# Patient Record
Sex: FEMALE | Race: WHITE | Employment: OTHER | URBAN - METROPOLITAN AREA
[De-identification: names, ages, dates, MRNs, and addresses within clinical notes are randomized per-mention and may not be internally consistent; named-entity substitution may affect disease eponyms.]

---

## 2017-01-17 ENCOUNTER — TELEPHONE (OUTPATIENT)
Dept: INTERNAL MEDICINE CLINIC | Age: 82
End: 2017-01-17

## 2017-01-17 DIAGNOSIS — R68.89 COLD INTOLERANCE: ICD-10-CM

## 2017-01-17 DIAGNOSIS — I10 ESSENTIAL HYPERTENSION: ICD-10-CM

## 2017-01-17 RX ORDER — ESTRADIOL 0.5 MG/1
TABLET ORAL
Qty: 90 TAB | Refills: 0 | Status: SHIPPED | OUTPATIENT
Start: 2017-01-17 | End: 2017-04-24 | Stop reason: SDUPTHER

## 2017-01-17 RX ORDER — FOSINOPIRL SODIUM 10 MG/1
10 TABLET ORAL DAILY
Qty: 90 TAB | Refills: 3 | Status: SHIPPED | OUTPATIENT
Start: 2017-01-17 | End: 2017-10-24 | Stop reason: SDUPTHER

## 2017-01-17 NOTE — TELEPHONE ENCOUNTER
Pt is very upset that her medications were denied.  She is requesting a return call from the nurse before 1:30pm to discuss this [phone]

## 2017-03-14 ENCOUNTER — OFFICE VISIT (OUTPATIENT)
Dept: INTERNAL MEDICINE CLINIC | Age: 82
End: 2017-03-14

## 2017-03-14 ENCOUNTER — HOSPITAL ENCOUNTER (OUTPATIENT)
Dept: LAB | Age: 82
Discharge: HOME | End: 2017-03-14
Payer: MEDICARE

## 2017-03-14 VITALS
OXYGEN SATURATION: 93 % | BODY MASS INDEX: 21.82 KG/M2 | SYSTOLIC BLOOD PRESSURE: 143 MMHG | RESPIRATION RATE: 18 BRPM | WEIGHT: 139 LBS | TEMPERATURE: 97.8 F | DIASTOLIC BLOOD PRESSURE: 79 MMHG | HEART RATE: 74 BPM | HEIGHT: 67 IN

## 2017-03-14 DIAGNOSIS — I10 ESSENTIAL HYPERTENSION: Primary | ICD-10-CM

## 2017-03-14 DIAGNOSIS — T73.3XXA FATIGUE DUE TO EXCESSIVE EXERTION, INITIAL ENCOUNTER: ICD-10-CM

## 2017-03-14 DIAGNOSIS — R73.02 GLUCOSE INTOLERANCE (IMPAIRED GLUCOSE TOLERANCE): ICD-10-CM

## 2017-03-14 DIAGNOSIS — E07.9 THYROID DISORDER: ICD-10-CM

## 2017-03-14 PROCEDURE — 83036 HEMOGLOBIN GLYCOSYLATED A1C: CPT

## 2017-03-14 PROCEDURE — 84443 ASSAY THYROID STIM HORMONE: CPT

## 2017-03-14 PROCEDURE — 84439 ASSAY OF FREE THYROXINE: CPT

## 2017-03-14 PROCEDURE — 86738 MYCOPLASMA ANTIBODY: CPT

## 2017-03-14 PROCEDURE — 80053 COMPREHEN METABOLIC PANEL: CPT

## 2017-03-14 RX ORDER — AMIODARONE HYDROCHLORIDE 100 MG/1
100 TABLET ORAL DAILY
COMMUNITY
End: 2018-02-01 | Stop reason: ALTCHOICE

## 2017-03-14 RX ORDER — ALBUTEROL SULFATE 90 UG/1
2 AEROSOL, METERED RESPIRATORY (INHALATION)
Qty: 1 INHALER | Refills: 1 | Status: SHIPPED | OUTPATIENT
Start: 2017-03-14 | End: 2018-02-01 | Stop reason: SDUPTHER

## 2017-03-14 RX ORDER — FLUTICASONE PROPIONATE 110 UG/1
2 AEROSOL, METERED RESPIRATORY (INHALATION) EVERY 12 HOURS
Qty: 1 INHALER | Refills: 0 | Status: SHIPPED | OUTPATIENT
Start: 2017-03-14 | End: 2018-02-01 | Stop reason: ALTCHOICE

## 2017-03-14 NOTE — PROGRESS NOTES
Have you been to the ER or urgent care clinic since your last [facility] DX with walking pnuemonia and COPD     Have you been hospitalized since your last visit? No     Have you been seen or consulted any other health care provider outside of [facility] since your last visit (including pap smears, colonoscopy screening)?    No

## 2017-03-14 NOTE — PROGRESS NOTES
Chief Complaint   Patient presents with    Wheezing     cougjh   congestion       Urgent Care 2/26/17       Dx with walking pnuemonia  an COPD       Mycoplasma pneumonia  Pt presents after diagnosis with mycoplasma 2/26/17. She reports she had sx for 2 weeks prior to her diagnosis. She was put on clindamyacin and sx are significantly better. She notes she did not have a + cxr but she was detected through her blood. (notes from [facility] [place] reviewed and noted + mycoplasma). She feels she does have some sob. she is concerned because she was told she had COPD although no hx of smoking and no hx of prior asthma. She has persistent fatigue at 3/10 but recently her baseline has been 5-6 /10 over the past 3 years. It use to be 9-10/10. Thyroid Disease:  Natalie Cuba is a 80 y.o. female here for follow up of hypothyroidism. Lab Results   Component Value Date/Time    TSH 0.426 12/12/2016 12:00 AM     Residual symptoms fatigue, feeling cold and cold intolerance, swelling and feeling slow. she denies feeling excessive energy and palpitations  Thyroid medication has been not decreased since last medication check and labs. She had tsh checked when she was sick revealing normal t4, tsh mildly elevated and elevated t3    Afib  She is followed closely by dr. Skylar Helton and Pardeep Quigley. She is currently on eliquis and no se    Subjective:   Natalie Cuba is a 80 y.o. female with hypertension. Hypertension ROS: taking medications as instructed, no medication side effects noted, no TIA's, no chest pain on exertion, no dyspnea on exertion, no swelling of ankles. New concerns: none. Rectal pouch  Pt had colonscopy. Dr. Carolynn Newman found pouch in rectum and recommened rectal surgery for pouch. Pt had not been able to go due to cardiac issues.         Past Medical History:   Diagnosis Date    Anemia, unspecified     Anxiety     Arrhythmia     a fib    Arthritis     osteoarthritis, rheumatoid    Atrial fibrillation [facility])     Dr. Christopher Cheng and Dr. Rob Mota  following    Autoimmune disease [facility])     sjogren disease Dr. Arsenio Kaye CAD (coronary artery disease)     HTN (hypertension)     Hyperlipidemia LDL goal < 100     Hypothyroid     Insomnia     Osteoarthritis     Sjogren's disease ([address]     Thoracic aneurysm without mention of rupture [facility])      Past Surgical History:   Procedure Laterality Date    HX CHOLECYSTECTOMY      HX COLONOSCOPY  3/2014    Dr. Jeremias Diggs    [facility]      hernia repairs    HX PARTIAL THYROIDECTOMY      HX JANNET AND BSO      HX VEIN STRIPPING       Social History     Social History    Marital status:      Spouse name: N/A    Number of children: N/A    Years of education: N/A     Social History Main Topics    Smoking status: Never Smoker    Smokeless tobacco: None    Alcohol use Yes      Comment: wine    Drug use: None    Sexual activity: Not Asked     Other Topics Concern    None     Social History Narrative    ** Merged History Encounter **          Family History   Problem Relation Age of Onset    Heart Disease Father      aneurysm    Cancer Maternal Grandfather      Current Outpatient Prescriptions   Medication Sig Dispense Refill    amiodarone (PACERONE) 100 mg tablet Take 100 mg by mouth daily.  fosinopril (MONOPRIL) 10 mg tablet Take 1 Tab by mouth daily. 90 Tab 3    estradiol (ESTRACE) 0.5 mg tablet Take 1 pill po daily. 90 Tab 0    levothyroxine (SYNTHROID) 100 mcg tablet Take 1 Tab by mouth Daily (before breakfast). 30 Tab 3    atenolol (TENORMIN) 25 mg tablet Take 25 mg by mouth daily.  diclofenac (VOLTAREN) 1 % gel Apply 4 g to affected area four (4) times daily. 100 g 0    PARoxetine (PAXIL) 30 mg tablet Take 1 Tab by mouth daily.  Indications: ANXIETY WITH DEPRESSION 30 Tab 2    ALPRAZolam (XANAX) 0.25 mg tablet Take 1/2 to 1 tablet po daily ONLY AS NEEDED 10 Tab 0    fluticasone (FLONASE) 50 mcg/actuation nasal spray       oxyCODONE IR (ROXICODONE) 5 mg immediate release tablet       eszopiclone (LUNESTA) 3 mg tablet Take 1 Tab by mouth nightly as needed for Sleep. Max Daily Amount: 3 mg. Please dispense brand only. Use sparingly 45 Tab 0    vitamin b comp & c no.4 (SUPER B COMPLEX + C) 150 mg tab Take  by mouth.  Cetirizine (ALL DAY ALLERGY, CETIRIZINE,) 10 mg cap Take  by mouth.  cholecalciferol, vitamin D3, (VITAMIN D3) 2,000 unit tab Take  by mouth.  CALCIUM CARBONATE (TUMS PO) Take  by mouth.  MULTIVITAMIN (MULTIPLE VITAMIN PO) Take  by mouth.  meloxicam (MOBIC) 7.5 mg tablet Take  by mouth daily as needed.  aspirin 81 mg tablet Take 81 mg by mouth. Allergies   Allergen Reactions    Latex Hives    Pcn [Penicillins] Anaphylaxis    Sulfa (Sulfonamide Antibiotics) Anaphylaxis    Biaxin [Clarithromycin] Nausea Only    Pcn [Penicillins] Hives    Sulfa (Sulfonamide Antibiotics) Nausea Only       Review of Systems - General ROS: positive for  - fatigue, malaise and sleep disturbance  negative for - chills or fever  Cardiovascular ROS: no chest pain or dyspnea on exertion  Respiratory ROS: no cough, shortness of breath, or wheezing    Visit Vitals    /79 (BP 1 Location: Left arm, BP Patient Position: Sitting)    Pulse 74    Temp 97.8 °F (36.6 °C) (Oral)    Resp 18    Ht 5' 7\" (1.702 m)    Wt 139 lb (63 kg)    SpO2 93%    BMI 21.77 kg/m2     General Appearance:  Well developed, well nourished,alert and oriented x 3, and individual in no acute distress. Ears/Nose/Mouth/Throat:   Hearing grossly normal.         Neck: Supple, no lad, no bruits   Chest:   Lungs clear to auscultation bilaterally. irregularly irregular   Cardiovascular:  Regular rate and rhythm, S1, S2 normal, no murmur. Abdomen:   Soft, non-tender, bowel sounds are active. Extremities: No edema bilaterally.     Skin: Warm and dry, no suspicious lesions                     Nirmal Kim was seen today for wheezing. Diagnoses and all orders for this visit:    Essential hypertension  Controlled   Cont meds  -     METABOLIC PANEL, COMPREHENSIVE    Thyroid disorder  Pt was checked and labs on 2/26/17 may reflect sick euthryoid will recheck today and adjust. Pt with fatigue and cold intolrance and may be from residual pneumonia but will check thryoid as rx was decreased due to mild hyperthryoid at last labs  -     T4, FREE  -     TSH 3RD GENERATION    Fatigue due to excessive exertion, initial encounter  Will need to follow up in 2 weeks  No hx of prior copd and prior xray not c/w this  May be from cardiac  Will try on inhalers and assess if beneficial  -     MYCOPLASMA AB, IGG/IGM  -     albuterol (PROVENTIL HFA, VENTOLIN HFA, PROAIR HFA) 90 mcg/actuation inhaler; Take 2 Puffs by inhalation every four (4) hours as needed for Wheezing or Shortness of Breath. -     fluticasone (FLOVENT HFA) 110 mcg/actuation inhaler; Take 2 Puffs by inhalation every twelve (12) hours. Rinse mouth after use    Glucose intolerance (impaired glucose tolerance)  -     HEMOGLOBIN A1C WITH EAG      I spent 25 min with this patient and >50% of the time was spent on counseling and management of fatigue and dyspnea. For urology She still needs to follow up after Dr. Marquis Farrell. She still needs follow up with dr. Cecilia Ross for recommended further testing manometry and defecography. I asked her to follow up re: her breathing in 1-2 weeks. Son lives close by and helping with pt. This note will not be viewable in [address]

## 2017-03-14 NOTE — MR AVS SNAPSHOT
Visit Information Date & Time Provider Department Dept. Phone Encounter #  
 3/14/2017 11:00 AM Kary Tristan MD [facility] [address] [id number] Upcoming Health Maintenance Date Due Pneumococcal 65+ Low/Medium Risk (1 of 2 - PCV13) 4/22/1993 INFLUENZA AGE 9 TO ADULT 8/1/2016 MEDICARE YEARLY EXAM 12/3/2016 GLAUCOMA SCREENING Q2Y 4/10/2017 DTaP/Tdap/Td series (2 - Td) 1/13/2024 Allergies as of 3/14/2017  Review Complete On: 3/14/2017 By: Kary Tristan MD  
  
 Severity Noted Reaction Type Reactions Latex  04/09/2013    Hives Pcn [Penicillins] High 01/26/2015   Systemic Anaphylaxis Sulfa (Sulfonamide Antibiotics) High 01/26/2015   Systemic Anaphylaxis Biaxin [Clarithromycin]  04/09/2013    Nausea Only Pcn [Penicillins]  04/09/2013    Hives Sulfa (Sulfonamide Antibiotics)  04/09/2013    Nausea Only Current Immunizations  Reviewed on 12/3/2015 Name Date Tdap 1/13/2014 Not reviewed this visit You Were Diagnosed With   
  
 Codes Comments Essential hypertension    -  Primary ICD-10-CM: I10 
ICD-9-CM: 401.9 Thyroid disorder     ICD-10-CM: E07.9 ICD-9-CM: 246. 9 Fatigue due to excessive exertion, initial encounter     ICD-10-CM: T73. 3XXA ICD-9-CM: 994.5, E927.9 Vitals BP Pulse Temp Resp Height(growth percentile) Weight(growth percentile) 143/79 (BP 1 Location: Left arm, BP Patient Position: Sitting) 74 97.8 °F (36.6 °C) (Oral) 18 5' 7\" (1.702 m) 139 lb (63 kg) SpO2 BMI OB Status Smoking Status 93% 21.77 kg/m2 Hysterectomy Never Smoker BMI and BSA Data Body Mass Index Body Surface Area 21.77 kg/m 2 1.73 m 2 Preferred Pharmacy Pharmacy Name Phone [facility], [address] [phone] Your Updated Medication List  
  
   
 This list is accurate as of: 3/14/17 12:06 PM.  Always use your most recent med list.  
  
  
  
  
 albuterol 90 mcg/actuation inhaler Commonly known as:  PROVENTIL HFA, VENTOLIN HFA, PROAIR HFA Take 2 Puffs by inhalation every four (4) hours as needed for Wheezing or Shortness of Breath. ALL DAY ALLERGY (CETIRIZINE) 10 mg Cap Generic drug:  Cetirizine Take  by mouth. ALPRAZolam 0.25 mg tablet Commonly known as:  Willjacinta Phoenix Take 1/2 to 1 tablet po daily ONLY AS NEEDED  
  
 amiodarone 100 mg tablet Commonly known as:  Ladd Boeck Take 100 mg by mouth daily. aspirin 81 mg tablet Take 81 mg by mouth. atenolol 25 mg tablet Commonly known as:  TENORMIN Take 25 mg by mouth daily. diclofenac 1 % Gel Commonly known as:  VOLTAREN Apply 4 g to affected area four (4) times daily. estradiol 0.5 mg tablet Commonly known as:  ESTRACE Take 1 pill po daily. eszopiclone 3 mg tablet Commonly known as:  Sherryle Revel Take 1 Tab by mouth nightly as needed for Sleep. Max Daily Amount: 3 mg. Please dispense brand only. Use sparingly * fluticasone 50 mcg/actuation nasal spray Commonly known as:  FLONASE  
  
 * fluticasone 110 mcg/actuation inhaler Commonly known as:  FLOVENT HFA Take 2 Puffs by inhalation every twelve (12) hours. Rinse mouth after use  
  
 fosinopril 10 mg tablet Commonly known as:  MONOPRIL Take 1 Tab by mouth daily. levothyroxine 100 mcg tablet Commonly known as:  SYNTHROID Take 1 Tab by mouth Daily (before breakfast). MOBIC 7.5 mg tablet Generic drug:  meloxicam  
Take  by mouth daily as needed. MULTIPLE VITAMIN PO Take  by mouth. oxyCODONE IR 5 mg immediate release tablet Commonly known as:  Tonia Sotelo PARoxetine 30 mg tablet Commonly known as:  PAXIL Take 1 Tab by mouth daily. Indications: ANXIETY WITH DEPRESSION  
  
 SUPER B COMPLEX + C 150 mg Tab Generic drug:  vitamin b comp & c no.4 Take  by mouth. TUMS PO Take  by mouth. VITAMIN D3 2,000 unit Tab Generic drug:  cholecalciferol (vitamin D3) Take  by mouth. * Notice: This list has 2 medication(s) that are the same as other medications prescribed for you. Read the directions carefully, and ask your doctor or other care provider to review them with you. Prescriptions Printed Refills  
 fluticasone (FLOVENT HFA) 110 mcg/actuation inhaler 0 Sig: Take 2 Puffs by inhalation every twelve (12) hours. Rinse mouth after use Class: Print Route: Inhalation Prescriptions Sent to Pharmacy Refills  
 albuterol (PROVENTIL HFA, VENTOLIN HFA, PROAIR HFA) 90 mcg/actuation inhaler 1 Sig: Take 2 Puffs by inhalation every four (4) hours as needed for Wheezing or Shortness of Breath. Class: Normal  
 Pharmacy: [facility], [address] #: [phone] Route: Inhalation We Performed the Following METABOLIC PANEL, COMPREHENSIVE [92941 CPT(R)] MYCOPLASMA AB, IGG/IGM [id number] CPT(R)] T4, FREE [id number] CPT(R)] TSH 3RD GENERATION [79132 CPT(R)] Introducing [facility]! Dear Lavell Page: 
Thank you for requesting a [employer] account. Our records indicate that you already have an active [employer] account. You can access your account anytime at [email or web address] Did you know that you can access your hospital and ER discharge instructions at any time in [employer]? You can also review all of your test results from your hospital stay or ER visit. Additional Information If you have questions, please visit the Frequently Asked Questions section of the [employer] website at [email or web address] Remember, [employer] is NOT to be used for urgent needs. For medical emergencies, dial 911. Now available from your iPhone and Android! Please provide this summary of care documentation to your next provider. Your primary care clinician is listed as Felipe Almodovar. If you have any questions after today's visit, please call [phone].

## 2017-03-16 LAB
ALBUMIN SERPL-MCNC: 4 G/DL (ref 3.5–4.7)
ALBUMIN/GLOB SERPL: 1.7 {RATIO} (ref 1.2–2.2)
ALP SERPL-CCNC: 72 IU/L (ref 39–117)
ALT SERPL-CCNC: 10 IU/L (ref 0–32)
AST SERPL-CCNC: 21 IU/L (ref 0–40)
BILIRUB SERPL-MCNC: 0.5 MG/DL (ref 0–1.2)
BUN SERPL-MCNC: 15 MG/DL (ref 8–27)
BUN/CREAT SERPL: 19 (ref 11–26)
CALCIUM SERPL-MCNC: 9.1 MG/DL (ref 8.7–10.3)
CHLORIDE SERPL-SCNC: 97 MMOL/L (ref 96–106)
CO2 SERPL-SCNC: 27 MMOL/L (ref 18–29)
CREAT SERPL-MCNC: 0.77 MG/DL (ref 0.57–1)
EST. AVERAGE GLUCOSE BLD GHB EST-MCNC: 126 MG/DL
GLOBULIN SER CALC-MCNC: 2.4 G/DL (ref 1.5–4.5)
GLUCOSE SERPL-MCNC: 84 MG/DL (ref 65–99)
HBA1C MFR BLD: 6 % (ref 4.8–5.6)
M PNEUMO IGG SER IA-ACNC: <100 U/ML (ref 0–99)
M PNEUMO IGM SER IA-ACNC: <770 U/ML (ref 0–769)
POTASSIUM SERPL-SCNC: 4.8 MMOL/L (ref 3.5–5.2)
PROT SERPL-MCNC: 6.4 G/DL (ref 6–8.5)
SODIUM SERPL-SCNC: 139 MMOL/L (ref 134–144)
T4 FREE SERPL-MCNC: 1.74 NG/DL (ref 0.82–1.77)
TSH SERPL DL<=0.005 MIU/L-ACNC: 4.6 UIU/ML (ref 0.45–4.5)

## 2017-03-21 ENCOUNTER — OFFICE VISIT (OUTPATIENT)
Dept: INTERNAL MEDICINE CLINIC | Age: 82
End: 2017-03-21

## 2017-03-21 VITALS
RESPIRATION RATE: 20 BRPM | TEMPERATURE: 97.9 F | BODY MASS INDEX: 22.13 KG/M2 | OXYGEN SATURATION: 97 % | HEART RATE: 102 BPM | HEIGHT: 67 IN | WEIGHT: 141 LBS | SYSTOLIC BLOOD PRESSURE: 129 MMHG | DIASTOLIC BLOOD PRESSURE: 86 MMHG

## 2017-03-21 DIAGNOSIS — Z13.31 SCREENING FOR DEPRESSION: ICD-10-CM

## 2017-03-21 DIAGNOSIS — Z00.00 MEDICARE ANNUAL WELLNESS VISIT, INITIAL: Primary | ICD-10-CM

## 2017-03-21 DIAGNOSIS — J43.9 PULMONARY EMPHYSEMA, UNSPECIFIED EMPHYSEMA TYPE (HCC): ICD-10-CM

## 2017-03-21 DIAGNOSIS — I10 ESSENTIAL HYPERTENSION: ICD-10-CM

## 2017-03-21 DIAGNOSIS — Z00.00 ROUTINE GENERAL MEDICAL EXAMINATION AT A HEALTH CARE FACILITY: ICD-10-CM

## 2017-03-21 DIAGNOSIS — J01.00 SUBACUTE MAXILLARY SINUSITIS: ICD-10-CM

## 2017-03-21 DIAGNOSIS — Z71.89 ADVANCED CARE PLANNING/COUNSELING DISCUSSION: ICD-10-CM

## 2017-03-21 DIAGNOSIS — Z13.39 SCREENING FOR ALCOHOLISM: ICD-10-CM

## 2017-03-21 RX ORDER — DOXYCYCLINE 100 MG/1
100 TABLET ORAL 2 TIMES DAILY
Qty: 20 TAB | Refills: 0 | Status: SHIPPED | OUTPATIENT
Start: 2017-03-21 | End: 2017-05-04 | Stop reason: ALTCHOICE

## 2017-03-21 NOTE — PROGRESS NOTES
Chief Complaint   Patient presents with    Thyroid Problem    Annual Wellness Visit     Pt presents for follow up from pneumnia last visit and thyroid as well as medicare annual..  Please see JABIER Kaye note for [employer]ha [employer]. Pt does not have any questions for me about this part of exam    Mycoplasma pneumonia  She reports she is feeling a little better. Labs reviewed. Presents with nasal blockage, post nasal drip and bilateral sinus pain for 7 days. Denies shortness of breath, chest pain, abdominal pain, nausea, vomiting,  sneezing and swollen glands. Symptoms are moderate. Recent treatment for similar symptoms? None. Has tried over-the-counter remedies flonase with mild and paritial relief of symptoms. Contacts with similar infections: no.  Asthma?:  no.   reports that she has never smoked. She does not have any smokeless tobacco history on file. COPD  Discussed with pt smoking hx and she reports some use at 16-19 yo but exposed to second hand smoke from parents and first . She feels better on her advair inhaler and has been compliant with use. Her energy level is much better as well. Thyroid Disease:  Jossie Bains is a 80 y.o. female here for follow up of hypothyroidism. Lab Results   Component Value Date/Time    TSH 4.600 03/14/2017 12:41 PM     Residual symptoms fatigue, feeling cold and cold intolerance, swelling and feeling slow. she denies feeling excessive energy and palpitations  Thyroid medication has been not decreased since last medication check and labs. She had tsh checked when she was sick revealing normal t4, tsh mildly elevated and elevated t3. This was rechecked and similar readings    Afib  She is followed closely by dr. Lorenzo Lam and Marie Mason. She is currently on eliquis and no se    Subjective:   Jossie Bains is a 80 y.o. female with hypertension.   Hypertension ROS: taking medications as instructed, no medication side effects noted, no TIA's, no chest pain on exertion, no dyspnea on exertion, no swelling of ankles. New concerns: none. Rectal pouch  Pt had colonscopy. Dr. Ioana Dang found pouch in rectum and recommened rectal surgery for pouch. Pt had not been able to go due to cardiac issues. Past Medical History:   Diagnosis Date    Anemia, unspecified     Anxiety     Arrhythmia     a fib    Arthritis     osteoarthritis, rheumatoid    Atrial fibrillation (HCC)     Dr. Hannah Deluna and Dr. Kaycee Ruffin  following    Autoimmune disease [facility])     sjogren disease Dr. Nicky Chi CAD (coronary artery disease)     HTN (hypertension)     Hyperlipidemia LDL goal < 100     Hypothyroid     Insomnia     Osteoarthritis     [address]ogren's disease ([facility]     Thoracic aneurysm without mention of rupture [facility])      Past Surgical History:   Procedure Laterality Date    HX CHOLECYSTECTOMY      HX COLONOSCOPY  3/2014    Dr. Naheed Wasserman    [facility]      hernia repairs    HX PARTIAL THYROIDECTOMY      HX JANNET AND BSO      HX VEIN STRIPPING       Social History     Social History    Marital status:      Spouse name: N/A    Number of children: N/A    Years of education: N/A     Social History Main Topics    Smoking status: Never Smoker    Smokeless tobacco: None    Alcohol use Yes      Comment: wine    Drug use: None    Sexual activity: Not Asked     Other Topics Concern    None     Social History Narrative    ** Merged History Encounter **          Family History   Problem Relation Age of Onset    Heart Disease Father      aneurysm    Cancer Maternal Grandfather      Current Outpatient Prescriptions   Medication Sig Dispense Refill    doxycycline (ADOXA) 100 mg tablet Take 1 Tab by mouth two (2) times a day. Do not take with calcium or other multivtamins 20 Tab 0    amiodarone (PACERONE) 100 mg tablet Take 100 mg by mouth daily.  apixaban (ELIQUIS) 5 mg tablet Take 1 Tab by mouth two (2) times a day.  RX BY CARDIOLOGY 1 Tab 0    fosinopril (MONOPRIL) 10 mg tablet Take 1 Tab by mouth daily. 90 Tab 3    estradiol (ESTRACE) 0.5 mg tablet Take 1 pill po daily. 90 Tab 0    levothyroxine (SYNTHROID) 100 mcg tablet Take 1 Tab by mouth Daily (before breakfast). 30 Tab 3    atenolol (TENORMIN) 25 mg tablet Take 25 mg by mouth daily.  PARoxetine (PAXIL) 30 mg tablet Take 1 Tab by mouth daily. Indications: ANXIETY WITH DEPRESSION 30 Tab 2    eszopiclone (LUNESTA) 3 mg tablet Take 1 Tab by mouth nightly as needed for Sleep. Max Daily Amount: 3 mg. Please dispense brand only. Use sparingly 45 Tab 0    vitamin b comp & c no.4 (SUPER B COMPLEX + C) 150 mg tab Take  by mouth.  Cetirizine (ALL DAY ALLERGY, CETIRIZINE,) 10 mg cap Take  by mouth.  cholecalciferol, vitamin D3, (VITAMIN D3) 2,000 unit tab Take  by mouth.  CALCIUM CARBONATE (TUMS PO) Take  by mouth.  MULTIVITAMIN (MULTIPLE VITAMIN PO) Take  by mouth.  albuterol (PROVENTIL HFA, VENTOLIN HFA, PROAIR HFA) 90 mcg/actuation inhaler Take 2 Puffs by inhalation every four (4) hours as needed for Wheezing or Shortness of Breath. 1 Inhaler 1    fluticasone (FLOVENT HFA) 110 mcg/actuation inhaler Take 2 Puffs by inhalation every twelve (12) hours. Rinse mouth after use 1 Inhaler 0    ALPRAZolam (XANAX) 0.25 mg tablet Take 1/2 to 1 tablet po daily ONLY AS NEEDED 10 Tab 0    fluticasone (FLONASE) 50 mcg/actuation nasal spray       oxyCODONE IR (ROXICODONE) 5 mg immediate release tablet       aspirin 81 mg tablet Take 81 mg by mouth.        Allergies   Allergen Reactions    Latex Hives    Pcn [Penicillins] Anaphylaxis    Sulfa (Sulfonamide Antibiotics) Anaphylaxis    Biaxin [Clarithromycin] Nausea Only    Pcn [Penicillins] Hives    Sulfa (Sulfonamide Antibiotics) Nausea Only       Review of Systems - General ROS: positive for  - fatigue, malaise and sleep disturbance  negative for - chills or fever  Cardiovascular ROS: no chest pain or dyspnea on exertion  Respiratory ROS: no cough, shortness of breath, or wheezing    Visit Vitals    /86 (BP 1 Location: Left arm, BP Patient Position: Sitting)    Pulse (!) 102    Temp 97.9 °F (36.6 °C) (Oral)    Resp 20    Ht 5' 7\" (1.702 m)    Wt 141 lb (64 kg)    SpO2 97%    BMI 22.08 kg/m2     General Appearance:  Well developed, well nourished,alert and oriented x 3, and individual in no acute distress. Ears/Nose/Mouth/Throat:   Hearing grossly normal. Maxillary sinus pain on palpation         Neck: Supple, no lad, no bruits   Chest:   Lungs clear to auscultation bilaterally. irregularly irregular   Cardiovascular:  Regular rate and rhythm, S1, S2 normal, no murmur. Abdomen:   Soft, non-tender, bowel sounds are active. Extremities: No edema bilaterally. Skin: Warm and dry, no suspicious lesions                     Reagan Rosado was seen today for thyroid problem and annual wellness visit. Diagnoses and all orders for this visit:    Medicare annual wellness visit, initial    Subacute maxillary sinusitis  Would like for her not to be on abx right now  Will simply saline, flonase and ipratropium sprays for now  ini will add on doxy, noted she was on levoquin  Sx improved then came back    Pulmonary emphysema, unspecified emphysema type ([address]  Due to second hand smoke  I think she is doing better on the advair. Will keep her on this. Once her sinusitis clears and if stable for 3 months may wean her off if no other issues on hand    Essential hypertension  Cont meds    Routine general medical examination at a health care facility    Screening for alcoholism    Screening for depression    Advanced care planning/counseling discussion    Other orders  -     doxycycline (ADOXA) 100 mg tablet; Take 1 Tab by mouth two (2) times a day. Do not take with calcium or other multivtamins      I spent 25 min with this patient and >50% of the time was spent on counseling and management of copd, sinusitis, fatiuge.   Will not step off advair until sinus sx resolve Son lives close by and helping with pt. This note will not be viewable in [address]

## 2017-03-21 NOTE — PATIENT INSTRUCTIONS
Medicare Part B Preventive Services Guidelines/Limitations Date last completed and Frequency Due Date   Bone Mass Measurement  (age 72 & older, biennial) Requires diagnosis related to osteoporosis or estrogen deficiency. Biennial benefit unless patient has history of long-term glucocorticoid tx or baseline is needed because initial test was by other method Completed 5/2013    Recommended every 2 years As recommended by your PCP or Specialist     Cardiovascular Screening Blood Tests (every 5 years)  Total cholesterol, HDL, Triglycerides Order as a panel if possible Completed 7/2016    As recommended by your PCP As recommended by your PCP or Specialist   Colorectal Cancer Screening  -Fecal occult blood test (annual)  -Flexible sigmoidoscopy (5y)  -Screening colonoscopy (10y)  -Barium Enema Age 49-80; After age [de-identified] if history of abnormal results Completed 3/19/2014     Recommended every 5 to 10 years  As recommended by your PCP or Specialist     Counseling to Prevent Tobacco Use (up to 8 sessions per year)  - Counseling greater than 3 and up to 10 minutes  - Counseling greater than 10 minutes Patients must be asymptomatic of tobacco-related conditions to receive as preventive service N/A N/A   Diabetes Screening Tests (at least every 3 years, Medicare covers annually or at 6-month intervals for prediabetic patients)    Fasting blood sugar (FBS) or glucose tolerance test (GTT) Patient must be diagnosed with one of the following:  -Hypertension, Dyslipidemia, obesity, previous impaired FBS or GTT  Or any two of the following: overweight, FH of diabetes, age ? 72, history of gestational diabetes, birth of baby weighing more than 9 pounds Completed 2/2017    Recommended every 3 years for non-diabetics     As recommended by your PCP or Specialist     Glaucoma Screening (no USPSTF recommendation) Diabetes mellitus, family history, , age 48 or over,  American, age 72 or over Completed within the last year    Recommended annually As recommended by your PCP or Specialist   Seasonal Influenza Vaccination (annually)  Completed Fall 2016    Recommended Annually Completed for 2016 flu season   TDAP Vaccination  Completed 1/2014    Recommended every 10 years As recommended by your PCP or Specialist   Zoster (Shingles) Vaccination Covered by Medicare Part D through the pharmacy- PCP provides prescription Completed 1/2013    Recommended once over age 48  Complete   Pneumococcal Vaccination (once after 72)  Pneumo 23-   Recommended once over the age of 72    Prevnar 15-  Recommended once over the age of 72 As recommended by your PCP or Specialist    You are due for a Prevnar 13 vaccine. You can get this at your local pharmacy with a prescription from your PCP. Screening Mammography (biennial age 54-69) Annually (age 36 or over) As recommended by your PCP or Specialist   As recommended by your PCP or Specialist     Screening Pap Tests and Pelvic Examination (up to age 79 and after 79 if unknown history or abnormal study last 8 years) Every 25 months except high risk As recommended by your PCP or Specialist   As recommended by your PCP or Specialist     Ultrasound Screening for Abdominal Aortic Aneurysm (AAA) (once) Patient must be referred through [facility] and not have had a screening for abdominal aortic aneurysm before under Medicare. Limited to patients who meet one of the following criteria:  - Men who are 73-68 years old and have smoked more than 100 cigarettes in their lifetime.  -Anyone with a FH of AAA  -Anyone recommended for screening by USPSTF Not indicated unless recommended by PCP   Not indicated unless recommended by PCP     Family Practice Management 2011    Please bring a copy of your completed advance medical directive to the office so it may be added to your medical record. Thank you. If you have any questions or concerns please feel free to contact me at [phone].   It was a pleasure meeting you today and participating in your healthcare.   Krista Delgado RN

## 2017-03-21 NOTE — PROGRESS NOTES
Nurse Navigator Medicare Wellness Visit performed by HIMA Gonzalez    This is an Initial Anastasiya Exam (AWV) (Performed 12 months after IPPE or effective date of Medicare Part B enrollment, Once in a lifetime)    I have reviewed the patient's medical history in detail and updated the computerized patient record. History     Past Medical History:   Diagnosis Date    Anemia, unspecified     Anxiety     Arrhythmia     a fib    Arthritis     osteoarthritis, rheumatoid    Atrial fibrillation (HCC)     Dr. Yuan Brand and Dr. Joyce Cespedes  following    Autoimmune disease [facility])     sjogren disease Dr. Rajiv Wallace CAD (coronary artery disease)     HTN (hypertension)     Hyperlipidemia LDL goal < 100     Hypothyroid     Insomnia     Osteoarthritis     [facility]ogren's [facility] ([facility]     Thoracic aneurysm without mention of rupture ([facility]       Past Surgical History:   Procedure Laterality Date    HX CHOLECYSTECTOMY      HX COLONOSCOPY  3/2014    Dr. Farrah Kendall    HX OTHER SURGICAL      hernia repairs    HX PARTIAL THYROIDECTOMY      HX JANNET AND BSO      HX VEIN STRIPPING       Current Outpatient Prescriptions   Medication Sig Dispense Refill    doxycycline (ADOXA) 100 mg tablet Take 1 Tab by mouth two (2) times a day. Do not take with calcium or other multivtamins 20 Tab 0    amiodarone (PACERONE) 100 mg tablet Take 100 mg by mouth daily.  apixaban (ELIQUIS) 5 mg tablet Take 1 Tab by mouth two (2) times a day. RX BY CARDIOLOGY 1 Tab 0    fosinopril (MONOPRIL) 10 mg tablet Take 1 Tab by mouth daily. 90 Tab 3    estradiol (ESTRACE) 0.5 mg tablet Take 1 pill po daily. 90 Tab 0    levothyroxine (SYNTHROID) 100 mcg tablet Take 1 Tab by mouth Daily (before breakfast). 30 Tab 3    atenolol (TENORMIN) 25 mg tablet Take 25 mg by mouth daily.  PARoxetine (PAXIL) 30 mg tablet Take 1 Tab by mouth daily.  Indications: ANXIETY WITH DEPRESSION 30 Tab 2    eszopiclone (LUNESTA) 3 mg tablet Take 1 Tab by mouth nightly as needed for Sleep. Max Daily Amount: 3 mg. Please dispense brand only. Use sparingly 45 Tab 0    vitamin b comp & c no.4 (SUPER B COMPLEX + C) 150 mg tab Take  by mouth.  Cetirizine (ALL DAY ALLERGY, CETIRIZINE,) 10 mg cap Take  by mouth.  cholecalciferol, vitamin D3, (VITAMIN D3) 2,000 unit tab Take  by mouth.  CALCIUM CARBONATE (TUMS PO) Take  by mouth.  MULTIVITAMIN (MULTIPLE VITAMIN PO) Take  by mouth.  albuterol (PROVENTIL HFA, VENTOLIN HFA, PROAIR HFA) 90 mcg/actuation inhaler Take 2 Puffs by inhalation every four (4) hours as needed for Wheezing or Shortness of Breath. 1 Inhaler 1    fluticasone (FLOVENT HFA) 110 mcg/actuation inhaler Take 2 Puffs by inhalation every twelve (12) hours. Rinse mouth after use 1 Inhaler 0    ALPRAZolam (XANAX) 0.25 mg tablet Take 1/2 to 1 tablet po daily ONLY AS NEEDED 10 Tab 0    fluticasone (FLONASE) 50 mcg/actuation nasal spray       oxyCODONE IR (ROXICODONE) 5 mg immediate release tablet       aspirin 81 mg tablet Take 81 mg by mouth.        Allergies   Allergen Reactions    Latex Hives    Pcn [Penicillins] Anaphylaxis    Sulfa (Sulfonamide Antibiotics) Anaphylaxis    Biaxin [Clarithromycin] Nausea Only    Pcn [Penicillins] Hives    Sulfa (Sulfonamide Antibiotics) Nausea Only     Family History   Problem Relation Age of Onset    Heart Disease Father      aneurysm    Cancer Maternal Grandfather      Social History   Substance Use Topics    Smoking status: Never Smoker    Smokeless tobacco: Not on file    Alcohol use Yes      Comment: wine     Patient Active Problem List   Diagnosis Code    Anxiety F41.9    Insomnia G47.00    HTN (hypertension) I10    Hyperlipidemia LDL goal < 100 E78.5    Osteoarthritis M19.90    Hypothyroid E03.9    CAD (coronary artery disease) I25.10    Atrial fibrillation ([address] I48.91    Thoracic aneurysm without mention of rupture (HCC) I71.2    Anemia, unspecified D64.9    Personal history of colonic polyps Z86.010    Advanced care planning/counseling discussion Z71.89         Depression Risk Factor Screening:   Patient denies feelings of being down, depressed or hopeless at this time. Patient states that they have a strong support system within their family & friends. Patient shares that she has been healing from diagnoses of pneumonia & a sinus infection simultaneously which kept her at home. Patient adds that she is just now beginning to feel better & she has started driving again. Patient states that while she was sick, she was feeling low & very fatigued. Patient states that she was in contact with PCP during the illness. Patient is in good spirits today. Patient denies thoughts of harm to self or others. PHQ 2 / 9, over the last two weeks 3/21/2017   Little interest or pleasure in doing things Not at all   Feeling down, depressed or hopeless Not at all   Total Score PHQ 2 0     Alcohol Risk Factor Screening: On any occasion during the past 3 months, have you had more than 3 drinks containing alcohol? No    Do you average more than 7 drinks per week? No    Functional Ability and Level of Safety:     Hearing Loss   normal-to-mild    Activities of Daily Living   Self-care. Patient states that she lives alone in a private condominium & her son checks in on her often. Patient adds that her two neighbors are very supportive as well. Patient states independence in all ADLs & denies the use of assistive devices for ambulation. NN encouraged patient to continue and/ or introduce routine physical exercise into their daily routine as applicable & as recommended by PCP. Patient verbalized understanding & agreement to take this into consideration; however, patient has been laying low for the past few weeks while healing from pneumonia & a sinus infection.    Requires assistance with:   ADL Assessment 3/21/2017   Feeding yourself No Help Needed   Getting from bed to chair No Help Needed Getting dressed No Help Needed   Bathing or showering No Help Needed   Walk across the room (includes cane/walker) No Help Needed   Using the telphone No Help Needed   Taking your medications No Help Needed   Preparing meals No Help Needed   Managing money (expenses/bills) No Help Needed   Moderately strenuous housework (laundry) No Help Needed   Shopping for personal items (toiletries/medicines) No Help Needed   Shopping for groceries No Help Needed   Driving No Help Needed   Climbing a flight of stairs No Help Needed   Getting to places beyond walking distances No Help Needed       Fall Risk   Patient reports one minor trip & fall at home. Patient states that she was cleaning & had moved a rug which she then tripped over. Patient states that she was able to get up by herself after the fall. Patient denies injury at the time of the fall. Patient denies a visit to the ER/ MD at the time of the fall. Patient denies the use of assistive devices for ambulation. Patient denies any additional falls. Patient denies feeling dizzy, weak, lightheaded & states no loss of consciousness at the time of the fall. Patient verbalized fall prevention strategies. Fall Risk Assessment, last 12 mths 3/21/2017   Able to walk? Yes   Fall in past 12 months? Yes   Fall with injury? No   Number of falls in past 12 months 1   Fall Risk Score 1     Abuse Screen   Patient is not abused    Review of Systems   Medicare Wellness Visit    Physical Examination     No exam data present    Evaluation of Cognitive Function:  Mood/affect:  happy  Appearance: age appropriate and casually dressed  Family member/caregiver input: None present; however, patient reports a strong support system. No exam performed today, Medicare Wellness Visit.     Patient Care Team:  Jasen Barrera MD as PCP - General (Internal Medicine)  Jasen Barrera MD (Internal Medicine)    Advice/Referrals/Counseling   Education and counseling provided:  End-of-Life planning (with patient's consent)  Pneumococcal Vaccine  Influenza Vaccine  Screening Mammography  Screening Pap and pelvic (covered once every 2 years)  Colorectal cancer screening tests  Bone mass measurement (DEXA)  Screening for glaucoma  tdap & shingles vaccinations      Assessment/Plan   1. Patient states that a completed Advanced Medical Directive is at home. NN encouraged patient to bring a copy of the completed Advanced Medical Directive to the office for scanning into the medical record. Patient verbalized understanding & agreement. 2. Patient is up to date on the following immunizations: flu vaccine (admin 10/2016), tdap vaccine (admin 1/2014), shingles vaccine (admin 1/2013). Patient confirmed the aforementioned preventative immunization dates are correct. Patient is unsure of her past pneumonia vaccine history & will inquire with PCP today about getting a pneumonia vaccine as she does not want to catch pneumonia again. PCP notified. Patient's health maintenance immunization record has been updated & is current. 3. Due to the patient's age, screening mammograms & screening colonoscopies (report on file from 3/19/2014 performed by Dr. Belma Hatchet at [facility]) are no longer indicated unless recommended by PCP or a specialist. Patient's last screening dexa scan was completed 5/2013 & a report is on file in the patient's medical record. 4. Patient was not wearing corrective lenses. Patient reports having a routine eye exam & glaucoma screening within the last year performed by [facility] Taiwo Gonzalez [facility]. [facility] faxed requesting a copy of patient's last eye exam with glaucoma screening with patient's verbal approval.     5. Please see depression screening section for additional information. Patient verbalized understanding of all information discussed. Patient was given the opportunity to ask questions.   Medication reconciliation completed by MA/ LPN and reviewed by PCP. Patient provided AVS which includes Medicare Wellness Preventative Screening Table.

## 2017-03-21 NOTE — MR AVS SNAPSHOT
Visit Information Date & Time Provider Department Dept. Phone Encounter #  
 3/21/2017  2:15 PM Rebeca Bentley MD [facility] [address] [id number] Upcoming Health Maintenance Date Due Pneumococcal 65+ Low/Medium Risk (1 of 2 - PCV13) 4/22/1993 GLAUCOMA SCREENING Q2Y 4/10/2017 MEDICARE YEARLY EXAM 3/22/2018 DTaP/Tdap/Td series (2 - Td) 1/13/2024 Allergies as of 3/21/2017  Review Complete On: 3/21/2017 By: Teresa Bruno LPN Severity Noted Reaction Type Reactions Latex  04/09/2013    Hives Pcn [Penicillins] High 01/26/2015   Systemic Anaphylaxis Sulfa (Sulfonamide Antibiotics) High 01/26/2015   Systemic Anaphylaxis Biaxin [Clarithromycin]  04/09/2013    Nausea Only Pcn [Penicillins]  04/09/2013    Hives Sulfa (Sulfonamide Antibiotics)  04/09/2013    Nausea Only Current Immunizations  Reviewed on 12/3/2015 Name Date Influenza Vaccine 10/15/2016 Tdap 1/13/2014 Not reviewed this visit You Were Diagnosed With   
  
 Codes Comments Subacute maxillary sinusitis    -  Primary ICD-10-CM: J01.00 ICD-9-CM: 461.0 Pulmonary emphysema, unspecified emphy[facility] type ([facility]     ICD-10-CM: J43.9 ICD-9-CM: 492.8 Essential hypertension     ICD-10-CM: I10 
ICD-9-CM: 401.9 Vitals BP Pulse Temp Resp Height(growth percentile) Weight(growth percentile) 129/86 (BP 1 Location: Left arm, BP Patient Position: Sitting) (!) 102 97.9 °F (36.6 °C) (Oral) 20 5' 7\" (1.702 m) 141 lb (64 kg) SpO2 BMI OB Status Smoking Status 97% 22.08 kg/m2 Hysterectomy Never Smoker BMI and BSA Data Body Mass Index Body Surface Area 22.08 kg/m 2 1.74 m 2 Preferred Pharmacy Pharmacy Name Phone [facility], [address] [phone] Your Updated Medication List  
  
   
 This list is accurate as of: 3/21/17  3:14 PM.  Always use your most recent med list.  
  
  
  
  
 albuterol 90 mcg/actuation inhaler Commonly known as:  PROVENTIL HFA, VENTOLIN HFA, PROAIR HFA Take 2 Puffs by inhalation every four (4) hours as needed for Wheezing or Shortness of Breath. ALL DAY ALLERGY (CETIRIZINE) 10 mg Cap Generic drug:  Cetirizine Take  by mouth. ALPRAZolam 0.25 mg tablet Commonly known as:  Ace Sa Take 1/2 to 1 tablet po daily ONLY AS NEEDED  
  
 amiodarone 100 mg tablet Commonly known as:  Isma Heady Take 100 mg by mouth daily. apixaban 5 mg tablet Commonly known as:  Cliffton Rubinstein Take 1 Tab by mouth two (2) times a day. RX BY CARDIOLOGY  
  
 aspirin 81 mg tablet Take 81 mg by mouth. atenolol 25 mg tablet Commonly known as:  TENORMIN Take 25 mg by mouth daily. doxycycline 100 mg tablet Commonly known as:  ADOXA Take 1 Tab by mouth two (2) times a day. Do not take with calcium or other multivtamins  
  
 estradiol 0.5 mg tablet Commonly known as:  ESTRACE Take 1 pill po daily. eszopiclone 3 mg tablet Commonly known as:  Sandra Nicks Take 1 Tab by mouth nightly as needed for Sleep. Max Daily Amount: 3 mg. Please dispense brand only. Use sparingly * fluticasone 50 mcg/actuation nasal spray Commonly known as:  FLONASE  
  
 * fluticasone 110 mcg/actuation inhaler Commonly known as:  FLOVENT HFA Take 2 Puffs by inhalation every twelve (12) hours. Rinse mouth after use  
  
 fosinopril 10 mg tablet Commonly known as:  MONOPRIL Take 1 Tab by mouth daily. levothyroxine 100 mcg tablet Commonly known as:  SYNTHROID Take 1 Tab by mouth Daily (before breakfast). MULTIPLE VITAMIN PO Take  by mouth. oxyCODONE IR 5 mg immediate release tablet Commonly known as:  Zohra Jimenez PARoxetine 30 mg tablet Commonly known as:  PAXIL Take 1 Tab by mouth daily.  Indications: ANXIETY WITH DEPRESSION  
  
 SUPER B COMPLEX + C 150 mg Tab Generic drug:  vitamin b comp & c no. 4 Take  by mouth. TUMS PO Take  by mouth. VITAMIN D3 2,000 unit Tab Generic drug:  cholecalciferol (vitamin D3) Take  by mouth. * Notice: This list has 2 medication(s) that are the same as other medications prescribed for you. Read the directions carefully, and ask your doctor or other care provider to review them with you. Prescriptions Sent to Pharmacy Refills  
 doxycycline (ADOXA) 100 mg tablet 0 Sig: Take 1 Tab by mouth two (2) times a day. Do not take with calcium or other multivtamins Class: Normal  
 [facility] [address] #: [phone] Route: Oral  
  
Patient Instructions Medicare Part B Preventive Services Guidelines/Limitations Date last completed and Frequency Due Date Bone Mass Measurement 
(age 72 & older, biennial) Requires diagnosis related to osteoporosis or estrogen deficiency. Biennial benefit unless patient has history of long-term glucocorticoid tx or baseline is needed because initial test was by other method Completed 5/2013 Recommended every 2 years As recommended by your PCP or Specialist 
  
Cardiovascular Screening Blood Tests (every 5 years) Total cholesterol, HDL, Triglycerides Order as a panel if possible Completed 7/2016 As recommended by your PCP As recommended by your PCP or Specialist  
Colorectal Cancer Screening 
-Fecal occult blood test (annual) -Flexible sigmoidoscopy (5y) 
-Screening colonoscopy (10y) -Barium Enema Age 49-80; After age [de-identified] if history of abnormal results Completed 3/19/2014 Recommended every 5 to 10 years  As recommended by your PCP or Specialist 
  
Counseling to Prevent Tobacco Use (up to 8 sessions per year) - Counseling greater than 3 and up to 10 minutes - Counseling greater than 10 minutes Patients must be asymptomatic of tobacco-related conditions to receive as preventive service N/A N/A Diabetes Screening Tests (at least every 3 years, Medicare covers annually or at 6-month intervals for prediabetic patients) Fasting blood sugar (FBS) or glucose tolerance test (GTT) Patient must be diagnosed with one of the following: 
-Hypertension, Dyslipidemia, obesity, previous impaired FBS or GTT 
Or any two of the following: overweight, FH of diabetes, age ? 72, history of gestational diabetes, birth of baby weighing more than 9 pounds Completed 2/2017 Recommended every 3 years for non-diabetics As recommended by your PCP or Specialist 
  
Glaucoma Screening (no USPSTF recommendation) Diabetes mellitus, family history, , age 48 or over,  American, age 72 or over Completed within the last year Recommended annually As recommended by your PCP or Specialist  
Seasonal Influenza Vaccination (annually)  Completed Fall 2016 Recommended Annually Completed for 2016 flu season TDAP Vaccination  Completed 1/2014 Recommended every 10 years As recommended by your PCP or Specialist  
Zoster (Shingles) Vaccination Covered by Medicare Part D through the pharmacy- PCP provides prescription Completed 1/2013 Recommended once over age 48  Complete Pneumococcal Vaccination (once after 65)  Pneumo 23- Recommended once over the age of 72 Prevnar 13-  Recommended once over the age of 72 As recommended by your PCP or Specialist 
 
You are due for a Prevnar 13 vaccine. You can get this at your local pharmacy with a prescription from your PCP.    
Screening Mammography (biennial age 54-69) Annually (age 36 or over) As recommended by your PCP or Specialist 
 As recommended by your PCP or Specialist 
  
Screening Pap Tests and Pelvic Examination (up to age 79 and after 79 if unknown history or abnormal study last 10 years) Every 24 months except high risk As recommended by your PCP or Specialist 
 As recommended by your PCP or Specialist 
  
Ultrasound Screening for Abdominal Aortic Aneurysm (AAA) (once) Patient must be referred through [facility] and not have had a screening for abdominal aortic aneurysm before under Medicare. Limited to patients who meet one of the following criteria: 
- Men who are 73-68 years old and have smoked more than 100 cigarettes in their lifetime. 
-Anyone with a FH of AAA 
-Anyone recommended for screening by USPSTF Not indicated unless recommended by PCP Not indicated unless recommended by PCP Family Practice Management 2011 Please bring a copy of your completed advance medical directive to the office so it may be added to your medical record. Thank you. If you have any questions or concerns please feel free to contact me at [phone]. It was a pleasure meeting you today and participating in your healthcare. Krishna Will RN Introducing [facility]! Dear Ricardo Corrales: 
Thank you for requesting a [employer] account. Our records indicate that you already have an active [employer] account. You can access your account anytime at [email or web address] Did you know that you can access your hospital and ER discharge instructions at any time in Of[employer]? You can also review all of your test results from your hospital stay or ER visit. Additional Information If you have questions, please visit the Frequently Asked Questions section of the [employer] website at [email or web address] Remember, [employer] is NOT to be used for urgent needs. For medical emergencies, dial 911. Now available from your iPhone and Android! Please provide this summary of care documentation to your next provider. Your primary care clinician is listed as Joyce Iraheta. If you have any questions after today's visit, please call [phone].

## 2017-03-22 RX ORDER — ESZOPICLONE 3 MG/1
3 TABLET, FILM COATED ORAL
Qty: 45 TAB | Refills: 0 | OUTPATIENT
Start: 2017-03-22 | End: 2017-04-18 | Stop reason: SDUPTHER

## 2017-03-22 NOTE — TELEPHONE ENCOUNTER
Verbal order per Dr. Delia Turk for calling in medications   Requested Prescriptions     Signed Prescriptions Disp Refills    eszopiclone (LUNESTA) 3 mg tablet 45 Tab 0     Sig: Take 1 Tab by mouth nightly as needed for Sleep. Max Daily Amount: 3 mg. Please dispense brand only. Use sparingly     Authorizing Provider: Osvaldo Grewal             Phone in.

## 2017-04-24 DIAGNOSIS — R68.89 COLD INTOLERANCE: ICD-10-CM

## 2017-04-25 ENCOUNTER — TELEPHONE (OUTPATIENT)
Dept: INTERNAL MEDICINE CLINIC | Age: 82
End: 2017-04-25

## 2017-04-25 NOTE — TELEPHONE ENCOUNTER
----- Message from Lelia Rose sent at 4/25/2017  9:06 AM EDT -----  Regarding: Dr. Lenard Anton: [phone]  Pt's request alternative sleeping Rx because ( Eszopiclone) is not working.  Pt also request refill Rx Estradiol. [phone]

## 2017-04-26 RX ORDER — ESTRADIOL 0.5 MG/1
TABLET ORAL
Qty: 90 TAB | Refills: 0 | Status: SHIPPED | OUTPATIENT
Start: 2017-04-26 | End: 2017-07-26 | Stop reason: SDUPTHER

## 2017-05-04 ENCOUNTER — OFFICE VISIT (OUTPATIENT)
Dept: INTERNAL MEDICINE CLINIC | Age: 82
End: 2017-05-04

## 2017-05-04 ENCOUNTER — HOSPITAL ENCOUNTER (OUTPATIENT)
Dept: LAB | Age: 82
Discharge: HOME | End: 2017-05-04
Payer: MEDICARE

## 2017-05-04 VITALS
BODY MASS INDEX: 21.82 KG/M2 | HEIGHT: 67 IN | DIASTOLIC BLOOD PRESSURE: 84 MMHG | SYSTOLIC BLOOD PRESSURE: 154 MMHG | RESPIRATION RATE: 14 BRPM | WEIGHT: 139 LBS | TEMPERATURE: 97.8 F | OXYGEN SATURATION: 97 % | HEART RATE: 74 BPM

## 2017-05-04 DIAGNOSIS — I48.20 CHRONIC ATRIAL FIBRILLATION (HCC): ICD-10-CM

## 2017-05-04 DIAGNOSIS — I10 ESSENTIAL HYPERTENSION: ICD-10-CM

## 2017-05-04 DIAGNOSIS — F51.01 PRIMARY INSOMNIA: Primary | ICD-10-CM

## 2017-05-04 PROCEDURE — 80053 COMPREHEN METABOLIC PANEL: CPT

## 2017-05-04 PROCEDURE — 84443 ASSAY THYROID STIM HORMONE: CPT

## 2017-05-04 PROCEDURE — 85027 COMPLETE CBC AUTOMATED: CPT

## 2017-05-04 PROCEDURE — 84439 ASSAY OF FREE THYROXINE: CPT

## 2017-05-04 RX ORDER — PAROXETINE HYDROCHLORIDE 20 MG/1
TABLET, FILM COATED ORAL
COMMUNITY
Start: 2017-03-10 | End: 2017-08-08 | Stop reason: SDUPTHER

## 2017-05-04 RX ORDER — LEVOTHYROXINE SODIUM 112 UG/1
TABLET ORAL
COMMUNITY
Start: 2017-03-15 | End: 2017-05-04 | Stop reason: SDUPTHER

## 2017-05-04 RX ORDER — ATENOLOL 50 MG/1
TABLET ORAL
COMMUNITY
Start: 2017-01-25 | End: 2017-05-04 | Stop reason: SDUPTHER

## 2017-05-04 RX ORDER — AMIODARONE HYDROCHLORIDE 200 MG/1
TABLET ORAL
COMMUNITY
Start: 2017-04-06 | End: 2017-05-04 | Stop reason: SDUPTHER

## 2017-05-04 RX ORDER — ESZOPICLONE 3 MG/1
3 TABLET, FILM COATED ORAL
Qty: 30 TAB | Refills: 1 | Status: SHIPPED | OUTPATIENT
Start: 2017-05-04 | End: 2017-07-20 | Stop reason: SDUPTHER

## 2017-05-04 RX ORDER — MELOXICAM 7.5 MG/1
7.5 TABLET ORAL DAILY
COMMUNITY
Start: 2017-04-21 | End: 2018-12-04 | Stop reason: ALTCHOICE

## 2017-05-04 NOTE — MR AVS SNAPSHOT
Visit Information Date & Time Provider Department Dept. Phone Encounter #  
 5/4/2017 11:00 AM Marina Alvarez MD [facility] [address] [id number] Upcoming Health Maintenance Date Due Pneumococcal 65+ Low/Medium Risk (1 of 2 - PCV13) 4/22/1993 INFLUENZA AGE 9 TO ADULT 8/1/2017 MEDICARE YEARLY EXAM 3/22/2018 GLAUCOMA SCREENING Q2Y 4/29/2018 DTaP/Tdap/Td series (2 - Td) 1/13/2024 Allergies as of 5/4/2017  Review Complete On: 5/4/2017 By: Nhan Chandler LPN Severity Noted Reaction Type Reactions Latex  04/09/2013    Hives Pcn [Penicillins] High 01/26/2015   Systemic Anaphylaxis Sulfa (Sulfonamide Antibiotics) High 01/26/2015   Systemic Anaphylaxis Biaxin [Clarithromycin]  04/09/2013    Nausea Only Pcn [Penicillins]  04/09/2013    Hives Sulfa (Sulfonamide Antibiotics)  04/09/2013    Nausea Only Current Immunizations  Reviewed on 12/3/2015 Name Date Influenza Vaccine 10/15/2016 Tdap 1/13/2014 Not reviewed this visit You Were Diagnosed With   
  
 Codes Comments Primary insomnia    -  Primary ICD-10-CM: F51.01 
ICD-9-CM: 307.42 Chronic atrial fibrillation (HCC)     ICD-10-CM: U84.7 ICD-9-CM: 427.31 Essential hypertension     ICD-10-CM: I10 
ICD-9-CM: 401.9 Vitals BP Pulse Temp Resp Height(growth percentile) Weight(growth percentile) 154/84 (BP 1 Location: Left arm, BP Patient Position: Sitting) 74 97.8 °F (36.6 °C) (Oral) 14 5' 7\" (1.702 m) 139 lb (63 kg) SpO2 BMI OB Status Smoking Status 97% 21.77 kg/m2 Hysterectomy Never Smoker BMI and BSA Data Body Mass Index Body Surface Area 21.77 kg/m 2 1.73 m 2 Preferred Pharmacy Pharmacy Name Phone [facility], [address] [phone] Your Updated Medication List  
  
   
 This list is accurate as of: 5/4/17 12:08 PM.  Always use your most recent med list.  
  
  
  
  
 albuterol 90 mcg/actuation inhaler Commonly known as:  PROVENTIL HFA, VENTOLIN HFA, PROAIR HFA Take 2 Puffs by inhalation every four (4) hours as needed for Wheezing or Shortness of Breath. ALL DAY ALLERGY (CETIRIZINE) 10 mg Cap Generic drug:  Cetirizine Take  by mouth. ALPRAZolam 0.25 mg tablet Commonly known as:  Alondra Sham Take 1/2 to 1 tablet po daily ONLY AS NEEDED  
  
 amiodarone 100 mg tablet Commonly known as:  Pamelia Shallow Take 100 mg by mouth daily. apixaban 5 mg tablet Commonly known as:  Ioanaanjelica Miller Take 1 Tab by mouth two (2) times a day. RX BY CARDIOLOGY  
  
 aspirin 81 mg tablet Take 81 mg by mouth. atenolol 25 mg tablet Commonly known as:  TENORMIN Take 25 mg by mouth daily. estradiol 0.5 mg tablet Commonly known as:  ESTRACE  
TAKE ONE TABLET BY MOUTH DAILY  
  
 eszopiclone 3 mg tablet Commonly known as:  Reagan Picket Take 1 Tab by mouth nightly as needed for Sleep. Max Daily Amount: 3 mg. Use with caution as may cause falls and sedation * fluticasone 50 mcg/actuation nasal spray Commonly known as:  FLONASE  
  
 * fluticasone 110 mcg/actuation inhaler Commonly known as:  FLOVENT HFA Take 2 Puffs by inhalation every twelve (12) hours. Rinse mouth after use  
  
 fosinopril 10 mg tablet Commonly known as:  MONOPRIL Take 1 Tab by mouth daily. meloxicam 7.5 mg tablet Commonly known as:  MOBIC  
  
 MULTIPLE VITAMIN PO Take  by mouth. oxyCODONE IR 5 mg immediate release tablet Commonly known as:  ROXICODONE  
  
 * PARoxetine 30 mg tablet Commonly known as:  PAXIL Take 1 Tab by mouth daily. Indications: ANXIETY WITH DEPRESSION  
  
 * PARoxetine 20 mg tablet Commonly known as:  PAXIL SUPER B COMPLEX + C 150 mg Tab Generic drug:  vitamin b comp & c no. 4 Take  by mouth. SYNTHROID 100 mcg tablet Generic drug:  levothyroxine TAKE ONE TABLET BY MOUTH DAILY BEFORE BREAKFAST  
  
 TUMS PO Take  by mouth. VITAMIN D3 2,000 unit Tab Generic drug:  cholecalciferol (vitamin D3) Take  by mouth. * Notice: This list has 4 medication(s) that are the same as other medications prescribed for you. Read the directions carefully, and ask your doctor or other care provider to review them with you. Prescriptions Printed Refills  
 eszopiclone (LUNESTA) 3 mg tablet 1 Sig: Take 1 Tab by mouth nightly as needed for Sleep. Max Daily Amount: 3 mg. Use with caution as may cause falls and sedation Class: Print Route: Oral  
  
We Performed the Following CBC W/O DIFF [53019 CPT(R)] METABOLIC PANEL, COMPREHENSIVE [37944 CPT(R)] T4, FREE [id number] CPT(R)] TSH 3RD GENERATION [82797 CPT(R)] Introducing [facility] SERVICES! Dear Darren Martins: 
Thank you for requesting a GiveGab account. Our records indicate that you already have an active GiveGab account. You can access your account anytime at [email or web address]air/[employer] Did you know that you can access your hospital and ER discharge instructions at any time in Give[employer]? You can also review all of your test results from your hospital stay or ER visit. Additional Information If you have questions, please visit the Frequently Asked Questions section of the GiveGab website at [email or web address]. Remember, GiveGab is NOT to be used for urgent needs. For medical emergencies, dial 911. Now available from your iPhone and Android! Please provide this summary of care documentation to your next provider. Your primary care clinician is listed as Manny Arnold. If you have any questions after today's visit, please call [phone].

## 2017-05-05 DIAGNOSIS — E07.9 THYROID DISORDER: Primary | ICD-10-CM

## 2017-05-05 LAB
ALBUMIN SERPL-MCNC: 4.2 G/DL (ref 3.5–4.7)
ALBUMIN/GLOB SERPL: 1.7 {RATIO} (ref 1.2–2.2)
ALP SERPL-CCNC: 64 IU/L (ref 39–117)
ALT SERPL-CCNC: 16 IU/L (ref 0–32)
AST SERPL-CCNC: 27 IU/L (ref 0–40)
BILIRUB SERPL-MCNC: 0.6 MG/DL (ref 0–1.2)
BUN SERPL-MCNC: 20 MG/DL (ref 8–27)
BUN/CREAT SERPL: 26 (ref 12–28)
CALCIUM SERPL-MCNC: 9 MG/DL (ref 8.7–10.3)
CHLORIDE SERPL-SCNC: 97 MMOL/L (ref 96–106)
CO2 SERPL-SCNC: 27 MMOL/L (ref 18–29)
CREAT SERPL-MCNC: 0.76 MG/DL (ref 0.57–1)
ERYTHROCYTE [DISTWIDTH] IN BLOOD BY AUTOMATED COUNT: 15 % (ref 12.3–15.4)
GLOBULIN SER CALC-MCNC: 2.5 G/DL (ref 1.5–4.5)
GLUCOSE SERPL-MCNC: 83 MG/DL (ref 65–99)
HCT VFR BLD AUTO: 39.9 % (ref 34–46.6)
HGB BLD-MCNC: 13.6 G/DL (ref 11.1–15.9)
MCH RBC QN AUTO: 31.6 PG (ref 26.6–33)
MCHC RBC AUTO-ENTMCNC: 34.1 G/DL (ref 31.5–35.7)
MCV RBC AUTO: 93 FL (ref 79–97)
PLATELET # BLD AUTO: 143 X10E3/UL (ref 150–379)
POTASSIUM SERPL-SCNC: 4.7 MMOL/L (ref 3.5–5.2)
PROT SERPL-MCNC: 6.7 G/DL (ref 6–8.5)
RBC # BLD AUTO: 4.31 X10E6/UL (ref 3.77–5.28)
SODIUM SERPL-SCNC: 139 MMOL/L (ref 134–144)
T4 FREE SERPL-MCNC: 1.94 NG/DL (ref 0.82–1.77)
TSH SERPL DL<=0.005 MIU/L-ACNC: 4.74 UIU/ML (ref 0.45–4.5)
WBC # BLD AUTO: 3.4 X10E3/UL (ref 3.4–10.8)

## 2017-05-05 NOTE — PROGRESS NOTES
Chief Complaint   Patient presents with    Thyroid Problem     SUBJECTIVE: Leslie Reilly is a 80 y.o. female here for follow up of hypothyroidism. Lab Results   Component Value Date/Time    TSH 4.600 03/14/2017 12:41 PM     Thyroid ROS: fatigue, weight gain, feeling cold and cold intolerance, constipation, feeling slow, losing hair and anxiousness. Mycoplasma pneumonia  She reports she is feeling a little better. Labs reviewed. COPD  Discussed with pt smoking hx and she reports some use at 16-21 yo but exposed to second hand smoke from parents and first . She feels better on her advair inhaler and has been compliant with use. Her energy level is much better as well. She denies respiratory sx on this visit. Afib  She is followed closely by dr. Aide Garcia and Cameron Madden. She is currently on eliquis and no se. She was told to get cbc, liver and thryoid checked. Subjective:   Leslie Reilly is a 80 y.o. female with hypertension. Hypertension ROS: taking medications as instructed, no medication side effects noted, no TIA's, no chest pain on exertion, no dyspnea on exertion, no swelling of ankles. New concerns: she has a hx of some hypotensive type sx so her atenolol was decreased from 25 mg to 1/2 tablet po at night. She now reports her bp is elevated and can feel in her head    Rectal pouch  Pt had colonscopy. Dr. Danny Garcia found pouch in rectum and recommened rectal surgery for pouch. Pt had not been able to go due to cardiac issues.         Past Medical History:   Diagnosis Date    Anemia, unspecified     Anxiety     Arrhythmia     a fib    Arthritis     osteoarthritis, rheumatoid    Atrial fibrillation (HCC)     Dr. Jerardo Rae and Dr. Leobardo Greene  following    Autoimmune disease [facility])     sjogren disease Dr. Drea Palma CAD (coronary artery disease)     HTN (hypertension)     Hyperlipidemia LDL goal < 100     Hypothyroid     Insomnia     Osteoarthritis     Sjogren's disease [facility])     Thoracic aneurysm without mention of rupture      Past Surgical History:   Procedure Laterality Date    HX CHOLECYSTECTOMY      HX COLONOSCOPY  3/2014    Dr. Landeros Halo    HX OTHER SURGICAL      hernia repairs    HX PARTIAL THYROIDECTOMY      HX JANNET AND BSO      HX VEIN STRIPPING       Social History     Social History    Marital status:      Spouse name: N/A    Number of children: N/A    Years of education: N/A     Social History Main Topics    Smoking status: Never Smoker    Smokeless tobacco: None    Alcohol use Yes      Comment: wine    Drug use: None    Sexual activity: Not Asked     Other Topics Concern    None     Social History Narrative    ** Merged History Encounter **          Family History   Problem Relation Age of Onset    Heart Disease Father      aneurysm    Cancer Maternal Grandfather      Current Outpatient Prescriptions   Medication Sig Dispense Refill    meloxicam (MOBIC) 7.5 mg tablet       PARoxetine (PAXIL) 20 mg tablet       eszopiclone (LUNESTA) 3 mg tablet Take 1 Tab by mouth nightly as needed for Sleep. Max Daily Amount: 3 mg. Use with caution as may cause falls and sedation 30 Tab 1    estradiol (ESTRACE) 0.5 mg tablet TAKE ONE TABLET BY MOUTH DAILY 90 Tab 0    SYNTHROID 100 mcg tablet TAKE ONE TABLET BY MOUTH DAILY BEFORE BREAKFAST 30 Tab 2    amiodarone (PACERONE) 100 mg tablet Take 100 mg by mouth daily.  apixaban (ELIQUIS) 5 mg tablet Take 1 Tab by mouth two (2) times a day. RX BY CARDIOLOGY 1 Tab 0    fosinopril (MONOPRIL) 10 mg tablet Take 1 Tab by mouth daily. 90 Tab 3    atenolol (TENORMIN) 25 mg tablet Take 25 mg by mouth daily.  PARoxetine (PAXIL) 30 mg tablet Take 1 Tab by mouth daily. Indications: ANXIETY WITH DEPRESSION 30 Tab 2    fluticasone (FLONASE) 50 mcg/actuation nasal spray       vitamin b comp & c no.4 (SUPER B COMPLEX + C) 150 mg tab Take  by mouth.       Cetirizine (ALL DAY ALLERGY, CETIRIZINE,) 10 mg cap Take  by mouth.      cholecalciferol, vitamin D3, (VITAMIN D3) 2,000 unit tab Take  by mouth.  CALCIUM CARBONATE (TUMS PO) Take  by mouth.  MULTIVITAMIN (MULTIPLE VITAMIN PO) Take  by mouth.  albuterol (PROVENTIL HFA, VENTOLIN HFA, PROAIR HFA) 90 mcg/actuation inhaler Take 2 Puffs by inhalation every four (4) hours as needed for Wheezing or Shortness of Breath. 1 Inhaler 1    fluticasone (FLOVENT HFA) 110 mcg/actuation inhaler Take 2 Puffs by inhalation every twelve (12) hours. Rinse mouth after use 1 Inhaler 0    ALPRAZolam (XANAX) 0.25 mg tablet Take 1/2 to 1 tablet po daily ONLY AS NEEDED 10 Tab 0    oxyCODONE IR (ROXICODONE) 5 mg immediate release tablet       aspirin 81 mg tablet Take 81 mg by mouth. Allergies   Allergen Reactions    Latex Hives    Pcn [Penicillins] Anaphylaxis    Sulfa (Sulfonamide Antibiotics) Anaphylaxis    Biaxin [Clarithromycin] Nausea Only    Pcn [Penicillins] Hives    Sulfa (Sulfonamide Antibiotics) Nausea Only       Review of Systems - General ROS: positive for  - fatigue, malaise and sleep disturbance  negative for - chills or fever  Cardiovascular ROS: no chest pain or dyspnea on exertion  Respiratory ROS: no cough, shortness of breath, or wheezing    Visit Vitals    /84 (BP 1 Location: Left arm, BP Patient Position: Sitting)    Pulse 74    Temp 97.8 °F (36.6 °C) (Oral)    Resp 14    Ht 5' 7\" (1.702 m)    Wt 139 lb (63 kg)    SpO2 97%    BMI 21.77 kg/m2     General Appearance:  Well developed, well nourished,alert and oriented x 3, and individual in no acute distress. Ears/Nose/Mouth/Throat:   Hearing grossly normal. Maxillary sinus pain on palpation         Neck: Supple, no lad, no bruits   Chest:   Lungs clear to auscultation bilaterally. irregularly irregular   Cardiovascular:  Regular rate and rhythm, S1, S2 normal, no murmur. Abdomen:   Soft, non-tender, bowel sounds are active. Extremities: No edema bilaterally.     Skin: Warm and dry, no suspicious lesions                     Santa Redman was seen today for thyroid problem. Diagnoses and all orders for this visit:    Primary insomnia  LONG discussion with pt. Discussed se of lunesta and other meds to help with insomnia with negative se profile for pt >70 yo. I will give her a letter outlining se and she would like to sign. She adamantly would like to stay with her lunesta to preserve her QOL. She does not want to try new meds at her age and reports was told she was suppose to get adequate sleep from cardiology. The lunesta 2 mg does not work for her and wants the 3 mg tablet. She has been feeling much worse on the 2 mg and notes fatigue is worse since on the 2 mg. She has been on the [place] for 23 years at least or med similar to [place]. -     eszopiclone (LUNESTA) 3 mg tablet; Take 1 Tab by mouth nightly as needed for Sleep. Max Daily Amount: 3 mg. Use with caution as may cause falls and sedation  -     CBC W/O DIFF    Chronic atrial fibrillation (HCC)  Cont meds  Check labs   Follow up with cardiology  -     CBC W/O DIFF  -     TSH 3RD GENERATION  -     T4, FREE  -     METABOLIC PANEL, COMPREHENSIVE    Essential hypertension  I rechecked her bp and 146/84  Can stay on current bp as outlined by cardiology. If bp increasing may need 25 mg atenolol as 1/2 tab bid as 25 mg one dose she develops se profile        This note will not be viewable in [employer]. I spent 25 min with pt and >50% of the time was spent on management and counseling of pt re: her lunesta se profile but also acknowledged may be helping her cv status and bp management. She will follow up in 1 month. This note will not be viewable in [address]

## 2017-05-23 ENCOUNTER — TELEPHONE (OUTPATIENT)
Dept: INTERNAL MEDICINE CLINIC | Age: 82
End: 2017-05-23

## 2017-05-23 NOTE — TELEPHONE ENCOUNTER
Spoke to pt, told pt that she has an appt with Endocrinology (Dr. Braxton Cobos) on 7/24/2017, pt verbalized understanding.

## 2017-05-23 NOTE — TELEPHONE ENCOUNTER
Pt states she has been unable to get in with any of the thyroid drs that were recommended to her.  Wants to know if the office can get her in or if she can be recommended to another dr. Please call [phone]

## 2017-06-09 RX ORDER — PAROXETINE HYDROCHLORIDE 20 MG/1
TABLET, FILM COATED ORAL
Qty: 90 TAB | Refills: 0 | Status: SHIPPED | OUTPATIENT
Start: 2017-06-09 | End: 2017-09-08 | Stop reason: SDUPTHER

## 2017-07-15 DIAGNOSIS — E03.9 ACQUIRED HYPOTHYROIDISM: ICD-10-CM

## 2017-07-16 RX ORDER — LEVOTHYROXINE SODIUM 100 UG/1
TABLET ORAL
Qty: 30 TAB | Refills: 1 | Status: SHIPPED | OUTPATIENT
Start: 2017-07-16 | End: 2017-09-19 | Stop reason: SDUPTHER

## 2017-07-20 DIAGNOSIS — F51.01 PRIMARY INSOMNIA: ICD-10-CM

## 2017-07-20 NOTE — TELEPHONE ENCOUNTER
Pt states that Migueljordancarlos alberto Darby has faxed twice. She is out of medication and depends on her son to get the medication. He is at the pharmacy now and she would like to have this called in.

## 2017-07-20 NOTE — TELEPHONE ENCOUNTER
I spoke with patient to advised she needs to be seen for her 1 month, appt scheduled with pcp. Rx request sent to pcp.

## 2017-07-21 RX ORDER — ESZOPICLONE 3 MG/1
3 TABLET, FILM COATED ORAL
Qty: 30 TAB | Refills: 1 | Status: SHIPPED | OUTPATIENT
Start: 2017-07-21 | End: 2017-08-08 | Stop reason: SDUPTHER

## 2017-07-21 NOTE — TELEPHONE ENCOUNTER
Pt has to rely on others to get her medication. She would like for the prescription to be sent so she can get it today after therapy.

## 2017-07-26 DIAGNOSIS — R68.89 COLD INTOLERANCE: ICD-10-CM

## 2017-07-26 RX ORDER — ESTRADIOL 0.5 MG/1
TABLET ORAL
Qty: 90 TAB | Refills: 0 | Status: SHIPPED | OUTPATIENT
Start: 2017-07-26 | End: 2017-10-27 | Stop reason: SDUPTHER

## 2017-08-08 ENCOUNTER — HOSPITAL ENCOUNTER (OUTPATIENT)
Dept: LAB | Age: 82
Discharge: HOME | End: 2017-08-08
Payer: MEDICARE

## 2017-08-08 ENCOUNTER — OFFICE VISIT (OUTPATIENT)
Dept: INTERNAL MEDICINE CLINIC | Age: 82
End: 2017-08-08

## 2017-08-08 VITALS
BODY MASS INDEX: 21.66 KG/M2 | SYSTOLIC BLOOD PRESSURE: 114 MMHG | OXYGEN SATURATION: 96 % | TEMPERATURE: 98.2 F | WEIGHT: 138 LBS | DIASTOLIC BLOOD PRESSURE: 72 MMHG | HEART RATE: 80 BPM | HEIGHT: 67 IN | RESPIRATION RATE: 16 BRPM

## 2017-08-08 DIAGNOSIS — F51.01 PRIMARY INSOMNIA: Primary | ICD-10-CM

## 2017-08-08 DIAGNOSIS — I10 ESSENTIAL HYPERTENSION: ICD-10-CM

## 2017-08-08 DIAGNOSIS — D50.9 IRON DEFICIENCY ANEMIA, UNSPECIFIED IRON DEFICIENCY ANEMIA TYPE: ICD-10-CM

## 2017-08-08 DIAGNOSIS — E07.9 THYROID DISORDER: ICD-10-CM

## 2017-08-08 PROCEDURE — 82728 ASSAY OF FERRITIN: CPT

## 2017-08-08 PROCEDURE — 84439 ASSAY OF FREE THYROXINE: CPT

## 2017-08-08 PROCEDURE — 85027 COMPLETE CBC AUTOMATED: CPT

## 2017-08-08 PROCEDURE — 36415 COLL VENOUS BLD VENIPUNCTURE: CPT

## 2017-08-08 PROCEDURE — 84443 ASSAY THYROID STIM HORMONE: CPT

## 2017-08-08 PROCEDURE — 83550 IRON BINDING TEST: CPT

## 2017-08-08 RX ORDER — ESZOPICLONE 2 MG/1
2 TABLET, FILM COATED ORAL
Qty: 30 TAB | Refills: 0 | Status: SHIPPED | OUTPATIENT
Start: 2017-08-08 | End: 2019-02-16 | Stop reason: CLARIF

## 2017-08-08 NOTE — MR AVS SNAPSHOT
Visit Information Date & Time Provider Department Dept. Phone Encounter #  
 8/8/2017  1:15 PM Laurel Marshall MD [facility] [address] [id number] Upcoming Health Maintenance Date Due Pneumococcal 65+ Low/Medium Risk (1 of 2 - PCV13) 4/22/1993 INFLUENZA AGE 9 TO ADULT 8/1/2017 MEDICARE YEARLY EXAM 3/22/2018 GLAUCOMA SCREENING Q2Y 4/29/2018 DTaP/Tdap/Td series (2 - Td) 1/13/2024 Allergies as of 8/8/2017  Review Complete On: 8/8/2017 By: Laurel Marshall MD  
  
 Severity Noted Reaction Type Reactions Latex  04/09/2013    Hives Pcn [Penicillins] High 01/26/2015   Systemic Anaphylaxis Sulfa (Sulfonamide Antibiotics) High 01/26/2015   Systemic Anaphylaxis Biaxin [Clarithromycin]  04/09/2013    Nausea Only Pcn [Penicillins]  04/09/2013    Hives Sulfa (Sulfonamide Antibiotics)  04/09/2013    Nausea Only Current Immunizations  Reviewed on 12/3/2015 Name Date Influenza Vaccine 10/15/2016 Tdap 1/13/2014 Not reviewed this visit You Were Diagnosed With   
  
 Codes Comments Primary insomnia    -  Primary ICD-10-CM: F51.01 
ICD-9-CM: 307.42 Iron deficiency anemia, unspecified iron deficiency anemia type     ICD-10-CM: D50.9 ICD-9-CM: 280.9 Essential hypertension     ICD-10-CM: I10 
ICD-9-CM: 401.9 Thyroid disorder     ICD-10-CM: E07.9 ICD-9-CM: 246. 9 Vitals BP Pulse Temp Resp Height(growth percentile) Weight(growth percentile) 114/72 (BP 1 Location: Left arm, BP Patient Position: Sitting) 80 98.2 °F (36.8 °C) (Oral) 16 5' 7\" (1.702 m) 138 lb (62.6 kg) SpO2 BMI OB Status Smoking Status 96% 21.61 kg/m2 Hysterectomy Never Smoker Vitals History BMI and BSA Data Body Mass Index Body Surface Area  
 21.61 kg/m 2 1.72 m 2 Preferred Pharmacy Pharmacy Name Phone  [facility] [address] [facility] [address] [phone] Your Updated Medication List  
  
   
This list is accurate as of: 8/8/17  2:03 PM.  Always use your most recent med list.  
  
  
  
  
 albuterol 90 mcg/actuation inhaler Commonly known as:  PROVENTIL HFA, VENTOLIN HFA, PROAIR HFA Take 2 Puffs by inhalation every four (4) hours as needed for Wheezing or Shortness of Breath. ALL DAY ALLERGY (CETIRIZINE) 10 mg Cap Generic drug:  Cetirizine Take  by mouth. ALPRAZolam 0.25 mg tablet Commonly known as:  Nirmala Roman Take 1/2 to 1 tablet po daily ONLY AS NEEDED  
  
 amiodarone 100 mg tablet Commonly known as:  Georgia Rey Take 100 mg by mouth daily. apixaban 5 mg tablet Commonly known as:  Cecille Redmondan Take 1 Tab by mouth two (2) times a day. RX BY CARDIOLOGY  
  
 aspirin 81 mg tablet Take 81 mg by mouth. atenolol 25 mg tablet Commonly known as:  TENORMIN Take 25 mg by mouth daily. estradiol 0.5 mg tablet Commonly known as:  ESTRACE Take 1 po daily but try to wean off as not a good medication with cardiac issues. eszopiclone 2 mg tablet Commonly known as:  Sherryn Waterloo Take 1 Tab by mouth nightly as needed for Sleep. Max Daily Amount: 2 mg. Use with caution as may cause falls and sedation  Indications: INSOMNIA * fluticasone 50 mcg/actuation nasal spray Commonly known as:  FLONASE  
  
 * fluticasone 110 mcg/actuation inhaler Commonly known as:  FLOVENT HFA Take 2 Puffs by inhalation every twelve (12) hours. Rinse mouth after use  
  
 fosinopril 10 mg tablet Commonly known as:  MONOPRIL Take 1 Tab by mouth daily. meloxicam 7.5 mg tablet Commonly known as:  MOBIC  
  
 MULTIPLE VITAMIN PO Take  by mouth. oxyCODONE IR 5 mg immediate release tablet Commonly known as:  ROXICODONE  
  
 * PARoxetine 30 mg tablet Commonly known as:  PAXIL Take 1 Tab by mouth daily. Indications: ANXIETY WITH DEPRESSION  
  
 * PARoxetine 20 mg tablet Commonly known as:  PAXIL TAKE ONE TABLET BY MOUTH DAILY SUPER B COMPLEX + C 150 mg Tab Generic drug:  vitamin b comp & c no. 4 Take  by mouth. SYNTHROID 100 mcg tablet Generic drug:  levothyroxine TAKE ONE TABLET BY MOUTH DAILY BEFORE BREAKFAST  
  
 TUMS PO Take  by mouth. VITAMIN D3 2,000 unit Tab Generic drug:  cholecalciferol (vitamin D3) Take  by mouth. * Notice: This list has 4 medication(s) that are the same as other medications prescribed for you. Read the directions carefully, and ask your doctor or other care provider to review them with you. Prescriptions Printed Refills  
 eszopiclone (LUNESTA) 2 mg tablet 0 Sig: Take 1 Tab by mouth nightly as needed for Sleep. Max Daily Amount: 2 mg. Use with caution as may cause falls and sedation  Indications: INSOMNIA Class: Print Route: Oral  
  
We Performed the Following CBC W/O DIFF [65234 CPT(R)] FERRITIN [67745 CPT(R)] IRON PROFILE [id number] CPT(R)] T4, FREE [id number] CPT(R)] TSH 3RD GENERATION [12798 CPT(R)] Introducing [facility] SERVICES! Dear Juanjo Villareal: 
Thank you for requesting a Cycle account. Our records indicate that you already have an active Cycle account. You can access your account anytime at [email or web address] Did you know that you can access your hospital and ER discharge instructions at any time in Cycle? You can also review all of your test results from your hospital stay or ER visit. Additional Information If you have questions, please visit the Frequently Asked Questions section of the Cycle website at [email or web address] Remember, Cycle is NOT to be used for urgent needs. For medical emergencies, dial 911. Now available from your iPhone and Android! Please provide this summary of care documentation to your next provider. Your primary care clinician is listed as Isidro Thompson. If you have any questions after today's visit, please call [phone].

## 2017-08-08 NOTE — PROGRESS NOTES
Chief Complaint   Patient presents with    Insomnia     S/p hip surgery  Pt reports on June 26 she became hypotensive during her hip surgery. She does not recall any of the event but son told her. She was in recovery for 5 hours and resusitatted back. Pt is not on pain medication. She reports she has been recovering but has not been able to sleep. SUBJECTIVE: Jenaro Juarez is a 80 y.o. female here for follow up of hypothyroidism. Lab Results   Component Value Date/Time    TSH 4.740 05/04/2017 12:26 PM     Thyroid ROS: fatigue, weight gain, feeling cold and cold intolerance, constipation, feeling slow, losing hair and anxiousness. Pt is still on 100 mcg of synthroid. She deferred increase in thryoid medication but now feels she may need to increase. She notes has been having difficulty gaining weight. She feels cold and tired. COPD  No complaints      Afib  She is followed closely by dr. Caro Nowak and German Cee. She is currently on eliquis and no se. Per pt they are aware she is on estrogen still and wants to stay on estrogen    Subjective:   Jenaro Juarez is a 80 y.o. female with hypertension. Hypertension ROS: taking medications as instructed, no medication side effects noted, no TIA's, no chest pain on exertion, no dyspnea on exertion, no swelling of ankles. New concerns: no complaints    Rectal pouch  Pt had colonscopy. Dr. Shabnam Wells found pouch in rectum and recommened rectal surgery for pouch. Pt had not been able to go due to cardiac issues and hip surgery.         Past Medical History:   Diagnosis Date    Anemia, unspecified     Anxiety     Arrhythmia     a fib    Arthritis     osteoarthritis, rheumatoid    Atrial fibrillation (HCC)     Dr. Yale Skiff and Dr. Sonia Collins  following    Autoimmune disease [facility])     sjogren disease Dr. Esperanza Haji CAD (coronary artery disease)     HTN (hypertension)     Hyperlipidemia LDL goal < 100     Hypothyroid     Insomnia     Osteoarthritis     Sjogren's disease ([facility] [address]     Thoracic aneurysm without mention of rupture      Past Surgical History:   Procedure Laterality Date    HX CHOLECYSTECTOMY      HX COLONOSCOPY  3/2014    Dr. Lena Chirinos    HX OTHER SURGICAL      hernia repairs    HX PARTIAL THYROIDECTOMY      HX JANNET AND BSO      HX VEIN STRIPPING       Social History     Social History    Marital status:      Spouse name: N/A    Number of children: N/A    Years of education: N/A     Social History Main Topics    Smoking status: Never Smoker    Smokeless tobacco: Never Used    Alcohol use Yes      Comment: wine    Drug use: None    Sexual activity: Not Asked     Other Topics Concern    None     Social History Narrative    ** Merged History Encounter **          Family History   Problem Relation Age of Onset    Heart Disease Father      aneurysm    Cancer Maternal Grandfather      Current Outpatient Prescriptions   Medication Sig Dispense Refill    estradiol (ESTRACE) 0.5 mg tablet Take 1 po daily but try to wean off as not a good medication with cardiac issues. 90 Tab 0    eszopiclone (LUNESTA) 3 mg tablet Take 1 Tab by mouth nightly as needed for Sleep. Max Daily Amount: 3 mg. Use with caution as may cause falls and sedation 30 Tab 1    SYNTHROID 100 mcg tablet TAKE ONE TABLET BY MOUTH DAILY BEFORE BREAKFAST 30 Tab 1    PARoxetine (PAXIL) 20 mg tablet TAKE ONE TABLET BY MOUTH DAILY 90 Tab 0    amiodarone (PACERONE) 100 mg tablet Take 100 mg by mouth daily.  apixaban (ELIQUIS) 5 mg tablet Take 1 Tab by mouth two (2) times a day. RX BY CARDIOLOGY 1 Tab 0    fosinopril (MONOPRIL) 10 mg tablet Take 1 Tab by mouth daily. 90 Tab 3    atenolol (TENORMIN) 25 mg tablet Take 25 mg by mouth daily.  PARoxetine (PAXIL) 30 mg tablet Take 1 Tab by mouth daily.  Indications: ANXIETY WITH DEPRESSION 30 Tab 2    fluticasone (FLONASE) 50 mcg/actuation nasal spray       vitamin b comp & c no.4 (SUPER B COMPLEX + C) 150 mg tab Take  by mouth.  Cetirizine (ALL DAY ALLERGY, CETIRIZINE,) 10 mg cap Take  by mouth.  cholecalciferol, vitamin D3, (VITAMIN D3) 2,000 unit tab Take  by mouth.  CALCIUM CARBONATE (TUMS PO) Take  by mouth.  meloxicam (MOBIC) 7.5 mg tablet       albuterol (PROVENTIL HFA, VENTOLIN HFA, PROAIR HFA) 90 mcg/actuation inhaler Take 2 Puffs by inhalation every four (4) hours as needed for Wheezing or Shortness of Breath. 1 Inhaler 1    fluticasone (FLOVENT HFA) 110 mcg/actuation inhaler Take 2 Puffs by inhalation every twelve (12) hours. Rinse mouth after use 1 Inhaler 0    ALPRAZolam (XANAX) 0.25 mg tablet Take 1/2 to 1 tablet po daily ONLY AS NEEDED 10 Tab 0    oxyCODONE IR (ROXICODONE) 5 mg immediate release tablet       MULTIVITAMIN (MULTIPLE VITAMIN PO) Take  by mouth.  aspirin 81 mg tablet Take 81 mg by mouth. Allergies   Allergen Reactions    Latex Hives    Pcn [Penicillins] Anaphylaxis    Sulfa (Sulfonamide Antibiotics) Anaphylaxis    Biaxin [Clarithromycin] Nausea Only    Pcn [Penicillins] Hives    Sulfa (Sulfonamide Antibiotics) Nausea Only       Review of Systems - General ROS: positive for  - fatigue, malaise and sleep disturbance  negative for - chills or fever  Cardiovascular ROS: no chest pain or dyspnea on exertion  Respiratory ROS: no cough, shortness of breath, or wheezing    Visit Vitals    /72 (BP 1 Location: Left arm, BP Patient Position: Sitting)    Pulse 80    Temp 98.2 °F (36.8 °C) (Oral)    Resp 16    Ht 5' 7\" (1.702 m)    Wt 138 lb (62.6 kg)    SpO2 96%    BMI 21.61 kg/m2     General Appearance:  Well developed, well nourished,alert and oriented x 3, and individual in no acute distress. Ears/Nose/Mouth/Throat:   Hearing grossly normal. Maxillary sinus pain on palpation         Neck: Supple, no lad, no bruits   Chest:   Lungs clear to auscultation bilaterally. irregularly irregular   Cardiovascular:  Regular rate and rhythm, S1, S2 normal, no murmur. Abdomen:   Soft, non-tender, bowel sounds are active. Extremities: No edema bilaterally. Skin: Warm and dry, no suspicious lesions                     Edgard Liz was seen today for thyroid problem. Diagnoses and all orders for this visit:    Much of visit discussion was on insomnia. 1. Primary insomnia  -     eszopiclone (LUNESTA) 2 mg tablet; Take 1 Tab by mouth nightly as needed for Sleep. Max Daily Amount: 2 mg. Use with caution as may cause falls and sedation  Indications: INSOMNIA  She reports she tried to get a refill on her lunesta 3 mg but could not get it. She reports the 2 mg just does not work. She would like to sign papers which would release me of any responsibility with the lunesta. Explained to her that Board of Medicine reviewing and agree that her dose is too high for her age due to risk for falls. Further I again stated that she should not even be on this medication but she responds that she has been on it for 23 years and at 80 does not want to come off medication. She reports trying trazadone, amitriptyline. She will ask Dr. Dick Ureña if he will write for her. LONG discussion with pt. Discussed se of lunesta and other meds to help with insomnia with negative se profile for pt >72 yo. I will give her a letter outlining se and she would like to sign. She adamantly would like to stay with her lunesta to preserve her QOL. She does not want to try new meds at her age and reports was told she was suppose to get adequate sleep from cardiology. The lunesta 2 mg does not work for her and wants the 3 mg tablet. She has been feeling much worse on the 2 mg and notes fatigue is worse since on the 2 mg. She has been on the [place] for 23 years at least or med similar to [place].           2. Iron deficiency anemia, unspecified iron deficiency anemia type  She required 2 units of prbc  Will check for persistent anemia from surgery and replete is needed  -     CBC W/O DIFF  - FERRITIN  -     IRON PROFILE    3. Essential hypertension  Cont meds    4. Thyroid disorder  May be having hyperthyroid sx even though hypothroid given age range  -     TSH 3RD GENERATION  -     T4, FREE        This note will not be viewable in MyChart. I spent 25 min with pt and >50% of the time was spent AGAIN on management and counseling of pt re: her lunesta se profile. This note will not be viewable in [address]

## 2017-08-09 LAB
ERYTHROCYTE [DISTWIDTH] IN BLOOD BY AUTOMATED COUNT: 14.7 % (ref 12.3–15.4)
FERRITIN SERPL-MCNC: 84 NG/ML (ref 15–150)
HCT VFR BLD AUTO: 36.1 % (ref 34–46.6)
HGB BLD-MCNC: 11.9 G/DL (ref 11.1–15.9)
IRON SATN MFR SERPL: 20 % (ref 15–55)
IRON SERPL-MCNC: 70 UG/DL (ref 27–139)
MCH RBC QN AUTO: 31.6 PG (ref 26.6–33)
MCHC RBC AUTO-ENTMCNC: 33 G/DL (ref 31.5–35.7)
MCV RBC AUTO: 96 FL (ref 79–97)
PLATELET # BLD AUTO: 186 X10E3/UL (ref 150–379)
RBC # BLD AUTO: 3.77 X10E6/UL (ref 3.77–5.28)
T4 FREE SERPL-MCNC: 1.75 NG/DL (ref 0.82–1.77)
TIBC SERPL-MCNC: 352 UG/DL (ref 250–450)
TSH SERPL DL<=0.005 MIU/L-ACNC: 4.26 UIU/ML (ref 0.45–4.5)
UIBC SERPL-MCNC: 282 UG/DL (ref 118–369)
WBC # BLD AUTO: 3.8 X10E3/UL (ref 3.4–10.8)

## 2017-10-12 ENCOUNTER — TELEPHONE (OUTPATIENT)
Dept: INTERNAL MEDICINE CLINIC | Age: 82
End: 2017-10-12

## 2017-10-12 NOTE — TELEPHONE ENCOUNTER
Patient is having a ct scan tomorrow and she wanted to know she could get an order to have her stomach scanned for the diarrhea issue she is having.   Call patient at [phone]

## 2017-10-24 ENCOUNTER — HOSPITAL ENCOUNTER (OUTPATIENT)
Dept: LAB | Age: 82
Discharge: HOME | End: 2017-10-24
Payer: MEDICARE

## 2017-10-24 ENCOUNTER — OFFICE VISIT (OUTPATIENT)
Dept: INTERNAL MEDICINE CLINIC | Age: 82
End: 2017-10-24

## 2017-10-24 VITALS
WEIGHT: 136 LBS | DIASTOLIC BLOOD PRESSURE: 94 MMHG | HEART RATE: 79 BPM | SYSTOLIC BLOOD PRESSURE: 159 MMHG | HEIGHT: 67 IN | OXYGEN SATURATION: 96 % | TEMPERATURE: 97.9 F | BODY MASS INDEX: 21.35 KG/M2 | RESPIRATION RATE: 16 BRPM

## 2017-10-24 DIAGNOSIS — G44.52 NEW DAILY PERSISTENT HEADACHE: Primary | ICD-10-CM

## 2017-10-24 DIAGNOSIS — E03.9 ACQUIRED HYPOTHYROIDISM: ICD-10-CM

## 2017-10-24 DIAGNOSIS — I10 ESSENTIAL HYPERTENSION: ICD-10-CM

## 2017-10-24 PROCEDURE — 84443 ASSAY THYROID STIM HORMONE: CPT

## 2017-10-24 PROCEDURE — 85027 COMPLETE CBC AUTOMATED: CPT

## 2017-10-24 RX ORDER — FOSINOPIRL SODIUM 10 MG/1
20 TABLET ORAL DAILY
Qty: 90 TAB | Refills: 3 | Status: SHIPPED | OUTPATIENT
Start: 2017-10-24 | End: 2018-11-25 | Stop reason: SDUPTHER

## 2017-10-24 NOTE — PROGRESS NOTES
Chief Complaint   Patient presents with    Immunization/Injection     Pt presents because she has been feeling ill with headache and sweats since her steroid injection in her back. sweats  10/16/17 pt got a steroid injection in her back and since then she reports feeling like she develops sweats then fatigue. She reports last week headache, day sweats, hot flashes all evening. she denies infection or chills  No appetite but still able to eat 3 meals /day  On review, sweats were before the injection  Increase in fatigue but denies chest pain, sob or palpitaions  Labs from cardiologist wnl CMP and cholesterol, glucose was normal      Area on tongue red not painful  Has not shown to her dentist    Hypertension  Hypertension ROS: taking medications as instructed, no medication side effects noted, no TIA's, no chest pain on exertion, no dyspnea on exertion, no swelling of ankles     reports that she has never smoked.  She has never used smokeless tobacco.    reports that she drinks alcohol. she has not been checking her bp at home  BP Readings from Last 2 Encounters:   10/24/17 (!) 159/94   08/08/17 114/72         Past Medical History:   Diagnosis Date    Anemia, unspecified     Anxiety     Arrhythmia     a fib    Arthritis     osteoarthritis, rheumatoid    Atrial fibrillation (HCC)     Dr. Leana Blood and Dr. Brayan Cevallos  following    Autoimmune disease [facility])     sjogren disease Dr. Philly Salas CAD (coronary artery disease)     HTN (hypertension)     Hyperlipidemia LDL goal < 100     Hypothyroid     Insomnia     Osteoarthritis     Sjogren's disease ([facility] [address]     Thoracic aneurysm without mention of rupture      Past Surgical History:   Procedure Laterality Date    HX CHOLECYSTECTOMY      HX COLONOSCOPY  3/2014    Dr. Ne Martinez    HX OTHER SURGICAL      hernia repairs    HX PARTIAL THYROIDECTOMY      HX JANNET AND BSO      HX VEIN STRIPPING       Social History     Social History    Marital status:  Spouse name: N/A    Number of children: N/A    Years of education: N/A     Social History Main Topics    Smoking status: Never Smoker    Smokeless tobacco: Never Used    Alcohol use Yes      Comment: wine    Drug use: None    Sexual activity: Not Asked     Other Topics Concern    None     Social History Narrative    ** Merged History Encounter **          Family History   Problem Relation Age of Onset    Heart Disease Father      aneurysm    Cancer Maternal Grandfather      Current Outpatient Prescriptions   Medication Sig Dispense Refill    fosinopril (MONOPRIL) 10 mg tablet Take 2 Tabs by mouth daily. 90 Tab 3    pneumococcal 23-valent (PNEUMOVAX 23) 25 mcg/0.5 mL injection 0.5 mL by IntraMUSCular route once for 1 dose. 0.5 mL 0    SYNTHROID 100 mcg tablet TAKE ONE TABLET BY MOUTH DAILY BEFORE BREAKFAST 90 Tab 0    PARoxetine (PAXIL) 20 mg tablet TAKE ONE TABLET BY MOUTH DAILY 90 Tab 0    eszopiclone (LUNESTA) 2 mg tablet Take 1 Tab by mouth nightly as needed for Sleep. Max Daily Amount: 2 mg. Use with caution as may cause falls and sedation  Indications: INSOMNIA 30 Tab 0    estradiol (ESTRACE) 0.5 mg tablet Take 1 po daily but try to wean off as not a good medication with cardiac issues. 90 Tab 0    amiodarone (PACERONE) 100 mg tablet Take 100 mg by mouth daily.  apixaban (ELIQUIS) 5 mg tablet Take 1 Tab by mouth two (2) times a day. RX BY CARDIOLOGY 1 Tab 0    atenolol (TENORMIN) 25 mg tablet Take 25 mg by mouth daily.  PARoxetine (PAXIL) 30 mg tablet Take 1 Tab by mouth daily. Indications: ANXIETY WITH DEPRESSION 30 Tab 2    fluticasone (FLONASE) 50 mcg/actuation nasal spray       vitamin b comp & c no.4 (SUPER B COMPLEX + C) 150 mg tab Take  by mouth.  Cetirizine (ALL DAY ALLERGY, CETIRIZINE,) 10 mg cap Take  by mouth.  cholecalciferol, vitamin D3, (VITAMIN D3) 2,000 unit tab Take  by mouth.  CALCIUM CARBONATE (TUMS PO) Take  by mouth.       MULTIVITAMIN (MULTIPLE VITAMIN PO) Take  by mouth.  meloxicam (MOBIC) 7.5 mg tablet       albuterol (PROVENTIL HFA, VENTOLIN HFA, PROAIR HFA) 90 mcg/actuation inhaler Take 2 Puffs by inhalation every four (4) hours as needed for Wheezing or Shortness of Breath. 1 Inhaler 1    fluticasone (FLOVENT HFA) 110 mcg/actuation inhaler Take 2 Puffs by inhalation every twelve (12) hours. Rinse mouth after use 1 Inhaler 0    ALPRAZolam (XANAX) 0.25 mg tablet Take 1/2 to 1 tablet po daily ONLY AS NEEDED 10 Tab 0    oxyCODONE IR (ROXICODONE) 5 mg immediate release tablet       aspirin 81 mg tablet Take 81 mg by mouth. Allergies   Allergen Reactions    Latex Hives    Pcn [Penicillins] Anaphylaxis    Sulfa (Sulfonamide Antibiotics) Anaphylaxis    Biaxin [Clarithromycin] Nausea Only    Pcn [Penicillins] Hives    Sulfa (Sulfonamide Antibiotics) Nausea Only       Review of Systems - General ROS: positive for  - fatigue and malaise  negative for - chills or fever  Cardiovascular ROS: no chest pain or dyspnea on exertion  Respiratory ROS: no cough, shortness of breath, or wheezing    Visit Vitals    BP (!) 159/94 (BP 1 Location: Left arm, BP Patient Position: Sitting)    Pulse 79    Temp 97.9 °F (36.6 °C) (Oral)    Resp 16    Ht 5' 7\" (1.702 m)    Wt 136 lb (61.7 kg)    SpO2 96%    BMI 21.3 kg/m2     General Appearance:  Well developed, well nourished,alert and oriented x 3, and individual in no acute distress. Ears/Nose/Mouth/Throat:   Hearing grossly normal.         Neck: Supple, no lad, no bruits   Chest:   Lungs clear to auscultation bilaterally. Cardiovascular:  Regular rate and rhythm, S1, S2 normal, no murmur. Abdomen:   Soft, non-tender, bowel sounds are active. Extremities: No edema bilaterally. Skin: Warm and dry, no suspicious lesions  Neuro: gait wnl, motor ue/le 5/5 bilaterally, tongue midline                 Diagnoses and all orders for this visit:    1.  New daily persistent headache  Will check labs  May be related to uncontrolled htn  Neurologically intact  -     CBC W/O DIFF    2. Essential hypertension  Not at goal and may be causing or aggravating symtoms  Will incrase fosinopril to 20 mg and may need to further increase to 30 mg  -     fosinopril (MONOPRIL) 10 mg tablet; Take 2 Tabs by mouth daily. 3. Acquired hypothyroidism/sweats  Pt has lost weigth and may not need 100 mcg levothyoxine and dose may need to be decreased  -     TSH 3RD GENERATION    Other orders  -     pneumococcal 23-valent (PNEUMOVAX 23) 25 mcg/0.5 mL injection; 0.5 mL by IntraMUSCular route once for 1 dose. Need close followo up, asked pt to kelly salmon her bp    I spent 25 min with this patient and >50% of the time was spent on counseling and management of Blood pressure, headache sx            This note will not be viewable in [facility].

## 2017-10-25 LAB
ERYTHROCYTE [DISTWIDTH] IN BLOOD BY AUTOMATED COUNT: 14.4 % (ref 12.3–15.4)
HCT VFR BLD AUTO: 43 % (ref 34–46.6)
HGB BLD-MCNC: 13.8 G/DL (ref 11.1–15.9)
MCH RBC QN AUTO: 30.1 PG (ref 26.6–33)
MCHC RBC AUTO-ENTMCNC: 32.1 G/DL (ref 31.5–35.7)
MCV RBC AUTO: 94 FL (ref 79–97)
PLATELET # BLD AUTO: 176 X10E3/UL (ref 150–379)
RBC # BLD AUTO: 4.59 X10E6/UL (ref 3.77–5.28)
TSH SERPL DL<=0.005 MIU/L-ACNC: 2.46 UIU/ML (ref 0.45–4.5)
WBC # BLD AUTO: 4.8 X10E3/UL (ref 3.4–10.8)

## 2017-10-27 DIAGNOSIS — R68.89 COLD INTOLERANCE: ICD-10-CM

## 2017-10-27 RX ORDER — ESTRADIOL 0.5 MG/1
TABLET ORAL
Qty: 90 TAB | Refills: 0 | Status: SHIPPED | OUTPATIENT
Start: 2017-10-27 | End: 2018-02-01 | Stop reason: SDUPTHER

## 2017-11-03 ENCOUNTER — TELEPHONE (OUTPATIENT)
Dept: INTERNAL MEDICINE CLINIC | Age: 82
End: 2017-11-03

## 2017-11-03 NOTE — TELEPHONE ENCOUNTER
Message received, return call to pt, she did not answer, left v/m asking her to call back as we do not have her list of medication.

## 2017-12-16 RX ORDER — PAROXETINE HYDROCHLORIDE 20 MG/1
TABLET, FILM COATED ORAL
Qty: 90 TAB | Refills: 0 | Status: SHIPPED | OUTPATIENT
Start: 2017-12-16 | End: 2018-03-15 | Stop reason: SDUPTHER

## 2018-01-19 ENCOUNTER — TELEPHONE (OUTPATIENT)
Dept: INTERNAL MEDICINE CLINIC | Age: 83
End: 2018-01-19

## 2018-01-19 NOTE — TELEPHONE ENCOUNTER
I called pt back, no answer. LM on VM to return call. Pt needs to keep her appt with PCP. It has to be approved by both providers for patient to switch within the practice.

## 2018-01-19 NOTE — TELEPHONE ENCOUNTER
----- Message from Stefan Stallworth sent at 1/19/2018  1:13 PM EST -----  Regarding: dr hummel/telephone  Patient is requesting to switch and set up an appointment with dr Michelle alcantara, per Adri myriam do send a message. Best contact .

## 2018-02-01 ENCOUNTER — HOSPITAL ENCOUNTER (OUTPATIENT)
Dept: CT IMAGING | Age: 83
Discharge: HOME | End: 2018-02-01
Attending: INTERNAL MEDICINE
Payer: MEDICARE

## 2018-02-01 ENCOUNTER — HOSPITAL ENCOUNTER (OUTPATIENT)
Dept: GENERAL RADIOLOGY | Age: 83
Discharge: HOME | End: 2018-02-01
Attending: INTERNAL MEDICINE
Payer: MEDICARE

## 2018-02-01 ENCOUNTER — OFFICE VISIT (OUTPATIENT)
Dept: INTERNAL MEDICINE CLINIC | Age: 83
End: 2018-02-01

## 2018-02-01 VITALS
TEMPERATURE: 97.9 F | BODY MASS INDEX: 21.5 KG/M2 | HEIGHT: 67 IN | WEIGHT: 137 LBS | SYSTOLIC BLOOD PRESSURE: 137 MMHG | DIASTOLIC BLOOD PRESSURE: 84 MMHG | RESPIRATION RATE: 18 BRPM | OXYGEN SATURATION: 97 % | HEART RATE: 77 BPM

## 2018-02-01 DIAGNOSIS — R68.89 COLD INTOLERANCE: ICD-10-CM

## 2018-02-01 DIAGNOSIS — I48.21 PERMANENT ATRIAL FIBRILLATION (HCC): ICD-10-CM

## 2018-02-01 DIAGNOSIS — R10.32 LLQ ABDOMINAL PAIN: ICD-10-CM

## 2018-02-01 DIAGNOSIS — R53.82 CHRONIC FATIGUE: Primary | ICD-10-CM

## 2018-02-01 DIAGNOSIS — R53.82 CHRONIC FATIGUE: ICD-10-CM

## 2018-02-01 DIAGNOSIS — K59.1 FUNCTIONAL DIARRHEA: ICD-10-CM

## 2018-02-01 DIAGNOSIS — J01.10 SUBACUTE FRONTAL SINUSITIS: ICD-10-CM

## 2018-02-01 DIAGNOSIS — R06.09 DYSPNEA ON EXERTION: ICD-10-CM

## 2018-02-01 LAB — CREAT BLD-MCNC: 0.9 MG/DL (ref 0.6–1.3)

## 2018-02-01 PROCEDURE — 71046 X-RAY EXAM CHEST 2 VIEWS: CPT

## 2018-02-01 PROCEDURE — 74177 CT ABD & PELVIS W/CONTRAST: CPT

## 2018-02-01 PROCEDURE — 82565 ASSAY OF CREATININE: CPT

## 2018-02-01 PROCEDURE — 74011636320 HC RX REV CODE- 636/320: Performed by: RADIOLOGY

## 2018-02-01 RX ORDER — ESTRADIOL 0.5 MG/1
TABLET ORAL
Qty: 90 TAB | Refills: 0 | Status: CANCELLED | OUTPATIENT
Start: 2018-02-01

## 2018-02-01 RX ORDER — AMIODARONE HYDROCHLORIDE 200 MG/1
TABLET ORAL
COMMUNITY
Start: 2018-01-15 | End: 2018-02-01 | Stop reason: ALTCHOICE

## 2018-02-01 RX ORDER — ALBUTEROL SULFATE 90 UG/1
2 AEROSOL, METERED RESPIRATORY (INHALATION)
Qty: 1 INHALER | Refills: 1 | Status: SHIPPED | OUTPATIENT
Start: 2018-02-01 | End: 2020-01-07 | Stop reason: SDUPTHER

## 2018-02-01 RX ORDER — IPRATROPIUM BROMIDE 42 UG/1
SPRAY, METERED NASAL
COMMUNITY
Start: 2018-01-16 | End: 2019-02-16 | Stop reason: CLARIF

## 2018-02-01 RX ORDER — ESTRADIOL 0.5 MG/1
TABLET ORAL
Qty: 90 TAB | Refills: 0 | Status: SHIPPED | OUTPATIENT
Start: 2018-02-01 | End: 2018-05-01 | Stop reason: SDUPTHER

## 2018-02-01 RX ORDER — ESZOPICLONE 3 MG/1
TABLET, FILM COATED ORAL
COMMUNITY
Start: 2018-01-16 | End: 2018-02-01 | Stop reason: SDUPTHER

## 2018-02-01 RX ORDER — AMIODARONE HYDROCHLORIDE 100 MG/1
100 TABLET ORAL DAILY
Qty: 30 TAB | Refills: 3 | Status: SHIPPED | OUTPATIENT
Start: 2018-02-01 | End: 2018-09-14 | Stop reason: ALTCHOICE

## 2018-02-01 RX ORDER — DOXYCYCLINE 100 MG/1
100 TABLET ORAL 2 TIMES DAILY
Qty: 20 TAB | Refills: 0 | Status: SHIPPED | OUTPATIENT
Start: 2018-02-01 | End: 2018-09-14 | Stop reason: ALTCHOICE

## 2018-02-01 RX ADMIN — IOPAMIDOL 100 ML: 755 INJECTION, SOLUTION INTRAVENOUS at 16:59

## 2018-02-01 NOTE — MR AVS SNAPSHOT
[facility], [facility] 
 
 
 [address] 
[phone] Patient: Shayy Miller MRN:  FIM:[id number] Visit Information Date & Time Provider Department Dept. Phone Encounter #  
 2/1/2018  2:00 PM Cata Mix MD [facility] [address] [id number] Upcoming Health Maintenance Date Due Pneumococcal 65+ Low/Medium Risk (1 of 2 - PCV13) 4/22/1993 MEDICARE YEARLY EXAM 3/22/2018 GLAUCOMA SCREENING Q2Y 4/29/2018 DTaP/Tdap/Td series (2 - Td) 1/13/2024 Allergies as of 2/1/2018  Review Complete On: 2/1/2018 By: Cata Mix MD  
  
 Severity Noted Reaction Type Reactions Latex  04/09/2013    Hives NOT ALLERGIC PER PT 02/01/2018 Pcn [Penicillins] High 01/26/2015   Systemic Anaphylaxis Sulfa (Sulfonamide Antibiotics) High 01/26/2015   Systemic Anaphylaxis Biaxin [Clarithromycin]  04/09/2013    Nausea Only Pcn [Penicillins]  04/09/2013    Hives Sulfa (Sulfonamide Antibiotics)  04/09/2013    Nausea Only Current Immunizations  Reviewed on 10/24/2017 Name Date Influenza High Dose Vaccine PF 10/3/2017 Influenza Vaccine 10/15/2016 Tdap 1/13/2014 Not reviewed this visit You Were Diagnosed With   
  
 Codes Comments LLQ abdominal pain    -  Primary ICD-10-CM: R10.32 
ICD-9-CM: 789.04 Cold intolerance     ICD-10-CM: R68.89 ICD-9-CM: 780.99 Chronic fatigue     ICD-10-CM: R53.82 
ICD-9-CM: 780.79 Permanent atrial fibrillation (HCC)     ICD-10-CM: E55.7 ICD-9-CM: 427.31 Vitals BP Pulse Temp Resp Height(growth percentile) Weight(growth percentile) 137/84 77 97.9 °F (36.6 °C) (Oral) 18 5' 7\" (1.702 m) 137 lb (62.1 kg) SpO2 BMI OB Status Smoking Status 97% 21.46 kg/m2 Hysterectomy Never Smoker Vitals History BMI and BSA Data  Body Mass Index Body Surface Area  
 21.46 kg/m 2 1.71 m 2  
  
  
 Preferred Pharmacy Pharmacy Name Phone [facility] [address], [address] [phone] Your Updated Medication List  
  
   
This list is accurate as of: 2/1/18  3:09 PM.  Always use your most recent med list.  
  
  
  
  
 albuterol 90 mcg/actuation inhaler Commonly known as:  PROVENTIL HFA, VENTOLIN HFA, PROAIR HFA Take 2 Puffs by inhalation every four (4) hours as needed for Wheezing or Shortness of Breath. ALL DAY ALLERGY (CETIRIZINE) 10 mg Cap Generic drug:  Cetirizine Take  by mouth. amiodarone 100 mg tablet Commonly known as:  Berashida Prince Take 1 Tab by mouth daily. rx'd by cardiology  
  
 apixaban 5 mg tablet Commonly known as:  Ara Bussinmisty Take 1 Tab by mouth two (2) times a day. RX BY CARDIOLOGY  
  
 atenolol 25 mg tablet Commonly known as:  TENORMIN Take 25 mg by mouth daily. estradiol 0.5 mg tablet Commonly known as:  ESTRACE  
TAKE ONE TABLET BY MOUTH DAILY BUT TRY TO WEAN OFF BECAUSE THIS IS NOT A GOOD MEDICATION WITH CARDIAC ISSUES  
  
 eszopiclone 2 mg tablet Commonly known as:  Hermes Oz Take 1 Tab by mouth nightly as needed for Sleep. Max Daily Amount: 2 mg. Use with caution as may cause falls and sedation  Indications: INSOMNIA  
  
 fluticasone 50 mcg/actuation nasal spray Commonly known as:  FLONASE  
  
 fosinopril 10 mg tablet Commonly known as:  MONOPRIL Take 2 Tabs by mouth daily. ipratropium 42 mcg (0.06 %) nasal spray Commonly known as:  ATROVENT  
  
 levothyroxine 100 mcg tablet Commonly known as:  SYNTHROID  
TAKE ONE TABLET BY MOUTH DAILY BEFORE BREAKFAST  
  
 meloxicam 7.5 mg tablet Commonly known as:  MOBIC  
  
 MULTIPLE VITAMIN PO Take  by mouth. PARoxetine 20 mg tablet Commonly known as:  PAXIL TAKE ONE TABLET BY MOUTH DAILY SUPER B COMPLEX + C 150 mg Tab Generic drug:  vitamin b comp & c no. 4 Take  by mouth.   
  
 TUMS PO  
 Take  by mouth. VITAMIN D3 2,000 unit Tab Generic drug:  cholecalciferol (vitamin D3) Take  by mouth. Prescriptions Sent to Pharmacy Refills  
 estradiol (ESTRACE) 0.5 mg tablet 0 Sig: TAKE ONE TABLET BY MOUTH DAILY BUT TRY TO WEAN OFF BECAUSE THIS IS NOT A GOOD MEDICATION WITH CARDIAC ISSUES Class: Normal  
 Pharmacy: [facility] [address] [address], [address] Ph #: [phone]  
 amiodarone (PACERONE) 100 mg tablet 3 Sig: Take 1 Tab by mouth daily. rx'd by cardiology Class: Normal  
 Pharmacy: [facility] [address], [address] Ph #: [phone] Route: Oral  
  
We Performed the Following CBC WITH AUTOMATED DIFF [42127 CPT(R)] CULTURE, URINE [id number] CPT(R)] T4, FREE [id number] CPT(R)] TSH 3RD GENERATION [38389 CPT(R)] URINALYSIS W/ RFLX MICROSCOPIC [11350 CPT(R)] To-Do List   
 02/01/2018 Imaging:  CT ABD W CONT AND PELVIS W WO CONT   
  
 02/01/2018 Imaging:  XR CHEST PA LAT   
  
 03/03/2018 Lab:  OCCULT BLOOD, IMMUNOASSAY (FIT) Introducing [facility]! Dear Akshat Escamilla: 
Thank you for requesting a [facility] account. Our records indicate that you already have an active [facility] account. You can access your account anytime at [email or web address] Did you know that you can access your hospital and ER discharge instructions at any time in [facility]? You can also review all of your test results from your hospital stay or ER visit. Additional Information If you have questions, please visit the Frequently Asked Questions section of the [facility] website at [email or web address] Remember, [facility] is NOT to be used for urgent needs. For medical emergencies, dial 911. Now available from your iPhone and Android! Please provide this summary of care documentation to your next provider. Your primary care clinician is listed as Cheri Munguia. If you have any questions after today's visit, please call [phone].

## 2018-02-01 NOTE — PROGRESS NOTES
Chief Complaint   Patient presents with    Shortness of Breath    Fatigue    Labs     requesting tests to be done for \"pneumonia, liver enzymes, thyroid, and cancer\"    Pneumonia     patient concerned about pneunomia because she has a history    Stomach Cancer     patient concerned about cancer     Diarrhea

## 2018-02-01 NOTE — PROGRESS NOTES
Chief Complaint   Patient presents with    Shortness of Breath    Fatigue    Labs     requesting tests to be done for \"pneumonia, liver enzymes, thyroid, and cancer\"    Pneumonia     patient concerned about pneunomia because she has a history    Stomach Cancer     patient concerned about cancer     Diarrhea       Pt presents with complaints of Fatigue, SOB, diarrhea, possible pna, concerns re: cancer. fatiuge and sob  Pt reports she is fatigued and has sob. She reports she was diagnosed with pna in the past and it did not show up on the cxr. She does not have fever or chills. She is not wheezing. She reports her fatigue is making her feel short of breath. She reports she has lost weight. She was on albuterol and flovent in the past but not using meds now. She feels her sx are progressive. She reports she has heat intolerance and then cold intolerance. She is presently fanning herself. Sinus  She reports her left nares is clear but her right nose has green drainage some of the time. She does not have frontal or maxillary sinus pressure. Diarrhea  She reports her chronic diarrhea has not stopped. She was seen by Dr. Lucyann Soulier and then developed diarrhea. He had recommended she go for testing (defecogram and manometry) but she never went because the testing site was on the other side of [facility]. She never followed up with [facility] despite her sx and did not follow up. Atrial fibrillation  She is still on amiodarone. She will follow up with cardiology and needs her TSH and liver proteins run. Subjective:   Chrissy Salas is a 80 y.o. female with hypertension. Hypertension ROS: taking medications as instructed, no medication side effects noted, no TIA's, no chest pain on exertion, no dyspnea on exertion, no swelling of ankles. New concerns: none.                Past Medical History:   Diagnosis Date    Anemia, unspecified     Anxiety     Arrhythmia     a fib    Arthritis osteoarthritis, rheumatoid    Atrial fibrillation (HCC)     Dr. Carri Aponte and Dr. Ayden Harrison  following    Autoimmune disease [facility])     sjogren disease Dr. Odilia Tilley    CAD (coronary artery disease)     HTN (hypertension)     Hyperlipidemia LDL goal < 100     Hypothyroid     Insomnia     Osteoarthritis     Sjogren's disease ([facility] [address]     Thoracic aneurysm without mention of rupture      Past Surgical History:   Procedure Laterality Date    HX CHOLECYSTECTOMY      HX COLONOSCOPY  3/2014    Dr. Pinky Hamlin    HX OTHER SURGICAL      hernia repairs    HX PARTIAL THYROIDECTOMY      HX JANNET AND BSO      HX VEIN STRIPPING       Social History     Social History    Marital status:      Spouse name: N/A    Number of children: N/A    Years of education: N/A     Social History Main Topics    Smoking status: Never Smoker    Smokeless tobacco: Never Used    Alcohol use Yes      Comment: wine    Drug use: None    Sexual activity: Not Asked     Other Topics Concern    None     Social History Narrative    ** Merged History Encounter **          Family History   Problem Relation Age of Onset    Heart Disease Father      aneurysm    Cancer Maternal Grandfather      Current Outpatient Prescriptions   Medication Sig Dispense Refill    ipratropium (ATROVENT) 42 mcg (0.06 %) nasal spray       eszopiclone (LUNESTA) 3 mg tablet       levothyroxine (SYNTHROID) 100 mcg tablet TAKE ONE TABLET BY MOUTH DAILY BEFORE BREAKFAST 90 Tab 0    PARoxetine (PAXIL) 20 mg tablet TAKE ONE TABLET BY MOUTH DAILY 90 Tab 0    estradiol (ESTRACE) 0.5 mg tablet TAKE ONE TABLET BY MOUTH DAILY BUT TRY TO WEAN OFF BECAUSE THIS IS NOT A GOOD MEDICATION WITH CARDIAC ISSUES 90 Tab 0    fosinopril (MONOPRIL) 10 mg tablet Take 2 Tabs by mouth daily. 90 Tab 3    meloxicam (MOBIC) 7.5 mg tablet       amiodarone (PACERONE) 100 mg tablet Take 100 mg by mouth daily.       albuterol (PROVENTIL HFA, VENTOLIN HFA, PROAIR HFA) 90 mcg/actuation inhaler Take 2 Puffs by inhalation every four (4) hours as needed for Wheezing or Shortness of Breath. 1 Inhaler 1    fluticasone (FLOVENT HFA) 110 mcg/actuation inhaler Take 2 Puffs by inhalation every twelve (12) hours. Rinse mouth after use 1 Inhaler 0    apixaban (ELIQUIS) 5 mg tablet Take 1 Tab by mouth two (2) times a day. RX BY CARDIOLOGY 1 Tab 0    atenolol (TENORMIN) 25 mg tablet Take 25 mg by mouth daily.  ALPRAZolam (XANAX) 0.25 mg tablet Take 1/2 to 1 tablet po daily ONLY AS NEEDED 10 Tab 0    fluticasone (FLONASE) 50 mcg/actuation nasal spray       vitamin b comp & c no.4 (SUPER B COMPLEX + C) 150 mg tab Take  by mouth.  Cetirizine (ALL DAY ALLERGY, CETIRIZINE,) 10 mg cap Take  by mouth.  cholecalciferol, vitamin D3, (VITAMIN D3) 2,000 unit tab Take  by mouth.  CALCIUM CARBONATE (TUMS PO) Take  by mouth.  MULTIVITAMIN (MULTIPLE VITAMIN PO) Take  by mouth.  amiodarone (CORDARONE) 200 mg tablet       eszopiclone (LUNESTA) 2 mg tablet Take 1 Tab by mouth nightly as needed for Sleep. Max Daily Amount: 2 mg. Use with caution as may cause falls and sedation  Indications: INSOMNIA 30 Tab 0    oxyCODONE IR (ROXICODONE) 5 mg immediate release tablet       aspirin 81 mg tablet Take 81 mg by mouth.        Allergies   Allergen Reactions    Latex Hives     NOT ALLERGIC PER PT 02/01/2018    Pcn [Penicillins] Anaphylaxis    Sulfa (Sulfonamide Antibiotics) Anaphylaxis    Biaxin [Clarithromycin] Nausea Only    Pcn [Penicillins] Hives    Sulfa (Sulfonamide Antibiotics) Nausea Only       Review of Systems - General ROS: positive for  - fatigue, malaise and weight loss  negative for - fever  Cardiovascular ROS: no chest pain or dyspnea on exertion  Respiratory ROS: positive for - cough, pleuritic pain and shortness of breath  negative for - hemoptysis    Visit Vitals    /84    Pulse 77    Temp 97.9 °F (36.6 °C) (Oral)    Resp 18    Ht 5' 7\" (1.702 m)    Wt 137 lb (62.1 kg)    SpO2 97%    BMI 21.46 kg/m2     General Appearance:  thin,alert and oriented x 3, and individual in no acute distress. She does appear tired but not in acute distress. She appears slightly younger than her stated age [de-identified]   Ears/Nose/Mouth/Throat:   Hearing grossly normal. She initially reported some TTP on left maxillary sinuse but on repeat no sx. Nasal turbinates wnl, no enlargement, no erythema of pharynx         Neck: Supple, no lymphadenopathy, no bruits   Chest:   Lungs clear to auscultation bilaterally. no wheezing, no crackles   Cardiovascular:  Regular rate and rhythm, S1, S2 normal, no murmur. No irregular rate, steady, no tachycardia   Abdomen:   Soft,, bowel sounds are active, no epigastric pain, + TTP LLQ on deep palpation other quadrants wnl   Extremities: No edema bilaterally. Skin: Warm and dry, no suspicious lesions                 Diagnoses and all orders for this visit:     Pt presents for follow up visit. I spent 40 min with pt and >50% of the time was spent on management and counseling re: her sx. I disucssed with her the work up for infectious process, r/o thryoid process, likley contributing chronic diarrhea. We will check her labs, cxr and CT before initation of abx. She reports she has had progressive fatigue since the last visit. She does appear more tired than prior. She notes she lost weight but according to cc she is stable. History taken and multiple issues. discussedd with Pt; Will do labs to work them up. Discussed her Exam was overall normal but  significant for LLQ pain. She did not have any prominent lymphadenopathy. She was assessed for infection and discussion re: abx. On exam no crackles and did not appear to have PNA, will check for uti. She has diarrhea but appears to be chronic process and does appear to be infectious. Her diarrhea may certainly be contributing to her fatigue and she was instructed to follow up with Dr. Pankaj Dougherty recommendations.   Her thyroid certainly could be contributing to her sx as well and needs to be checked given her amiodarone treatment. In terms of abx, she does not have fever. She may have possible sinus infection and could have diverticulits. Discussed treatment and given chronicity will check abd Ct, CBC with diff. Can start zpak given negative CT and may help sinus sx. If no infectious process and labs are within normal limits. sx maybe due to functional decline of heart/lungs vs se of meds    Further, I did address with her that I was aware that she was pursuing another PCP. While I saw her at [facility] she did find another PCP and then came back to me at this clinic. Given this, I discussed with her  that since this was the second time she felt like she needed another PCP  that she should find another PCP. I feel that this physician - patient relationship is not strong going forward  so recommend she find another PCP. I discussed with her that I will follow up these studies I ordered but she still should pursue another PCP to further evaluate her symptoms. 1. Chronic fatigue  -     XR CHEST PA LAT; Future    2. Cold intolerance  -     estradiol (ESTRACE) 0.5 mg tablet; TAKE ONE TABLET BY MOUTH DAILY BUT TRY TO WEAN OFF BECAUSE THIS IS NOT A GOOD MEDICATION WITH CARDIAC ISSUES  -     CBC WITH AUTOMATED DIFF  -     T4, FREE  -     TSH 3RD GENERATION    3. LLQ abdominal pain  Chronic diarrhea assess for blood  Needs to follow up with dr. Elvia Epps, IMMUNOASSAY (FIT); Future  -     CULTURE, URINE  -     URINALYSIS W/ RFLX MICROSCOPIC  -     METABOLIC PANEL, COMPREHENSIVE    4. Permanent atrial fibrillation (HCC)  -     amiodarone (PACERONE) 100 mg tablet; Take 1 Tab by mouth daily. rx'd by cardiology    5. Functional diarrhea  Needs follow up with [facility]  She will not get work up recommended because testing facility too far. She did not call dr. Leandra Amaral office back to reschedule. Advised reschedule.  Will give her fit test to assess for blood    6. Dyspnea on exertion  Will check labs      Sent message to [facility]   Ct negative  maciepak  albuterol    Labs still pending  Follow up with [facility]    This note will not be viewable in MyChart.

## 2018-02-02 NOTE — TELEPHONE ENCOUNTER
----- Message from Mis Barry MD sent at 2/1/2018  8:28 PM EST -----  Regarding: please call pt  Hi  Evita Guerrier,   Please see my A/P for background. Please tell pt that her chest xray was read as normal and CT of abdomen and pelvis did not show reason for diarrhea or her sx and no diverticulitis which is good. Please tell her I ordered doxycycline for possible underlying sinus issues and albuterol for her shortness of breathe. Please tell her I will be watching for her lab results. Please tell her to call dr. Esvin Noyola office and let them know the testing facility is too far for her and to please recommend a closer place to have the tests done.   Thanks,yumiko

## 2018-02-02 NOTE — TELEPHONE ENCOUNTER
I spoke with pt to advise of PCP's message. Pt verbalized understanding. Pt states she is feeling better today. She will be in touch with us if we can help with anything. She will get her labs done next week.

## 2018-02-05 ENCOUNTER — HOSPITAL ENCOUNTER (OUTPATIENT)
Dept: LAB | Age: 83
Discharge: HOME | End: 2018-02-05
Payer: MEDICARE

## 2018-02-05 PROCEDURE — 36415 COLL VENOUS BLD VENIPUNCTURE: CPT

## 2018-02-05 PROCEDURE — 84439 ASSAY OF FREE THYROXINE: CPT

## 2018-02-05 PROCEDURE — 84443 ASSAY THYROID STIM HORMONE: CPT

## 2018-02-05 PROCEDURE — 81003 URINALYSIS AUTO W/O SCOPE: CPT

## 2018-02-05 PROCEDURE — 85025 COMPLETE CBC W/AUTO DIFF WBC: CPT

## 2018-02-05 PROCEDURE — 87088 URINE BACTERIA CULTURE: CPT

## 2018-02-05 PROCEDURE — 80053 COMPREHEN METABOLIC PANEL: CPT

## 2018-02-07 ENCOUNTER — HOSPITAL ENCOUNTER (OUTPATIENT)
Dept: LAB | Age: 83
Discharge: HOME | End: 2018-02-07
Payer: MEDICARE

## 2018-02-07 LAB
ALBUMIN SERPL-MCNC: 4.4 G/DL (ref 3.5–4.7)
ALBUMIN/GLOB SERPL: 1.6 {RATIO} (ref 1.2–2.2)
ALP SERPL-CCNC: 83 IU/L (ref 39–117)
ALT SERPL-CCNC: 13 IU/L (ref 0–32)
APPEARANCE UR: CLEAR
AST SERPL-CCNC: 25 IU/L (ref 0–40)
BACTERIA UR CULT: NORMAL
BASOPHILS # BLD AUTO: 0 X10E3/UL (ref 0–0.2)
BASOPHILS NFR BLD AUTO: 1 %
BILIRUB SERPL-MCNC: 0.5 MG/DL (ref 0–1.2)
BILIRUB UR QL STRIP: NEGATIVE
BUN SERPL-MCNC: 26 MG/DL (ref 8–27)
BUN/CREAT SERPL: 26 (ref 12–28)
CALCIUM SERPL-MCNC: 9.1 MG/DL (ref 8.7–10.3)
CHLORIDE SERPL-SCNC: 92 MMOL/L (ref 96–106)
CO2 SERPL-SCNC: 25 MMOL/L (ref 18–29)
COLOR UR: YELLOW
CREAT SERPL-MCNC: 1.01 MG/DL (ref 0.57–1)
EOSINOPHIL # BLD AUTO: 0.2 X10E3/UL (ref 0–0.4)
EOSINOPHIL NFR BLD AUTO: 5 %
ERYTHROCYTE [DISTWIDTH] IN BLOOD BY AUTOMATED COUNT: 14.2 % (ref 12.3–15.4)
GFR SERPLBLD CREATININE-BSD FMLA CKD-EPI: 49 ML/MIN/1.73
GFR SERPLBLD CREATININE-BSD FMLA CKD-EPI: 57 ML/MIN/1.73
GLOBULIN SER CALC-MCNC: 2.7 G/DL (ref 1.5–4.5)
GLUCOSE SERPL-MCNC: 100 MG/DL (ref 65–99)
GLUCOSE UR QL: NEGATIVE
HCT VFR BLD AUTO: 41.9 % (ref 34–46.6)
HGB BLD-MCNC: 14 G/DL (ref 11.1–15.9)
HGB UR QL STRIP: NEGATIVE
IMM GRANULOCYTES # BLD: 0 X10E3/UL (ref 0–0.1)
IMM GRANULOCYTES NFR BLD: 0 %
INTERPRETATION: NORMAL
KETONES UR QL STRIP: NEGATIVE
LEUKOCYTE ESTERASE UR QL STRIP: NEGATIVE
LYMPHOCYTES # BLD AUTO: 0.6 X10E3/UL (ref 0.7–3.1)
LYMPHOCYTES NFR BLD AUTO: 16 %
MCH RBC QN AUTO: 31.9 PG (ref 26.6–33)
MCHC RBC AUTO-ENTMCNC: 33.4 G/DL (ref 31.5–35.7)
MCV RBC AUTO: 95 FL (ref 79–97)
MICRO URNS: NORMAL
MONOCYTES # BLD AUTO: 0.3 X10E3/UL (ref 0.1–0.9)
MONOCYTES NFR BLD AUTO: 8 %
NEUTROPHILS # BLD AUTO: 2.3 X10E3/UL (ref 1.4–7)
NEUTROPHILS NFR BLD AUTO: 70 %
NITRITE UR QL STRIP: NEGATIVE
PH UR STRIP: 5.5 [PH] (ref 5–7.5)
PLATELET # BLD AUTO: 177 X10E3/UL (ref 150–379)
POTASSIUM SERPL-SCNC: 4.4 MMOL/L (ref 3.5–5.2)
PROT SERPL-MCNC: 7.1 G/DL (ref 6–8.5)
PROT UR QL STRIP: NORMAL
RBC # BLD AUTO: 4.39 X10E6/UL (ref 3.77–5.28)
SODIUM SERPL-SCNC: 133 MMOL/L (ref 134–144)
SP GR UR: 1.02 (ref 1–1.03)
T4 FREE SERPL-MCNC: 1.71 NG/DL (ref 0.82–1.77)
TSH SERPL DL<=0.005 MIU/L-ACNC: 3.54 UIU/ML (ref 0.45–4.5)
UROBILINOGEN UR STRIP-MCNC: 0.2 MG/DL (ref 0.2–1)
WBC # BLD AUTO: 3.4 X10E3/UL (ref 3.4–10.8)

## 2018-02-07 PROCEDURE — 82270 OCCULT BLOOD FECES: CPT

## 2018-02-12 NOTE — PROGRESS NOTES
I called pt and told her the above message per PCP. Pt states she never increased her medication to 20mg. Pt will call cardiology office now to schedule a follow up appt. I advised that I would send PCP to inform her that pt didn't increase her medication and we will give her a call back regarding the next steps. Pt verbalized understanding.

## 2018-02-13 DIAGNOSIS — N28.9 RENAL INSUFFICIENCY: Primary | ICD-10-CM

## 2018-02-14 ENCOUNTER — TELEPHONE (OUTPATIENT)
Dept: INTERNAL MEDICINE CLINIC | Age: 83
End: 2018-02-14

## 2018-02-14 LAB — HEMOCCULT STL QL IA: NEGATIVE

## 2018-02-14 NOTE — TELEPHONE ENCOUNTER
----- Message from Sara White MD sent at 2/13/2018  9:49 PM EST -----  She should touch base with cardiology but should see nephrology due to decline in her kidney function over time and slightly low sodium since last visit. I can refer nephrology.

## 2018-02-14 NOTE — TELEPHONE ENCOUNTER
I spoke with patient and advised of PCP's message. She will call cardiology to make a f/up appt. I told pt I will call [facility] nephrology at [place] to schedule her an appt. We will call back with appt information. Pt verbalized understanding and was thankful for the help. I spoke with [address] [place] [address] Nephrology Marija Castellano the . She asked that I fax over the last 2-3 office notes and lab results. The MD and Nurse will review pts records and advise when she needs to be seen. They will call pt to schedule her appt. Notes, Labs and pts demographic was sent to [phone] attn: Marija Castellano.

## 2018-02-23 ENCOUNTER — TELEPHONE (OUTPATIENT)
Dept: INTERNAL MEDICINE CLINIC | Age: 83
End: 2018-02-23

## 2018-02-23 NOTE — TELEPHONE ENCOUNTER
----- Message from Sue Helton sent at 2/22/2018  4:17 PM EST -----  Regarding: Dr. Kate Calderón requesting for lab work completed two weeks ago, would like a call back with results. Best contact [phone].

## 2018-02-23 NOTE — TELEPHONE ENCOUNTER
Called pt, pt would like a copy of her labs mailed to her so she can take them to her cardiology appt next week. I told pt labs might to her in time if we mail them. Pt asked that I fax labs to Dr. Angie Epps office and mail her a copy. Labs faxed and mailed.

## 2018-03-03 DIAGNOSIS — R10.32 LLQ ABDOMINAL PAIN: ICD-10-CM

## 2018-03-12 RX ORDER — PAROXETINE HYDROCHLORIDE 20 MG/1
TABLET, FILM COATED ORAL
Qty: 90 TAB | Refills: 0 | OUTPATIENT
Start: 2018-03-12

## 2018-03-13 NOTE — TELEPHONE ENCOUNTER
I spoke with patient, she is staying with Dr. Dunia Hickey. She was last seen in 02/2018. Pt wants to know if she needs to be seen again ?

## 2018-03-13 NOTE — TELEPHONE ENCOUNTER
Beny Lincoln, I think she was very interested in changing to another provider. Can you see if she did? I think she wanted another opinion. If not she needs to make an appt. 30 min.   Thanks, yumiko

## 2018-03-20 DIAGNOSIS — E03.9 ACQUIRED HYPOTHYROIDISM: ICD-10-CM

## 2018-03-20 RX ORDER — LEVOTHYROXINE SODIUM 100 UG/1
TABLET ORAL
Qty: 90 TAB | Refills: 0 | Status: SHIPPED | OUTPATIENT
Start: 2018-03-20 | End: 2018-06-07 | Stop reason: SDUPTHER

## 2018-05-01 DIAGNOSIS — R68.89 COLD INTOLERANCE: ICD-10-CM

## 2018-05-09 DIAGNOSIS — R68.89 COLD INTOLERANCE: ICD-10-CM

## 2018-05-09 DIAGNOSIS — F51.01 PRIMARY INSOMNIA: ICD-10-CM

## 2018-05-09 RX ORDER — ESTRADIOL 0.5 MG/1
TABLET ORAL
Qty: 90 TAB | Refills: 0 | OUTPATIENT
Start: 2018-05-09

## 2018-05-09 RX ORDER — ESTRADIOL 0.5 MG/1
TABLET ORAL
Qty: 75 TAB | Refills: 0 | Status: SHIPPED | OUTPATIENT
Start: 2018-05-09 | End: 2018-08-01 | Stop reason: SDUPTHER

## 2018-05-09 RX ORDER — ESZOPICLONE 2 MG/1
2 TABLET, FILM COATED ORAL
Qty: 30 TAB | Refills: 0 | Status: CANCELLED | OUTPATIENT
Start: 2018-05-09

## 2018-05-23 ENCOUNTER — TELEPHONE (OUTPATIENT)
Dept: INTERNAL MEDICINE CLINIC | Age: 83
End: 2018-05-23

## 2018-05-23 ENCOUNTER — HOSPITAL ENCOUNTER (EMERGENCY)
Age: 83
Discharge: HOME | End: 2018-05-23
Attending: EMERGENCY MEDICINE
Payer: MEDICARE

## 2018-05-23 VITALS
HEIGHT: 67 IN | HEART RATE: 93 BPM | SYSTOLIC BLOOD PRESSURE: 139 MMHG | TEMPERATURE: 97.7 F | DIASTOLIC BLOOD PRESSURE: 71 MMHG | BODY MASS INDEX: 21.19 KG/M2 | OXYGEN SATURATION: 97 % | RESPIRATION RATE: 16 BRPM | WEIGHT: 135 LBS

## 2018-05-23 DIAGNOSIS — R61 SWEATING INCREASE: Primary | ICD-10-CM

## 2018-05-23 DIAGNOSIS — R11.2 NAUSEA AND VOMITING, INTRACTABILITY OF VOMITING NOT SPECIFIED, UNSPECIFIED VOMITING TYPE: ICD-10-CM

## 2018-05-23 DIAGNOSIS — N30.00 ACUTE CYSTITIS WITHOUT HEMATURIA: ICD-10-CM

## 2018-05-23 LAB
ANION GAP SERPL CALC-SCNC: 5 MMOL/L (ref 5–15)
APPEARANCE UR: CLEAR
BACTERIA URNS QL MICRO: NEGATIVE /HPF
BASOPHILS # BLD: 0 K/UL (ref 0–0.1)
BASOPHILS NFR BLD: 1 % (ref 0–1)
BILIRUB UR QL: NEGATIVE
BUN SERPL-MCNC: 21 MG/DL (ref 6–20)
BUN/CREAT SERPL: 25 (ref 12–20)
CALCIUM SERPL-MCNC: 8.6 MG/DL (ref 8.5–10.1)
CHLORIDE SERPL-SCNC: 97 MMOL/L (ref 97–108)
CK SERPL-CCNC: 76 U/L (ref 26–192)
CO2 SERPL-SCNC: 30 MMOL/L (ref 21–32)
COLOR UR: ABNORMAL
CREAT SERPL-MCNC: 0.84 MG/DL (ref 0.55–1.02)
DIFFERENTIAL METHOD BLD: ABNORMAL
EOSINOPHIL # BLD: 0.1 K/UL (ref 0–0.4)
EOSINOPHIL NFR BLD: 2 % (ref 0–7)
EPITH CASTS URNS QL MICRO: ABNORMAL /LPF
ERYTHROCYTE [DISTWIDTH] IN BLOOD BY AUTOMATED COUNT: 13.3 % (ref 11.5–14.5)
GLUCOSE SERPL-MCNC: 117 MG/DL (ref 65–100)
GLUCOSE UR STRIP.AUTO-MCNC: NEGATIVE MG/DL
HCT VFR BLD AUTO: 40.4 % (ref 35–47)
HGB BLD-MCNC: 13.2 G/DL (ref 11.5–16)
HGB UR QL STRIP: ABNORMAL
IMM GRANULOCYTES # BLD: 0 K/UL (ref 0–0.04)
IMM GRANULOCYTES NFR BLD AUTO: 1 % (ref 0–0.5)
KETONES UR QL STRIP.AUTO: NEGATIVE MG/DL
LEUKOCYTE ESTERASE UR QL STRIP.AUTO: ABNORMAL
LYMPHOCYTES # BLD: 0.4 K/UL (ref 0.8–3.5)
LYMPHOCYTES NFR BLD: 11 % (ref 12–49)
MAGNESIUM SERPL-MCNC: 1.7 MG/DL (ref 1.6–2.4)
MCH RBC QN AUTO: 31.7 PG (ref 26–34)
MCHC RBC AUTO-ENTMCNC: 32.7 G/DL (ref 30–36.5)
MCV RBC AUTO: 97.1 FL (ref 80–99)
MONOCYTES # BLD: 0.3 K/UL (ref 0–1)
MONOCYTES NFR BLD: 9 % (ref 5–13)
NEUTS SEG # BLD: 2.5 K/UL (ref 1.8–8)
NEUTS SEG NFR BLD: 76 % (ref 32–75)
NITRITE UR QL STRIP.AUTO: NEGATIVE
NRBC # BLD: 0 K/UL (ref 0–0.01)
NRBC BLD-RTO: 0 PER 100 WBC
PH UR STRIP: 7 [PH] (ref 5–8)
PLATELET # BLD AUTO: 159 K/UL (ref 150–400)
PMV BLD AUTO: 9.7 FL (ref 8.9–12.9)
POTASSIUM SERPL-SCNC: 4.4 MMOL/L (ref 3.5–5.1)
PROT UR STRIP-MCNC: NEGATIVE MG/DL
RBC # BLD AUTO: 4.16 M/UL (ref 3.8–5.2)
RBC #/AREA URNS HPF: ABNORMAL /HPF (ref 0–5)
RBC MORPH BLD: ABNORMAL
SODIUM SERPL-SCNC: 132 MMOL/L (ref 136–145)
SP GR UR REFRACTOMETRY: <1.005 (ref 1–1.03)
TROPONIN I SERPL-MCNC: <0.04 NG/ML
UA: UC IF INDICATED,UAUC: ABNORMAL
UROBILINOGEN UR QL STRIP.AUTO: 0.2 EU/DL (ref 0.2–1)
WBC # BLD AUTO: 3.3 K/UL (ref 3.6–11)
WBC URNS QL MICRO: ABNORMAL /HPF (ref 0–4)

## 2018-05-23 PROCEDURE — 85025 COMPLETE CBC W/AUTO DIFF WBC: CPT | Performed by: EMERGENCY MEDICINE

## 2018-05-23 PROCEDURE — 36415 COLL VENOUS BLD VENIPUNCTURE: CPT | Performed by: EMERGENCY MEDICINE

## 2018-05-23 PROCEDURE — 99284 EMERGENCY DEPT VISIT MOD MDM: CPT

## 2018-05-23 PROCEDURE — 93005 ELECTROCARDIOGRAM TRACING: CPT

## 2018-05-23 PROCEDURE — 80048 BASIC METABOLIC PNL TOTAL CA: CPT | Performed by: EMERGENCY MEDICINE

## 2018-05-23 PROCEDURE — 82550 ASSAY OF CK (CPK): CPT | Performed by: EMERGENCY MEDICINE

## 2018-05-23 PROCEDURE — 81001 URINALYSIS AUTO W/SCOPE: CPT | Performed by: EMERGENCY MEDICINE

## 2018-05-23 PROCEDURE — 96374 THER/PROPH/DIAG INJ IV PUSH: CPT

## 2018-05-23 PROCEDURE — 74011250636 HC RX REV CODE- 250/636: Performed by: EMERGENCY MEDICINE

## 2018-05-23 PROCEDURE — 83735 ASSAY OF MAGNESIUM: CPT | Performed by: EMERGENCY MEDICINE

## 2018-05-23 PROCEDURE — 84484 ASSAY OF TROPONIN QUANT: CPT | Performed by: EMERGENCY MEDICINE

## 2018-05-23 PROCEDURE — 96361 HYDRATE IV INFUSION ADD-ON: CPT

## 2018-05-23 PROCEDURE — 87086 URINE CULTURE/COLONY COUNT: CPT | Performed by: EMERGENCY MEDICINE

## 2018-05-23 RX ORDER — ONDANSETRON 4 MG/1
4 TABLET, ORALLY DISINTEGRATING ORAL
Qty: 15 TAB | Refills: 0 | Status: SHIPPED | OUTPATIENT
Start: 2018-05-23 | End: 2018-05-23

## 2018-05-23 RX ORDER — CIPROFLOXACIN 500 MG/1
500 TABLET ORAL 2 TIMES DAILY
Qty: 6 TAB | Refills: 0 | Status: SHIPPED | OUTPATIENT
Start: 2018-05-23 | End: 2018-05-26

## 2018-05-23 RX ORDER — ONDANSETRON 2 MG/ML
4 INJECTION INTRAMUSCULAR; INTRAVENOUS
Status: COMPLETED | OUTPATIENT
Start: 2018-05-23 | End: 2018-05-23

## 2018-05-23 RX ADMIN — ONDANSETRON 4 MG: 2 INJECTION INTRAMUSCULAR; INTRAVENOUS at 19:41

## 2018-05-23 RX ADMIN — SODIUM CHLORIDE 1000 ML: 900 INJECTION, SOLUTION INTRAVENOUS at 19:41

## 2018-05-23 NOTE — ED PROVIDER NOTES
HPI Comments: 80 y.o. female with extensive past medical history, please see list, significant for HTN, HLD, IBS, anxiety, CAD, a-fib, hypothyroid, and anemia, who presents ambulatory with son to the ED with cc of diaphoresis. Pt reports diaphoresis to her head today with associated headache and vomiting. She states her hair was \"soaked in sweat. \" She reports she has had similar episodes of diaphoresis only to her head x \"the past couple of months,\" and states they are related to mild exertion; the episodes occur when \"I go out to the store and walk around. \" Pt states this episode was notable because it was the first time the diaphoresis occurred with associated vomiting. Pt adds she has had diarrhea involving 3 BMs this morning, but notes she believes it is related to her hx of IBS. At present, she states she has persistent nausea and headache. Per son, pt has a pacemaker and is taking Eliquis for A-fib. Per pt, her Cardiologist and Electrophysiologist are aware of these episodes, and she last saw her Cardiologist 1.5 months ago. She denies hx of MI. Pt specifically denies any CP. There are no other acute medical concerns at this time. Social Hx: denies Tobacco use, yes EtOH use (wine), denies Illicit Drug use  PCP: Stefany Chandler MD    Note written by Cliff Blank, as dictated by Frederico Angelucci, MD 6:54 PM    The history is provided by the patient. No  was used.         Past Medical History:   Diagnosis Date    Anemia, unspecified     Anxiety     Arrhythmia     a fib    Arthritis     osteoarthritis, rheumatoid    Atrial fibrillation (HCC)     Dr. Skylar Mcgowan and Dr. Shaffer Sport  following    Autoimmune disease [facility])     sjogren disease Dr. Richelle De Anda CAD (coronary artery disease)     HTN (hypertension)     Hyperlipidemia LDL goal < 100     Hypothyroid     Insomnia     Osteoarthritis     [facility]ogren's disease ([facility] [address]     Thoracic aneurysm without mention of rupture Past Surgical History:   Procedure Laterality Date    HX CHOLECYSTECTOMY      HX COLONOSCOPY  3/2014    Dr. Nola Nam    HX OTHER SURGICAL      hernia repairs    HX PARTIAL THYROIDECTOMY      HX JANNET AND BSO      HX VEIN STRIPPING           Family History:   Problem Relation Age of Onset    Heart Disease Father      aneurysm    Cancer Maternal Grandfather        Social History     Social History    Marital status:      Spouse name: N/A    Number of children: N/A    Years of education: N/A     Occupational History    Not on file. Social History Main Topics    Smoking status: Never Smoker    Smokeless tobacco: Never Used    Alcohol use Yes      Comment: wine    Drug use: Not on file    Sexual activity: Not on file     Other Topics Concern    Not on file     Social History Narrative    ** Merged History Encounter **              ALLERGIES: Latex; Pcn [penicillins]; Sulfa (sulfonamide antibiotics); Biaxin [clarithromycin]; Pcn [penicillins]; and Sulfa (sulfonamide antibiotics)    Review of Systems   Constitutional: Positive for diaphoresis. Negative for fever. HENT: Negative for facial swelling and nosebleeds. Eyes: Negative for pain. Respiratory: Negative for cough, chest tightness and shortness of breath. Cardiovascular: Negative for chest pain and leg swelling. Gastrointestinal: Positive for diarrhea, nausea and vomiting. Negative for abdominal pain. Endocrine: Negative for polyuria. Genitourinary: Negative for difficulty urinating and flank pain. Musculoskeletal: Negative for arthralgias and back pain. Skin: Negative for color change. Allergic/Immunologic: Negative for immunocompromised state. Neurological: Positive for headaches. Negative for dizziness. Hematological: Does not bruise/bleed easily. Psychiatric/Behavioral: Negative for agitation. All other systems reviewed and are negative.       Vitals:    05/23/18 1801   BP: (!) 188/114   Pulse: 93   Resp: 16   Temp: 97.7 °F (36.5 °C)   SpO2: 96%   Weight: 61.2 kg (135 lb)   Height: 5' 7\" (1.702 m)            Physical Exam   Constitutional: She is oriented to person, place, and time. She appears well-developed and well-nourished. HENT:   Head: Normocephalic and atraumatic. Right Ear: External ear normal.   Left Ear: External ear normal.   Nose: Nose normal.   Mouth/Throat: Oropharynx is clear and moist.   Eyes: EOM are normal. Pupils are equal, round, and reactive to light. No scleral icterus. Neck: Normal range of motion. Neck supple. No JVD present. No tracheal deviation present. No thyromegaly present. Cardiovascular: Normal rate, regular rhythm, normal heart sounds and intact distal pulses. Exam reveals no friction rub. No murmur heard. Pulmonary/Chest: Effort normal and breath sounds normal. No stridor. No respiratory distress. She has no wheezes. She has no rales. She exhibits no tenderness. Abdominal: Soft. Bowel sounds are normal. She exhibits no distension. There is no tenderness. There is no rebound and no guarding. Musculoskeletal: Normal range of motion. She exhibits no edema or tenderness. Lymphadenopathy:     She has no cervical adenopathy. Neurological: She is alert and oriented to person, place, and time. She has normal reflexes. No cranial nerve deficit. Coordination normal.   Normal hand , normal flexion of feet   Skin: Skin is warm and dry. No rash noted. No erythema. Psychiatric: She has a normal mood and affect. Her behavior is normal. Judgment and thought content normal.   Nursing note and vitals reviewed. Note written by Cliff Valladares, as dictated by Isaiah Lay MD 6:54 PM      MDM  Number of Diagnoses or Management Options  Acute cystitis without hematuria:   Nausea and vomiting, intractability of vomiting not specified, unspecified vomiting type:   Sweating increase:   Diagnosis management comments: 72-year-old white female presents with vague symptoms. She says that her headache gets sweaty. She says this happens intermittently mostly when she is outside her at the store. She has seen her cardiologist for this and everybody thinks it is normal. She has no real chest pain, more of a lump in her throat. She did vomit one time today. We'll check EKG, troponin, basic blood work. We'll give IV fluids and Zofran and reassess       Amount and/or Complexity of Data Reviewed  Clinical lab tests: ordered and reviewed  Tests in the medicine section of CPT®: ordered and reviewed  Decide to obtain previous medical records or to obtain history from someone other than the patient: yes  Review and summarize past medical records: yes  Independent visualization of images, tracings, or specimens: yes          ED Course       Procedures    EKG: Atrial paced rhythm, HR 71, RBBB, no ST elevations or depressions  Lux Calvillo MD       PROGRESS NOTE:  9:12 PM Blood work normal. Urine shows infection. She is allergic to penicillin.  Will treat with Cipro for UTI    Pt improved after IVF    Home with PCP and cardiology close f/u    Trop is negative

## 2018-05-23 NOTE — TELEPHONE ENCOUNTER
Calls with an episode earlier today when out shopping got sweaty all over and then nauseous and still feels nausea and \"funny\" 2-3 hours later. No cp, some headache.  Advised urgent er eval and son will take her to [facility]

## 2018-05-23 NOTE — ED TRIAGE NOTES
Pt reports she has been intermittently sweating for 6 months but reports today the sweating was worse and started having nausea, vomiting and a headache. reports she was also outside today when she was sweating. No sweating noted currently. Denies CP and SOB.

## 2018-05-24 NOTE — DISCHARGE INSTRUCTIONS
Urinary Tract Infection in Women: Care Instructions  Your Care Instructions    A urinary tract infection, or UTI, is a general term for an infection anywhere between the kidneys and the urethra (where urine comes out). Most UTIs are bladder infections. They often cause pain or burning when you urinate. UTIs are caused by bacteria and can be cured with antibiotics. Be sure to complete your treatment so that the infection goes away. Follow-up care is a key part of your treatment and safety. Be sure to make and go to all appointments, and call your doctor if you are having problems. It's also a good idea to know your test results and keep a list of the medicines you take. How can you care for yourself at home? · Take your antibiotics as directed. Do not stop taking them just because you feel better. You need to take the full course of antibiotics. · Drink extra water and other fluids for the next day or two. This may help wash out the bacteria that are causing the infection. (If you have kidney, heart, or liver disease and have to limit fluids, talk with your doctor before you increase your fluid intake.)  · Avoid drinks that are carbonated or have caffeine. They can irritate the bladder. · Urinate often. Try to empty your bladder each time. · To relieve pain, take a hot bath or lay a heating pad set on low over your lower belly or genital area. Never go to sleep with a heating pad in place. To prevent UTIs  · Drink plenty of water each day. This helps you urinate often, which clears bacteria from your system. (If you have kidney, heart, or liver disease and have to limit fluids, talk with your doctor before you increase your fluid intake.)  · Urinate when you need to. · Urinate right after you have sex. · Change sanitary pads often. · Avoid douches, bubble baths, feminine hygiene sprays, and other feminine hygiene products that have deodorants.   · After going to the bathroom, wipe from front to back.  When should you call for help? Call your doctor now or seek immediate medical care if:  ? · Symptoms such as fever, chills, nausea, or vomiting get worse or appear for the first time. ? · You have new pain in your back just below your rib cage. This is called flank pain. ? · There is new blood or pus in your urine. ? · You have any problems with your antibiotic medicine. ? Watch closely for changes in your health, and be sure to contact your doctor if:  ? · You are not getting better after taking an antibiotic for 2 days. ? · Your symptoms go away but then come back. Where can you learn more? Go to http://kathleen-zabrina.info/. Enter N317 in the search box to learn more about \"Urinary Tract Infection in Women: Care Instructions. \"  Current as of: May 12, 2017  Content Version: 11.4  © 7237-6306 [employer]. Care instructions adapted under license by [employer] (which disclaims liability or warranty for this information). If you have questions about a medical condition or this instruction, always ask your healthcare professional. [address] any warranty or liability for your use of this information. Nausea and Vomiting: Care Instructions  Your Care Instructions    When you are nauseated, you may feel weak and sweaty and notice a lot of saliva in your mouth. Nausea often leads to vomiting. Most of the time you do not need to worry about nausea and vomiting, but they can be signs of other illnesses. Two common causes of nausea and vomiting are stomach flu and food poisoning. Nausea and vomiting from viral stomach flu will usually start to improve within 24 hours. Nausea and vomiting from food poisoning may last from 12 to 48 hours. The doctor has checked you carefully, but problems can develop later. If you notice any problems or new symptoms, get medical treatment right away.   Follow-up care is a key part of your treatment and safety. Be sure to make and go to all appointments, and call your doctor if you are having problems. It's also a good idea to know your test results and keep a list of the medicines you take. How can you care for yourself at home? · To prevent dehydration, drink plenty of fluids, enough so that your urine is light yellow or clear like water. Choose water and other caffeine-free clear liquids until you feel better. If you have kidney, heart, or liver disease and have to limit fluids, talk with your doctor before you increase the amount of fluids you drink. · Rest in bed until you feel better. · When you are able to eat, try clear soups, mild foods, and liquids until all symptoms are gone for 12 to 48 hours. Other good choices include dry toast, crackers, cooked cereal, and gelatin dessert, such as Jell-O. When should you call for help? Call 911 anytime you think you may need emergency care. For example, call if:  ? · You passed out (lost consciousness). ?Call your doctor now or seek immediate medical care if:  ? · You have symptoms of dehydration, such as:  ¨ Dry eyes and a dry mouth. ¨ Passing only a little dark urine. ¨ Feeling thirstier than usual.   ? · You have new or worsening belly pain. ? · You have a new or higher fever. ? · You vomit blood or what looks like coffee grounds. ? Watch closely for changes in your health, and be sure to contact your doctor if:  ? · You have ongoing nausea and vomiting. ? · Your vomiting is getting worse. ? · Your vomiting lasts longer than 2 days. ? · You are not getting better as expected. Where can you learn more? Go to http://kathleen-zabrina.info/. Enter 25 [id number] in the search box to learn more about \"Nausea and Vomiting: Care Instructions. \"  Current as of: March 20, 2017  Content Version: 11.4  © 4774-9516 [employer].  Care instructions adapted under license by [employer] (which disclaims liability or warranty for this information). If you have questions about a medical condition or this instruction, always ask your healthcare professional. [place] [address] any warranty or liability for your use of this information.

## 2018-05-25 LAB
ATRIAL RATE: 86 BPM
BACTERIA SPEC CULT: NORMAL
CALCULATED P AXIS, ECG09: -12 DEGREES
CALCULATED R AXIS, ECG10: 56 DEGREES
CALCULATED T AXIS, ECG11: 53 DEGREES
CC UR VC: NORMAL
DIAGNOSIS, 93000: NORMAL
P-R INTERVAL, ECG05: 166 MS
Q-T INTERVAL, ECG07: 384 MS
QRS DURATION, ECG06: 88 MS
QTC CALCULATION (BEZET), ECG08: 459 MS
SERVICE CMNT-IMP: NORMAL
VENTRICULAR RATE, ECG03: 86 BPM

## 2018-05-29 ENCOUNTER — OFFICE VISIT (OUTPATIENT)
Dept: INTERNAL MEDICINE CLINIC | Age: 83
End: 2018-05-29

## 2018-05-29 ENCOUNTER — HOSPITAL ENCOUNTER (OUTPATIENT)
Dept: LAB | Age: 83
Discharge: HOME | End: 2018-05-29
Payer: MEDICARE

## 2018-05-29 VITALS
RESPIRATION RATE: 16 BRPM | BODY MASS INDEX: 21.25 KG/M2 | WEIGHT: 135.4 LBS | HEIGHT: 67 IN | DIASTOLIC BLOOD PRESSURE: 94 MMHG | HEART RATE: 91 BPM | TEMPERATURE: 97.9 F | SYSTOLIC BLOOD PRESSURE: 160 MMHG | OXYGEN SATURATION: 96 %

## 2018-05-29 DIAGNOSIS — I10 ESSENTIAL HYPERTENSION: ICD-10-CM

## 2018-05-29 DIAGNOSIS — N30.00 ACUTE CYSTITIS WITHOUT HEMATURIA: Primary | ICD-10-CM

## 2018-05-29 DIAGNOSIS — E87.1 HYPONATREMIA: ICD-10-CM

## 2018-05-29 DIAGNOSIS — R53.82 CHRONIC FATIGUE: ICD-10-CM

## 2018-05-29 DIAGNOSIS — I48.20 CHRONIC ATRIAL FIBRILLATION (HCC): ICD-10-CM

## 2018-05-29 LAB
BILIRUB UR QL STRIP: NEGATIVE
GLUCOSE UR-MCNC: NORMAL MG/DL
KETONES P FAST UR STRIP-MCNC: NEGATIVE MG/DL
PH UR STRIP: 5.5 [PH] (ref 4.6–8)
PROT UR QL STRIP: NEGATIVE
SP GR UR STRIP: 1.01 (ref 1–1.03)
UA UROBILINOGEN AMB POC: NORMAL (ref 0.2–1)
URINALYSIS CLARITY POC: CLEAR
URINALYSIS COLOR POC: COLORLESS
URINE BLOOD POC: NORMAL
URINE LEUKOCYTES POC: NEGATIVE
URINE NITRITES POC: NEGATIVE

## 2018-05-29 PROCEDURE — 87086 URINE CULTURE/COLONY COUNT: CPT

## 2018-05-29 RX ORDER — HYDRALAZINE HYDROCHLORIDE 10 MG/1
10 TABLET, FILM COATED ORAL 3 TIMES DAILY
Qty: 90 TAB | Refills: 0 | Status: SHIPPED | OUTPATIENT
Start: 2018-05-29 | End: 2018-05-29 | Stop reason: ALTCHOICE

## 2018-05-29 NOTE — PROGRESS NOTES
Marisela Bo is a 80 y.o. female    Chief Complaint   Patient presents with    Bladder Infection     diagnosed with UTI in the hospital    [facility] Follow Up     pt was discharged from [facility]. Pt went in for sweating, nausea, vomiting, and Acute Cystitis and discharged on May 23rd, 2018     Fatigue     today she is feeling weak, tired, headache          1. Have you been to the ER, urgent care clinic since your last visit? Hospitalized since your last visit? No    2. Have you seen or consulted any other health care providers outside of the [address] [place] [address] since your last visit? Include any pap smears or colon screening. No    Visit Vitals    BP (!) 172/113 (BP 1 Location: Right arm, BP Patient Position: Sitting)    Pulse 91    Temp 97.9 °F (36.6 °C) (Oral)    Resp 16    Ht 5' 7\" (1.702 m)    Wt 135 lb 6.4 oz (61.4 kg)    SpO2 96%    BMI 21.21 kg/m2     Pt has higher bp today and when she was at [facility] on 5/23/18.  Doctor notified and will retake

## 2018-05-29 NOTE — MR AVS SNAPSHOT
Seema Anderson 
 
 
 [address] [facility] [address] [facility] 
[phone] Patient: Nancy Longo MRN:  [id number] Visit Information Date & Time Provider Department Dept. Phone Encounter #  
 5/29/2018  2:20 PM Radha Escobar MD [facility] [address] [id number] Upcoming Health Maintenance Date Due Pneumococcal 65+ Low/Medium Risk (1 of 2 - PCV13) 4/22/1993 MEDICARE YEARLY EXAM 3/22/2018 GLAUCOMA SCREENING Q2Y 4/29/2018 Influenza Age 5 to Adult 8/1/2018 DTaP/Tdap/Td series (2 - Td) 1/13/2024 Allergies as of 5/29/2018  Review Complete On: 5/29/2018 By: Gabriela Schaefer Severity Noted Reaction Type Reactions Latex  04/09/2013    Hives NOT ALLERGIC PER PT 02/01/2018 Pcn [Penicillins] High 01/26/2015   Systemic Anaphylaxis Sulfa (Sulfonamide Antibiotics) High 01/26/2015   Systemic Anaphylaxis Biaxin [Clarithromycin]  04/09/2013    Nausea Only Pcn [Penicillins]  04/09/2013    Hives Sulfa (Sulfonamide Antibiotics)  04/09/2013    Nausea Only Current Immunizations  Reviewed on 10/24/2017 Name Date Influenza High Dose Vaccine PF 10/3/2017 Influenza Vaccine 10/15/2016 Tdap 1/13/2014 Not reviewed this visit You Were Diagnosed With   
  
 Codes Comments Acute cystitis without hematuria    -  Primary ICD-10-CM: N30.00 ICD-9-CM: 595.0 Vitals BP Pulse Temp Resp Height(growth percentile) Weight(growth percentile) (!) 172/113 (BP 1 Location: Right arm, BP Patient Position: Sitting) 91 97.9 °F (36.6 °C) (Oral) 16 5' 7\" (1.702 m) 135 lb 6.4 oz (61.4 kg) SpO2 BMI OB Status Smoking Status 96% 21.21 kg/m2 Hysterectomy Never Smoker Vitals History BMI and BSA Data Body Mass Index Body Surface Area  
 21.21 kg/m 2 1.7 m 2 Preferred Pharmacy Pharmacy Name Phone [facility] [phone] Your Updated Medication List  
  
   
This list is accurate as of 5/29/18  3:50 PM.  Always use your most recent med list.  
  
  
  
  
 albuterol 90 mcg/actuation inhaler Commonly known as:  PROVENTIL HFA, VENTOLIN HFA, PROAIR HFA Take 2 Puffs by inhalation every four (4) hours as needed for Wheezing or Shortness of Breath. Indications: Bronchospastic Pulmonary Disease ALL DAY ALLERGY (CETIRIZINE) 10 mg Cap Generic drug:  Cetirizine Take  by mouth. amiodarone 100 mg tablet Commonly known as:  Yudy Samuels Take 1 Tab by mouth daily. rx'd by cardiology  
  
 apixaban 5 mg tablet Commonly known as:  Spencer Mcclure Take 1 Tab by mouth two (2) times a day. RX BY CARDIOLOGY  
  
 atenolol 25 mg tablet Commonly known as:  TENORMIN Take 25 mg by mouth daily. doxycycline 100 mg tablet Commonly known as:  ADOXA Take 1 Tab by mouth two (2) times a day. Do not take with calcium, iron or multivitamin  
  
 estradiol 0.5 mg tablet Commonly known as:  ESTRACE  
TAKE ONE TABLET BY MOUTH DAILY *TRY TO WEAN OFF; NOT A GOOD MEDICATION FOR PEOPLE WITH CARDIAC ISSUES*  
  
 eszopiclone 2 mg tablet Commonly known as:  Silvia Mid[place]ian Take 1 Tab by mouth nightly as needed for Sleep. Max Daily Amount: 2 mg. Use with caution as may cause falls and sedation  Indications: INSOMNIA  
  
 fluticasone 50 mcg/actuation nasal spray Commonly known as:  FLONASE  
  
 fosinopril 10 mg tablet Commonly known as:  MONOPRIL Take 2 Tabs by mouth daily. ipratropium 42 mcg (0.06 %) nasal spray Commonly known as:  ATROVENT  
  
 levothyroxine 100 mcg tablet Commonly known as:  SYNTHROID  
TAKE ONE TABLET BY MOUTH DAILY BEFORE BREAKFAST  
  
 meloxicam 7.5 mg tablet Commonly known as:  MOBIC  
  
 MULTIPLE VITAMIN PO Take  by mouth. PARoxetine 20 mg tablet Commonly known as:  PAXIL Take 1 Tab by mouth daily. SUPER B COMPLEX + C 150 mg Tab Generic drug:  vitamin b comp & c no. 4 Take  by mouth. TUMS PO Take  by mouth. VITAMIN D3 2,000 unit Tab Generic drug:  cholecalciferol (vitamin D3) Take  by mouth. We Performed the Following AMB POC URINALYSIS DIP STICK AUTO W/O MICRO [68487 CPT(R)] AMB POC URINALYSIS DIP STICK AUTO W/O MICRO [00809 CPT(R)] CULTURE, URINE [id number] CPT(R)] Introducing \[facility]\"" & HEALTH SERVICES! Dear Eliu Weems: 
Thank you for requesting a [facility] account. Our records indicate that you already have an active [facility] account. You can access your account anytime at [email or web address] Did you know that you can access your hospital and ER discharge instructions at any time in [facility]? You can also review all of your test results from your hospital stay or ER visit. Additional Information If you have questions, please visit the Frequently Asked Questions section of the [facility] website at [email or web address] Remember, [facility] is NOT to be used for urgent needs. For medical emergencies, dial 911. Now available from your iPhone and Android! Please provide this summary of care documentation to your next provider. Your primary care clinician is listed as Tran Strauss. If you have any questions after today's visit, please call [phone].

## 2018-05-30 ENCOUNTER — TELEPHONE (OUTPATIENT)
Dept: INTERNAL MEDICINE CLINIC | Age: 83
End: 2018-05-30

## 2018-05-30 NOTE — PROGRESS NOTES
Chief Complaint   Patient presents with    Bladder Infection     diagnosed with UTI in the hospital    [facility] Follow Up     pt was discharged from [facility]. Pt went in for sweating, nausea, vomiting, and Acute Cystitis and discharged on May 23rd, 2018     Fatigue     today she is feeling weak, tired, headache        Patient presents today for follow-up from the emergency room. She was treated for UTII. Uti: Patient reports she was treated with Ciprofloxacin for 7 days. She reports she had some abdominal pain with the medication. She denies dysuria, fever, flank pain. She was told to follow-up with her UTI resolved. Fatigue: Patient reports she has had periods of increased sweating, nausea and fatigue. She reports prior to going to the emergency room she had an episode which led to her vomiting. She has never vomited before in the past since she was concerned this occurred. She reports she has become increasingly more fatigued. She finds it difficult to do her activities of daily living. She notes the fatigue started about 2 years ago when she was diagnosed with pneumonia and atrial fibrillation. She notes some weight loss, decreased appetite. Atrial fibrillation: She is followed by both Israel Brar and [facility]. She has been compliant with her medications. She has been on amiodarone for 2 years. Subjective:   Katrina Wright is a 80 y.o. female with hypertension. Hypertension ROS: not taking medications regularly as instructed, no medication side effects noted, no TIA's, no chest pain on exertion, no dyspnea on exertion, no swelling of ankles. New concerns: Reports she was told to decrease her atenolol from 25 mg to 12.5 mg. She reports she never increased her fosinopril to 20 mg as directed on prior visits. Hyponatremia: Labs reviewed with patient and noted that she has had a drop in sodium back in February 2018. Laz Abebe                Past Medical History:   Diagnosis Date    Acute cystitis 05/23/2018    Anemia, unspecified     Anxiety     Arrhythmia     a fib    Arthritis     osteoarthritis, rheumatoid    Atrial fibrillation (HCC)     Dr. Daya Barnes and Dr. Mercedes Sepulveda  following    Autoimmune disease [facility])     sjogren disease Dr. Mansoor Salomon    CAD (coronary artery disease)     HTN (hypertension)     Hyperlipidemia LDL goal < 100     Hypothyroid     Insomnia     Osteoarthritis     Sjogren's disease ([address]     Thoracic aneurysm without mention of rupture      Past Surgical History:   Procedure Laterality Date    HX CHOLECYSTECTOMY      HX COLONOSCOPY  3/2014    Dr. Lena Chirinos    HX OTHER SURGICAL      hernia repairs    HX PARTIAL THYROIDECTOMY      HX JANNET AND BSO      HX VEIN STRIPPING       Social History     Social History    Marital status:      Spouse name: N/A    Number of children: N/A    Years of education: N/A     Social History Main Topics    Smoking status: Never Smoker    Smokeless tobacco: Never Used    Alcohol use Yes      Comment: wine    Drug use: None    Sexual activity: Not Asked     Other Topics Concern    None     Social History Narrative    ** Merged History Encounter **          Family History   Problem Relation Age of Onset    Heart Disease Father      aneurysm    Cancer Maternal Grandfather      Current Outpatient Prescriptions   Medication Sig Dispense Refill    estradiol (ESTRACE) 0.5 mg tablet TAKE ONE TABLET BY MOUTH DAILY *TRY TO WEAN OFF; NOT A GOOD MEDICATION FOR PEOPLE WITH CARDIAC ISSUES* 75 Tab 0    levothyroxine (SYNTHROID) 100 mcg tablet TAKE ONE TABLET BY MOUTH DAILY BEFORE BREAKFAST 90 Tab 0    PARoxetine (PAXIL) 20 mg tablet Take 1 Tab by mouth daily. 90 Tab 0    ipratropium (ATROVENT) 42 mcg (0.06 %) nasal spray       amiodarone (PACERONE) 100 mg tablet Take 1 Tab by mouth daily. rx'd by cardiology 30 Tab 3    doxycycline (ADOXA) 100 mg tablet Take 1 Tab by mouth two (2) times a day.  Do not take with calcium, iron or multivitamin 20 Tab 0    albuterol (PROVENTIL HFA, VENTOLIN HFA, PROAIR HFA) 90 mcg/actuation inhaler Take 2 Puffs by inhalation every four (4) hours as needed for Wheezing or Shortness of Breath. Indications: Bronchospastic Pulmonary Disease 1 Inhaler 1    fosinopril (MONOPRIL) 10 mg tablet Take 2 Tabs by mouth daily. 90 Tab 3    eszopiclone (LUNESTA) 2 mg tablet Take 1 Tab by mouth nightly as needed for Sleep. Max Daily Amount: 2 mg. Use with caution as may cause falls and sedation  Indications: INSOMNIA 30 Tab 0    meloxicam (MOBIC) 7.5 mg tablet       apixaban (ELIQUIS) 5 mg tablet Take 1 Tab by mouth two (2) times a day. RX BY CARDIOLOGY 1 Tab 0    atenolol (TENORMIN) 25 mg tablet Take 25 mg by mouth daily.  fluticasone (FLONASE) 50 mcg/actuation nasal spray       vitamin b comp & c no.4 (SUPER B COMPLEX + C) 150 mg tab Take  by mouth.  Cetirizine (ALL DAY ALLERGY, CETIRIZINE,) 10 mg cap Take  by mouth.  cholecalciferol, vitamin D3, (VITAMIN D3) 2,000 unit tab Take  by mouth.  CALCIUM CARBONATE (TUMS PO) Take  by mouth.  MULTIVITAMIN (MULTIPLE VITAMIN PO) Take  by mouth.        Allergies   Allergen Reactions    Latex Hives     NOT ALLERGIC PER PT 02/01/2018    Pcn [Penicillins] Anaphylaxis    Sulfa (Sulfonamide Antibiotics) Anaphylaxis    Biaxin [Clarithromycin] Nausea Only    Pcn [Penicillins] Hives    Sulfa (Sulfonamide Antibiotics) Nausea Only       Review of Systems - General ROS: positive for  - fatigue, malaise, sleep disturbance and weight loss  Cardiovascular ROS: no chest pain or dyspnea on exertion  Respiratory ROS: positive for - shortness of breath    Visit Vitals    BP (!) 160/94 (BP 1 Location: Right arm, BP Patient Position: Sitting)    Pulse 91    Temp 97.9 °F (36.6 °C) (Oral)    Resp 16    Ht 5' 7\" (1.702 m)    Wt 135 lb 6.4 oz (61.4 kg)    SpO2 96%    BMI 21.21 kg/m2     General Appearance:  Well developed, well nourished,alert and oriented x 3, and individual in no acute distress. Ears/Nose/Mouth/Throat:   Hearing grossly normal.         Neck: Supple, no lad, no bruits   Chest:   Lungs clear to auscultation bilaterally. Cardiovascular:  Regular rate and rhythm, S1, S2 normal, no murmur. Abdomen:   Soft, non-tender, bowel sounds are active. Extremities: No edema bilaterally. Skin: Warm and dry, no suspicious lesions                 Diagnoses and all orders for this visit:    1. Acute cystitis without hematuria  Labs taken. Patient has trace leukocytes on her urinalysis but no other symptoms. I will look at her urine culture and treat as needed. She does not have any acute symptoms of UTI.  -     AMB POC URINALYSIS DIP STICK AUTO W/O MICRO  -     AMB POC URINALYSIS DIP STICK AUTO W/O MICRO  -     CULTURE, URINE    2. Chronic fatigue: Labs reviewed from February and recently from ER with patient. She reports her symptoms are worse since our last visit. She has developed GI symptoms during this time. We discussed her quality of life. She reports it is not good based on her current symptoms. We reviewed her medications together. She will follow-up with cardiology with regards to her amiodarone. 3. Chronic atrial fibrillation [facility]): Patient has been on amiodarone for 2 years. She reports since this time. She has had significant amount of fatigue. She has been on 100 mg per day. Her February labs revealed normal thyroid, liver, kidney. Her chest x-ray was within normal limits. It is possible that he might have some amiodarone side effects. I asked her to reach out to Dr. Jocelyne Arshad to make a follow-up appointment. 4. Hyponatremia: This is been a new finding for patient. I discussed with her possible causes for this. We discussed her Paxil and fosinopril. He is currently taking one half of her Paxil dose which I believe is 10 mg per day. She is going to slowly decrease this with time.   I told her not to stop this medication abruptly due to discontinuation syndrome which may cause more problems for her. Patient is also on a low-dose of lisinopril. Her blood pressure is not well controlled. We will increase her atenolol rather than her fosinopril due to a hyponatremia risk. I discussed possible tumor in chest but xray appears to be normal.      5. Essential hypertension  Not controlled. Her blood pressure was 184/100 when she first came. Her blood pressure has come down after discussing her symptoms. She will increase her atenolol to 25 mg as per Dr. Pansy Libman request as well. She will monitor her blood pressure at home. If she cannot monitor at home she will go and ask the pharmacist when she picks up her medications. She will need to follow-up in 1 week. I spent 40 min with this patient and >50% of the time was spent on counseling and management of above problems in detail. All questions answered. This note will not be viewable in [address]

## 2018-05-30 NOTE — TELEPHONE ENCOUNTER
Patient needs a nurse to call her today regarding a medication. She is not sure which one is she supposed to take.  Her no is [phone]  Wants to know some thing before you leave today

## 2018-05-30 NOTE — TELEPHONE ENCOUNTER
I spoke with the patient, she was very confused about a medication the pharmacy has. I spoke with the pharmacy, they have a new rx for Hydralazine 10mg tablet. I advise that PCP discontinue that medication yesterday 05/29/18 reason alternate therapy. Pharmacy tech verbalized understanding and will discontinue medication in their system. He will have the pharmacist call the patient.

## 2018-05-31 LAB — BACTERIA UR CULT: NORMAL

## 2018-06-08 NOTE — PROGRESS NOTES
Correction to note. Re: hyponatremia pt will wean off paxil as possible cause and will continue atenolol and consider decreasing ace I fosinopril if ok with cardiology as fosinopril might cause a low sodium. Her cxr did not show any lesions when last imaged which is good.

## 2018-08-01 DIAGNOSIS — R68.89 COLD INTOLERANCE: ICD-10-CM

## 2018-08-01 RX ORDER — ESTRADIOL 0.5 MG/1
TABLET ORAL
Qty: 75 TAB | Refills: 0 | Status: SHIPPED | OUTPATIENT
Start: 2018-08-01 | End: 2018-10-13 | Stop reason: SDUPTHER

## 2018-09-06 ENCOUNTER — HOSPITAL ENCOUNTER (OUTPATIENT)
Dept: LAB | Age: 83
Discharge: HOME | End: 2018-09-06
Payer: MEDICARE

## 2018-09-06 ENCOUNTER — OFFICE VISIT (OUTPATIENT)
Dept: INTERNAL MEDICINE CLINIC | Age: 83
End: 2018-09-06

## 2018-09-06 VITALS
BODY MASS INDEX: 21.05 KG/M2 | DIASTOLIC BLOOD PRESSURE: 73 MMHG | SYSTOLIC BLOOD PRESSURE: 111 MMHG | TEMPERATURE: 97.7 F | WEIGHT: 134.13 LBS | HEART RATE: 86 BPM | HEIGHT: 67 IN | OXYGEN SATURATION: 99 % | RESPIRATION RATE: 18 BRPM

## 2018-09-06 DIAGNOSIS — N30.00 ACUTE CYSTITIS WITHOUT HEMATURIA: Primary | ICD-10-CM

## 2018-09-06 DIAGNOSIS — J15.7 PNEUMONIA DUE TO MYCOPLASMA PNEUMONIAE, UNSPECIFIED LATERALITY, UNSPECIFIED PART OF LUNG: ICD-10-CM

## 2018-09-06 DIAGNOSIS — N30.00 ACUTE CYSTITIS WITHOUT HEMATURIA: ICD-10-CM

## 2018-09-06 LAB
BILIRUB UR QL STRIP: NEGATIVE
GLUCOSE UR-MCNC: NEGATIVE MG/DL
KETONES P FAST UR STRIP-MCNC: NEGATIVE MG/DL
PH UR STRIP: 5.5 [PH] (ref 4.6–8)
PROT UR QL STRIP: NORMAL
SP GR UR STRIP: 1.01 (ref 1–1.03)
UA UROBILINOGEN AMB POC: NORMAL (ref 0.2–1)
URINALYSIS CLARITY POC: CLEAR
URINALYSIS COLOR POC: NORMAL
URINE BLOOD POC: NORMAL
URINE LEUKOCYTES POC: NEGATIVE
URINE NITRITES POC: NEGATIVE

## 2018-09-06 PROCEDURE — 87086 URINE CULTURE/COLONY COUNT: CPT

## 2018-09-06 PROCEDURE — 81003 URINALYSIS AUTO W/O SCOPE: CPT

## 2018-09-06 RX ORDER — ALPRAZOLAM 1 MG/1
1 TABLET ORAL
COMMUNITY
Start: 2018-08-20 | End: 2019-02-16 | Stop reason: CLARIF

## 2018-09-06 RX ORDER — BENZONATATE 100 MG/1
CAPSULE ORAL
COMMUNITY
Start: 2018-09-05 | End: 2019-02-16 | Stop reason: CLARIF

## 2018-09-06 RX ORDER — DOXYCYCLINE HYCLATE 100 MG
TABLET ORAL
COMMUNITY
Start: 2018-08-31 | End: 2018-09-06 | Stop reason: SDUPTHER

## 2018-09-06 NOTE — PROGRESS NOTES
Chief Complaint Patient presents with  Pneumonia Patient presents for follow-up care after being seen by [facility] on [facility]. She was treated for UTI and pneumonia. Patient reports she had been at home for about 3 weeks and her symptoms of fatigue and shortness of breath got progressively worse. Her son convinced her to go and get treatment. She reports she started to have chills and almost passed out. She never had chest pain. UTI Patient was diagnosed with Klebsiella pneumoniae with her urine culture. She had greater than 100,000 colony-forming units. She was initially started on Macrobid then was transitioned over to Cipro 250 mg twice a day. She reports her symptoms are better but also notes that she never really experienced any UTI type symptoms initially. She still feels fatigued and weak. Pneumonia Patient reports she had a chest x-ray which was negative for infiltrate or infiltrate. She had a mycoplasma run there was no IgG or IgM but was positive. She was initially put on a Z-Aidan but symptoms did not improve so she was transitioned over to doxycycline. She is still on Doxy and is not having any side effects. She does report still being fatigued and run down. She reports she feels clinically much better than 3 weeks ago. She has not been smoking for over 80 years. She was concerned she had a history of COPD on x-ray. She has about 8 days of Doxy left. A. Fib Patient denies having symptoms of palpitations during diagnosis of pneumonia or UTI. She will see Dr. Isai Molina next Wednesday, about 6 days from now. She reports she feels better on the amiodarone reduced from daily to every other day. Dr. Leana Blood did offer her to not have the amiodarone for 1 full month but her son wanted her to be on something. Fatigue Patient's TSH was run at [facility]. Please see media July 26, 2018. Subjective: Alan Machado is a 80 y.o. female with hypertension. Hypertension ROS: taking medications as instructed, no medication side effects noted, no TIA's, no chest pain on exertion, no dyspnea on exertion, no swelling of ankles. New concerns: Patient has been compliant with medications. She did have a period of chills but was still taking her medication. Fernando Rausch Past Medical History:  
Diagnosis Date  Acute cystitis 05/23/2018  Anemia, unspecified  Anxiety  Arrhythmia   
 a fib  Arthritis   
 osteoarthritis, rheumatoid  Atrial fibrillation ([facility] [address] Dr. Christopher Cheng and Dr. Rob Mota  following  Autoimmune disease (HCC)   
 sjogren disease Dr. Belcher Ahr  CAD (coronary artery disease)  HTN (hypertension)  Hyperlipidemia LDL goal < 100  Hypothyroid  Insomnia  Osteoarthritis  Sjogren's disease ([facility] [address]  Thoracic aneurysm without mention of rupture Past Surgical History:  
Procedure Laterality Date  HX CHOLECYSTECTOMY  HX COLONOSCOPY  3/2014 Dr. Jeremias Diggs  HX OTHER SURGICAL    
 hernia repairs  HX PARTIAL THYROIDECTOMY  HX JANNET AND BSO  HX VEIN STRIPPING Social History Social History  Marital status:  Spouse name: N/A  
 Number of children: N/A  
 Years of education: N/A Social History Main Topics  Smoking status: Never Smoker  Smokeless tobacco: Never Used  Alcohol use Yes Comment: wine  Drug use: None  Sexual activity: Not Asked Other Topics Concern  None Social History Narrative ** Merged History Encounter ** Family History Problem Relation Age of Onset  Heart Disease Father   
  aneurysm  Cancer Maternal Grandfather Current Outpatient Prescriptions Medication Sig Dispense Refill  benzonatate (TESSALON) 100 mg capsule two (2) times daily as needed.  ALPRAZolam (XANAX) 1 mg tablet  estradiol (ESTRACE) 0.5 mg tablet TAKE ONE TABLET BY MOUTH DAILY *TRY TO WEAN OFF* 75 Tab 0  
 PARoxetine (PAXIL) 10 mg tablet Take 1 Tab by mouth daily. 90 Tab 0  
 levothyroxine (SYNTHROID) 100 mcg tablet TAKE ONE TABLET BY MOUTH DAILY BEFORE BREAKFAST 90 Tab 0  
 ipratropium (ATROVENT) 42 mcg (0.06 %) nasal spray     
 amiodarone (PACERONE) 100 mg tablet Take 1 Tab by mouth daily. rx'd by cardiology 30 Tab 3  
 doxycycline (ADOXA) 100 mg tablet Take 1 Tab by mouth two (2) times a day. Do not take with calcium, iron or multivitamin 20 Tab 0  
 albuterol (PROVENTIL HFA, VENTOLIN HFA, PROAIR HFA) 90 mcg/actuation inhaler Take 2 Puffs by inhalation every four (4) hours as needed for Wheezing or Shortness of Breath. Indications: Bronchospastic Pulmonary Disease 1 Inhaler 1  
 fosinopril (MONOPRIL) 10 mg tablet Take 2 Tabs by mouth daily. 90 Tab 3  
 eszopiclone (LUNESTA) 2 mg tablet Take 1 Tab by mouth nightly as needed for Sleep. Max Daily Amount: 2 mg. Use with caution as may cause falls and sedation  Indications: INSOMNIA 30 Tab 0  
 meloxicam (MOBIC) 7.5 mg tablet Take 7.5 mg by mouth daily.  apixaban (ELIQUIS) 5 mg tablet Take 1 Tab by mouth two (2) times a day. RX BY CARDIOLOGY 1 Tab 0  
 atenolol (TENORMIN) 25 mg tablet Take 25 mg by mouth daily.  fluticasone (FLONASE) 50 mcg/actuation nasal spray  vitamin b comp & c no.4 (SUPER B COMPLEX + C) 150 mg tab Take  by mouth.  Cetirizine (ALL DAY ALLERGY, CETIRIZINE,) 10 mg cap Take  by mouth.  cholecalciferol, vitamin D3, (VITAMIN D3) 2,000 unit tab Take  by mouth.  CALCIUM CARBONATE (TUMS PO) Take  by mouth.  MULTIVITAMIN (MULTIPLE VITAMIN PO) Take  by mouth. Allergies Allergen Reactions  Latex Hives NOT ALLERGIC PER PT 02/01/2018  Pcn [Penicillins] Anaphylaxis  Sulfa (Sulfonamide Antibiotics) Anaphylaxis  Biaxin [Clarithromycin] Nausea Only  Pcn [Penicillins] Hives  Sulfa (Sulfonamide Antibiotics) Nausea Only Review of Systems - General ROS: positive for  - fatigue, malaise and weight loss 
negative for - chills or fever Cardiovascular ROS: no chest pain or dyspnea on exertion Respiratory ROS: positive for - cough and shortness of breath 
negative for - hemoptysis, stridor or tachypnea Visit Vitals  /73 (BP 1 Location: Left arm, BP Patient Position: Sitting)  Pulse 86  Temp 97.7 °F (36.5 °C) (Oral)  Resp 18  Ht 5' 7\" (1.702 m)  Wt 134 lb 2 oz (60.8 kg)  SpO2 99%  BMI 21.01 kg/m2 General Appearance:  Well developed, well nourished,alert and oriented x 3, and individual in no acute distress. Ears/Nose/Mouth/Throat:   Hearing grossly normal. 
  
    Neck: Supple, no lad, no bruits Chest:   Lungs clear to auscultation bilaterally. Cardiovascular:  Regular rate and rhythm, S1, S2 normal, no murmur. Abdomen:   Soft, non-tender, bowel sounds are active. Extremities: No edema bilaterally. Skin: Warm and dry, no suspicious lesions Diagnoses and all orders for this visit: 
 
1. Acute cystitis without hematuria Patient was treated with Cipro twice daily 5 days at outpatient facility. Will recheck to ensure that she is clear today. I also gave her urine culture lab slip to recheck in 2 weeks. -     AMB POC URINALYSIS DIP STICK MANUAL W/O MICRO 
-     CULTURE, URINE 
-     URINALYSIS W/ RFLX MICROSCOPIC 
-     URINALYSIS W/ RFLX MICROSCOPIC; Future -     CULTURE, URINE; Future 2. Pneumonia due to Mycoplasma pneumoniae, unspecified laterality, unspecified part of lung Patient has a history of mycoplasma pneumonia. I do not know if IgG or IgM was run. Patient will need to continue on Doxy. We will do a repeat x-ray if her symptoms are not improving still -     XR CHEST PA LAT; Future 
 
 
htn Discussion with patient to hold her fosinopril if her blood pressure is less than 110/65. She will also see Dr. Loy Young and discussed with him A. fib Patient is on Eliquis. She has not had any falls. She may need to come off her amiodarone due to side effects and quality of life issues. I am curious if she would be a candidate for an interventional procedure. She will talk with Loy Young later next week. I spent 25 min with this patient and >50% of the time was spent on counseling and management of fatigue, UTI, pneumonia and hypertension and A. Fib. This note will not be viewable in [address]

## 2018-09-06 NOTE — MR AVS SNAPSHOT
[facility] 
 
 
 [address] [place] [address] [facility] 
[phone] Patient: Jose Franz MRN:  [id number] Visit Information Date & Time Provider Department Dept. Phone Encounter #  
 2018  1:40 PM Jose Salomon MD [facility] [address] [id number] Upcoming Health Maintenance Date Due Pneumococcal 65+ Low/Medium Risk (1 of 2 - PCV13) 1993 MEDICARE YEARLY EXAM 3/22/2018 GLAUCOMA SCREENING Q2Y 2018 Influenza Age 5 to Adult 2018 DTaP/Tdap/Td series (2 - Td) 2024 Allergies as of 2018  Review Complete On: 2018 By: Jose Salomon MD  
  
 Severity Noted Reaction Type Reactions Latex  2013    Hives NOT ALLERGIC PER PT 2018 Pcn [Penicillins] High 2015   Systemic Anaphylaxis Sulfa (Sulfonamide Antibiotics) High 2015   Systemic Anaphylaxis Biaxin [Clarithromycin]  2013    Nausea Only Pcn [Penicillins]  2013    Hives Sulfa (Sulfonamide Antibiotics)  2013    Nausea Only Current Immunizations  Reviewed on 10/24/2017 Name Date Influenza High Dose Vaccine PF 10/3/2017 Influenza Vaccine 10/15/2016 Tdap 2014 Not reviewed this visit You Were Diagnosed With   
  
 Codes Comments Acute cystitis without hematuria    -  Primary ICD-10-CM: N30.00 ICD-9-CM: 595.0 Pneumonia due to Mycoplasma pneumoniae, unspecified laterality, unspecified part of lung     ICD-10-CM: J15.7 ICD-9-CM: 483.0 Vitals BP Pulse Temp Resp Height(growth percentile) Weight(growth percentile) 111/73 (BP 1 Location: Left arm, BP Patient Position: Sitting) 86 97.7 °F (36.5 °C) (Oral) 18 5' 7\" (1.702 m) 134 lb 2 oz (60.8 kg) SpO2 BMI OB Status Smoking Status 99% 21.01 kg/m2 Hysterectomy Never Smoker BMI and BSA Data Body Mass Index Body Surface Area 21.01 kg/m 2 1.7 m 2 Preferred Pharmacy Pharmacy Name Phone [facility], [address] [phone] Your Updated Medication List  
  
   
This list is accurate as of 9/6/18  2:31 PM.  Always use your most recent med list.  
  
  
  
  
 albuterol 90 mcg/actuation inhaler Commonly known as:  PROVENTIL HFA, VENTOLIN HFA, PROAIR HFA Take 2 Puffs by inhalation every four (4) hours as needed for Wheezing or Shortness of Breath. Indications: Bronchospastic Pulmonary Disease ALL DAY ALLERGY (CETIRIZINE) 10 mg Cap Generic drug:  Cetirizine Take  by mouth. ALPRAZolam 1 mg tablet Commonly known as:  XANAX  
  
 amiodarone 100 mg tablet Commonly known as:  Eliverto [place] Take 1 Tab by mouth daily. rx'd by cardiology  
  
 apixaban 5 mg tablet Commonly known as:  [place] Nelia Take 1 Tab by mouth two (2) times a day. RX BY CARDIOLOGY  
  
 atenolol 25 mg tablet Commonly known as:  TENORMIN Take 25 mg by mouth daily. benzonatate 100 mg capsule Commonly known as:  TESSALON  
two (2) times daily as needed. doxycycline 100 mg tablet Commonly known as:  ADOXA Take 1 Tab by mouth two (2) times a day. Do not take with calcium, iron or multivitamin  
  
 estradiol 0.5 mg tablet Commonly known as:  ESTRACE  
TAKE ONE TABLET BY MOUTH DAILY *TRY TO WEAN OFF*  
  
 eszopiclone 2 mg tablet Commonly known as:  Argenis [place] Take 1 Tab by mouth nightly as needed for Sleep. Max Daily Amount: 2 mg. Use with caution as may cause falls and sedation  Indications: INSOMNIA  
  
 fluticasone 50 mcg/actuation nasal spray Commonly known as:  FLONASE  
  
 fosinopril 10 mg tablet Commonly known as:  MONOPRIL Take 2 Tabs by mouth daily. ipratropium 42 mcg (0.06 %) nasal spray Commonly known as:  ATROVENT  
  
 levothyroxine 100 mcg tablet Commonly known as:  SYNTHROID  
 TAKE ONE TABLET BY MOUTH DAILY BEFORE BREAKFAST  
  
 meloxicam 7.5 mg tablet Commonly known as:  MOBIC Take 7.5 mg by mouth daily. MULTIPLE VITAMIN PO Take  by mouth. PARoxetine 10 mg tablet Commonly known as:  PAXIL Take 1 Tab by mouth daily. SUPER B COMPLEX + C 150 mg Tab Generic drug:  vitamin b comp & c no. 4 Take  by mouth. TUMS PO Take  by mouth. VITAMIN D3 2,000 unit Tab Generic drug:  cholecalciferol (vitamin D3) Take  by mouth. We Performed the Following AMB POC URINALYSIS DIP STICK MANUAL W/O MICRO [94585 CPT(R)] CULTURE, URINE [id number] CPT(R)] URINALYSIS W/ RFLX MICROSCOPIC [58190 CPT(R)] To-Do List   
 09/06/2018 Microbiology:  CULTURE, URINE   
  
 09/06/2018 Lab:  URINALYSIS W/ RFLX MICROSCOPIC   
  
 09/06/2018 Imaging:  XR CHEST PA LAT [facility] SERVICES! Dear Lavell Page: 
Thank you for requesting a [employer] account. Our records indicate that you already have an active [employer] account. You can access your account anytime at [email or web address] Did you know that you can access your hospital and ER discharge instructions at any time in [employer]? You can also review all of your test results from your hospital stay or ER visit. Additional Information If you have questions, please visit the Frequently Asked Questions section of the [employer] website at [email or web address] Remember, [employer] is NOT to be used for urgent needs. For medical emergencies, dial 911. Now available from your iPhone and Android! Please provide this summary of care documentation to your next provider. Your primary care clinician is listed as Lori Mari. If you have any questions after today's visit, please call [phone].

## 2018-09-08 LAB
APPEARANCE UR: CLEAR
BACTERIA UR CULT: NO GROWTH
BILIRUB UR QL STRIP: NEGATIVE
COLOR UR: YELLOW
GLUCOSE UR QL: NEGATIVE
HGB UR QL STRIP: NEGATIVE
KETONES UR QL STRIP: NEGATIVE
LEUKOCYTE ESTERASE UR QL STRIP: NEGATIVE
MICRO URNS: NORMAL
NITRITE UR QL STRIP: NEGATIVE
PH UR STRIP: 5.5 [PH] (ref 5–7.5)
PROT UR QL STRIP: NEGATIVE
SP GR UR: 1.01 (ref 1–1.03)
UROBILINOGEN UR STRIP-MCNC: 0.2 MG/DL (ref 0.2–1)

## 2018-09-12 ENCOUNTER — TELEPHONE (OUTPATIENT)
Dept: INTERNAL MEDICINE CLINIC | Age: 83
End: 2018-09-12

## 2018-09-12 NOTE — TELEPHONE ENCOUNTER
----- Message from Kerri Tomas sent at 9/12/2018  1:02 PM EDT -----  Regarding: Dr. Lisha Macedo  The patient is requesting a call back to discuss getting a different medication due to her symptoms of pneumonia getting worse.  [phone]

## 2018-09-14 ENCOUNTER — OFFICE VISIT (OUTPATIENT)
Dept: INTERNAL MEDICINE CLINIC | Age: 83
End: 2018-09-14

## 2018-09-14 ENCOUNTER — HOSPITAL ENCOUNTER (OUTPATIENT)
Dept: GENERAL RADIOLOGY | Age: 83
Discharge: HOME | End: 2018-09-14
Attending: INTERNAL MEDICINE
Payer: MEDICARE

## 2018-09-14 VITALS
OXYGEN SATURATION: 100 % | DIASTOLIC BLOOD PRESSURE: 90 MMHG | WEIGHT: 133 LBS | TEMPERATURE: 97.8 F | SYSTOLIC BLOOD PRESSURE: 143 MMHG | HEIGHT: 67 IN | RESPIRATION RATE: 12 BRPM | BODY MASS INDEX: 20.88 KG/M2 | HEART RATE: 91 BPM

## 2018-09-14 DIAGNOSIS — Z91.09 ENVIRONMENTAL ALLERGIES: ICD-10-CM

## 2018-09-14 DIAGNOSIS — I48.20 CHRONIC ATRIAL FIBRILLATION (HCC): ICD-10-CM

## 2018-09-14 DIAGNOSIS — R05.9 COUGH: ICD-10-CM

## 2018-09-14 DIAGNOSIS — I10 ESSENTIAL HYPERTENSION: ICD-10-CM

## 2018-09-14 DIAGNOSIS — R05.9 COUGH: Primary | ICD-10-CM

## 2018-09-14 PROCEDURE — 71046 X-RAY EXAM CHEST 2 VIEWS: CPT

## 2018-09-14 RX ORDER — MONTELUKAST SODIUM 10 MG/1
10 TABLET ORAL DAILY
Qty: 30 TAB | Refills: 3 | Status: SHIPPED | OUTPATIENT
Start: 2018-09-14 | End: 2019-01-09 | Stop reason: SDUPTHER

## 2018-09-14 RX ORDER — GUAIFENESIN 600 MG/1
TABLET, EXTENDED RELEASE ORAL
Qty: 60 TAB | Refills: 0 | Status: SHIPPED | OUTPATIENT
Start: 2018-09-14 | End: 2019-02-16 | Stop reason: CLARIF

## 2018-09-14 RX ORDER — LEVOFLOXACIN 500 MG/1
500 TABLET, FILM COATED ORAL DAILY
Qty: 10 TAB | Refills: 0 | Status: SHIPPED | OUTPATIENT
Start: 2018-09-14 | End: 2018-10-08 | Stop reason: ALTCHOICE

## 2018-09-14 RX ORDER — MINERAL OIL
180 ENEMA (ML) RECTAL DAILY
Qty: 30 TAB | Refills: 3 | Status: SHIPPED | OUTPATIENT
Start: 2018-09-14 | End: 2018-10-08 | Stop reason: ALTCHOICE

## 2018-09-14 RX ORDER — PREDNISONE 5 MG/1
5 TABLET ORAL DAILY
Qty: 5 TAB | Refills: 0 | Status: SHIPPED | OUTPATIENT
Start: 2018-09-14 | End: 2019-02-16 | Stop reason: CLARIF

## 2018-09-14 NOTE — PROGRESS NOTES
Chief Complaint Patient presents with  Pneumonia Cough Pt presents for follow up. She was seen last week with hx of uti and pneumonia. She reports she has been off the doxy for several days but still does not feel good. She notes she has a cough. She has sputum which is a thick white and pnd. She is coughing at night and bringing up the phlegm. She does not have fever, night sweats. She feels a deep ache in her left lower base of lung area. She feels sob. She has an albuterol inhaler but has not been using it. She has not been using tylenol. She is losing weight and does not know why. She has to sleep upright due to the secretions. afib She is off the amiodarone for 1 week. She can not tell if she feels better due to lung issues. Subjective:  
Nakia Farley is a 80 y.o. female with hypertension. Hypertension ROS: taking medications as instructed, no medication side effects noted, no TIA's, no chest pain on exertion, no dyspnea on exertion, no swelling of ankles. New concerns: none. Back pain She is not taking otc pain med or tylenol. Allergies She reports PND for several weeks. Ragweed is the worst this season. She is taking zyrtec and flonase. She is compliant with both but on zyrtec for several years. Past Medical History:  
Diagnosis Date  Acute cystitis 05/23/2018  Anemia, unspecified  Anxiety  Arrhythmia   
 a fib  Arthritis   
 osteoarthritis, rheumatoid  Atrial fibrillation ([facility] [address] Dr. Dale Kelley and Dr. Leonel Raines  following  Autoimmune disease (HCC)   
 sjogren disease Dr. Monster Melgar  CAD (coronary artery disease)  HTN (hypertension)  Hyperlipidemia LDL goal < 100  Hypothyroid  Insomnia  Osteoarthritis  Sjogren's disease ([facility] [address]  Thoracic aneurysm without mention of rupture Past Surgical History:  
Procedure Laterality Date  HX CHOLECYSTECTOMY  HX COLONOSCOPY  3/2014 Dr. Dean May  HX OTHER SURGICAL    
 hernia repairs  HX PARTIAL THYROIDECTOMY  HX JANNET AND BSO  HX VEIN STRIPPING Social History Social History  Marital status:  Spouse name: N/A  
 Number of children: N/A  
 Years of education: N/A Social History Main Topics  Smoking status: Never Smoker  Smokeless tobacco: Never Used  Alcohol use Yes Comment: wine  Drug use: None  Sexual activity: Not Asked Other Topics Concern  None Social History Narrative ** Merged History Encounter ** Family History Problem Relation Age of Onset  Heart Disease Father   
  aneurysm  Cancer Maternal Grandfather Current Outpatient Prescriptions Medication Sig Dispense Refill  montelukast (SINGULAIR) 10 mg tablet Take 1 Tab by mouth daily. 30 Tab 3  
 fexofenadine (ALLEGRA) 180 mg tablet Take 1 Tab by mouth daily. 30 Tab 3  
 guaiFENesin ER (MUCINEX) 600 mg ER tablet Must take with water 60 Tab 0  predniSONE (DELTASONE) 5 mg tablet Take 1 Tab by mouth daily. 5 Tab 0  
 levoFLOXacin (LEVAQUIN) 500 mg tablet Take 1 Tab by mouth daily. 10 Tab 0  
 benzonatate (TESSALON) 100 mg capsule two (2) times daily as needed.  ALPRAZolam (XANAX) 1 mg tablet  estradiol (ESTRACE) 0.5 mg tablet TAKE ONE TABLET BY MOUTH DAILY *TRY TO WEAN OFF* 75 Tab 0  
 PARoxetine (PAXIL) 10 mg tablet Take 1 Tab by mouth daily. 90 Tab 0  
 levothyroxine (SYNTHROID) 100 mcg tablet TAKE ONE TABLET BY MOUTH DAILY BEFORE BREAKFAST 90 Tab 0  
 ipratropium (ATROVENT) 42 mcg (0.06 %) nasal spray     
 albuterol (PROVENTIL HFA, VENTOLIN HFA, PROAIR HFA) 90 mcg/actuation inhaler Take 2 Puffs by inhalation every four (4) hours as needed for Wheezing or Shortness of Breath. Indications: Bronchospastic Pulmonary Disease 1 Inhaler 1  
 fosinopril (MONOPRIL) 10 mg tablet Take 2 Tabs by mouth daily.  90 Tab 3  
 eszopiclone (LUNESTA) 2 mg tablet Take 1 Tab by mouth nightly as needed for Sleep. Max Daily Amount: 2 mg. Use with caution as may cause falls and sedation  Indications: INSOMNIA 30 Tab 0  
 meloxicam (MOBIC) 7.5 mg tablet Take 7.5 mg by mouth daily.  apixaban (ELIQUIS) 5 mg tablet Take 1 Tab by mouth two (2) times a day. RX BY CARDIOLOGY 1 Tab 0  
 atenolol (TENORMIN) 25 mg tablet Take 25 mg by mouth daily.  fluticasone (FLONASE) 50 mcg/actuation nasal spray  vitamin b comp & c no.4 (SUPER B COMPLEX + C) 150 mg tab Take  by mouth.  cholecalciferol, vitamin D3, (VITAMIN D3) 2,000 unit tab Take  by mouth.  CALCIUM CARBONATE (TUMS PO) Take  by mouth.  MULTIVITAMIN (MULTIPLE VITAMIN PO) Take  by mouth. Allergies Allergen Reactions  Latex Hives NOT ALLERGIC PER PT 02/01/2018  Pcn [Penicillins] Anaphylaxis  Sulfa (Sulfonamide Antibiotics) Anaphylaxis  Biaxin [Clarithromycin] Nausea Only  Pcn [Penicillins] Hives  Sulfa (Sulfonamide Antibiotics) Nausea Only Review of Systems - General ROS: positive for  - fatigue, malaise and sleep disturbance 
negative for - fever Cardiovascular ROS: no chest pain or dyspnea on exertion Respiratory ROS: positive for - cough and wheezing 
negative for - hemoptysis, pleuritic pain or sputum changes Visit Vitals  /90 (BP 1 Location: Left arm, BP Patient Position: Sitting)  Pulse 91  Temp 97.8 °F (36.6 °C) (Oral)  Resp 12  Ht 5' 7\" (1.702 m)  Wt 133 lb (60.3 kg)  SpO2 100%  BMI 20.83 kg/m2 General Appearance:  Well developed, well nourished,alert and oriented x 3, and individual in no acute distress. Ears/Nose/Mouth/Throat:   Hearing grossly normal. 
  
    Neck: Supple, no lad, no bruits Chest:   Lungs clear to auscultation bilaterally. Cardiovascular:  Regular rate and rhythm, S1, S2 normal, no murmur. Abdomen:   Soft, non-tender, bowel sounds are active. Extremities: No edema bilaterally. Skin: Warm and dry, no suspicious lesions Diagnoses and all orders for this visit: 
 
Clinically pt does not appear acutely ill. 1. Cough Not improving. S/p PNA. Will recheck xray. Discussed with pt to do conservative care with allergy control as noted below and will add mucinex and albuterol. ONLY if sx not improving she will add on prednisone low dose. SED re: prednisone and levoquin -     XR CHEST PA LAT; Future 
-     guaiFENesin ER (MUCINEX) 600 mg ER tablet; Must take with water -     predniSONE (DELTASONE) 5 mg tablet; Take 1 Tab by mouth daily. -     levoFLOXacin (LEVAQUIN) 500 mg tablet; Take 1 Tab by mouth daily. 2. Environmental allergies Not controlled 
-     montelukast (SINGULAIR) 10 mg tablet; Take 1 Tab by mouth daily. -     fexofenadine (ALLEGRA) 180 mg tablet; Take 1 Tab by mouth daily. 3. Chronic atrial fibrillation ([address] Off amiodarone Cont eliquis 4. Essential hypertension Cont meds Follow up in 2 weeks or earlier if needed This note will not be viewable in [address]

## 2018-09-14 NOTE — TELEPHONE ENCOUNTER
Pt will need to be seen and assessed before new abx based on her hx. She may need an chest xray. [address], can you offer her 12 noon.  yumiko

## 2018-09-14 NOTE — TELEPHONE ENCOUNTER
I called pt, she states she thinks she is feeling better. She is still getting up at night to cough up mucus. Offered appt at 12pm with PCP, pt will be here.

## 2018-09-14 NOTE — MR AVS SNAPSHOT
[place] 
 
 
 [address]  [place] [phone] Patient: Jose Franz MRN:  [id number] Visit Information Date & Time Provider Department Dept. Phone Encounter #  
 9/14/2018 12:00 PM Jose Salomon MD [facility] [id number] Your Appointments 10/8/2018 11:40 AM  
ROUTINE CARE with Jose Salomon MD  
[facility] [address]) Appt Note: 1 mo  
 [address] [facility] [phone]  
[phone]  
  
   
 [address] [facility] [address] Upcoming Health Maintenance Date Due Pneumococcal 65+ Low/Medium Risk (1 of 2 - PCV13) 4/22/1993 MEDICARE YEARLY EXAM 3/22/2018 GLAUCOMA SCREENING Q2Y 4/29/2018 Influenza Age 5 to Adult 8/1/2018 DTaP/Tdap/Td series (2 - Td) 1/13/2024 Allergies as of 9/14/2018  Review Complete On: 9/14/2018 By: Gremán Spencer LPN Severity Noted Reaction Type Reactions Latex  04/09/2013    Hives NOT ALLERGIC PER PT 02/01/2018 Pcn [Penicillins] High 01/26/2015   Systemic Anaphylaxis Sulfa (Sulfonamide Antibiotics) High 01/26/2015   Systemic Anaphylaxis Biaxin [Clarithromycin]  04/09/2013    Nausea Only Pcn [Penicillins]  04/09/2013    Hives Sulfa (Sulfonamide Antibiotics)  04/09/2013    Nausea Only Current Immunizations  Reviewed on 10/24/2017 Name Date Influenza High Dose Vaccine PF 10/3/2017 Influenza Vaccine 10/15/2016 Tdap 1/13/2014 Not reviewed this visit You Were Diagnosed With   
  
 Codes Comments Cough    -  Primary ICD-10-CM: Y27 ICD-9-CM: 786.2 Environmental allergies     ICD-10-CM: Z91.09 
ICD-9-CM: V15.09 Chronic atrial fibrillation (HCC)     ICD-10-CM: B93.7 ICD-9-CM: 427.31 Essential hypertension     ICD-10-CM: I10 
ICD-9-CM: 401.9 Vitals BP Pulse Temp Resp Height(growth percentile) Weight(growth percentile) 143/90 (BP 1 Location: Left arm, BP Patient Position: Sitting) 91 97.8 °F (36.6 °C) (Oral) 12 5' 7\" (1.702 m) 133 lb (60.3 kg) SpO2 BMI OB Status Smoking Status 100% 20.83 kg/m2 Hysterectomy Never Smoker BMI and BSA Data Body Mass Index Body Surface Area  
 20.83 kg/m 2 1.69 m 2 [facility] [address] [phone] Your Updated Medication List  
  
   
This list is accurate as of 9/14/18 12:33 PM.  Always use your most recent med list.  
  
  
  
  
 albuterol 90 mcg/actuation inhaler Commonly known as:  PROVENTIL HFA, VENTOLIN HFA, PROAIR HFA Take 2 Puffs by inhalation every four (4) hours as needed for Wheezing or Shortness of Breath. Indications: Bronchospastic Pulmonary Disease ALPRAZolam 1 mg tablet Commonly known as:  XANAX  
  
 apixaban 5 mg tablet Commonly known as:  José Antonio Costain Take 1 Tab by mouth two (2) times a day. RX BY CARDIOLOGY  
  
 atenolol 25 mg tablet Commonly known as:  TENORMIN Take 25 mg by mouth daily. benzonatate 100 mg capsule Commonly known as:  TESSALON  
two (2) times daily as needed. estradiol 0.5 mg tablet Commonly known as:  ESTRACE  
TAKE ONE TABLET BY MOUTH DAILY *TRY TO WEAN OFF*  
  
 eszopiclone 2 mg tablet Commonly known as:  Kale Barajas Take 1 Tab by mouth nightly as needed for Sleep. Max Daily Amount: 2 mg. Use with caution as may cause falls and sedation  Indications: INSOMNIA  
  
 fexofenadine 180 mg tablet Commonly known as:  Candance Ny Take 1 Tab by mouth daily. fluticasone 50 mcg/actuation nasal spray Commonly known as:  FLONASE  
  
 fosinopril 10 mg tablet Commonly known as:  MONOPRIL Take 2 Tabs by mouth daily. guaiFENesin  mg ER tablet Commonly known as:  Preston & Preston Must take with water  
  
 ipratropium 42 mcg (0.06 %) nasal spray Commonly known as:  ATROVENT  
  
 levoFLOXacin 500 mg tablet Commonly known as:  Lena Radon Take 1 Tab by mouth daily. levothyroxine 100 mcg tablet Commonly known as:  SYNTHROID  
TAKE ONE TABLET BY MOUTH DAILY BEFORE BREAKFAST  
  
 meloxicam 7.5 mg tablet Commonly known as:  MOBIC Take 7.5 mg by mouth daily. montelukast 10 mg tablet Commonly known as:  SINGULAIR Take 1 Tab by mouth daily. MULTIPLE VITAMIN PO Take  by mouth. PARoxetine 10 mg tablet Commonly known as:  PAXIL Take 1 Tab by mouth daily. predniSONE 5 mg tablet Commonly known as:  Susen Arbour Take 1 Tab by mouth daily. SUPER B COMPLEX + C 150 mg Tab Generic drug:  vitamin b comp & c no. 4 Take  by mouth. TUMS PO Take  by mouth. VITAMIN D3 2,000 unit Tab Generic drug:  cholecalciferol (vitamin D3) Take  by mouth. Prescriptions Printed Refills  
 predniSONE (DELTASONE) 5 mg tablet 0 Sig: Take 1 Tab by mouth daily. Class: Print Route: Oral  
 levoFLOXacin (LEVAQUIN) 500 mg tablet 0 Sig: Take 1 Tab by mouth daily. Class: Print Route: Oral  
  
Prescriptions Sent to Pharmacy Refills  
 montelukast (SINGULAIR) 10 mg tablet 3 Sig: Take 1 Tab by mouth daily. Class: Normal  
 Pharmacy: [place] [address] Ph #: [phone] Route: Oral  
 fexofenadine (ALLEGRA) 180 mg tablet 3 Sig: Take 1 Tab by mouth daily. Class: Normal  
 Pharmacy: [place] [address] Ph #: [phone] Route: Oral  
 guaiFENesin ER (MUCINEX) 600 mg ER tablet 0 Sig: Must take with water Class: Normal  
 Pharmacy: [place] [address] Ph #: [phone] To-Do List   
 09/14/2018 Imaging:  XR CHEST PA LAT Introducing [facility] SERVICES! Dear Jihan Caballero: 
Thank you for requesting a [employer] account. Our records indicate that you already have an active [employer] account. You can access your account anytime at [email or web address] Did you know that you can access your hospital and ER discharge instructions at any time in [employer]? You can also review all of your test results from your hospital stay or ER visit. Additional Information If you have questions, please visit the Frequently Asked Questions section of the [employer] website at [email or web address] Remember, [employer] is NOT to be used for urgent needs. For medical emergencies, dial 911. Now available from your iPhone and Android! Please provide this summary of care documentation to your next provider. Your primary care clinician is listed as Margarito Souza. If you have any questions after today's visit, please call [phone].

## 2018-09-28 ENCOUNTER — TELEPHONE (OUTPATIENT)
Dept: INTERNAL MEDICINE CLINIC | Age: 83
End: 2018-09-28

## 2018-09-28 NOTE — TELEPHONE ENCOUNTER
Patient has an apt on 10/08 and is requesting to have her labs completed prior to her apt.        Patient can be reached at [phone]

## 2018-09-30 DIAGNOSIS — E03.9 ACQUIRED HYPOTHYROIDISM: ICD-10-CM

## 2018-09-30 DIAGNOSIS — E55.9 VITAMIN D DEFICIENCY: ICD-10-CM

## 2018-09-30 DIAGNOSIS — I10 ESSENTIAL HYPERTENSION: Primary | ICD-10-CM

## 2018-09-30 DIAGNOSIS — D72.829 LEUKOCYTOSIS, UNSPECIFIED TYPE: ICD-10-CM

## 2018-10-04 ENCOUNTER — HOSPITAL ENCOUNTER (OUTPATIENT)
Dept: LAB | Age: 83
Discharge: HOME | End: 2018-10-04
Payer: MEDICARE

## 2018-10-04 PROCEDURE — 84443 ASSAY THYROID STIM HORMONE: CPT

## 2018-10-04 PROCEDURE — 81001 URINALYSIS AUTO W/SCOPE: CPT

## 2018-10-04 PROCEDURE — 80053 COMPREHEN METABOLIC PANEL: CPT

## 2018-10-04 PROCEDURE — 36415 COLL VENOUS BLD VENIPUNCTURE: CPT

## 2018-10-04 PROCEDURE — 85027 COMPLETE CBC AUTOMATED: CPT

## 2018-10-04 PROCEDURE — 87086 URINE CULTURE/COLONY COUNT: CPT

## 2018-10-04 PROCEDURE — 82306 VITAMIN D 25 HYDROXY: CPT

## 2018-10-05 LAB
25(OH)D3+25(OH)D2 SERPL-MCNC: 47.2 NG/ML (ref 30–100)
ALBUMIN SERPL-MCNC: 4.5 G/DL (ref 3.2–4.6)
ALBUMIN/GLOB SERPL: 2.3 {RATIO} (ref 1.2–2.2)
ALP SERPL-CCNC: 61 IU/L (ref 39–117)
ALT SERPL-CCNC: 10 IU/L (ref 0–32)
AST SERPL-CCNC: 22 IU/L (ref 0–40)
BILIRUB SERPL-MCNC: 0.5 MG/DL (ref 0–1.2)
BUN SERPL-MCNC: 16 MG/DL (ref 10–36)
BUN/CREAT SERPL: 17 (ref 12–28)
CALCIUM SERPL-MCNC: 9.3 MG/DL (ref 8.7–10.3)
CHLORIDE SERPL-SCNC: 97 MMOL/L (ref 96–106)
CO2 SERPL-SCNC: 25 MMOL/L (ref 20–29)
CREAT SERPL-MCNC: 0.92 MG/DL (ref 0.57–1)
ERYTHROCYTE [DISTWIDTH] IN BLOOD BY AUTOMATED COUNT: 13.6 % (ref 12.3–15.4)
GLOBULIN SER CALC-MCNC: 2 G/DL (ref 1.5–4.5)
GLUCOSE SERPL-MCNC: 94 MG/DL (ref 65–99)
HCT VFR BLD AUTO: 37.8 % (ref 34–46.6)
HGB BLD-MCNC: 12.4 G/DL (ref 11.1–15.9)
INTERPRETATION: NORMAL
MCH RBC QN AUTO: 31.2 PG (ref 26.6–33)
MCHC RBC AUTO-ENTMCNC: 32.8 G/DL (ref 31.5–35.7)
MCV RBC AUTO: 95 FL (ref 79–97)
PLATELET # BLD AUTO: 162 X10E3/UL (ref 150–379)
POTASSIUM SERPL-SCNC: 4.6 MMOL/L (ref 3.5–5.2)
PROT SERPL-MCNC: 6.5 G/DL (ref 6–8.5)
RBC # BLD AUTO: 3.98 X10E6/UL (ref 3.77–5.28)
SODIUM SERPL-SCNC: 138 MMOL/L (ref 134–144)
TSH SERPL DL<=0.005 MIU/L-ACNC: 1.21 UIU/ML (ref 0.45–4.5)
WBC # BLD AUTO: 2.7 X10E3/UL (ref 3.4–10.8)

## 2018-10-07 LAB
APPEARANCE UR: CLEAR
BACTERIA #/AREA URNS HPF: ABNORMAL /[HPF]
BACTERIA UR CULT: NORMAL
BILIRUB UR QL STRIP: NEGATIVE
CASTS URNS QL MICRO: ABNORMAL /LPF
COLOR UR: YELLOW
EPI CELLS #/AREA URNS HPF: ABNORMAL /HPF
GLUCOSE UR QL: NEGATIVE
HGB UR QL STRIP: NEGATIVE
KETONES UR QL STRIP: NEGATIVE
LEUKOCYTE ESTERASE UR QL STRIP: ABNORMAL
MICRO URNS: ABNORMAL
MUCOUS THREADS URNS QL MICRO: PRESENT
NITRITE UR QL STRIP: NEGATIVE
PH UR STRIP: 6 [PH] (ref 5–7.5)
PROT UR QL STRIP: NEGATIVE
RBC #/AREA URNS HPF: ABNORMAL /HPF
SP GR UR: 1.01 (ref 1–1.03)
UROBILINOGEN UR STRIP-MCNC: 0.2 MG/DL (ref 0.2–1)
WBC #/AREA URNS HPF: ABNORMAL /HPF

## 2018-10-08 ENCOUNTER — OFFICE VISIT (OUTPATIENT)
Dept: INTERNAL MEDICINE CLINIC | Age: 83
End: 2018-10-08

## 2018-10-08 VITALS
OXYGEN SATURATION: 98 % | HEIGHT: 67 IN | DIASTOLIC BLOOD PRESSURE: 86 MMHG | RESPIRATION RATE: 18 BRPM | WEIGHT: 132 LBS | TEMPERATURE: 98 F | HEART RATE: 73 BPM | BODY MASS INDEX: 20.72 KG/M2 | SYSTOLIC BLOOD PRESSURE: 132 MMHG

## 2018-10-08 DIAGNOSIS — I48.19 PERSISTENT ATRIAL FIBRILLATION (HCC): ICD-10-CM

## 2018-10-08 DIAGNOSIS — I10 ESSENTIAL HYPERTENSION: ICD-10-CM

## 2018-10-08 DIAGNOSIS — E03.9 ACQUIRED HYPOTHYROIDISM: ICD-10-CM

## 2018-10-08 DIAGNOSIS — R63.4 WEIGHT LOSS: Primary | ICD-10-CM

## 2018-10-08 DIAGNOSIS — Z23 ENCOUNTER FOR IMMUNIZATION: ICD-10-CM

## 2018-10-08 RX ORDER — LEVOTHYROXINE SODIUM 100 UG/1
TABLET ORAL
Qty: 90 TAB | Refills: 1 | Status: SHIPPED | OUTPATIENT
Start: 2018-10-08 | End: 2019-02-16 | Stop reason: CLARIF

## 2018-10-08 RX ORDER — ATENOLOL 25 MG/1
12.5 TABLET ORAL DAILY
Qty: 90 TAB | Refills: 0 | Status: ON HOLD
Start: 2018-10-08 | End: 2019-02-25 | Stop reason: SDUPTHER

## 2018-10-08 RX ORDER — PAROXETINE 10 MG/1
15 TABLET, FILM COATED ORAL DAILY
Qty: 135 TAB | Refills: 0 | Status: SHIPPED | OUTPATIENT
Start: 2018-10-08 | End: 2019-01-02 | Stop reason: SDUPTHER

## 2018-10-08 NOTE — MR AVS SNAPSHOT
[address] [facility], [place], operated [facility] 
 
 
 [address] [place] [address] 
[phone] Patient: Norman Lutz MRN:  [id number] Visit Information Date & Time Provider Department Dept. Phone Encounter #  
 10/8/2018 11:40 AM Bev Cornejo MD [facility] [address] [id number] Upcoming Health Maintenance Date Due Shingrix Vaccine Age 50> (1 of 2) 4/22/1978 Pneumococcal 65+ High/Highest Risk (1 of 2 - PCV13) 4/22/1993 MEDICARE YEARLY EXAM 3/22/2018 GLAUCOMA SCREENING Q2Y 4/29/2018 Influenza Age 5 to Adult 8/1/2018 DTaP/Tdap/Td series (2 - Td) 1/13/2024 Allergies as of 10/8/2018  Review Complete On: 10/8/2018 By: Bev Cornejo MD  
  
 Severity Noted Reaction Type Reactions Latex  04/09/2013    Hives NOT ALLERGIC PER PT 02/01/2018 Pcn [Penicillins] High 01/26/2015   Systemic Anaphylaxis Sulfa (Sulfonamide Antibiotics) High 01/26/2015   Systemic Anaphylaxis Biaxin [Clarithromycin]  04/09/2013    Nausea Only Pcn [Penicillins]  04/09/2013    Hives Sulfa (Sulfonamide Antibiotics)  04/09/2013    Nausea Only Current Immunizations  Reviewed on 10/24/2017 Name Date Influenza High Dose Vaccine PF 10/3/2017 Influenza Vaccine 10/15/2016 Influenza Vaccine (Tri) Adjuvanted  Incomplete Tdap 1/13/2014 Not reviewed this visit You Were Diagnosed With   
  
 Codes Comments Weight loss    -  Primary ICD-10-CM: R63.4 ICD-9-CM: 783.21 Encounter for immunization     ICD-10-CM: F03 ICD-9-CM: V03.89 Acquired hypothyroidism     ICD-10-CM: E03.9 ICD-9-CM: 244.9 Essential hypertension     ICD-10-CM: I10 
ICD-9-CM: 401.9 Vitals BP Pulse Temp Resp Height(growth percentile) Weight(growth percentile) 132/86 (BP 1 Location: Left arm, BP Patient Position: Sitting) 73 98 °F (36.7 °C) (Oral) 18 5' 7\" (1.702 m) 132 lb (59.9 kg) SpO2 BMI OB Status Smoking Status 98% 20.67 kg/m2 Hysterectomy Never Smoker Vitals History BMI and BSA Data Body Mass Index Body Surface Area  
 20.67 kg/m 2 1.68 m 2 Preferred Pharmacy Pharmacy Name Phone [facility] [address], [address] [phone] Your Updated Medication List  
  
   
This list is accurate as of 10/8/18 12:39 PM.  Always use your most recent med list.  
  
  
  
  
 albuterol 90 mcg/actuation inhaler Commonly known as:  PROVENTIL HFA, VENTOLIN HFA, PROAIR HFA Take 2 Puffs by inhalation every four (4) hours as needed for Wheezing or Shortness of Breath. Indications: Bronchospastic Pulmonary Disease ALPRAZolam 1 mg tablet Commonly known as:  Daisha Bravo Take 1 mg by mouth nightly as needed. apixaban 5 mg tablet Commonly known as:  Catherne Gaucher Take 1 Tab by mouth two (2) times a day. RX BY CARDIOLOGY  
  
 atenolol 25 mg tablet Commonly known as:  TENORMIN Take 0.5 Tabs by mouth daily. benzonatate 100 mg capsule Commonly known as:  TESSALON  
two (2) times daily as needed. estradiol 0.5 mg tablet Commonly known as:  ESTRACE  
TAKE ONE TABLET BY MOUTH DAILY *TRY TO WEAN OFF*  
  
 eszopiclone 2 mg tablet Commonly known as:  Leander Sour Take 1 Tab by mouth nightly as needed for Sleep. Max Daily Amount: 2 mg. Use with caution as may cause falls and sedation  Indications: INSOMNIA  
  
 fluticasone 50 mcg/actuation nasal spray Commonly known as:  FLONASE  
  
 fosinopril 10 mg tablet Commonly known as:  MONOPRIL Take 2 Tabs by mouth daily. guaiFENesin  mg ER tablet Commonly known as:  Preston & Preston Must take with water  
  
 ipratropium 42 mcg (0.06 %) nasal spray Commonly known as:  ATROVENT  
  
 levothyroxine 100 mcg tablet Commonly known as:  SYNTHROID  
TAKE ONE TABLET BY MOUTH DAILY BEFORE BREAKFAST  
  
 meloxicam 7.5 mg tablet Commonly known as:  MOBIC Take 7.5 mg by mouth daily. montelukast 10 mg tablet Commonly known as:  SINGULAIR Take 1 Tab by mouth daily. MULTIPLE VITAMIN PO Take  by mouth. PARoxetine 10 mg tablet Commonly known as:  PAXIL Take 1.5 Tabs by mouth daily. predniSONE 5 mg tablet Commonly known as:  Leward Meals Take 1 Tab by mouth daily. SUPER B COMPLEX + C 150 mg Tab Generic drug:  vitamin b comp & c no. 4 Take  by mouth. TUMS PO Take  by mouth. VITAMIN D3 2,000 unit Tab Generic drug:  cholecalciferol (vitamin D3) Take  by mouth. Prescriptions Sent to Pharmacy Refills  
 levothyroxine (SYNTHROID) 100 mcg tablet 1 Sig: TAKE ONE TABLET BY MOUTH DAILY BEFORE BREAKFAST Class: Normal  
 [facility] [address] Ph #: [phone] PARoxetine (PAXIL) 10 mg tablet 0 Sig: Take 1.5 Tabs by mouth daily. Class: Normal  
 [facility] [address] Ph #: [phone] Route: Oral  
  
We Performed the Following ADMIN INFLUENZA VIRUS VAC [ [facility]] INFLUENZA VACCINE INACTIVATED (IIV), SUBUNIT, ADJUVANTED, IM [id number] CPT(R)] Patient Instructions DASH Diet: Care Instructions Your Care Instructions The DASH diet is an eating plan that can help lower your blood pressure. DASH stands for Dietary Approaches to Stop Hypertension. Hypertension is high blood pressure. The DASH diet focuses on eating foods that are high in calcium, potassium, and magnesium. These nutrients can lower blood pressure. The foods that are highest in these nutrients are fruits, vegetables, low-fat dairy products, nuts, seeds, and legumes.  But taking calcium, potassium, and magnesium supplements instead of eating foods that are high in those nutrients does not have the same effect. The DASH diet also includes whole grains, fish, and poultry. The DASH diet is one of several lifestyle changes your doctor may recommend to lower your high blood pressure. Your doctor may also want you to decrease the amount of sodium in your diet. Lowering sodium while following the DASH diet can lower blood pressure even further than just the DASH diet alone. Follow-up care is a key part of your treatment and safety. Be sure to make and go to all appointments, and call your doctor if you are having problems. It's also a good idea to know your test results and keep a list of the medicines you take. How can you care for yourself at home? Following the DASH diet · Eat 4 to 5 servings of fruit each day. A serving is 1 medium-sized piece of fruit, ½ cup chopped or canned fruit, 1/4 cup dried fruit, or 4 ounces (½ cup) of fruit juice. Choose fruit more often than fruit juice. · Eat 4 to 5 servings of vegetables each day. A serving is 1 cup of lettuce or raw leafy vegetables, ½ cup of chopped or cooked vegetables, or 4 ounces (½ cup) of vegetable juice. Choose vegetables more often than vegetable juice. · Get 2 to 3 servings of low-fat and fat-free dairy each day. A serving is 8 ounces of milk, 1 cup of yogurt, or 1 ½ ounces of cheese. · Eat 6 to 8 servings of grains each day. A serving is 1 slice of bread, 1 ounce of dry cereal, or ½ cup of cooked rice, pasta, or cooked cereal. Try to choose whole-grain products as much as possible. · Limit lean meat, poultry, and fish to 2 servings each day. A serving is 3 ounces, about the size of a deck of cards. · Eat 4 to 5 servings of nuts, seeds, and legumes (cooked dried beans, lentils, and split peas) each week. A serving is 1/3 cup of nuts, 2 tablespoons of seeds, or ½ cup of cooked beans or peas. · Limit fats and oils to 2 to 3 servings each day. A serving is 1 teaspoon of vegetable oil or 2 tablespoons of salad dressing. · Limit sweets and added sugars to 5 servings or less a week. A serving is 1 tablespoon jelly or jam, ½ cup sorbet, or 1 cup of lemonade. · Eat less than 2,300 milligrams (mg) of sodium a day. If you limit your sodium to 1,500 mg a day, you can lower your blood pressure even more. Tips for success · Start small. Do not try to make dramatic changes to your diet all at once. You might feel that you are missing out on your favorite foods and then be more likely to not follow the plan. Make small changes, and stick with them. Once those changes become habit, add a few more changes. · Try some of the following: ¨ Make it a goal to eat a fruit or vegetable at every meal and at snacks. This will make it easy to get the recommended amount of fruits and vegetables each day. ¨ Try yogurt topped with fruit and nuts for a snack or healthy dessert. ¨ Add lettuce, tomato, cucumber, and onion to sandwiches. ¨ Combine a ready-made pizza crust with low-fat mozzarella cheese and lots of vegetable toppings. Try using tomatoes, squash, spinach, broccoli, carrots, cauliflower, and onions. ¨ Have a variety of cut-up vegetables with a low-fat dip as an appetizer instead of chips and dip. ¨ Sprinkle sunflower seeds or chopped almonds over salads. Or try adding chopped walnuts or almonds to cooked vegetables. ¨ Try some vegetarian meals using beans and peas. Add garbanzo or kidney beans to salads. Make burritos and tacos with mashed cerda beans or black beans. Where can you learn more? Go to http://kathleen-zabrina.info/. Enter P319 in the search box to learn more about \"DASH Diet: Care Instructions. \" Current as of: December 6, 2017 Content Version: 11.8 © 6187-9430 [employer]. Care instructions adapted under license by [employer] (which disclaims liability or warranty for this information).  If you have questions about a medical condition or this instruction, always ask your healthcare professional. [address] any warranty or liability for your use of this information. Introducing [facility] SERVICES! Dear Tyler Gil: 
Thank you for requesting a [employer] account. Our records indicate that you already have an active [employer] account. You can access your account anytime at [email or web address] Did you know that you can access your hospital and ER discharge instructions at any time in [employer]? You can also review all of your test results from your hospital stay or ER visit. Additional Information If you have questions, please visit the Frequently Asked Questions section of the [employer] website at [email or web address] Remember, [employer] is NOT to be used for urgent needs. For medical emergencies, dial 911. Now available from your iPhone and Android! Please provide this summary of care documentation to your next provider. Your primary care clinician is listed as Guillermina Marcial. If you have any questions after today's visit, please call [phone].

## 2018-10-08 NOTE — PATIENT INSTRUCTIONS

## 2018-10-08 NOTE — PROGRESS NOTES
Chief Complaint Patient presents with  Labs Would like review labs  Fatigue Patient presents for follow-up. She is following up on her cough, atrial fibrillation, weight loss and fatigue. Left shoulder Patient reports she got a left shoulder injection. She reports after the injection she felt so good and she had a lot of energy. Cough Improved from last visit. She still reports she has secretions. She changed from Allegra to Zyrtec because the former was not helping her. afib Patient reports that after the last visit 3 weeks ago she developed an increase in fatigue. She talked to her son who recommended that she go back on the amiodarone. She has been on the amiodarone every other day. Her fatigue level has subsided. She is still on Eliquis. She denies chest pain shortness of breath and palpitations. Subjective:  
Walt Gatica is a 80 y.o. female with hypertension. Hypertension ROS: taking medications as instructed, no medication side effects noted, no TIA's, no chest pain on exertion, no dyspnea on exertion, no swelling of ankles. New concerns: She took the atenolol 25 mg p.o. daily but then her blood pressure dropped too low. She is back on the 25 mg daily. Weight loss She reports she continues to lose weight. She takes one Ensure a day. For breakfast she has 1 bowl of Cheerios. For lunch she has a sandwich of cheese or meat. For dinner she will have a salad and some chicken or sweet potato. She denies blood in her stool. She denies fevers or night sweats Past Medical History:  
Diagnosis Date  Acute cystitis 05/23/2018  Anemia, unspecified  Anxiety  Arrhythmia   
 a fib  Arthritis   
 osteoarthritis, rheumatoid  Atrial fibrillation ([facility] [address] Dr. Nikko Aldrich and Dr. Christina Mcclellan  following  Autoimmune disease (HCC)   
 sjogren disease Dr. Katiuska Choi  CAD (coronary artery disease)  HTN (hypertension)  Hyperlipidemia LDL goal < 100  Hypothyroid  Insomnia  Osteoarthritis  Sjogren's disease ([address]  Thoracic aneurysm without mention of rupture Past Surgical History:  
Procedure Laterality Date  HX CHOLECYSTECTOMY  HX COLONOSCOPY  3/2014 Dr. Mary Kim  HX OTHER SURGICAL    
 hernia repairs  HX PARTIAL THYROIDECTOMY  HX JANNTE AND BSO  HX VEIN STRIPPING Social History Social History  Marital status:  Spouse name: N/A  
 Number of children: N/A  
 Years of education: N/A Social History Main Topics  Smoking status: Never Smoker  Smokeless tobacco: Never Used  Alcohol use Yes Comment: wine  Drug use: None  Sexual activity: Not Asked Other Topics Concern  None Social History Narrative ** Merged History Encounter ** Family History Problem Relation Age of Onset  Heart Disease Father   
  aneurysm  Cancer Maternal Grandfather Current Outpatient Prescriptions Medication Sig Dispense Refill  levothyroxine (SYNTHROID) 100 mcg tablet TAKE ONE TABLET BY MOUTH DAILY BEFORE BREAKFAST 20 Tab 0  
 montelukast (SINGULAIR) 10 mg tablet Take 1 Tab by mouth daily. 30 Tab 3  
 guaiFENesin ER (MUCINEX) 600 mg ER tablet Must take with water 60 Tab 0  
 benzonatate (TESSALON) 100 mg capsule two (2) times daily as needed.  ALPRAZolam (XANAX) 1 mg tablet Take 1 mg by mouth nightly as needed.  estradiol (ESTRACE) 0.5 mg tablet TAKE ONE TABLET BY MOUTH DAILY *TRY TO WEAN OFF* 75 Tab 0  
 PARoxetine (PAXIL) 10 mg tablet Take 1 Tab by mouth daily. 90 Tab 0  
 ipratropium (ATROVENT) 42 mcg (0.06 %) nasal spray     
 albuterol (PROVENTIL HFA, VENTOLIN HFA, PROAIR HFA) 90 mcg/actuation inhaler Take 2 Puffs by inhalation every four (4) hours as needed for Wheezing or Shortness of Breath.  Indications: Bronchospastic Pulmonary Disease 1 Inhaler 1  
  fosinopril (MONOPRIL) 10 mg tablet Take 2 Tabs by mouth daily. 90 Tab 3  
 eszopiclone (LUNESTA) 2 mg tablet Take 1 Tab by mouth nightly as needed for Sleep. Max Daily Amount: 2 mg. Use with caution as may cause falls and sedation  Indications: INSOMNIA 30 Tab 0  
 meloxicam (MOBIC) 7.5 mg tablet Take 7.5 mg by mouth daily.  apixaban (ELIQUIS) 5 mg tablet Take 1 Tab by mouth two (2) times a day. RX BY CARDIOLOGY 1 Tab 0  
 atenolol (TENORMIN) 25 mg tablet Take 25 mg by mouth daily.  fluticasone (FLONASE) 50 mcg/actuation nasal spray  vitamin b comp & c no.4 (SUPER B COMPLEX + C) 150 mg tab Take  by mouth.  cholecalciferol, vitamin D3, (VITAMIN D3) 2,000 unit tab Take  by mouth.  CALCIUM CARBONATE (TUMS PO) Take  by mouth.  MULTIVITAMIN (MULTIPLE VITAMIN PO) Take  by mouth.  predniSONE (DELTASONE) 5 mg tablet Take 1 Tab by mouth daily. 5 Tab 0 Allergies Allergen Reactions  Latex Hives NOT ALLERGIC PER PT 02/01/2018  Pcn [Penicillins] Anaphylaxis  Sulfa (Sulfonamide Antibiotics) Anaphylaxis  Biaxin [Clarithromycin] Nausea Only  Pcn [Penicillins] Hives  Sulfa (Sulfonamide Antibiotics) Nausea Only Review of Systems - General ROS: positive for  - fatigue, malaise and sleep disturbance 
negative for - fever Cardiovascular ROS: no chest pain or dyspnea on exertion Respiratory ROS: positive for - cough and wheezing 
negative for - hemoptysis, pleuritic pain or sputum changes Visit Vitals  /86 (BP 1 Location: Left arm, BP Patient Position: Sitting)  Pulse 73  Temp 98 °F (36.7 °C) (Oral)  Resp 18  Ht 5' 7\" (1.702 m)  Wt 132 lb (59.9 kg)  SpO2 98%  BMI 20.67 kg/m2 General Appearance:  Well developed, well nourished,alert and oriented x 3, and individual in no acute distress. Ears/Nose/Mouth/Throat:   Hearing grossly normal. 
  
    Neck: Supple, no lad, no bruits Chest:   Lungs clear to auscultation bilaterally. Cardiovascular:  Regular rate and rhythm, S1, S2 normal, no murmur. Abdomen:   Soft, non-tender, bowel sounds are active. Extremities: No edema bilaterally. Skin: Warm and dry, no suspicious lesions Diagnoses and all orders for this visit: 
Clinically patient appears improved more so than last visit and the prior visit. 1. Weight loss Discussed with patient. I do not think she is getting enough calories in per day. I think she needs to eat more carbs. We will monitor her for underlying cancer. 2. Acquired hypothyroidism Thyroid was within normal limits continue meds -     levothyroxine (SYNTHROID) 100 mcg tablet; TAKE ONE TABLET BY MOUTH DAILY BEFORE BREAKFAST 3. Essential hypertension Continue on low-dose atenolol. She appears stable 
-     atenolol (TENORMIN) 25 mg tablet; Take 0.5 Tabs by mouth daily. 4. Encounter for immunization -     Influenza Vaccine Inactivated (IIV)(FLUAD), Subunit, Adjuvanted, IM, (32365) -     Administration fee () for Medicare insured patients 5. Persistent atrial fibrillation ([facility] [address] Continue on amiodarone. I directed patient to follow-up with her cardiologist with regards to the frequency of her amiodarone. When she was taking daily it may have been too much and now she is taking every other day which seems to control her. Her energy level looks better. Depression/anxiety anxiety Will increase her Paxil this might help her with her affect and her weight. -     PARoxetine (PAXIL) 10 mg tablet; Take 1.5 Tabs by mouth daily. Follow-up in November/December Diagnoses and all orders for this visit: 
 
1. Weight loss 2. Acquired hypothyroidism 
-     levothyroxine (SYNTHROID) 100 mcg tablet; TAKE ONE TABLET BY MOUTH DAILY BEFORE BREAKFAST 3. Essential hypertension 
-     atenolol (TENORMIN) 25 mg tablet; Take 0.5 Tabs by mouth daily. 4. Encounter for immunization -     Influenza Vaccine Inactivated (IIV)(FLUAD), Subunit, Adjuvanted, IM, (19481) -     Administration fee () for Medicare insured patients 5. Persistent [facility] ([facility] Other orders -     PARoxetine (PAXIL) 10 mg tablet; Take 1.5 Tabs by mouth daily. This note will not be viewable in [address]

## 2018-10-12 ENCOUNTER — TELEPHONE (OUTPATIENT)
Dept: INTERNAL MEDICINE CLINIC | Age: 83
End: 2018-10-12

## 2018-10-12 NOTE — TELEPHONE ENCOUNTER
Patient is requesting to have her Lab results mailed to her home address on file, address was verified.

## 2018-10-13 NOTE — PROGRESS NOTES
Please let pt know. An organism was cultured from your urine but not at a level consistent with an urinary tract infection. Please let me know if you develop burning, hesitancy, incomplete voiding, dysuria as we may want to re culture you and treat.

## 2018-11-14 ENCOUNTER — OFFICE VISIT (OUTPATIENT)
Dept: INTERNAL MEDICINE CLINIC | Age: 83
End: 2018-11-14

## 2018-11-14 VITALS
OXYGEN SATURATION: 96 % | TEMPERATURE: 97.6 F | BODY MASS INDEX: 21.06 KG/M2 | HEIGHT: 67 IN | WEIGHT: 134.2 LBS | RESPIRATION RATE: 12 BRPM | DIASTOLIC BLOOD PRESSURE: 84 MMHG | SYSTOLIC BLOOD PRESSURE: 136 MMHG | HEART RATE: 84 BPM

## 2018-11-14 DIAGNOSIS — K59.1 FUNCTIONAL DIARRHEA: ICD-10-CM

## 2018-11-14 DIAGNOSIS — D70.9 NEUTROPENIA, UNSPECIFIED TYPE (HCC): ICD-10-CM

## 2018-11-14 DIAGNOSIS — R21 RASH: Primary | ICD-10-CM

## 2018-11-14 RX ORDER — TRIAMCINOLONE ACETONIDE 1 MG/G
CREAM TOPICAL 2 TIMES DAILY
Qty: 15 G | Refills: 0 | Status: SHIPPED | OUTPATIENT
Start: 2018-11-14 | End: 2019-02-16 | Stop reason: CLARIF

## 2018-11-14 RX ORDER — CHOLESTYRAMINE 4 G/9G
1 POWDER, FOR SUSPENSION ORAL
Qty: 30 PACKET | Refills: 0 | Status: SHIPPED | OUTPATIENT
Start: 2018-11-14 | End: 2018-12-15 | Stop reason: SDUPTHER

## 2018-11-14 NOTE — PROGRESS NOTES
Chief Complaint   Patient presents with    Diarrhea    Rash     upper part of body      Patient reports for follow-up because she has not been feeling well. She still has fatigue. Rash   Patient reports she has had a breakout on her right chest and behind her neck. She reports there is a rash which is very itchy. She is currently taking Zyrtec. Zyrtec is moderately helpful but she still has an itch in that area. She has an itch on her right chest on her right neck and at the base of her neck. She denies use of perfumes, necklaces or clothing that is new that would be touching the area. She has not put any over-the-counter topical agents. She denies rash on the left side of her body chest back legs or abdomen. Diarrhea  Patient reports she has a chronic history of diarrhea. She has been seen by gastroenterology and per her history her workup was negative. Pt reports increased flatulence. Pt reports diarrhea for month +. She reports it was actually chronic. She has seen GI. She reports her son told her to try Imodium. Since taking the Imodium her symptoms are better. She did not stool yesterday but today much better. She has brown/yellow stool. It is not giana colored or white. She is eating 3 small meals a day still. She has not increased. afib  Patient reports she is taking amiodarone every other day. She does not have a follow-up cardiology appointment until the first of the year. Subjective:   Claude Bautista is a 80 y.o. female with hypertension. Hypertension ROS: taking medications as instructed, no medication side effects noted, no TIA's, no chest pain on exertion, no dyspnea on exertion, no swelling of ankles. New concerns: No symptoms    Weight loss  Patient reports her weight is stable. She reports she has a small bowl of cereal for breakfast with some blueberries. For lunch she has a small sandwich. For dinner she has a salad and some chicken.   She reports she does take Ensure. Her son has recommended that she eat more meals per day but she is not.       Past Medical History:   Diagnosis Date    Acute cystitis 05/23/2018    Anemia, unspecified     Anxiety     Arrhythmia     a fib    Arthritis     osteoarthritis, rheumatoid    Atrial fibrillation (HCC)     Dr. Diego Pratt and Dr. Nirav Espino  following    Autoimmune disease [facility])     sjogren disease Dr. Waqar Hill    CAD (coronary artery disease)     HTN (hypertension)     Hyperlipidemia LDL goal < 100     Hypothyroid     Insomnia     Osteoarthritis     Sjogren's disease ([address]     Thoracic aneurysm without mention of rupture      Past Surgical History:   Procedure Laterality Date    HX CHOLECYSTECTOMY      HX COLONOSCOPY  3/2014    Dr. Francine Gomez    [facility]      hernia repairs    HX PARTIAL THYROIDECTOMY      HX JANNET AND BSO      HX VEIN STRIPPING       Social History     Socioeconomic History    Marital status:      Spouse name: Not on file    Number of children: Not on file    Years of education: Not on file    Highest education level: Not on file   Social Needs    Financial resource strain: Not on file    Food insecurity - worry: Not on file    Food insecurity - inability: Not on file   [place] needs - medical: Not on file   [employer] needs - non-medical: Not on file   Occupational History    Not on file   Tobacco Use    Smoking status: Never Smoker    Smokeless tobacco: Never Used   Substance and Sexual Activity    Alcohol use: Yes     Comment: wine    Drug use: Not on file    Sexual activity: Not on file   Other Topics Concern    Not on file   Social History Narrative    ** Merged History Encounter **          Family History   Problem Relation Age of Onset    Heart Disease Father         aneurysm    Cancer Maternal Grandfather      Current Outpatient Medications   Medication Sig Dispense Refill    estradiol (ESTRACE) 0.5 mg tablet TAKE ONE TABLET BY MOUTH DAILY, TRY TO WEAN OFF 60 Tab 0    levothyroxine (SYNTHROID) 100 mcg tablet TAKE ONE TABLET BY MOUTH DAILY BEFORE BREAKFAST 90 Tab 1    PARoxetine (PAXIL) 10 mg tablet Take 1.5 Tabs by mouth daily. 135 Tab 0    atenolol (TENORMIN) 25 mg tablet Take 0.5 Tabs by mouth daily. 90 Tab 0    montelukast (SINGULAIR) 10 mg tablet Take 1 Tab by mouth daily. 30 Tab 3    guaiFENesin ER (MUCINEX) 600 mg ER tablet Must take with water 60 Tab 0    benzonatate (TESSALON) 100 mg capsule two (2) times daily as needed.  ALPRAZolam (XANAX) 1 mg tablet Take 1 mg by mouth nightly as needed.  ipratropium (ATROVENT) 42 mcg (0.06 %) nasal spray       albuterol (PROVENTIL HFA, VENTOLIN HFA, PROAIR HFA) 90 mcg/actuation inhaler Take 2 Puffs by inhalation every four (4) hours as needed for Wheezing or Shortness of Breath. Indications: Bronchospastic Pulmonary Disease 1 Inhaler 1    fosinopril (MONOPRIL) 10 mg tablet Take 2 Tabs by mouth daily. 90 Tab 3    eszopiclone (LUNESTA) 2 mg tablet Take 1 Tab by mouth nightly as needed for Sleep. Max Daily Amount: 2 mg. Use with caution as may cause falls and sedation  Indications: INSOMNIA 30 Tab 0    meloxicam (MOBIC) 7.5 mg tablet Take 7.5 mg by mouth daily.  apixaban (ELIQUIS) 5 mg tablet Take 1 Tab by mouth two (2) times a day. RX BY CARDIOLOGY 1 Tab 0    fluticasone (FLONASE) 50 mcg/actuation nasal spray       vitamin b comp & c no.4 (SUPER B COMPLEX + C) 150 mg tab Take  by mouth.  cholecalciferol, vitamin D3, (VITAMIN D3) 2,000 unit tab Take  by mouth.  CALCIUM CARBONATE (TUMS PO) Take  by mouth.  MULTIVITAMIN (MULTIPLE VITAMIN PO) Take  by mouth.  predniSONE (DELTASONE) 5 mg tablet Take 1 Tab by mouth daily.  5 Tab 0     Allergies   Allergen Reactions    Latex Hives     NOT ALLERGIC PER PT 02/01/2018    Pcn [Penicillins] Anaphylaxis    Sulfa (Sulfonamide Antibiotics) Anaphylaxis    Biaxin [Clarithromycin] Nausea Only    Pcn [Penicillins] Hives    Sulfa (Sulfonamide Antibiotics) Nausea Only       Review of Systems - General ROS: positive for  - fatigue, malaise and sleep disturbance  negative for - fever  Cardiovascular ROS: no chest pain or dyspnea on exertion  Respiratory ROS: positive for - cough and wheezing  negative for - hemoptysis, pleuritic pain or sputum changes    Visit Vitals  /84 (BP 1 Location: Left arm, BP Patient Position: Sitting)   Pulse 84   Temp 97.6 °F (36.4 °C) (Oral)   Resp 12   Ht 5' 7\" (1.702 m)   Wt 134 lb 3.2 oz (60.9 kg)   SpO2 96%   BMI 21.02 kg/m²     General Appearance:  Well developed, well nourished,alert and oriented x 3, and individual in no acute distress. Ears/Nose/Mouth/Throat:   Hearing grossly normal.         Neck: Supple, no lad, no bruits   Chest:   Lungs clear to auscultation bilaterally. Cardiovascular:  Regular rate and rhythm, S1, S2 normal, no murmur. Abdomen:   Soft, non-tender, bowel sounds are active. Extremities: No edema bilaterally. Skin: Warm and dry, no suspicious lesions, pink scaly rash over left chest about 1 inch x 3 inches, scaly patch on left shoulder and behind neck                 Diagnoses and all orders for this visit:    1. Rash/eczema  We will try triamcinolone. I encouraged her to continue to moisturize that area and her skin through the fall and winter season  -     triamcinolone acetonide (KENALOG) 0.1 % topical cream; Apply  to affected area two (2) times a day. use thin layer    2. Functional diarrhea  I think the diarrhea may be coming from the ensure drinks. She also notes she had her gallbladder out several years ago with initiation of diarrhea since then. She can try banana flakes but if no improvement she may try Questran  -     cholestyramine (QUESTRAN) 4 gram packet; Take 1 Packet by mouth daily as needed.     3. Neutropenia, unspecified type ([address]  Labs reviewed we will recheck her white count  -     CBC W/O DIFF  -     CBC W/O DIFF      BMI 21  Natural weight loss with aging. I do not think patient is eating enough calories in fact I think she is eating less than 1000 jean claude/day. I encouraged her to have about 5 small meals per day at about 1200 jean claude to gain weight. I also was discussed with her that part of the reason why she is not bouncing back as well after illness or dehydration is likely due to her low calories and limited reserves.     Follow-up in 3 months or earlier if needed

## 2018-11-25 DIAGNOSIS — I10 ESSENTIAL HYPERTENSION: ICD-10-CM

## 2018-11-25 RX ORDER — FOSINOPIRL SODIUM 10 MG/1
TABLET ORAL
Qty: 90 TAB | Refills: 2 | Status: SHIPPED | OUTPATIENT
Start: 2018-11-25 | End: 2019-02-16 | Stop reason: CLARIF

## 2018-12-03 ENCOUNTER — OFFICE VISIT (OUTPATIENT)
Dept: INTERNAL MEDICINE CLINIC | Age: 83
End: 2018-12-03

## 2018-12-03 ENCOUNTER — HOSPITAL ENCOUNTER (OUTPATIENT)
Dept: LAB | Age: 83
Discharge: HOME | End: 2018-12-03
Payer: MEDICARE

## 2018-12-03 ENCOUNTER — HOSPITAL ENCOUNTER (OUTPATIENT)
Dept: CT IMAGING | Age: 83
Discharge: HOME | End: 2018-12-03
Attending: INTERNAL MEDICINE
Payer: MEDICARE

## 2018-12-03 VITALS
HEART RATE: 78 BPM | HEIGHT: 67 IN | TEMPERATURE: 97.7 F | BODY MASS INDEX: 20.53 KG/M2 | DIASTOLIC BLOOD PRESSURE: 88 MMHG | WEIGHT: 130.8 LBS | SYSTOLIC BLOOD PRESSURE: 139 MMHG | OXYGEN SATURATION: 96 % | RESPIRATION RATE: 12 BRPM

## 2018-12-03 DIAGNOSIS — I48.19 PERSISTENT ATRIAL FIBRILLATION (HCC): ICD-10-CM

## 2018-12-03 DIAGNOSIS — G45.9 TIA (TRANSIENT ISCHEMIC ATTACK): Primary | ICD-10-CM

## 2018-12-03 DIAGNOSIS — G45.9 TIA (TRANSIENT ISCHEMIC ATTACK): ICD-10-CM

## 2018-12-03 DIAGNOSIS — R35.0 FREQUENCY OF MICTURITION: ICD-10-CM

## 2018-12-03 DIAGNOSIS — N39.0 URINARY TRACT INFECTION WITHOUT HEMATURIA, SITE UNSPECIFIED: ICD-10-CM

## 2018-12-03 LAB
BILIRUB UR QL STRIP: NEGATIVE
GLUCOSE UR-MCNC: NEGATIVE MG/DL
KETONES P FAST UR STRIP-MCNC: NEGATIVE MG/DL
PH UR STRIP: 6 [PH] (ref 4.6–8)
PROT UR QL STRIP: NEGATIVE
SP GR UR STRIP: 1 (ref 1–1.03)
UA UROBILINOGEN AMB POC: NORMAL (ref 0.2–1)
URINALYSIS CLARITY POC: CLEAR
URINALYSIS COLOR POC: YELLOW
URINE BLOOD POC: NORMAL
URINE LEUKOCYTES POC: NORMAL
URINE NITRITES POC: NEGATIVE

## 2018-12-03 PROCEDURE — 81001 URINALYSIS AUTO W/SCOPE: CPT

## 2018-12-03 PROCEDURE — 87086 URINE CULTURE/COLONY COUNT: CPT

## 2018-12-03 PROCEDURE — 70450 CT HEAD/BRAIN W/O DYE: CPT

## 2018-12-03 NOTE — Clinical Note
Can you please call neurology and see if pt can be seen by them? Pt evaluated  for possible TIA on eliquis, possibly first available MD or NP?  Thanks, yumiko

## 2018-12-03 NOTE — PROGRESS NOTES
I called pt with results of head CT. She will follow up with dr. Kathy Severino for the echo and carotid doppler. VM to Dr. Jairo Nunez re: pt as well.

## 2018-12-04 DIAGNOSIS — I48.20 CHRONIC ATRIAL FIBRILLATION (HCC): Primary | ICD-10-CM

## 2018-12-04 NOTE — PROGRESS NOTES
Chief Complaint Patient presents with  
 Headache  
  states she has had a HA and had an epidose of extremity paralysis x4 on 11/26/18 Motor loss Patient reports last Monday, 7 days ago, she reports she was getting her [employer] things together in the closet. Patient reports she was leaning over to open a box when she felt a weakness in her legs and arms. She reports extremely weak and had to crawl to a recliner to sit up. She was unable to lift herself up off the floor so was crawling around. She eventually crawled over to the \"recliner and got up. She reports her symptoms resolved over about 30 minutes. She has never had any symptoms like this before. She denied chest pain shortness of breath or palpitations. She was by herself so could not talk her and was not talking. She has been on Eliquis for her atrial fibrillation. The patient reports she stayed at home and did not tell anybody. She called her son the following evening. The son notes that the patient was at her baseline. Patient notes that since this event she has had a chronic diffuse headache. She denied any weakness in her face. Since last week she has not had any additional symptoms. Patient reports at baseline she is able to pick herself off the floor and stand up. This was a new event. Frequency Patient reports she has been voiding frequently at night. Atrial fibrillation Patient is being followed by both Dr. Andree Bowman and Dr. Mónica Barraza. She has been compliant with her Eliquis and her amiodarone. Past Medical History:  
Diagnosis Date  Acute cystitis 05/23/2018  Anemia, unspecified  Anxiety  Arrhythmia   
 a fib  Arthritis   
 osteoarthritis, rheumatoid  Atrial fibrillation ([facility] [address] Dr. Mónica Barraza and Dr. Andree Bowman  following  Autoimmune disease (HCC)   
 sjogren disease Dr. Giovany Hargrove  CAD (coronary artery disease)  HTN (hypertension)  Hyperlipidemia LDL goal < 100  Hypothyroid  Insomnia  Osteoarthritis  Sjogren's disease ([facility] [address]  Thoracic aneurysm without mention of rupture Past Surgical History:  
Procedure Laterality Date  HX CHOLECYSTECTOMY  HX COLONOSCOPY  3/2014 Dr. Isaiah Cosme  HX OTHER SURGICAL    
 hernia repairs  HX PARTIAL THYROIDECTOMY  HX JANNET AND BSO  HX VEIN STRIPPING Social History Socioeconomic History  Marital status:  Spouse name: Not on file  Number of children: Not on file  Years of education: Not on file  Highest education level: Not on file Tobacco Use  Smoking status: Never Smoker  Smokeless tobacco: Never Used Substance and Sexual Activity  Alcohol use: Yes Comment: wine Social History Narrative ** Merged History Encounter ** Family History Problem Relation Age of Onset  Heart Disease Father   
     aneurysm  Cancer Maternal Grandfather Current Outpatient Medications Medication Sig Dispense Refill  fosinopril (MONOPRIL) 10 mg tablet TAKE TWO TABLETS BY MOUTH DAILY 90 Tab 2  
 triamcinolone acetonide (KENALOG) 0.1 % topical cream Apply  to affected area two (2) times a day. use thin layer 15 g 0  cholestyramine (QUESTRAN) 4 gram packet Take 1 Packet by mouth daily as needed. 30 Packet 0  
 estradiol (ESTRACE) 0.5 mg tablet TAKE ONE TABLET BY MOUTH DAILY, TRY TO WEAN OFF 60 Tab 0  
 levothyroxine (SYNTHROID) 100 mcg tablet TAKE ONE TABLET BY MOUTH DAILY BEFORE BREAKFAST 90 Tab 1  
 PARoxetine (PAXIL) 10 mg tablet Take 1.5 Tabs by mouth daily. 135 Tab 0  
 atenolol (TENORMIN) 25 mg tablet Take 0.5 Tabs by mouth daily. 90 Tab 0  
 montelukast (SINGULAIR) 10 mg tablet Take 1 Tab by mouth daily. 30 Tab 3  
 guaiFENesin ER (MUCINEX) 600 mg ER tablet Must take with water 60 Tab 0  
 benzonatate (TESSALON) 100 mg capsule two (2) times daily as needed.  ALPRAZolam (XANAX) 1 mg tablet Take 1 mg by mouth nightly as needed.  ipratropium (ATROVENT) 42 mcg (0.06 %) nasal spray     
 albuterol (PROVENTIL HFA, VENTOLIN HFA, PROAIR HFA) 90 mcg/actuation inhaler Take 2 Puffs by inhalation every four (4) hours as needed for Wheezing or Shortness of Breath. Indications: Bronchospastic Pulmonary Disease 1 Inhaler 1  
 eszopiclone (LUNESTA) 2 mg tablet Take 1 Tab by mouth nightly as needed for Sleep. Max Daily Amount: 2 mg. Use with caution as may cause falls and sedation  Indications: INSOMNIA 30 Tab 0  
 meloxicam (MOBIC) 7.5 mg tablet Take 7.5 mg by mouth daily.  apixaban (ELIQUIS) 5 mg tablet Take 1 Tab by mouth two (2) times a day. RX BY CARDIOLOGY 1 Tab 0  
 fluticasone (FLONASE) 50 mcg/actuation nasal spray  vitamin b comp & c no.4 (SUPER B COMPLEX + C) 150 mg tab Take  by mouth.  cholecalciferol, vitamin D3, (VITAMIN D3) 2,000 unit tab Take  by mouth.  CALCIUM CARBONATE (TUMS PO) Take  by mouth.  MULTIVITAMIN (MULTIPLE VITAMIN PO) Take  by mouth.  predniSONE (DELTASONE) 5 mg tablet Take 1 Tab by mouth daily. 5 Tab 0 Allergies Allergen Reactions  Latex Hives NOT ALLERGIC PER PT 02/01/2018  Pcn [Penicillins] Anaphylaxis  Sulfa (Sulfonamide Antibiotics) Anaphylaxis  Biaxin [Clarithromycin] Nausea Only  Pcn [Penicillins] Hives  Sulfa (Sulfonamide Antibiotics) Nausea Only Review of Systems - General ROS: positive for  - fatigue and malaise 
negative for - chills or fever Cardiovascular ROS: no chest pain or dyspnea on exertion Respiratory ROS: no cough, shortness of breath, or wheezing Visit Vitals /88 (BP 1 Location: Left arm, BP Patient Position: Sitting) Pulse 78 Temp 97.7 °F (36.5 °C) (Oral) Resp 12 Ht 5' 7\" (1.702 m) Wt 130 lb 12.8 oz (59.3 kg) SpO2 96% BMI 20.49 kg/m² General Appearance:  Well developed, well nourished,alert and oriented x 3, and individual in no acute distress.   
Ears/Nose/Mouth/Throat:   Hearing grossly normal. 
  
 Neck: Supple, no lad, no bruits Chest:   Lungs clear to auscultation bilaterally. Cardiovascular:  Regular rate and rhythm, S1, S2 normal, no murmur. Abdomen:   Soft, non-tender, bowel sounds are active. Extremities: No edema bilaterally. Skin: Warm and dry, no suspicious lesions Diagnoses and all orders for this visit: 1. TIA (transient ischemic attack) Possible TIA versus near vagal syncope versus seizure Patient is on Eliquis so should decrease risk for stroke. Spoke with son and patient. If this event was a TIA then she did this on Eliquis. We discussed and patient will add on low-dose aspirin. Discussed increased bleeding risk with addition of aspirin and they are aware. Will also have her see neurology. She will also follow-up with cardiology for a repeat Doppler of her carotids and echo. pt was not able to get MRI head due to pacer 
-     DUPLEX CAROTID BILATERAL; Future 
-     ECHO COMPLETE STUDY We will have her see neurology as well 2. Frequency of micturition Will rule out UTI as symptoms may cause altered neurological states 
-     CULTURE, URINE 
-     AMB POC URINALYSIS DIP STICK MANUAL W/O MICRO 3. Urinary tract infection without hematuria, site unspecified -     AMB POC URINALYSIS DIP STICK MANUAL W/O MICRO 4. Persistent atrial fibrillation ([address] Continue Eliquis Caution fall risk

## 2018-12-05 LAB
APPEARANCE UR: CLEAR
BACTERIA #/AREA URNS HPF: ABNORMAL /[HPF]
BACTERIA UR CULT: NORMAL
BILIRUB UR QL STRIP: NEGATIVE
CASTS URNS QL MICRO: ABNORMAL /LPF
COLOR UR: YELLOW
EPI CELLS #/AREA URNS HPF: ABNORMAL /HPF
GLUCOSE UR QL: NEGATIVE
HGB UR QL STRIP: NEGATIVE
KETONES UR QL STRIP: NEGATIVE
LEUKOCYTE ESTERASE UR QL STRIP: ABNORMAL
MICRO URNS: ABNORMAL
NITRITE UR QL STRIP: NEGATIVE
PH UR STRIP: 6 [PH] (ref 5–7.5)
PROT UR QL STRIP: NEGATIVE
RBC #/AREA URNS HPF: ABNORMAL /HPF
SP GR UR: 1.01 (ref 1–1.03)
UROBILINOGEN UR STRIP-MCNC: 0.2 MG/DL (ref 0.2–1)
WBC #/AREA URNS HPF: ABNORMAL /HPF

## 2018-12-13 DIAGNOSIS — R68.89 COLD INTOLERANCE: ICD-10-CM

## 2018-12-13 RX ORDER — ESTRADIOL 0.5 MG/1
TABLET ORAL
Qty: 30 TAB | Refills: 0 | Status: SHIPPED | OUTPATIENT
Start: 2018-12-13 | End: 2019-01-07 | Stop reason: ALTCHOICE

## 2018-12-13 NOTE — TELEPHONE ENCOUNTER
Yue Galvez [facility] Front Office Pool             Pt requesting a refill on prescription: \"Estradol\" [facility] pharmacy on file. Pt best contact number is [phone].      [address]loria [address], [address] [facility] [phone]

## 2018-12-15 DIAGNOSIS — K59.1 FUNCTIONAL DIARRHEA: ICD-10-CM

## 2018-12-15 RX ORDER — CHOLESTYRAMINE 4 G/9G
POWDER, FOR SUSPENSION ORAL
Qty: 30 EACH | Refills: 0 | Status: SHIPPED | OUTPATIENT
Start: 2018-12-15 | End: 2019-02-12 | Stop reason: ALTCHOICE

## 2018-12-17 ENCOUNTER — HOSPITAL ENCOUNTER (OUTPATIENT)
Dept: VASCULAR SURGERY | Age: 83
Discharge: HOME | End: 2018-12-17
Attending: INTERNAL MEDICINE
Payer: MEDICARE

## 2018-12-17 DIAGNOSIS — G45.9 TIA (TRANSIENT ISCHEMIC ATTACK): ICD-10-CM

## 2018-12-17 PROCEDURE — 93880 EXTRACRANIAL BILAT STUDY: CPT

## 2018-12-17 NOTE — PROCEDURES
[address]  *** FINAL REPORT ***    Name: Pastor Wallace  MRN: [id number]    Outpatient  : 1928  HIS Order #: [id number]  [id number] [facility] Visit #: [id number]  Date: 17 Dec 2018    TYPE OF TEST: Cerebrovascular Duplex    REASON FOR TEST  Transient ischemic attacks    Right Carotid:-             Proximal               Mid                 Distal  cm/s  Systolic  Diastolic  Systolic  Diastolic  Systolic  Diastolic  CCA:     22.7       8.4                            63.3      15.4  Bulb:  ECA:     50.2       0.0  ICA:     61.6      18.0      105.9      25.2       75.4      22.6  ICA/CCA:  1.0       1.2    ICA Stenosis: <50%    Right Vertebral:-  Finding: Antegrade  Sys:       51.0  Zahra:       10.4    Right Subclavian:    Left Carotid:-            Proximal                Mid                 Distal  cm/s  Systolic  Diastolic  Systolic  Diastolic  Systolic  Diastolic  CCA:     03.1      12.7                            60.0      11.6  Bulb:  ECA:     54.5       0.0  ICA:     60.3      18.9       68.0      19.7       78.1      27.8  ICA/CCA:  1.0       1.6    ICA Stenosis: <50%    Left Vertebral:-  Finding: Antegrade  Sys:       67.4  Zahra:       18.1    Left Subclavian:    INTERPRETATION/FINDINGS  PROCEDURE:  Evaluation of the extracranial cerebrovascular arteries  with ultrasound (B-mode imaging, pulsed Doppler, color Doppler). Includes the common carotid, internal carotid, external carotid, and  vertebral arteries. FINDINGS:  Mild heterogeneous plaque seen in the right carotid bulb with no  hemodynamic changes. IMPRESSION: Findings are consistent with 0-49% stenosis of the right  internal carotid and 0-49% stenosis of the left internal carotid. Vertebrals are patent with antegrade flow. ADDITIONAL COMMENTS    I have personally reviewed the data relevant to the interpretation of  this  study. TECHNOLOGIST: Molly Rod. Jacob  Signed: 2018 03:23 PM    PHYSICIAN: Jenn Ruth.  Chayo Harrell MD  Signed: 12/18/2018 10:12 AM

## 2019-01-02 RX ORDER — PAROXETINE 10 MG/1
TABLET, FILM COATED ORAL
Qty: 135 TAB | Refills: 0 | Status: SHIPPED | OUTPATIENT
Start: 2019-01-02 | End: 2019-02-16 | Stop reason: CLARIF

## 2019-01-09 DIAGNOSIS — Z91.09 ENVIRONMENTAL ALLERGIES: ICD-10-CM

## 2019-01-09 RX ORDER — MONTELUKAST SODIUM 10 MG/1
TABLET ORAL
Qty: 30 TAB | Refills: 2 | Status: SHIPPED | OUTPATIENT
Start: 2019-01-09 | End: 2019-02-16 | Stop reason: CLARIF

## 2019-02-05 ENCOUNTER — APPOINTMENT (OUTPATIENT)
Dept: CT IMAGING | Age: 84
End: 2019-02-05
Attending: STUDENT IN AN ORGANIZED HEALTH CARE EDUCATION/TRAINING PROGRAM
Payer: MEDICARE

## 2019-02-05 ENCOUNTER — HOSPITAL ENCOUNTER (EMERGENCY)
Age: 84
Discharge: HOME | End: 2019-02-05
Attending: STUDENT IN AN ORGANIZED HEALTH CARE EDUCATION/TRAINING PROGRAM
Payer: MEDICARE

## 2019-02-05 VITALS
OXYGEN SATURATION: 95 % | HEART RATE: 80 BPM | BODY MASS INDEX: 20.4 KG/M2 | SYSTOLIC BLOOD PRESSURE: 147 MMHG | DIASTOLIC BLOOD PRESSURE: 78 MMHG | HEIGHT: 67 IN | RESPIRATION RATE: 13 BRPM | WEIGHT: 130 LBS

## 2019-02-05 DIAGNOSIS — R11.2 NAUSEA AND VOMITING, INTRACTABILITY OF VOMITING NOT SPECIFIED, UNSPECIFIED VOMITING TYPE: Primary | ICD-10-CM

## 2019-02-05 LAB
ABO + RH BLD: NORMAL
ALBUMIN SERPL-MCNC: 3.5 G/DL (ref 3.5–5)
ALBUMIN/GLOB SERPL: 1.1 {RATIO} (ref 1.1–2.2)
ALP SERPL-CCNC: 70 U/L (ref 45–117)
ALT SERPL-CCNC: 28 U/L (ref 12–78)
ANION GAP SERPL CALC-SCNC: 12 MMOL/L (ref 5–15)
APPEARANCE UR: CLEAR
AST SERPL-CCNC: 54 U/L (ref 15–37)
BACTERIA URNS QL MICRO: ABNORMAL /HPF
BASOPHILS # BLD: 0 K/UL (ref 0–0.1)
BASOPHILS NFR BLD: 1 % (ref 0–1)
BILIRUB SERPL-MCNC: 0.4 MG/DL (ref 0.2–1)
BILIRUB UR QL: NEGATIVE
BLOOD GROUP ANTIBODIES SERPL: NORMAL
BUN SERPL-MCNC: 13 MG/DL (ref 6–20)
BUN/CREAT SERPL: 12 (ref 12–20)
CALCIUM SERPL-MCNC: 8.7 MG/DL (ref 8.5–10.1)
CHLORIDE SERPL-SCNC: 103 MMOL/L (ref 97–108)
CO2 SERPL-SCNC: 28 MMOL/L (ref 21–32)
COLOR UR: ABNORMAL
COMMENT, HOLDF: NORMAL
CREAT SERPL-MCNC: 1.13 MG/DL (ref 0.55–1.02)
DIFFERENTIAL METHOD BLD: ABNORMAL
EOSINOPHIL # BLD: 0.1 K/UL (ref 0–0.4)
EOSINOPHIL NFR BLD: 3 % (ref 0–7)
EPITH CASTS URNS QL MICRO: ABNORMAL /LPF
ERYTHROCYTE [DISTWIDTH] IN BLOOD BY AUTOMATED COUNT: 13.1 % (ref 11.5–14.5)
GLOBULIN SER CALC-MCNC: 3.3 G/DL (ref 2–4)
GLUCOSE SERPL-MCNC: 183 MG/DL (ref 65–100)
GLUCOSE UR STRIP.AUTO-MCNC: NEGATIVE MG/DL
HCT VFR BLD AUTO: 39.8 % (ref 35–47)
HGB BLD-MCNC: 12.6 G/DL (ref 11.5–16)
HGB UR QL STRIP: ABNORMAL
HYALINE CASTS URNS QL MICRO: ABNORMAL /LPF (ref 0–5)
IMM GRANULOCYTES # BLD AUTO: 0 K/UL (ref 0–0.04)
IMM GRANULOCYTES NFR BLD AUTO: 0 % (ref 0–0.5)
KETONES UR QL STRIP.AUTO: NEGATIVE MG/DL
LACTATE SERPL-SCNC: 3 MMOL/L (ref 0.4–2)
LEUKOCYTE ESTERASE UR QL STRIP.AUTO: NEGATIVE
LIPASE SERPL-CCNC: 363 U/L (ref 73–393)
LYMPHOCYTES # BLD: 0.5 K/UL (ref 0.8–3.5)
LYMPHOCYTES NFR BLD: 13 % (ref 12–49)
MAGNESIUM SERPL-MCNC: 1.9 MG/DL (ref 1.6–2.4)
MCH RBC QN AUTO: 31.7 PG (ref 26–34)
MCHC RBC AUTO-ENTMCNC: 31.7 G/DL (ref 30–36.5)
MCV RBC AUTO: 100.3 FL (ref 80–99)
MONOCYTES # BLD: 0.2 K/UL (ref 0–1)
MONOCYTES NFR BLD: 6 % (ref 5–13)
NEUTS SEG # BLD: 3.2 K/UL (ref 1.8–8)
NEUTS SEG NFR BLD: 77 % (ref 32–75)
NITRITE UR QL STRIP.AUTO: NEGATIVE
NRBC # BLD: 0 K/UL (ref 0–0.01)
NRBC BLD-RTO: 0 PER 100 WBC
PH UR STRIP: 7 [PH] (ref 5–8)
PLATELET # BLD AUTO: 150 K/UL (ref 150–400)
PMV BLD AUTO: 10 FL (ref 8.9–12.9)
POTASSIUM SERPL-SCNC: 3.6 MMOL/L (ref 3.5–5.1)
PROT SERPL-MCNC: 6.8 G/DL (ref 6.4–8.2)
PROT UR STRIP-MCNC: ABNORMAL MG/DL
RBC # BLD AUTO: 3.97 M/UL (ref 3.8–5.2)
RBC #/AREA URNS HPF: ABNORMAL /HPF (ref 0–5)
RBC MORPH BLD: ABNORMAL
SAMPLES BEING HELD,HOLD: NORMAL
SODIUM SERPL-SCNC: 143 MMOL/L (ref 136–145)
SP GR UR REFRACTOMETRY: 1.01 (ref 1–1.03)
SPECIMEN EXP DATE BLD: NORMAL
TROPONIN I SERPL-MCNC: <0.05 NG/ML
UR CULT HOLD, URHOLD: NORMAL
UROBILINOGEN UR QL STRIP.AUTO: 0.2 EU/DL (ref 0.2–1)
WBC # BLD AUTO: 4 K/UL (ref 3.6–11)
WBC URNS QL MICRO: ABNORMAL /HPF (ref 0–4)

## 2019-02-05 PROCEDURE — 83735 ASSAY OF MAGNESIUM: CPT

## 2019-02-05 PROCEDURE — 93005 ELECTROCARDIOGRAM TRACING: CPT

## 2019-02-05 PROCEDURE — 96374 THER/PROPH/DIAG INJ IV PUSH: CPT

## 2019-02-05 PROCEDURE — 86900 BLOOD TYPING SEROLOGIC ABO: CPT

## 2019-02-05 PROCEDURE — 74011250636 HC RX REV CODE- 250/636: Performed by: STUDENT IN AN ORGANIZED HEALTH CARE EDUCATION/TRAINING PROGRAM

## 2019-02-05 PROCEDURE — 81001 URINALYSIS AUTO W/SCOPE: CPT

## 2019-02-05 PROCEDURE — 74011636320 HC RX REV CODE- 636/320: Performed by: STUDENT IN AN ORGANIZED HEALTH CARE EDUCATION/TRAINING PROGRAM

## 2019-02-05 PROCEDURE — 36415 COLL VENOUS BLD VENIPUNCTURE: CPT

## 2019-02-05 PROCEDURE — 99284 EMERGENCY DEPT VISIT MOD MDM: CPT

## 2019-02-05 PROCEDURE — 74177 CT ABD & PELVIS W/CONTRAST: CPT

## 2019-02-05 PROCEDURE — 83690 ASSAY OF LIPASE: CPT

## 2019-02-05 PROCEDURE — 85025 COMPLETE CBC W/AUTO DIFF WBC: CPT

## 2019-02-05 PROCEDURE — 80053 COMPREHEN METABOLIC PANEL: CPT

## 2019-02-05 PROCEDURE — 83605 ASSAY OF LACTIC ACID: CPT

## 2019-02-05 PROCEDURE — 84484 ASSAY OF TROPONIN QUANT: CPT

## 2019-02-05 PROCEDURE — 96361 HYDRATE IV INFUSION ADD-ON: CPT

## 2019-02-05 RX ORDER — ONDANSETRON 2 MG/ML
4 INJECTION INTRAMUSCULAR; INTRAVENOUS
Status: COMPLETED | OUTPATIENT
Start: 2019-02-05 | End: 2019-02-05

## 2019-02-05 RX ADMIN — ONDANSETRON 4 MG: 2 INJECTION INTRAMUSCULAR; INTRAVENOUS at 18:30

## 2019-02-05 RX ADMIN — SODIUM CHLORIDE 1000 ML: 900 INJECTION, SOLUTION INTRAVENOUS at 18:29

## 2019-02-05 RX ADMIN — SODIUM CHLORIDE 1000 ML: 900 INJECTION, SOLUTION INTRAVENOUS at 16:03

## 2019-02-05 RX ADMIN — IOPAMIDOL 100 ML: 755 INJECTION, SOLUTION INTRAVENOUS at 20:51

## 2019-02-05 NOTE — ED PROVIDER NOTES
80 y.o. female with past medical history significant for anxiety, insomnia, HTN, hyperlipidemia, osteoarthritis, hypothyroid, CAD, a-fib, thoracic aneurysm, anemia, Sjogren's disease, arrhythmia, and acute cystitis who presents from home via EMS with chief complaint of nausea. Patient states that she suddenly began experiencing nausea and generalized weakness after eating a chicken salad sandwich with mayonnaise for lunch. Patient states that she took a nap after lunch but claims that she felt worse upon waking. She mentions that she became diaphoretic and required a long amount of time to get to a nearby phone. EMS reports that the patient has likely been experiencing these symptoms for the past hour. EMS denies giving the patient Zofran for her symptoms. Of note, patient has previously visited the ED on 5/23/18 for sweating increase, nausea, and vomiting. Patient denies vomiting, syncope, and chest pain. There are no other acute medical concerns at this time. Social hx: negative tobacco use; positive alcohol use; unknown drug use PCP: Gabriela Schaefer MD 
 
Note written by Cliff Hayes, as dictated by Christine Alves MD 3:35 PM 
 
 
 
 
The history is provided by the patient and the EMS personnel. No  was used. Past Medical History:  
Diagnosis Date  Acute cystitis 05/23/2018  Anemia, unspecified  Anxiety  Arrhythmia   
 a fib  Arthritis   
 osteoarthritis, rheumatoid  Atrial fibrillation ([address] Dr. Roberta Koroma and Dr. Amanda Carreno  following  Autoimmune disease (HCC)   
 sjogren disease Dr. Marily Hull  CAD (coronary artery disease)  HTN (hypertension)  Hyperlipidemia LDL goal < 100  Hypothyroid  Insomnia  Osteoarthritis  Sjogren's disease ([address]  Thoracic aneurysm without mention of rupture Past Surgical History:  
Procedure Laterality Date  HX CHOLECYSTECTOMY  HX COLONOSCOPY  3/2014 Dr. Stevenson Diver  HX OTHER SURGICAL    
 hernia repairs  HX PARTIAL THYROIDECTOMY  HX JANNET AND BSO  HX VEIN STRIPPING Family History:  
Problem Relation Age of Onset  Heart Disease Father   
     aneurysm  Cancer Maternal Grandfather Social History Socioeconomic History  Marital status:  Spouse name: Not on file  Number of children: Not on file  Years of education: Not on file  Highest education level: Not on file Social Needs  Financial resource strain: Not on file  Food insecurity - worry: Not on file  Food insecurity - inability: Not on file  Transportation needs - medical: Not on file  Transportation needs - non-medical: Not on file Occupational History  Not on file Tobacco Use  Smoking status: Never Smoker  Smokeless tobacco: Never Used Substance and Sexual Activity  Alcohol use: Yes Comment: wine  Drug use: Not on file  Sexual activity: Not on file Other Topics Concern  Not on file Social History Narrative ** Merged History Encounter ** ALLERGIES: Latex; Pcn [penicillins]; Sulfa (sulfonamide antibiotics); Biaxin [clarithromycin]; Biotin; Pcn [penicillins]; and Sulfa (sulfonamide antibiotics) Review of Systems Constitutional: Positive for diaphoresis. Negative for activity change and fever. HENT: Negative for congestion and sore throat. Eyes: Negative for photophobia and visual disturbance. Respiratory: Negative for chest tightness and shortness of breath. Cardiovascular: Negative for chest pain, palpitations and leg swelling. Gastrointestinal: Positive for nausea. Negative for blood in stool, constipation, diarrhea and vomiting. Genitourinary: Negative for difficulty urinating, dysuria, flank pain, frequency and hematuria. Musculoskeletal: Positive for gait problem (\"took a long time getting to the phone\"). Negative for back pain. Neurological: Positive for weakness. Negative for dizziness, syncope, numbness and headaches. All other systems reviewed and are negative. Vitals:  
 02/05/19 1540 BP: 135/75 Pulse: 88 Resp: 16 SpO2: 97% Weight: 59 kg (130 lb) Height: 5' 7\" (1.702 m) Physical Exam  
Constitutional: She is oriented to person, place, and time. She appears well-developed and well-nourished. She appears ill. No distress. HENT:  
Head: Normocephalic and atraumatic. Nose: Nose normal.  
Mouth/Throat: Oropharynx is clear and moist. No oropharyngeal exudate. Eyes: Conjunctivae and EOM are normal. Pupils are equal, round, and reactive to light. Right eye exhibits no discharge. Left eye exhibits no discharge. No scleral icterus. Neck: Normal range of motion. Neck supple. No JVD present. No tracheal deviation present. No thyromegaly present. Cardiovascular: Normal rate, regular rhythm, normal heart sounds and intact distal pulses. Exam reveals no gallop and no friction rub. No murmur heard. Equal pulses in all extremities bilaterally Pulmonary/Chest: Effort normal and breath sounds normal. No stridor. No respiratory distress. She has no wheezes. She has no rales. She exhibits no tenderness. Abdominal: Bowel sounds are normal. She exhibits no distension and no mass. There is tenderness in the epigastric area and periumbilical area. There is no rebound and no guarding. No hernia. Musculoskeletal: Normal range of motion. She exhibits no edema or tenderness. Lymphadenopathy:  
  She has no cervical adenopathy. Neurological: She is alert and oriented to person, place, and time. No cranial nerve deficit. Coordination normal.  
Skin: Skin is warm. No rash noted. No erythema. No pallor. Psychiatric: She has a normal mood and affect. Her behavior is normal. Judgment and thought content normal.  
Nursing note and vitals reviewed. Note written by Cliff Paz, as dictated by Jenny Cabrera MD 3:35 PM 
 
 
MDM Number of Diagnoses or Management Options Nausea and vomiting, intractability of vomiting not specified, unspecified vomiting type:  
Diagnosis management comments: A/P:  AAA, aortic dissection, N/V.  81 y/o with sudden onset abd pain with N/V. Pt with hx of AAA. Bedside US performed does not show rupture. Cbc, cmp, ua, type and screen, ecg, troponin, 1 L NS, lactate, zofran, CT abd/pelvis. Reassessment:  Imaging and labs reassuring. Pt feeling better after fluids and antiemetics. Pt to be dc with PCP f/u. Amount and/or Complexity of Data Reviewed Clinical lab tests: ordered and reviewed Tests in the radiology section of CPT®: reviewed and ordered Review and summarize past medical records: yes Independent visualization of images, tracings, or specimens: yes Risk of Complications, Morbidity, and/or Mortality Presenting problems: moderate Diagnostic procedures: moderate Management options: moderate Patient Progress Patient progress: improved Bedside US Date/Time: 2/5/2019 3:45 PM 
Performed by: Jenny Cabrera MD 
Authorized by: Jenny Cabrera MD  
 
Verbal consent obtained: Yes Given by:  Patient Performed by: Attending Type of procedure: Focused abdominal aorta Indications:  Abdominal pain and other (comment) (h/o aneurysm) Proximal transverse:  Adequate Distal transverse:  Adequate Sagittal:  Complete Bifurcation:  Visualized Aneurysm: aneurysm present Aneurysm comment:  Transverse 3.2 cm Sonographic Evidence for Aneurysm:  Aneurysm present (No evidence of disection or triple A rupture) ED EKG interpretation: 
Rhythm: atrial paced rhythm with occasional PVCs;  Rate (approx.): 80 bpm; Prolonged QT at 528;  
Note written by Stanislav Jay, as dictated by Jenny Cabrera MD 4:04 PM 
 
10:17 PM 
 Patient's results have been reviewed with them. Patient and/or family have verbally conveyed their understanding and agreement of the patient's signs, symptoms, diagnosis, treatment and prognosis and additionally agree to follow up as recommended or return to the Emergency Room should their condition change prior to follow-up. Discharge instructions have also been provided to the patient with some educational information regarding their diagnosis as well a list of reasons why they would want to return to the ER prior to their follow-up appointment should their condition change.

## 2019-02-05 NOTE — ED TRIAGE NOTES
Arrives via EMS from home. Had a sudden onset of generalized weakness,. Nausea and vertigo when trying to stand up. History of aortic aneurysm and a pacemaker.

## 2019-02-06 ENCOUNTER — PATIENT OUTREACH (OUTPATIENT)
Dept: INTERNAL MEDICINE CLINIC | Age: 84
End: 2019-02-06

## 2019-02-06 ENCOUNTER — TELEPHONE (OUTPATIENT)
Dept: INTERNAL MEDICINE CLINIC | Age: 84
End: 2019-02-06

## 2019-02-06 LAB
ATRIAL RATE: 78 BPM
CALCULATED R AXIS, ECG10: 37 DEGREES
CALCULATED T AXIS, ECG11: 58 DEGREES
DIAGNOSIS, 93000: NORMAL
P-R INTERVAL, ECG05: 250 MS
Q-T INTERVAL, ECG07: 458 MS
QRS DURATION, ECG06: 92 MS
QTC CALCULATION (BEZET), ECG08: 528 MS
VENTRICULAR RATE, ECG03: 80 BPM

## 2019-02-06 NOTE — ED NOTES
Talked with abdifatah in [address] regarding patient lab levels and contract, will have patient finish second NS bolus and repeat creatine level prior to CT.

## 2019-02-06 NOTE — ED NOTES
Discharge Instructions Reviewed with patient. Discharge instructions given to patient per provider. patient able to return verbalize discharge instructions. Paper copy of discharge instructions given. Patient condition stable, Respiratory status WNL, Neurostatus intact. Patient wheeled out of er, to home with family.

## 2019-02-06 NOTE — TELEPHONE ENCOUNTER
I spoke to pts son, states pt was seen in ER last  Night for n/v and generalized weakness from not feeling well. Pt is not having n/v now and he states she is staying hydrated and urinating regularly, but her son wants pt to be seen today to discuss the ER visit as he feels it was \"inconclusive\". I offered him an appt for tomorrow at 8:20am. He takes the appt but wants me to ask Dr Keenan Villalobos if she can be worked in. I advised him that I would discuss this with her and call him back.

## 2019-02-06 NOTE — TELEPHONE ENCOUNTER
Jaren Carlos returned my call, states today at 3pm no longer works with his schedule and wants to just keep tomorrows appt at 820am

## 2019-02-06 NOTE — TELEPHONE ENCOUNTER
Patient's son is requesting to speak with the nurse to discuss possibly working patient in today for an ER visit f.u/ Son can be reached at [phone], declined first available

## 2019-02-06 NOTE — DISCHARGE INSTRUCTIONS
Patient Education        Nausea and Vomiting: Care Instructions  Your Care Instructions    When you are nauseated, you may feel weak and sweaty and notice a lot of saliva in your mouth. Nausea often leads to vomiting. Most of the time you do not need to worry about nausea and vomiting, but they can be signs of other illnesses. Two common causes of nausea and vomiting are stomach flu and food poisoning. Nausea and vomiting from viral stomach flu will usually start to improve within 24 hours. Nausea and vomiting from food poisoning may last from 12 to 48 hours. The doctor has checked you carefully, but problems can develop later. If you notice any problems or new symptoms, get medical treatment right away. Follow-up care is a key part of your treatment and safety. Be sure to make and go to all appointments, and call your doctor if you are having problems. It's also a good idea to know your test results and keep a list of the medicines you take. How can you care for yourself at home? · To prevent dehydration, drink plenty of fluids, enough so that your urine is light yellow or clear like water. Choose water and other caffeine-free clear liquids until you feel better. If you have kidney, heart, or liver disease and have to limit fluids, talk with your doctor before you increase the amount of fluids you drink. · Rest in bed until you feel better. · When you are able to eat, try clear soups, mild foods, and liquids until all symptoms are gone for 12 to 48 hours. Other good choices include dry toast, crackers, cooked cereal, and gelatin dessert, such as Jell-O. When should you call for help? Call 911 anytime you think you may need emergency care. For example, call if:    · You passed out (lost consciousness).    Call your doctor now or seek immediate medical care if:    · You have symptoms of dehydration, such as:  ? Dry eyes and a dry mouth. ? Passing only a little dark urine. ?  Feeling thirstier than usual.   · You have new or worsening belly pain.     · You have a new or higher fever.     · You vomit blood or what looks like coffee grounds.    Watch closely for changes in your health, and be sure to contact your doctor if:    · You have ongoing nausea and vomiting.     · Your vomiting is getting worse.     · Your vomiting lasts longer than 2 days.     · You are not getting better as expected. Where can you learn more? Go to http://kathleen-zabrina.info/. Enter 25 [id number] in the search box to learn more about \"Nausea and Vomiting: Care Instructions. \"  Current as of: September 23, 2018  Content Version: 11.9  © 7190-9868 [employer]. Care instructions adapted under license by [employer] (which disclaims liability or warranty for this information). If you have questions about a medical condition or this instruction, always ask your healthcare professional. [address] any warranty or liability for your use of this information.

## 2019-02-06 NOTE — TELEPHONE ENCOUNTER
I spoke to Dr Aba Zapata, she states she can add her on today at 3pm. I attempted to call Erin Hernandez back, no answer, left voicemail to return call asap

## 2019-02-06 NOTE — PROGRESS NOTES
ED Discharge Follow-Up Date/Time:     2019 3:12 PM 
 
Patient presented to [facility]  ED on 19 and was diagnosed with N/V. Top Challenges reviewed with the provider ACP not on file Health Maintenance needs to be updated Method of communication with provider : none Nurse Navigator(NN) contacted the  family  by telephone to perform post ED discharge assessment. Verified name and  with family as identifiers. Provided introduction to self, and explanation of the Nurse Navigator role. Family reported assessment: son reported pt has been experiencing nausea more so then vomiting. Unable to say how long this has been going on. Pt resides in own home, son on his way now to visit pt. Son cut call short with NN, on his way now to visit pt. Medication(s):  
New Medications at Discharge: no 
Changed Medications at Discharge: no 
Discontinued Medications at Discharge: no There were no barriers to obtaining medications identified at this time. Reviewed discharge instructions and red flags with  family who voiced understanding. Family given an opportunity to ask questions and does not have any further questions or concerns at this time. The family agrees to contact the PCP office for questions related to their healthcare. Patient reminded that there are physicians on call 24 hours a day / 7 days a week (M-F 5pm to 8am and from Friday 5pm until Monday 8am for the weekend) should the patient have questions or concerns. NN provided contact information for future reference. Offered follow up appointment with PCP: yes [facility] follow up appointment(s):  
Future Appointments Date Time Provider Nikolas Victoria 2019  8:20 AM Marissa Brown MD [address] [facility] [address] Non-[facility] follow up appointment(s): ? openPeople:  Son hung up before NN could provide info  
 www. [place] 9 AM- 9 PM.   Phone [phone]

## 2019-02-07 ENCOUNTER — OFFICE VISIT (OUTPATIENT)
Dept: INTERNAL MEDICINE CLINIC | Age: 84
End: 2019-02-07

## 2019-02-07 VITALS
DIASTOLIC BLOOD PRESSURE: 83 MMHG | RESPIRATION RATE: 14 BRPM | HEART RATE: 96 BPM | OXYGEN SATURATION: 96 % | SYSTOLIC BLOOD PRESSURE: 159 MMHG | WEIGHT: 135.4 LBS | HEIGHT: 67 IN | TEMPERATURE: 97.9 F | BODY MASS INDEX: 21.25 KG/M2

## 2019-02-07 DIAGNOSIS — I48.91 ATRIAL FIBRILLATION, UNSPECIFIED TYPE (HCC): ICD-10-CM

## 2019-02-07 DIAGNOSIS — R53.1 WEAKNESS GENERALIZED: Primary | ICD-10-CM

## 2019-02-07 DIAGNOSIS — I10 ESSENTIAL HYPERTENSION: ICD-10-CM

## 2019-02-07 NOTE — PROGRESS NOTES
.it  Chief Complaint   Patient presents with   [facility] Follow Up     2/5/19 seen for nausea     Patient presents with her son for follow-up from the emergency room. Weakness  Pt reports ate lunch normal prior to Chicken salad sandwich. She ate sandwhich then back in the sits in chair. She feels lightheaded and blurred visiton. She felt sick and hair sweating. She went from chair to chair. She called 911 and dispatched ambulance. She was unstable in the ER. She reports she was still feeling very unstable when she went to the ER. She notes that she has had a history of chronic loose stools. She reports that she does make formed stools as well. Patient reports she had an episode in [place] but notes they are not as similar. Patient had a head CT in December which was negative    Atrial fibrillation  Patient is being followed by cardiology. She was just recently increased on her amiodarone dose. Patient is being followed by both Dr. Armando Romero and Dr. Fernando Palafox. She has been compliant with her Eliquis and her amiodarone.       Past Medical History:   Diagnosis Date    Acute cystitis 05/23/2018    Anemia, unspecified     Anxiety     Arrhythmia     a fib    Arthritis     osteoarthritis, rheumatoid    Atrial fibrillation (HCC)     Dr. Fernando Palafox and Dr. Armando Romero  following    Autoimmune disease [facility])     sjogren disease Dr. Sylvain Matthews CAD (coronary artery disease)     HTN (hypertension)     Hyperlipidemia LDL goal < 100     Hypothyroid     Insomnia     Osteoarthritis     [facility]ogren's [facility]     Thoracic aneurysm without mention of rupture      Past Surgical History:   Procedure Laterality Date    HX CHOLECYSTECTOMY      HX COLONOSCOPY  3/2014    Dr. Elva Hines    HX OTHER SURGICAL      hernia repairs    HX PARTIAL THYROIDECTOMY      HX JANNET AND BSO      HX VEIN STRIPPING       Social History     Socioeconomic History    Marital status:      Spouse name: Not on file    Number of children: Not on file    Years of education: Not on file    Highest education level: Not on file   Tobacco Use    Smoking status: Never Smoker    Smokeless tobacco: Never Used   Substance and Sexual Activity    Alcohol use: Yes     Comment: wine   Social History Narrative    ** Merged History Encounter **          Family History   Problem Relation Age of Onset    Heart Disease Father         aneurysm    Cancer Maternal Grandfather      Current Outpatient Medications   Medication Sig Dispense Refill    montelukast (SINGULAIR) 10 mg tablet TAKE ONE TABLET BY MOUTH DAILY 30 Tab 2    estradiol (ESTRACE) 0.5 mg tablet TAKE ONE TABLET BY MOUTH DAILY 30 Tab 0    PARoxetine (PAXIL) 10 mg tablet TAKE ONE AND ONE-HALF TABLET BY MOUTH DAILY 135 Tab 0    cholestyramine (QUESTRAN) 4 gram packet MIX ONE PACKET AS DIRECTED ON PACKAGE AND TAKE ONCE DAILY 30 Each 0    rivaroxaban (XARELTO) 20 mg tab tablet Take 1 Tab by mouth daily (with breakfast). Do not take  ecasa. 30 Tab 0    fosinopril (MONOPRIL) 10 mg tablet TAKE TWO TABLETS BY MOUTH DAILY 90 Tab 2    triamcinolone acetonide (KENALOG) 0.1 % topical cream Apply  to affected area two (2) times a day. use thin layer 15 g 0    levothyroxine (SYNTHROID) 100 mcg tablet TAKE ONE TABLET BY MOUTH DAILY BEFORE BREAKFAST 90 Tab 1    atenolol (TENORMIN) 25 mg tablet Take 0.5 Tabs by mouth daily. 90 Tab 0    guaiFENesin ER (MUCINEX) 600 mg ER tablet Must take with water 60 Tab 0    benzonatate (TESSALON) 100 mg capsule two (2) times daily as needed.  ALPRAZolam (XANAX) 1 mg tablet Take 1 mg by mouth nightly as needed.  ipratropium (ATROVENT) 42 mcg (0.06 %) nasal spray       albuterol (PROVENTIL HFA, VENTOLIN HFA, PROAIR HFA) 90 mcg/actuation inhaler Take 2 Puffs by inhalation every four (4) hours as needed for Wheezing or Shortness of Breath.  Indications: Bronchospastic Pulmonary Disease 1 Inhaler 1    eszopiclone (LUNESTA) 2 mg tablet Take 1 Tab by mouth nightly as needed for Sleep. Max Daily Amount: 2 mg. Use with caution as may cause falls and sedation  Indications: INSOMNIA 30 Tab 0    fluticasone (FLONASE) 50 mcg/actuation nasal spray       vitamin b comp & c no.4 (SUPER B COMPLEX + C) 150 mg tab Take  by mouth.  cholecalciferol, vitamin D3, (VITAMIN D3) 2,000 unit tab Take  by mouth.  CALCIUM CARBONATE (TUMS PO) Take  by mouth.  MULTIVITAMIN (MULTIPLE VITAMIN PO) Take  by mouth.  predniSONE (DELTASONE) 5 mg tablet Take 1 Tab by mouth daily. 5 Tab 0     Allergies   Allergen Reactions    Latex Hives     NOT ALLERGIC PER PT 02/01/2018    Pcn [Penicillins] Anaphylaxis    Sulfa (Sulfonamide Antibiotics) Anaphylaxis    Biaxin [Clarithromycin] Nausea Only    Biotin Unknown (comments)    Pcn [Penicillins] Hives    Sulfa (Sulfonamide Antibiotics) Nausea Only       Review of Systems - General ROS: positive for  - fatigue and malaise  negative for - chills or fever  Cardiovascular ROS: no chest pain or dyspnea on exertion  Respiratory ROS: no cough, shortness of breath, or wheezing    Visit Vitals  /83 (BP 1 Location: Left arm, BP Patient Position: Sitting)   Pulse 96   Temp 97.9 °F (36.6 °C) (Oral)   Resp 14   Ht 5' 7\" (1.702 m)   Wt 135 lb 6.4 oz (61.4 kg)   SpO2 96%   BMI 21.21 kg/m²     Supine: 148/88, 84 sitting 140/88, 82 standing 140/88, 80. Patient reports on transition from supine to sitting to standing lightheadedness and fuzzy feeling in head. General Appearance:  Well developed, well nourished,alert and oriented x 3, and individual in no acute distress. Ears/Nose/Mouth/Throat:   Hearing grossly normal.         Neck: Supple, no lad, no bruits   Chest:   Lungs clear to auscultation bilaterally. Cardiovascular:  Regular rate and rhythm, S1, S2 normal, no murmur. Abdomen:   Soft, non-tender, bowel sounds are active. Extremities: No edema bilaterally.     Skin: Warm and dry, no suspicious lesions                 Diagnoses and all orders for this visit:    1. Weakness generalized  Discussed with patient and son. ER lab work and imaging reviewed with them. I think she may have a slow vagal tone. She was symptomatic on orthostatic testing clinically. 2. Essential hypertension  Blood pressure recheck improved  Patient will follow up with cardiology    3. Atrial fibrillation, unspecified type ([facility] [address]  Continue amiodarone for now    I spent 25 minutes with this patient and son greater than 50% of the time was spent in management and counseling with regards to her ER evaluation and assessment of her second presyncopal type event. She may have vagal tone dysfunction which is causing some of her symptoms.

## 2019-02-12 DIAGNOSIS — K59.1 FUNCTIONAL DIARRHEA: ICD-10-CM

## 2019-02-12 RX ORDER — CHOLESTYRAMINE 4 G/9G
POWDER, FOR SUSPENSION ORAL
Qty: 30 PACKET | Refills: 0 | Status: SHIPPED | OUTPATIENT
Start: 2019-02-12 | End: 2019-02-16 | Stop reason: CLARIF

## 2019-02-15 DIAGNOSIS — K59.1 FUNCTIONAL DIARRHEA: Primary | ICD-10-CM

## 2019-02-15 RX ORDER — CHOLESTYRAMINE 4 G/4.8G
POWDER, FOR SUSPENSION ORAL
Qty: 30 PACKET | Refills: 0 | Status: SHIPPED | OUTPATIENT
Start: 2019-02-15 | End: 2019-02-16 | Stop reason: CLARIF

## 2019-02-16 ENCOUNTER — APPOINTMENT (OUTPATIENT)
Dept: GENERAL RADIOLOGY | Age: 84
DRG: 225 | End: 2019-02-16
Attending: EMERGENCY MEDICINE
Payer: MEDICARE

## 2019-02-16 ENCOUNTER — HOSPITAL ENCOUNTER (INPATIENT)
Age: 84
LOS: 9 days | Discharge: NURSING, REHAB OR LONG-TERM CARE | DRG: 225 | End: 2019-02-25
Attending: EMERGENCY MEDICINE | Admitting: INTERNAL MEDICINE
Payer: MEDICARE

## 2019-02-16 DIAGNOSIS — I47.20 V-TACH: ICD-10-CM

## 2019-02-16 DIAGNOSIS — I46.9 CARDIAC ARREST (HCC): ICD-10-CM

## 2019-02-16 DIAGNOSIS — I10 ESSENTIAL HYPERTENSION: ICD-10-CM

## 2019-02-16 DIAGNOSIS — I47.20 VENTRICULAR TACHYCARDIA: Primary | ICD-10-CM

## 2019-02-16 PROBLEM — R79.89 ELEVATED SERUM CREATININE: Status: ACTIVE | Noted: 2019-02-16

## 2019-02-16 LAB
ALBUMIN SERPL-MCNC: 3.3 G/DL (ref 3.5–5)
ALBUMIN/GLOB SERPL: 1 {RATIO} (ref 1.1–2.2)
ALP SERPL-CCNC: 81 U/L (ref 45–117)
ALT SERPL-CCNC: 31 U/L (ref 12–78)
AMORPH CRY URNS QL MICRO: ABNORMAL
ANION GAP BLD CALC-SCNC: 22 MMOL/L (ref 10–20)
ANION GAP SERPL CALC-SCNC: 14 MMOL/L (ref 5–15)
APPEARANCE UR: CLEAR
AST SERPL-CCNC: 70 U/L (ref 15–37)
BACTERIA URNS QL MICRO: NEGATIVE /HPF
BASOPHILS # BLD: 0 K/UL (ref 0–0.1)
BASOPHILS NFR BLD: 1 % (ref 0–1)
BILIRUB SERPL-MCNC: 0.4 MG/DL (ref 0.2–1)
BILIRUB UR QL: NEGATIVE
BUN BLD-MCNC: 18 MG/DL (ref 9–20)
BUN SERPL-MCNC: 19 MG/DL (ref 6–20)
BUN/CREAT SERPL: 15 (ref 12–20)
CA-I BLD-MCNC: 1.03 MMOL/L (ref 1.12–1.32)
CALCIUM SERPL-MCNC: 8.4 MG/DL (ref 8.5–10.1)
CHLORIDE BLD-SCNC: 99 MMOL/L (ref 98–107)
CHLORIDE SERPL-SCNC: 100 MMOL/L (ref 97–108)
CO2 BLD-SCNC: 20 MMOL/L (ref 21–32)
CO2 SERPL-SCNC: 22 MMOL/L (ref 21–32)
COLOR UR: ABNORMAL
COMMENT, HOLDF: NORMAL
CREAT BLD-MCNC: 1.1 MG/DL (ref 0.6–1.3)
CREAT SERPL-MCNC: 1.26 MG/DL (ref 0.55–1.02)
DIFFERENTIAL METHOD BLD: ABNORMAL
EOSINOPHIL # BLD: 0.1 K/UL (ref 0–0.4)
EOSINOPHIL NFR BLD: 4 % (ref 0–7)
EPITH CASTS URNS QL MICRO: ABNORMAL /LPF
ERYTHROCYTE [DISTWIDTH] IN BLOOD BY AUTOMATED COUNT: 13.2 % (ref 11.5–14.5)
GLOBULIN SER CALC-MCNC: 3.3 G/DL (ref 2–4)
GLUCOSE BLD-MCNC: 275 MG/DL (ref 65–100)
GLUCOSE SERPL-MCNC: 278 MG/DL (ref 65–100)
GLUCOSE UR STRIP.AUTO-MCNC: 100 MG/DL
HCT VFR BLD AUTO: 38.4 % (ref 35–47)
HCT VFR BLD CALC: 38 % (ref 35–47)
HGB BLD-MCNC: 12.1 G/DL (ref 11.5–16)
HGB UR QL STRIP: ABNORMAL
IMM GRANULOCYTES # BLD AUTO: 0.1 K/UL (ref 0–0.04)
IMM GRANULOCYTES NFR BLD AUTO: 2 % (ref 0–0.5)
INR PPP: 1.1 (ref 0.9–1.1)
KETONES UR QL STRIP.AUTO: NEGATIVE MG/DL
LACTATE SERPL-SCNC: 6.9 MMOL/L (ref 0.4–2)
LEUKOCYTE ESTERASE UR QL STRIP.AUTO: NEGATIVE
LYMPHOCYTES # BLD: 0.8 K/UL (ref 0.8–3.5)
LYMPHOCYTES NFR BLD: 30 % (ref 12–49)
MAGNESIUM SERPL-MCNC: 2 MG/DL (ref 1.6–2.4)
MCH RBC QN AUTO: 31.7 PG (ref 26–34)
MCHC RBC AUTO-ENTMCNC: 31.5 G/DL (ref 30–36.5)
MCV RBC AUTO: 100.5 FL (ref 80–99)
MONOCYTES # BLD: 0.2 K/UL (ref 0–1)
MONOCYTES NFR BLD: 7 % (ref 5–13)
NEUTS SEG # BLD: 1.3 K/UL (ref 1.8–8)
NEUTS SEG NFR BLD: 56 % (ref 32–75)
NITRITE UR QL STRIP.AUTO: NEGATIVE
NRBC # BLD: 0 K/UL (ref 0–0.01)
NRBC BLD-RTO: 0 PER 100 WBC
PH UR STRIP: 7.5 [PH] (ref 5–8)
PLATELET # BLD AUTO: 150 K/UL (ref 150–400)
PMV BLD AUTO: 10.3 FL (ref 8.9–12.9)
POTASSIUM BLD-SCNC: 3.8 MMOL/L (ref 3.5–5.1)
POTASSIUM SERPL-SCNC: 4 MMOL/L (ref 3.5–5.1)
PROT SERPL-MCNC: 6.6 G/DL (ref 6.4–8.2)
PROT UR STRIP-MCNC: 30 MG/DL
PROTHROMBIN TIME: 11 SEC (ref 9–11.1)
RBC # BLD AUTO: 3.82 M/UL (ref 3.8–5.2)
RBC #/AREA URNS HPF: ABNORMAL /HPF (ref 0–5)
RBC MORPH BLD: ABNORMAL
RBC MORPH BLD: ABNORMAL
SAMPLES BEING HELD,HOLD: NORMAL
SERVICE CMNT-IMP: ABNORMAL
SODIUM BLD-SCNC: 137 MMOL/L (ref 136–145)
SODIUM SERPL-SCNC: 136 MMOL/L (ref 136–145)
SP GR UR REFRACTOMETRY: 1.01 (ref 1–1.03)
TROPONIN I SERPL-MCNC: 0.14 NG/ML
TROPONIN I SERPL-MCNC: <0.05 NG/ML
UR CULT HOLD, URHOLD: NORMAL
UROBILINOGEN UR QL STRIP.AUTO: 0.2 EU/DL (ref 0.2–1)
WBC # BLD AUTO: 2.5 K/UL (ref 3.6–11)
WBC URNS QL MICRO: ABNORMAL /HPF (ref 0–4)

## 2019-02-16 PROCEDURE — 51701 INSERT BLADDER CATHETER: CPT

## 2019-02-16 PROCEDURE — 82803 BLOOD GASES ANY COMBINATION: CPT

## 2019-02-16 PROCEDURE — 81001 URINALYSIS AUTO W/SCOPE: CPT

## 2019-02-16 PROCEDURE — 85610 PROTHROMBIN TIME: CPT

## 2019-02-16 PROCEDURE — 74011250637 HC RX REV CODE- 250/637: Performed by: INTERNAL MEDICINE

## 2019-02-16 PROCEDURE — 96375 TX/PRO/DX INJ NEW DRUG ADDON: CPT

## 2019-02-16 PROCEDURE — 74011250636 HC RX REV CODE- 250/636: Performed by: EMERGENCY MEDICINE

## 2019-02-16 PROCEDURE — 87449 NOS EACH ORGANISM AG IA: CPT

## 2019-02-16 PROCEDURE — 93005 ELECTROCARDIOGRAM TRACING: CPT

## 2019-02-16 PROCEDURE — 85025 COMPLETE CBC W/AUTO DIFF WBC: CPT

## 2019-02-16 PROCEDURE — 89055 LEUKOCYTE ASSESSMENT FECAL: CPT

## 2019-02-16 PROCEDURE — 96365 THER/PROPH/DIAG IV INF INIT: CPT

## 2019-02-16 PROCEDURE — 77030011943

## 2019-02-16 PROCEDURE — 71045 X-RAY EXAM CHEST 1 VIEW: CPT

## 2019-02-16 PROCEDURE — 87046 STOOL CULTR AEROBIC BACT EA: CPT

## 2019-02-16 PROCEDURE — 74011250637 HC RX REV CODE- 250/637: Performed by: EMERGENCY MEDICINE

## 2019-02-16 PROCEDURE — 87798 DETECT AGENT NOS DNA AMP: CPT

## 2019-02-16 PROCEDURE — 80053 COMPREHEN METABOLIC PANEL: CPT

## 2019-02-16 PROCEDURE — 36415 COLL VENOUS BLD VENIPUNCTURE: CPT

## 2019-02-16 PROCEDURE — 83735 ASSAY OF MAGNESIUM: CPT

## 2019-02-16 PROCEDURE — 80047 BASIC METABLC PNL IONIZED CA: CPT

## 2019-02-16 PROCEDURE — 84484 ASSAY OF TROPONIN QUANT: CPT

## 2019-02-16 PROCEDURE — 65610000006 HC RM INTENSIVE CARE

## 2019-02-16 PROCEDURE — 77030005563 HC CATH URETH INT MMGH -A

## 2019-02-16 PROCEDURE — 36600 WITHDRAWAL OF ARTERIAL BLOOD: CPT

## 2019-02-16 PROCEDURE — 83605 ASSAY OF LACTIC ACID: CPT

## 2019-02-16 PROCEDURE — 74011000258 HC RX REV CODE- 258: Performed by: EMERGENCY MEDICINE

## 2019-02-16 PROCEDURE — 99284 EMERGENCY DEPT VISIT MOD MDM: CPT

## 2019-02-16 PROCEDURE — 74011250636 HC RX REV CODE- 250/636: Performed by: INTERNAL MEDICINE

## 2019-02-16 RX ORDER — GUAIFENESIN 600 MG/1
600 TABLET, EXTENDED RELEASE ORAL
COMMUNITY
End: 2021-10-04

## 2019-02-16 RX ORDER — PROCHLORPERAZINE EDISYLATE 5 MG/ML
10 INJECTION INTRAMUSCULAR; INTRAVENOUS
Status: COMPLETED | OUTPATIENT
Start: 2019-02-16 | End: 2019-02-16

## 2019-02-16 RX ORDER — LEVOTHYROXINE SODIUM 100 UG/1
100 TABLET ORAL
COMMUNITY
End: 2019-07-15 | Stop reason: SDUPTHER

## 2019-02-16 RX ORDER — MONTELUKAST SODIUM 10 MG/1
10 TABLET ORAL
COMMUNITY
End: 2019-04-30 | Stop reason: ALTCHOICE

## 2019-02-16 RX ORDER — AMIODARONE HYDROCHLORIDE 200 MG/1
200 TABLET ORAL DAILY
Status: ON HOLD | COMMUNITY
End: 2019-02-25 | Stop reason: SDUPTHER

## 2019-02-16 RX ORDER — OXYCODONE AND ACETAMINOPHEN 5; 325 MG/1; MG/1
1 TABLET ORAL
Status: DISCONTINUED | OUTPATIENT
Start: 2019-02-16 | End: 2019-02-25

## 2019-02-16 RX ORDER — PAROXETINE 10 MG/1
15 TABLET, FILM COATED ORAL DAILY
COMMUNITY
End: 2019-02-25

## 2019-02-16 RX ORDER — ASPIRIN 300 MG/1
300 SUPPOSITORY RECTAL
Status: COMPLETED | OUTPATIENT
Start: 2019-02-16 | End: 2019-02-16

## 2019-02-16 RX ORDER — ESZOPICLONE 2 MG/1
2 TABLET, FILM COATED ORAL
Status: ON HOLD | COMMUNITY
End: 2020-10-21

## 2019-02-16 RX ORDER — SODIUM CHLORIDE 9 MG/ML
75 INJECTION, SOLUTION INTRAVENOUS CONTINUOUS
Status: DISCONTINUED | OUTPATIENT
Start: 2019-02-16 | End: 2019-02-17

## 2019-02-16 RX ORDER — PROCHLORPERAZINE EDISYLATE 5 MG/ML
10 INJECTION INTRAMUSCULAR; INTRAVENOUS
Status: DISCONTINUED | OUTPATIENT
Start: 2019-02-16 | End: 2019-02-25 | Stop reason: HOSPADM

## 2019-02-16 RX ORDER — FOSINOPIRL SODIUM 10 MG/1
20 TABLET ORAL DAILY
COMMUNITY
End: 2019-04-19 | Stop reason: ALTCHOICE

## 2019-02-16 RX ORDER — ACETAMINOPHEN 325 MG/1
650 TABLET ORAL
Status: DISCONTINUED | OUTPATIENT
Start: 2019-02-16 | End: 2019-02-22 | Stop reason: SDUPTHER

## 2019-02-16 RX ORDER — HYDROMORPHONE HYDROCHLORIDE 2 MG/ML
0.5 INJECTION, SOLUTION INTRAMUSCULAR; INTRAVENOUS; SUBCUTANEOUS
Status: DISCONTINUED | OUTPATIENT
Start: 2019-02-16 | End: 2019-02-17

## 2019-02-16 RX ORDER — SODIUM CHLORIDE 0.9 % (FLUSH) 0.9 %
5-40 SYRINGE (ML) INJECTION AS NEEDED
Status: DISCONTINUED | OUTPATIENT
Start: 2019-02-16 | End: 2019-02-25 | Stop reason: HOSPADM

## 2019-02-16 RX ORDER — SODIUM CHLORIDE 0.9 % (FLUSH) 0.9 %
5-40 SYRINGE (ML) INJECTION EVERY 8 HOURS
Status: DISCONTINUED | OUTPATIENT
Start: 2019-02-16 | End: 2019-02-25 | Stop reason: HOSPADM

## 2019-02-16 RX ADMIN — AMIODARONE HYDROCHLORIDE 1 MG/MIN: 50 INJECTION, SOLUTION INTRAVENOUS at 17:39

## 2019-02-16 RX ADMIN — SODIUM CHLORIDE 75 ML/HR: 900 INJECTION, SOLUTION INTRAVENOUS at 23:50

## 2019-02-16 RX ADMIN — Medication 10 ML: at 23:00

## 2019-02-16 RX ADMIN — PROCHLORPERAZINE EDISYLATE 10 MG: 5 INJECTION INTRAMUSCULAR; INTRAVENOUS at 18:00

## 2019-02-16 RX ADMIN — AMIODARONE HYDROCHLORIDE 0.5 MG/MIN: 50 INJECTION, SOLUTION INTRAVENOUS at 23:51

## 2019-02-16 RX ADMIN — SODIUM CHLORIDE 1000 ML: 900 INJECTION, SOLUTION INTRAVENOUS at 17:50

## 2019-02-16 RX ADMIN — ASPIRIN 300 MG: 300 SUPPOSITORY RECTAL at 18:10

## 2019-02-16 RX ADMIN — Medication 1 CAPSULE: at 21:28

## 2019-02-16 RX ADMIN — SODIUM CHLORIDE 1000 ML: 900 INJECTION, SOLUTION INTRAVENOUS at 19:05

## 2019-02-16 NOTE — Clinical Note
TRANSFER - OUT REPORT:  
 
Verbal report given to: Janice Phillip RN. Report consisted of patient's Situation, Background, Assessment and  
Recommendations(SBAR). Opportunity for questions and clarification was provided. Patient transported with a Registered Nurse. Patient transported to: [facility].

## 2019-02-16 NOTE — CONSULTS
Reason for Consult: Ventricular tachycardia. HPI: Jenaro Juarez is a 80 y.o. female with extensive past medical history significant for CAD, atrial fibrillation, permanent pacemaker, hypertension, Sjogren's syndrome, thoracic aortic aneurysm without mention of rupture, hypertension, is brought by paramedics. Patient reports having shortness of breath, lethargic and not able to move herself. When paramedics arrived they found her unresponsive and in V. tach. Chest compressions were started and patient received 200 J synchronized shock converting to sinus tach at rate of 114 bpm.  Patient was also administered amiodarone 150 mg. On arrival to the ER she was alert awake oriented x3 and was verbally responsive. She denies having any chest pain. EKG does not demonstrate any ST-T changes. EKG shows atrial pacing. ER attending placed a magnet on the pacemaker which converted the rhythm to ventricular pacing. Her usual cardiologist is Dr. Yudelka Wilson and Dr. Yale Skiff placed a pacemaker. She personally denies any history of CAD. She takes Xarelto for atrial fibrillation. EKG was personally reviewed which demonstrated atrial pacing with prolonged AV delay with normal AV conduction and normal ST segment. QTc interval is mildly prolonged at 512. Troponin is negative    Hemoglobin is 12    Potassium was normal.    Plan:    1. Wide-complex tachycardia status post cardioversion: At this time there is no clear etiology apparent. Son mentioned to me that she had cardioversion for atrial fibrillation few weeks ago. She does not seem to have ACS however we will do 3 sets of cardiac enzymes. QT interval is mildly prolonged at 512. Check echocardiogram.   Continue atenolol which she takes at home. Continue amiodarone drip overnight however keep close eye as it can also cause QTC prolongation. Monitoring. 2.  Atrial fibrillation: Currently atrial paced. Continue Xarelto. Continue atenolol.   She takes amiodarone p.o. at home. Continue amiodarone drip overnight. Hold amnio p.o. medication until she is on the drip. 3.  Hypertension: Continue atenolol and Monopril.          Past Medical History:   Diagnosis Date    Acute cystitis 05/23/2018    Anemia, unspecified     Anxiety     Arrhythmia     a fib    Arthritis     osteoarthritis, rheumatoid    Atrial fibrillation (HCC)     Dr. Aminata Garza and Dr. Asim Mosqueda  following    Autoimmune disease [facility])     sjogren disease Dr. Smita Welch CAD (coronary artery disease)     HTN (hypertension)     Hyperlipidemia LDL goal < 100     Hypothyroid     Insomnia     Osteoarthritis     Sjogren's disease ([facility] [address]     Thoracic aneurysm without mention of rupture             Past Surgical History:   Procedure Laterality Date    HX CHOLECYSTECTOMY      HX COLONOSCOPY  3/2014    Dr. Timothy Avilez    HX OTHER SURGICAL      hernia repairs    HX PARTIAL THYROIDECTOMY      HX JANNET AND BSO      HX VEIN STRIPPING               Family History   Problem Relation Age of Onset    Heart Disease Father         aneurysm    Cancer Maternal Grandfather            Social History     Socioeconomic History    Marital status:      Spouse name: Not on file    Number of children: Not on file    Years of education: Not on file    Highest education level: Not on file   Social Needs    Financial resource strain: Not on file    Food insecurity - worry: Not on file    Food insecurity - inability: Not on file   [employer] [employer] needs - medical: Not on file   [employer] needs - non-medical: Not on file   Occupational History    Not on file   Tobacco Use    Smoking status: Never Smoker    Smokeless tobacco: Never Used   Substance and Sexual Activity    Alcohol use: Yes     Comment: wine    Drug use: Not on file    Sexual activity: Not on file   Other Topics Concern    Not on file   Social History Narrative    ** Merged History Encounter **              Allergies   Allergen Reactions    Latex Hives     NOT ALLERGIC PER PT 02/01/2018    Pcn [Penicillins] Anaphylaxis    Sulfa (Sulfonamide Antibiotics) Anaphylaxis    Biaxin [Clarithromycin] Nausea Only    Biotin Unknown (comments)    Pcn [Penicillins] Hives    Sulfa (Sulfonamide Antibiotics) Nausea Only            Current Facility-Administered Medications   Medication Dose Route Frequency Provider Last Rate Last Dose    amiodarone (CORDARONE) 375 mg in dextrose 5% 250 mL infusion  0.5-1 mg/min IntraVENous TITRATE Rozetta Tonia, DO 40 mL/hr at 02/16/19 1739 1 mg/min at 02/16/19 1739    sodium chloride 0.9 % bolus infusion 1,000 mL  1,000 mL IntraVENous ONCE Rozetta Tonia, DO        sodium chloride 0.9 % bolus infusion 1,000 mL  1,000 mL IntraVENous ONCE Rozetta Tonia, DO 1,000 mL/hr at 02/16/19 1750 1,000 mL at 02/16/19 1750     Current Outpatient Medications   Medication Sig Dispense Refill    cholestyramine-aspartame (PREVALITE) 4 gram packet Mix one package as directed on package and take once daily 30 Packet 0    cholestyramine (QUESTRAN) 4 gram packet MIX ONE PACKET AS DIRECTED ON PACKAGE AND TAKE ONCE DAILY 30 Packet 0    estradiol (ESTRACE) 0.5 mg tablet Take 1 Tab by mouth daily. Please wean off this medication due to cv risks associated. 30 Tab 0    montelukast (SINGULAIR) 10 mg tablet TAKE ONE TABLET BY MOUTH DAILY 30 Tab 2    PARoxetine (PAXIL) 10 mg tablet TAKE ONE AND ONE-HALF TABLET BY MOUTH DAILY 135 Tab 0    rivaroxaban (XARELTO) 20 mg tab tablet Take 1 Tab by mouth daily (with breakfast). Do not take  ecasa. 30 Tab 0    fosinopril (MONOPRIL) 10 mg tablet TAKE TWO TABLETS BY MOUTH DAILY 90 Tab 2    triamcinolone acetonide (KENALOG) 0.1 % topical cream Apply  to affected area two (2) times a day. use thin layer 15 g 0    levothyroxine (SYNTHROID) 100 mcg tablet TAKE ONE TABLET BY MOUTH DAILY BEFORE BREAKFAST 90 Tab 1    atenolol (TENORMIN) 25 mg tablet Take 0.5 Tabs by mouth daily.  90 Tab 0    guaiFENesin ER (MUCINEX) 600 mg ER tablet Must take with water 60 Tab 0    predniSONE (DELTASONE) 5 mg tablet Take 1 Tab by mouth daily. 5 Tab 0    benzonatate (TESSALON) 100 mg capsule two (2) times daily as needed.  ALPRAZolam (XANAX) 1 mg tablet Take 1 mg by mouth nightly as needed.  ipratropium (ATROVENT) 42 mcg (0.06 %) nasal spray       albuterol (PROVENTIL HFA, VENTOLIN HFA, PROAIR HFA) 90 mcg/actuation inhaler Take 2 Puffs by inhalation every four (4) hours as needed for Wheezing or Shortness of Breath. Indications: Bronchospastic Pulmonary Disease 1 Inhaler 1    eszopiclone (LUNESTA) 2 mg tablet Take 1 Tab by mouth nightly as needed for Sleep. Max Daily Amount: 2 mg. Use with caution as may cause falls and sedation  Indications: INSOMNIA 30 Tab 0    fluticasone (FLONASE) 50 mcg/actuation nasal spray       vitamin b comp & c no.4 (SUPER B COMPLEX + C) 150 mg tab Take  by mouth.  cholecalciferol, vitamin D3, (VITAMIN D3) 2,000 unit tab Take  by mouth.  CALCIUM CARBONATE (TUMS PO) Take  by mouth.  MULTIVITAMIN (MULTIPLE VITAMIN PO) Take  by mouth. ROS:  12 point review of systems was performed.  All negative except for HPI     Physical Exam:  Visit Vitals  /74   Pulse 80   Temp 95.6 °F (35.3 °C)   Resp 26   SpO2 100%       Gen:  Well-developed, well-nourished, in no acute distress  HEENT:  Pink conjunctivae, PERRL, hearing intact to voice, moist mucous membranes  Neck:  Supple, without masses, thyroid non-tender  Resp:  No accessory muscle use, clear breath sounds without wheezes rales or rhonchi  Card:  No murmurs, normal S1, S2 without thrills, bruits or peripheral edema  Abd:  Soft, non-tender, non-distended, normoactive bowel sounds are present, no palpable organomegaly and no detectable hernias  Lymph:  No cervical or inguinal adenopathy  Musc:  No cyanosis or clubbing  Skin:  No rashes or ulcers, skin turgor is good  Neuro:  Cranial nerves are grossly intact, no focal motor weakness, follows commands appropriately  Psych:  Good insight, oriented to person, place and time, alert     Labs:     Lab Results   Component Value Date/Time    WBC 2.5 (L) 02/16/2019 05:36 PM    HGB 12.1 02/16/2019 05:36 PM    HCT 38.4 02/16/2019 05:36 PM    Hematocrit (POC) 38 02/16/2019 05:38 PM    PLATELET 032 17/64/3236 05:36 PM    .5 (H) 02/16/2019 05:36 PM     Lab Results   Component Value Date/Time    Hemoglobin A1c 6.0 (H) 03/14/2017 12:41 PM    Hemoglobin A1c 5.9 (H) 12/12/2016 12:00 AM    Hemoglobin A1c 5.9 (H) 05/22/2013 12:00 AM    Glucose 278 (H) 02/16/2019 05:36 PM    Glucose (POC) 275 (H) 02/16/2019 05:38 PM    Microalb/Creat ratio (ug/mg creat.) 25.0 05/07/2013 12:00 AM    LDL, calculated 99 07/26/2016 11:40 AM    Creatinine (POC) 1.1 02/16/2019 05:38 PM    Creatinine 1.26 (H) 02/16/2019 05:36 PM      Lab Results   Component Value Date/Time    Cholesterol, total 199 07/26/2016 11:40 AM    HDL Cholesterol 75 07/26/2016 11:40 AM    LDL, calculated 99 07/26/2016 11:40 AM    Triglyceride 125 07/26/2016 11:40 AM     Lab Results   Component Value Date/Time    ALT (SGPT) 31 02/16/2019 05:36 PM    AST (SGOT) 70 (H) 02/16/2019 05:36 PM    Alk.  phosphatase 81 02/16/2019 05:36 PM    Bilirubin, direct CANCELED 02/21/2014 11:42 AM    Bilirubin, total 0.4 02/16/2019 05:36 PM    Albumin 3.3 (L) 02/16/2019 05:36 PM    Protein, total 6.6 02/16/2019 05:36 PM    INR 1.1 02/16/2019 05:36 PM    Prothrombin time 11.0 02/16/2019 05:36 PM    Prothrombin time, External 9.7 09/12/2016    PLATELET 495 91/40/9031 05:36 PM     Lab Results   Component Value Date/Time    INR 1.1 02/16/2019 05:36 PM    INR, External 1.0 09/12/2016    Prothrombin time 11.0 02/16/2019 05:36 PM      Lab Results   Component Value Date/Time    GFR est non-AA 40 (L) 02/16/2019 05:36 PM    GFRNA, POC 47 (L) 02/16/2019 05:38 PM    GFR est AA 48 (L) 02/16/2019 05:36 PM    GFRAA, POC 57 (L) 02/16/2019 05:38 PM    Creatinine 1.26 (H) 02/16/2019 05:36 PM    Creatinine (POC) 1.1 02/16/2019 05:38 PM    BUN 19 02/16/2019 05:36 PM    BUN (POC) 18 02/16/2019 05:38 PM    Sodium 136 02/16/2019 05:36 PM    Sodium (POC) 137 02/16/2019 05:38 PM    Potassium 4.0 02/16/2019 05:36 PM    Potassium (POC) 3.8 02/16/2019 05:38 PM    Chloride 100 02/16/2019 05:36 PM    Chloride (POC) 99 02/16/2019 05:38 PM    CO2 22 02/16/2019 05:36 PM    Magnesium 2.0 02/16/2019 05:36 PM     No results found for: PSA, Anne-Marie Dux, [id number], [id number], PSALT  Lab Results   Component Value Date/Time    TSH 1.210 10/04/2018 10:42 AM    T4, Free 1.71 02/05/2018 01:58 PM      Lab Results   Component Value Date/Time    Glucose 278 (H) 02/16/2019 05:36 PM    Glucose (POC) 275 (H) 02/16/2019 05:38 PM      Lab Results   Component Value Date/Time    Troponin-I, Qt. <0.05 02/16/2019 05:36 PM      No results found for: BNP, BNPP, BNPPPOC, XBNPT, BNPNT   Lab Results   Component Value Date/Time    Sodium 136 02/16/2019 05:36 PM    Potassium 4.0 02/16/2019 05:36 PM    Chloride 100 02/16/2019 05:36 PM    CO2 22 02/16/2019 05:36 PM    Anion gap 14 02/16/2019 05:36 PM    Glucose 278 (H) 02/16/2019 05:36 PM    BUN 19 02/16/2019 05:36 PM    Creatinine 1.26 (H) 02/16/2019 05:36 PM    BUN/Creatinine ratio 15 02/16/2019 05:36 PM    GFR est AA 48 (L) 02/16/2019 05:36 PM    GFR est non-AA 40 (L) 02/16/2019 05:36 PM    Calcium 8.4 (L) 02/16/2019 05:36 PM      Lab Results   Component Value Date/Time    Sodium 136 02/16/2019 05:36 PM    Potassium 4.0 02/16/2019 05:36 PM    Chloride 100 02/16/2019 05:36 PM    CO2 22 02/16/2019 05:36 PM    Anion gap 14 02/16/2019 05:36 PM    Glucose 278 (H) 02/16/2019 05:36 PM    BUN 19 02/16/2019 05:36 PM    Creatinine 1.26 (H) 02/16/2019 05:36 PM    BUN/Creatinine ratio 15 02/16/2019 05:36 PM    GFR est AA 48 (L) 02/16/2019 05:36 PM    GFR est non-AA 40 (L) 02/16/2019 05:36 PM    Calcium 8.4 (L) 02/16/2019 05:36 PM    Bilirubin, total 0.4 02/16/2019 05:36 PM    ALT (SGPT) 31 02/16/2019 05:36 PM    AST (SGOT) 70 (H) 02/16/2019 05:36 PM    Alk. phosphatase 81 02/16/2019 05:36 PM    Protein, total 6.6 02/16/2019 05:36 PM    Albumin 3.3 (L) 02/16/2019 05:36 PM    Globulin 3.3 02/16/2019 05:36 PM    A-G Ratio 1.0 (L) 02/16/2019 05:36 PM      Lab Results   Component Value Date/Time    Hemoglobin A1c 6.0 (H) 03/14/2017 12:41 PM         Recent Labs     02/16/19  1736   TROIQ <0.05           Problem List:     Problem List  Date Reviewed: 2/16/2019          Codes Class Noted    * (Principal) Ventricular tachycardia ([facility] [address] ICD-10-CM: I47.2  ICD-9-CM: 427.1  2/16/2019        Elevated serum creatinine ICD-10-CM: R79.89  ICD-9-CM: 790.99  2/16/2019        Advanced care planning/counseling discussion ICD-10-CM: Z71.89  ICD-9-CM: V65.49  12/3/2015    Overview Signed 12/3/2015  1:10 PM by Aleta Vigil RN     Patient states that completed AMD at home. NN encouraged patient to bring a copy to the office for scanning into the medical record. Patient verbalized understanding. Patient verbalized that her son is the primary medical decision maker. Personal history of colonic polyps ICD-10-CM: Z86.010  ICD-9-CM: V12.72  3/19/2014        Anxiety ICD-10-CM: F41.9  ICD-9-CM: 300.00  Unknown        Insomnia ICD-10-CM: G47.00  ICD-9-CM: 780.52  Unknown        HTN (hypertension) (Chronic) ICD-10-CM: I10  ICD-9-CM: 401.9  Unknown        Hyperlipidemia LDL goal < 100 ICD-10-CM: E78.5  ICD-9-CM: 272.4  Unknown        Osteoarthritis ICD-10-CM: M19.90  ICD-9-CM: 715.90  Unknown        Hypothyroid ICD-10-CM: E03.9  ICD-9-CM: 244.9  Unknown        CAD (coronary artery disease) (Chronic) ICD-10-CM: I25.10  ICD-9-CM: 414.00  Unknown        Atrial fibrillation (HCC) (Chronic) ICD-10-CM: I48.91  ICD-9-CM: 427.31  Unknown        Thoracic aneurysm without mention of rupture ICD-10-CM: I71.2  ICD-9-CM: 071. 2  Unknown        Anemia ICD-10-CM: D64.9  ICD-9-CM: 249. 9  Unknown James Collazo MD, [facility]

## 2019-02-16 NOTE — H&P
[address] [place] [address] 
[phone] Admission History and Physical 
 
 
NAME:  Do Dempsey :   1928 MRN:  [id number] PCP:  Karla Doherty MD  
 
Date/Time:  2019 Subjective: CHIEF COMPLAINT: cardiac arrest  
 
HISTORY OF PRESENT ILLNESS:    
Ms. Beckie Cardenas is a 80 y.o.  female who is admitted s/p VT arrest.  Ms. Beckie Cardenas presented to the Emergency Department this PM via EMS. She called them complaining of SOB and on arrival was found unresponsive in VT, she was resuscitated and successfully cardioverted to sinus tach with return of consciousness History obtained from child, chart review and the patient. Previous records reviewed, recent ED visit for nausea attributed to food poisoning, PCP visits for follow up of chronic afib, HTN Past Medical History:  
Diagnosis Date  Acute cystitis 2018  Anemia, unspecified  Anxiety  Arrhythmia   
 a fib  Arthritis   
 osteoarthritis, rheumatoid  Atrial fibrillation ([address] Dr. Wu Gutierrez and Dr. Milagro Erwin  following  Autoimmune disease (HCC)   
 sjogren disease Dr. Salvador Bowie  CAD (coronary artery disease)  HTN (hypertension)  Hyperlipidemia LDL goal < 100  Hypothyroid  Insomnia  Osteoarthritis  Sjogren's disease ([address]  Thoracic aneurysm without mention of rupture Past Surgical History:  
Procedure Laterality Date  HX CHOLECYSTECTOMY  HX COLONOSCOPY  3/2014 Dr. Bj Simpson  HX OTHER SURGICAL    
 hernia repairs  HX PARTIAL THYROIDECTOMY  HX JANNET AND BSO  HX VEIN STRIPPING Social History Tobacco Use  Smoking status: Never Smoker  Smokeless tobacco: Never Used Substance Use Topics  Alcohol use: Yes Comment: wine Family History Problem Relation Age of Onset  Heart Disease Father   
     aneurysm  Cancer Maternal Grandfather Allergies Allergen Reactions  Latex Hives NOT ALLERGIC PER PT 02/01/2018  Pcn [Penicillins] Anaphylaxis  Sulfa (Sulfonamide Antibiotics) Anaphylaxis  Biaxin [Clarithromycin] Nausea Only  Biotin Unknown (comments)  Pcn [Penicillins] Hives  Sulfa (Sulfonamide Antibiotics) Nausea Only Prior to Admission medications Medication Sig Start Date End Date Taking? Authorizing Provider  
cholestyramine-aspartame (PREVALITE) 4 gram packet Mix one package as directed on package and take once daily 2/15/19   Evelyn Oliver MD  
cholestyramine (QUESTRAN) 4 gram packet MIX ONE PACKET AS DIRECTED ON PACKAGE AND TAKE ONCE DAILY 2/12/19   Lucie Estrada MD  
estradiol (ESTRACE) 0.5 mg tablet Take 1 Tab by mouth daily. Please wean off this medication due to cv risks associated. 2/8/19   Lucie Estrada MD  
montelukast (SINGULAIR) 10 mg tablet TAKE ONE TABLET BY MOUTH DAILY 1/9/19   Lucie Estrada MD  
PARoxetine (PAXIL) 10 mg tablet TAKE ONE AND ONE-HALF TABLET BY MOUTH DAILY 1/2/19   Evelyn Oliver MD  
rivaroxaban (XARELTO) 20 mg tab tablet Take 1 Tab by mouth daily (with breakfast). Do not take  ecasa. 12/4/18   Lucie Estrada MD  
fosinopril (MONOPRIL) 10 mg tablet TAKE TWO TABLETS BY MOUTH DAILY 11/25/18   Lucie Estrada MD  
triamcinolone acetonide (KENALOG) 0.1 % topical cream Apply  to affected area two (2) times a day. use thin layer 11/14/18   Lucie Estrada MD  
levothyroxine (SYNTHROID) 100 mcg tablet TAKE ONE TABLET BY MOUTH DAILY BEFORE BREAKFAST 10/8/18   Evelyn Brown MD  
atenolol (TENORMIN) 25 mg tablet Take 0.5 Tabs by mouth daily. 10/8/18   Lucie Estrada MD  
guaiFENesin ER [facility]) 600 mg ER tablet Must take with water 9/14/18   Lucie Estrada MD  
predniSONE (DELTASONE) 5 mg tablet Take 1 Tab by mouth daily.  9/14/18   Lucie Estrada MD  
 benzonatate (TESSALON) 100 mg capsule two (2) times daily as needed. 9/5/18   Provider, Historical  
ALPRAZolam [place]) 1 mg tablet Take 1 mg by mouth nightly as needed. 8/20/18   Provider, Historical  
ipratropium (ATROVENT) 42 mcg (0.06 %) nasal spray  1/16/18   Provider, Historical  
albuterol (PROVENTIL HFA, VENTOLIN HFA, PROAIR HFA) 90 mcg/actuation inhaler Take 2 Puffs by inhalation every four (4) hours as needed for Wheezing or Shortness of Breath. Indications: Bronchospastic Pulmonary Disease 2/1/18   Sheridan Dexter MD  
eszopiclone (LUNESTA) 2 mg tablet Take 1 Tab by mouth nightly as needed for Sleep. Max Daily Amount: 2 mg. Use with caution as may cause falls and sedation  Indications: INSOMNIA 8/8/17   Sheridan Dexter MD  
fluticasone [facility]) 50 mcg/actuation nasal spray  7/3/16   Provider, Historical  
vitamin b comp & c no.4 (SUPER B COMPLEX + C) 150 mg tab Take  by mouth. Provider, Historical  
cholecalciferol, vitamin D3, (VITAMIN D3) 2,000 unit tab Take  by mouth. Provider, Historical  
CALCIUM CARBONATE (TUMS PO) Take  by mouth. Provider, Historical  
MULTIVITAMIN (MULTIPLE VITAMIN PO) Take  by mouth. Provider, Historical  
 
 
 
Review of Systems: 
(bold if positive, if negative) Gen:  fatigueEyes:  ENT:  CVS:  syncopePulm:  GI:  nausea :   
MS:  PainSkin:  Psych:  Endo:   
Hem:  Renal:   
Neuro:    
 
 
  
Objective: VITALS:   
Vital signs reviewed; most recent are: 
 
Visit Vitals /74 Pulse 80 Temp 95.6 °F (35.3 °C) Resp 26 SpO2 100% SpO2 Readings from Last 6 Encounters:  
02/16/19 100% 02/07/19 96% 02/05/19 95% 12/03/18 96% 11/14/18 96% 10/08/18 98% No intake or output data in the 24 hours ending 02/16/19 1830 Exam:  
 
Physical Exam: 
 
Gen: Well-developed, well-nourished, elderly, in no acute distress HEENT:  Pink conjunctivae, PERRL, hearing intact to voice, moist mucous membranes Neck: Supple, without masses, thyroid non-tender Resp: No accessory muscle use, clear breath sounds without wheezes rales or rhonchi 
Card: No murmurs, normal S1, S2 without thrills, bruits or peripheral edema, 2+ LE peripheral pulses Abd:  Soft, non-tender, non-distended, normoactive bowel sounds are present, no palpable organomegaly and no detectable hernias Lymph:  No cervical or inguinal adenopathy Musc: No cyanosis or clubbing Skin: No rashes or ulcers, skin turgor is good, cap refill <2 sec Neuro:  Cranial nerves are grossly intact, no focal motor weakness, follows commands appropriately Psych:  Good insight, oriented to person, place but not time, lethargic Labs: 
 
Recent Labs  
  02/16/19 
1736 WBC 2.5* HGB 12.1 HCT 38.4  Recent Labs  
  02/16/19 
1736   
K 4.0  
 CO2 22 * BUN 19  
CREA 1.26* CA 8.4* MG 2.0 ALB 3.3* TBILI 0.4 SGOT 70* ALT 31 Lab Results Component Value Date/Time Glucose (POC) 275 (H) 02/16/2019 05:38 PM  
 
No results for input(s): PH, PCO2, PO2, HCO3, FIO2 in the last 72 hours. Recent Labs  
  02/16/19 
1736 INR 1.1 Additional testing:  Chest X-ray: left basilar atelectasis, no other acute findings, visualized by me. Results not reviewed with Radiologist. 
 
  
Assessment/Plan:   
  
Principal Problem: 
  Ventricular tachycardia ([address] (2/16/2019) 
 - s/p successful resuscitation 
 - continue amiodarone drip - obtain echocardiogram 
 - cycle cardiac enzymes - Cardiology seeing Active Problems: 
  HTN (hypertension) () 
 - hold antihypertensives for now in post-arrest period CAD (coronary artery disease) () 
 - stable, initial troponin negative, cycle enzymes Atrial fibrillation ([address] () 
 - continue Xarelto unless ICD is planned   Elevated serum creatinine (2/16/2019) 
 - acute elevated from 11 days ago and was already up at that time from baseline, suspect mild ELADIA due to dehydration and arrest 
 - follow on IV fluids Code status: 
 - patient is FULL CODE, no AMD on file, son Kerrie Banerjee is surrogate Total time spent on patient care: 70 Minutes Care Plan discussed with: Patient, Family, Nursing Staff and Dr. Cali Figueroa Discussed:  Code Status, Care Plan and D/C Planning Prophylaxis:  SCD's and Xarelto Probable Disposition:  Home w/Family 
        
___________________________________________________ Attending Physician: Tavares Mckee MD

## 2019-02-16 NOTE — Clinical Note
Subcutaneous layer closed with 3-0 monocryl.  Skin margins approximated with zip line closure device

## 2019-02-16 NOTE — ED TRIAGE NOTES
Pt arrived via [facility]. Report of pt pt c/o shortness of breath and chest pain squad arrived pt was unresponsive in V tach compressions started  pt synchronized  shocked [address] she then converted to a sinus tach rate 114. She started breathing on her own pt was administer Amiodarone 150mg Zofran 4mg was given. % sats 100%. On arrival to ER pt verbally responsive alert and oriented x4. VSS non rebreather in place. RT Dr Laverne Lopez in room.

## 2019-02-16 NOTE — Clinical Note
TRANSFER - IN REPORT:  
 
Verbal report received from: 318. Report consisted of patient's Situation, Background, Assessment and  
Recommendations(SBAR). Opportunity for questions and clarification was provided. Assessment completed upon patient's arrival to unit and care assumed. Patient transported with a Registered Nurse.

## 2019-02-16 NOTE — ED PROVIDER NOTES
[address] y.o. female with past medical history significant for anxiety, HTN, hyperlipidemia, osteoarthritis, hypothyroid, CAD, atrial fibrillation, thoracic aneurysm without mention of rupture, anemia, sjogren's disease, and acute cystitis who presents from home via ambulance with chief complaint of VT and cardiac arrest. Patient history was delivered via EMS: the patient was unresponsive and was found with no pulse. Per EMS, the patient was given CPR (10-12 compressions only) and her pulse was restored, and \"popping was heard during the 10-12 compressions. \"  Per EMS, the patient was breathing 1x per minute. Per EMS, the patient does have a pacemaker. The presenting rhythm V Tach. Pt had one shock DCC. The patient was administered 150 mg Amiodarone and 4 mg Zofran. Per son through EMS, the patient also experienced similar chest pain and SOB one week ago. Per EMS, the patient's most recent BP was 150/80, but first one taken \"was even higher. \" Patient affirms nausea. Patient denies CP, back pain , and jaw pain. There are no other acute medical concerns at this time. Social hx: Never smoker PCP: Arcelia Navarro MD 
 
Full history, physical exam, and ROS unable to be obtained due to: Patient Unresponsive. Note written by radha Antunez, as dictated by Dmitry Zazueta DO 5:20 PM 
 
 
The history is provided by the EMS personnel and the patient. The history is limited by the condition of the patient. No  was used. Past Medical History:  
Diagnosis Date  Acute cystitis 05/23/2018  Anemia, unspecified  Anxiety  Arrhythmia   
 a fib  Arthritis   
 osteoarthritis, rheumatoid  Atrial fibrillation ([facility] [address] Dr. Irina You and Dr. Kita Eduardo  following  Autoimmune disease (HCC)   
 sjogren disease Dr. Kayleigh Turner  CAD (coronary artery disease)  HTN (hypertension)  Hyperlipidemia LDL goal < 100  Hypothyroid  Insomnia  Osteoarthritis  Sjogren's disease ([facility] [address]  Thoracic aneurysm without mention of rupture Past Surgical History:  
Procedure Laterality Date  HX CHOLECYSTECTOMY  HX COLONOSCOPY  3/2014 Dr. Flakito Guillen  HX OTHER SURGICAL    
 hernia repairs  HX PARTIAL THYROIDECTOMY  HX JANNET AND BSO  HX VEIN STRIPPING Family History:  
Problem Relation Age of Onset  Heart Disease Father   
     aneurysm  Cancer Maternal Grandfather Social History Socioeconomic History  Marital status:  Spouse name: Not on file  Number of children: Not on file  Years of education: Not on file  Highest education level: Not on file Social Needs  Financial resource strain: Not on file  Food insecurity - worry: Not on file  Food insecurity - inability: Not on file  Transportation needs - medical: Not on file  Transportation needs - non-medical: Not on file Occupational History  Not on file Tobacco Use  Smoking status: Never Smoker  Smokeless tobacco: Never Used Substance and Sexual Activity  Alcohol use: Yes Comment: wine  Drug use: Not on file  Sexual activity: Not on file Other Topics Concern  Not on file Social History Narrative ** Merged History Encounter ** ALLERGIES: Latex; Pcn [penicillins]; Sulfa (sulfonamide antibiotics); Biaxin [clarithromycin]; Biotin; Pcn [penicillins]; and Sulfa (sulfonamide antibiotics) Review of Systems Unable to perform ROS: Patient unresponsive HENT: Negative for dental problem (jaw pain). Respiratory: Positive for shortness of breath. Cardiovascular: Positive for palpitations. Negative for chest pain. Musculoskeletal: Negative for back pain. All other systems reviewed and are negative. There were no vitals filed for this visit. Physical Exam  
Constitutional: No distress. Frail, elderly, arrived awake. Dried vomit around mouth. Eyes: Conjunctivae are normal. Pupils are equal, round, and reactive to light. Neck: No tracheal deviation present. Cardiovascular: Normal rate, regular rhythm and intact distal pulses. Pulmonary/Chest: Effort normal and breath sounds normal.  
Abdominal: Soft. She exhibits no distension. There is no tenderness. Musculoskeletal: She exhibits no edema. Skin: She is diaphoretic (slight). cool MDM Number of Diagnoses or Management Options Critical Care Total time providing critical care: 30-74 minutes 35 minutes Procedures PROGRESS NOTE: 
5:28 PM 
TigerTexted Dr. Zander Escobar, consult for cardiology, about the patient. ED EKG interpretation: 1 Rhythm: atrial paced; and regular . Rate (approx.): 84; Axis: normal; ST/T wave: normal; No ectopy. Note written by radha Li, as dictated by Siddharth Vizcarra,  5:26 PM 
 
 
ED EKG interpretation: # 2 Rhythm: atrial paced; and regular . Rate (approx.): 84; Axis: normal; ST/T wave: normal. No ectopy. Note written by radha Li, as dictated by Siddharth Vizcarra, DO 5:34 PM 
  
ED EKG interpretation: 
Rhythm: paced; and regular . Rate (approx.): 80 bpm; no ectopy; Note written by Radha Clark, as dictated by Siddharth Vizcarra, DO 7:30 PM 
 
 
 
Consulted hospitalist to admit IVF 
D/w son Amiodarone drip/load continued Asa given [place]. Trop # 1 neg Lactic elevation is likely bc of her cardiac arrest/hypotension and poor perfusion while in VT Reviewed EMS EKG strip - no 12 lead done.

## 2019-02-16 NOTE — Clinical Note
Chronic R Atrial lead [id number], [id number] Chronic R Ventricle [id number], [id number] Generator to be explanted [id number], [id number]

## 2019-02-17 ENCOUNTER — APPOINTMENT (OUTPATIENT)
Dept: NON INVASIVE DIAGNOSTICS | Age: 84
DRG: 225 | End: 2019-02-17
Attending: INTERNAL MEDICINE
Payer: MEDICARE

## 2019-02-17 LAB
ANION GAP SERPL CALC-SCNC: 7 MMOL/L (ref 5–15)
BUN SERPL-MCNC: 20 MG/DL (ref 6–20)
BUN/CREAT SERPL: 24 (ref 12–20)
C DIFF GDH STL QL: NEGATIVE
C DIFF TOX A+B STL QL IA: NEGATIVE
CALCIUM SERPL-MCNC: 7.6 MG/DL (ref 8.5–10.1)
CHLORIDE SERPL-SCNC: 106 MMOL/L (ref 97–108)
CO2 SERPL-SCNC: 25 MMOL/L (ref 21–32)
CREAT SERPL-MCNC: 0.83 MG/DL (ref 0.55–1.02)
ECHO AO ASC DIAM: 4.17 CM
ECHO AO ROOT DIAM: 4.22 CM
ECHO AV REGURGITANT PHT: 298.2 CM
ECHO IVC SNIFF: 1.58 CM
ECHO LA AREA 4C: 23.9 CM2
ECHO LA MAJOR AXIS: 3.85 CM
ECHO LA TO AORTIC ROOT RATIO: 0.91
ECHO LA VOL 2C: 109.68 ML (ref 22–52)
ECHO LA VOL 4C: 61.66 ML (ref 22–52)
ECHO LA VOL BP: 86.2 ML (ref 22–52)
ECHO LA VOL/BSA BIPLANE: 50.38 ML/M2 (ref 16–28)
ECHO LA VOLUME INDEX A2C: 64.1 ML/M2 (ref 16–28)
ECHO LA VOLUME INDEX A4C: 36.04 ML/M2 (ref 16–28)
ECHO LV E' LATERAL VELOCITY: 11.68 CENTIMETER/SECOND
ECHO LV E' SEPTAL VELOCITY: 6.32 CENTIMETER/SECOND
ECHO LV EDV A2C: 70 ML
ECHO LV EDV A4C: 77.8 ML
ECHO LV EDV BP: 75 ML (ref 56–104)
ECHO LV EDV INDEX A4C: 45.5 ML/M2
ECHO LV EDV INDEX BP: 43.8 ML/M2
ECHO LV EDV NDEX A2C: 40.9 ML/M2
ECHO LV EJECTION FRACTION A2C: 64 %
ECHO LV EJECTION FRACTION A4C: 57 %
ECHO LV EJECTION FRACTION BIPLANE: 60.4 % (ref 55–100)
ECHO LV ESV A2C: 24.9 ML
ECHO LV ESV A4C: 33.5 ML
ECHO LV ESV BP: 29.7 ML (ref 19–49)
ECHO LV ESV INDEX A2C: 14.6 ML/M2
ECHO LV ESV INDEX A4C: 19.6 ML/M2
ECHO LV ESV INDEX BP: 17.4 ML/M2
ECHO LV INTERNAL DIMENSION DIASTOLIC: 3.81 CM (ref 3.9–5.3)
ECHO LV INTERNAL DIMENSION SYSTOLIC: 3.05 CM
ECHO LV IVSD: 1.34 CM (ref 0.6–0.9)
ECHO LV MASS 2D: 203.2 G (ref 67–162)
ECHO LV MASS INDEX 2D: 118.8 G/M2 (ref 43–95)
ECHO LV POSTERIOR WALL DIASTOLIC: 1.26 CM (ref 0.6–0.9)
ECHO LVOT DIAM: 2.22 CM
ECHO MV A VELOCITY: 63.48 CM/S
ECHO MV AREA PHT: 4.8 CM2
ECHO MV E DECELERATION TIME (DT): 158.9 MS
ECHO MV E VELOCITY: 0.93 CM/S
ECHO MV E/A RATIO: 0.01
ECHO MV PRESSURE HALF TIME (PHT): 46.1 MS
ECHO RV INTERNAL DIMENSION: 4.59 CM
ECHO RV TAPSE: 2.76 CM (ref 1.5–2)
ECHO TV REGURGITANT MAX VELOCITY: 251.42 CM/S
ECHO TV REGURGITANT PEAK GRADIENT: 25.3 MMHG
ERYTHROCYTE [DISTWIDTH] IN BLOOD BY AUTOMATED COUNT: 13 % (ref 11.5–14.5)
GLUCOSE SERPL-MCNC: 121 MG/DL (ref 65–100)
HCT VFR BLD AUTO: 33.8 % (ref 35–47)
HGB BLD-MCNC: 10.8 G/DL (ref 11.5–16)
INTERPRETATION: NORMAL
LACTATE SERPL-SCNC: 0.7 MMOL/L (ref 0.4–2)
MAGNESIUM SERPL-MCNC: 1.6 MG/DL (ref 1.6–2.4)
MCH RBC QN AUTO: 31.8 PG (ref 26–34)
MCHC RBC AUTO-ENTMCNC: 32 G/DL (ref 30–36.5)
MCV RBC AUTO: 99.4 FL (ref 80–99)
NRBC # BLD: 0 K/UL (ref 0–0.01)
NRBC BLD-RTO: 0 PER 100 WBC
PHOSPHATE SERPL-MCNC: 3.6 MG/DL (ref 2.6–4.7)
PISA AR MAX VEL: 329.7 CM/S
PLATELET # BLD AUTO: 142 K/UL (ref 150–400)
PMV BLD AUTO: 10.2 FL (ref 8.9–12.9)
POTASSIUM SERPL-SCNC: 3.5 MMOL/L (ref 3.5–5.1)
RBC # BLD AUTO: 3.4 M/UL (ref 3.8–5.2)
SODIUM SERPL-SCNC: 138 MMOL/L (ref 136–145)
TROPONIN I SERPL-MCNC: 0.12 NG/ML
TROPONIN I SERPL-MCNC: <0.05 NG/ML
WBC # BLD AUTO: 3.2 K/UL (ref 3.6–11)
WBC #/AREA STL HPF: NORMAL /HPF (ref 0–4)

## 2019-02-17 PROCEDURE — 74011250636 HC RX REV CODE- 250/636: Performed by: INTERNAL MEDICINE

## 2019-02-17 PROCEDURE — 84100 ASSAY OF PHOSPHORUS: CPT

## 2019-02-17 PROCEDURE — 85027 COMPLETE CBC AUTOMATED: CPT

## 2019-02-17 PROCEDURE — 77030038269 HC DRN EXT URIN PURWCK BARD -A

## 2019-02-17 PROCEDURE — 65610000006 HC RM INTENSIVE CARE

## 2019-02-17 PROCEDURE — 74011250636 HC RX REV CODE- 250/636: Performed by: EMERGENCY MEDICINE

## 2019-02-17 PROCEDURE — 36415 COLL VENOUS BLD VENIPUNCTURE: CPT

## 2019-02-17 PROCEDURE — 97116 GAIT TRAINING THERAPY: CPT | Performed by: PHYSICAL THERAPIST

## 2019-02-17 PROCEDURE — 74011000258 HC RX REV CODE- 258: Performed by: EMERGENCY MEDICINE

## 2019-02-17 PROCEDURE — 97535 SELF CARE MNGMENT TRAINING: CPT

## 2019-02-17 PROCEDURE — 97161 PT EVAL LOW COMPLEX 20 MIN: CPT | Performed by: PHYSICAL THERAPIST

## 2019-02-17 PROCEDURE — 97165 OT EVAL LOW COMPLEX 30 MIN: CPT

## 2019-02-17 PROCEDURE — 74011000258 HC RX REV CODE- 258: Performed by: INTERNAL MEDICINE

## 2019-02-17 PROCEDURE — 84484 ASSAY OF TROPONIN QUANT: CPT

## 2019-02-17 PROCEDURE — 74011250637 HC RX REV CODE- 250/637: Performed by: INTERNAL MEDICINE

## 2019-02-17 PROCEDURE — 93306 TTE W/DOPPLER COMPLETE: CPT

## 2019-02-17 PROCEDURE — 83605 ASSAY OF LACTIC ACID: CPT

## 2019-02-17 PROCEDURE — 83735 ASSAY OF MAGNESIUM: CPT

## 2019-02-17 PROCEDURE — 80048 BASIC METABOLIC PNL TOTAL CA: CPT

## 2019-02-17 RX ORDER — AMIODARONE HYDROCHLORIDE 200 MG/1
200 TABLET ORAL DAILY
Status: DISCONTINUED | OUTPATIENT
Start: 2019-02-17 | End: 2019-02-17

## 2019-02-17 RX ORDER — ATENOLOL 25 MG/1
25 TABLET ORAL DAILY
Status: DISCONTINUED | OUTPATIENT
Start: 2019-02-18 | End: 2019-02-25 | Stop reason: HOSPADM

## 2019-02-17 RX ORDER — MONTELUKAST SODIUM 10 MG/1
10 TABLET ORAL DAILY
Status: DISCONTINUED | OUTPATIENT
Start: 2019-02-17 | End: 2019-02-25 | Stop reason: HOSPADM

## 2019-02-17 RX ORDER — MAGNESIUM SULFATE HEPTAHYDRATE 40 MG/ML
2 INJECTION, SOLUTION INTRAVENOUS ONCE
Status: COMPLETED | OUTPATIENT
Start: 2019-02-17 | End: 2019-02-17

## 2019-02-17 RX ORDER — LEVOTHYROXINE SODIUM 100 UG/1
100 TABLET ORAL
Status: DISCONTINUED | OUTPATIENT
Start: 2019-02-17 | End: 2019-02-25 | Stop reason: HOSPADM

## 2019-02-17 RX ORDER — LOPERAMIDE HYDROCHLORIDE 2 MG/1
2 CAPSULE ORAL
Status: DISCONTINUED | OUTPATIENT
Start: 2019-02-17 | End: 2019-02-25 | Stop reason: HOSPADM

## 2019-02-17 RX ORDER — ATENOLOL 25 MG/1
12.5 TABLET ORAL DAILY
Status: DISCONTINUED | OUTPATIENT
Start: 2019-02-17 | End: 2019-02-17

## 2019-02-17 RX ORDER — POTASSIUM CHLORIDE 750 MG/1
40 TABLET, FILM COATED, EXTENDED RELEASE ORAL
Status: COMPLETED | OUTPATIENT
Start: 2019-02-17 | End: 2019-02-17

## 2019-02-17 RX ADMIN — AMIODARONE HYDROCHLORIDE 0.5 MG/MIN: 50 INJECTION, SOLUTION INTRAVENOUS at 17:41

## 2019-02-17 RX ADMIN — AMIODARONE HYDROCHLORIDE 200 MG: 200 TABLET ORAL at 13:43

## 2019-02-17 RX ADMIN — APIXABAN 5 MG: 5 TABLET, FILM COATED ORAL at 17:48

## 2019-02-17 RX ADMIN — Medication 10 ML: at 21:42

## 2019-02-17 RX ADMIN — ATENOLOL 12.5 MG: 25 TABLET ORAL at 09:37

## 2019-02-17 RX ADMIN — SODIUM CHLORIDE 75 ML/HR: 900 INJECTION, SOLUTION INTRAVENOUS at 12:34

## 2019-02-17 RX ADMIN — MAGNESIUM SULFATE HEPTAHYDRATE 2 G: 40 INJECTION, SOLUTION INTRAVENOUS at 16:49

## 2019-02-17 RX ADMIN — AMIODARONE HYDROCHLORIDE 0.5 MG/MIN: 50 INJECTION, SOLUTION INTRAVENOUS at 12:35

## 2019-02-17 RX ADMIN — Medication 10 ML: at 07:11

## 2019-02-17 RX ADMIN — POTASSIUM CHLORIDE 40 MEQ: 750 TABLET, EXTENDED RELEASE ORAL at 17:48

## 2019-02-17 RX ADMIN — Medication 10 ML: at 14:00

## 2019-02-17 RX ADMIN — MONTELUKAST 10 MG: 10 TABLET, FILM COATED ORAL at 09:38

## 2019-02-17 RX ADMIN — APIXABAN 5 MG: 5 TABLET, FILM COATED ORAL at 09:37

## 2019-02-17 RX ADMIN — LEVOTHYROXINE SODIUM 100 MCG: 100 TABLET ORAL at 09:38

## 2019-02-17 NOTE — ED NOTES
Pt suddenly stated \"I feel nauseated again and my throat hurts Like when the ambulance came. \" Dr Kathi Leblanc made aware, warm IVF infusing as ordered.

## 2019-02-17 NOTE — ED NOTES
TRANSFER - OUT REPORT: 
 
Verbal report given to [facility] RN(name) on Joanna Seo  being transferred to [id number](unit) for routine progression of care Report consisted of patients Situation, Background, Assessment and  
Recommendations(SBAR). Information from the following report(s) SBAR, Kardex, [facility], Recent Results and Cardiac Rhythm Paced 80's was reviewed with the receiving nurse. Lines:  
Peripheral IV 02/16/19 Right Antecubital (Active) Site Assessment Clean, dry, & intact 2/16/2019  5:40 PM  
Phlebitis Assessment 0 2/16/2019  5:40 PM  
Infiltration Assessment 0 2/16/2019  5:40 PM  
Dressing Status Clean, dry, & intact 2/16/2019  5:40 PM  
Hub Color/Line Status Pink 2/16/2019  5:40 PM  
Action Taken Blood drawn 2/16/2019  5:40 PM  
   
Peripheral IV 02/16/19 Left Antecubital (Active) Site Assessment Clean, dry, & intact 2/16/2019  6:00 PM  
Phlebitis Assessment 0 2/16/2019  6:00 PM  
Infiltration Assessment 0 2/16/2019  6:00 PM  
Dressing Status Clean, dry, & intact 2/16/2019  6:00 PM  
Dressing Type Transparent 2/16/2019  6:00 PM  
Hub Color/Line Status Pink 2/16/2019  6:00 PM  
  
 
Opportunity for questions and clarification was provided. Patient transported with: 
 Monitor O2 @ 2 liters Registered Nurse

## 2019-02-17 NOTE — ED NOTES
Assumed care from Hank Mayberry. Patient AXOX3, only complaint at the moment is being tired, no complaint of pain. TAMMIE fletcher applied. Amiodarone infusing at 1mg until 2330, as well as 1L NS bolus. Family at bedside.

## 2019-02-17 NOTE — ED NOTES
Critical result called by [facility] in Lab @0188. Dr. Nba Carpio on unit and notified @8658. No new orders received.

## 2019-02-17 NOTE — PROGRESS NOTES
Problem: Self Care Deficits Care Plan (Adult) Goal: *Acute Goals and Plan of Care (Insert Text) Occupational Therapy Goals Initiated 2/17/2019 1. Patient will perform grooming with supervision/set-up in standing with CGA within 7 day(s). 2.  Patient will perform upper body dressing with modified independence within 7 day(s). 3.  Patient will perform lower body dressing with supervision/set-up within 7 day(s). 4.  Patient will perform toilet transfers with supervision/set-up within 7 day(s). 5.  Patient will perform all aspects of toileting with supervision/set-up within 7 day(s). 6.  Patient will participate in upper extremity therapeutic exercise/activities with independence for 5 minutes within 7 day(s). 7.  Patient will utilize energy conservation techniques during functional activities with verbal cues within 7 day(s). Occupational Therapy EVALUATION Patient: Ben De Los Santos (63 y.o. female) Date: 2/17/2019 Primary Diagnosis: Ventricular tachycardia ([address] [I47.2] Precautions:  Fall ASSESSMENT : 
Cleared by RN to see pt for therapy session. Based on the objective data described below, the patient presents with decreased endurance, strength and balance following admission for ventricular tachycardia. At baseline pt lives alone with supportive family nearby, is I with ADLs and functional mobility without AD (although owns cane and walker). Received seated EOB with PT, agreeable to participate. Performed sit>stand from EOB, brief ambulation in room and transfer to chair with CGA x1-2 (primarily for line management) using RW for energy conservation/increased stability. No LOB observed, min cues for safe RW management and transfer technique. Performed hand washing with setup and LB dressing ADL in sitting using leg crossover technique in sitting. Cued to take rest break during LB dressing task.  Vitals monitored and stable, O2 sats 94-99% and HR 80s-90s with activity. In chair at end of session with call bell in reach and needs met. Pt is below her I baseline at time and will benefit from skilled intervention to address the above impairments. Recommend HH therapy at discharge to increase independence and safety. Patients rehabilitation potential is considered to be Good Factors which may influence rehabilitation potential include:  
[x]             None noted []             Mental ability/status []             Medical condition []             Home/family situation and support systems []             Safety awareness []             Pain tolerance/management 
[]             Other: PLAN : 
Recommendations and Planned Interventions: 
[x]               Self Care Training                  [x]        Therapeutic Activities [x]               Functional Mobility Training    []        Cognitive Retraining 
[x]               Therapeutic Exercises           [x]        Endurance Activities [x]               Balance Training                   []        Neuromuscular Re-Education []               Visual/Perceptual Training     [x]   Home Safety Training 
[x]               Patient Education                 [x]        Family Training/Education []               Other (comment): Frequency/Duration: Patient will be followed by occupational therapy 5 times a week to address goals. Discharge Recommendations: Home Health Further Equipment Recommendations for Discharge: TBD, likely none SUBJECTIVE:  
Patient stated My son got me this bracelet. I never take it off.  OBJECTIVE DATA SUMMARY:  
HISTORY:  
Past Medical History:  
Diagnosis Date  Acute cystitis 05/23/2018  Anemia, unspecified  Anxiety  Arrhythmia   
 a fib  Arthritis   
 osteoarthritis, rheumatoid  Atrial fibrillation ([address] Dr. Val Guaman and Dr. Miah Bello  following  Autoimmune disease (HCC)   
 sjogren disease Dr. Chetan Ferreira  CAD (coronary artery disease)  HTN (hypertension)  Hyperlipidemia LDL goal < 100  Hypothyroid  Insomnia  Osteoarthritis  Sjogren's disease ([facility] [address]  Thoracic aneurysm without mention of rupture Past Surgical History:  
Procedure Laterality Date  HX CHOLECYSTECTOMY  HX COLONOSCOPY  3/2014 Dr. Ward Vinton  HX OTHER SURGICAL    
 hernia repairs  HX PARTIAL THYROIDECTOMY  HX JANNET AND BSO  HX VEIN STRIPPING Prior Level of Function/Environment/Context: At baseline pt lives alone with supportive family nearby, is I with ADLs and functional mobility without AD (although owns cane and walker). Pt reports she drives and performs IADLs without assistance. Home Situation Home Environment: Private residence # Steps to Enter: 0 Rails to Enter: No 
Wheelchair Ramp: No 
One/Two Story Residence: Two story, live on 1st floor Lift Chair Available: No 
Living Alone: Yes Support Systems: Family member(s) Patient Expects to be Discharged to[de-identified] Private residence Current DME Used/Available at Home: Walker, rolling, Cane, straight, Grab bars Tub or Shower Type: Shower Hand dominance: RightEXAMINATION OF PERFORMANCE DEFICITS: 
Cognitive/Behavioral Status: 
Neurologic State: Alert Orientation Level: Oriented X4 Cognition: Follows commands Perception: Appears intact Perseveration: No perseveration noted Safety/Judgement: Awareness of environment; Fall prevention Hearing: Auditory Auditory Impairment: Hearing aid(s) Vision/Perceptual:   
    
Acuity: Within Defined Limits Range of Motion: 
AROM: Generally decreased, functional 
   
Strength: 
Strength: Generally decreased, functional 
Coordination: 
Coordination: Generally decreased, functional 
Fine Motor Skills-Upper: Left Intact; Right Intact Gross Motor Skills-Upper: Left Intact; Right Intact Tone & Sensation: 
Tone: Normal 
Sensation: Intact Balance: 
Sitting: Intact Standing: Intact; With support(RW) 
 Functional Mobility and Transfers for ADLs:Bed Mobility: 
Rolling: Contact guard assistance Supine to Sit: Contact guard assistance Scooting: Modified independent Transfers: 
Sit to Stand: Contact guard assistance Stand to Sit: Contact guard assistance Bed to Chair: Contact guard assistance;Assist x2; Other (comment) ADL Assessment: 
Feeding: Independent Oral Facial Hygiene/Grooming: Setup Bathing: Additional time; Adaptive equipment; Moderate assistance Upper Body Dressing: Setup Lower Body Dressing: Minimum assistance; Additional time; Adaptive equipment Toileting: Minimum assistance ADL Intervention and task modifications: 
  
 
Grooming Washing Hands: Supervision/set-up(seated in chair) Lower Body Dressing Assistance Socks: Supervision/set-up(increased time, cues to take rest break) Leg Crossed Method Used: Yes Position Performed: Seated in chair Cues: Verbal cues provided Cues for pacing during task Cognitive Retraining Safety/Judgement: Awareness of environment; Fall prevention Functional Measure: 
Barthel Index: 
 
Bathin Bladder: 5 Bowels: 10 
Groomin Dressin Feeding: 10 Mobility: 0 Stairs: 0 Toilet Use: 5 Transfer (Bed to Chair and Back): 10 Total: 50 The Barthel ADL Index: Guidelines 1. The index should be used as a record of what a patient does, not as a record of what a patient could do. 2. The main aim is to establish degree of independence from any help, physical or verbal, however minor and for whatever reason. 3. The need for supervision renders the patient not independent. 4. A patient's performance should be established using the best available evidence. Asking the patient, friends/relatives and nurses are the usual sources, but direct observation and common sense are also important. However direct testing is not needed.  
5. Usually the patient's performance over the preceding 24-48 hours is important, but occasionally longer periods will be relevant. 6. Middle categories imply that the patient supplies over 50 per cent of the effort. 7. Use of aids to be independent is allowed. Alonso Moise., Barthel, D.W. (8218). Functional evaluation: the Barthel Index. [facility] (14)2. LAUREL Conteh, Jesus Bellamy., Joana Osler., [place], [address] [place] [address] (1999). Measuring the change indisability after inpatient rehabilitation; comparison of the responsiveness of the Barthel Index and Functional Rheems Measure. Journal of Neurology, Neurosurgery, and Psychiatry, 66(4), 915-683. Brandie Jung, MARGARETTE.LALITHA.DARRION, JAYSON Miranda, & Farshad Fan MCONCHITA. (2004.) Assessment of post-stroke quality of life in cost-effectiveness studies: The usefulness of the Barthel Index and the EuroQoL-5D. [facility], 13, 707-74 Occupational Therapy Evaluation Charge Determination History Examination Decision-Making LOW Complexity : Brief history review  MEDIUM Complexity : 3-5 performance deficits relating to physical, cognitive , or psychosocial skils that result in activity limitations and / or participation restrictions MEDIUM Complexity : Patient may present with comorbidities that affect occupational performnce. Miniml to moderate modification of tasks or assistance (eg, physical or verbal ) with assesment(s) is necessary to enable patient to complete evaluation Based on the above components, the patient evaluation is determined to be of the following complexity level: LOW Pain: 
Pain Scale 1: Numeric (0 - 10) Pain Intensity 1: 0 Activity Tolerance:  
Good Please refer to the flowsheet for vital signs taken during this treatment. After treatment:  
[x] Patient left in no apparent distress sitting up in chair 
[] Patient left in no apparent distress in bed 
[x] Call bell left within reach [x] Nursing notified 
[] Caregiver present 
[] Bed alarm activated COMMUNICATION/EDUCATION:  
The patients plan of care was discussed with: Physical Therapist and Registered Nurse. [x] Home safety education was provided and the patient/caregiver indicated understanding. [x] Patient/family have participated as able in goal setting and plan of care. [x] Patient/family agree to work toward stated goals and plan of care. [] Patient understands intent and goals of therapy, but is neutral about his/her participation. [] Patient is unable to participate in goal setting and plan of care. This patients plan of care is appropriate for delegation to hospitals. Thank you for this referral. 
Arely Muro OT Time Calculation: 17 mins

## 2019-02-17 NOTE — PROGRESS NOTES
Cardiology Daily Progress Note Katrina Wright is a 80 y.o. female with extensive past medical history significant for CAD, atrial fibrillation, permanent pacemaker, hypertension, Sjogren's syndrome, thoracic aortic aneurysm without mention of rupture, hypertension admitted with what appears to be cardiac arrest.  
 
PPM interrogation today shows heart rate of 194 bpm yesterday for 1 hour around the time when she had symptoms. It is unclear if this is A. fib with pacemaker tracking versus VT. She had a similar high heart rate event on February 5 noted on her pacemaker. She is doing well without any symptoms. Visit Vitals /74 Pulse 80 Temp 98.6 °F (37 °C) Resp 15 Wt 133 lb 9.6 oz (60.6 kg) SpO2 94% BMI 20.92 kg/m² Intake/Output Summary (Last 24 hours) at 2/17/2019 1513 Last data filed at 2/17/2019 2308 Gross per 24 hour Intake 3003.5 ml Output 575 ml Net 2428. 5 ml  
 
General appearance - alert, well appearing, and in no distress Mental status - affect appropriate to mood Eyes - sclera anicteric, moist mucous membranes Neck - supple, no significant adenopathy Chest - clear to auscultation, no wheezes, rales or rhonchi Heart - normal rate, regular rhythm, normal S1, S2, no murmurs, rubs, clicks or gallops Abdomen - soft, nontender, nondistended, no masses or organomegaly Extremities - peripheral pulses normal, no pedal edema Current Facility-Administered Medications Medication Dose Route Frequency  apixaban (ELIQUIS) tablet 5 mg  5 mg Oral BID  levothyroxine (SYNTHROID) tablet 100 mcg  100 mcg Oral ACB  montelukast (SINGULAIR) tablet 10 mg  10 mg Oral DAILY  amiodarone (CORDARONE) tablet 200 mg  200 mg Oral DAILY  [START ON 2/18/2019] atenolol (TENORMIN) tablet 25 mg  25 mg Oral DAILY  sodium chloride (NS) flush 5-40 mL  5-40 mL IntraVENous Q8H  
 sodium chloride (NS) flush 5-40 mL  5-40 mL IntraVENous PRN  
  acetaminophen (TYLENOL) tablet 650 mg  650 mg Oral Q4H PRN  
 oxyCODONE-acetaminophen (PERCOCET) 5-325 mg per tablet 1 Tab  1 Tab Oral Q4H PRN  
 HYDROmorphone (PF) (DILAUDID) injection 0.5 mg  0.5 mg IntraVENous Q4H PRN  prochlorperazine (COMPAZINE) injection 10 mg  10 mg IntraVENous Q6H PRN  
 lactobac ac& pc-s.therm-b.anim (WILLA Q/RISAQUAD)  1 Cap Oral DAILY No specialty comments available. Assessment: 
 
- Cardiac arrest/ VT? 
- Afib - PPM 
 
 
 
Plan:  
 
-PPM interrogation shows an hour long episode yesterday with heart rate of 194 bpm.  It is unclear if this is A. fib with ventricular tracking of the pacemaker versus VT. Dr. Emi Kaye reviewed these rhythm strips as well. In his opinion he would like to get an echocardiogram and if EF is normal patient can be discharged home and he can follow-up as an outpatient. Awaiting echo results. 
-Troponin minimally positive peripheral to AICD discharge. It has quickly normalized. Not an MI. No chest pain no EKG changes. 
-Increase atenolol to 25 mg p.o. daily. Continue home amiodarone dose of 200 mg p.o. Daily. ___________________________________________________ James Valencia MD, [facility]

## 2019-02-17 NOTE — PROGRESS NOTES
TRANSFER - IN REPORT: 
 
Verbal report received from Sandrita Burgos RN (name) on Sima Weller  being received from ED (unit) for routine progression of care Report consisted of patients Situation, Background, Assessment and  
Recommendations(SBAR). Information from the following report(s) SBAR, Intake/Output, MAR, Accordion and Recent Results was reviewed with the receiving nurse. Opportunity for questions and clarification was provided. Assessment completed upon patients arrival to unit and care assumed. Pt arrived on the floor, denies pain, confirmed full code status. Unable to apply SCD's due to no machines on floor. Turn team notified. 0400 No changes, pt resting quietly. Shift summary: Uneventful night. Pt slept between assessments, VSS, on room air, Amio 0.5mg. Bedside shift change report given to Sharona Saenz (oncoming nurse) by Rosa Maria Cortes RN (offgoing nurse). Report included the following information SBAR, Intake/Output, MAR, Accordion and Recent Results.

## 2019-02-17 NOTE — ED NOTES
Patient with several watery stools in the last hour. Patient advises she has not had any stool softeners recently, or been on any antibiotics. Will send stool sample.

## 2019-02-17 NOTE — ED NOTES
Spoke with Sandy Oconnor, ICU Charge Nurse to notify of bed assignment, nurse to call back for report Satish Tracy RN notified

## 2019-02-17 NOTE — ED NOTES
Bedside shift change report given to [address] [place] [address] (oncoming nurse) by Oma Coffey. Jane Martinez RN (offgoing nurse). Report included the following information SBAR, Vitals, telemetry, all test results,MAR.

## 2019-02-17 NOTE — PROGRESS NOTES
Problem: Mobility Impaired (Adult and Pediatric) Goal: *Acute Goals and Plan of Care (Insert Text) Physical Therapy Goals Initiated 2/17/2019 1. Patient will move from supine to sit and sit to supine  in bed with modified independence within 7 day(s). 2.  Patient will transfer from bed to chair and chair to bed with modified independence using the least restrictive device within 7 day(s). 3.  Patient will perform sit to stand with modified independence within 7 day(s). 4.  Patient will ambulate with modified independence for 150 feet with the least restrictive device within 7 day(s). 5.  Patient will ascend/descend 3 stairs with no handrail(s) with minimal assistance/contact guard assist within 7 day(s). physical Therapy EVALUATION Patient: Avelina Keane (52 y.o. female) Date: 2/17/2019 Primary Diagnosis: Ventricular tachycardia ([address] [I47.2] Precautions: Fall , [place] ( batteries low, son will replace batteries) ASSESSMENT : 
Based on the objective data described below, the patient presents with no acute issues, and boast about her independence prior to admission despite her advanced age. She is cooperative and willing to participate in OOB transfer, and limited gait ( with her desire to keep rc-wick patent following bedpan use). Pt bed mobility and transfer requiring CGA x 2 for line management and safety, but pt able to initiate movement independently with safeguards. Limited ambulation at bedside ~ 10 feet using RW with SBA for staff for IV/management of lines/leads. Welcomed OOB to the chair with CGA for safety, and left in the care of OTR. Pt denies pain with mobility, hemodynamically stable with pacing. HR 80-->86, /73-->143/68, SPO2 94%-->99% on RA. Anticipate cont progressive mobility toward functional baseline independence, in hopes tor return to private residence.  May benefit from HHPT for safety, and progressive mobility, strength, and endurance to participate in routine tasks independently as previously. Patient will benefit from skilled intervention to address the above impairments. Patients rehabilitation potential is considered to be Good Factors which may influence rehabilitation potential include:  
[]         None noted 
[]         Mental ability/status [x]         Medical condition 
[x]         Home/family situation and support systems 
[]         Safety awareness 
[]         Pain tolerance/management 
[]         Other: PLAN : 
Recommendations and Planned Interventions: 
[x]           Bed Mobility Training             []    Neuromuscular Re-Education 
[x]           Transfer Training                   []    Orthotic/Prosthetic Training 
[x]           Gait Training                         []    Modalities [x]           Therapeutic Exercises           []    Edema Management/Control 
[x]           Therapeutic Activities            [x]    Patient and Family Training/Education 
[]           Other (comment): Frequency/Duration: Patient will be followed by physical therapy  5 times a week to address goals. Discharge Recommendations: Home Health Further Equipment Recommendations for Discharge: None new. Has access to [facility] SUBJECTIVE:  
Patient stated May I leave this in my bladder, they just inserted it.  OBJECTIVE DATA SUMMARY:  
HISTORY:   
Past Medical History:  
Diagnosis Date  Acute cystitis 05/23/2018  Anemia, unspecified  Anxiety  Arrhythmia   
 a fib  Arthritis   
 osteoarthritis, rheumatoid  Atrial fibrillation ([facility] [address] Dr. Jocelyne Arshad and Dr. Maagly Perry  following  Autoimmune disease (HCC)   
 sjogren disease Dr. Jessica Evans  CAD (coronary artery disease)  HTN (hypertension)  Hyperlipidemia LDL goal < 100  Hypothyroid  Insomnia  Osteoarthritis  Sjogren's disease ([facility] [address]  Thoracic aneurysm without mention of rupture Past Surgical History: Procedure Laterality Date  HX CHOLECYSTECTOMY  HX COLONOSCOPY  3/2014 Dr. Nohemy Fuentes  HX OTHER SURGICAL    
 hernia repairs  HX PARTIAL THYROIDECTOMY  HX JANNET AND BSO  HX VEIN STRIPPING Prior Level of Function/Home Situation: Independent Personal factors and/or comorbidities impacting plan of care: None acutley Home Situation Home Environment: Private residence # Steps to Enter: 0 Rails to Enter: No 
Wheelchair Ramp: No 
One/Two Story Residence: Two story, live on 1st floor Lift Chair Available: No 
Living Alone: Yes Support Systems: Family member(s) Patient Expects to be Discharged to[de-identified] Private residence Current DME Used/Available at Home: Walker, rolling, Cane, straight, Grab bars Tub or Shower Type: Shower EXAMINATION/PRESENTATION/DECISION MAKING: Critical Behavior: 
  
Orientation Level: Appropriate for age, Oriented X4 Cognition: Appropriate safety awareness, Appropriate for age attention/concentration, Follows commands, Appropriate decision making Safety/Judgement: Awareness of environment, Fall prevention, Insight into deficits Hearing: Auditory Auditory Impairment: Hearing aid(s)Skin:  itnact Edema: N/A Range Of Motion: 
AROM: Generally decreased, functional 
Strength:   
Strength: Generally decreased, functional 
 Tone & Sensation:  
Tone: Normal 
 Sensation: Intact Coordination: 
Coordination: Generally decreased, functional 
Functional Mobility: 
Bed Mobility: 
Rolling: Contact guard assistance Supine to Sit: Contact guard assistance Scooting: Modified independent Transfers: 
Sit to Stand: Contact guard assistance Stand to Sit: Contact guard assistance Bed to Chair: Contact guard assistance;Assist x2; Other (comment) Balance:  
Sitting: Intact Standing: Intact; With supportAmbulation/Gait Training:Distance (ft): 10 Feet (ft) Assistive Device: Walker, rolling;Gait belt Ambulation - Level of Assistance: Contact guard assistance;Minimal assistance Gait Description (WDL): Exceptions to [facility] Base of Support: Narrowed Speed/Tayler: Pace decreased (<100 feet/min) Stairs: Deferred Functional Measure: 
Barthel Index: 
 
Bathin Bladder: 5 Bowels: 5 Groomin Dressin Feeding: 10 Mobility: 0 Stairs: 0 Toilet Use: 0 Transfer (Bed to Chair and Back): 5 Total: 25 The Barthel ADL Index: Guidelines 1. The index should be used as a record of what a patient does, not as a record of what a patient could do. 2. The main aim is to establish degree of independence from any help, physical or verbal, however minor and for whatever reason. 3. The need for supervision renders the patient not independent. 4. A patient's performance should be established using the best available evidence. Asking the patient, friends/relatives and nurses are the usual sources, but direct observation and common sense are also important. However direct testing is not needed. 5. Usually the patient's performance over the preceding 24-48 hours is important, but occasionally longer periods will be relevant. 6. Middle categories imply that the patient supplies over 50 per cent of the effort. 7. Use of aids to be independent is allowed. [facility] Barthel, DFinaW. (5832). Functional evaluation: the Barthel Index. [address] [facility] (14)2. LAUREL Aguilar, Luis Felipe Machado., [facility] Human.[facility], [address] [place] [address] (). Measuring the change indisability after inpatient rehabilitation; comparison of the responsiveness of the Barthel Index and Functional [place] Measure. Journal of Neurology, Neurosurgery, and Psychiatry, 66(4), 027-077. Jose R Kingston, N.J.A, JAYSON Miranda, & Temitope Dunn M.A. (2004.) Assessment of post-stroke quality of life in cost-effectiveness studies: The usefulness of the Barthel Index and the EuroQoL-5D. [facility], 13, 344-82 Physical Therapy Evaluation Charge Determination History Examination Presentation Decision-Making LOW Complexity : Zero comorbidities / personal factors that will impact the outcome / POC LOW Complexity : 1-2 Standardized tests and measures addressing body structure, function, activity limitation and / or participation in recreation  LOW Complexity : Stable, uncomplicated  MEDIUM Complexity : FOTO score of 26-74 Based on the above components, the patient evaluation is determined to be of the following complexity level: MEDIUM Pain: 
Pain Scale 1: Numeric (0 - 10) Pain Intensity 1: 0 Activity Tolerance: Tolerated OOB, short distance ambulation with hemodynamically stable parameters Please refer to the flowsheet for vital signs taken during this treatment. After treatment:  
[x]         Patient left in no apparent distress sitting up in chair 
[]         Patient left in no apparent distress in bed 
[x]         Call bell left within reach [x]         Nursing notified 
[]         Caregiver present [x]         Chair activated COMMUNICATION/EDUCATION:  
The patients plan of care was discussed with: Registered Nurse. [x]         Fall prevention education was provided and the patient/caregiver indicated understanding. [x]         Patient/family have participated as able in goal setting and plan of care. [x]         Patient/family agree to work toward stated goals and plan of care. []         Patient understands intent and goals of therapy, but is neutral about his/her participation. []         Patient is unable to participate in goal setting and plan of care. Thank you for this referral. 
Mary Vang, PT Time Calculation: 20 mins

## 2019-02-17 NOTE — PROGRESS NOTES
[facility]: MED RECONCILIATION Comments/Recommendations:  
 
Medications added: · Amiodarone · Eliquis - confirmed with patient/son - Pt does not take Xarelto Medications removed: 
 
· Xanax · Benzonatate · Questran · Prednisone · Atrovent nasal spray · Xarelto Medications adjusted: 
 
· None Information obtained from: Son, Rx Query, Patient and reading through PCP notes Significant PMH/Disease States:  
Past Medical History:  
Diagnosis Date  Acute cystitis 05/23/2018  Anemia, unspecified  Anxiety  Arrhythmia   
 a fib  Arthritis   
 osteoarthritis, rheumatoid  Atrial fibrillation ([facility] [address] Dr. Jocelyne Arshad and Dr. Magaly Perry  following  Autoimmune disease (HCC)   
 sjogren disease Dr. Jessica Evans  CAD (coronary artery disease)  HTN (hypertension)  Hyperlipidemia LDL goal < 100  Hypothyroid  Insomnia  Osteoarthritis  Sjogren's disease ([facility] [address]  Thoracic aneurysm without mention of rupture Chief Complaint for this Admission: No chief complaint on file. Allergies: Latex; Pcn [penicillins]; Sulfa (sulfonamide antibiotics); Biaxin [clarithromycin]; Biotin; Pcn [penicillins]; and Sulfa (sulfonamide antibiotics) Prior to Admission Medications:  
 
Medication Documentation Review Audit Reviewed by Michael Abreu (Pharmacist) on 02/16/19 at 2000 Medication Sig Documenting Provider Last Dose Status Taking? albuterol (PROVENTIL HFA, VENTOLIN HFA, PROAIR HFA) 90 mcg/actuation inhaler Take 2 Puffs by inhalation every four (4) hours as needed for Wheezing or Shortness of Breath. Indications: Bronchospastic Pulmonary Disease Rosa Brown MD years ago Active Yes  
amiodarone (CORDARONE) 200 mg tablet Take 200 mg by mouth daily. Provider, Historical 2/16/2019 Unknown time Active Yes  
apixaban (ELIQUIS) 5 mg tablet Take 5 mg by mouth two (2) times a day. Provider, Historical 2/16/2019 Unknown time Active Yes atenolol (TENORMIN) 25 mg tablet Take 0.5 Tabs by mouth daily. Chelsea Solitario MD 2/16/2019 Unknown time Active Yes Med Note (KACEY STEPHON A   Sat Feb 16, 2019  8:00 PM) CALCIUM CARBONATE (TUMS PO) Take 1 Tab by mouth every six (6) hours as needed. Provider, Historical 2/15/2019 Unknown time Active Yes  
cholecalciferol, vitamin D3, (VITAMIN D3) 2,000 unit tab Take 2,000 Units by mouth daily. Provider, Historical 2/16/2019 Unknown time Active Yes  
estradiol (ESTRACE) 0.5 mg tablet Take 1 Tab by mouth daily. Please wean off this medication due to cv risks associated. Chelsea Solitario MD 2/16/2019 Unknown time Active Yes  
eszopiclone (LUNESTA) 2 mg tablet Take 2 mg by mouth nightly. Provider, Historical 2/16/2019 Unknown time Active Yes  
fluticasone (FLONASE) 50 mcg/actuation nasal spray 2 Sprays by Both Nostrils route daily as needed. Provider, Historical  Active Yes Med Note ([facility] Sep 6, 2018  1:32 PM)    
fosinopril (MONOPRIL) 10 mg tablet Take 20 mg by mouth daily. Provider, Historical 2/16/2019 Unknown time Active Yes  
guaiFENesin ER (MUCINEX) 600 mg ER tablet Take 600 mg by mouth two (2) times daily as needed for Congestion. Provider, Historical  Active Yes  
levothyroxine (SYNTHROID) 100 mcg tablet Take 100 mcg by mouth Daily (before breakfast). Provider, Historical 2/16/2019 Unknown time Active Yes  
montelukast (SINGULAIR) 10 mg tablet Take 10 mg by mouth daily as needed. Provider, Historical  Active Yes MULTIVITAMIN (MULTIPLE VITAMIN PO) Take 1 Tab by mouth daily. Provider, Historical 2/16/2019 Unknown time Active Yes PARoxetine (PAXIL) 10 mg tablet Take 15 mg by mouth daily. Provider, Historical 2/16/2019 Unknown time Active Yes  
vitamin b comp & c no.4 (SUPER B COMPLEX + C) 150 mg tab Take 1 Tab by mouth daily. Provider, Historical 2/16/2019 Unknown time Active Yes Shahid Calderón   Contact: [phone]

## 2019-02-17 NOTE — PROGRESS NOTES
0700 
Bedside and Verbal shift change report given to Sharona Saenz  (oncoming nurse) by Rosa Maria Cortes RN  (offgoing nurse). Report included the following information SBAR, MAR and Recent Results. [phone] Pt had a 12 beat run of Vtach followed by a 15 beat run of vtach ten minutes later. Dr. Haleigh Larry notified, orders received for 2g mag.  
 
1900 Bedside and Verbal shift change report given to Maria Luisa Buchanan  (oncoming nurse) by Sharona Saenz  (offgoing nurse). Report included the following information SBAR, MAR and Recent Results.

## 2019-02-17 NOTE — PROGRESS NOTES
[facility] DAILY PROGRESS NOTE Name: Parvez Reis : 1928 MRN: [id number] Date: 2019 8:48 AM  
I have reviewed the flowsheet and previous days notes. The patient a part of intensivist program. 
Briefly, she is a 81 yo female who call EMS with lethargy and SOB. On their arrival she was in V-Tach and unresponsive. Chest compressions were started and she was shocked with 200 J and sinus rhythm returned. Currently she is resting comfortable on room air. She denies pain. Mild nausea which is improved from yesterday. PMH: a.fib, pacemaker, CAD, htn, hypothyroid, sjogrens SH: nonsmoker Vital Signs:   
Visit Vitals /73 Pulse 80 Temp 98.1 °F (36.7 °C) Resp 15 Wt 60.6 kg (133 lb 9.6 oz) SpO2 93% BMI 20.92 kg/m² O2 Device: Room air O2 Flow Rate (L/min): 2 l/min Temp (24hrs), Av.6 °F (36.4 °C), Min:95.6 °F (35.3 °C), Max:98.6 °F (37 °C) Intake/Output:  
Last shift:       07 - 1900 In: 76 [I.V.:75] Out: - Last 3 shifts: 02/15 1901 -  0700 In: 2928.5 [I.V.:2928.5] Out: 375 [Urine:375] Intake/Output Summary (Last 24 hours) at 2019 0848 Last data filed at 2019 0800 Gross per 24 hour Intake 3003.5 ml Output 375 ml Net 2628.5 ml Physical Exam: 
General:  Alert, cooperative, no distress Head:  atraumatic. Eyes:  EOMs intact. Nose: Nares normal.  
Room air Throat: Lips normal.  
   
   
Lungs:   Clear to auscultation bilaterally. Chest wall:  No tenderness or deformity. Heart:  Regular rate and rhythm Abdomen:   Soft, non-tender. Extremities: No edema or tenderness. Skin: Dry Neurologic: Grossly nonfocal  
 
 
DATA:  
Current Facility-Administered Medications Medication Dose Route Frequency  apixaban (ELIQUIS) tablet 5 mg  5 mg Oral BID  atenolol (TENORMIN) tablet 12.5 mg  12.5 mg Oral DAILY  levothyroxine (SYNTHROID) tablet 100 mcg  100 mcg Oral ACB  montelukast (SINGULAIR) tablet 10 mg  10 mg Oral DAILY  amiodarone (CORDARONE) 375 mg in dextrose 5% 250 mL infusion  0.5-1 mg/min IntraVENous TITRATE  
 0.9% sodium chloride infusion  75 mL/hr IntraVENous CONTINUOUS  
 sodium chloride (NS) flush 5-40 mL  5-40 mL IntraVENous Q8H  
 sodium chloride (NS) flush 5-40 mL  5-40 mL IntraVENous PRN  
 acetaminophen (TYLENOL) tablet 650 mg  650 mg Oral Q4H PRN  
 oxyCODONE-acetaminophen (PERCOCET) 5-325 mg per tablet 1 Tab  1 Tab Oral Q4H PRN  
 HYDROmorphone (PF) (DILAUDID) injection 0.5 mg  0.5 mg IntraVENous Q4H PRN  prochlorperazine (COMPAZINE) injection 10 mg  10 mg IntraVENous Q6H PRN  
 lactobac ac& pc-s.therm-b.anim (WILLA Q/RISAQUAD)  1 Cap Oral DAILY Telemetry: paced rhythm Labs: 
Recent Labs  
  02/17/19 
0235 02/16/19 
1736 WBC 3.2* 2.5* HGB 10.8* 12.1 HCT 33.8* 38.4 * 150 Recent Labs  
  02/17/19 
0235 02/16/19 
1736  136  
K 3.5 4.0  
 100 CO2 25 22 * 278* BUN 20 19 CREA 0.83 1.26* CA 7.6* 8.4* MG 1.6 2.0 PHOS 3.6  --   
ALB  --  3.3* SGOT  --  70* ALT  --  31 INR  --  1.1 No results for input(s): PH, PCO2, PO2, HCO3, FIO2 in the last 72 hours. Imaging:  I have personally reviewed the patients radiographs and reports. Mild left atx IMPRESSION:  
· S/p VT and cardioversion · Pacemaker · Htn PLAN:  
· Cardiology following and checking echo/pacemaker. Ruling out ischemia. · Pulm/ccm team will be available as needed · My assessment/plan was discussed with: nurse Greg Roberts MD

## 2019-02-18 LAB
ANION GAP SERPL CALC-SCNC: 8 MMOL/L (ref 5–15)
ARTERIAL PATENCY WRIST A: YES
ATRIAL RATE: 79 BPM
ATRIAL RATE: 86 BPM
BASE DEFICIT BLDA-SCNC: 6.1 MMOL/L
BASOPHILS # BLD: 0 K/UL (ref 0–0.1)
BASOPHILS NFR BLD: 1 % (ref 0–1)
BDY SITE: ABNORMAL
BUN SERPL-MCNC: 8 MG/DL (ref 6–20)
BUN/CREAT SERPL: 10 (ref 12–20)
CALCIUM SERPL-MCNC: 7.7 MG/DL (ref 8.5–10.1)
CALCULATED P AXIS, ECG09: 105 DEGREES
CALCULATED R AXIS, ECG10: -86 DEGREES
CALCULATED R AXIS, ECG10: 35 DEGREES
CALCULATED T AXIS, ECG11: 52 DEGREES
CALCULATED T AXIS, ECG11: 91 DEGREES
CHLORIDE SERPL-SCNC: 109 MMOL/L (ref 97–108)
CO2 SERPL-SCNC: 27 MMOL/L (ref 21–32)
CREAT SERPL-MCNC: 0.8 MG/DL (ref 0.55–1.02)
DIAGNOSIS, 93000: NORMAL
DIAGNOSIS, 93000: NORMAL
DIFFERENTIAL METHOD BLD: ABNORMAL
EOSINOPHIL # BLD: 0.1 K/UL (ref 0–0.4)
EOSINOPHIL NFR BLD: 3 % (ref 0–7)
ERYTHROCYTE [DISTWIDTH] IN BLOOD BY AUTOMATED COUNT: 13.2 % (ref 11.5–14.5)
GLUCOSE SERPL-MCNC: 89 MG/DL (ref 65–100)
HCO3 BLDA-SCNC: 18 MMOL/L (ref 22–26)
HCT VFR BLD AUTO: 36.6 % (ref 35–47)
HGB BLD-MCNC: 11.6 G/DL (ref 11.5–16)
IMM GRANULOCYTES # BLD AUTO: 0 K/UL (ref 0–0.04)
IMM GRANULOCYTES NFR BLD AUTO: 0 % (ref 0–0.5)
LYMPHOCYTES # BLD: 0.6 K/UL (ref 0.8–3.5)
LYMPHOCYTES NFR BLD: 21 % (ref 12–49)
MAGNESIUM SERPL-MCNC: 2 MG/DL (ref 1.6–2.4)
MCH RBC QN AUTO: 31.4 PG (ref 26–34)
MCHC RBC AUTO-ENTMCNC: 31.7 G/DL (ref 30–36.5)
MCV RBC AUTO: 98.9 FL (ref 80–99)
MONOCYTES # BLD: 0.3 K/UL (ref 0–1)
MONOCYTES NFR BLD: 12 % (ref 5–13)
NEUTS SEG # BLD: 1.7 K/UL (ref 1.8–8)
NEUTS SEG NFR BLD: 63 % (ref 32–75)
NRBC # BLD: 0 K/UL (ref 0–0.01)
NRBC BLD-RTO: 0 PER 100 WBC
P-R INTERVAL, ECG05: 182 MS
P-R INTERVAL, ECG05: 352 MS
PCO2 BLDA: 32 MMHG (ref 35–45)
PH BLDA: 7.37 [PH] (ref 7.35–7.45)
PLATELET # BLD AUTO: 140 K/UL (ref 150–400)
PMV BLD AUTO: 10 FL (ref 8.9–12.9)
PO2 BLDA: 305 MMHG (ref 80–100)
POTASSIUM SERPL-SCNC: 4 MMOL/L (ref 3.5–5.1)
Q-T INTERVAL, ECG07: 450 MS
Q-T INTERVAL, ECG07: 518 MS
QRS DURATION, ECG06: 182 MS
QRS DURATION, ECG06: 98 MS
QTC CALCULATION (BEZET), ECG08: 519 MS
QTC CALCULATION (BEZET), ECG08: 612 MS
RBC # BLD AUTO: 3.7 M/UL (ref 3.8–5.2)
SAO2 % BLD: 100 % (ref 92–97)
SAO2% DEVICE SAO2% SENSOR NAME: ABNORMAL
SODIUM SERPL-SCNC: 144 MMOL/L (ref 136–145)
SPECIMEN SITE: ABNORMAL
VENTRICULAR RATE, ECG03: 80 BPM
VENTRICULAR RATE, ECG03: 84 BPM
WBC # BLD AUTO: 2.6 K/UL (ref 3.6–11)

## 2019-02-18 PROCEDURE — 74011250637 HC RX REV CODE- 250/637: Performed by: NURSE PRACTITIONER

## 2019-02-18 PROCEDURE — 74011250636 HC RX REV CODE- 250/636: Performed by: INTERNAL MEDICINE

## 2019-02-18 PROCEDURE — 74011250637 HC RX REV CODE- 250/637: Performed by: INTERNAL MEDICINE

## 2019-02-18 PROCEDURE — 97116 GAIT TRAINING THERAPY: CPT

## 2019-02-18 PROCEDURE — 80048 BASIC METABOLIC PNL TOTAL CA: CPT

## 2019-02-18 PROCEDURE — 83735 ASSAY OF MAGNESIUM: CPT

## 2019-02-18 PROCEDURE — 97535 SELF CARE MNGMENT TRAINING: CPT

## 2019-02-18 PROCEDURE — 85025 COMPLETE CBC W/AUTO DIFF WBC: CPT

## 2019-02-18 PROCEDURE — 77030038269 HC DRN EXT URIN PURWCK BARD -A

## 2019-02-18 PROCEDURE — 97530 THERAPEUTIC ACTIVITIES: CPT

## 2019-02-18 PROCEDURE — 36415 COLL VENOUS BLD VENIPUNCTURE: CPT

## 2019-02-18 PROCEDURE — 65610000006 HC RM INTENSIVE CARE

## 2019-02-18 PROCEDURE — 74011000258 HC RX REV CODE- 258: Performed by: INTERNAL MEDICINE

## 2019-02-18 RX ORDER — AMIODARONE HYDROCHLORIDE 200 MG/1
200 TABLET ORAL 2 TIMES DAILY
Status: DISCONTINUED | OUTPATIENT
Start: 2019-02-18 | End: 2019-02-18

## 2019-02-18 RX ORDER — LISINOPRIL 5 MG/1
10 TABLET ORAL
Status: DISCONTINUED | OUTPATIENT
Start: 2019-02-18 | End: 2019-02-21

## 2019-02-18 RX ORDER — GUAIFENESIN 600 MG/1
600 TABLET, EXTENDED RELEASE ORAL EVERY 12 HOURS
Status: DISCONTINUED | OUTPATIENT
Start: 2019-02-18 | End: 2019-02-18

## 2019-02-18 RX ORDER — GUAIFENESIN 600 MG/1
600 TABLET, EXTENDED RELEASE ORAL EVERY 12 HOURS
Status: DISCONTINUED | OUTPATIENT
Start: 2019-02-18 | End: 2019-02-25 | Stop reason: HOSPADM

## 2019-02-18 RX ORDER — AMIODARONE HYDROCHLORIDE 200 MG/1
200 TABLET ORAL 2 TIMES DAILY
Status: DISCONTINUED | OUTPATIENT
Start: 2019-02-18 | End: 2019-02-25 | Stop reason: HOSPADM

## 2019-02-18 RX ORDER — CETIRIZINE HCL 10 MG
5 TABLET ORAL DAILY
Status: DISCONTINUED | OUTPATIENT
Start: 2019-02-18 | End: 2019-02-25 | Stop reason: HOSPADM

## 2019-02-18 RX ADMIN — APIXABAN 5 MG: 5 TABLET, FILM COATED ORAL at 08:02

## 2019-02-18 RX ADMIN — AMIODARONE HYDROCHLORIDE 200 MG: 200 TABLET ORAL at 17:02

## 2019-02-18 RX ADMIN — ATENOLOL 25 MG: 25 TABLET ORAL at 08:02

## 2019-02-18 RX ADMIN — PROCHLORPERAZINE EDISYLATE 10 MG: 5 INJECTION INTRAMUSCULAR; INTRAVENOUS at 06:58

## 2019-02-18 RX ADMIN — LISINOPRIL 10 MG: 5 TABLET ORAL at 21:27

## 2019-02-18 RX ADMIN — Medication 10 ML: at 13:17

## 2019-02-18 RX ADMIN — MONTELUKAST 10 MG: 10 TABLET, FILM COATED ORAL at 08:02

## 2019-02-18 RX ADMIN — Medication 10 ML: at 21:27

## 2019-02-18 RX ADMIN — AMIODARONE HYDROCHLORIDE 200 MG: 200 TABLET ORAL at 10:33

## 2019-02-18 RX ADMIN — LEVOTHYROXINE SODIUM 100 MCG: 100 TABLET ORAL at 07:52

## 2019-02-18 RX ADMIN — GUAIFENESIN 600 MG: 600 TABLET, EXTENDED RELEASE ORAL at 16:56

## 2019-02-18 RX ADMIN — Medication 1 CAPSULE: at 08:02

## 2019-02-18 RX ADMIN — CETIRIZINE HYDROCHLORIDE 5 MG: 10 TABLET, FILM COATED ORAL at 16:56

## 2019-02-18 RX ADMIN — ACETAMINOPHEN 650 MG: 325 TABLET ORAL at 00:08

## 2019-02-18 RX ADMIN — AMIODARONE HYDROCHLORIDE 0.5 MG/MIN: 50 INJECTION, SOLUTION INTRAVENOUS at 05:05

## 2019-02-18 NOTE — PROGRESS NOTES
Appreciate cardiology's asisstance. Dr. Meza Brood willing to be listed as attending. Will change attending status.

## 2019-02-18 NOTE — CONSULTS
HISTORY OF PRESENTING ILLNESS      Mira Ojeda is a [address] y.[address]. female with with CAD, dual chamber pacemaker presenting after sudden cardiac death for which she underwent defibrillation by EMS. PPM interrogation confirmed sustained VT x 2. Her atenolol dose was increased yesterday. She was started on amiodarone drip. She was on amiodarone orally already. Echocardiogram demonstrates preserved LV function.         ACTIVE PROBLEM LIST     Patient Active Problem List    Diagnosis Date Noted    Ventricular tachycardia ([address] 02/16/2019    Elevated serum creatinine 02/16/2019    Advanced care planning/counseling discussion 12/03/2015    Personal history of colonic polyps 03/19/2014    Anxiety     Insomnia     HTN (hypertension)     Hyperlipidemia LDL goal < 100     Osteoarthritis     Hypothyroid     CAD (coronary artery disease)     Atrial fibrillation (HCC)     Thoracic aneurysm without mention of rupture     Anemia            MEDICATIONS     Current Facility-Administered Medications   Medication Dose Route Frequency    amiodarone (CORDARONE) tablet 200 mg  200 mg Oral BID    apixaban (ELIQUIS) tablet 5 mg  5 mg Oral BID    levothyroxine (SYNTHROID) tablet 100 mcg  100 mcg Oral ACB    montelukast (SINGULAIR) tablet 10 mg  10 mg Oral DAILY    atenolol (TENORMIN) tablet 25 mg  25 mg Oral DAILY    amiodarone (CORDARONE) 375 mg in dextrose 5% 250 mL infusion  0.5 mg/min IntraVENous TITRATE    loperamide (IMODIUM) capsule 2 mg  2 mg Oral Q4H PRN    sodium chloride (NS) flush 5-40 mL  5-40 mL IntraVENous Q8H    sodium chloride (NS) flush 5-40 mL  5-40 mL IntraVENous PRN    acetaminophen (TYLENOL) tablet 650 mg  650 mg Oral Q4H PRN    oxyCODONE-acetaminophen (PERCOCET) 5-325 mg per tablet 1 Tab  1 Tab Oral Q4H PRN    prochlorperazine (COMPAZINE) injection 10 mg  10 mg IntraVENous Q6H PRN    lactobac ac& pc-s.therm-b.anim (WILLA Q/RISAQUAD)  1 Cap Oral DAILY           PAST MEDICAL HISTORY     Past Medical History:   Diagnosis Date    Acute cystitis 05/23/2018    Anemia, unspecified     Anxiety     Arrhythmia     a fib    Arthritis     osteoarthritis, rheumatoid    Atrial fibrillation (HCC)     Dr. Sonny Hernandes and Dr. Gerry Alanis  following    Autoimmune disease [facility])     sjogren disease Dr. Meredith Rodriguez    CAD (coronary artery disease)     HTN (hypertension)     Hyperlipidemia LDL goal < 100     Hypothyroid     Insomnia     Osteoarthritis     Sjogren's disease ([address]     Thoracic aneurysm without mention of rupture            PAST SURGICAL HISTORY     Past Surgical History:   Procedure Laterality Date    HX CHOLECYSTECTOMY      HX COLONOSCOPY  3/2014    Dr. Narinder Ibarra    HX OTHER SURGICAL      hernia repairs    HX PARTIAL THYROIDECTOMY      HX JANNET AND BSO      HX VEIN STRIPPING            ALLERGIES     Allergies   Allergen Reactions    Latex Hives     NOT ALLERGIC PER PT 02/01/2018    Pcn [Penicillins] Anaphylaxis    Sulfa (Sulfonamide Antibiotics) Anaphylaxis    Biaxin [Clarithromycin] Nausea Only    Biotin Unknown (comments)    Pcn [Penicillins] Hives    Sulfa (Sulfonamide Antibiotics) Nausea Only          FAMILY HISTORY     Family History   Problem Relation Age of Onset    Heart Disease Father         aneurysm    Cancer Maternal Grandfather     negative for cardiac disease       SOCIAL HISTORY     Social History     Socioeconomic History    Marital status:      Spouse name: Not on file    Number of children: Not on file    Years of education: Not on file    Highest education level: Not on file   Tobacco Use    Smoking status: Never Smoker    Smokeless tobacco: Never Used   Substance and Sexual Activity    Alcohol use: Yes     Comment: wine   Social History Narrative    ** Merged History Encounter **              MEDICATIONS     Current Facility-Administered Medications   Medication Dose Route Frequency    amiodarone (CORDARONE) tablet 200 mg  200 mg Oral BID    apixaban (ELIQUIS) tablet 5 mg  5 mg Oral BID    levothyroxine (SYNTHROID) tablet 100 mcg  100 mcg Oral ACB    montelukast (SINGULAIR) tablet 10 mg  10 mg Oral DAILY    atenolol (TENORMIN) tablet 25 mg  25 mg Oral DAILY    amiodarone (CORDARONE) 375 mg in dextrose 5% 250 mL infusion  0.5 mg/min IntraVENous TITRATE    loperamide (IMODIUM) capsule 2 mg  2 mg Oral Q4H PRN    sodium chloride (NS) flush 5-40 mL  5-40 mL IntraVENous Q8H    sodium chloride (NS) flush 5-40 mL  5-40 mL IntraVENous PRN    acetaminophen (TYLENOL) tablet 650 mg  650 mg Oral Q4H PRN    oxyCODONE-acetaminophen (PERCOCET) 5-325 mg per tablet 1 Tab  1 Tab Oral Q4H PRN    prochlorperazine (COMPAZINE) injection 10 mg  10 mg IntraVENous Q6H PRN    lactobac ac& pc-s.therm-b.anim (WILLA Q/RISAQUAD)  1 Cap Oral DAILY       I have reviewed the nurses notes, vitals, problem list, allergy list, medical history, family, social history and medications. REVIEW OF SYMPTOMS      General: Pt denies excessive weight gain or loss. Pt is able to conduct ADL's  HEENT: Denies blurred vision, headaches, hearing loss, epistaxis and difficulty swallowing. Respiratory: Denies cough, congestion, shortness of breath, NAVA, wheezing or stridor. Cardiovascular: Denies precordial pain, palpitations, edema or PND  Gastrointestinal: Denies poor appetite, indigestion, abdominal pain or blood in stool  Genitourinary: Denies hematuria, dysuria, increased urinary frequency  Musculoskeletal: Denies joint pain or swelling from muscles or joints  Neurologic: Denies tremor, paresthesias, headache, or sensory motor disturbance  Psychiatric: Denies confusion, insomnia, depression  Integumentray: Denies rash, itching or ulcers.   Hematologic: Denies easy bruising, bleeding       PHYSICAL EXAMINATION      Vitals:    02/18/19 1030 02/18/19 1100 02/18/19 1130 02/18/19 1200   BP: 161/89 149/76 158/84 (!) 157/92   Pulse: 82 80 80 85   Resp: 16 13 12 14   Temp:    98.4 °F (36.9 °C) SpO2: 92% 95% 93% 96%   Weight:       Height:         General: Well developed, in no acute distress. HEENT: No jaundice, oral mucosa moist, no oral ulcers  Neck: Supple, no stiffness, no lymphadenopathy, supple  Heart:  Normal S1/S2 negative S3 or S4. Regular, no murmur, gallop or rub, no jugular venous distention  Respiratory: Clear bilaterally x 4, no wheezing or rales  Abdomen:   Soft, non-tender, bowel sounds are active.   Extremities:  No edema, normal cap refill, no cyanosis. Musculoskeletal: No clubbing, no deformities  Neuro: A&Ox3, speech clear, gait stable, cooperative, no focal neurologic deficits  Skin: Skin color is normal. No rashes or lesions. Non diaphoretic, moist.  Vascular: 2+ pulses symmetric in all extremities       DIAGNOSTIC DATA      TELEMETRY: paced rhythm       LABORATORY DATA      Lab Results   Component Value Date/Time    WBC 2.6 (L) 02/18/2019 03:00 AM    HGB 11.6 02/18/2019 03:00 AM    Hematocrit (POC) 38 02/16/2019 05:38 PM    HCT 36.6 02/18/2019 03:00 AM    PLATELET 254 (L) 40/77/3648 03:00 AM    MCV 98.9 02/18/2019 03:00 AM      Lab Results   Component Value Date/Time    Sodium 144 02/18/2019 03:00 AM    Potassium 4.0 02/18/2019 03:00 AM    Chloride 109 (H) 02/18/2019 03:00 AM    CO2 27 02/18/2019 03:00 AM    Anion gap 8 02/18/2019 03:00 AM    Glucose 89 02/18/2019 03:00 AM    BUN 8 02/18/2019 03:00 AM    Creatinine 0.80 02/18/2019 03:00 AM    BUN/Creatinine ratio 10 (L) 02/18/2019 03:00 AM    GFR est AA >60 02/18/2019 03:00 AM    GFR est non-AA >60 02/18/2019 03:00 AM    Calcium 7.7 (L) 02/18/2019 03:00 AM    Bilirubin, total 0.4 02/16/2019 05:36 PM    AST (SGOT) 70 (H) 02/16/2019 05:36 PM    Alk. phosphatase 81 02/16/2019 05:36 PM    Protein, total 6.6 02/16/2019 05:36 PM    Albumin 3.3 (L) 02/16/2019 05:36 PM    Globulin 3.3 02/16/2019 05:36 PM    A-G Ratio 1.0 (L) 02/16/2019 05:36 PM    ALT (SGPT) 31 02/16/2019 05:36 PM           ASSESSMENT      1. Ventricular tachycardia  2. CAD, native  3. Pacemaker   A. Dual chamber   B. Implant 2016  4. Atrial fibrillation   A. Paroxysmal   5. Sjogren disease  6. Hypertension  7. Hypothyroidism  8. Thoracic aortic aneurysm         PLAN     Increase amiodarone to 200 mg bid. Recommend evaluating whether ischemic evaluation necessary by obtaining previous records from Dr. Zechariah Plummer office. Continue atenolol and increased dosing. Thank you, Landen Childers MD and Dr. Jerel Grant for involving me in the care of this extraordinarily pleasant female. Please do not hesitate to contact me for further questions/concerns.          Niki Graves MD  Cardiac Electrophysiology / Cardiology    [facility]  [address] [facility], [facility], [facility]  [address]helley L[address]  [phone] / [phone] Fax   [phone] / [phone] Fax

## 2019-02-18 NOTE — ADVANCED PRACTICE NURSE
Reviewed record from S. Last ischemia eval was 2016 with nuclear stress test that showed no ischemia. Met with pt and son. Pt recalls cath many yrs ago in [facility]. Will plan for [facility] tomorrow pm 1:45, Dr Papi Quintanilla. NPO after 7 am, ok for light breakfast prior. Hold Eliquis tonight and in am  
 
D/W Dr Thelma Krueger.

## 2019-02-18 NOTE — PROGRESS NOTES
Problem: Self Care Deficits Care Plan (Adult) Goal: *Acute Goals and Plan of Care (Insert Text) Occupational Therapy Goals Initiated 2/17/2019 1. Patient will perform grooming with supervision/set-up in standing with CGA within 7 day(s). 2.  Patient will perform upper body dressing with modified independence within 7 day(s). 3.  Patient will perform lower body dressing with supervision/set-up within 7 day(s). 4.  Patient will perform toilet transfers with supervision/set-up within 7 day(s). 5.  Patient will perform all aspects of toileting with supervision/set-up within 7 day(s). 6.  Patient will participate in upper extremity therapeutic exercise/activities with independence for 5 minutes within 7 day(s). 7.  Patient will utilize energy conservation techniques during functional activities with verbal cues within 7 day(s). Occupational Therapy TREATMENT Patient: Do Dempsey (34 y.o. female) Date: 2/18/2019 Diagnosis: Ventricular tachycardia ([address] [I47.2] Ventricular tachycardia ([address] Procedure(s) (LRB): LEFT HEART CATH / CORONARY ANGIOGRAPHY (N/A) Precautions: Fall Chart, occupational therapy assessment, plan of care, and goals were reviewed. ASSESSMENT: 
Patient received supine in bed, sleeping. Easily aroused and agreeable to activity though reporting she is tired. Son present for treatment. Patient able to move to EOB with SBA. Sit to stand with CGA and transfers with stand by/CGA. Patient transfers to commode and able to perform hygiene with supervision. She reports RN has been performing hygiene. Encouraged patient to complete on her own. Patient then able to stand at sink for hand hygiene and requests brushing teeth. She performs with supervision in standing. Patient returned to bed at end of session. Recommend HH at discharge. Progression toward goals: 
[x]       Improving appropriately and progressing toward goals []       Improving slowly and progressing toward goals 
[]       Not making progress toward goals and plan of care will be adjusted PLAN: 
Patient continues to benefit from skilled intervention to address the above impairments. Continue treatment per established plan of care. Discharge Recommendations:  Home Health Further Equipment Recommendations for Discharge:  TBD SUBJECTIVE:  
Patient stated I feel Mónica Farris.  OBJECTIVE DATA SUMMARY:  
Cognitive/Behavioral Status: 
Neurologic State: Alert Orientation Level: Oriented X4 Cognition: Follows commands Perception: Appears intact Perseveration: No perseveration noted Safety/Judgement: Awareness of environment Functional Mobility and Transfers for ADLs:Bed Mobility: 
Rolling: Stand-by assistance Supine to Sit: Stand-by assistance Sit to Supine: Stand-by assistance Scooting: Supervision Transfers: 
Sit to Stand: Contact guard assistance Functional Transfers Toilet Transfer : Contact guard assistance Balance: 
Sitting: Intact Standing: Intact; With support ADL Intervention: 
  
 
Grooming Grooming Assistance: Supervision/set up Washing Face: Supervision/set-up Washing Hands: Supervision/set-up Toileting Toileting Assistance: Contact guard assistance Bladder Hygiene: Supervision/set-up Clothing Management: Contact guard assistance Cognitive Retraining Safety/Judgement: Awareness of environment Neuro Re-Education: 
  
  
  
Therapeutic Exercises:  
Pain: 
Pain Scale 1: Numeric (0 - 10) Pain Intensity 1: 0 Activity Tolerance: VSS Please refer to the flowsheet for vital signs taken during this treatment. After treatment:  
[] Patient left in no apparent distress sitting up in chair 
[x] Patient left in no apparent distress in bed 
[x] Call bell left within reach [x] Nursing notified 
[x] Caregiver present 
[] Bed alarm activated COMMUNICATION/COLLABORATION:  
 The patients plan of care was discussed with: Physical Therapist and Registered Nurse Charly Hernandez OTR/L Time Calculation: 31 mins

## 2019-02-18 NOTE — PROGRESS NOTES
1900: Bedside and Verbal shift change report given to [facility] (oncoming nurse) by Joanna Tang (offgoing nurse). Report included the following information SBAR, Kardex, Procedure Summary, Intake/Output, MAR, Recent Results, Med Rec Status and Cardiac Rhythm paced . 2000: Shift assessment noted, see flowsheet. AxOx4, on room air, VSS, paced on monitor, Amio infusing at 0.5, no complaints at this time. 0010: Patient stated she bumped her head on the side rail and her head is hurting, prn tylenol given, No new changes at this time. 0430: Patient reassessed, no new changes, VSS, afebrile, purewick changed and in place CHG bath given, new IV started, no complaints of pain. 0720: Bedside and Verbal shift change report given to Azeb Love RN (oncoming nurse) by Nery Gonzalez RN (offgoing nurse). Report included the following information SBAR, Kardex, Procedure Summary, Intake/Output, MAR, Recent Results, Med Rec Status and Cardiac Rhythm Paced .

## 2019-02-18 NOTE — DISCHARGE SUMMARY
Cardiology Discharge Summary     Patient ID:       Griffin Renee  [id number]  37 y.o.  4/22/1928    Admit Date: 2/16/2019    Discharge Date:      Admitting Physician: Isa Dennis MD   PCP: Brian Shafer MD    Discharge Physician: Darcie Yates MD     Consults: EP                     Cardiac Rehab, PT, OT, Case management     Admission Diagnoses: Ventricular tachycardia [facility]) [I47.2]    Discharge Diagnoses: Principal Problem:    Ventricular tachycardia ([facility] (2/16/2019)    Active Problems:    HTN (hypertension) ()      CAD (coronary artery disease) ()      Atrial fibrillation ([facility] ()      Elevated serum creatinine (2/16/2019)        Discharge Condition: Stable    Cardiology Procedures this Admission:  Diagnostic left heart catheterization  EchoCardiogram   ICD implant    Hospital Course:    Griffin Renee  is a 80 y.o. female with extensive past medical history significant for CAD, atrial fibrillation, permanent pacemaker, hypertension, Sjogren's syndrome, thoracic aortic aneurysm without mention of rupture, hypertension, is brought by paramedics. Patient reports having shortness of breath, lethargic and not able to move herself. When paramedics arrived they found her unresponsive and in V. tach. Chest compressions were started and patient received 200 J synchronized shock converting to sinus tach at rate of 114 bpm.  Patient was also administered amiodarone 150 mg. On arrival to the ER she was alert awake oriented x3 and was verbally responsive. She denies having any chest pain. EKG does not demonstrate any ST-T changes. EKG shows atrial pacing. ER attending placed a magnet on the pacemaker which converted the rhythm to ventricular pacing. Her usual cardiologist is Dr. Isaiah Mathur and Dr. Vyas Hands placed a pacemaker. She personally denies any history of CAD.      EKG was personally reviewed which demonstrated atrial pacing with prolonged AV delay with normal AV conduction and normal ST segment. QTc interval is mildly prolonged at 512.           ECHO   02/16/19   ECHO ADULT COMPLETE 02/17/2019 2/17/2019    Narrative · Estimated left ventricular ejection fraction is 61 - 65%. · Left atrial cavity size is mildly dilated. · Mild aortic valve sclerosis. Aortic valve leaflet calcification present. Mild aortic valve regurgitation is present. · Mild mitral annular calcification. · Mild tricuspid valve regurgitation is present. Signed by: Maikol Ace MD       Cardiac cath showed non obs CAD    EP consulted for VT mgt: amiodarone dose adjusted. Pacemaker upgraded to ICD    The patient was seen by cardiac rehab for education. Deconditioning: seen and treated by PT/OT. Up ambulating with assistance. Case mangement was consulted for discharge planning. The patient/family was kept apprised of her disease process and treatment plan of care. After receiving maximum benefit from her in-patient hospitalization, she was ready for discharge. At the time of discharge she was up ambulating and hemodynamically stable. Discharge Exam:     See Final Progress Note from today 2/25/19    Disposition: SNF    Patient Instructions:   Current Discharge Medication List      START taking these medications    Details   clindamycin (CLEOCIN) 150 mg capsule Take 1 Cap by mouth three (3) times daily. Qty: 7 Cap, Refills: 0      magnesium oxide (MAG-OX) 400 mg tablet Take 1 Tab by mouth daily. Qty: 30 Tab, Refills: 0         CONTINUE these medications which have CHANGED    Details   amiodarone (CORDARONE) 200 mg tablet Take 1 Tab by mouth two (2) times a day for 10 days, THEN 1 Tab daily for 30 days. Qty: 50 Tab, Refills: 0      atenolol (TENORMIN) 25 mg tablet Take 1 Tab by mouth daily. Qty: 90 Tab, Refills: 0    Associated Diagnoses: Essential hypertension         CONTINUE these medications which have NOT CHANGED    Details   fosinopril (MONOPRIL) 10 mg tablet Take 20 mg by mouth daily. montelukast (SINGULAIR) 10 mg tablet Take 10 mg by mouth daily as needed. eszopiclone (LUNESTA) 2 mg tablet Take 2 mg by mouth nightly. guaiFENesin ER (MUCINEX) 600 mg ER tablet Take 600 mg by mouth two (2) times daily as needed for Congestion. levothyroxine (SYNTHROID) 100 mcg tablet Take 100 mcg by mouth Daily (before breakfast). apixaban (ELIQUIS) 5 mg tablet Take 5 mg by mouth two (2) times a day. estradiol (ESTRACE) 0.5 mg tablet Take 1 Tab by mouth daily. Please wean off this medication due to cv risks associated. Qty: 30 Tab, Refills: 0    Associated Diagnoses: Cold intolerance      albuterol (PROVENTIL HFA, VENTOLIN HFA, PROAIR HFA) 90 mcg/actuation inhaler Take 2 Puffs by inhalation every four (4) hours as needed for Wheezing or Shortness of Breath. Indications: Bronchospastic Pulmonary Disease  Qty: 1 Inhaler, Refills: 1    Associated Diagnoses: Dyspnea on exertion; Chronic fatigue      fluticasone (FLONASE) 50 mcg/actuation nasal spray 2 Sprays by Both Nostrils route daily as needed. vitamin b comp & c no.4 (SUPER B COMPLEX + C) 150 mg tab Take 1 Tab by mouth daily. cholecalciferol, vitamin D3, (VITAMIN D3) 2,000 unit tab Take 2,000 Units by mouth daily. CALCIUM CARBONATE (TUMS PO) Take 1 Tab by mouth every six (6) hours as needed. MULTIVITAMIN (MULTIPLE VITAMIN PO) Take 1 Tab by mouth daily. STOP taking these medications       PARoxetine (PAXIL) 10 mg tablet Comments:   Reason for Stopping:               Referenced discharge instructions provided by nursing for diet and activity. Follow-up with Dr [facility] in 10 days and Dr Linda Marroquin   Date Time Provider Nikolas Victoria   3/20/2019  3:00 PM Michelle Crenshaw MD [address] [place] [address]         Signed:  Iliana Molina NP  2/25/2019  9:44 AM    Discharge time > 30 min. James Cornelius MD, [facility]

## 2019-02-18 NOTE — PROGRESS NOTES
Cardiology Progress Note     ICU   NAME:  Lv Lee :   1928 MRN:   [id number] Assessment/Plan:  
1. S/P VT arrest: PPM interrogation shows an hour long episode out of hospitla with heart rate of 194 bpm.  It is unclear if this is A. fib with ventricular tracking of the pacemaker versus VT. Dr. Fernando Palafox reviewed these rhythm strips as well. Feels it was VT. EF 61-65% Mild TR, mild AR 2. Recurrent NSVT: IV amio, BB EP to see. D/W pt  
3. Elevated troponin: no MI, this is secondary to shock. 4. A fib s/p PPM:  cont atenolol, eliquis 5. CAD: cont BB Cardiology Attending: 
 
Patient personally seen and examined. All the elements of history and examination were personally performed. Assessment and plan was discussed and agree as written above. Mik Castro reviewing the case. - Increased Amio to 200 BID. Stop Amio gtt. - Possible ischemia eval if not done in recent past.  
 
James Encarnacion MD, [facility] [place] Subjective:  
Lv Lee is a 80 y.o. female with extensive past medical history significant for CAD, atrial fibrillation, permanent pacemaker, hypertension, Sjogren's syndrome, thoracic aortic aneurysm without mention of rupture, hypertension admitted with what appears to be cardiac arrest.  
  
PPM interrogation showed a  heart rate of 194 bpm yesterday for 1 hour around the time when she had symptoms. It is unclear if this is A. fib with pacemaker tracking versus VT. She had a similar high heart rate event on  noted on her pacemaker. 
  
 
Cardiac ROS: Patient denies any exertional chest pain, dyspnea, palpitations, syncope, orthopnea, edema or paroxysmal nocturnal dyspnea. Previous Cardiac Eval 
19 ECHO ADULT COMPLETE 2019 Narrative · Estimated left ventricular ejection fraction is 61 - 65%. · Left atrial cavity size is mildly dilated. · Mild aortic valve sclerosis. Aortic valve leaflet calcification present. Mild aortic valve regurgitation is present. · Mild mitral annular calcification. · Mild tricuspid valve regurgitation is present. Signed by: Nighat Markham MD  
 
 
 
Review of Systems: + fatigue , No nausea, indigestion, vomiting, pain, cough, sputum. No bleeding. Taking po. Appetite ok. Up to chair. Objective:  
 
Visit Vitals /82 Pulse 80 Temp 98.4 °F (36.9 °C) Resp 14 Ht 5' 7\" (1.702 m) Wt 135 lb (61.2 kg) SpO2 93% BMI 21.14 kg/m² O2 Flow Rate (L/min): 2 l/min O2 Device: Room air Temp (24hrs), Av.3 °F (36.8 °C), Min:98 °F (36.7 °C), Max:98.6 °F (37 °C) 
 
 
701 - 1900 In: -  
Out: [facility] 1901 -  07 In: 3992.3 [I.V.:3992.3] Out: 1531 [OLHZL:5746] TELE: SR  
 
General: AAOx3 cooperative, no acute distress. HEENT: Atraumatic. Pink and moist.  Anicteric sclerae. Neck : Supple, no thyromegaly. Lungs: CTA bilaterally. No wheezing/rhonchi/rales. Heart: Regular rhythm, no murmur, no rubs, no gallops. No JVD. No carotid bruits. Abdomen: Soft, non-distended, non-tender. + Bowel sounds. No bruits. Extremities: No edema, no clubbing, no cyanosis. Neurologic: Grossly intact. Alert and oriented X 3. No acute neurological distress. Psych: Good insight. Not anxious or agitated. Care Plan discussed with: 
  Comments Patient x Family RN x Care Manager Consultant:  x Data Review: No lab exists for component: ITNL Recent Labs  
  19 
0949 19 
0235 19 
2019 TROIQ <0.05 0.12* 0.14* Recent Labs  
  19 
0300 19 
0235 19 
1736  138 136  
K 4.0 3.5 4.0  
* 106 100 CO2 27 25 22 BUN 8 20 19 CREA 0.80 0.83 1.26* GLU 89 121* 278* PHOS  --  3.6  --   
MG 2.0 1.6 2.0 ALB  --   --  3.3* WBC 2.6* 3.2* 2.5* HGB 11.6 10.8* 12.1 HCT 36.6 33.8* 38.4 * 142* 150 Recent Labs  
  02/16/19 
1736 INR 1.1 PTP 11.0 Medications reviewed Current Facility-Administered Medications Medication Dose Route Frequency  apixaban (ELIQUIS) tablet 5 mg  5 mg Oral BID  levothyroxine (SYNTHROID) tablet 100 mcg  100 mcg Oral ACB  montelukast (SINGULAIR) tablet 10 mg  10 mg Oral DAILY  atenolol (TENORMIN) tablet 25 mg  25 mg Oral DAILY  amiodarone (CORDARONE) 375 mg in dextrose 5% 250 mL infusion  0.5 mg/min IntraVENous TITRATE  loperamide (IMODIUM) capsule 2 mg  2 mg Oral Q4H PRN  
 sodium chloride (NS) flush 5-40 mL  5-40 mL IntraVENous Q8H  
 sodium chloride (NS) flush 5-40 mL  5-40 mL IntraVENous PRN  
 acetaminophen (TYLENOL) tablet 650 mg  650 mg Oral Q4H PRN  
 oxyCODONE-acetaminophen (PERCOCET) 5-325 mg per tablet 1 Tab  1 Tab Oral Q4H PRN  prochlorperazine (COMPAZINE) injection 10 mg  10 mg IntraVENous Q6H PRN  
 lactobac ac& pc-s.therm-b.anim (WILLA Q/RISAQUAD)  1 Cap Oral DAILY [place] NP

## 2019-02-18 NOTE — PROGRESS NOTES
[facility] DAILY PROGRESS NOTE Name: Mirna Conn : 1928 MRN: [id number] Date: 2019 8:48 AM  
I have reviewed the flowsheet and previous days notes. Overall feels fatigued, but denies chest pain, shortness of breath, cough, f/c. Amiodarone PO increased to bid, plan to stop drip this afternoon. Vital Signs:   
Visit Vitals BP (!) 157/92 (BP 1 Location: Left arm, BP Patient Position: At rest) Pulse 85 Temp 98.4 °F (36.9 °C) Resp 14 Ht 5' 7\" (1.702 m) Wt 61.2 kg (135 lb) SpO2 96% BMI 21.14 kg/m² O2 Device: Room air O2 Flow Rate (L/min): 2 l/min Temp (24hrs), Av.3 °F (36.8 °C), Min:98 °F (36.7 °C), Max:98.6 °F (37 °C) Intake/Output:  
Last shift:       07 - 1900 In: 340 [P.O.:240; I.V.:100] Out: 8051 [YRRQB:3334] Last 3 shifts: 1901 -  0700 In: 3992.3 [I.V.:3992.3] Out: 4464 [WVCRP:2184] Intake/Output Summary (Last 24 hours) at 2019 1309 Last data filed at 2019 1200 Gross per 24 hour Intake 1328.83 ml Output 3800 ml Net -2471.17 ml Physical Exam: 
General:  Alert, cooperative, no distress Head:  atraumatic. Eyes:  EOMs intact. Nose: Nares normal.  
Room air Throat: Lips normal.  
   
   
Lungs:   Clear to auscultation bilaterally. Chest wall:  No tenderness or deformity. Heart:  Regular rate and rhythm Abdomen:   Soft, non-tender. Extremities: No edema or tenderness. Skin: Dry Neurologic: Grossly nonfocal  
 
 
DATA:  
Current Facility-Administered Medications Medication Dose Route Frequency  amiodarone (CORDARONE) tablet 200 mg  200 mg Oral BID  
 apixaban (ELIQUIS) tablet 5 mg  5 mg Oral BID  levothyroxine (SYNTHROID) tablet 100 mcg  100 mcg Oral ACB  montelukast (SINGULAIR) tablet 10 mg  10 mg Oral DAILY  atenolol (TENORMIN) tablet 25 mg  25 mg Oral DAILY  amiodarone (CORDARONE) 375 mg in dextrose 5% 250 mL infusion  0.5 mg/min IntraVENous TITRATE  loperamide (IMODIUM) capsule 2 mg  2 mg Oral Q4H PRN  
 sodium chloride (NS) flush 5-40 mL  5-40 mL IntraVENous Q8H  
 sodium chloride (NS) flush 5-40 mL  5-40 mL IntraVENous PRN  
 acetaminophen (TYLENOL) tablet 650 mg  650 mg Oral Q4H PRN  
 oxyCODONE-acetaminophen (PERCOCET) 5-325 mg per tablet 1 Tab  1 Tab Oral Q4H PRN  prochlorperazine (COMPAZINE) injection 10 mg  10 mg IntraVENous Q6H PRN  
 lactobac ac& pc-s.therm-b.anim (WILLA Q/RISAQUAD)  1 Cap Oral DAILY Telemetry: paced rhythm Labs: 
Recent Labs  
  02/18/19 
0300 02/17/19 
0235 02/16/19 
1736 WBC 2.6* 3.2* 2.5* HGB 11.6 10.8* 12.1 HCT 36.6 33.8* 38.4 * 142* 150 Recent Labs  
  02/18/19 
0300 02/17/19 
0235 02/16/19 
1736  138 136  
K 4.0 3.5 4.0  
* 106 100 CO2 27 25 22 GLU 89 121* 278* BUN 8 20 19 CREA 0.80 0.83 1.26* CA 7.7* 7.6* 8.4* MG 2.0 1.6 2.0 PHOS  --  3.6  --   
ALB  --   --  3.3* SGOT  --   --  70* ALT  --   --  31 INR  --   --  1.1 Recent Labs  
  02/16/19 
1743 PH 7.37  
PCO2 32* PO2 305* HCO3 18* Imaging:  I have personally reviewed the patients radiographs and reports. Mild left atx IMPRESSION:  
· S/p VT and cardioversion · Pacemaker · Htn · Afib s/p PPM  
PLAN:  
· Rate control as per cardiology · May need ischemia evaluation · Atenolol · Elquis · Cardiac diet · My assessment/plan was discussed with: nurse Gabriella Healy MD

## 2019-02-18 NOTE — PROGRESS NOTES
Problem: Mobility Impaired (Adult and Pediatric) Goal: *Acute Goals and Plan of Care (Insert Text) Physical Therapy Goals Initiated 2/17/2019 1. Patient will move from supine to sit and sit to supine  in bed with modified independence within 7 day(s). 2.  Patient will transfer from bed to chair and chair to bed with modified independence using the least restrictive device within 7 day(s). 3.  Patient will perform sit to stand with modified independence within 7 day(s). 4.  Patient will ambulate with modified independence for 150 feet with the least restrictive device within 7 day(s). 5.  Patient will ascend/descend 3 stairs with no handrail(s) with minimal assistance/contact guard assist within 7 day(s). physical Therapy TREATMENT Patient: Shanon Segovia (27 y.o. female) Date: 2/18/2019 Diagnosis: Ventricular tachycardia ([address] [I47.2] Ventricular tachycardia ([address] Procedure(s) (LRB): LEFT HEART CATH / CORONARY ANGIOGRAPHY (N/A) Precautions: Fall Chart, physical therapy assessment, plan of care and goals were reviewed. ASSESSMENT: 
Based on the objective data described below, patient participated well with treatment today. Rolled on the edge of bed SBA, supine to sit SBA, sit to stand CGA, ambulate with rolling walker CGA out on the ICU hallway, no loss of balance. OOB to chair as tolerated performed some active range of motion exercise on both LE all planes. Assisted to and from the commode CGA. Notified nurse who agreed to monitor patient. Progression toward goals: 
[x]    Improving appropriately and progressing toward goals 
[]    Improving slowly and progressing toward goals 
[]    Not making progress toward goals and plan of care will be adjusted PLAN: 
Patient continues to benefit from skilled intervention to address the above impairments. Continue treatment per established plan of care. Discharge Recommendations:  Home Health Further Equipment Recommendations for Discharge:  TBD SUBJECTIVE:  
Patient stated ok.  OBJECTIVE DATA SUMMARY:  
Critical Behavior: 
Neurologic State: Alert Orientation Level: Oriented X4 Cognition: Follows commands Safety/Judgement: Awareness of environment Functional Mobility Training: 
Bed Mobility: 
Rolling: Stand-by assistance Supine to Sit: Stand-by assistance Sit to Supine: Stand-by assistance Scooting: Supervision Transfers: 
Sit to Stand: Contact guard assistance Stand to Sit: Contact guard assistance Stand Pivot Transfers: Contact guard assistance Bed to Chair: Contact guard assistance Balance: 
Sitting: Intact Standing: Intact; With supportAmbulation/Gait Training: 
Distance (ft): 80 Feet (ft) Assistive Device: Walker, rolling;Gait belt Ambulation - Level of Assistance: Contact guard assistance Gait Description (WDL): Exceptions to [facility] Base of Support: Widened Speed/Tayler: Pace decreased (<100 feet/min) Therapeutic Exercises:  
 Instructed patient to continue active range of motion exercise on both legs while up on chair or on bed. Pain: 
Pain Scale 1: Numeric (0 - 10) Pain Intensity 1: 0 Activity Tolerance:  
Good. Please refer to the flowsheet for vital signs taken during this treatment. After treatment:  
[x]    Patient left in no apparent distress sitting up in chair 
[]    Patient left in no apparent distress in bed 
[x]    Call bell left within reach [x]    Nursing notified 
[x]    Caregiver present 
[]    Bed alarm activated COMMUNICATION/COLLABORATION:  
The patients plan of care was discussed with: Occupational Therapist, Registered Nurse and patient Alejandra Wolf PT,WCC. Time Calculation: 24 mins

## 2019-02-18 NOTE — PROGRESS NOTES
7482-Bedside report received from Ivory Sarmiento RN. SBAR, Kardex, Procedure Summary, Intake/Output, MAR, Recent Results and Cardiac Rhythm paced were discussed. Pt observed in bed, asleep. VSS. Will assess. Holly Saleh RN 
1000-Dr. Kapadia on unit. Plan to change amiodarone back to PO today; increase to BID. Will stop amiodarone drip approximately 2 hours after PO dose administered. No other changes; MD to call patient's son to explain plan of care. Elenita SIMONS 
1200-Reassessed at this time; assisted out of bed to the chair again (got out of the bed to the chair this morning). Tolerated activity very well. Voided in the bedside commode; pure wick once again removed. Encouraged patient to use the bedside commode instead. Voided in the bedside commode without difficulty. Amiodarone still infusing; will be stopped in about 40min. No new issues. Elenita SIMONS 
1433-Mindy Acosta, cardiology NP at bedside discussing plan of care with the patient and her son. Will hold eliquis, resume lisinopril tonight, and make NPO after midnight for cardiac cath tomorrow. They verbalize understanding. Elenita SIMONS 
0334-Pt ambulating in the hallway with PT/OT. Appears to be tolerating well. Elenita SIMONS 
6198-Bedside and Verbal shift change report given to TERELL Dawson (oncoming nurse) by HARSH Love RN (offgoing nurse). Report included the following information SBAR, Kardex, Procedure Summary, Intake/Output, MAR, Recent Results and Cardiac Rhythm paced.  Elenita SIMONS

## 2019-02-18 NOTE — PROGRESS NOTES
Reason for Admission:   S/p VT arrest,recurrent NSVT,elevated troponin RRAT Score:    8 Plan for utilizing home health:  Home Health orders for SN,PT and OT are being set up with [facility]. Likelihood of Readmission:  Low/green Transition of Care Plan:     I met with pt and her son,Nazario Moyer. Pt lives by herself @ [address] Pt has medicare A & B and [employer] as her secondary. PCP is Dr Angie Morse. I notified her nurse navigators regarding pt.'s admission. I discussed rehab versus home health with pt and her son. Son prefers for his mother to return home with home health services as she is typically independent. Pt prefers to return home also with home health services. The plan is for pt to have a cardiac catherization tomorrow am.with enrrique discharge home later this week. Order for home health for SN,PT and OT faxed through [employer] to [facility]. I will follow-up with pt and son again tomorrow.  
 
Sarah Gentile

## 2019-02-19 LAB
ANION GAP SERPL CALC-SCNC: 6 MMOL/L (ref 5–15)
BACTERIA SPEC CULT: NORMAL
BUN SERPL-MCNC: 8 MG/DL (ref 6–20)
BUN/CREAT SERPL: 12 (ref 12–20)
C JEJUNI+C COLI AG STL QL: NEGATIVE
CALCIUM SERPL-MCNC: 8.4 MG/DL (ref 8.5–10.1)
CHLORIDE SERPL-SCNC: 103 MMOL/L (ref 97–108)
CO2 SERPL-SCNC: 30 MMOL/L (ref 21–32)
CREAT SERPL-MCNC: 0.69 MG/DL (ref 0.55–1.02)
E COLI SXT1+2 STL IA: NEGATIVE
GLUCOSE SERPL-MCNC: 100 MG/DL (ref 65–100)
MAGNESIUM SERPL-MCNC: 1.7 MG/DL (ref 1.6–2.4)
NOROVIRUS GI RNA STL QL NAA+PROBE: NEGATIVE
NOROVIRUS GII RNA STL QL NAA+PROBE: NEGATIVE
POTASSIUM SERPL-SCNC: 3.6 MMOL/L (ref 3.5–5.1)
SERVICE CMNT-IMP: NORMAL
SODIUM SERPL-SCNC: 139 MMOL/L (ref 136–145)

## 2019-02-19 PROCEDURE — 77030004532 HC CATH ANGI DX IMP BSC -A: Performed by: INTERNAL MEDICINE

## 2019-02-19 PROCEDURE — 36415 COLL VENOUS BLD VENIPUNCTURE: CPT

## 2019-02-19 PROCEDURE — 80048 BASIC METABOLIC PNL TOTAL CA: CPT

## 2019-02-19 PROCEDURE — 77030038269 HC DRN EXT URIN PURWCK BARD -A

## 2019-02-19 PROCEDURE — 74011250637 HC RX REV CODE- 250/637: Performed by: INTERNAL MEDICINE

## 2019-02-19 PROCEDURE — 99152 MOD SED SAME PHYS/QHP 5/>YRS: CPT | Performed by: INTERNAL MEDICINE

## 2019-02-19 PROCEDURE — 74011636320 HC RX REV CODE- 636/320: Performed by: INTERNAL MEDICINE

## 2019-02-19 PROCEDURE — 4A023N7 MEASUREMENT OF CARDIAC SAMPLING AND PRESSURE, LEFT HEART, PERCUTANEOUS APPROACH: ICD-10-PCS | Performed by: INTERNAL MEDICINE

## 2019-02-19 PROCEDURE — C1887 CATHETER, GUIDING: HCPCS | Performed by: INTERNAL MEDICINE

## 2019-02-19 PROCEDURE — C1894 INTRO/SHEATH, NON-LASER: HCPCS | Performed by: INTERNAL MEDICINE

## 2019-02-19 PROCEDURE — 93458 L HRT ARTERY/VENTRICLE ANGIO: CPT | Performed by: INTERNAL MEDICINE

## 2019-02-19 PROCEDURE — 74011250637 HC RX REV CODE- 250/637: Performed by: NURSE PRACTITIONER

## 2019-02-19 PROCEDURE — 65610000006 HC RM INTENSIVE CARE

## 2019-02-19 PROCEDURE — B2151ZZ FLUOROSCOPY OF LEFT HEART USING LOW OSMOLAR CONTRAST: ICD-10-PCS | Performed by: INTERNAL MEDICINE

## 2019-02-19 PROCEDURE — 74011250636 HC RX REV CODE- 250/636

## 2019-02-19 PROCEDURE — 83735 ASSAY OF MAGNESIUM: CPT

## 2019-02-19 PROCEDURE — B2111ZZ FLUOROSCOPY OF MULTIPLE CORONARY ARTERIES USING LOW OSMOLAR CONTRAST: ICD-10-PCS | Performed by: INTERNAL MEDICINE

## 2019-02-19 PROCEDURE — 77030004522 HC CATH ANGI DX EXPO BSC -A: Performed by: INTERNAL MEDICINE

## 2019-02-19 PROCEDURE — 77030029065 HC DRSG HEMO QCLOT ZMED -B: Performed by: INTERNAL MEDICINE

## 2019-02-19 PROCEDURE — 99153 MOD SED SAME PHYS/QHP EA: CPT | Performed by: INTERNAL MEDICINE

## 2019-02-19 PROCEDURE — 74011250636 HC RX REV CODE- 250/636: Performed by: INTERNAL MEDICINE

## 2019-02-19 RX ORDER — LANOLIN ALCOHOL/MO/W.PET/CERES
400 CREAM (GRAM) TOPICAL DAILY
Status: DISCONTINUED | OUTPATIENT
Start: 2019-02-19 | End: 2019-02-25 | Stop reason: HOSPADM

## 2019-02-19 RX ORDER — FENTANYL CITRATE 50 UG/ML
INJECTION, SOLUTION INTRAMUSCULAR; INTRAVENOUS AS NEEDED
Status: DISCONTINUED | OUTPATIENT
Start: 2019-02-19 | End: 2019-02-19 | Stop reason: HOSPADM

## 2019-02-19 RX ORDER — SODIUM CHLORIDE 0.9 % (FLUSH) 0.9 %
5-40 SYRINGE (ML) INJECTION EVERY 8 HOURS
Status: DISCONTINUED | OUTPATIENT
Start: 2019-02-19 | End: 2019-02-22

## 2019-02-19 RX ORDER — SODIUM CHLORIDE 9 MG/ML
75 INJECTION, SOLUTION INTRAVENOUS CONTINUOUS
Status: DISPENSED | OUTPATIENT
Start: 2019-02-19 | End: 2019-02-20

## 2019-02-19 RX ORDER — SENNOSIDES 8.6 MG/1
1 TABLET ORAL DAILY
Status: DISCONTINUED | OUTPATIENT
Start: 2019-02-20 | End: 2019-02-25 | Stop reason: HOSPADM

## 2019-02-19 RX ORDER — MIDAZOLAM HYDROCHLORIDE 1 MG/ML
INJECTION, SOLUTION INTRAMUSCULAR; INTRAVENOUS AS NEEDED
Status: DISCONTINUED | OUTPATIENT
Start: 2019-02-19 | End: 2019-02-19 | Stop reason: HOSPADM

## 2019-02-19 RX ORDER — LIDOCAINE HYDROCHLORIDE 10 MG/ML
INJECTION INFILTRATION; PERINEURAL AS NEEDED
Status: DISCONTINUED | OUTPATIENT
Start: 2019-02-19 | End: 2019-02-19 | Stop reason: HOSPADM

## 2019-02-19 RX ORDER — SODIUM CHLORIDE 0.9 % (FLUSH) 0.9 %
5-40 SYRINGE (ML) INJECTION AS NEEDED
Status: DISCONTINUED | OUTPATIENT
Start: 2019-02-19 | End: 2019-02-22

## 2019-02-19 RX ORDER — DOCUSATE SODIUM 100 MG/1
100 CAPSULE, LIQUID FILLED ORAL 2 TIMES DAILY
Status: DISCONTINUED | OUTPATIENT
Start: 2019-02-19 | End: 2019-02-25 | Stop reason: HOSPADM

## 2019-02-19 RX ADMIN — MONTELUKAST 10 MG: 10 TABLET, FILM COATED ORAL at 08:06

## 2019-02-19 RX ADMIN — AMIODARONE HYDROCHLORIDE 200 MG: 200 TABLET ORAL at 08:07

## 2019-02-19 RX ADMIN — SODIUM CHLORIDE 75 ML/HR: 900 INJECTION, SOLUTION INTRAVENOUS at 16:39

## 2019-02-19 RX ADMIN — LEVOTHYROXINE SODIUM 100 MCG: 100 TABLET ORAL at 08:06

## 2019-02-19 RX ADMIN — LISINOPRIL 10 MG: 5 TABLET ORAL at 21:04

## 2019-02-19 RX ADMIN — Medication 400 MG: at 17:48

## 2019-02-19 RX ADMIN — Medication 10 ML: at 06:00

## 2019-02-19 RX ADMIN — ATENOLOL 25 MG: 25 TABLET ORAL at 08:06

## 2019-02-19 RX ADMIN — AMIODARONE HYDROCHLORIDE 200 MG: 200 TABLET ORAL at 17:48

## 2019-02-19 RX ADMIN — GUAIFENESIN 600 MG: 600 TABLET, EXTENDED RELEASE ORAL at 08:07

## 2019-02-19 RX ADMIN — CETIRIZINE HYDROCHLORIDE 5 MG: 10 TABLET, FILM COATED ORAL at 08:06

## 2019-02-19 RX ADMIN — Medication 10 ML: at 21:05

## 2019-02-19 RX ADMIN — GUAIFENESIN 600 MG: 600 TABLET, EXTENDED RELEASE ORAL at 21:04

## 2019-02-19 RX ADMIN — DOCUSATE SODIUM 100 MG: 100 CAPSULE, LIQUID FILLED ORAL at 17:48

## 2019-02-19 RX ADMIN — Medication 1 CAPSULE: at 08:07

## 2019-02-19 NOTE — PROGRESS NOTES
TRANSFER - IN REPORT: 
 
Verbal report received from [place], RN (name) on Bne De Los Santos  being received from Cath Lab (unit) for routine progression of care Report consisted of patients Situation, Background, Assessment and  
Recommendations(SBAR). Information from the following report(s) SBAR, Kardex, [facility] and Cardiac Rhythm Paced was reviewed with the receiving nurse. Opportunity for questions and clarification was provided. Assessment completed upon patients arrival to unit and care assumed. 1600 - 1800 Patient brought up from Cath Lab. Right groin dressing D/I. Held pressure to cath site for an extra five minutes per request of RONAK Fraga (Cath LaB). Patient's son at bedside. Educated patient on the need to keep right leg straight and to call for assistance when needed. Patient acknowledged understanding. Palpable pulse below and above cath site. No complaints of pain or discomfort. 1800 - 1900: Patient able to sit up and eat according to Cardiologist orders. Patient eating dinner. No complaints. Cath site D/I. No hematoma or bleeding. Bedside and Verbal shift change report given to Angela Montgomery RN (oncoming nurse) by Jeffrey Morales RN (offgoing nurse). Report included the following information SBAR, Kardex, [facility] and Cardiac Rhythm Paced.

## 2019-02-19 NOTE — PROGRESS NOTES
0700-Bedside shift change report given to Roberto (oncoming nurse) by Betsy Alatorre RN (offgoing nurse). Report included the following information SBAR, Intake/Output, MAR, Recent Results, Cardiac Rhythm NSR and Alarm Parameters . 0800-Patient sitting uo in chair. A&O x4. AM meds given. Aware that heart cath procedure has been moved up to around 1115, son also aware. CHG bath previously completed this AM. 
1030-Patient resting. No complaints. Awaiting Heart cath. 1225-Son at bedside. Patient up to toilet w/assist x1. Heart cath delayed, patient and son aware. 1315-Heart cath continues to be delayed, patient and son updated. 1440-Patient to Cath Lab 
6512- report given to Amy Vann RN (oncoming nurse) by Joseph Crump RN (offgoing nurse). Report included the following information SBAR, Intake/Output, MAR, Recent Results, Cardiac Rhythm NSR and Alarm Parameters .

## 2019-02-19 NOTE — PROGRESS NOTES
1900: Bedside and Verbal shift change report given to Andres Mcmillan RN (oncoming nurse) by Viktoriya Nagel RN (offgoing nurse). Report included the following information SBAR, Kardex, Procedure Summary, Intake/Output, MAR, Recent Results, Med Rec Status and Cardiac Rhythm Paced 2010: Shift assessment noted, see flow sheet. AxOx4, on room air. /94, HR 82 Paced on monitor, afebrile. Voiding, purewick in place. No complaints of pain. Call bell within reach. Family at bedside. 2200: Patient asleep in bed. 0023: Patient reassessed, no new changes at this time. 0400: No new changes, new IV started and lab work sent, reeducated patient that she could have a light breakfast before 7 am, patient declines. 0530: Patient given CHG bath, now resting comfortably in bed 
 
 
0700: Bedside and Verbal shift change report given to Roberto (oncoming nurse) by Andres Mcmillan RN (offgoing nurse). Report included the following information SBAR, Kardex, Procedure Summary, Intake/Output, MAR, Recent Results, Med Rec Status and Cardiac Rhythm Paced.

## 2019-02-19 NOTE — PROGRESS NOTES
Pt is currently in the cath lab. Son is very focused on his mother asking repeatedly throughout the shift when his mother would be going for her procedure. When pt is stable for discharge,pt will be opened to home health services with [facility]

## 2019-02-19 NOTE — PROCEDURES
BRIEF PROCEDURE NOTE    Date of Procedure: 2/19/2019   Preoperative Diagnosis: VT  Postoperative Diagnosis: Non obstructive CAD    Procedure: Left heart cath, LV angiography, coronary angiography  Interventional Cardiologist: Brian Eagle MD  Anesthesia: local + IV sedation  Estimated Blood Loss: Minimal  Findings:   R CFA acess - ultrasound guided; micropuncture access    LCA - EBU 3.5  RCA - JR4    L Main:  Med; Nml    LAD: Med; Mid 30% tandem lesion; Small vessel distally; D1/D2 - MLI    LCflex: Med; Prox 30%; OM1 - MLI    RCA: Med to Large; Mid  - 50% lesion; PDA and PLB - med; MLI    LVEDP: 12    LVEF: Not assessed; Nml by ECHO    No significant gradient across aortic valvve    Specimens Removed : None    Closure Device: Manual    D/w Dr Rice Falling         See full cath note.     Complications: none    Brian Eagle MD

## 2019-02-19 NOTE — PROGRESS NOTES
Nutrition Assessment: 
 
RECOMMENDATIONS/INTERVENTION(S):  
1. Consider liberalizing diet to regular, pt refusing meals on cardiac diet. 2. Ensure Enlive BID with limited PO intake. 3. Monitor intake, wt and plan of care. ASSESSMENT:  
2/19: pt admitted for v-tach cardiac arrest, resuscitated in the ED. PMHx for CAD, HTn, Afib, HLD. Cath placement planned for today. Pt able to have a small breakfast this am, pt refused and had 1/2 of an Ensure HP. PTA pt reports a good appetite has cereal with a banana for breakfast, cheese, peanut butter or banana sandwich for lunch (no sides) and eggs for dinner. Son reports she will eat more when she is with him. Pt reported not liking the food on the cardiac diet. She wasn't able to get a grilled cheese sandwich last night so she refused dinner and had a Ensure high protein. Eating 50% or less of meals. UBW around 135 lbs, confirmed in chart. BMI underweight for age. Meds: lola-q, bowel, mag ox, Diet Order: NPO 
% Eaten:   
Patient Vitals for the past 72 hrs: 
 % Diet Eaten 02/19/19 0700 0 % 02/18/19 1200 10 % 02/18/19 1000 50 % Pertinent Medications: [x] Reviewed Chemistries: [x] Reviewed Anthropometrics: Height: 5' 7\" (170.2 cm) Weight: 61.2 kg (135 lb) IBW (%IBW):   ( ) UBW (%UBW):   (  %) BMI: Body mass index is 21.14 kg/m². This BMI is indicative of: 
 [x] Underweight for age   [] Normal    [] Overweight    []  Obesity    []  Extreme Obesity (BMI>40) Estimated Nutrition Needs (Based on): 1060 Kcals/day(BMr 1065 x 1.2 AF +250kcal) , 61 g(-73g (1.0-1.2g/kg ABW)) Protein Carbohydrate: At Least 130 g/day  Fluids: 1550 ml (1mL/kcal) Last BM: 2/16,watery       Bowel Sounds: active Skin:    [] Intact   [] Incision  [] Breakdown   [] DTI   [] Tears/Excoriation/Abrasion  [x]Edema-trace BLE [] Other: Wt Readings from Last 30 Encounters:  
02/17/19 61.2 kg (135 lb) 02/07/19 61.4 kg (135 lb 6.4 oz) 02/05/19 59 kg (130 lb) 12/03/18 59.3 kg (130 lb 12.8 oz)  
11/14/18 60.9 kg (134 lb 3.2 oz) 10/08/18 59.9 kg (132 lb)  
09/14/18 60.3 kg (133 lb) 09/06/18 60.8 kg (134 lb 2 oz) 05/29/18 61.4 kg (135 lb 6.4 oz) 05/23/18 61.2 kg (135 lb) 02/01/18 62.1 kg (137 lb) 10/24/17 61.7 kg (136 lb) 08/08/17 62.6 kg (138 lb) 05/04/17 63 kg (139 lb)  
03/21/17 64 kg (141 lb)  
03/14/17 63 kg (139 lb) 12/12/16 60.8 kg (134 lb) 08/19/16 63.5 kg (140 lb) 08/12/16 64 kg (141 lb)  
07/26/16 64 kg (141 lb) 12/03/15 64.4 kg (142 lb)  
09/11/15 64 kg (141 lb) 08/10/15 64.4 kg (142 lb) 05/11/15 64 kg (141 lb) 05/04/15 64 kg (141 lb)  
01/26/15 65.8 kg (145 lb)  
09/24/14 65.8 kg (145 lb) 09/03/14 64 kg (141 lb)  
03/19/14 67.6 kg (149 lb)  
02/21/14 66.7 kg (147 lb) NUTRITION DIAGNOSES:  
Problem:  Inadequate energy intake Etiology: related to decreased hunger sensation, limited food acceptance with age Signs/Symptoms: as evidenced by BMI underweight for age, not meeting EEN    
 
NUTRITION INTERVENTIONS: 
Meals/Snacks: General/healthful diet   Supplements: Commercial supplement GOAL:  
Patient to meet EEN in the next 2-4 days Cultural, Taoism, or Ethnic Dietary Needs: None EDUCATION & DISCHARGE NEEDS:  
 [x] None Identified 
 [] Identified and Education Provided/Documented 
 [] Identified and Pt declined/was not appropriate [x] Interdisciplinary Care Plan Reviewed/Documented  
 [x] Discharge Needs: Regular diet, ONS until appetite returns 
 [] No Nutrition Related Discharge Needs NUTRITION RISK:  
Pt Is At Nutrition Risk  [x] No Nutrition Risk Identified  [] PT SEEN FOR:  
 []  MD Consult: []Calorie Count []Diabetic Diet Education []Diet Education []Electrolyte Management []General Nutrition Management and Supplements []Management of Tube Feeding []TPN Recommendations []  RN Referral:  []MST score >=2 
   []Enteral/Parenteral Nutrition PTA []Pregnant: Gestational DM or Multigestation  
              [] Pressure Ulcer 
 
[]  Low BMI      [x]  Length of Stay ICU       [] Dysphagia Diet         [] Ventilator 
[]  Follow-up Previous Recommendations: 
 [] Implemented          [] Not Implemented          [x] Not Applicable Previous Goal: 
 [] Met              [] Progressing Towards Goal              [] Not Progressing Towards Goal   [x] Not Applicable Janet Galvan RD Pager [phone] Office [phone]

## 2019-02-19 NOTE — PROGRESS NOTES
Problem: Falls - Risk of 
Goal: *Absence of Falls Document Luz Maria Reece Fall Risk and appropriate interventions in the flowsheet. Outcome: Progressing Towards Goal 
Fall Risk Interventions: 
Mobility Interventions: Patient to call before getting OOB Medication Interventions: Patient to call before getting OOB Elimination Interventions: Call light in reach, Patient to call for help with toileting needs History of Falls Interventions: Door open when patient unattended Problem: Pressure Injury - Risk of 
Goal: *Prevention of pressure injury Document Luciano Scale and appropriate interventions in the flowsheet. Outcome: Progressing Towards Goal 
Pressure Injury Interventions: Activity Interventions: Increase time out of bed Mobility Interventions: PT/OT evaluation Nutrition Interventions: Document food/fluid/supplement intake Friction and Shear Interventions: HOB 30 degrees or less

## 2019-02-19 NOTE — PROGRESS NOTES
Occupational therapy note: 
Chart reviewed. Patient scheduled for cardiac cath today. Will hold OT at this time and follow up with patient after cath. Taylor Conrad MS OTR/L

## 2019-02-19 NOTE — PROGRESS NOTES
Cardiology Progress Note     ICU   NAME:  Efe Mulligan :   1928 MRN:   [id number] Assessment/Plan:  
1. S/P VT arrest: PPM interrogation shows an hour long episode out of hospital with heart rate of 194 bpm.  Rhythm likely VT. EF 61-65% Mild TR, mild AR. Plan for [facility] today at 11:15 to eval CORS. NPO. Discussed at length with pt and son. Verbal consent obtained. Further recs post cath. 2. Recurrent NSVT: EP following. Cont amio 200 mg BID. 3. Elevated troponin: no MI.  
4. A fib s/p PPM:  cont atenolol, eliquis 5. CAD: cont BB Cardiology Attending: 
 
Patient personally seen and examined. All the elements of history and examination were personally performed. Assessment and plan was discussed and agree as written above. - Going for cath to r/o CAD. - High bleeding risk therefore discussed with Dr. Bart Fatima to consider BMS if PCI is needed to avoid long therm DAPT as she is on Eliquis for Afib James Rivas MD, [facility] [place] Subjective:  
Efe Mulligan is a 80 y.o. female with extensive past medical history significant for CAD, atrial fibrillation, permanent pacemaker, hypertension, Sjogren's syndrome, thoracic aortic aneurysm without mention of rupture, hypertension admitted with what appears to be cardiac arrest.  
  
PPM interrogation showed a  heart rate of 194 bpm yesterday for 1 hour around the time when she had symptoms. It is unclear if this is A. fib with pacemaker tracking versus VT. She had a similar high heart rate event on  noted on her pacemaker. 
  
 
Cardiac ROS: Patient denies any exertional chest pain, dyspnea, palpitations, syncope, orthopnea, edema or paroxysmal nocturnal dyspnea. Previous Cardiac Eval 
19 ECHO ADULT COMPLETE 2019 Narrative · Estimated left ventricular ejection fraction is 61 - 65%. · Left atrial cavity size is mildly dilated. · Mild aortic valve sclerosis. Aortic valve leaflet calcification present. Mild aortic valve regurgitation is present. · Mild mitral annular calcification. · Mild tricuspid valve regurgitation is present. Signed by: Natalie Renee MD  
 
16 stress EF 61% no ischemia 11 stress EF 71% no ischemia 2/10/17 DCCV a fib > SR 1 shock 150 joules 16 dual chamber PPM, medtronic 16 ECHO EF 65% mild/mod hypertrophy, mild/mod MR/TR 
12: ECHO EF 60% mild AI, MR Chest CT 3/12/12 aneurysmal dilation of ascending thoracic aorta 4.4 cm in greatest diameter Proximal descending aorta 4.2 cm in greatest diameter. Mod calcified plaque  
involving LAD, and prox cflex Review of Systems: Slept well. No nausea, indigestion, vomiting, pain, cough, sputum. No bleeding. NPO for cath. Objective:  
 
Visit Vitals /66 Pulse 80 Temp 97.8 °F (36.6 °C) Resp 12 Ht 5' 7\" (1.702 m) Wt 135 lb (61.2 kg) SpO2 96% BMI 21.14 kg/m² O2 Flow Rate (L/min): 2 l/min O2 Device: Room air Temp (24hrs), Av.9 °F (36.6 °C), Min:97.6 °F (36.4 °C), Max:98.4 °F (36.9 °C) No intake/output data recorded.  1901 -  0700 In: 1027 [P.O.:440; I.V.:587] Out: 5550 [[facility]:6362] TELE: SR occ PVC General: AAOx3 cooperative, no acute distress. HEENT: Atraumatic. Pink and moist.  Anicteric sclerae. Neck : Supple, no thyromegaly. Lungs: CTA bilaterally. No wheezing/rhonchi/rales. Heart: Regular rhythm, no murmur, no rubs, no gallops. No JVD. No carotid bruits. Abdomen: Soft, non-distended, non-tender. + Bowel sounds. Extremities: No edema Neurologic: Grossly intact. Alert and oriented X 3. No acute neurological distress. Psych: Good insight. Not anxious or agitated. Care Plan discussed with: 
  Comments Patient x Family  x Son   
RN x Care Manager x Consultant:  x Data Review: No lab exists for component: ITNL Recent Labs  
  02/17/19 
0949 02/17/19 
0235 02/16/19 2019 TROIQ <0.05 0.12* 0.14* Recent Labs  
  02/19/19 
0354 02/18/19 
0300 02/17/19 
0235 02/16/19 
1736  144 138 136  
K 3.6 4.0 3.5 4.0  
 109* 106 100 CO2 30 27 25 22 BUN 8 8 20 19 CREA 0.69 0.80 0.83 1.26*  89 121* 278* PHOS  --   --  3.6  --   
MG 1.7 2.0 1.6 2.0 ALB  --   --   --  3.3* WBC  --  2.6* 3.2* 2.5* HGB  --  11.6 10.8* 12.1 HCT  --  36.6 33.8* 38.4 PLT  --  140* 142* 150 Recent Labs  
  02/16/19 
1736 INR 1.1 PTP 11.0 Medications reviewed Current Facility-Administered Medications Medication Dose Route Frequency  amiodarone (CORDARONE) tablet 200 mg  200 mg Oral BID  lisinopril (PRINIVIL, ZESTRIL) tablet 10 mg  10 mg Oral QHS  cetirizine (ZYRTEC) tablet 5 mg  5 mg Oral DAILY  guaiFENesin ER (MUCINEX) tablet 600 mg  600 mg Oral Q12H  levothyroxine (SYNTHROID) tablet 100 mcg  100 mcg Oral ACB  montelukast (SINGULAIR) tablet 10 mg  10 mg Oral DAILY  atenolol (TENORMIN) tablet 25 mg  25 mg Oral DAILY  amiodarone (CORDARONE) 375 mg in dextrose 5% 250 mL infusion  0.5 mg/min IntraVENous TITRATE  loperamide (IMODIUM) capsule 2 mg  2 mg Oral Q4H PRN  
 sodium chloride (NS) flush 5-40 mL  5-40 mL IntraVENous Q8H  
 sodium chloride (NS) flush 5-40 mL  5-40 mL IntraVENous PRN  
 acetaminophen (TYLENOL) tablet 650 mg  650 mg Oral Q4H PRN  
 oxyCODONE-acetaminophen (PERCOCET) 5-325 mg per tablet 1 Tab  1 Tab Oral Q4H PRN  prochlorperazine (COMPAZINE) injection 10 mg  10 mg IntraVENous Q6H PRN  
 lactobac ac& pc-s.therm-b.anim (WILLA Q/RISAQUAD)  1 Cap Oral DAILY Tj River NP

## 2019-02-19 NOTE — PROGRESS NOTES
Attempted to work with the patient for Physical Therapy, chart reviewed and discussed with nurse patient scheduled to have cardiac cath today. We will defer therapy for today. We will continue to follow up with the patient for therapy. Thank you.

## 2019-02-19 NOTE — PROGRESS NOTES
[facility] DAILY PROGRESS NOTE Name: Jaz Fuentes : 1928 MRN: [id number] Date: 2019 8:48 AM  
I have reviewed the flowsheet and previous days notes. Overnight events: 
Has some chest tenderness from compressions. Denies SOB Plan for [facility] this AM. Vital Signs:   
Visit Vitals /90 Pulse 80 Temp 98.3 °F (36.8 °C) Resp 11 Ht 5' 7\" (1.702 m) Wt 61.2 kg (135 lb) SpO2 95% BMI 21.14 kg/m² O2 Device: Room air O2 Flow Rate (L/min): 2 l/min Temp (24hrs), Av °F (36.7 °C), Min:97.6 °F (36.4 °C), Max:98.4 °F (36.9 °C) Intake/Output:  
Last shift:       07 - 1900 In: -  
Out: 400 [Urine:400] Last 3 shifts: 1901 -  0700 In: 1267 [P.O.:680; I.V.:587] Out: 5550 [WKPUR:3925] Intake/Output Summary (Last 24 hours) at 2019 1058 Last data filed at 2019 2290 Gross per 24 hour Intake 505.67 ml Output 3600 ml Net -3094.33 ml Physical Exam: 
General:  Alert, cooperative, no distress Head:  atraumatic. Eyes:  EOMs intact. Nose: Nares normal.  
Room air Throat: Lips normal.  
   
   
Lungs:   Clear to auscultation bilaterally. Chest wall:  No tenderness or deformity. Heart:  Regular rate and rhythm Abdomen:   Soft, non-tender. Extremities: No edema or tenderness. Skin: Dry Neurologic: Grossly nonfocal  
 
 
DATA:  
Current Facility-Administered Medications Medication Dose Route Frequency  magnesium oxide (MAG-OX) tablet 400 mg  400 mg Oral DAILY  amiodarone (CORDARONE) tablet 200 mg  200 mg Oral BID  lisinopril (PRINIVIL, ZESTRIL) tablet 10 mg  10 mg Oral QHS  cetirizine (ZYRTEC) tablet 5 mg  5 mg Oral DAILY  guaiFENesin ER (MUCINEX) tablet 600 mg  600 mg Oral Q12H  levothyroxine (SYNTHROID) tablet 100 mcg  100 mcg Oral ACB  montelukast (SINGULAIR) tablet 10 mg  10 mg Oral DAILY  atenolol (TENORMIN) tablet 25 mg  25 mg Oral DAILY  amiodarone (CORDARONE) 375 mg in dextrose 5% 250 mL infusion  0.5 mg/min IntraVENous TITRATE  loperamide (IMODIUM) capsule 2 mg  2 mg Oral Q4H PRN  
 sodium chloride (NS) flush 5-40 mL  5-40 mL IntraVENous Q8H  
 sodium chloride (NS) flush 5-40 mL  5-40 mL IntraVENous PRN  
 acetaminophen (TYLENOL) tablet 650 mg  650 mg Oral Q4H PRN  
 oxyCODONE-acetaminophen (PERCOCET) 5-325 mg per tablet 1 Tab  1 Tab Oral Q4H PRN  prochlorperazine (COMPAZINE) injection 10 mg  10 mg IntraVENous Q6H PRN  
 lactobac ac& pc-s.therm-b.anim (WILLA Q/RISAQUAD)  1 Cap Oral DAILY Telemetry: paced rhythm Labs: 
Recent Labs  
  02/18/19 
0300 02/17/19 
0235 02/16/19 
1736 WBC 2.6* 3.2* 2.5* HGB 11.6 10.8* 12.1 HCT 36.6 33.8* 38.4 * 142* 150 Recent Labs  
  02/19/19 
0354 02/18/19 
0300 02/17/19 
0235 02/16/19 
1736  144 138 136  
K 3.6 4.0 3.5 4.0  
 109* 106 100 CO2 30 27 25 22  89 121* 278* BUN 8 8 20 19 CREA 0.69 0.80 0.83 1.26* CA 8.4* 7.7* 7.6* 8.4* MG 1.7 2.0 1.6 2.0 PHOS  --   --  3.6  --   
ALB  --   --   --  3.3* SGOT  --   --   --  70* ALT  --   --   --  31 INR  --   --   --  1.1 Recent Labs  
  02/16/19 
1743 PH 7.37  
PCO2 32* PO2 305* HCO3 18* Imaging:  I have personally reviewed the patients radiographs and reports. Mild left atx IMPRESSION:  
· S/p VT and cardioversion · Pacemaker · Htn · Afib s/p PPM 
· Constipation · Hypothyroidism PLAN:  
· Rate control as per cardiology · Amiodarone · LHC this AM 
· Atenolol · Elquis · Cardiac diet · Stool softners · My assessment/plan was discussed with: nurse Yonas Calvillo MD

## 2019-02-20 LAB
ANION GAP SERPL CALC-SCNC: 7 MMOL/L (ref 5–15)
BUN SERPL-MCNC: 10 MG/DL (ref 6–20)
BUN/CREAT SERPL: 13 (ref 12–20)
CALCIUM SERPL-MCNC: 7.9 MG/DL (ref 8.5–10.1)
CHLORIDE SERPL-SCNC: 105 MMOL/L (ref 97–108)
CO2 SERPL-SCNC: 29 MMOL/L (ref 21–32)
CREAT SERPL-MCNC: 0.75 MG/DL (ref 0.55–1.02)
GLUCOSE SERPL-MCNC: 88 MG/DL (ref 65–100)
MAGNESIUM SERPL-MCNC: 1.8 MG/DL (ref 1.6–2.4)
POTASSIUM SERPL-SCNC: 3.9 MMOL/L (ref 3.5–5.1)
SODIUM SERPL-SCNC: 141 MMOL/L (ref 136–145)
TSH SERPL DL<=0.05 MIU/L-ACNC: 5.26 UIU/ML (ref 0.36–3.74)

## 2019-02-20 PROCEDURE — 80048 BASIC METABOLIC PNL TOTAL CA: CPT

## 2019-02-20 PROCEDURE — 74011250637 HC RX REV CODE- 250/637: Performed by: INTERNAL MEDICINE

## 2019-02-20 PROCEDURE — 36415 COLL VENOUS BLD VENIPUNCTURE: CPT

## 2019-02-20 PROCEDURE — 77030038269 HC DRN EXT URIN PURWCK BARD -A

## 2019-02-20 PROCEDURE — 97535 SELF CARE MNGMENT TRAINING: CPT

## 2019-02-20 PROCEDURE — 97116 GAIT TRAINING THERAPY: CPT

## 2019-02-20 PROCEDURE — 83735 ASSAY OF MAGNESIUM: CPT

## 2019-02-20 PROCEDURE — 97530 THERAPEUTIC ACTIVITIES: CPT

## 2019-02-20 PROCEDURE — 84443 ASSAY THYROID STIM HORMONE: CPT

## 2019-02-20 PROCEDURE — 65660000000 HC RM CCU STEPDOWN

## 2019-02-20 PROCEDURE — 74011250637 HC RX REV CODE- 250/637: Performed by: NURSE PRACTITIONER

## 2019-02-20 RX ADMIN — SENNA 8.6 MG: 8.6 TABLET, COATED ORAL at 08:02

## 2019-02-20 RX ADMIN — DOCUSATE SODIUM 100 MG: 100 CAPSULE, LIQUID FILLED ORAL at 18:40

## 2019-02-20 RX ADMIN — Medication 10 ML: at 18:41

## 2019-02-20 RX ADMIN — MONTELUKAST 10 MG: 10 TABLET, FILM COATED ORAL at 08:02

## 2019-02-20 RX ADMIN — ATENOLOL 25 MG: 25 TABLET ORAL at 08:03

## 2019-02-20 RX ADMIN — ACETAMINOPHEN 650 MG: 325 TABLET ORAL at 08:02

## 2019-02-20 RX ADMIN — ACETAMINOPHEN 650 MG: 325 TABLET ORAL at 21:09

## 2019-02-20 RX ADMIN — CETIRIZINE HYDROCHLORIDE 5 MG: 10 TABLET, FILM COATED ORAL at 08:03

## 2019-02-20 RX ADMIN — AMIODARONE HYDROCHLORIDE 200 MG: 200 TABLET ORAL at 18:39

## 2019-02-20 RX ADMIN — Medication 10 ML: at 21:12

## 2019-02-20 RX ADMIN — Medication 1 CAPSULE: at 08:03

## 2019-02-20 RX ADMIN — LISINOPRIL 10 MG: 5 TABLET ORAL at 21:09

## 2019-02-20 RX ADMIN — Medication 400 MG: at 08:02

## 2019-02-20 RX ADMIN — APIXABAN 5 MG: 5 TABLET, FILM COATED ORAL at 09:07

## 2019-02-20 RX ADMIN — GUAIFENESIN 600 MG: 600 TABLET, EXTENDED RELEASE ORAL at 21:09

## 2019-02-20 RX ADMIN — Medication 10 ML: at 06:00

## 2019-02-20 RX ADMIN — DOCUSATE SODIUM 100 MG: 100 CAPSULE, LIQUID FILLED ORAL at 08:02

## 2019-02-20 RX ADMIN — AMIODARONE HYDROCHLORIDE 200 MG: 200 TABLET ORAL at 08:02

## 2019-02-20 RX ADMIN — Medication 10 ML: at 18:42

## 2019-02-20 RX ADMIN — LEVOTHYROXINE SODIUM 100 MCG: 100 TABLET ORAL at 08:02

## 2019-02-20 RX ADMIN — APIXABAN 5 MG: 5 TABLET, FILM COATED ORAL at 18:39

## 2019-02-20 RX ADMIN — Medication 10 ML: at 18:40

## 2019-02-20 RX ADMIN — GUAIFENESIN 600 MG: 600 TABLET, EXTENDED RELEASE ORAL at 08:02

## 2019-02-20 NOTE — PROGRESS NOTES
Problem: Falls - Risk of 
Goal: *Absence of Falls Document Reino Horn Fall Risk and appropriate interventions in the flowsheet. Outcome: Progressing Towards Goal 
Fall Risk Interventions: 
Mobility Interventions: Patient to call before getting OOB Medication Interventions: Patient to call before getting OOB Elimination Interventions: Call light in reach, Patient to call for help with toileting needs History of Falls Interventions: Door open when patient unattended Problem: Pressure Injury - Risk of 
Goal: *Prevention of pressure injury Document Luciano Scale and appropriate interventions in the flowsheet. Outcome: Progressing Towards Goal 
Pressure Injury Interventions: Activity Interventions: Increase time out of bed, Pressure redistribution bed/mattress(bed type) Mobility Interventions: PT/OT evaluation Nutrition Interventions: Document food/fluid/supplement intake Friction and Shear Interventions: HOB 30 degrees or less

## 2019-02-20 NOTE — PROGRESS NOTES
SUBJECTIVE No recurrent VT on amiodarone bid. [facility] yesterday demonstrated nonobstructive CAD. ACTIVE PROBLEM LIST Patient Active Problem List  
 Diagnosis Date Noted  Ventricular tachycardia ([facility] [address] 02/16/2019  Elevated serum creatinine 02/16/2019  Advanced care planning/counseling discussion 12/03/2015  Personal history of colonic polyps 03/19/2014  Anxiety  Insomnia   
 HTN (hypertension)  Hyperlipidemia LDL goal < 100   
 Osteoarthritis  Hypothyroid  CAD (coronary artery disease)  Atrial fibrillation ([facility] [address]  Thoracic aneurysm without mention of rupture  Anemia MEDICATIONS Current Facility-Administered Medications Medication Dose Route Frequency  apixaban (ELIQUIS) tablet 5 mg  5 mg Oral BID  magnesium oxide (MAG-OX) tablet 400 mg  400 mg Oral DAILY  docusate sodium (COLACE) capsule 100 mg  100 mg Oral BID  senna (SENOKOT) tablet 8.6 mg  1 Tab Oral DAILY  sodium chloride (NS) flush 5-40 mL  5-40 mL IntraVENous Q8H  
 sodium chloride (NS) flush 5-40 mL  5-40 mL IntraVENous PRN  
 amiodarone (CORDARONE) tablet 200 mg  200 mg Oral BID  lisinopril (PRINIVIL, ZESTRIL) tablet 10 mg  10 mg Oral QHS  cetirizine (ZYRTEC) tablet 5 mg  5 mg Oral DAILY  guaiFENesin ER (MUCINEX) tablet 600 mg  600 mg Oral Q12H  levothyroxine (SYNTHROID) tablet 100 mcg  100 mcg Oral ACB  montelukast (SINGULAIR) tablet 10 mg  10 mg Oral DAILY  atenolol (TENORMIN) tablet 25 mg  25 mg Oral DAILY  amiodarone (CORDARONE) 375 mg in dextrose 5% 250 mL infusion  0.5 mg/min IntraVENous TITRATE  loperamide (IMODIUM) capsule 2 mg  2 mg Oral Q4H PRN  
 sodium chloride (NS) flush 5-40 mL  5-40 mL IntraVENous Q8H  
 sodium chloride (NS) flush 5-40 mL  5-40 mL IntraVENous PRN  
 acetaminophen (TYLENOL) tablet 650 mg  650 mg Oral Q4H PRN  
 oxyCODONE-acetaminophen (PERCOCET) 5-325 mg per tablet 1 Tab  1 Tab Oral Q4H PRN  
  prochlorperazine (COMPAZINE) injection 10 mg  10 mg IntraVENous Q6H PRN  
 lactobac ac& pc-s.therm-b.anim (WILLA Q/RISAQUAD)  1 Cap Oral DAILY PAST MEDICAL HISTORY Past Medical History:  
Diagnosis Date  Acute cystitis 05/23/2018  Anemia, unspecified  Anxiety  Arrhythmia   
 a fib  Arthritis   
 osteoarthritis, rheumatoid  Atrial fibrillation ([facility] [address] Dr. Marylou Crockett and Dr. Margi Tinsley  following  Autoimmune disease (HCC)   
 sjogren disease Dr. Micheal Jacinto  CAD (coronary artery disease)  HTN (hypertension)  Hyperlipidemia LDL goal < 100  Hypothyroid  Insomnia  Osteoarthritis  Sjogren's disease ([facility] [address]  Thoracic aneurysm without mention of rupture PAST SURGICAL HISTORY Past Surgical History:  
Procedure Laterality Date  HX CHOLECYSTECTOMY  HX COLONOSCOPY  3/2014 Dr. Isabelle Gayle  HX OTHER SURGICAL    
 hernia repairs  HX PARTIAL THYROIDECTOMY  HX JANNET AND BSO  HX VEIN STRIPPING ALLERGIES Allergies Allergen Reactions  Latex Hives NOT ALLERGIC PER PT 02/01/2018  Pcn [Penicillins] Anaphylaxis  Sulfa (Sulfonamide Antibiotics) Anaphylaxis  Biaxin [Clarithromycin] Nausea Only  Biotin Unknown (comments)  Pcn [Penicillins] Hives  Sulfa (Sulfonamide Antibiotics) Nausea Only FAMILY HISTORY Family History Problem Relation Age of Onset  Heart Disease Father   
     aneurysm  Cancer Maternal Grandfather   
 negative for cardiac disease SOCIAL HISTORY Social History Socioeconomic History  Marital status:  Spouse name: Not on file  Number of children: Not on file  Years of education: Not on file  Highest education level: Not on file Tobacco Use  Smoking status: Never Smoker  Smokeless tobacco: Never Used Substance and Sexual Activity  Alcohol use: Yes Comment: wine Social History Narrative ** Merged History Encounter ** MEDICATIONS Current Facility-Administered Medications Medication Dose Route Frequency  apixaban (ELIQUIS) tablet 5 mg  5 mg Oral BID  magnesium oxide (MAG-OX) tablet 400 mg  400 mg Oral DAILY  docusate sodium (COLACE) capsule 100 mg  100 mg Oral BID  senna (SENOKOT) tablet 8.6 mg  1 Tab Oral DAILY  sodium chloride (NS) flush 5-40 mL  5-40 mL IntraVENous Q8H  
 sodium chloride (NS) flush 5-40 mL  5-40 mL IntraVENous PRN  
 amiodarone (CORDARONE) tablet 200 mg  200 mg Oral BID  lisinopril (PRINIVIL, ZESTRIL) tablet 10 mg  10 mg Oral QHS  cetirizine (ZYRTEC) tablet 5 mg  5 mg Oral DAILY  guaiFENesin ER (MUCINEX) tablet 600 mg  600 mg Oral Q12H  levothyroxine (SYNTHROID) tablet 100 mcg  100 mcg Oral ACB  montelukast (SINGULAIR) tablet 10 mg  10 mg Oral DAILY  atenolol (TENORMIN) tablet 25 mg  25 mg Oral DAILY  amiodarone (CORDARONE) 375 mg in dextrose 5% 250 mL infusion  0.5 mg/min IntraVENous TITRATE  loperamide (IMODIUM) capsule 2 mg  2 mg Oral Q4H PRN  
 sodium chloride (NS) flush 5-40 mL  5-40 mL IntraVENous Q8H  
 sodium chloride (NS) flush 5-40 mL  5-40 mL IntraVENous PRN  
 acetaminophen (TYLENOL) tablet 650 mg  650 mg Oral Q4H PRN  
 oxyCODONE-acetaminophen (PERCOCET) 5-325 mg per tablet 1 Tab  1 Tab Oral Q4H PRN  prochlorperazine (COMPAZINE) injection 10 mg  10 mg IntraVENous Q6H PRN  
 lactobac ac& pc-s.therm-b.anim (WILLA Q/RISAQUAD)  1 Cap Oral DAILY I have reviewed the nurses notes, vitals, problem list, allergy list, medical history, family, social history and medications. REVIEW OF SYMPTOMS General: Pt denies excessive weight gain or loss. Pt is able to conduct ADL's HEENT: Denies blurred vision, headaches, hearing loss, epistaxis and difficulty swallowing. Respiratory: Denies cough, congestion, shortness of breath, NAVA, wheezing or stridor. Cardiovascular: Denies precordial pain, palpitations, edema or PND Gastrointestinal: Denies poor appetite, indigestion, abdominal pain or blood in stool Genitourinary: Denies hematuria, dysuria, increased urinary frequency Musculoskeletal: Denies joint pain or swelling from muscles or joints Neurologic: Denies tremor, paresthesias, headache, or sensory motor disturbance Psychiatric: Denies confusion, insomnia, depression Integumentray: Denies rash, itching or ulcers. Hematologic: Denies easy bruising, bleeding PHYSICAL EXAMINATION Vitals:  
 02/20/19 1100 02/20/19 1142 02/20/19 1200 02/20/19 1300 BP: 125/89 152/87 125/79 143/80 Pulse: 82 81 80 80 Resp: 14 17 15 14 Temp: 97.7 °F (36.5 °C) SpO2: 96% 94% 95% 96% Weight:      
Height:      
 
General: Well developed, in no acute distress. HEENT: No jaundice, oral mucosa moist, no oral ulcers Neck: Supple, no stiffness, no lymphadenopathy, supple Heart:  Normal S1/S2 negative S3 or S4. Regular, no murmur, gallop or rub, no jugular venous distention Respiratory: Clear bilaterally x 4, no wheezing or rales Abdomen:   Soft, non-tender, bowel sounds are active.  
Extremities:  No edema, normal cap refill, no cyanosis. Musculoskeletal: No clubbing, no deformities Neuro: A&Ox3, speech clear, gait stable, cooperative, no focal neurologic deficits Skin: Skin color is normal. No rashes or lesions. Non diaphoretic, moist. 
Vascular: 2+ pulses symmetric in all extremities DIAGNOSTIC DATA TELEMETRY:  
 
 
 LABORATORY DATA Lab Results Component Value Date/Time WBC 2.6 (L) 02/18/2019 03:00 AM  
 HGB 11.6 02/18/2019 03:00 AM  
 Hematocrit (POC) 38 02/16/2019 05:38 PM  
 HCT 36.6 02/18/2019 03:00 AM  
 PLATELET 878 (L) 55/11/9730 03:00 AM  
 MCV 98.9 02/18/2019 03:00 AM  
  
Lab Results Component Value Date/Time  Sodium 141 02/20/2019 05:04 AM  
 Potassium 3.9 02/20/2019 05:04 AM  
 Chloride 105 02/20/2019 05:04 AM  
 CO2 29 02/20/2019 05:04 AM  
 Anion gap 7 02/20/2019 05:04 AM  
 Glucose 88 02/20/2019 05:04 AM  
 BUN 10 02/20/2019 05:04 AM  
 Creatinine 0.75 02/20/2019 05:04 AM  
 BUN/Creatinine ratio 13 02/20/2019 05:04 AM  
 GFR est AA >60 02/20/2019 05:04 AM  
 GFR est non-AA >60 02/20/2019 05:04 AM  
 Calcium 7.9 (L) 02/20/2019 05:04 AM  
 Bilirubin, total 0.4 02/16/2019 05:36 PM  
 AST (SGOT) 70 (H) 02/16/2019 05:36 PM  
 Alk. phosphatase 81 02/16/2019 05:36 PM  
 Protein, total 6.6 02/16/2019 05:36 PM  
 Albumin 3.3 (L) 02/16/2019 05:36 PM  
 Globulin 3.3 02/16/2019 05:36 PM  
 A-G Ratio 1.0 (L) 02/16/2019 05:36 PM  
 ALT (SGPT) 31 02/16/2019 05:36 PM  
  
 
 
 ASSESSMENT 1. Ventricular tachycardia 2. CAD, native 3. Pacemaker A. Dual chamber B. Implant 2016 4. Atrial fibrillation A. Paroxysmal  
5. Sjogren disease 6. Hypertension 7. Hypothyroidism 8. Thoracic aortic aneurysm 
  
 
 
 PLAN Plan for upgrade of dual chamber PPM to ICD on Friday. Hold eliquis tomorrow. Haile Chandler MD 
Cardiac Electrophysiology / Cardiology [address], [address] [facility], [address] 
[phone] / [phone] Fax   [phone] / [phone] Fax

## 2019-02-20 NOTE — PROGRESS NOTES
Problem: Self Care Deficits Care Plan (Adult) Goal: *Acute Goals and Plan of Care (Insert Text) Occupational Therapy Goals Initiated 2/17/2019 1. Patient will perform grooming with supervision/set-up in standing with CGA within 7 day(s). 2.  Patient will perform upper body dressing with modified independence within 7 day(s). 3.  Patient will perform lower body dressing with supervision/set-up within 7 day(s). 4.  Patient will perform toilet transfers with supervision/set-up within 7 day(s). 5.  Patient will perform all aspects of toileting with supervision/set-up within 7 day(s). 6.  Patient will participate in upper extremity therapeutic exercise/activities with independence for 5 minutes within 7 day(s). 7.  Patient will utilize energy conservation techniques during functional activities with verbal cues within 7 day(s). Occupational Therapy TREATMENT Patient: Do Dempsey (23 y.o. female) Date: 2/20/2019 Diagnosis: Ventricular tachycardia ([address] [I47.2] Ventricular tachycardia ([address] Procedure(s) (LRB): LEFT HEART CATH / CORONARY ANGIOGRAPHY (Bilateral) 1 Day Post-Op Precautions: Fall Chart, occupational therapy assessment, plan of care, and goals were reviewed. ASSESSMENT: 
Patient agreeable to activity today. She is able to come to sitting with SBA. Once sitting, she requests toilet transfer. She is able to perform with CGA. She manages hygiene in sitting with supervision and requires only CGA for managing hospital gown. Patient able to stand at sink for hand hygiene with supervision. Patient left in NAD in chair. Progression toward goals: 
[x]       Improving appropriately and progressing toward goals 
[]       Improving slowly and progressing toward goals 
[]       Not making progress toward goals and plan of care will be adjusted PLAN: 
Patient continues to benefit from skilled intervention to address the above impairments. Continue treatment per established plan of care. Discharge Recommendations:  Home Health Further Equipment Recommendations for Discharge:  TBD SUBJECTIVE:  
Patient stated I was just going to take a nap.  OBJECTIVE DATA SUMMARY:  
Cognitive/Behavioral Status: 
Neurologic State: Alert Orientation Level: Oriented X4 Cognition: Appropriate decision making; Follows commands Perception: Appears intact Perseveration: No perseveration noted Safety/Judgement: Awareness of environment Functional Mobility and Transfers for ADLs:Bed Mobility: 
Rolling: Stand-by assistance Supine to Sit: Stand-by assistance Sit to Supine: Stand-by assistance Scooting: Stand-by assistance Transfers: 
Sit to Stand: Contact guard assistance Functional Transfers Toilet Transfer : Contact guard assistance Bed to Chair: Contact guard assistance Balance: 
Sitting: Intact Standing: With support Standing - Static: Good Standing - Dynamic : Fair ADL Intervention: 
  
 
Grooming Grooming Assistance: Supervision/set up Washing Face: Supervision/set-up Washing Hands: Supervision/set-up Toileting Toileting Assistance: Contact guard assistance Bladder Hygiene: Supervision/set-up Clothing Management: Contact guard assistance Cognitive Retraining Safety/Judgement: Awareness of environment Neuro Re-Education: 
  
  
  
Therapeutic Exercises:  
Pain: 
Pain Scale 1: Numeric (0 - 10) Pain Intensity 1: 0 Pain Location 1: Head 
Pain Orientation 1: Anterior Pain Description 1: Aching Pain Intervention(s) 1: Medication (see MAR) Activity Tolerance: VSS Please refer to the flowsheet for vital signs taken during this treatment. After treatment:  
[x] Patient left in no apparent distress sitting up in chair 
[] Patient left in no apparent distress in bed 
[x] Call bell left within reach 
[] Nursing notified 
[x] Caregiver present [x] Bed alarm activated COMMUNICATION/COLLABORATION:  
The patients plan of care was discussed with: Physical Therapist and Registered Nurse BLACK Landaverde/L Time Calculation: 23 mins

## 2019-02-20 NOTE — PROGRESS NOTES
I met with pt to discuss the plan for discharge. Zahida Delacruz will transfer to telemetry. On Friday,pt will have an ICD placed. Cardiac cath demonstrated non-obstructive CAD. If pt remains stable,she will likely be discharged home on Saturday. When discharged,pt has been accepted by [facility] in [place] for SN,PT and OT. I notified [facility] regarding pt.'s tentative discharge date. Home Health has been placed on pt.'s AVS.Dr Elysia Brown and nurse navigators notified of the plan also. Hand-off given to [facility]  in anticipation of pt.'s transfer to telemetry.  
 
Lubna Mehta

## 2019-02-20 NOTE — PROGRESS NOTES
Cardiology Progress Note    NAME:  Sabra Herman :   1928 MRN:   [id number] Assessment/Plan:  
1. S/P VT arrest: PPM interrogation shows an hour long episode out of hospital with heart rate of 194 bpm.  Rhythm likely VT. EF 61-65% Mild TR, mild AR. Cath with non obs CAD. D/W Dr Lennox Rung re: ICD. He will call son. OK to resume eliquis today and hold from tomorrow in anticipation of ICD Friday. D/ W pt  
2. NSVT: EP following. None with amio @ 200 mg BID. 3. Elevated troponin: no MI.  
4. A fib s/p PPM:  cont atenolol, eliquis 5. Non obs CAD: cont BB Ok to tele. Cardiology Attending: 
 
Patient personally seen and examined. All the elements of history and examination were personally performed. Assessment and plan was discussed and agree as written above. - No occlusive CAD on cath. RCA with 50% lesion but deemed non significant to cause ischemia. Will probably need stress test in near future to r/o ischemia.  
 
- Dr. Angelo Waddell. He is has been in communication with her son and Dr. Daya Barnes. James Cornelius MD, [facility] [place] Subjective:  
Sabra Herman is a 80 y.o. female with extensive past medical history significant for CAD, atrial fibrillation, permanent pacemaker, hypertension, Sjogren's syndrome, thoracic aortic aneurysm without mention of rupture, hypertension admitted with what appears to be cardiac arrest.  
  
PPM interrogation showed a  heart rate of 194 bpm yesterday for 1 hour around the time when she had symptoms. It is unclear if this is A. fib with pacemaker tracking versus VT. She had a similar high heart rate event on  noted on her pacemaker. 
  
 
Cardiac ROS: Patient denies any exertional chest pain, dyspnea, palpitations, syncope, orthopnea, edema or paroxysmal nocturnal dyspnea. Previous Cardiac Eval 
19 ECHO ADULT COMPLETE 2019 Narrative · Estimated left ventricular ejection fraction is 61 - 65%. · Left atrial cavity size is mildly dilated. · Mild aortic valve sclerosis. Aortic valve leaflet calcification present. Mild aortic valve regurgitation is present. · Mild mitral annular calcification. · Mild tricuspid valve regurgitation is present. Signed by: Rishabh Meneses MD  
 
16 stress EF 61% no ischemia 11 stress EF 71% no ischemia 2/10/17 DCCV a fib > SR 1 shock 150 joules 16 dual chamber PPM, medtronic 16 ECHO EF 65% mild/mod hypertrophy, mild/mod MR/TR 
12: ECHO EF 60% mild AI, MR Chest CT 3/12/12 aneurysmal dilation of ascending thoracic aorta 4.4 cm in greatest diameter Proximal descending aorta 4.2 cm in greatest diameter. Mod calcified plaque  
involving LAD, and prox cflex Review of Systems: Slept well. No nausea, indigestion, vomiting, pain, cough, sputum. No bleeding. Appetite poor, on supplements. Objective:  
 
Visit Vitals /69 (BP 1 Location: Right arm, BP Patient Position: At rest) Pulse 80 Temp 98 °F (36.7 °C) Resp 18 Ht 5' 7\" (1.702 m) Wt 135 lb (61.2 kg) SpO2 95% BMI 21.14 kg/m² O2 Flow Rate (L/min): 2 l/min O2 Device: Room air Temp (24hrs), Av °F (36.7 °C), Min:98 °F (36.7 °C), Max:98.1 °F (36.7 °C) No intake/output data recorded.  1901 -  0700 In: 1241.3 [P.O.:240; I.V.:1001.3] Out: 2950 [Urine:2950] TELE: SR occ PVC General: AAOx3 cooperative, no acute distress. HEENT: Atraumatic. Pink and moist.  Anicteric sclerae. Neck : Supple, no thyromegaly. Lungs: CTA bilaterally. No wheezing/rhonchi/rales. Heart: Regular rhythm, no murmur. No JVD. No carotid bruits. Abdomen: Soft, non-distended, non-tender. + Bowel sounds. R groin: soft, no hematoma. Dsg D/I. Extremities: No edema Neurologic: Grossly intact. Alert and oriented X 3. No acute neurological distress. Psych: Good insight. Not anxious or agitated. Care Plan discussed with: 
  Comments Patient x Family  x Son   
RN x Care Manager x Consultant:  conner BAXTER Data Review: No lab exists for component: ITNL Recent Labs  
  02/17/19 
3367 TROIQ <0.05 Recent Labs  
  02/20/19 
0504 02/19/19 
0354 02/18/19 
0300  139 144  
K 3.9 3.6 4.0  
 103 109* CO2 29 30 27 BUN 10 8 8 CREA 0.75 0.69 0.80 GLU 88 100 89 MG 1.8 1.7 2.0 WBC  --   --  2.6* HGB  --   --  11.6 HCT  --   --  36.6 PLT  --   --  140* No results for input(s): INR, PTP, APTT in the last 72 hours. No lab exists for component: INREXT, INREXT Medications reviewed Current Facility-Administered Medications Medication Dose Route Frequency  magnesium oxide (MAG-OX) tablet 400 mg  400 mg Oral DAILY  docusate sodium (COLACE) capsule 100 mg  100 mg Oral BID  senna (SENOKOT) tablet 8.6 mg  1 Tab Oral DAILY  sodium chloride (NS) flush 5-40 mL  5-40 mL IntraVENous Q8H  
 sodium chloride (NS) flush 5-40 mL  5-40 mL IntraVENous PRN  
 amiodarone (CORDARONE) tablet 200 mg  200 mg Oral BID  lisinopril (PRINIVIL, ZESTRIL) tablet 10 mg  10 mg Oral QHS  cetirizine (ZYRTEC) tablet 5 mg  5 mg Oral DAILY  guaiFENesin ER (MUCINEX) tablet 600 mg  600 mg Oral Q12H  levothyroxine (SYNTHROID) tablet 100 mcg  100 mcg Oral ACB  montelukast (SINGULAIR) tablet 10 mg  10 mg Oral DAILY  atenolol (TENORMIN) tablet 25 mg  25 mg Oral DAILY  amiodarone (CORDARONE) 375 mg in dextrose 5% 250 mL infusion  0.5 mg/min IntraVENous TITRATE  loperamide (IMODIUM) capsule 2 mg  2 mg Oral Q4H PRN  
 sodium chloride (NS) flush 5-40 mL  5-40 mL IntraVENous Q8H  
 sodium chloride (NS) flush 5-40 mL  5-40 mL IntraVENous PRN  
 acetaminophen (TYLENOL) tablet 650 mg  650 mg Oral Q4H PRN  
 oxyCODONE-acetaminophen (PERCOCET) 5-325 mg per tablet 1 Tab  1 Tab Oral Q4H PRN  
  prochlorperazine (COMPAZINE) injection 10 mg  10 mg IntraVENous Q6H PRN  
 lactobac ac& pc-s.therm-b.anim (WILLA Q/RISAQUAD)  1 Cap Oral DAILY Em Novoa NP

## 2019-02-20 NOTE — PROGRESS NOTES
Bedside and Verbal shift change report given to Aria Hughes RN (oncoming nurse) by Rebekah Castillo RN (offgoing nurse). Report included the following information SBAR, Kardex, [facility] and Cardiac Rhythm Paced. 0730: Patient resting in bed. Complains of headache pain of 5 out of 10. Cath site to right groin D/I without signs of hematoma. No complaints of pain to site. 12: Helped patient to bedside toilet then to sit in bedside chair. 1100: Patient back in bed. No complaints of pain or discomfort. Right groin cath site D/I. No signs of hematoma or bleeding. 1300: Patient in bedside chair. Helped patient back into bed per patient's request.  
 
TRANSFER - OUT REPORT: 
 
Verbal report given to Mar(name) elmo Angel Guardian  being transferred to [facility], RN (unit) for routine progression of care Report consisted of patients Situation, Background, Assessment and  
Recommendations(SBAR). Information from the following report(s) SBAR, Kardex, [facility] and Cardiac Rhythm Paced was reviewed with the receiving nurse. Lines:  
Peripheral IV 02/19/19 Left Wrist (Active) Site Assessment Clean, dry, & intact 2/20/2019  7:00 AM  
Phlebitis Assessment 0 2/20/2019  7:00 AM  
Infiltration Assessment 0 2/20/2019  7:00 AM  
Dressing Status Clean, dry, & intact 2/20/2019  7:00 AM  
Dressing Type Transparent;Tape 2/20/2019  7:00 AM  
Hub Color/Line Status Pink;Flushed;Patent 2/20/2019  7:00 AM  
Action Taken Open ports on tubing capped 2/20/2019  7:00 AM  
Alcohol Cap Used Yes 2/20/2019  7:00 AM  
  
 
Opportunity for questions and clarification was provided. Patient transported with: 
 Monitor Registered Nurse

## 2019-02-20 NOTE — PROGRESS NOTES
TRANSFER - IN REPORT: 
 
Verbal report received from Cristopher(name) on Joanna Seo  being received from ICU(unit) for routine progression of care Report consisted of patients Situation, Background, Assessment and  
Recommendations(SBAR). Information from the following report(s) SBAR, Kardex, Intake/Output, MAR and Cardiac Rhythm Paced was reviewed with the receiving nurse. Opportunity for questions and clarification was provided. Assessment completed upon patients arrival to unit and care assumed. End of shift Bedside shift change report given to BRIDGETTE (oncoming nurse) by Nhung Tidwell (offgoing nurse). Report included the following information SBAR, Kardex, Intake/Output, Recent Results and Cardiac Rhythm Paced.

## 2019-02-20 NOTE — PROGRESS NOTES
Problem: Mobility Impaired (Adult and Pediatric) Goal: *Acute Goals and Plan of Care (Insert Text) Physical Therapy Goals Initiated 2/17/2019 1. Patient will move from supine to sit and sit to supine  in bed with modified independence within 7 day(s). 2.  Patient will transfer from bed to chair and chair to bed with modified independence using the least restrictive device within 7 day(s). 3.  Patient will perform sit to stand with modified independence within 7 day(s). 4.  Patient will ambulate with modified independence for 150 feet with the least restrictive device within 7 day(s). 5.  Patient will ascend/descend 3 stairs with no handrail(s) with minimal assistance/contact guard assist within 7 day(s). physical Therapy TREATMENT Patient: Joanna Seo (35 y.o. female) Date: 2/20/2019 Diagnosis: Ventricular tachycardia ([address] [I47.2] Ventricular tachycardia ([address] Procedure(s) (LRB): LEFT HEART CATH / CORONARY ANGIOGRAPHY (Bilateral) 1 Day Post-Op Precautions: Fall Chart, physical therapy assessment, plan of care and goals were reviewed. ASSESSMENT: 
Based on the objective data described below, patient participated well with treatment today. Rolled on the edge of bed with SBA, supine to sit SBA, sit to stand CGA, ambulate with rolling walker out on the ICU hallway CGA. No loss of balance need some verbal cues to stay within the rolling walker and to turn slowly. OOB to chair as tolerated performed some active range of motion on both LE all planes. Notified nurse who agreed to monitor and assist back to bed when ready to go back to bed. Progression toward goals: 
[x]    Improving appropriately and progressing toward goals 
[]    Improving slowly and progressing toward goals 
[]    Not making progress toward goals and plan of care will be adjusted PLAN: 
Patient continues to benefit from skilled intervention to address the above impairments. Continue treatment per established plan of care. Discharge Recommendations:  Home Health and To Be Determined pending progress from therapy Further Equipment Recommendations for Discharge: TBD SUBJECTIVE:  
Patient stated ok.  OBJECTIVE DATA SUMMARY:  
Critical Behavior: 
Neurologic State: Alert Orientation Level: Oriented X4 Cognition: Appropriate decision making, Appropriate for age attention/concentration, Appropriate safety awareness, Follows commands Safety/Judgement: Awareness of environment Functional Mobility Training: 
Bed Mobility: 
Rolling: Stand-by assistance Supine to Sit: Stand-by assistance Sit to Supine: Stand-by assistance Scooting: Stand-by assistance Transfers: 
Sit to Stand: Contact guard assistance Stand to Sit: Contact guard assistance Stand Pivot Transfers: Contact guard assistance Bed to Chair: Contact guard assistance Balance: 
Sitting: Intact Standing: With support; Impaired Standing - Static: Fair Standing - Dynamic : FairAmbulation/Gait Training: 
Distance (ft): 150 Feet (ft) Assistive Device: Walker, rolling;Gait belt Ambulation - Level of Assistance: Contact guard assistance Gait Description (WDL): Exceptions to [facility] Base of Support: Narrowed Speed/Tayler: Pace decreased (<100 feet/min) Therapeutic Exercises:  
 Instructed patient to continue active range of motion exercise on both legs while up on chair or on bed. Pain: 
Pain Scale 1: Numeric (0 - 10) Pain Intensity 1: 0 Pain Location 1: Head 
Pain Orientation 1: Anterior Pain Description 1: Aching Pain Intervention(s) 1: Medication (see MAR) Activity Tolerance:  
Good. Please refer to the flowsheet for vital signs taken during this treatment. After treatment:  
[x]    Patient left in no apparent distress sitting up in chair 
[]    Patient left in no apparent distress in bed 
[x]    Call bell left within reach [x]    Nursing notified 
[]    Caregiver present 
[]    Bed alarm activated COMMUNICATION/COLLABORATION:  
The patients plan of care was discussed with: Occupational Therapist, Registered Nurse and patient Kerri Youssef PT,WCC. Time Calculation: 26 mins

## 2019-02-21 LAB
ANION GAP SERPL CALC-SCNC: 8 MMOL/L (ref 5–15)
BUN SERPL-MCNC: 11 MG/DL (ref 6–20)
BUN/CREAT SERPL: 14 (ref 12–20)
CALCIUM SERPL-MCNC: 8.5 MG/DL (ref 8.5–10.1)
CHLORIDE SERPL-SCNC: 105 MMOL/L (ref 97–108)
CO2 SERPL-SCNC: 29 MMOL/L (ref 21–32)
CREAT SERPL-MCNC: 0.81 MG/DL (ref 0.55–1.02)
GLUCOSE SERPL-MCNC: 98 MG/DL (ref 65–100)
MAGNESIUM SERPL-MCNC: 1.8 MG/DL (ref 1.6–2.4)
POTASSIUM SERPL-SCNC: 3.8 MMOL/L (ref 3.5–5.1)
SODIUM SERPL-SCNC: 142 MMOL/L (ref 136–145)

## 2019-02-21 PROCEDURE — 80048 BASIC METABOLIC PNL TOTAL CA: CPT

## 2019-02-21 PROCEDURE — 74011250637 HC RX REV CODE- 250/637: Performed by: SPECIALIST

## 2019-02-21 PROCEDURE — 77030032490 HC SLV COMPR SCD KNE COVD -B

## 2019-02-21 PROCEDURE — 74011250637 HC RX REV CODE- 250/637: Performed by: NURSE PRACTITIONER

## 2019-02-21 PROCEDURE — 74011250637 HC RX REV CODE- 250/637: Performed by: INTERNAL MEDICINE

## 2019-02-21 PROCEDURE — 36415 COLL VENOUS BLD VENIPUNCTURE: CPT

## 2019-02-21 PROCEDURE — 65660000000 HC RM CCU STEPDOWN

## 2019-02-21 PROCEDURE — 83735 ASSAY OF MAGNESIUM: CPT

## 2019-02-21 RX ORDER — ZOLPIDEM TARTRATE 5 MG/1
5 TABLET ORAL
Status: DISCONTINUED | OUTPATIENT
Start: 2019-02-21 | End: 2019-02-22

## 2019-02-21 RX ORDER — LISINOPRIL 5 MG/1
10 TABLET ORAL DAILY
Status: DISCONTINUED | OUTPATIENT
Start: 2019-02-21 | End: 2019-02-25 | Stop reason: HOSPADM

## 2019-02-21 RX ADMIN — Medication 20 ML: at 14:48

## 2019-02-21 RX ADMIN — SENNA 8.6 MG: 8.6 TABLET, COATED ORAL at 09:29

## 2019-02-21 RX ADMIN — Medication 10 ML: at 14:48

## 2019-02-21 RX ADMIN — GUAIFENESIN 600 MG: 600 TABLET, EXTENDED RELEASE ORAL at 21:24

## 2019-02-21 RX ADMIN — Medication 10 ML: at 06:30

## 2019-02-21 RX ADMIN — CETIRIZINE HYDROCHLORIDE 5 MG: 10 TABLET, FILM COATED ORAL at 09:30

## 2019-02-21 RX ADMIN — LISINOPRIL 10 MG: 5 TABLET ORAL at 10:49

## 2019-02-21 RX ADMIN — GUAIFENESIN 600 MG: 600 TABLET, EXTENDED RELEASE ORAL at 09:29

## 2019-02-21 RX ADMIN — Medication 10 ML: at 21:24

## 2019-02-21 RX ADMIN — Medication 10 ML: at 06:29

## 2019-02-21 RX ADMIN — ZOLPIDEM TARTRATE 5 MG: 5 TABLET ORAL at 21:24

## 2019-02-21 RX ADMIN — LEVOTHYROXINE SODIUM 100 MCG: 100 TABLET ORAL at 08:02

## 2019-02-21 RX ADMIN — ACETAMINOPHEN 650 MG: 325 TABLET ORAL at 06:29

## 2019-02-21 RX ADMIN — DOCUSATE SODIUM 100 MG: 100 CAPSULE, LIQUID FILLED ORAL at 17:40

## 2019-02-21 RX ADMIN — AMIODARONE HYDROCHLORIDE 200 MG: 200 TABLET ORAL at 09:30

## 2019-02-21 RX ADMIN — Medication 1 CAPSULE: at 09:29

## 2019-02-21 RX ADMIN — AMIODARONE HYDROCHLORIDE 200 MG: 200 TABLET ORAL at 17:40

## 2019-02-21 RX ADMIN — DOCUSATE SODIUM 100 MG: 100 CAPSULE, LIQUID FILLED ORAL at 09:29

## 2019-02-21 RX ADMIN — ATENOLOL 25 MG: 25 TABLET ORAL at 08:02

## 2019-02-21 RX ADMIN — Medication 400 MG: at 09:30

## 2019-02-21 RX ADMIN — MONTELUKAST 10 MG: 10 TABLET, FILM COATED ORAL at 09:29

## 2019-02-21 NOTE — PROGRESS NOTES
Bedside and Verbal shift change report given to Charlene Snyder RN (oncoming nurse) by Giacomo Orr RN (offgoing nurse). Report included the following information SBAR, Kardex, Intake/Output, MAR, Accordion, Recent Results, Med Rec Status, Cardiac Rhythm V Paced and Alarm Parameters . 1150: Called received from telemetry that patient peaked at T wave. Rodrigo Kirby NP notified, telemetry monitor reviewed by her. Patient resting comfortable in her room, denies any chest pain. 1800: Patient requesting sleeping aid. On call Dr Anamaria Calloway called, no order for Ambien 5 mg po PRN. Patient made aware. Bedside shift change report given to [address] [place] [address]  (oncoming nurse) by Charlene Snyder RN (offgoing nurse). Report included the following information SBAR, Kardex, Intake/Output, MAR, Accordion, Recent Results, Med Rec Status and Cardiac Rhythm Afib.

## 2019-02-21 NOTE — PROGRESS NOTES
2- CASE MANAGEMENT NOTE: 
I met with the pt to introduce myself and explain the role of case management. She confirmed that she lives alone in a first floor condo with no entry steps. She has a cane, rolling walker, walk-in-shower, shower chair and railings in the bathroom. She is independent with her ADL's and driving prior to admission. She understands that she will not be able to drive for a month post discharge and either her son, Nazanin Nobles ([phone]), or friends will assist with transportation needs. A referral was sent to [facility] ([phone]) for home RN,PT,OT and they are planning to see her on Saturday if discharged on Friday. CM will follow and let [facility] know of definite discharge date.  Pankaj Segura, BSW, CM

## 2019-02-21 NOTE — PROGRESS NOTES
Cardiology Progress Note [address], [place], [address] [facility] Phone [phone]; Fax [phone] 
 
 
 
2019 9:58 AM  
 
Admit Date:           2019 Admit Diagnosis:  Ventricular tachycardia ([address] [I47.2] :          1928 MRN:          [id number] ASSESSMENT/RECOMMENDATION:  
 
S/P VT arrest: PPM interrogation shows an hour long episode out of hospital with heart rate of 194 bpm.  Rhythm likely VT. EF 61-65% Mild TR, mild AR. Cath with non obs CAD. Holding eliquis today  in anticipation of ICD tomorrow. D/ W pt  
-pEF 
 
NSVT: EP following. Now on amio @ 200 mg BID. Hypertension: elevated this AM, change lisinopril to AM, cont atenolol titrate medications as tolerated Elevated troponin: no MI. A fib s/p PPM:  cont atenolol, eliquis Non obs CAD: cont BB Cardiology Attending: 
 
Patient personally seen and examined. All the elements of history and examination were personally performed. Assessment and plan was discussed and agree as written above. PPM upgrade to AICD tomorrow morning for primary prevention of VT Discussed with son at bedside. James Santoyo MD, [facility] [place] No intake/output data recorded. Last 3 Recorded Weights in this Encounter 19 2100 19 1537 Weight: 133 lb 9.6 oz (60.6 kg) 135 lb (61.2 kg) 1901 -  0700 In: 2201.3 [P.O.:1200; I.V.:1001.3] Out: 750 [Urine:750] SUBJECTIVE Joanna Seo denies palpitations, irregular heart beat, SOB, chest pain or LE edema.  
+ lightheadedness or dizziness, does not feel well when her BP is high Current Facility-Administered Medications Medication Dose Route Frequency  magnesium oxide (MAG-OX) tablet 400 mg  400 mg Oral DAILY  docusate sodium (COLACE) capsule 100 mg  100 mg Oral BID  senna (SENOKOT) tablet 8.6 mg  1 Tab Oral DAILY  sodium chloride (NS) flush 5-40 mL  5-40 mL IntraVENous Q8H  
 sodium chloride (NS) flush 5-40 mL  5-40 mL IntraVENous PRN  
 amiodarone (CORDARONE) tablet 200 mg  200 mg Oral BID  lisinopril (PRINIVIL, ZESTRIL) tablet 10 mg  10 mg Oral QHS  cetirizine (ZYRTEC) tablet 5 mg  5 mg Oral DAILY  guaiFENesin ER (MUCINEX) tablet 600 mg  600 mg Oral Q12H  levothyroxine (SYNTHROID) tablet 100 mcg  100 mcg Oral ACB  montelukast (SINGULAIR) tablet 10 mg  10 mg Oral DAILY  atenolol (TENORMIN) tablet 25 mg  25 mg Oral DAILY  amiodarone (CORDARONE) 375 mg in dextrose 5% 250 mL infusion  0.5 mg/min IntraVENous TITRATE  loperamide (IMODIUM) capsule 2 mg  2 mg Oral Q4H PRN  
 sodium chloride (NS) flush 5-40 mL  5-40 mL IntraVENous Q8H  
 sodium chloride (NS) flush 5-40 mL  5-40 mL IntraVENous PRN  
 acetaminophen (TYLENOL) tablet 650 mg  650 mg Oral Q4H PRN  
 oxyCODONE-acetaminophen (PERCOCET) 5-325 mg per tablet 1 Tab  1 Tab Oral Q4H PRN  prochlorperazine (COMPAZINE) injection 10 mg  10 mg IntraVENous Q6H PRN  
 lactobac ac& pc-s.therm-b.anim (WILLA Q/RISAQUAD)  1 Cap Oral DAILY OBJECTIVE Intake/Output Summary (Last 24 hours) at 2/21/2019 9519 Last data filed at 2/21/2019 0668 Gross per 24 hour Intake 1020 ml Output  Net 1020 ml Review of Systems - History obtained from the patient AS PER  HPI Telemetry AP w PVCs PHYSICAL EXAM  
  
 
Visit Vitals /86 Pulse 79 Temp 97.7 °F (36.5 °C) Resp 18 Ht 5' 7\" (1.702 m) Wt 135 lb (61.2 kg) SpO2 94% BMI 21.14 kg/m² Gen: Well-developed, elderly/frail, in no acute distress  alert and oriented x 3 HEENT:  Pink conjunctivae, Hearing grossly normal.No scleral icterus or conjunctival, moist mucous membranes Neck: Supple, No JVD Resp: No accessory muscle use, Clear breath sounds, No rales or rhonchi 
Card: Regular Rate,Rythm, No murmurs, rubs or gallop. No thrills. GI:          soft, non-tender MSK: No cyanosis or clubbing, good capillary refill Skin: No rashes or ulcers, + bruising. Left sided ppm unremarkable, ppm prominent as she is thin Neuro:  Cranial nerves are grossly intact, moving all four extremities, no focal deficit, follows commands appropriately Psych:  Good insight, oriented to person, place and time, alert, Nml Affect LE: No edema DATA REVIEW Laboratory and Imaging have been reviewed by me and are notable for No results for input(s): CPK, CKMB, TROIQ in the last 72 hours. Recent Labs  
  02/21/19 
0404 02/20/19 
0504 02/19/19 
0354  141 139  
K 3.8 3.9 3.6 CO2 29 29 30 BUN 11 10 8 CREA 0.81 0.75 0.69 GLU 98 88 100 MG 1.8 1.8 1.7 Amor Sero, NP

## 2019-02-21 NOTE — PROGRESS NOTES
1930 Bedside and Verbal shift change report given to [address] [place] [address] (oncoming nurse) by Maria Esther Arthur RN (offgoing nurse). Report included the following information SBAR and Kardex. 1855 patient has complaints of headache. Requested blood pressure to be checked again, results 152/92 pulse 78. Patient medicated with Tylenol for headache. Will re check blood pressure in 30 minutes. 3997 Assisted patient to bathroom and back into bed. Prior to ambulation blood pressure 161/86, pulse 79. Patient medicated with am  dose of Atenolol. Greg Lan

## 2019-02-21 NOTE — PROGRESS NOTES
Nutrition Assessment: 
 
RECOMMENDATIONS/INTERVENTION(S):  
1. Consider liberalizing diet to regular, pt refusing meals on cardiac diet. 2. Will change Ensure Enlive to Mighty Shake TID per pt preference. 3. Monitor intake, wt, GI. ASSESSMENT:  
2/21: Cardiac diet ordered with Ensure Enlive and Banatrol ordered. Pt states she is not consuming the Banatrol, explained what it is for and encouraged pt to try it. She is not drinking the Ensure out of fear it will cause her to have diarrhea. Offered to change this to Mighty shake, she agrees, states she loves ice-cream and thinks she will like it. PO intakes have been poor. States her appetite is not good and she does not like some of the items. Having diarrhea today. Probiotic is ordered. Labs reviewed. 2/19: pt admitted for v-tach cardiac arrest, resuscitated in the ED. PMHx for CAD, HTn, Afib, HLD. Cath placement planned for today. Pt able to have a small breakfast this am, pt refused and had 1/2 of an Ensure HP. PTA pt reports a good appetite has cereal with a banana for breakfast, cheese, peanut butter or banana sandwich for lunch (no sides) and eggs for dinner. Son reports she will eat more when she is with him. Pt reported not liking the food on the cardiac diet. She wasn't able to get a grilled cheese sandwich last night so she refused dinner and had a Ensure high protein. Eating 50% or less of meals. UBW around 135 lbs, confirmed in chart. BMI underweight for age. Meds: lola-q, bowel, mag ox, Diet Order: Cardiac 
% Eaten:   
Patient Vitals for the past 72 hrs: 
 % Diet Eaten 02/20/19 1650 100 % 02/20/19 1200 50 % 02/20/19 0840 50 % 02/19/19 1800 50 % 02/19/19 0700 0 % Pertinent Medications: [x] Reviewed Chemistries: [x] Reviewed Anthropometrics: Height: 5' 7\" (170.2 cm) Weight: 61.2 kg (135 lb) IBW (%IBW):   ( ) UBW (%UBW):   (  %) BMI: Body mass index is 21.14 kg/m². This BMI is indicative of: [x] Underweight for age   [] Normal    [] Overweight    []  Obesity    []  Extreme Obesity (BMI>40) Estimated Nutrition Needs (Based on): 9966 Kcals/day(BMr 1065 x 1.2 AF +250kcal) , 61 g(-73g (1.0-1.2g/kg ABW)) Protein Carbohydrate: At Least 130 g/day  Fluids: 1550 ml (1mL/kcal) Last BM: 2/21,watery       Bowel Sounds: active Skin:    [x] Intact   [] Incision  [] Breakdown   [] DTI   [] Tears/Excoriation/Abrasion  []Edema-trace BLE [] Other: Wt Readings from Last 30 Encounters:  
02/17/19 61.2 kg (135 lb) 02/07/19 61.4 kg (135 lb 6.4 oz) 02/05/19 59 kg (130 lb) 12/03/18 59.3 kg (130 lb 12.8 oz)  
11/14/18 60.9 kg (134 lb 3.2 oz) 10/08/18 59.9 kg (132 lb)  
09/14/18 60.3 kg (133 lb) 09/06/18 60.8 kg (134 lb 2 oz) 05/29/18 61.4 kg (135 lb 6.4 oz) 05/23/18 61.2 kg (135 lb) 02/01/18 62.1 kg (137 lb) 10/24/17 61.7 kg (136 lb) 08/08/17 62.6 kg (138 lb) 05/04/17 63 kg (139 lb)  
03/21/17 64 kg (141 lb)  
03/14/17 63 kg (139 lb) 12/12/16 60.8 kg (134 lb) 08/19/16 63.5 kg (140 lb) 08/12/16 64 kg (141 lb)  
07/26/16 64 kg (141 lb) 12/03/15 64.4 kg (142 lb)  
09/11/15 64 kg (141 lb) 08/10/15 64.4 kg (142 lb) 05/11/15 64 kg (141 lb) 05/04/15 64 kg (141 lb)  
01/26/15 65.8 kg (145 lb)  
09/24/14 65.8 kg (145 lb) 09/03/14 64 kg (141 lb)  
03/19/14 67.6 kg (149 lb)  
02/21/14 66.7 kg (147 lb) NUTRITION DIAGNOSES:  
Problem:  Inadequate energy intake Etiology: related to decreased hunger sensation, limited food acceptance with age Signs/Symptoms: as evidenced by BMI underweight for age, not meeting EEN    
 
2/21: Nutrition dx continues. NUTRITION INTERVENTIONS: 
Meals/Snacks: General/healthful diet   Supplements: Commercial supplement GOAL:  
Patient to meet EEN in the next 2-4 days Cultural, Mosque, or Ethnic Dietary Needs: None EDUCATION & DISCHARGE NEEDS:  
 [x] None Identified 
 [] Identified and Education Provided/Documented [] Identified and Pt declined/was not appropriate [x] Interdisciplinary Care Plan Reviewed/Documented  
 [x] Discharge Needs: Regular diet, ONS until appetite returns 
 [] No Nutrition Related Discharge Needs NUTRITION RISK:  
Pt Is At Nutrition Risk  [x] No Nutrition Risk Identified  [] PT SEEN FOR:  
 []  MD Consult: []Calorie Count []Diabetic Diet Education []Diet Education []Electrolyte Management []General Nutrition Management and Supplements []Management of Tube Feeding []TPN Recommendations []  RN Referral:  []MST score >=2 
   []Enteral/Parenteral Nutrition PTA []Pregnant: Gestational DM or Multigestation  
              [] Pressure Ulcer 
 
[]  Low BMI      []  Length of Stay ICU       [] Dysphagia Diet         [] Ventilator [x]  Follow-up Previous Recommendations: 
 [x] Implemented          [] Not Implemented          [] Not Applicable Previous Goal: 
 [] Met              [] Progressing Towards Goal              [x] Not Progressing Towards Goal   [] Not Applicable Agatha Gonzalez RD Pager [phone] Office [phone]

## 2019-02-22 ENCOUNTER — APPOINTMENT (OUTPATIENT)
Dept: GENERAL RADIOLOGY | Age: 84
DRG: 225 | End: 2019-02-22
Attending: INTERNAL MEDICINE
Payer: MEDICARE

## 2019-02-22 LAB
ANION GAP SERPL CALC-SCNC: 7 MMOL/L (ref 5–15)
BUN SERPL-MCNC: 11 MG/DL (ref 6–20)
BUN/CREAT SERPL: 12 (ref 12–20)
CALCIUM SERPL-MCNC: 8.8 MG/DL (ref 8.5–10.1)
CHLORIDE SERPL-SCNC: 105 MMOL/L (ref 97–108)
CO2 SERPL-SCNC: 31 MMOL/L (ref 21–32)
CREAT SERPL-MCNC: 0.89 MG/DL (ref 0.55–1.02)
GLUCOSE SERPL-MCNC: 106 MG/DL (ref 65–100)
MAGNESIUM SERPL-MCNC: 1.8 MG/DL (ref 1.6–2.4)
POTASSIUM SERPL-SCNC: 3.8 MMOL/L (ref 3.5–5.1)
SODIUM SERPL-SCNC: 143 MMOL/L (ref 136–145)

## 2019-02-22 PROCEDURE — 77030018673: Performed by: INTERNAL MEDICINE

## 2019-02-22 PROCEDURE — 71046 X-RAY EXAM CHEST 2 VIEWS: CPT

## 2019-02-22 PROCEDURE — 77030018729 HC ELECTRD DEFIB PAD CARD -B: Performed by: INTERNAL MEDICINE

## 2019-02-22 PROCEDURE — 77030018547 HC SUT ETHBND1 J&J -B: Performed by: INTERNAL MEDICINE

## 2019-02-22 PROCEDURE — 74011250637 HC RX REV CODE- 250/637: Performed by: INTERNAL MEDICINE

## 2019-02-22 PROCEDURE — 99152 MOD SED SAME PHYS/QHP 5/>YRS: CPT | Performed by: INTERNAL MEDICINE

## 2019-02-22 PROCEDURE — 74011250636 HC RX REV CODE- 250/636

## 2019-02-22 PROCEDURE — 0JPT0PZ REMOVAL OF CARDIAC RHYTHM RELATED DEVICE FROM TRUNK SUBCUTANEOUS TISSUE AND FASCIA, OPEN APPROACH: ICD-10-PCS | Performed by: INTERNAL MEDICINE

## 2019-02-22 PROCEDURE — 33249 INSJ/RPLCMT DEFIB W/LEAD(S): CPT | Performed by: INTERNAL MEDICINE

## 2019-02-22 PROCEDURE — B5171ZZ FLUOROSCOPY OF LEFT SUBCLAVIAN VEIN USING LOW OSMOLAR CONTRAST: ICD-10-PCS | Performed by: INTERNAL MEDICINE

## 2019-02-22 PROCEDURE — 65660000000 HC RM CCU STEPDOWN

## 2019-02-22 PROCEDURE — 02HK3KZ INSERTION OF DEFIBRILLATOR LEAD INTO RIGHT VENTRICLE, PERCUTANEOUS APPROACH: ICD-10-PCS | Performed by: INTERNAL MEDICINE

## 2019-02-22 PROCEDURE — 36415 COLL VENOUS BLD VENIPUNCTURE: CPT

## 2019-02-22 PROCEDURE — 77030037713 HC CLOSR DEV INCIS ZIP STRY -B: Performed by: INTERNAL MEDICINE

## 2019-02-22 PROCEDURE — 97535 SELF CARE MNGMENT TRAINING: CPT | Performed by: OCCUPATIONAL THERAPIST

## 2019-02-22 PROCEDURE — 83735 ASSAY OF MAGNESIUM: CPT

## 2019-02-22 PROCEDURE — C1721 AICD, DUAL CHAMBER: HCPCS | Performed by: INTERNAL MEDICINE

## 2019-02-22 PROCEDURE — 80048 BASIC METABOLIC PNL TOTAL CA: CPT

## 2019-02-22 PROCEDURE — 74011000250 HC RX REV CODE- 250: Performed by: INTERNAL MEDICINE

## 2019-02-22 PROCEDURE — 77030031139 HC SUT VCRL2 J&J -A: Performed by: INTERNAL MEDICINE

## 2019-02-22 PROCEDURE — 74011636320 HC RX REV CODE- 636/320: Performed by: INTERNAL MEDICINE

## 2019-02-22 PROCEDURE — 74011250636 HC RX REV CODE- 250/636: Performed by: INTERNAL MEDICINE

## 2019-02-22 PROCEDURE — 77030028698 HC BLD TISS PLSM MEDT -D: Performed by: INTERNAL MEDICINE

## 2019-02-22 PROCEDURE — 74011250637 HC RX REV CODE- 250/637: Performed by: NURSE PRACTITIONER

## 2019-02-22 PROCEDURE — 99153 MOD SED SAME PHYS/QHP EA: CPT | Performed by: INTERNAL MEDICINE

## 2019-02-22 PROCEDURE — 77030012935 HC DRSG AQUACEL BMS -B: Performed by: INTERNAL MEDICINE

## 2019-02-22 PROCEDURE — 0JH608Z INSERTION OF DEFIBRILLATOR GENERATOR INTO CHEST SUBCUTANEOUS TISSUE AND FASCIA, OPEN APPROACH: ICD-10-PCS | Performed by: INTERNAL MEDICINE

## 2019-02-22 PROCEDURE — 33225 L VENTRIC PACING LEAD ADD-ON: CPT | Performed by: INTERNAL MEDICINE

## 2019-02-22 PROCEDURE — 02PA3MZ REMOVAL OF CARDIAC LEAD FROM HEART, PERCUTANEOUS APPROACH: ICD-10-PCS | Performed by: INTERNAL MEDICINE

## 2019-02-22 PROCEDURE — C1777 LEAD, AICD, ENDO SINGLE COIL: HCPCS | Performed by: INTERNAL MEDICINE

## 2019-02-22 PROCEDURE — C1893 INTRO/SHEATH, FIXED,NON-PEEL: HCPCS | Performed by: INTERNAL MEDICINE

## 2019-02-22 PROCEDURE — 77030002933 HC SUT MCRYL J&J -A: Performed by: INTERNAL MEDICINE

## 2019-02-22 DEVICE — IMPLANTABLE CARDIOVERTER DEFIBRILLATOR DR
Type: IMPLANTABLE DEVICE | Status: FUNCTIONAL
Brand: VIGILANT™ EL ICD DR

## 2019-02-22 DEVICE — STEROID-ELUTING BIPOLAR PACE/SENSE AND DEFIBRILLATION LEAD
Type: IMPLANTABLE DEVICE | Status: FUNCTIONAL
Brand: ENDOTAK RELIANCE® SG

## 2019-02-22 RX ORDER — HEPARIN SODIUM 200 [USP'U]/100ML
INJECTION, SOLUTION INTRAVENOUS
Status: COMPLETED | OUTPATIENT
Start: 2019-02-22 | End: 2019-02-22

## 2019-02-22 RX ORDER — ESZOPICLONE 2 MG/1
2 TABLET, FILM COATED ORAL
Status: DISCONTINUED | OUTPATIENT
Start: 2019-02-22 | End: 2019-02-25 | Stop reason: HOSPADM

## 2019-02-22 RX ORDER — ESZOPICLONE 2 MG/1
2 TABLET, FILM COATED ORAL
Status: DISCONTINUED | OUTPATIENT
Start: 2019-02-22 | End: 2019-02-22

## 2019-02-22 RX ORDER — HYDROCODONE BITARTRATE AND ACETAMINOPHEN 5; 325 MG/1; MG/1
1 TABLET ORAL
Status: DISCONTINUED | OUTPATIENT
Start: 2019-02-22 | End: 2019-02-25

## 2019-02-22 RX ORDER — LIDOCAINE HYDROCHLORIDE AND EPINEPHRINE 10; 10 MG/ML; UG/ML
INJECTION, SOLUTION INFILTRATION; PERINEURAL AS NEEDED
Status: DISCONTINUED | OUTPATIENT
Start: 2019-02-22 | End: 2019-02-22 | Stop reason: HOSPADM

## 2019-02-22 RX ORDER — SODIUM CHLORIDE 0.9 % (FLUSH) 0.9 %
5-40 SYRINGE (ML) INJECTION EVERY 8 HOURS
Status: DISCONTINUED | OUTPATIENT
Start: 2019-02-22 | End: 2019-02-22

## 2019-02-22 RX ORDER — GENTAMICIN SULFATE 80 MG/100ML
80 INJECTION, SOLUTION INTRAVENOUS ONCE
Status: DISCONTINUED | OUTPATIENT
Start: 2019-02-22 | End: 2019-02-22 | Stop reason: HOSPADM

## 2019-02-22 RX ORDER — SODIUM CHLORIDE 0.9 % (FLUSH) 0.9 %
5-40 SYRINGE (ML) INJECTION AS NEEDED
Status: DISCONTINUED | OUTPATIENT
Start: 2019-02-22 | End: 2019-02-22

## 2019-02-22 RX ORDER — ONDANSETRON 2 MG/ML
4 INJECTION INTRAMUSCULAR; INTRAVENOUS
Status: DISCONTINUED | OUTPATIENT
Start: 2019-02-22 | End: 2019-02-25 | Stop reason: HOSPADM

## 2019-02-22 RX ORDER — ACETAMINOPHEN 325 MG/1
650 TABLET ORAL
Status: DISCONTINUED | OUTPATIENT
Start: 2019-02-22 | End: 2019-02-25 | Stop reason: HOSPADM

## 2019-02-22 RX ORDER — MIDAZOLAM HYDROCHLORIDE 1 MG/ML
INJECTION, SOLUTION INTRAMUSCULAR; INTRAVENOUS AS NEEDED
Status: DISCONTINUED | OUTPATIENT
Start: 2019-02-22 | End: 2019-02-22 | Stop reason: HOSPADM

## 2019-02-22 RX ORDER — BUPIVACAINE HYDROCHLORIDE 5 MG/ML
INJECTION, SOLUTION EPIDURAL; INTRACAUDAL AS NEEDED
Status: DISCONTINUED | OUTPATIENT
Start: 2019-02-22 | End: 2019-02-22 | Stop reason: HOSPADM

## 2019-02-22 RX ORDER — DIPHENHYDRAMINE HYDROCHLORIDE 50 MG/ML
INJECTION, SOLUTION INTRAMUSCULAR; INTRAVENOUS AS NEEDED
Status: DISCONTINUED | OUTPATIENT
Start: 2019-02-22 | End: 2019-02-22 | Stop reason: HOSPADM

## 2019-02-22 RX ORDER — FENTANYL CITRATE 50 UG/ML
INJECTION, SOLUTION INTRAMUSCULAR; INTRAVENOUS AS NEEDED
Status: DISCONTINUED | OUTPATIENT
Start: 2019-02-22 | End: 2019-02-22 | Stop reason: HOSPADM

## 2019-02-22 RX ORDER — POTASSIUM CHLORIDE 750 MG/1
20 TABLET, FILM COATED, EXTENDED RELEASE ORAL
Status: COMPLETED | OUTPATIENT
Start: 2019-02-22 | End: 2019-02-22

## 2019-02-22 RX ORDER — CLINDAMYCIN HYDROCHLORIDE 150 MG/1
150 CAPSULE ORAL 3 TIMES DAILY
Status: DISCONTINUED | OUTPATIENT
Start: 2019-02-23 | End: 2019-02-25 | Stop reason: HOSPADM

## 2019-02-22 RX ADMIN — ESZOPICLONE 2 MG: 2 TABLET, COATED ORAL at 21:15

## 2019-02-22 RX ADMIN — MONTELUKAST 10 MG: 10 TABLET, FILM COATED ORAL at 12:07

## 2019-02-22 RX ADMIN — ACETAMINOPHEN 650 MG: 325 TABLET ORAL at 20:50

## 2019-02-22 RX ADMIN — Medication 10 ML: at 06:56

## 2019-02-22 RX ADMIN — Medication 10 ML: at 14:20

## 2019-02-22 RX ADMIN — ACETAMINOPHEN 650 MG: 325 TABLET ORAL at 10:59

## 2019-02-22 RX ADMIN — Medication 10 ML: at 06:57

## 2019-02-22 RX ADMIN — Medication 400 MG: at 12:07

## 2019-02-22 RX ADMIN — SENNA 8.6 MG: 8.6 TABLET, COATED ORAL at 12:07

## 2019-02-22 RX ADMIN — GUAIFENESIN 600 MG: 600 TABLET, EXTENDED RELEASE ORAL at 12:07

## 2019-02-22 RX ADMIN — DOCUSATE SODIUM 100 MG: 100 CAPSULE, LIQUID FILLED ORAL at 18:15

## 2019-02-22 RX ADMIN — DOCUSATE SODIUM 100 MG: 100 CAPSULE, LIQUID FILLED ORAL at 12:07

## 2019-02-22 RX ADMIN — POTASSIUM CHLORIDE 20 MEQ: 750 TABLET, EXTENDED RELEASE ORAL at 12:07

## 2019-02-22 RX ADMIN — LEVOTHYROXINE SODIUM 100 MCG: 100 TABLET ORAL at 06:56

## 2019-02-22 RX ADMIN — VANCOMYCIN HYDROCHLORIDE 1000 MG: 1 INJECTION, POWDER, LYOPHILIZED, FOR SOLUTION INTRAVENOUS at 21:15

## 2019-02-22 RX ADMIN — Medication 10 ML: at 21:15

## 2019-02-22 RX ADMIN — AMIODARONE HYDROCHLORIDE 200 MG: 200 TABLET ORAL at 18:15

## 2019-02-22 RX ADMIN — LISINOPRIL 10 MG: 5 TABLET ORAL at 12:06

## 2019-02-22 RX ADMIN — Medication 1 CAPSULE: at 12:07

## 2019-02-22 RX ADMIN — HYDROCODONE BITARTRATE AND ACETAMINOPHEN 1 TABLET: 5; 325 TABLET ORAL at 14:19

## 2019-02-22 RX ADMIN — GUAIFENESIN 600 MG: 600 TABLET, EXTENDED RELEASE ORAL at 21:15

## 2019-02-22 RX ADMIN — ATENOLOL 25 MG: 25 TABLET ORAL at 12:07

## 2019-02-22 RX ADMIN — VANCOMYCIN HYDROCHLORIDE 1000 MG: 1 INJECTION, POWDER, LYOPHILIZED, FOR SOLUTION INTRAVENOUS at 08:52

## 2019-02-22 RX ADMIN — CETIRIZINE HYDROCHLORIDE 5 MG: 10 TABLET, FILM COATED ORAL at 12:07

## 2019-02-22 RX ADMIN — AMIODARONE HYDROCHLORIDE 200 MG: 200 TABLET ORAL at 12:07

## 2019-02-22 NOTE — PROGRESS NOTES
Cardiology Progress Note [address] [facility], [place], [address] [facility] Phone [phone]; Fax [phone] 
 
 
 
2019 9:58 AM  
 
Admit Date:           2019 Admit Diagnosis:  Ventricular tachycardia ([address] [I47.2] :          1928 MRN:          [id number] ASSESSMENT/RECOMMENDATION:  
 
S/P VT arrest: PPM interrogation shows an hour long episode out of hospital with heart rate of 194 bpm.  Rhythm likely VT. EF 61-65% Mild TR, mild AR. Cath with non obs CAD. Holding eliquis for ICD this morning, resume eliquis per Dr Enriqueta Gonzalez to have NSVT on telemetry (16 bts) NSVT: EP following. Now on amio @ 200 mg BID. Hypertension: Bp better with lisinopril in the AM, cont atenolol & titrate medications as tolerated Elevated troponin: no MI. A fib s/p PPM:  cont atenolol, eliquis (on hold) Non obs CAD: cont BB Did not see patient today as she was out for AICD upgrade procedure. James Mathew MD, [facility] No intake/output data recorded. Last 3 Recorded Weights in this Encounter 19 2100 19 1537 Weight: 133 lb 9.6 oz (60.6 kg) 135 lb (61.2 kg)  1901 -  0700 In: 600 [P.O.:600] Out: 400 [Urine:400] SUBJECTIVE Alexandru Jasmine denies palpitations, irregular heart beat, SOB, chest pain or LE edema. Feels better this morning Current Facility-Administered Medications Medication Dose Route Frequency  gentamicin in Saline (Iso-osm) (GARAMYCIN) 80 mg/100 mL IVPB premix 80 mg  80 mg IntraVENous ONCE  
 vancomycin (VANCOCIN) 1,000 mg in 0.9% sodium chloride (MBP/ADV) 250 mL  1,000 mg IntraVENous ONCE  
 iopamidol (ISOVUE-370) 76 % injection    PRN  
 diphenhydrAMINE (BENADRYL) injection    PRN  
 midazolam (VERSED) injection    PRN  
  fentaNYL citrate (PF) injection    PRN  
 lidocaine-EPINEPHrine (XYLOCAINE) 1 %-1:100,000 injection    PRN  
 bupivacaine (PF) (MARCAINE) 0.5 % (5 mg/mL) injection    PRN  
 heparinized saline 2 units/mL infusion    CONTINUOUS  
 lisinopril (PRINIVIL, ZESTRIL) tablet 10 mg  10 mg Oral DAILY  zolpidem (AMBIEN) tablet 5 mg  5 mg Oral QHS PRN  
 magnesium oxide (MAG-OX) tablet 400 mg  400 mg Oral DAILY  docusate sodium (COLACE) capsule 100 mg  100 mg Oral BID  senna (SENOKOT) tablet 8.6 mg  1 Tab Oral DAILY  sodium chloride (NS) flush 5-40 mL  5-40 mL IntraVENous Q8H  
 sodium chloride (NS) flush 5-40 mL  5-40 mL IntraVENous PRN  
 amiodarone (CORDARONE) tablet 200 mg  200 mg Oral BID  cetirizine (ZYRTEC) tablet 5 mg  5 mg Oral DAILY  guaiFENesin ER (MUCINEX) tablet 600 mg  600 mg Oral Q12H  levothyroxine (SYNTHROID) tablet 100 mcg  100 mcg Oral ACB  montelukast (SINGULAIR) tablet 10 mg  10 mg Oral DAILY  atenolol (TENORMIN) tablet 25 mg  25 mg Oral DAILY  loperamide (IMODIUM) capsule 2 mg  2 mg Oral Q4H PRN  
 sodium chloride (NS) flush 5-40 mL  5-40 mL IntraVENous Q8H  
 sodium chloride (NS) flush 5-40 mL  5-40 mL IntraVENous PRN  
 acetaminophen (TYLENOL) tablet 650 mg  650 mg Oral Q4H PRN  
 oxyCODONE-acetaminophen (PERCOCET) 5-325 mg per tablet 1 Tab  1 Tab Oral Q4H PRN  prochlorperazine (COMPAZINE) injection 10 mg  10 mg IntraVENous Q6H PRN  
 lactobac ac& pc-s.therm-b.anim (WILLA Q/RISAQUAD)  1 Cap Oral DAILY OBJECTIVE Intake/Output Summary (Last 24 hours) at 2/22/2019 2180 Last data filed at 2/22/2019 9397 Gross per 24 hour Intake 240 ml Output 400 ml Net -160 ml Review of Systems - History obtained from the patient AS PER  HPI Telemetry AP w PVCs & 7 -16 beats of NSVT PHYSICAL EXAM  
  
 
Visit Vitals /79 (BP 1 Location: Left arm, BP Patient Position: At rest) Pulse 78 Temp 97.5 °F (36.4 °C) Resp 16  
 Ht 5' 7\" (1.702 m) Wt 135 lb (61.2 kg) SpO2 97% BMI 21.14 kg/m² Gen: Well-developed, elderly/frail, in no acute distress  alert and oriented x 3 HEENT:  Pink conjunctivae, Hearing grossly normal.No scleral icterus or conjunctival, moist mucous membranes Neck: Supple, No JVD Resp: No accessory muscle use, Clear breath sounds, No rales or rhonchi 
Card: Regular Rate,Rythm, No murmurs, rubs or gallop. No thrills. GI:          soft, non-tender MSK: No cyanosis or clubbing, good capillary refill Skin: No rashes or ulcers, + bruising. Left sided ppm unremarkable, ppm prominent as she is thin Neuro:  Cranial nerves are grossly intact, moving all four extremities, no focal deficit, follows commands appropriately Psych:  Good insight, oriented to person, place and time, alert, Nml Affect LE: No edema DATA REVIEW Laboratory and Imaging have been reviewed by me and are notable for No results for input(s): CPK, CKMB, TROIQ in the last 72 hours. Recent Labs  
  02/22/19 
0158 02/21/19 
0404 02/20/19 
5302  142 141  
K 3.8 3.8 3.9 CO2 31 29 29 BUN 11 11 10 CREA 0.89 0.81 0.75 * 98 88 MG 1.8 1.8 1.8 Eddy Walters NP

## 2019-02-22 NOTE — PROCEDURES
Cardiac Electrophysiology Report        PATIENT INFORMATION     Patient Name: Mira Ojeda MRN: [id number]    Study Date: 2019    YOB: 1928   Age: 80 y.o. Gender: female      Procedure:  Pacemaker Upgrade to ICD     Referring Physician:  Becca Gonzalez MD and Dr. Lynette Salgado Name   Electrophysiologist Pedrito Sheffield MD   Monitor Wing Jaden RN   Circulator Cinda Dyson RT; Monica Miller RN       PATIENT HISTORY     Mira Ojeda is a 80 y.o. female with with CAD, dual chamber pacemaker presenting after sudden cardiac death for which she underwent defibrillation by EMS. PPM interrogation confirmed sustained VT x 2.  Echocardiogram demonstrates preserved LV function. Left heart catheterization demonstrated nonobstructive CAD. She now presents for upgrade of her pacemaker to a defibrillator system for secondary prevention.        PROCEDURE     The patient was brought to the Cardiac Electrophysiology laboratory in a post-absorptive, fasting state. Informed consent was obtained. A peripheral IV was in place. Continuous electrocardiographic, blood pressure, O2 saturation and  CO2 monitoring was initiated. Intravenous antibiotics were administered pre-operatively. Self-adhesive cardioversion patches were positioned on the chest.  Conscious sedation was effectuated according to protocol. The patient was then prepped and draped in the usual sterile fashion. A left subclavian venogram was performed and demonstrated patency of the vein. A 50/50 mixture of lidocaine (1%) with epinephrine and bupivicaine (0.5%) was utilized for local anesthesia. An incision was performed medial to the left delto-pectoral groove. Sharp and blunt dissection was carried down to the level of the pre-pectoralis fascia. Hemostasis was maintained with electrocautery.  Extra-thoracic, subclavian venous access was obtained twice and sheaths were placed using the modified Seldinger technique. A single-coil, DF4 ICD lead was advanced under fluoroscopic guidance and positioned in the right ventricle where stable pacing and sensing characteristics were encountered. The sheath was peeled away and the lead was anchored to the pre-pectoralis fascia using O-ethibond non-absorbable suture material. The chronic RV pacing lead was capped and placed into the pocket. The device pocket was then revised to accommodate the new larger generator and flushed copiously with antibiotic solution. The ICD generator was connected to the leads and the generator was placed into the pocket. The device was secured to the pocket using O-ethibond, non-absorbable suture material. The pocket was then closed in three layers using 2-O vicryl, 3-O vicryl absorbable suture material and a zipline. The skin was closed using a sub-cuticular technique. A bio-occlusive dressing were applied to the skin. The patient remained hemodynamically stable, tolerated the procedure well and was transferred in stable condition. There were no immediate complications encountered during the procedure. No specimen were removed; there was minimal blood loss.        LEAD & GENERATOR DATA       Model # Serial #   Explanted Generator Medtronic [id number]   Implanted Generator [facility] [id number] [id number]   Atrial Lead Medtronic [id number]   Ventricular Lead (capped) Medtronic 2851 [id number]   ICD Lead [employer] 2059 [id number]       PACE/SENSE DATA      Sensed Wave (mV) Threshold (V) Impedance (?)   Atrium 1.5 0.7 414   Ventricle 15.7 0.6 515   Shock ------ ------ 61       FINAL PROGRAMMING     Pacing Mode Lower Rate (ppm) Upper Rate (ppm)   DDDR 60 130   VF Rate (bpm) # Antitachycardia Pacing First Shock Energy (J)   215 Quick Convert 41 x 6   VT Rate (bpm) # Antitachycardia Pacing First Shock Energy (J)   185 Busts (3) 41 x 6         DEFIBRILLATION THRESHOLD TESTING     Energy 31 Joules  Sensitivity 1.0 mV  Impedance 61 Ohms  Minimal drop-out  Result: successful      MEDICATION SUMMARY     Medication Route Unit Total   Gentamycin IV mg 80   Ancef IV grams 2   Fentanyl IV micrograms 100   Versed IV grams 7       RADIOLOGY SUMMARY      Total   Fluoro Time (minutes) 2.0      Dose Area Product (mGy) 33       CONCLUSIONS     1. Successful upgrade of dual chamber pacemaker to a dual-chamber ICD for secondary prevention. 2. Successful DFT at low sensitivity with minimal drop-out. 3. Successful pocket revision. 4. IV antibiotics x 24 hours for 2 doses followed by doxycycline 100 po tid x 7 days. 5. Monitor overnight on telemetry. 6. CXR (PA & lateral) and device interrogation in AM.  7. Possible discharge in AM.  8. Wound check in EP clinic in 10 days. 9. Follow up in EP clinic in 6 weeks or earlier if necessary. 10. Follow up with Lucie Estrada MD as scheduled. Thank you, Lucie Estrada MD for involving me in the care of this extraordinarily pleasant female.               Lainey Escobar MD  Cardiac Electrophysiology / Cardiology    [address] [place] [address], [address], [address] [place] [address]  [phone] / [phone] Fax     [phone] / [phone] Fax

## 2019-02-22 NOTE — PROGRESS NOTES
[address] TRANSFER - IN REPORT: 
 
Verbal report received from [facility] RN(name) on Jaz Fuentes  being received from   (unit) for routine progression of care Report consisted of patients Situation, Background, Assessment and  
Recommendations(SBAR). Information from the following report(s) SBAR was reviewed with the receiving nurse. Opportunity for questions and clarification was provided. Assessment completed upon patients arrival to unit and care assumed. [id number] [place] [address] C/o mild elbow pain post case given 650 mg po tylenol [address] TRANSFER - OUT REPORT: 
 
Verbal report given to April RN PCC(name) on Jaz Fuentes  being transferred to  [facility] (unit) for routine progression of care Report consisted of patients Situation, Background, Assessment and  
Recommendations(SBAR). Information from the following report(s) SBAR was reviewed with the receiving nurse. Lines:  
Peripheral IV 02/19/19 Left Wrist (Active) Site Assessment Clean, dry, & intact 2/22/2019  3:43 AM  
Phlebitis Assessment 0 2/22/2019  3:43 AM  
Infiltration Assessment 0 2/22/2019  3:43 AM  
Dressing Status Clean, dry, & intact 2/22/2019  3:43 AM  
Dressing Type Tape;Transparent 2/22/2019  3:43 AM  
Hub Color/Line Status Pink;Capped;Flushed 2/22/2019  3:43 AM  
Action Taken Open ports on tubing capped 2/22/2019  3:43 AM  
Alcohol Cap Used Yes 2/22/2019  3:43 AM  
  
 
Opportunity for questions and clarification was provided. Patient transported with: 
 Registered Nurse

## 2019-02-22 NOTE — PROGRESS NOTES
Occupational Therapy Note:  Chart reviewed. Pt currently JULIANNA for ICD placement. Will follow up at later time as able for OT tx.  
Umair Benitez, OTR/L, CBIS

## 2019-02-22 NOTE — PROGRESS NOTES
Problem: Falls - Risk of 
Goal: *Absence of Falls Document Yvonne Quick Fall Risk and appropriate interventions in the flowsheet. Outcome: Progressing Towards Goal 
Fall Risk Interventions: 
Mobility Interventions: Assess mobility with egress test, Bed/chair exit alarm, Patient to call before getting OOB, PT Consult for mobility concerns Medication Interventions: Assess postural VS orthostatic hypotension, Bed/chair exit alarm, Patient to call before getting OOB, Teach patient to arise slowly Elimination Interventions: Bed/chair exit alarm, Call light in reach, Patient to call for help with toileting needs History of Falls Interventions: Bed/chair exit alarm, Consult care management for discharge planning Problem: Pressure Injury - Risk of 
Goal: *Prevention of pressure injury Document Luciano Scale and appropriate interventions in the flowsheet. Outcome: Progressing Towards Goal 
Pressure Injury Interventions: Activity Interventions: Assess need for specialty bed, Increase time out of bed, Pressure redistribution bed/mattress(bed type), PT/OT evaluation Mobility Interventions: Assess need for specialty bed, HOB 30 degrees or less Nutrition Interventions: Document food/fluid/supplement intake, Discuss nutritional consult with provider Friction and Shear Interventions: Apply protective barrier, creams and emollients, HOB 30 degrees or less, Lift team/patient mobility team, Minimize layers, Lift sheet

## 2019-02-22 NOTE — PROGRESS NOTES
1900 
Bedside and Verbal shift change report given to [address] (oncoming nurse) by Loan Grimaldo (offgoing nurse). Report included the following information SBAR, Kardex, Intake/Output, MAR, Accordion and Recent Results. Patient on bed with visitor on the bedside. Initial assessment done. Denies any pain. 2130 Assisted patient to the bathroom. Patient done brushing. CHG done. Visit Vitals /79 (BP 1 Location: Left arm, BP Patient Position: At rest) Pulse 78 Temp 97.5 °F (36.4 °C) Resp 16 Ht 5' 7\" (1.702 m) Wt 61.2 kg (135 lb) SpO2 97% BMI 21.14 kg/m²  
 
0700 Bedside and Verbal shift change report given to April RN (oncoming nurse) by Hudson Sterling RN (offgoing nurse). Report included the following information SBAR, Kardex, Procedure Summary, Intake/Output, MAR, Accordion and Recent Results.

## 2019-02-22 NOTE — PROGRESS NOTES
Attempted to work with the patient for Physical Therapy, chart reviewed and discussed with nurse patient off the floor for a procedure. We will continue to follow up with the patient for therapy. Thank you.

## 2019-02-22 NOTE — PROGRESS NOTES
Problem: Self Care Deficits Care Plan (Adult) Goal: *Acute Goals and Plan of Care (Insert Text) Occupational Therapy Goals Initiated 2/17/2019 1. Patient will perform grooming with supervision/set-up in standing with CGA within 7 day(s). 2.  Patient will perform upper body dressing with modified independence within 7 day(s). 3.  Patient will perform lower body dressing with supervision/set-up within 7 day(s). 4.  Patient will perform toilet transfers with supervision/set-up within 7 day(s). 5.  Patient will perform all aspects of toileting with supervision/set-up within 7 day(s). 6.  Patient will participate in upper extremity therapeutic exercise/activities with independence for 5 minutes within 7 day(s). 7.  Patient will utilize energy conservation techniques during functional activities with verbal cues within 7 day(s). Occupational Therapy TREATMENT Patient: Katrina Wright (17 y.o. female) Date: 2/22/2019 Diagnosis: Ventricular tachycardia ([address] [I47.2] Ventricular tachycardia ([address] Procedure(s) (LRB): 
upgrade PM to ICD-Bsx (Left) Day of Surgery Precautions: Fall, LUE pacemaker Chart, occupational therapy assessment, plan of care, and goals were reviewed. ASSESSMENT: 
Pt s/p ICD this am and with LUE sling x24 hours to end around 1p tomorrow. Pt declined OOB activity due to not sleeping well and being too tired, but agreeable to ICD precaution education and education on performing ADLs and functional mobility following those precautions. Pt verbalized good understanding. Pt states she will have assist from her son this weekend and intermittent assist from her son next week as he works. Recommend HH therapy at discharge. Progression toward goals: 
[x]       Improving appropriately and progressing toward goals 
[]       Improving slowly and progressing toward goals 
[]       Not making progress toward goals and plan of care will be adjusted PLAN: 
 Patient continues to benefit from skilled intervention to address the above impairments. Continue treatment per established plan of care. Discharge Recommendations:  Home Health Further Equipment Recommendations for Discharge:  TBD SUBJECTIVE:  
Patient stated I am too tired to do anything right now. I wish everyone would just leave me alone so I can rest. OBJECTIVE DATA SUMMARY:  
Cognitive/Behavioral Status: 
Neurologic State: Alert; Appropriate for age Orientation Level: Oriented X4 Cognition: Appropriate for age attention/concentration; Follows commands Perception: Appears intact Perseveration: No perseveration noted Safety/Judgement: Awareness of environment; Fall prevention; Insight into deficits Functional Mobility and Transfers for ADLs: Pt seated supported in bedADL Intervention: 
Pt agreeable to ICD precaution education and education on performing ADLs and functional mobility following those precautions. Pt verbalized good understanding. Cognitive Retraining Safety/Judgement: Awareness of environment; Fall prevention; Insight into deficits Pain: 
Pain Scale 1: Numeric (0 - 10) Pain Intensity 1: 2 Activity Tolerance:  
Fair Please refer to the flowsheet for vital signs taken during this treatment. After treatment:  
[] Patient left in no apparent distress sitting up in chair 
[x] Patient left in no apparent distress in bed 
[x] Call bell left within reach [x] Nursing notified 
[] Caregiver present 
[] Bed alarm activated COMMUNICATION/COLLABORATION:  
The patients plan of care was discussed with: Physical Therapist and Registered Nurse Natalie Horton OT Time Calculation: 17 mins

## 2019-02-22 NOTE — DISCHARGE INSTRUCTIONS
ICD  Discharge Instructions    Please make sure you have received your Temporary ICD identification card with your discharge instructions      MEDICATIONS         Take only the medications prescribed to you at discharge. ACTIVITY         Return to your normal activity, except as noted below. o Do not lift anything heavier than 10 pounds for 4 weeks with the affected arm. This is how long it takes the muscles to heal, and the leads inside your heart to stabilize their position. o Do not reach above your head with the affected arm for 4 weeks, doing so increases the risk of lead dislodgement.    o It is, however, important to move the affected arm to prevent shoulder stiffness and locking. o Avoid tight clothes or unnecessary pressure over your incision (such as bra straps or seat belts). If it is tender or sensitive to clothing, cover the incision with a soft dressing or pad.  o Questions about driving are individualized and should be discussed with one of the EP Physicians prior to discharge. SHOWERING         Leave the bandage over your incision for 7 days after the ICD implant. You bandage will be removed in clinic in 7 days.  It is important to keep the bandaged area clean and dry. You may shower around the site until the bandage is removed in clinic. Thereafter, you may shower after the bandage is removed, washing it gently with soap and water. Do not apply any lotions, powders, or perfumes to the incision line.  Avoid submerging your incision in water (tub baths, hot tubs, or swimming) for four weeks.  Underneath the dressing. o If you have white steri-strips over your incision (underneath the gauze dressing), they will curl up at the end and fall off, usually within 10 days. Do not pull them off.  - OR -   o You may have a different type of closure for the incision.   If Dermabond Adhesive was used to close your incision, you will receive a separate instruction sheet.      DISCHARGE PRECAUTIONS         Record your temperature every day, at the same time, for 3 weeks after your implant. A temperature of 100.5 F, or higher, can be the first sign of infection. This should be reported to your Doctor immediately.  You cannot have an MRI. You must be aware that any strong magnet or magnetic field can affect your ICD. In general, be careful of metal detectors, heavy machinery, and any area where arc-welding is performed. Avoid metal detectors such as the ones in security checkpoints at [facility] or [address] [place] [address]. When approaching a security checkpoint show your ICD Identification Card to security personnel and ask to be hand searched.  Always tell your doctor or dentist that you have an ICD. Antibiotics may be prescribed before certain procedures.  If you use a cell phone, hold it on the opposite side from where your ICD is implanted.  Your temporary identification will be given to you with these instructions. Keep your ICD card in your wallet or on your person at all times. You should receive your permanent card in 8 weeks. If you do not receive your permanent card, please call the office at [phone]. TAKING YOUR PULSE         Take your pulse the same time every day, preferably in the morning.  Sit down and rest for 5 minutes prior to taking your pulse.  Take your pulse for 1 full minute, use a clock or stop watch with a second hand.  To feel your pulse, use the first two fingers of one hand; place them on the thumb side of the wrist of the opposite hand. The pulse will be steady, regular and throbbing.  Call the office if your pulse is less than 40 beats per minute. SYMPTOMS THAT NEED TO BE REPORTED IMMEDIATELY         Temperature more than 100.4 F     Redness or warmth at the incision site, or pain for longer than the first 5 days after the implant.  Drainage from the incision site.      Swelling around the incision site.  Shortness of breath.  Rapid heart rate or palpitations.  Dizziness, lightheadedness, fainting.  Slow pulse below 40 beats per minute.  REMEMBER: If you feel something is an emergency or cannot be handled over the phone, call 911 or go to the closest emergency room. WHAT TO DO IF YOUR ICD DELIVERS A SHOCK     · If you ICD delivers a shock, you should seek attention at the nearest emergency room, call our office or call 911.           Juliann Doran MD  Cardiac Electrophysiology / Cardiology    [address]  [address] [facility], [address] [facility], [address]  [address]  [phone] / [phone] Fax       [phone] / [phone] Fax

## 2019-02-23 LAB
ANION GAP SERPL CALC-SCNC: 5 MMOL/L (ref 5–15)
BUN SERPL-MCNC: 15 MG/DL (ref 6–20)
BUN/CREAT SERPL: 15 (ref 12–20)
CALCIUM SERPL-MCNC: 8.3 MG/DL (ref 8.5–10.1)
CHLORIDE SERPL-SCNC: 107 MMOL/L (ref 97–108)
CO2 SERPL-SCNC: 29 MMOL/L (ref 21–32)
CREAT SERPL-MCNC: 0.98 MG/DL (ref 0.55–1.02)
GLUCOSE SERPL-MCNC: 104 MG/DL (ref 65–100)
MAGNESIUM SERPL-MCNC: 1.7 MG/DL (ref 1.6–2.4)
POTASSIUM SERPL-SCNC: 4 MMOL/L (ref 3.5–5.1)
SODIUM SERPL-SCNC: 141 MMOL/L (ref 136–145)

## 2019-02-23 PROCEDURE — 97530 THERAPEUTIC ACTIVITIES: CPT

## 2019-02-23 PROCEDURE — 74011250637 HC RX REV CODE- 250/637: Performed by: NURSE PRACTITIONER

## 2019-02-23 PROCEDURE — 97116 GAIT TRAINING THERAPY: CPT

## 2019-02-23 PROCEDURE — 74011250637 HC RX REV CODE- 250/637: Performed by: INTERNAL MEDICINE

## 2019-02-23 PROCEDURE — 65660000000 HC RM CCU STEPDOWN

## 2019-02-23 PROCEDURE — 77030038269 HC DRN EXT URIN PURWCK BARD -A

## 2019-02-23 PROCEDURE — 74011250636 HC RX REV CODE- 250/636: Performed by: INTERNAL MEDICINE

## 2019-02-23 RX ADMIN — Medication 10 ML: at 06:38

## 2019-02-23 RX ADMIN — AMIODARONE HYDROCHLORIDE 200 MG: 200 TABLET ORAL at 18:06

## 2019-02-23 RX ADMIN — GUAIFENESIN 600 MG: 600 TABLET, EXTENDED RELEASE ORAL at 08:40

## 2019-02-23 RX ADMIN — Medication 10 ML: at 13:08

## 2019-02-23 RX ADMIN — VANCOMYCIN HYDROCHLORIDE 1000 MG: 1 INJECTION, POWDER, LYOPHILIZED, FOR SOLUTION INTRAVENOUS at 08:40

## 2019-02-23 RX ADMIN — CETIRIZINE HYDROCHLORIDE 5 MG: 10 TABLET, FILM COATED ORAL at 08:40

## 2019-02-23 RX ADMIN — CLINDAMYCIN HYDROCHLORIDE 150 MG: 150 CAPSULE ORAL at 21:17

## 2019-02-23 RX ADMIN — CLINDAMYCIN HYDROCHLORIDE 150 MG: 150 CAPSULE ORAL at 13:07

## 2019-02-23 RX ADMIN — Medication 10 ML: at 21:18

## 2019-02-23 RX ADMIN — ESZOPICLONE 2 MG: 2 TABLET, COATED ORAL at 21:17

## 2019-02-23 RX ADMIN — LISINOPRIL 10 MG: 5 TABLET ORAL at 08:40

## 2019-02-23 RX ADMIN — Medication 400 MG: at 08:40

## 2019-02-23 RX ADMIN — DOCUSATE SODIUM 100 MG: 100 CAPSULE, LIQUID FILLED ORAL at 08:40

## 2019-02-23 RX ADMIN — CLINDAMYCIN HYDROCHLORIDE 150 MG: 150 CAPSULE ORAL at 16:26

## 2019-02-23 RX ADMIN — AMIODARONE HYDROCHLORIDE 200 MG: 200 TABLET ORAL at 08:40

## 2019-02-23 RX ADMIN — Medication 1 CAPSULE: at 08:40

## 2019-02-23 RX ADMIN — LEVOTHYROXINE SODIUM 100 MCG: 100 TABLET ORAL at 06:38

## 2019-02-23 RX ADMIN — GUAIFENESIN 600 MG: 600 TABLET, EXTENDED RELEASE ORAL at 21:17

## 2019-02-23 RX ADMIN — HYDROCODONE BITARTRATE AND ACETAMINOPHEN 1 TABLET: 5; 325 TABLET ORAL at 13:07

## 2019-02-23 RX ADMIN — ATENOLOL 25 MG: 25 TABLET ORAL at 08:40

## 2019-02-23 RX ADMIN — MONTELUKAST 10 MG: 10 TABLET, FILM COATED ORAL at 08:40

## 2019-02-23 RX ADMIN — SENNA 8.6 MG: 8.6 TABLET, COATED ORAL at 08:40

## 2019-02-23 NOTE — PROGRESS NOTES
Problem: Mobility Impaired (Adult and Pediatric) Goal: *Acute Goals and Plan of Care (Insert Text) Physical Therapy Goals Initiated 2/17/2019 1. Patient will move from supine to sit and sit to supine  in bed with modified independence within 7 day(s). 2.  Patient will transfer from bed to chair and chair to bed with modified independence using the least restrictive device within 7 day(s). 3.  Patient will perform sit to stand with modified independence within 7 day(s). 4.  Patient will ambulate with modified independence for 150 feet with the least restrictive device within 7 day(s). 5.  Patient will ascend/descend 3 stairs with no handrail(s) with minimal assistance/contact guard assist within 7 day(s). physical Therapy TREATMENT Patient: Jeanne Atkins (16 y.o. female) Date: 2/23/2019 Diagnosis: Ventricular tachycardia ([address] [I47.2] Ventricular tachycardia ([address] Procedure(s) (LRB): 
upgrade PM to ICD-Bsx (Left) 1 Day Post-Op Precautions: Fall(LUE pacemaker as of 2/22/19) Chart, physical therapy assessment, plan of care and goals were reviewed. ASSESSMENT: 
Per RN,Pt with increased bleeding overnight, reinforced LUE precautions with pt. CGA with increased time for bed mobility, use of SPC for gait training x 120' two min LOB requiring Min A for steadying. denies SOB, dizziness with activity. Fatigued after gait training pt requesting to return to bed,Pt reports she will be alone at home as her son works. At current level, pt will require assist from family for safety with mobility tasks. Progression toward goals: 
[]    Improving appropriately and progressing toward goals [x]    Improving slowly and progressing toward goals 
[]    Not making progress toward goals and plan of care will be adjusted PLAN: 
Patient continues to benefit from skilled intervention to address the above impairments. Continue treatment per established plan of care. Discharge Recommendations:  Home Health with family assist vs rehab Further Equipment Recommendations for Discharge:  none SUBJECTIVE:  
Patient stated Roxy Preciado don't want to fall.  OBJECTIVE DATA SUMMARY:  
Critical Behavior: 
Neurologic State: Alert, Appropriate for age Orientation Level: Oriented X4 Cognition: Appropriate decision making, Appropriate for age attention/concentration, Appropriate safety awareness, Follows commands Safety/Judgement: Awareness of environment, Fall prevention, Insight into deficits Functional Mobility Training: 
Bed Mobility: 
  
Supine to Sit: Contact guard assistance Sit to Supine: Contact guard assistance Scooting: Contact guard assistance Transfers: 
Sit to Stand: Contact guard assistance Stand to Sit: Contact guard assistance Balance: 
Sitting: Intact Standing: With support Standing - Static: Good Standing - Dynamic : FairAmbulation/Gait Training: 
Distance (ft): 120 Feet (ft) Assistive Device: Cane, straight;Gait belt Ambulation - Level of Assistance: Contact guard assistance Base of Support: Narrowed Speed/Tayler: Pace decreased (<100 feet/min) Stairs: 
  
  
   
 
Neuro Re-Education: 
 
Therapeutic Exercises:  
 
Pain: 
Pain Scale 1: Numeric (0 - 10) Pain Intensity 1: 3 Activity Tolerance:  
good Please refer to the flowsheet for vital signs taken during this treatment. After treatment:  
[]    Patient left in no apparent distress sitting up in chair 
[x]    Patient left in no apparent distress in bed 
[x]    Call bell left within reach [x]    Nursing notified 
[]    Caregiver present [x]    Bed alarm activated COMMUNICATION/COLLABORATION:  
The patients plan of care was discussed with: Registered Nurse Arlet Mcdermott Time Calculation: 26 mins

## 2019-02-23 NOTE — PROGRESS NOTES
1900  
Bedside and Verbal shift change report given to Clarissa Almanzar RN (oncoming nurse) by April RN (offgoing nurse). Report included the following information SBAR, Kardex, Procedure Summary, Intake/Output, MAR, Accordion and Recent Results. Jorge A Conteh 188 Patient went down to Xray. 80 Patient back on the floor. Initial assessment done. Patient complaining of 4/10 pain on the left shoulder. PRN medication given. 2100 Assisted patient to the bathroom. Scheduled meds given. Visit Vitals /76 (BP 1 Location: Right arm, BP Patient Position: At rest) Pulse 60 Temp 98 °F (36.7 °C) Resp 16 Ht 5' 7\" (1.702 m) Wt 61.2 kg (135 lb) SpO2 97% BMI 21.14 kg/m²  
 
0700 Bedside and Verbal shift change report given to April RN (oncoming nurse) by Clarissa Almanzar RN (offgoing nurse). Report included the following information SBAR, Kardex, Procedure Summary, Intake/Output, MAR, Accordion and Recent Results.

## 2019-02-23 NOTE — PROGRESS NOTES
Patient noted with blood on her gown, left axillary area, blood is bright red. Pressure dressing from pacemaker insertion site removed to assess the site. Aquacell dressing noted to have a shadow on 85% of the dressing and is not intact at the left lower corner. Bright red blood noted at area. Gauze applied at left lower corner of Aquacell dressing and pressure dressing reapplied. Dr. Annette Erickson notified of bleeding from incision site while in on rounds. 1145 small amount of bleeding noted on gauze to left lower corner of the aqua cell dressing. Will continue to observe and notify the physician if persistence is noted. 1230 patient noted with bright red bleeding on axillary dressing and at left upper corner of aqua cell dressing with a large clot noted at left upper corner of aqua cell dressing. Gauze reapplied and Dr. Annette Erickson notified via tiger connect with new order to place a sand bag on incision. Ice applied to site and 6.6 lbs of weight applied due to the patients inability to tolerate the 10lb sand bag. Aqua cell dressing marked to observe for increase in shadow size. Dr. Annette Erickson stated that he is still in the building and will come to assess the site. 56 Dr. Annette Erickson in to assess the patient and talk with son in regard to bleeding. Weight removed and pressure dressing reapplied to site. Medicated for pain and pur wic reapplied to assist with toileting needs due to urgency and frequency and patient instructed to remain on BR until no further issues with bleeding are noted. 0 resting quietly with son at bedside and is comfortable with no further active bleeding noted at this time. Will continue to monitor and intervene as needed.

## 2019-02-23 NOTE — PROGRESS NOTES
Progress Note Jerson Pierre MD, [address] [facility], [address], [place], [address] [facility] Phone [phone]; Fax [phone] 
 
 
 
2019 7:19 AM  
 
Admit Date:           2019 Admit Diagnosis:  Ventricular tachycardia ([address] [I47.2] :          1928 MRN:          [id number] ASSESSMENT/RECOMMENDATION:  
1)S/P VT arrest: PPM interrogation shows an hour long episode out of hospital with heart rate of 194 bpm.  Rhythm likely VT. EF 61-65% Mild TR, mild AR. Cath with non obs CAD. Holding eliquis for ICD this morning, resume eliquis but secondary to connitued oozing will hold for now. -pEF 
-c I did not see any nonsustained ventricular tachycardia on the telemetry today I did see that she is atrially paced. 
  
NSVT: EP following and received AICD yesterday. CXR today ok home on doxy 100 mg bid for 7 days.  
  
Hypertension: BP better with lisinopril in the AM, cont atenolol & titrate medications as tolerated  
  
Elevated troponin: no MI.  
  
A fib s/p PPM:  cont atenolol, eliquis (on hold) 
  Non obs CAD: cont BB No intake/output data recorded. Last 3 Recorded Weights in this Encounter 19 2100 19 1537 Weight: 133 lb 9.6 oz (60.6 kg) 135 lb (61.2 kg) 1901 -  0700 In: 1200 [P.O.:1200] Out: 1500 [Urine:1500] SUBJECTIVE Genevive Glassing reports She states she is doing well. Her family is unable to be with her at home and because of the continued oozing from her pacemaker site with no hematoma I think it is prudent that she stay at least overnight until we can figure out placement. Also to watch her oozing. She has no chest discomfort or shortness of breath. Liam Yoon Current Facility-Administered Medications Medication Dose Route Frequency  acetaminophen (TYLENOL) tablet 650 mg  650 mg Oral Q4H PRN  
 HYDROcodone-acetaminophen (NORCO) 5-325 mg per tablet 1 Tab  1 Tab Oral Q4H PRN  
 ondansetron (ZOFRAN) injection 4 mg  4 mg IntraVENous Q4H PRN  
 vancomycin (VANCOCIN) 1,000 mg in 0.9% sodium chloride (MBP/ADV) 250 mL  1 g IntraVENous Q12H  clindamycin (CLEOCIN) capsule 150 mg  150 mg Oral TID  eszopiclone (LUNESTA) tablet 2 mg  2 mg Oral QHS  lisinopril (PRINIVIL, ZESTRIL) tablet 10 mg  10 mg Oral DAILY  magnesium oxide (MAG-OX) tablet 400 mg  400 mg Oral DAILY  docusate sodium (COLACE) capsule 100 mg  100 mg Oral BID  senna (SENOKOT) tablet 8.6 mg  1 Tab Oral DAILY  amiodarone (CORDARONE) tablet 200 mg  200 mg Oral BID  cetirizine (ZYRTEC) tablet 5 mg  5 mg Oral DAILY  guaiFENesin ER (MUCINEX) tablet 600 mg  600 mg Oral Q12H  levothyroxine (SYNTHROID) tablet 100 mcg  100 mcg Oral ACB  montelukast (SINGULAIR) tablet 10 mg  10 mg Oral DAILY  atenolol (TENORMIN) tablet 25 mg  25 mg Oral DAILY  loperamide (IMODIUM) capsule 2 mg  2 mg Oral Q4H PRN  
 sodium chloride (NS) flush 5-40 mL  5-40 mL IntraVENous Q8H  
 sodium chloride (NS) flush 5-40 mL  5-40 mL IntraVENous PRN  
 oxyCODONE-acetaminophen (PERCOCET) 5-325 mg per tablet 1 Tab  1 Tab Oral Q4H PRN  prochlorperazine (COMPAZINE) injection 10 mg  10 mg IntraVENous Q6H PRN  
 lactobac ac& pc-s.therm-b.anim (WILLA Q/RISAQUAD)  1 Cap Oral DAILY OBJECTIVE Intake/Output Summary (Last 24 hours) at 2/23/2019 2620 Last data filed at 2/23/2019 7657 Gross per 24 hour Intake 960 ml Output 1100 ml Net -140 ml Review of Systems - History obtained from the patient AS PER  HPI PHYSICAL EXAM  
  
 
Visit Vitals /76 (BP 1 Location: Right arm, BP Patient Position: At rest) Pulse 60 Temp 98 °F (36.7 °C) Resp 16 Ht 5' 7\" (1.702 m) Wt 135 lb (61.2 kg) SpO2 97% BMI 21.14 kg/m² Gen: Well-developed, well-nourished, in no acute distress  alert HEENT:  Pink conjunctivae, Hearing grossly normal.No scleral icterus or conjunctival, moist mucous membranes Neck: Supple,No JVD, No Carotid Bruit, Thyroid- non tender No cervical lymphadenopathy Resp: No accessory muscle use, Clear breath sounds, No rales or rhonchi 
Card: Regular Rate,Rythm; chest exam there is a dressing on her left upper chest with some slight amount of blood on the gauze. MSK: No cyanosis or clubbing, good capillary refill Skin: No rashes or ulcers, no bruising Neuro:  Cranial nerves are grossly intact, moving all four extremities, no focal deficit, follows commands appropriately Psych:  Good insight, oriented to person, place and time, alert, Nml Affect LE: No edema DATA REVIEW Laboratory and Imaging have been reviewed by me and are notable for No results for input(s): CPK, CKMB, TROIQ in the last 72 hours. Recent Labs  
  02/22/19 
2356 02/22/19 
0158 02/21/19 
0404  143 142  
K 4.0 3.8 3.8 CO2 29 31 29 BUN 15 11 11 CREA 0.98 0.89 0.81 * 106* 98  
MG 1.7 1.8 1.8 Yudi Pereyra MD

## 2019-02-23 NOTE — PROGRESS NOTES
CM spoke with patient and her son at bedside. Both expressed concern about the patient going home alone at discharge. They would like for the patient to go to a SNF for a short term rehab stay. CM provided a list of choices, [facility] choice then [facility] [address]. CM sent referrals via [employer], awaiting response. Patients son can be reached at [phone] with any updates.

## 2019-02-23 NOTE — PROGRESS NOTES
Problem: Falls - Risk of 
Goal: *Absence of Falls Document Gricel Red Fall Risk and appropriate interventions in the flowsheet. Outcome: Progressing Towards Goal 
Fall Risk Interventions: 
Mobility Interventions: Assess mobility with egress test, Bed/chair exit alarm, Patient to call before getting OOB, PT Consult for mobility concerns Medication Interventions: Assess postural VS orthostatic hypotension, Bed/chair exit alarm, Patient to call before getting OOB, Teach patient to arise slowly Elimination Interventions: Call light in reach History of Falls Interventions: Bed/chair exit alarm, Consult care management for discharge planning Problem: Pressure Injury - Risk of 
Goal: *Prevention of pressure injury Document Luciano Scale and appropriate interventions in the flowsheet. Outcome: Progressing Towards Goal 
Pressure Injury Interventions: Activity Interventions: Pressure redistribution bed/mattress(bed type) Mobility Interventions: Pressure redistribution bed/mattress (bed type) Nutrition Interventions: Document food/fluid/supplement intake, Discuss nutritional consult with provider Friction and Shear Interventions: Apply protective barrier, creams and emollients, Foam dressings/transparent film/skin sealants, Lift team/patient mobility team, Transferring/repositioning devices

## 2019-02-24 LAB — MAGNESIUM SERPL-MCNC: 1.6 MG/DL (ref 1.6–2.4)

## 2019-02-24 PROCEDURE — 77030038269 HC DRN EXT URIN PURWCK BARD -A

## 2019-02-24 PROCEDURE — 36415 COLL VENOUS BLD VENIPUNCTURE: CPT

## 2019-02-24 PROCEDURE — 74011250637 HC RX REV CODE- 250/637: Performed by: SPECIALIST

## 2019-02-24 PROCEDURE — 74011250637 HC RX REV CODE- 250/637: Performed by: INTERNAL MEDICINE

## 2019-02-24 PROCEDURE — 83735 ASSAY OF MAGNESIUM: CPT

## 2019-02-24 PROCEDURE — 65660000000 HC RM CCU STEPDOWN

## 2019-02-24 PROCEDURE — 74011250637 HC RX REV CODE- 250/637: Performed by: NURSE PRACTITIONER

## 2019-02-24 RX ORDER — ESTRADIOL 1 MG/1
0.5 TABLET ORAL DAILY
Status: DISCONTINUED | OUTPATIENT
Start: 2019-02-24 | End: 2019-02-25 | Stop reason: HOSPADM

## 2019-02-24 RX ADMIN — GUAIFENESIN 600 MG: 600 TABLET, EXTENDED RELEASE ORAL at 21:21

## 2019-02-24 RX ADMIN — Medication 400 MG: at 08:20

## 2019-02-24 RX ADMIN — CETIRIZINE HYDROCHLORIDE 5 MG: 10 TABLET, FILM COATED ORAL at 08:20

## 2019-02-24 RX ADMIN — Medication 10 ML: at 21:21

## 2019-02-24 RX ADMIN — Medication 1 CAPSULE: at 08:19

## 2019-02-24 RX ADMIN — ESTRADIOL 0.5 MG: 1 TABLET ORAL at 14:28

## 2019-02-24 RX ADMIN — CLINDAMYCIN HYDROCHLORIDE 150 MG: 150 CAPSULE ORAL at 21:21

## 2019-02-24 RX ADMIN — CLINDAMYCIN HYDROCHLORIDE 150 MG: 150 CAPSULE ORAL at 08:19

## 2019-02-24 RX ADMIN — LEVOTHYROXINE SODIUM 100 MCG: 100 TABLET ORAL at 06:48

## 2019-02-24 RX ADMIN — ESZOPICLONE 2 MG: 2 TABLET, COATED ORAL at 21:21

## 2019-02-24 RX ADMIN — GUAIFENESIN 600 MG: 600 TABLET, EXTENDED RELEASE ORAL at 08:20

## 2019-02-24 RX ADMIN — AMIODARONE HYDROCHLORIDE 200 MG: 200 TABLET ORAL at 08:20

## 2019-02-24 RX ADMIN — LISINOPRIL 10 MG: 5 TABLET ORAL at 08:19

## 2019-02-24 RX ADMIN — SENNA 8.6 MG: 8.6 TABLET, COATED ORAL at 08:20

## 2019-02-24 RX ADMIN — ACETAMINOPHEN 650 MG: 325 TABLET ORAL at 20:34

## 2019-02-24 RX ADMIN — DOCUSATE SODIUM 100 MG: 100 CAPSULE, LIQUID FILLED ORAL at 08:19

## 2019-02-24 RX ADMIN — DOCUSATE SODIUM 100 MG: 100 CAPSULE, LIQUID FILLED ORAL at 17:00

## 2019-02-24 RX ADMIN — CLINDAMYCIN HYDROCHLORIDE 150 MG: 150 CAPSULE ORAL at 15:22

## 2019-02-24 RX ADMIN — AMIODARONE HYDROCHLORIDE 200 MG: 200 TABLET ORAL at 17:00

## 2019-02-24 RX ADMIN — Medication 10 ML: at 14:28

## 2019-02-24 RX ADMIN — Medication 10 ML: at 04:23

## 2019-02-24 RX ADMIN — ATENOLOL 25 MG: 25 TABLET ORAL at 08:20

## 2019-02-24 RX ADMIN — MONTELUKAST 10 MG: 10 TABLET, FILM COATED ORAL at 08:20

## 2019-02-24 RX ADMIN — HYDROCODONE BITARTRATE AND ACETAMINOPHEN 1 TABLET: 5; 325 TABLET ORAL at 23:14

## 2019-02-24 RX ADMIN — ACETAMINOPHEN 650 MG: 325 TABLET ORAL at 04:22

## 2019-02-24 NOTE — PROGRESS NOTES
Progress Note Jerson Pierre MD, [facility] [address], [place], [address] [facility] Phone [phone]; Fax [phone] 
 
 
 
2019 8:14 AM  
 
Admit Date:           2019 Admit Diagnosis:  Ventricular tachycardia ([address] [I47.2] :          1928 MRN:          [id number] ASSESSMENT/RECOMMENDATION:  
1)S/P VT arrest: PPM interrogation shows an hour long episode out of hospital with heart rate of 194 bpm.  Rhythm likely VT. EF 61-65% Mild TR, mild AR. Cath with non obs CAD. Holding eliquis for ICD this morning, resume eliquis tomorrow AWAITING PLACEMENT 
 
  
NSVT: EP following and received AICD yesterday. CXR today ok home on doxy 100 mg bid for 7 days.  
  
Hypertension: BP better with lisinopril in the AM, cont atenolol & titrate medications as tolerated  
  
Elevated troponin: no MI.  
  
A fib s/p PPM:  cont atenolol, eliquis (on hold) 
  Non obs CAD: cont BB  
 
 
?????? Why is WBC low as is the PLT . If hematological disocred her plts may not be functional resulting in the bleeding. No intake/output data recorded. Last 3 Recorded Weights in this Encounter 19 2100 19 1537 Weight: 133 lb 9.6 oz (60.6 kg) 135 lb (61.2 kg)  1901 -  0700 In: 2477 [P.O.:2820; I.V.:270] Out: 3470 [GXWBS:8105] SUBJECTIVE Edil Love reports none. Waiting for placement since no one at home. Wants to go back on estrogen. PM site no further bleeding. Current Facility-Administered Medications Medication Dose Route Frequency  acetaminophen (TYLENOL) tablet 650 mg  650 mg Oral Q4H PRN  
 HYDROcodone-acetaminophen (NORCO) 5-325 mg per tablet 1 Tab  1 Tab Oral Q4H PRN  
  ondansetron (ZOFRAN) injection 4 mg  4 mg IntraVENous Q4H PRN  
 clindamycin (CLEOCIN) capsule 150 mg  150 mg Oral TID  eszopiclone (LUNESTA) tablet 2 mg  2 mg Oral QHS  lisinopril (PRINIVIL, ZESTRIL) tablet 10 mg  10 mg Oral DAILY  magnesium oxide (MAG-OX) tablet 400 mg  400 mg Oral DAILY  docusate sodium (COLACE) capsule 100 mg  100 mg Oral BID  senna (SENOKOT) tablet 8.6 mg  1 Tab Oral DAILY  amiodarone (CORDARONE) tablet 200 mg  200 mg Oral BID  cetirizine (ZYRTEC) tablet 5 mg  5 mg Oral DAILY  guaiFENesin ER (MUCINEX) tablet 600 mg  600 mg Oral Q12H  levothyroxine (SYNTHROID) tablet 100 mcg  100 mcg Oral ACB  montelukast (SINGULAIR) tablet 10 mg  10 mg Oral DAILY  atenolol (TENORMIN) tablet 25 mg  25 mg Oral DAILY  loperamide (IMODIUM) capsule 2 mg  2 mg Oral Q4H PRN  
 sodium chloride (NS) flush 5-40 mL  5-40 mL IntraVENous Q8H  
 sodium chloride (NS) flush 5-40 mL  5-40 mL IntraVENous PRN  
 oxyCODONE-acetaminophen (PERCOCET) 5-325 mg per tablet 1 Tab  1 Tab Oral Q4H PRN  prochlorperazine (COMPAZINE) injection 10 mg  10 mg IntraVENous Q6H PRN  
 lactobac ac& pc-s.therm-b.anim (WILLA Q/RISAQUAD)  1 Cap Oral DAILY OBJECTIVE Intake/Output Summary (Last 24 hours) at 2/24/2019 7268 Last data filed at 2/24/2019 3399 Gross per 24 hour Intake 2770 ml Output 2770 ml Net 0 ml Review of Systems - History obtained from the patient AS PER  HPI PHYSICAL EXAM  
  
 
Visit Vitals /79 (BP 1 Location: Right arm, BP Patient Position: At rest) Pulse 60 Temp 97.4 °F (36.3 °C) Resp 18 Ht 5' 7\" (1.702 m) Wt 135 lb (61.2 kg) SpO2 94% BMI 21.14 kg/m² Gen: Well-developed, well-nourished, in no acute distress  alert HEENT:  Pink conjunctivae, Hearing grossly normal.No scleral icterus or conjunctival, moist mucous membranes Neck: Supple,No JVD, No Carotid Bruit, Thyroid- non tender No cervical lymphadenopathy Resp: No accessory muscle use, Clear breath sounds Card: Regular Rate,Rythm,. PM SITE NO BLEEDING DRY 
MSK: No cyanosis or clubbing, good capillary refill Skin: No rashes or ulcers, no bruising Neuro:  Cranial nerves are grossly intact, moving all four extremities, no focal deficit, follows commands appropriately Psych:  Good insight, oriented to person, place and time, alert, Nml Affect LE: No edema DATA REVIEW Laboratory and Imaging have been reviewed by me and are notable for No results for input(s): CPK, CKMB, TROIQ in the last 72 hours. Recent Labs  
  02/24/19 
0139 02/22/19 
2356 02/22/19 
0158 NA  --  141 143 K  --  4.0 3.8 CO2  --  29 31 BUN  --  15 11 CREA  --  0.98 0.89 GLU  --  104* 106* MG 1.6 1.7 1.8 Gary Ybarra MD

## 2019-02-24 NOTE — PROGRESS NOTES
Shift Summary 1930: Bedside and Verbal shift change report given to Margaret Murphy RN (oncoming nurse) by Nighat Musa RN (offgoing nurse). Report included the following information SBAR, Kardex, Procedure Summary, Intake/Output, MAR, Accordion, Recent Results and Cardiac Rhythm Apaced. 2100: Patient understands she is supposed to rest tonight to protect arm d/t bleeding during today but is feeling restless and eager to get up. Denies pain. Site clean, dry, intact. Assisted with mouth care, bath, and linen change. Pure wick new, clean, intact 
 
0300: Patient restless in bed, feeling hot and cold, asking about her estrogen medication. I informed her that this had been stopped in the hospital, encouraged her to ask her doctor about it today if she was concerned about it. 0430: Patient woke up \"not feeling well\", complaining of headache, feeling like she is \"getting a cold\". Gave tylenol. Temperature WDL. 0630: Patient reports feeling better this AM. 0730: Bedside and Verbal shift change report given to Walt Rodriguez RN and Nighat Pryor RN (oncoming nurse) by Margaret Murphy RN (offgoing nurse). Report included the following information SBAR, Kardex, Procedure Summary, Intake/Output, MAR, Accordion, Recent Results and Cardiac Rhythm A Paced. Care Plan Summary Problem: Falls - Risk of 
Goal: *Absence of Falls Document Ana Maria Khan Fall Risk and appropriate interventions in the flowsheet. Outcome: Progressing Towards Goal 
Fall Risk Interventions: 
Mobility Interventions: OT consult for ADLs, Patient to call before getting OOB, PT Consult for mobility concerns, PT Consult for assist device competence, Strengthening exercises (ROM-active/passive), Utilize walker, cane, or other assistive device, Utilize gait belt for transfers/ambulation Medication Interventions: Teach patient to arise slowly Elimination Interventions: Call light in reach History of Falls Interventions: Door open when patient unattended Problem: Pressure Injury - Risk of 
Goal: *Prevention of pressure injury Document Luciano Scale and appropriate interventions in the flowsheet. Outcome: Progressing Towards Goal 
Pressure Injury Interventions: Activity Interventions: Increase time out of bed, PT/OT evaluation Mobility Interventions: HOB 30 degrees or less Nutrition Interventions: Document food/fluid/supplement intake, Offer support with meals,snacks and hydration Friction and Shear Interventions: Apply protective barrier, creams and emollients

## 2019-02-24 NOTE — PROGRESS NOTES
Bedside and Verbal shift change report given to DARRION Jo RN (oncoming nurse) by Elizabeth Tidwell RN (offgoing nurse). Report included the following information SBAR, Kardex, Intake/Output, MAR, Recent Results, Med Rec Status and Cardiac Rhythm Afib. Will assess. 1030- Pt asking to continue at home estrogen meds while in hospital. Will follow up with MD 
 
[phone] Pt refused placement of new IV.

## 2019-02-24 NOTE — PROGRESS NOTES
4909- Bedside and Verbal shift change report given to Janna Champion RN (oncoming nurse) by Ronnald Merlin RN (offgoing nurse). Report included the following information SBAR, Kardex, Intake/Output, MAR, Recent Results, Cardiac Rhythm Afib, paced and Alarm Parameters .

## 2019-02-25 VITALS
TEMPERATURE: 97.5 F | BODY MASS INDEX: 21.19 KG/M2 | DIASTOLIC BLOOD PRESSURE: 63 MMHG | SYSTOLIC BLOOD PRESSURE: 122 MMHG | RESPIRATION RATE: 17 BRPM | HEART RATE: 60 BPM | HEIGHT: 67 IN | OXYGEN SATURATION: 95 % | WEIGHT: 135 LBS

## 2019-02-25 LAB
ANION GAP SERPL CALC-SCNC: 6 MMOL/L (ref 5–15)
BUN SERPL-MCNC: 11 MG/DL (ref 6–20)
BUN/CREAT SERPL: 13 (ref 12–20)
CALCIUM SERPL-MCNC: 8.3 MG/DL (ref 8.5–10.1)
CHLORIDE SERPL-SCNC: 102 MMOL/L (ref 97–108)
CO2 SERPL-SCNC: 29 MMOL/L (ref 21–32)
CREAT SERPL-MCNC: 0.86 MG/DL (ref 0.55–1.02)
GLUCOSE SERPL-MCNC: 86 MG/DL (ref 65–100)
MAGNESIUM SERPL-MCNC: 1.7 MG/DL (ref 1.6–2.4)
POTASSIUM SERPL-SCNC: 4 MMOL/L (ref 3.5–5.1)
SODIUM SERPL-SCNC: 137 MMOL/L (ref 136–145)

## 2019-02-25 PROCEDURE — 80048 BASIC METABOLIC PNL TOTAL CA: CPT

## 2019-02-25 PROCEDURE — 83735 ASSAY OF MAGNESIUM: CPT

## 2019-02-25 PROCEDURE — 74011250637 HC RX REV CODE- 250/637: Performed by: NURSE PRACTITIONER

## 2019-02-25 PROCEDURE — 74011250637 HC RX REV CODE- 250/637: Performed by: INTERNAL MEDICINE

## 2019-02-25 PROCEDURE — 74011250637 HC RX REV CODE- 250/637: Performed by: SPECIALIST

## 2019-02-25 PROCEDURE — 36415 COLL VENOUS BLD VENIPUNCTURE: CPT

## 2019-02-25 PROCEDURE — 74011250636 HC RX REV CODE- 250/636: Performed by: INTERNAL MEDICINE

## 2019-02-25 RX ORDER — CLINDAMYCIN HYDROCHLORIDE 150 MG/1
150 CAPSULE ORAL 3 TIMES DAILY
Qty: 7 CAP | Refills: 0 | Status: SHIPPED | OUTPATIENT
Start: 2019-02-25 | End: 2019-03-20

## 2019-02-25 RX ORDER — ATENOLOL 25 MG/1
25 TABLET ORAL DAILY
Qty: 90 TAB | Refills: 0 | Status: SHIPPED | OUTPATIENT
Start: 2019-02-25 | End: 2021-06-08 | Stop reason: SDUPTHER

## 2019-02-25 RX ORDER — AMIODARONE HYDROCHLORIDE 200 MG/1
TABLET ORAL
Qty: 50 TAB | Refills: 0 | Status: SHIPPED | OUTPATIENT
Start: 2019-02-25 | End: 2019-04-06

## 2019-02-25 RX ORDER — LANOLIN ALCOHOL/MO/W.PET/CERES
400 CREAM (GRAM) TOPICAL DAILY
Qty: 30 TAB | Refills: 0 | Status: SHIPPED | OUTPATIENT
Start: 2019-02-26 | End: 2019-03-20

## 2019-02-25 RX ADMIN — Medication 400 MG: at 09:52

## 2019-02-25 RX ADMIN — HYDROCODONE BITARTRATE AND ACETAMINOPHEN 1 TABLET: 5; 325 TABLET ORAL at 03:08

## 2019-02-25 RX ADMIN — CETIRIZINE HYDROCHLORIDE 5 MG: 10 TABLET, FILM COATED ORAL at 09:52

## 2019-02-25 RX ADMIN — APIXABAN 5 MG: 5 TABLET, FILM COATED ORAL at 09:51

## 2019-02-25 RX ADMIN — CLINDAMYCIN HYDROCHLORIDE 150 MG: 150 CAPSULE ORAL at 09:51

## 2019-02-25 RX ADMIN — ONDANSETRON 4 MG: 2 INJECTION INTRAMUSCULAR; INTRAVENOUS at 09:06

## 2019-02-25 RX ADMIN — ATENOLOL 25 MG: 25 TABLET ORAL at 09:52

## 2019-02-25 RX ADMIN — Medication 1 CAPSULE: at 09:51

## 2019-02-25 RX ADMIN — GUAIFENESIN 600 MG: 600 TABLET, EXTENDED RELEASE ORAL at 09:52

## 2019-02-25 RX ADMIN — CLINDAMYCIN HYDROCHLORIDE 150 MG: 150 CAPSULE ORAL at 15:53

## 2019-02-25 RX ADMIN — Medication 10 ML: at 03:08

## 2019-02-25 RX ADMIN — AMIODARONE HYDROCHLORIDE 200 MG: 200 TABLET ORAL at 09:51

## 2019-02-25 RX ADMIN — MONTELUKAST 10 MG: 10 TABLET, FILM COATED ORAL at 09:51

## 2019-02-25 RX ADMIN — LISINOPRIL 10 MG: 5 TABLET ORAL at 09:52

## 2019-02-25 RX ADMIN — LEVOTHYROXINE SODIUM 100 MCG: 100 TABLET ORAL at 05:56

## 2019-02-25 RX ADMIN — ESTRADIOL 0.5 MG: 1 TABLET ORAL at 10:01

## 2019-02-25 NOTE — PROGRESS NOTES
0720- Bedside and Verbal shift change report given to DARRION Jo RN (oncoming nurse) by Clem Moise RN (offgoing nurse). Report included the following information SBAR, Kardex, Intake/Output, MAR, Recent Results, Med Rec Status, Cardiac Rhythm Paced and Alarm Parameters . Will assess. 3919- Pt had an episode of emesis. Stated she has been \"not feeling well in the mornings\". Administered zofran. Still awaiting SNF placement. Will continue to monitor. 1232- Pt resting without distress. 1526- TRANSFER - OUT REPORT: 
 
Verbal report given to [address] (name) on Shanon Segovia  being transferred to [facility] (unit) for routine progression of care Report consisted of patients Situation, Background, Assessment and  
Recommendations(SBAR). Information from the following report(s) SBAR, Kardex, Procedure Summary, Intake/Output, MAR, Recent Results, Med Rec Status and Cardiac Rhythm A Paced was reviewed with the receiving nurse. Lines:  
Peripheral IV 02/19/19 Left Wrist (Active) Site Assessment Clean, dry, & intact 2/25/2019  9:15 AM  
Phlebitis Assessment 0 2/25/2019  9:15 AM  
Infiltration Assessment 0 2/25/2019  9:15 AM  
Dressing Status Clean, dry, & intact 2/25/2019  9:15 AM  
Dressing Type Transparent;Tape 2/25/2019  9:15 AM  
Hub Color/Line Status Pink;Patent; Flushed;End cap changed 2/25/2019  9:15 AM  
Action Taken Open ports on tubing capped 2/25/2019  9:15 AM  
Alcohol Cap Used Yes 2/25/2019  9:15 AM  
  
 
Opportunity for questions and clarification was provided. Patient transported with: 
 Foap AB

## 2019-02-25 NOTE — PROGRESS NOTES
Cardiology Progress Note [address], [place], [address] Phone [phone]; Fax [phone] 
 
 
 
2019 9:58 AM  
 
Admit Date:           2019 Admit Diagnosis:  Ventricular tachycardia ([address] [I47.2] :          1928 MRN:          [id number] ASSESSMENT/RECOMMENDATION:  
 
S/P VT arrest: s/p upgrade ppm to ICD 19, device functioning properly. 
-small hematoma at ICD site. Pressure dressing removed, new aquacel/zip line placed.  
-eliquis resumed  
-PPM interrogation showing an hour long episode out of hospital with heart rate of 194 bpm.  Rhythm likely VT.  
-EF 61-65% Mild TR, mild AR. -Cath with non obs CAD. NSVT: EP following. Now on amio @ 200 mg BID, will reduce to daily when seen in EP clinic. Post interrogation Hypertension: controlled Elevated troponin: no MI. A fib s/p PPM:  cont atenolol. Resumed elilquis this morning Non obs CAD: cont BB  
 
N/V: suspect this is d/t taking pain medication on an empty stomach. Use tylenol and ice pack for pain prn. D/w patient, okay to d/c and go to rehab today Cardiology Attending: 
 
Patient personally seen and examined. All the elements of history and examination were personally performed. Assessment and plan was discussed and agree as written above. See my note separately. James Rivas MD, [facility] No intake/output data recorded. Last 3 Recorded Weights in this Encounter 19 2100 19 1537 Weight: 133 lb 9.6 oz (60.6 kg) 135 lb (61.2 kg) 1901 -  0700 In: 1910 [P.O.:1900; I.V.:10] Out: 3420 [QKBRE:[id number]] SUBJECTIVE Cherylynn Heckler denies palpitations, irregular heart beat, SOB, chest pain or LE edema. Had N/A x 1 this AM. No diarrhea Feels better now. Current Facility-Administered Medications Medication Dose Route Frequency  apixaban (ELIQUIS) tablet 5 mg  5 mg Oral BID  estradiol (ESTRACE) tablet 0.5 mg  0.5 mg Oral DAILY  acetaminophen (TYLENOL) tablet 650 mg  650 mg Oral Q4H PRN  
 HYDROcodone-acetaminophen (NORCO) 5-325 mg per tablet 1 Tab  1 Tab Oral Q4H PRN  
 ondansetron (ZOFRAN) injection 4 mg  4 mg IntraVENous Q4H PRN  
 clindamycin (CLEOCIN) capsule 150 mg  150 mg Oral TID  eszopiclone (LUNESTA) tablet 2 mg  2 mg Oral QHS  lisinopril (PRINIVIL, ZESTRIL) tablet 10 mg  10 mg Oral DAILY  magnesium oxide (MAG-OX) tablet 400 mg  400 mg Oral DAILY  docusate sodium (COLACE) capsule 100 mg  100 mg Oral BID  senna (SENOKOT) tablet 8.6 mg  1 Tab Oral DAILY  amiodarone (CORDARONE) tablet 200 mg  200 mg Oral BID  cetirizine (ZYRTEC) tablet 5 mg  5 mg Oral DAILY  guaiFENesin ER (MUCINEX) tablet 600 mg  600 mg Oral Q12H  levothyroxine (SYNTHROID) tablet 100 mcg  100 mcg Oral ACB  montelukast (SINGULAIR) tablet 10 mg  10 mg Oral DAILY  atenolol (TENORMIN) tablet 25 mg  25 mg Oral DAILY  loperamide (IMODIUM) capsule 2 mg  2 mg Oral Q4H PRN  
 sodium chloride (NS) flush 5-40 mL  5-40 mL IntraVENous Q8H  
 sodium chloride (NS) flush 5-40 mL  5-40 mL IntraVENous PRN  
 oxyCODONE-acetaminophen (PERCOCET) 5-325 mg per tablet 1 Tab  1 Tab Oral Q4H PRN  prochlorperazine (COMPAZINE) injection 10 mg  10 mg IntraVENous Q6H PRN  
 lactobac ac& pc-s.therm-b.anim (WILLA Q/RISAQUAD)  1 Cap Oral DAILY OBJECTIVE Intake/Output Summary (Last 24 hours) at 2/25/2019 1152 Last data filed at 2/25/2019 2917 Gross per 24 hour Intake 1310 ml Output 1850 ml Net -540 ml Review of Systems - History obtained from the patient AS PER  HPI Telemetry AP  
 
PHYSICAL EXAM  
  
 
Visit Vitals /63 Pulse 60 Temp 97.5 °F (36.4 °C) Resp 17 Ht 5' 7\" (1.702 m) Wt 135 lb (61.2 kg) SpO2 95% BMI 21.14 kg/m² Gen: Well-developed, elderly/frail, in no acute distress  alert and oriented x 3 HEENT:  Pink conjunctivae, Hearing grossly normal.No scleral icterus or conjunctival, moist mucous membranes Neck: Supple, No JVD Resp: No accessory muscle use, Clear breath sounds, No rales or rhonchi 
Card: Regular Rate,Rythm, No murmurs, rubs or gallop. No thrills. GI:          soft, non-tender MSK: No cyanosis or clubbing, good capillary refill Skin: No rashes or ulcers, + bruising. Left sided ICD site visualize- edges well approximated. Small hematoma present, no active drainage or redness. Large amount of old sangeous drainage. (zip line and aquacel dressing changed). Left arm in sling Neuro:  Cranial nerves are grossly intact, moving all four extremities, no focal deficit, follows commands appropriately Psych:  Good insight, oriented to person, place and time, alert, Nml Affect LE: No edema DATA REVIEW Laboratory and Imaging have been reviewed by me and are notable for No results for input(s): CPK, CKMB, TROIQ in the last 72 hours. Recent Labs  
  02/25/19 
0312 02/24/19 
0139 02/22/19 
2356   --  141  
K 4.0  --  4.0  
CO2 29  --  29 BUN 11  --  15  
CREA 0.86  --  0.98  
GLU 86  --  104* MG 1.7 1.6 1.7 Adilson Simmons, PINKY

## 2019-02-25 NOTE — PROGRESS NOTES
Shift Summary 1930: Bedside and Verbal shift change report given to José Miguel Marin RN (oncoming nurse) by Montrell Nevarez RN (offgoing nurse). Report included the following information SBAR, Kardex, Procedure Summary, Intake/Output, MAR, Accordion, Recent Results and Cardiac Rhythm Appaced. 0000: Patient having some trouble sleeping d/t coughing up large amounts of clear phlegm, feeling uncomfortable in bed, pain in shoulders. Treated with medication, encouraged position changes, raising head of bed to help with drainage. 0600: Patient resting on and off through night, discomfort relieved with repositioning and medicine but unable to sleep soundly. 0730: Bedside and Verbal shift change report given to Montrell Nevarez RN and Nighat Pryor RN (oncoming nurse) by José Miguel Marin RN (offgoing nurse). Report included the following information SBAR, Kardex, Procedure Summary, Intake/Output, MAR, Accordion, Recent Results and Cardiac Rhythm Apaced. Care Plan Summary Problem: Falls - Risk of 
Goal: *Absence of Falls Document Kimberley Bernstein Fall Risk and appropriate interventions in the flowsheet. Outcome: Progressing Towards Goal 
Fall Risk Interventions: 
Mobility Interventions: Patient to call before getting OOB Medication Interventions: Teach patient to arise slowly Elimination Interventions: Patient to call for help with toileting needs History of Falls Interventions: Door open when patient unattended Problem: Pressure Injury - Risk of 
Goal: *Prevention of pressure injury Document Luciano Scale and appropriate interventions in the flowsheet. Outcome: Progressing Towards Goal 
Pressure Injury Interventions: Activity Interventions: Increase time out of bed Mobility Interventions: HOB 30 degrees or less Nutrition Interventions: Document food/fluid/supplement intake Friction and Shear Interventions: Minimize layers

## 2019-02-25 NOTE — PROGRESS NOTES
Cardiology Daily Progress Note Lv Lee is a 80 y.o. female admitted with slow VT s/p defib in field. Evaluated by Dr. Miah Castro. Amio increased. PPM upgraded to AICD. Cath show moderate RCA disease. Now awaiting placement. BP better controlled. Doing well. Visit Vitals /63 Pulse 60 Temp 97.5 °F (36.4 °C) Resp 17 Ht 5' 7\" (1.702 m) Wt 135 lb (61.2 kg) SpO2 95% BMI 21.14 kg/m² Intake/Output Summary (Last 24 hours) at 2/25/2019 1155 Last data filed at 2/25/2019 9327 Gross per 24 hour Intake 1310 ml Output 1850 ml Net -540 ml General appearance - alert, well appearing, and in no distress Mental status - affect appropriate to mood Eyes - sclera anicteric, moist mucous membranes Neck - supple, no significant adenopathy Chest - clear to auscultation, no wheezes, rales or rhonchi Heart - normal rate, regular rhythm, normal S1, S2, no murmurs, rubs, clicks or gallops Abdomen - soft, nontender, nondistended, no masses or organomegaly Extremities - peripheral pulses normal, no pedal edema Current Facility-Administered Medications Medication Dose Route Frequency  apixaban (ELIQUIS) tablet 5 mg  5 mg Oral BID  estradiol (ESTRACE) tablet 0.5 mg  0.5 mg Oral DAILY  acetaminophen (TYLENOL) tablet 650 mg  650 mg Oral Q4H PRN  
 HYDROcodone-acetaminophen (NORCO) 5-325 mg per tablet 1 Tab  1 Tab Oral Q4H PRN  
 ondansetron (ZOFRAN) injection 4 mg  4 mg IntraVENous Q4H PRN  
 clindamycin (CLEOCIN) capsule 150 mg  150 mg Oral TID  eszopiclone (LUNESTA) tablet 2 mg  2 mg Oral QHS  lisinopril (PRINIVIL, ZESTRIL) tablet 10 mg  10 mg Oral DAILY  magnesium oxide (MAG-OX) tablet 400 mg  400 mg Oral DAILY  docusate sodium (COLACE) capsule 100 mg  100 mg Oral BID  senna (SENOKOT) tablet 8.6 mg  1 Tab Oral DAILY  amiodarone (CORDARONE) tablet 200 mg  200 mg Oral BID  cetirizine (ZYRTEC) tablet 5 mg  5 mg Oral DAILY  guaiFENesin ER (MUCINEX) tablet 600 mg  600 mg Oral Q12H  levothyroxine (SYNTHROID) tablet 100 mcg  100 mcg Oral ACB  montelukast (SINGULAIR) tablet 10 mg  10 mg Oral DAILY  atenolol (TENORMIN) tablet 25 mg  25 mg Oral DAILY  loperamide (IMODIUM) capsule 2 mg  2 mg Oral Q4H PRN  
 sodium chloride (NS) flush 5-40 mL  5-40 mL IntraVENous Q8H  
 sodium chloride (NS) flush 5-40 mL  5-40 mL IntraVENous PRN  
 oxyCODONE-acetaminophen (PERCOCET) 5-325 mg per tablet 1 Tab  1 Tab Oral Q4H PRN  prochlorperazine (COMPAZINE) injection 10 mg  10 mg IntraVENous Q6H PRN  
 lactobac ac& pc-s.therm-b.anim (WILLA Q/RISAQUAD)  1 Cap Oral DAILY 2/19/19 cath: L Main:  Med; Nml LAD: Med; Mid 30% tandem lesion; Small vessel distally; D1/D2 - MLI LCflex: Med; Prox 30%; OM1 - MLI RCA: Med to Large; Mid  - 50% lesion; PDA and PLB - med; MLI LVEDP: 12 
  
6/16/16 stress EF 61% no ischemia 6/24/11 stress EF 71% no ischemia 2/10/17 DCCV a fib > SR 1 shock 150 joules 9/14/16 dual chamber PPM, medtronic 2/18/16 ECHO EF 65% mild/mod hypertrophy, mild/mod MR/TR 
6/28/12: ECHO EF 60% mild AI, MR Chest CT 3/12/12 aneurysmal dilation of ascending thoracic aorta 4.4 cm in greatest diameter Proximal descending aorta 4.2 cm in greatest diameter. Mod calcified plaque  
involving LAD, and prox cflex Assessment: - VT 
- PAF 
- HTN 
- CAD 
- Leukopenia- Chronic.  
- Asc aortic aneurysm. Plan: - Await placement to inpatient rehab. - Continue Amio, Eliquis. Will see Dr. Ghazal Winston and Dr. Hanane Whaley as OP.  
        
___________________________________________________ James Escobar MD, [facility] [place]

## 2019-02-25 NOTE — PROGRESS NOTES
Called to patient room to address blood/ clot under pressure dressing. Aquacel already removed upon arrival to room . Top side of Zipline not adhered to skin. Zipline removed  Quarter sized clot removed area cleaned. ea allowed to dry new Zipline placed, covered with nonadherent dressing and then Aquacel Dressing. NP at bedside report  Given to Primary care nurse.  Informed of actions.

## 2019-02-25 NOTE — PROGRESS NOTES
2- CASE MANAGEMENT NOTE: 
Arrangements have been made for the pt to be transported to [facility] today by her son. I have informed the pt and SNF of the arrangements and the RN was instructed to call report. The SNF can view the discharge instructions in [address] but a copy will also accompany her. Care Management Interventions PCP Verified by CM: Yes(Dr. Melony Blood navigator notified of discharge) Transition of Care Consult (CM Consult): Discharge Planning [facility] - INPATIENT: No 
Reason Outside [place]: Patient already serviced by other home care/hospice agency MyChart Signup: Yes Discharge Durable Medical Equipment: No 
Physical Therapy Consult: Yes Occupational Therapy Consult: Yes Current Support Network: Lives Alone, Own Home Confirm Follow Up Transport: Family Plan discussed with Pt/Family/Caregiver: Yes Freedom of Choice Offered: Yes Discharge Location Discharge Placement: ([facility]) TARIQ Carmichael, CM

## 2019-02-25 NOTE — PROGRESS NOTES
2- CASE MANAGEMENT NOTE: 
Events of the weekend noted and I understand the pt now needs some rehab before returning home. The son's first choice, [facility], has no openings at this time. His second choice is [facility] and I sent them a referral thru [address]. CM will continue to follow.  TARIQ Carmichael, CM

## 2019-02-25 NOTE — PROGRESS NOTES
Nutrition Assessment: 
 
RECOMMENDATIONS/INTERVENTION(S):  
1. Continue with cardiac diet. 2. Will d/c Banatrol and Mighty Shakes as pt not consuming. 3. Monitor intake, wt, GI. ASSESSMENT:  
2/25: Pt remains on Cardiac diet with Banatrol and now Standard [place]. PO intakes have improved and pt has now been eating % most meals. Not taking in [place] and states she originally was taking in Mighty shakes but not for the past few meals. States she had random episode of emesis this AM. Reports her diarrhea has improved. Labs and meds reviewed. 2/21: Cardiac diet ordered with Ensure Enlive and Banatrol ordered. Pt states she is not consuming the Banatrol, explained what it is for and encouraged pt to try it. She is not drinking the Ensure out of fear it will cause her to have diarrhea. Offered to change this to Mighty shake, she agrees, states she loves ice-cream and thinks she will like it. PO intakes have been poor. States her appetite is not good and she does not like some of the items. Having diarrhea today. Probiotic is ordered. Labs reviewed. 2/19: pt admitted for v-tach cardiac arrest, resuscitated in the ED. PMHx for CAD, HTn, Afib, HLD. Cath placement planned for today. Pt able to have a small breakfast this am, pt refused and had 1/2 of an Ensure HP. PTA pt reports a good appetite has cereal with a banana for breakfast, cheese, peanut butter or banana sandwich for lunch (no sides) and eggs for dinner. Son reports she will eat more when she is with him. Pt reported not liking the food on the cardiac diet. She wasn't able to get a grilled cheese sandwich last night so she refused dinner and had a Ensure high protein. Eating 50% or less of meals. UBW around 135 lbs, confirmed in chart. BMI underweight for age. Meds: lola-q, bowel, mag ox, Diet Order: Cardiac 
% Eaten:   
Patient Vitals for the past 72 hrs: 
 % Diet Eaten 02/23/19 1807 100 % 02/23/19 1403 100 % 02/23/19 1115 75 % 02/22/19 1831 100 % 02/22/19 1349 100 % Pertinent Medications: [x] Reviewed Chemistries: [x] Reviewed Anthropometrics: Height: 5' 7\" (170.2 cm) Weight: 61.2 kg (135 lb) IBW (%IBW):   ( ) UBW (%UBW):   (  %) BMI: Body mass index is 21.14 kg/m². This BMI is indicative of: 
 [x] Underweight for age   [] Normal    [] Overweight    []  Obesity    []  Extreme Obesity (BMI>40) Estimated Nutrition Needs (Based on): 2872 Kcals/day(BMr 1065 x 1.2 AF +250kcal) , 61 g(-73g (1.0-1.2g/kg ABW)) Protein Carbohydrate: At Least 130 g/day  Fluids: 1550 ml (1mL/kcal) Last BM: 2/24      Bowel Sounds: active Skin:    [x] Intact   [] Incision  [] Breakdown   [] DTI   [] Tears/Excoriation/Abrasion  []Edema-trace BLE [] Other: Wt Readings from Last 30 Encounters:  
02/17/19 61.2 kg (135 lb) 02/07/19 61.4 kg (135 lb 6.4 oz) 02/05/19 59 kg (130 lb) 12/03/18 59.3 kg (130 lb 12.8 oz)  
11/14/18 60.9 kg (134 lb 3.2 oz) 10/08/18 59.9 kg (132 lb)  
09/14/18 60.3 kg (133 lb) 09/06/18 60.8 kg (134 lb 2 oz) 05/29/18 61.4 kg (135 lb 6.4 oz) 05/23/18 61.2 kg (135 lb) 02/01/18 62.1 kg (137 lb) 10/24/17 61.7 kg (136 lb) 08/08/17 62.6 kg (138 lb) 05/04/17 63 kg (139 lb)  
03/21/17 64 kg (141 lb)  
03/14/17 63 kg (139 lb) 12/12/16 60.8 kg (134 lb) 08/19/16 63.5 kg (140 lb) 08/12/16 64 kg (141 lb)  
07/26/16 64 kg (141 lb) 12/03/15 64.4 kg (142 lb)  
09/11/15 64 kg (141 lb) 08/10/15 64.4 kg (142 lb) 05/11/15 64 kg (141 lb) 05/04/15 64 kg (141 lb)  
01/26/15 65.8 kg (145 lb)  
09/24/14 65.8 kg (145 lb) 09/03/14 64 kg (141 lb)  
03/19/14 67.6 kg (149 lb)  
02/21/14 66.7 kg (147 lb) NUTRITION DIAGNOSES:  
Problem:  Inadequate energy intake Etiology: related to decreased hunger sensation, limited food acceptance with age Signs/Symptoms: as evidenced by BMI underweight for age, not meeting EEN    
 
2/21: Nutrition dx continues. 2/25: Nutrition dx resolved with good PO intakes. NUTRITION INTERVENTIONS: 
Meals/Snacks: General/healthful diet   Supplements: Commercial supplement GOAL:  
Patient to meet EEN in the next 2-4 days Cultural, Druze, or Ethnic Dietary Needs: None EDUCATION & DISCHARGE NEEDS:  
 [x] None Identified 
 [] Identified and Education Provided/Documented 
 [] Identified and Pt declined/was not appropriate [x] Interdisciplinary Care Plan Reviewed/Documented  
 [x] Discharge Needs: Regular diet, ONS until appetite returns 
 [] No Nutrition Related Discharge Needs NUTRITION RISK:  
Pt Is At Nutrition Risk  [x] No Nutrition Risk Identified  [] PT SEEN FOR:  
 []  MD Consult: []Calorie Count []Diabetic Diet Education []Diet Education []Electrolyte Management []General Nutrition Management and Supplements []Management of Tube Feeding []TPN Recommendations []  RN Referral:  []MST score >=2 
   []Enteral/Parenteral Nutrition PTA []Pregnant: Gestational DM or Multigestation  
              [] Pressure Ulcer 
 
[]  Low BMI      []  Length of Stay ICU       [] Dysphagia Diet         [] Ventilator [x]  Follow-up Previous Recommendations: 
 [x] Implemented          [] Not Implemented          [] Not Applicable Previous Goal: 
 [x] Met              [] Progressing Towards Goal              [] Not Progressing Towards Goal   [] Not Applicable Isidoro Love RD Pager [phone] Office [phone]

## 2019-02-27 ENCOUNTER — PATIENT OUTREACH (OUTPATIENT)
Dept: INTERNAL MEDICINE CLINIC | Age: 84
End: 2019-02-27

## 2019-02-27 NOTE — PROGRESS NOTES
Transition of Care Coordination/Hospital to Post Acute Facility: 
  
Date/Time:  2019 3:05 PM 
 
Patient was admitted to [facility] on 19 for treatment of Ventricular tachycardia . Patient was discharged 19 to [facility] for continuation of care. Inpatient RRAT score: 11 Top Challenges reviewed D/C to SNF 
 
CCT will be following pt in the NF Method of communication with care team : phone Nurse Navigator(NN) spoke with intake to provide introduction to self and explanation of the Nurse Navigator Role. Verified name and  as patient identifiers. Discussed and reviewed  - confirmed pt is admitted, but was not able to speak with CN at this time. NN will f/u after d/c 
 
ACP:  
Does the patient have a current ACP (including DDNR):  no 
Does the post acute facility have a copy of the patients ACP:  N/A Medication(s):  
New Medications at Discharge: clindamycin (CLEOCIN) 150 mg, magnesium oxide (MAG-OX) 400 mg Changed Medications at Discharge: amiodarone (CORDARONE) 200 mg, atenolol (TENORMIN) 25 mg tablet Discontinued Medications at Discharge: PARoxetine (PAXIL) 10 mg tablet PCP/Specialist follow up:  
Future Appointments Date Time Provider Nikolas Victoria 3/20/2019  3:00 PM David Crenshaw MD [address] [place] [address] Opportunity to ask questions was provided. Contact information was provided for future reference or further questions. Will continue to monitor.

## 2019-02-28 ENCOUNTER — PATIENT OUTREACH (OUTPATIENT)
Dept: CASE MANAGEMENT | Age: 84
End: 2019-02-28

## 2019-02-28 NOTE — PROGRESS NOTES
Community Care Team documentation for patient in [facility] Initial Follow Up Patient was discharged to [address] [facility]. Information included in this progress note has been provided to SNF. Hospital Admission and Diagnosis: [facility] 2/16-2/25  Ventricular tachycardia  RRAT Score:  11  Advance Care Planning:  Not on file PCP : Dominik Jay MD 
Nurse Navigator in PCP office: Radames Friends Note routed to Nurse Navigator team. 
 
SNF Attending:  Ana Zavala MD 
 
Spoke with SNF team. Ensured patient arrived to SNF safely with admission packet in order. Provided needed hospital discharge follow up appointments:   Cardiology Lena Cervantes MD On 3/20  3:00 PM; Cardiology Asa Reinoso MD call office for date/time of appt. PT/Ot continue. Currently contact guard assist with transfers. Ambulating 75 ft with quad cane. Min assist with ADLs and toileting. Barriers - Cardiac precautions and NWB to LUE. Patient requested to discharge 3/4 to home alone with [facility]. Community Care Team will follow up weekly with [facility] until discharge. Medications were not reconciled and general patient assessment was not completed during this [facility] outreach. Farhat Kahn, MSN, RN, ACNS-BC, [facility] Nurse Navigator, [address] [place] [address] [phone]

## 2019-03-01 ENCOUNTER — TELEPHONE (OUTPATIENT)
Dept: INTERNAL MEDICINE CLINIC | Age: 84
End: 2019-03-01

## 2019-03-01 NOTE — TELEPHONE ENCOUNTER
----- Message from Bernarda Kahn RN sent at 2/28/2019  3:39 PM EST -----  I see she has cardiology on 3/8 at 10:20 AM.  JUAN.      ----- Message -----  From: Brian Calderón RN  Sent: 2/28/2019   3:03 PM  To: Jorge Luis Mccormick RN, Ly Kahn RN, #    Beaulah Crane,    The following pt is scheduled to be d/c from [facility] on [facility] 4 to home alone with [facility]. Pt will require a RENITA appt with Dr. Venecia Reece. Can you please review schedule to see if pt can be worked in the week of d/c please.     Thanks - Annalisa

## 2019-03-07 ENCOUNTER — HOME HEALTH ADMISSION (OUTPATIENT)
Dept: HOME HEALTH SERVICES | Facility: HOME HEALTH | Age: 84
End: 2019-03-07

## 2019-03-07 ENCOUNTER — PATIENT OUTREACH (OUTPATIENT)
Dept: CASE MANAGEMENT | Age: 84
End: 2019-03-07

## 2019-03-07 NOTE — Clinical Note
DC from [facility]. Please see note. We were not aware of the DC date change. She was a no show for appt yesterday 3/6.

## 2019-03-07 NOTE — PROGRESS NOTES
Community Care Team Documentation for Patient in [facility]  Discharge Note    [facility] See previous [facility] Team notes. PCP : Chelsea Solitario MD  Nurse Navigator in PCP office: Aubrey Cason  Note routed to Nurse Navigator team.    Spoke with SNF team.  Patient chose to stay in SNF until 3/7 and was a no show for PCP follow up 3/6. Confirmed patient was discharged 3/7 to home alone with [facility]. Will notify nurse navigator. Community Care Team will sign off at this time. Medications were not reconciled and general patient assessment was not completed during this skilled nursing facility outreach.      Nelson Leonardo, MSN, RN, ACNS-BC, [facility]  Nurse Navigator, [employer] [phone]

## 2019-03-08 ENCOUNTER — TELEPHONE (OUTPATIENT)
Dept: INTERNAL MEDICINE CLINIC | Age: 84
End: 2019-03-08

## 2019-03-08 ENCOUNTER — PATIENT OUTREACH (OUTPATIENT)
Dept: INTERNAL MEDICINE CLINIC | Age: 84
End: 2019-03-08

## 2019-03-08 NOTE — PROGRESS NOTES
Staff message received from [address] [place] [address] that pt was d/c from SNF. Pt had a RENITA appt scheduled with PCP 3/6/19, but no showed. Pt also had a cardio appt for today 3/8/19, but was cancelled. Call placed to pt, no answer. VM left to reschedule RENITA appt with PCP ASAP.

## 2019-03-08 NOTE — TELEPHONE ENCOUNTER
Jocelin Gamino w/ [facility] called to request that DR. Devora Orellana follow patient for home care.      Please call Jocelin Gamino back at [phone]

## 2019-03-11 ENCOUNTER — OFFICE VISIT (OUTPATIENT)
Dept: CARDIOLOGY CLINIC | Age: 84
End: 2019-03-11

## 2019-03-11 DIAGNOSIS — Z95.0 PACEMAKER: Primary | ICD-10-CM

## 2019-03-11 DIAGNOSIS — Z51.89 VISIT FOR WOUND CHECK: ICD-10-CM

## 2019-03-11 NOTE — PROGRESS NOTES
Patient presents for wound check post-device implantation. The dressing was removed and the site was inspected. The site appeared to be well-healing without tenderness/erythema. Denies pain, fevers, discharge. She has some edema and ecchymosis around the site. Plan:    Apply ice to site, return for wound check in one week. Continue follow up in device clinic as planned.        Denver Rundle, NP

## 2019-03-13 ENCOUNTER — HOSPITAL ENCOUNTER (OUTPATIENT)
Dept: LAB | Age: 84
Discharge: HOME | End: 2019-03-13
Payer: MEDICARE

## 2019-03-13 ENCOUNTER — OFFICE VISIT (OUTPATIENT)
Dept: INTERNAL MEDICINE CLINIC | Age: 84
End: 2019-03-13

## 2019-03-13 ENCOUNTER — HOSPITAL ENCOUNTER (OUTPATIENT)
Dept: GENERAL RADIOLOGY | Age: 84
Discharge: HOME | End: 2019-03-13
Attending: INTERNAL MEDICINE
Payer: MEDICARE

## 2019-03-13 VITALS
DIASTOLIC BLOOD PRESSURE: 70 MMHG | RESPIRATION RATE: 18 BRPM | HEIGHT: 67 IN | OXYGEN SATURATION: 98 % | TEMPERATURE: 98.8 F | SYSTOLIC BLOOD PRESSURE: 152 MMHG | HEART RATE: 60 BPM | WEIGHT: 133 LBS | BODY MASS INDEX: 20.88 KG/M2

## 2019-03-13 DIAGNOSIS — Z86.79 HISTORY OF VENTRICULAR TACHYCARDIA: ICD-10-CM

## 2019-03-13 DIAGNOSIS — I10 ESSENTIAL HYPERTENSION: ICD-10-CM

## 2019-03-13 DIAGNOSIS — J15.7 PNEUMONIA DUE TO MYCOPLASMA PNEUMONIAE, UNSPECIFIED LATERALITY, UNSPECIFIED PART OF LUNG: ICD-10-CM

## 2019-03-13 DIAGNOSIS — J18.9 PNEUMONIA OF LEFT LOWER LOBE DUE TO INFECTIOUS ORGANISM: ICD-10-CM

## 2019-03-13 DIAGNOSIS — J06.9 UPPER RESPIRATORY TRACT INFECTION, UNSPECIFIED TYPE: Primary | ICD-10-CM

## 2019-03-13 LAB
FLUAV+FLUBV AG NOSE QL IA.RAPID: NEGATIVE POS/NEG
FLUAV+FLUBV AG NOSE QL IA.RAPID: NEGATIVE POS/NEG
VALID INTERNAL CONTROL?: YES

## 2019-03-13 PROCEDURE — 36415 COLL VENOUS BLD VENIPUNCTURE: CPT

## 2019-03-13 PROCEDURE — 71046 X-RAY EXAM CHEST 2 VIEWS: CPT

## 2019-03-13 PROCEDURE — 85025 COMPLETE CBC W/AUTO DIFF WBC: CPT

## 2019-03-13 RX ORDER — ALPRAZOLAM 1 MG/1
1 TABLET ORAL
COMMUNITY
Start: 2019-02-08 | End: 2021-10-04

## 2019-03-13 RX ORDER — DOXYCYCLINE 100 MG/1
100 TABLET ORAL 2 TIMES DAILY
Qty: 28 TAB | Refills: 0 | Status: SHIPPED | OUTPATIENT
Start: 2019-03-13 | End: 2019-07-22 | Stop reason: ALTCHOICE

## 2019-03-13 RX ORDER — PAROXETINE 10 MG/1
TABLET, FILM COATED ORAL
COMMUNITY
Start: 2019-01-02 | End: 2019-03-20

## 2019-03-13 RX ORDER — IPRATROPIUM BROMIDE 42 UG/1
SPRAY, METERED NASAL
COMMUNITY
Start: 2019-02-14 | End: 2020-06-05 | Stop reason: SDUPTHER

## 2019-03-13 NOTE — PROGRESS NOTES
Chief Complaint   Patient presents with   [facility] Follow Up    URI       Patient presents with her friend from [place]. Ventricular tachycardia  Per chart review, patient was found unresponsive February 16. The paramedics noted she was in V. tach and was given chest compressions and then a shock, 200 J which converted patient to sinus tach. Patient arrived in the emergency room and was given amiodarone. Per the report patient was oriented x3 and responsive. During the hospital stay she she had a dual-chamber pacemaker changed to dual-chamber ICD. Bronchitis  Patient reports that she has been home for 6 days and last night developed a cough. She became concerned because she has had pneumonia in the past.  She denies fever or night sweats. She has been coughing up a green productive sputum. Subjective:   Mirna Conn is a 80 y.o. female with hypertension. Hypertension ROS: taking medications as instructed, no medication side effects noted, no TIA's, no chest pain on exertion, no dyspnea on exertion, no swelling of ankles. New concerns: Patient reports her blood pressure at home has been in the 140 range.         Past Medical History:   Diagnosis Date    Acute cystitis 05/23/2018    Anemia, unspecified     Anxiety     Arrhythmia     a fib    Arthritis     osteoarthritis, rheumatoid    Atrial fibrillation (HCC)     Dr. Zuhair Keller and Dr. Kary Angelo  following    Autoimmune disease [facility])     sjogren disease Dr. Jose Luis Otero CAD (coronary artery disease)     HTN (hypertension)     Hyperlipidemia LDL goal < 100     Hypothyroid     Insomnia     Osteoarthritis     Sjogren's disease ([facility] [address]     Thoracic aneurysm without mention of rupture      Past Surgical History:   Procedure Laterality Date    HX CHOLECYSTECTOMY      HX COLONOSCOPY  3/2014    Dr. Maximiliano Ramirez      hernia repairs    HX PARTIAL THYROIDECTOMY      HX JANNET AND BSO      HX VEIN STRIPPING      [place] INSJ ELTRD CAR COLLIN SYS TM INSJ CVDFB/PM PLS GEN Left 2/22/2019    Lv Lead Placement performed by Shelly Robbins MD at [facility] LAB    [place] INSJ/RPLCMT PERM DFB W/TRNSVNS LDS 1/DUAL [facility] Left 2/22/2019    upgrade PM to ICD-Bsx performed by Shelly Robbins MD at [facility] LAB     Social History     Socioeconomic History    Marital status:      Spouse name: Not on file    Number of children: Not on file    Years of education: Not on file    Highest education level: Not on file   Tobacco Use    Smoking status: Never Smoker    Smokeless tobacco: Never Used   Substance and Sexual Activity    Alcohol use: Yes     Comment: wine   Social History Narrative    ** Merged History Encounter **          Family History   Problem Relation Age of Onset    Heart Disease Father         aneurysm    Cancer Maternal Grandfather      Current Outpatient Medications   Medication Sig Dispense Refill    ipratropium (ATROVENT) 42 mcg (0.06 %) nasal spray       amiodarone (CORDARONE) 200 mg tablet Take 1 Tab by mouth two (2) times a day for 10 days, THEN 1 Tab daily for 30 days. 50 Tab 0    magnesium oxide (MAG-OX) 400 mg tablet Take 1 Tab by mouth daily. 30 Tab 0    atenolol (TENORMIN) 25 mg tablet Take 1 Tab by mouth daily. 90 Tab 0    fosinopril (MONOPRIL) 10 mg tablet Take 20 mg by mouth daily.  montelukast (SINGULAIR) 10 mg tablet Take 10 mg by mouth daily as needed.  eszopiclone (LUNESTA) 2 mg tablet Take 2 mg by mouth nightly.  guaiFENesin ER (MUCINEX) 600 mg ER tablet Take 600 mg by mouth two (2) times daily as needed for Congestion.  levothyroxine (SYNTHROID) 100 mcg tablet Take 100 mcg by mouth Daily (before breakfast).  apixaban (ELIQUIS) 5 mg tablet Take 5 mg by mouth two (2) times a day.  estradiol (ESTRACE) 0.5 mg tablet Take 1 Tab by mouth daily. Please wean off this medication due to cv risks associated.  30 Tab 0    albuterol (PROVENTIL HFA, VENTOLIN HFA, PROAIR HFA) 90 mcg/actuation inhaler Take 2 Puffs by inhalation every four (4) hours as needed for Wheezing or Shortness of Breath. Indications: Bronchospastic Pulmonary Disease 1 Inhaler 1    fluticasone (FLONASE) 50 mcg/actuation nasal spray 2 Sprays by Both Nostrils route daily as needed.  vitamin b comp & c no.4 (SUPER B COMPLEX + C) 150 mg tab Take 1 Tab by mouth daily.  cholecalciferol, vitamin D3, (VITAMIN D3) 2,000 unit tab Take 2,000 Units by mouth daily.  CALCIUM CARBONATE (TUMS PO) Take 1 Tab by mouth every six (6) hours as needed.  MULTIVITAMIN (MULTIPLE VITAMIN PO) Take 1 Tab by mouth daily.  ALPRAZolam (XANAX) 1 mg tablet       PARoxetine (PAXIL) 10 mg tablet       clindamycin (CLEOCIN) 150 mg capsule Take 1 Cap by mouth three (3) times daily. 7 Cap 0     Allergies   Allergen Reactions    Latex Hives     NOT ALLERGIC PER PT 02/01/2018    Pcn [Penicillins] Anaphylaxis    Sulfa (Sulfonamide Antibiotics) Anaphylaxis    Biaxin [Clarithromycin] Nausea Only    Biotin Unknown (comments)    Pcn [Penicillins] Hives    Sulfa (Sulfonamide Antibiotics) Nausea Only       Review of Systems - General ROS: positive for  - fatigue and malaise  negative for - fever  Cardiovascular ROS: no chest pain or dyspnea on exertion  Respiratory ROS: positive for - cough and sputum changes  negative for - tachypnea or wheezing    Visit Vitals  /77 (BP 1 Location: Left arm, BP Patient Position: Sitting)   Pulse 60   Temp 98.8 °F (37.1 °C) (Oral)   Resp 18   Ht 5' 7\" (1.702 m)   Wt 133 lb (60.3 kg)   SpO2 98%   BMI 20.83 kg/m²     General Appearance:  Well developed, well nourished,alert and oriented x 3, and individual in no acute distress. Ears/Nose/Mouth/Throat:   Hearing grossly normal.         Neck: Supple, no lad, no bruits   Chest:    Crackles at right lung base   Cardiovascular:  Regular rate and rhythm, S1, S2 normal, no murmur. Abdomen:   Soft, non-tender, bowel sounds are active. Extremities: No edema bilaterally. Skin: Warm and dry, no suspicious lesions                 Diagnoses and all orders for this visit:    1. Upper respiratory tract infection, unspecified type  -     AMB POC IRMA INFLUENZA A/B TEST  Patient is flu negative    2. Pneumonia of left lower lobe due to infectious organism [facility])  Concern for pneumonia at left lung base. She has been in the hospital and has had a history of pneumonia in the past.  We will do labs and chest x-ray. Will initiate Doxy. There is a drug interaction between Levaquin and amiodarone. Patient reports she has been in the hospital for 3 weeks and has been at home for 6 days. -     XR CHEST PA LAT; Future  -     CBC WITH AUTOMATED DIFF  -     doxycycline (ADOXA) 100 mg tablet; Take 1 Tab by mouth two (2) times a day. Do not take with calciuim, magnesium or mvi    3. Essential hypertension  Continue meds  Monitor blood pressure    4. History of ventricular tachycardia  Patient needs close follow-up with cardiology. If she has an underlying infection will need to involve them given her new pacer    Patient was instructed to keep me posted if she has any decline. If her symptoms get worse on the antibiotic she is to go to the ER.

## 2019-03-14 ENCOUNTER — TELEPHONE (OUTPATIENT)
Dept: INTERNAL MEDICINE CLINIC | Age: 84
End: 2019-03-14

## 2019-03-14 LAB
BASOPHILS # BLD AUTO: 0 X10E3/UL (ref 0–0.2)
BASOPHILS NFR BLD AUTO: 0 %
EOSINOPHIL # BLD AUTO: 0.1 X10E3/UL (ref 0–0.4)
EOSINOPHIL NFR BLD AUTO: 3 %
ERYTHROCYTE [DISTWIDTH] IN BLOOD BY AUTOMATED COUNT: 14.3 % (ref 12.3–15.4)
HCT VFR BLD AUTO: 32.1 % (ref 34–46.6)
HGB BLD-MCNC: 10.6 G/DL (ref 11.1–15.9)
IMM GRANULOCYTES # BLD AUTO: 0 X10E3/UL (ref 0–0.1)
IMM GRANULOCYTES NFR BLD AUTO: 0 %
LYMPHOCYTES # BLD AUTO: 0.4 X10E3/UL (ref 0.7–3.1)
LYMPHOCYTES NFR BLD AUTO: 16 %
MCH RBC QN AUTO: 30.5 PG (ref 26.6–33)
MCHC RBC AUTO-ENTMCNC: 33 G/DL (ref 31.5–35.7)
MCV RBC AUTO: 93 FL (ref 79–97)
MONOCYTES # BLD AUTO: 0.3 X10E3/UL (ref 0.1–0.9)
MONOCYTES NFR BLD AUTO: 11 %
NEUTROPHILS # BLD AUTO: 1.9 X10E3/UL (ref 1.4–7)
NEUTROPHILS NFR BLD AUTO: 70 %
PLATELET # BLD AUTO: 133 X10E3/UL (ref 150–379)
RBC # BLD AUTO: 3.47 X10E6/UL (ref 3.77–5.28)
WBC # BLD AUTO: 2.7 X10E3/UL (ref 3.4–10.8)

## 2019-03-14 NOTE — TELEPHONE ENCOUNTER
Pt advised of normal chest xray results per Dr Candace Sauceda. I advised pt to continue recommendations given to her at her appt time and call us I/f she isnt showing improvement by 3/18/19.  Pt voices understanding and thanks

## 2019-03-15 ENCOUNTER — PATIENT MESSAGE (OUTPATIENT)
Dept: INTERNAL MEDICINE CLINIC | Age: 84
End: 2019-03-15

## 2019-03-17 NOTE — PROGRESS NOTES
I spoke with patient on Sunday, March 17. She reports she is doing significantly better.   She will take 7 days of Doxy and hold the rest.

## 2019-03-18 ENCOUNTER — OFFICE VISIT (OUTPATIENT)
Dept: CARDIOLOGY CLINIC | Age: 84
End: 2019-03-18

## 2019-03-18 DIAGNOSIS — Z51.89 VISIT FOR WOUND CHECK: ICD-10-CM

## 2019-03-18 DIAGNOSIS — Z95.0 PACEMAKER: Primary | ICD-10-CM

## 2019-03-20 ENCOUNTER — OFFICE VISIT (OUTPATIENT)
Dept: CARDIOLOGY CLINIC | Age: 84
End: 2019-03-20

## 2019-03-20 VITALS
SYSTOLIC BLOOD PRESSURE: 122 MMHG | RESPIRATION RATE: 16 BRPM | BODY MASS INDEX: 20.67 KG/M2 | WEIGHT: 132 LBS | DIASTOLIC BLOOD PRESSURE: 60 MMHG | OXYGEN SATURATION: 95 % | HEART RATE: 60 BPM

## 2019-03-20 DIAGNOSIS — I25.10 CORONARY ARTERY DISEASE INVOLVING NATIVE CORONARY ARTERY OF NATIVE HEART WITHOUT ANGINA PECTORIS: ICD-10-CM

## 2019-03-20 DIAGNOSIS — I47.20 VENTRICULAR TACHYCARDIA: ICD-10-CM

## 2019-03-20 DIAGNOSIS — I48.91 ATRIAL FIBRILLATION, UNSPECIFIED TYPE (HCC): ICD-10-CM

## 2019-03-20 DIAGNOSIS — I10 ESSENTIAL HYPERTENSION: Primary | ICD-10-CM

## 2019-03-20 RX ORDER — PREDNISONE 20 MG/1
TABLET ORAL
COMMUNITY
End: 2019-04-10 | Stop reason: ALTCHOICE

## 2019-03-20 NOTE — PROGRESS NOTES
Office Follow-up    NAME: Jeanne Atkins   :  1928  MRM:  [id number]    Date:  3/20/2019            Assessment:     Problem List  Date Reviewed: 3/20/2019          Codes Class Noted    Ventricular tachycardia ([address] ICD-10-CM: I47.2  ICD-9-CM: 427.1  2019        Elevated serum creatinine ICD-10-CM: R79.89  ICD-9-CM: 790.99  2019        Advanced care planning/counseling discussion ICD-10-CM: Z71.89  ICD-9-CM: V65.49  12/3/2015    Overview Signed 12/3/2015  1:10 PM by Gera Major RN     Patient states that completed AMD at home. NN encouraged patient to bring a copy to the office for scanning into the medical record. Patient verbalized understanding. Patient verbalized that her son is the primary medical decision maker. Personal history of colonic polyps ICD-10-CM: Z86.010  ICD-9-CM: V12.72  3/19/2014        Anxiety ICD-10-CM: F41.9  ICD-9-CM: 300.00  Unknown        Insomnia ICD-10-CM: G47.00  ICD-9-CM: 780.52  Unknown        HTN (hypertension) (Chronic) ICD-10-CM: I10  ICD-9-CM: 401.9  Unknown        Hyperlipidemia LDL goal < 100 ICD-10-CM: E78.5  ICD-9-CM: 272.4  Unknown        Osteoarthritis ICD-10-CM: M19.90  ICD-9-CM: 715.90  Unknown        Hypothyroid ICD-10-CM: E03.9  ICD-9-CM: 244.9  Unknown        CAD (coronary artery disease) (Chronic) ICD-10-CM: I25.10  ICD-9-CM: 414.00  Unknown        Atrial fibrillation (HCC) (Chronic) ICD-10-CM: I48.91  ICD-9-CM: 427.31  Unknown        Thoracic aneurysm without mention of rupture ICD-10-CM: I71.2  ICD-9-CM: 182. 2  Unknown        Anemia ICD-10-CM: D64.9  ICD-9-CM: 817. 9  Unknown                 Plan:     1. Status post VT arrest status post upgrade of the pacemaker to ICD on 2019: Currently on amiodarone 200 mg which she is supposed to finish in 30 days since she was discharged. 2. History of atrial fibrillation: Status post pacemaker. Continue atenolol, Eliquis. 3. Hypertension: Blood pressure is controlled. Continue atenolol, lisinopril. 4. Nonobstructive mild CAD: Continue beta-blockers. 5. See Dr. Krystal Browning in 1 year. Subjective:     Ben De Los Santos, a 80y.o. year-old who presents for followup. She has known history of atrial fibrillation and has had cardioversions in the past.  She had a pacemaker. She was admitted to the hospital on February 16, 2019 with wide-complex tachycardia which appeared to be ventricular tachycardia. She remained in the hospital for a few days. She had an upgrade of her pacemaker to a dual-chamber ICD by Dr. Yung Mckeon. She is returning today for follow-up. Denies any symptoms of chest pain, shortness of breath, lightheadedness or dizziness. She recently had an upper respiratory infection and is recovering from it. Exam:     Physical Exam:  Visit Vitals  /60 (BP 1 Location: Right arm, BP Patient Position: Sitting)   Pulse 60   Resp 16   Wt 132 lb (59.9 kg)   SpO2 95%   BMI 20.67 kg/m²     General appearance - alert, well appearing, and in no distress  Mental status - affect appropriate to mood  Eyes - sclera anicteric, moist mucous membranes  Neck - supple, no significant adenopathy  Chest - clear to auscultation, no wheezes, rales or rhonchi  Heart - normal rate, regular rhythm, normal S1, S2, no murmurs, rubs, clicks or gallops  Abdomen - soft, nontender, nondistended, no masses or organomegaly  Extremities - peripheral pulses normal, no pedal edema  Skin - normal coloration  no rashes    Medications:     Current Outpatient Medications   Medication Sig    predniSONE (DELTASONE) 20 mg tablet Take  by mouth daily (with breakfast).  ALPRAZolam (XANAX) 1 mg tablet     ipratropium (ATROVENT) 42 mcg (0.06 %) nasal spray     doxycycline (ADOXA) 100 mg tablet Take 1 Tab by mouth two (2) times a day.  Do not take with calciuim, magnesium or mvi    amiodarone (CORDARONE) 200 mg tablet Take 1 Tab by mouth two (2) times a day for 10 days, THEN 1 Tab daily for 30 days.    atenolol (TENORMIN) 25 mg tablet Take 1 Tab by mouth daily.  fosinopril (MONOPRIL) 10 mg tablet Take 20 mg by mouth daily.  montelukast (SINGULAIR) 10 mg tablet Take 10 mg by mouth daily as needed.  eszopiclone (LUNESTA) 2 mg tablet Take 2 mg by mouth nightly.  guaiFENesin ER (MUCINEX) 600 mg ER tablet Take 600 mg by mouth two (2) times daily as needed for Congestion.  levothyroxine (SYNTHROID) 100 mcg tablet Take 100 mcg by mouth Daily (before breakfast).  apixaban (ELIQUIS) 5 mg tablet Take 5 mg by mouth two (2) times a day.  estradiol (ESTRACE) 0.5 mg tablet Take 1 Tab by mouth daily. Please wean off this medication due to cv risks associated.  albuterol (PROVENTIL HFA, VENTOLIN HFA, PROAIR HFA) 90 mcg/actuation inhaler Take 2 Puffs by inhalation every four (4) hours as needed for Wheezing or Shortness of Breath. Indications: Bronchospastic Pulmonary Disease    fluticasone (FLONASE) 50 mcg/actuation nasal spray 2 Sprays by Both Nostrils route daily as needed.  vitamin b comp & c no.4 (SUPER B COMPLEX + C) 150 mg tab Take 1 Tab by mouth daily.  cholecalciferol, vitamin D3, (VITAMIN D3) 2,000 unit tab Take 2,000 Units by mouth daily.  MULTIVITAMIN (MULTIPLE VITAMIN PO) Take 1 Tab by mouth daily. No current facility-administered medications for this visit. Diagnostic Data Review:       2/22/2019: Upgrade of dual chamber pacemaker to a dual-chamber [employer] ICD for secondary prevention    2/19/19 cath: L Main: Med; Nml LAD: Med; Mid 30% tandem lesion; Small vessel distally; D1/D2 - MLI LCflex: Med; Prox 30%;  OM1 - MLI RCA: Med to Large; Mid - 50% lesion; PDA and PLB - med; MLI LVEDP: 12     6/16/16 stress EF 61% no ischemia  6/24/11 stress EF 71% no ischemia    2/10/17 DCCV a fib > SR 1 shock 150 joules    9/14/16 dual chamber PPM, [employer]    2/18/16 ECHO EF 65% mild/mod hypertrophy, mild/mod MR/TR  6/28/12: ECHO EF 60% mild AI, MR    Chest CT 3/12/12 aneurysmal dilation of ascending thoracic aorta 4.4 cm in greatest diameter Proximal descending aorta 4.2 cm in greatest diameter. Mod calcified plaque   involving LAD, and prox cflex      Lab Review:     Lab Results   Component Value Date/Time    Cholesterol, total 199 07/26/2016 11:40 AM    HDL Cholesterol 75 07/26/2016 11:40 AM    LDL, calculated 99 07/26/2016 11:40 AM    Triglyceride 125 07/26/2016 11:40 AM     Lab Results   Component Value Date/Time    Creatinine (POC) 1.1 02/16/2019 05:38 PM    Creatinine 0.86 02/25/2019 03:12 AM     Lab Results   Component Value Date/Time    BUN 11 02/25/2019 03:12 AM    BUN (POC) 18 02/16/2019 05:38 PM     Lab Results   Component Value Date/Time    Potassium 4.0 02/25/2019 03:12 AM     Lab Results   Component Value Date/Time    Hemoglobin A1c 6.0 (H) 03/14/2017 12:41 PM     Lab Results   Component Value Date/Time    HGB 10.6 (L) 03/13/2019 03:09 PM     Lab Results   Component Value Date/Time    PLATELET 696 (L) 45/17/4345 03:09 PM     No results for input(s): CPK, CKMB, TROIQ in the last 72 hours. No lab exists for component: CKQMB, CPKMB             ___________________________________________________    Brittni Nail.  Darian Naqvi MD, [facility]

## 2019-03-20 NOTE — PROGRESS NOTES
Chief Complaint   Patient presents with    Follow-up     hospital    Ventricular Tachycardia     Visit Vitals  /60 (BP 1 Location: Right arm, BP Patient Position: Sitting)   Pulse 60   Resp 16   Wt 132 lb (59.9 kg)   SpO2 95%   BMI 20.67 kg/m²     Urgent care for upper respiratory yesterday. Doxycyline didn't help from PCP. Urgent care added prednisone for 5 days, 20 mg. Denies SOB, chest pain, dizziness. Swelling in feet, not normal 1 month since I was in the hospital.  Swelling goes down over night    . Would like to confirm Amiodarone frequency.

## 2019-04-03 RX ORDER — PAROXETINE 10 MG/1
TABLET, FILM COATED ORAL
Qty: 135 TAB | Refills: 0 | Status: SHIPPED | OUTPATIENT
Start: 2019-04-03 | End: 2019-06-29 | Stop reason: SDUPTHER

## 2019-04-10 ENCOUNTER — CLINICAL SUPPORT (OUTPATIENT)
Dept: CARDIOLOGY CLINIC | Age: 84
End: 2019-04-10

## 2019-04-10 ENCOUNTER — OFFICE VISIT (OUTPATIENT)
Dept: CARDIOLOGY CLINIC | Age: 84
End: 2019-04-10

## 2019-04-10 VITALS
BODY MASS INDEX: 20.48 KG/M2 | HEART RATE: 64 BPM | HEIGHT: 67 IN | SYSTOLIC BLOOD PRESSURE: 128 MMHG | DIASTOLIC BLOOD PRESSURE: 70 MMHG | OXYGEN SATURATION: 97 % | RESPIRATION RATE: 16 BRPM | WEIGHT: 130.5 LBS

## 2019-04-10 DIAGNOSIS — Z79.899 ENCOUNTER FOR MONITORING AMIODARONE THERAPY: ICD-10-CM

## 2019-04-10 DIAGNOSIS — E03.8 OTHER SPECIFIED HYPOTHYROIDISM: ICD-10-CM

## 2019-04-10 DIAGNOSIS — Z51.81 ENCOUNTER FOR MONITORING AMIODARONE THERAPY: ICD-10-CM

## 2019-04-10 DIAGNOSIS — Z95.810 CARDIAC DEFIBRILLATOR IN PLACE: Primary | ICD-10-CM

## 2019-04-10 DIAGNOSIS — I48.0 PAROXYSMAL ATRIAL FIBRILLATION (HCC): Chronic | ICD-10-CM

## 2019-04-10 DIAGNOSIS — I47.20 VENTRICULAR TACHYCARDIA: Primary | ICD-10-CM

## 2019-04-10 RX ORDER — AMIODARONE HYDROCHLORIDE 200 MG/1
100 TABLET ORAL DAILY
Status: ON HOLD | COMMUNITY
End: 2020-10-21

## 2019-04-10 NOTE — PROGRESS NOTES
Room # 7 Check Incision, c/o soreness Please review recent labs in chart per patient's PCP request 
 
Denies any cardiac complaints at this time. Visit Vitals /70 (BP 1 Location: Left arm, BP Patient Position: Sitting) Pulse 64 Resp 16 Ht 5' 7\" (1.702 m) Wt 130 lb 8 oz (59.2 kg) SpO2 97% BMI 20.44 kg/m²

## 2019-04-10 NOTE — PROGRESS NOTES
HISTORY OF PRESENTING ILLNESS Marisela Baeza is a 80 y.o. female with ventricular tachycardia who underwent upgrade of her pacemaker to an ICD. She remains on amiodarone. Device interrogation was normal as functioning. Her incision site has healed well. ACTIVE PROBLEM LIST Patient Active Problem List  
 Diagnosis Date Noted  Ventricular tachycardia ([facility] [address] 02/16/2019  Elevated serum creatinine 02/16/2019  Advanced care planning/counseling discussion 12/03/2015  Personal history of colonic polyps 03/19/2014  Anxiety  Insomnia   
 HTN (hypertension)  Hyperlipidemia LDL goal < 100   
 Osteoarthritis  Hypothyroid  CAD (coronary artery disease)  Atrial fibrillation ([facility] [address]  Thoracic aneurysm without mention of rupture  Anemia PAST MEDICAL HISTORY Past Medical History:  
Diagnosis Date  Acute cystitis 05/23/2018  Anemia, unspecified  Anxiety  Arrhythmia   
 a fib  Arthritis   
 osteoarthritis, rheumatoid  Atrial fibrillation ([facility] [address] Dr. Osmany Cheung and Dr. Gilmar Wilson  following  Autoimmune disease (HCC)   
 sjogren disease Dr. Eliseo Mulligan  CAD (coronary artery disease)  HTN (hypertension)  Hyperlipidemia LDL goal < 100  Hypothyroid  Insomnia  Osteoarthritis  Sjogren's disease ([facility] [address]  Thoracic aneurysm without mention of rupture PAST SURGICAL HISTORY Past Surgical History:  
Procedure Laterality Date  HX CHOLECYSTECTOMY  HX COLONOSCOPY  3/2014 Dr. Jarad Pearce  HX OTHER SURGICAL    
 hernia repairs  HX PARTIAL THYROIDECTOMY  HX JANNET AND BSO  HX VEIN STRIPPING    
 [place] INSJ ELTRD CAR COLLNI SYS TM INSJ CVDFB/PM PLS GEN Left 2/22/2019 Lv Lead Placement performed by Mono Snell MD at [address] CATH LAB  [place] INSJ/RPLCMT PERM DFB W/TRNSVNS LDS 1/DUAL [facility] Left 2/22/2019  
 upgrade PM to ICD-Bsx performed by Mono Snell MD at [address] CATH LAB  
 
 
 
 ALLERGIES Allergies Allergen Reactions  Latex Hives NOT ALLERGIC PER PT 02/01/2018  Pcn [Penicillins] Anaphylaxis  Sulfa (Sulfonamide Antibiotics) Anaphylaxis  Biaxin [Clarithromycin] Nausea Only  Biotin Unknown (comments)  Pcn [Penicillins] Hives  Sulfa (Sulfonamide Antibiotics) Nausea Only FAMILY HISTORY Family History Problem Relation Age of Onset  Heart Disease Father   
     aneurysm  Cancer Maternal Grandfather   
 negative for cardiac disease SOCIAL HISTORY Social History Socioeconomic History  Marital status:  Spouse name: Not on file  Number of children: Not on file  Years of education: Not on file  Highest education level: Not on file Tobacco Use  Smoking status: Never Smoker  Smokeless tobacco: Never Used Substance and Sexual Activity  Alcohol use: Yes Comment: wine Social History Narrative ** Merged History Encounter ** MEDICATIONS Current Outpatient Medications Medication Sig  PARoxetine (PAXIL) 10 mg tablet TAKE ONE AND ONE-HALF (1 & 1/2) TABLET BY MOUTH DAILY  estradiol (ESTRACE) 0.5 mg tablet TAKE ONE TABLET BY MOUTH DAILY * PLEASE WEAN OFF THIS MEDICATION DUE TO CV RISKS ASSOCIATED *  
 predniSONE (DELTASONE) 20 mg tablet Take  by mouth daily (with breakfast).  ALPRAZolam (XANAX) 1 mg tablet  ipratropium (ATROVENT) 42 mcg (0.06 %) nasal spray  doxycycline (ADOXA) 100 mg tablet Take 1 Tab by mouth two (2) times a day. Do not take with calciuim, magnesium or mvi  atenolol (TENORMIN) 25 mg tablet Take 1 Tab by mouth daily.  fosinopril (MONOPRIL) 10 mg tablet Take 20 mg by mouth daily.  montelukast (SINGULAIR) 10 mg tablet Take 10 mg by mouth daily as needed.  eszopiclone (LUNESTA) 2 mg tablet Take 2 mg by mouth nightly.  guaiFENesin ER (MUCINEX) 600 mg ER tablet Take 600 mg by mouth two (2) times daily as needed for Congestion.  levothyroxine (SYNTHROID) 100 mcg tablet Take 100 mcg by mouth Daily (before breakfast).  apixaban (ELIQUIS) 5 mg tablet Take 5 mg by mouth two (2) times a day.  albuterol (PROVENTIL HFA, VENTOLIN HFA, PROAIR HFA) 90 mcg/actuation inhaler Take 2 Puffs by inhalation every four (4) hours as needed for Wheezing or Shortness of Breath. Indications: Bronchospastic Pulmonary Disease  fluticasone (FLONASE) 50 mcg/actuation nasal spray 2 Sprays by Both Nostrils route daily as needed.  vitamin b comp & c no.4 (SUPER B COMPLEX + C) 150 mg tab Take 1 Tab by mouth daily.  cholecalciferol, vitamin D3, (VITAMIN D3) 2,000 unit tab Take 2,000 Units by mouth daily.  MULTIVITAMIN (MULTIPLE VITAMIN PO) Take 1 Tab by mouth daily. No current facility-administered medications for this visit. I have reviewed the nurses notes, vitals, problem list, allergy list, medical history, family, social history and medications. REVIEW OF SYMPTOMS General: Pt denies excessive weight gain or loss. Pt is able to conduct ADL's HEENT: Denies blurred vision, headaches, hearing loss, epistaxis and difficulty swallowing. Respiratory: Denies cough, congestion, shortness of breath, NAVA, wheezing or stridor. Cardiovascular: Denies precordial pain, palpitations, edema or PND Gastrointestinal: Denies poor appetite, indigestion, abdominal pain or blood in stool Genitourinary: Denies hematuria, dysuria, increased urinary frequency Musculoskeletal: Denies joint pain or swelling from muscles or joints Neurologic: Denies tremor, paresthesias, headache, or sensory motor disturbance Psychiatric: Denies confusion, insomnia, depression Integumentray: Denies rash, itching or ulcers. Hematologic: Denies easy bruising, bleeding PHYSICAL EXAMINATION There were no vitals filed for this visit. General: Well developed, in no acute distress. HEENT: No jaundice, oral mucosa moist, no oral ulcers Neck: Supple, no stiffness, no lymphadenopathy, supple Heart:  Normal S1/S2 negative S3 or S4. Regular, no murmur, gallop or rub, no jugular venous distention Respiratory: Clear bilaterally x 4, no wheezing or rales Abdomen:   Soft, non-tender, bowel sounds are active.  
Extremities:  No edema, normal cap refill, no cyanosis. Musculoskeletal: No clubbing, no deformities Neuro: A&Ox3, speech clear, gait stable, cooperative, no focal neurologic deficits Skin: Skin color is normal. No rashes or lesions. Non diaphoretic, moist. 
Vascular: 2+ pulses symmetric in all extremities DIAGNOSTIC DATA EKG:  
 
 
 LABORATORY DATA Lab Results Component Value Date/Time WBC 2.7 (L) 03/13/2019 03:09 PM  
 HGB 10.6 (L) 03/13/2019 03:09 PM  
 Hematocrit (POC) 38 02/16/2019 05:38 PM  
 HCT 32.1 (L) 03/13/2019 03:09 PM  
 PLATELET 770 (L) 90/47/9523 03:09 PM  
 MCV 93 03/13/2019 03:09 PM  
  
Lab Results Component Value Date/Time Sodium 137 02/25/2019 03:12 AM  
 Potassium 4.0 02/25/2019 03:12 AM  
 Chloride 102 02/25/2019 03:12 AM  
 CO2 29 02/25/2019 03:12 AM  
 Anion gap 6 02/25/2019 03:12 AM  
 Glucose 86 02/25/2019 03:12 AM  
 BUN 11 02/25/2019 03:12 AM  
 Creatinine 0.86 02/25/2019 03:12 AM  
 BUN/Creatinine ratio 13 02/25/2019 03:12 AM  
 GFR est AA >60 02/25/2019 03:12 AM  
 GFR est non-AA >60 02/25/2019 03:12 AM  
 Calcium 8.3 (L) 02/25/2019 03:12 AM  
 Bilirubin, total 0.4 02/16/2019 05:36 PM  
 AST (SGOT) 70 (H) 02/16/2019 05:36 PM  
 Alk. phosphatase 81 02/16/2019 05:36 PM  
 Protein, total 6.6 02/16/2019 05:36 PM  
 Albumin 3.3 (L) 02/16/2019 05:36 PM  
 Globulin 3.3 02/16/2019 05:36 PM  
 A-G Ratio 1.0 (L) 02/16/2019 05:36 PM  
 ALT (SGPT) 31 02/16/2019 05:36 PM  
  
 
 
 ASSESSMENT 1. Ventricular tachycardia 2. Atrial fibrillation 3. Hypertension 4. CAD, native 5. Thoracic aortic aneurysm 6. ICD  PLAN  
 
 Continue monitoring in device clinic. Continue current drug therapy. LFT/TFTs in 6 months with plan to obtain chest x-ray/eye exam/PFTs in 1 year. FOLLOW-UP  
 
6 months with Chase Salazar Thank you, Vasile Munroe MD and Dr. Caballero Dears for allowing me to participate in the care of this extraordinarily pleasant female. Please do not hesitate to contact me for further questions/concerns. Remy Toth MD 
Cardiac Electrophysiology / Cardiology [address], [address] [place] [address], [address] 
[phone] / [phone] Fax   [phone] / [phone] Fax

## 2019-04-30 DIAGNOSIS — Z91.09 ENVIRONMENTAL ALLERGIES: ICD-10-CM

## 2019-04-30 RX ORDER — MONTELUKAST SODIUM 10 MG/1
TABLET ORAL
Qty: 30 TAB | Refills: 1 | Status: SHIPPED | OUTPATIENT
Start: 2019-04-30 | End: 2019-12-18

## 2019-05-09 ENCOUNTER — TELEPHONE (OUTPATIENT)
Dept: CARDIOLOGY CLINIC | Age: 84
End: 2019-05-09

## 2019-05-09 NOTE — TELEPHONE ENCOUNTER
Pt called yesterday to inform us that on Sunday she was trying to get up from a seated position but fell on her back & left side. She stated that she didn't pass out or get dizzy but just tripped. Pt hit her left should from behind & it was a little painful. I had pt send a manual transmission with her Latitude remote to check to make sure there were no episodes & functioning ok. Unfortunately the Latitude site was down all day yesterday. Received the transmission this am & everything looks ok. She also stated her shoulder doesn't feel as bad today. Yesterday pt also stated she had to go to the bathroom & when she got up she felt like she was going to vomit, broke out in a sweat & had stomach cramps. Informed her that she needed to call her PCP to have that addressed. She stated today that she never called. Informed her again that they need to address that issue. She stated she understands.

## 2019-05-10 DIAGNOSIS — E03.9 ACQUIRED HYPOTHYROIDISM: ICD-10-CM

## 2019-05-10 RX ORDER — LEVOTHYROXINE SODIUM 100 UG/1
100 TABLET ORAL
Qty: 90 TAB | Refills: 0 | Status: SHIPPED | OUTPATIENT
Start: 2019-05-10 | End: 2019-08-17 | Stop reason: SDUPTHER

## 2019-06-04 ENCOUNTER — TELEPHONE (OUTPATIENT)
Dept: INTERNAL MEDICINE CLINIC | Age: 84
End: 2019-06-04

## 2019-06-04 NOTE — TELEPHONE ENCOUNTER
Patient states she wants to go back on her Estradiol medication , states she having cold and hot flashes .       Please send medication to patient's [address]     She can be reached at [phone] if need be to discuss , patient states to tell PCP to approve this request because she wants to be back on the medication

## 2019-06-05 NOTE — TELEPHONE ENCOUNTER
Returned pts call and advised her of the contraindications. Explained to the pt the increased risk that could potentially be fatal for her if she states she medication. Pt voices understanding and thanks.

## 2019-06-05 NOTE — TELEPHONE ENCOUNTER
Tahmina Cadenaugh, please touch base with me before you call her. Estrogen use is a contraindicated with a hx of Coronary artery disease and given her history of Elmarie Form might put her at higher risk for another cardiac event. I don't feel comfortable prescribing for her due to her higher risk for a potentially fatal cardiac or embolic event.

## 2019-06-29 RX ORDER — PAROXETINE 10 MG/1
TABLET, FILM COATED ORAL
Qty: 135 TAB | Refills: 0 | Status: SHIPPED | OUTPATIENT
Start: 2019-06-29 | End: 2020-04-21

## 2019-07-11 ENCOUNTER — TELEPHONE (OUTPATIENT)
Dept: INTERNAL MEDICINE CLINIC | Age: 84
End: 2019-07-11

## 2019-07-11 NOTE — TELEPHONE ENCOUNTER
Patient called to report that she currently has an appointment on 7/29 w/ Dr. Little Ng. Patient stated that she needs to see her before current appointment. Patient stated that she is over all not feeling well at all & has extreme fatigue. She believes its due to her thyroid and wants to speak with nurse regarding.      [phone]

## 2019-07-12 NOTE — TELEPHONE ENCOUNTER
Returned call to pt, she states she has had extreme intermittent fatigue. She states she recently had labs done with Dr Ainsley Gomez (arthritis specialist) who tested her thyroid levels and they were elevated. I offered her an appt for today but she states she was unable to come. She is scheduled for Monday 7/15/19. I have requested lab results from Dr Chinyere Caballero' office for review. Pt thanks.

## 2019-07-15 ENCOUNTER — HOSPITAL ENCOUNTER (OUTPATIENT)
Dept: LAB | Age: 84
Discharge: HOME | End: 2019-07-15
Payer: MEDICARE

## 2019-07-15 ENCOUNTER — OFFICE VISIT (OUTPATIENT)
Dept: INTERNAL MEDICINE CLINIC | Age: 84
End: 2019-07-15

## 2019-07-15 VITALS
BODY MASS INDEX: 20.88 KG/M2 | WEIGHT: 133 LBS | RESPIRATION RATE: 16 BRPM | HEART RATE: 64 BPM | HEIGHT: 67 IN | DIASTOLIC BLOOD PRESSURE: 70 MMHG | TEMPERATURE: 98.2 F | OXYGEN SATURATION: 95 % | SYSTOLIC BLOOD PRESSURE: 132 MMHG

## 2019-07-15 DIAGNOSIS — I10 ESSENTIAL HYPERTENSION: Chronic | ICD-10-CM

## 2019-07-15 DIAGNOSIS — R23.2 HOT FLASHES: ICD-10-CM

## 2019-07-15 DIAGNOSIS — I47.20 VENTRICULAR TACHYCARDIA: ICD-10-CM

## 2019-07-15 DIAGNOSIS — R53.82 CHRONIC FATIGUE: ICD-10-CM

## 2019-07-15 DIAGNOSIS — E03.9 ACQUIRED HYPOTHYROIDISM: Primary | ICD-10-CM

## 2019-07-15 PROCEDURE — 80053 COMPREHEN METABOLIC PANEL: CPT

## 2019-07-15 PROCEDURE — 36415 COLL VENOUS BLD VENIPUNCTURE: CPT

## 2019-07-15 PROCEDURE — 85025 COMPLETE CBC W/AUTO DIFF WBC: CPT

## 2019-07-15 PROCEDURE — 84443 ASSAY THYROID STIM HORMONE: CPT

## 2019-07-15 RX ORDER — MELOXICAM 7.5 MG/1
7.5 TABLET ORAL DAILY
COMMUNITY
End: 2021-10-30

## 2019-07-15 RX ORDER — GLUCOSAMINE/CHONDR SU A SOD 750-600 MG
TABLET ORAL
Status: ON HOLD | COMMUNITY
End: 2020-10-21

## 2019-07-15 NOTE — PROGRESS NOTES
Coy Meza is a 80 y.o. female who presents today for Fatigue and Hypothyroidism  . She has a history of   Patient Active Problem List   Diagnosis Code    Anxiety F41.9    Insomnia G47.00    HTN (hypertension) I10    Hyperlipidemia LDL goal < 100 E78.5    Osteoarthritis M19.90   Erle Isidoro Hypothyroid E03.9    CAD (coronary artery disease) I25.10    Atrial fibrillation ([address] I48.91    Thoracic aneurysm without mention of rupture I71.2    Anemia D64.9    Personal history of colonic polyps Z86.010    Advanced care planning/counseling discussion Z71.89    Ventricular tachycardia (HCC) I47.2    Elevated serum creatinine R79.89   . Today patient for an acute visit. Problem visit:  Coy Meza is here for complaint of fatigue and worsening fatigued. Problem began 3 month(s) ago. Severity is moderate  Has been on current dose of levothyroxine for some time. No CP/SOB  Patient with a history of V. tach in February requiring defibrillation. She has since been on amiodarone patient dual-chamber pacemaker was upgraded to a dual-chamber ICD. Hemoglobin was mildly low on 13 March. About 2 months ago she did have these labs checked by her arthritis doctors Dr. Fabiano Chery. TSH was above 5. Her iron profile within normal limits her inflammatory markers were negative. Having more hot flashes since stopping the Estrogen. I discussed seeing how she feels with normal TSH. She can otherwise discuss with her PCP resuming low-dose estrogen. ROS  Review of Systems   Constitutional: Positive for malaise/fatigue. Negative for chills, fever and weight loss. Hot flashes. Low mood   HENT: Negative for congestion, hearing loss, sore throat and tinnitus. Eyes: Negative for blurred vision, double vision and photophobia. Respiratory: Negative for cough, hemoptysis, sputum production and shortness of breath. Cardiovascular: Negative for chest pain, palpitations, orthopnea and leg swelling. Gastrointestinal: Negative for abdominal pain, constipation, diarrhea, heartburn, nausea and vomiting. Genitourinary: Negative for dysuria, frequency and urgency. Musculoskeletal: Negative for joint pain and myalgias. Skin: Negative for rash. Neurological: Negative. Negative for headaches. Endo/Heme/Allergies: Does not bruise/bleed easily. Psychiatric/Behavioral: Negative for memory loss and suicidal ideas. Visit Vitals  /70   Pulse 64   Temp 98.2 °F (36.8 °C) (Oral)   Resp 16   Ht 5' 7\" (1.702 m)   Wt 133 lb (60.3 kg)   SpO2 95%   BMI 20.83 kg/m²       Physical Exam   Constitutional: She is oriented to person, place, and time. She appears well-developed and well-nourished. HENT:   Head: Normocephalic and atraumatic. Cardiovascular: Normal rate and regular rhythm. No murmur heard. Healed scar from ICD placement in the left chest wall   Pulmonary/Chest: Effort normal. No respiratory distress. Abdominal: Soft. Bowel sounds are normal. She exhibits no distension and no mass. There is no tenderness. There is no guarding. Musculoskeletal: Normal range of motion. She exhibits no edema. Neurological: She is alert and oriented to person, place, and time. She displays normal reflexes. No cranial nerve deficit. Coordination normal.   Skin: Skin is warm and dry. Psychiatric: She has a normal mood and affect. Her behavior is normal.         Current Outpatient Medications   Medication Sig    Biotin 2,500 mcg cap Take  by mouth.  meloxicam (MOBIC) 7.5 mg tablet Take  by mouth daily.  PARoxetine (PAXIL) 10 mg tablet TAKE ONE AND ONE-HALF TABLET BY MOUTH DAILY    levothyroxine (SYNTHROID) 100 mcg tablet Take 1 Tab by mouth Daily (before breakfast).  TAKE ONE TABLET BY MOUTH DAILY BEFORE BREAKFAST    montelukast (SINGULAIR) 10 mg tablet TAKE ONE TABLET BY MOUTH DAILY    estradiol (ESTRACE) 0.5 mg tablet TAKE ONE TABLET BY MOUTH DAILY **PLEASE WEAN QUICKLY OR STOP THIS MEDICATION DUE TO CARDIOVASCULAR RISKS    fosinopril (MONOPRIL) 10 mg tablet TAKE TWO TABLETS BY MOUTH DAILY    amiodarone (CORDARONE) 200 mg tablet Take 200 mg by mouth daily.  ALPRAZolam (XANAX) 1 mg tablet Take 1 mg by mouth as needed.  ipratropium (ATROVENT) 42 mcg (0.06 %) nasal spray     atenolol (TENORMIN) 25 mg tablet Take 1 Tab by mouth daily.  eszopiclone (LUNESTA) 2 mg tablet Take 2 mg by mouth nightly.  guaiFENesin ER (MUCINEX) 600 mg ER tablet Take 600 mg by mouth two (2) times daily as needed for Congestion.  apixaban (ELIQUIS) 5 mg tablet Take 5 mg by mouth two (2) times a day.  albuterol (PROVENTIL HFA, VENTOLIN HFA, PROAIR HFA) 90 mcg/actuation inhaler Take 2 Puffs by inhalation every four (4) hours as needed for Wheezing or Shortness of Breath. Indications: Bronchospastic Pulmonary Disease    fluticasone (FLONASE) 50 mcg/actuation nasal spray 2 Sprays by Both Nostrils route daily as needed.  vitamin b comp & c no.4 (SUPER B COMPLEX + C) 150 mg tab Take 1 Tab by mouth daily.  cholecalciferol, vitamin D3, (VITAMIN D3) 2,000 unit tab Take 2,000 Units by mouth daily.  MULTIVITAMIN (MULTIPLE VITAMIN PO) Take 1 Tab by mouth daily.  doxycycline (ADOXA) 100 mg tablet Take 1 Tab by mouth two (2) times a day. Do not take with calciuim, magnesium or mvi     No current facility-administered medications for this visit.          Past Medical History:   Diagnosis Date    Acute cystitis 05/23/2018    Anemia, unspecified     Anxiety     Arrhythmia     a fib    Arthritis     osteoarthritis, rheumatoid    Atrial fibrillation (HCC)     Dr. Sowmya Yates and Dr. Vasquez Nam  following    Autoimmune disease [facility])     sjogren disease Dr. Devonda Olszewski CAD (coronary artery disease)     HTN (hypertension)     Hyperlipidemia LDL goal < 100     Hypothyroid     Insomnia     Osteoarthritis     [facility]jogren's disease [facility]     Thoracic aneurysm without mention of rupture       Past Surgical History: Procedure Laterality Date    HX BIV-IMPLANTABLE CARDIOVERTER DEFIBRILLATOR      HX CHOLECYSTECTOMY      HX COLONOSCOPY  3/2014    Dr. Victorina Keller    HX OTHER SURGICAL      hernia repairs    HX PARTIAL THYROIDECTOMY      HX JANNET AND BSO      HX VEIN STRIPPING      ND INSJ ELTRD CAR COLLIN SYS TM INSJ CVDFB/PM PLS GEN Left 2/22/2019    Lv Lead Placement performed by Maxim Mtz MD at [facility] CATH LAB    ND INSJ/RPLCMT PERM DFB W/TRNSVNS LDS 1/DUAL [facility] Left 2/22/2019    upgrade PM to ICD-Bsx performed by Maxim Mtz MD at [facility] CATH LAB      Social History     Tobacco Use    Smoking status: Never Smoker    Smokeless tobacco: Never Used   Substance Use Topics    Alcohol use: Yes     Comment: wine      Family History   Problem Relation Age of Onset    Heart Disease Father         aneurysm    Cancer Maternal Grandfather         Allergies   Allergen Reactions    Latex Hives     NOT ALLERGIC PER PT 02/01/2018    Pcn [Penicillins] Anaphylaxis    Sulfa (Sulfonamide Antibiotics) Anaphylaxis    Biaxin [Clarithromycin] Nausea Only    Biotin Unknown (comments)    Pcn [Penicillins] Hives    Sulfa (Sulfonamide Antibiotics) Nausea Only        Assessment/Plan  Diagnoses and all orders for this visit:    1. Acquired hypothyroidism -seems to be undertreated and has not been adjusted recently. We will repeat today and if still elevated will increase her dose. We discussed that this may be part of her fatigue. She also thinks that not being on estrogen may also be contributing. We discussed first treating thyroid and if she continues to have symptoms it would be reasonable to consider resuming oral estrogens. She will discuss this with her PCP. We discussed that amiodarone can affect the thyroid. We also discussed monitoring her breathing.  -     METABOLIC PANEL, COMPREHENSIVE  -     CBC WITH AUTOMATED DIFF    2.  Ventricular tachycardia ([facility] -now has an ICD with a by V pacer.  -     METABOLIC PANEL, COMPREHENSIVE  -     CBC WITH AUTOMATED DIFF    3. Essential hypertension -pressure much better on repeat  -     METABOLIC PANEL, COMPREHENSIVE  -     CBC WITH AUTOMATED DIFF    4. Chronic fatigue  -     TSH 3RD GENERATION    5. Hot flashes -see how she does with improved thyroid levels and otherwise consider resuming estrogens with PCP            Ramone Pineda MD  7/15/2019    This note was created with the help of speech recognition software [employer]) and may contain some 'sound alike' errors.

## 2019-07-16 LAB
ALBUMIN SERPL-MCNC: 4.2 G/DL (ref 3.2–4.6)
ALBUMIN/GLOB SERPL: 1.8 {RATIO} (ref 1.2–2.2)
ALP SERPL-CCNC: 68 IU/L (ref 39–117)
ALT SERPL-CCNC: 13 IU/L (ref 0–32)
AST SERPL-CCNC: 24 IU/L (ref 0–40)
BASOPHILS # BLD AUTO: 0 X10E3/UL (ref 0–0.2)
BASOPHILS NFR BLD AUTO: 1 %
BILIRUB SERPL-MCNC: 0.5 MG/DL (ref 0–1.2)
BUN SERPL-MCNC: 19 MG/DL (ref 10–36)
BUN/CREAT SERPL: 20 (ref 12–28)
CALCIUM SERPL-MCNC: 9.2 MG/DL (ref 8.7–10.3)
CHLORIDE SERPL-SCNC: 97 MMOL/L (ref 96–106)
CO2 SERPL-SCNC: 26 MMOL/L (ref 20–29)
CREAT SERPL-MCNC: 0.95 MG/DL (ref 0.57–1)
EOSINOPHIL # BLD AUTO: 0.2 X10E3/UL (ref 0–0.4)
EOSINOPHIL NFR BLD AUTO: 5 %
ERYTHROCYTE [DISTWIDTH] IN BLOOD BY AUTOMATED COUNT: 14.6 % (ref 12.3–15.4)
GLOBULIN SER CALC-MCNC: 2.4 G/DL (ref 1.5–4.5)
GLUCOSE SERPL-MCNC: 86 MG/DL (ref 65–99)
HCT VFR BLD AUTO: 35.9 % (ref 34–46.6)
HGB BLD-MCNC: 11.3 G/DL (ref 11.1–15.9)
IMM GRANULOCYTES # BLD AUTO: 0 X10E3/UL (ref 0–0.1)
IMM GRANULOCYTES NFR BLD AUTO: 0 %
INTERPRETATION: NORMAL
LYMPHOCYTES # BLD AUTO: 0.7 X10E3/UL (ref 0.7–3.1)
LYMPHOCYTES NFR BLD AUTO: 21 %
MCH RBC QN AUTO: 29.5 PG (ref 26.6–33)
MCHC RBC AUTO-ENTMCNC: 31.5 G/DL (ref 31.5–35.7)
MCV RBC AUTO: 94 FL (ref 79–97)
MONOCYTES # BLD AUTO: 0.2 X10E3/UL (ref 0.1–0.9)
MONOCYTES NFR BLD AUTO: 6 %
NEUTROPHILS # BLD AUTO: 2.1 X10E3/UL (ref 1.4–7)
NEUTROPHILS NFR BLD AUTO: 67 %
PLATELET # BLD AUTO: 158 X10E3/UL (ref 150–450)
POTASSIUM SERPL-SCNC: 4.9 MMOL/L (ref 3.5–5.2)
PROT SERPL-MCNC: 6.6 G/DL (ref 6–8.5)
RBC # BLD AUTO: 3.83 X10E6/UL (ref 3.77–5.28)
SODIUM SERPL-SCNC: 137 MMOL/L (ref 134–144)
TSH SERPL DL<=0.005 MIU/L-ACNC: 1.88 UIU/ML (ref 0.45–4.5)
WBC # BLD AUTO: 3.1 X10E3/UL (ref 3.4–10.8)

## 2019-07-18 ENCOUNTER — OFFICE VISIT (OUTPATIENT)
Dept: CARDIOLOGY CLINIC | Age: 84
End: 2019-07-18

## 2019-07-18 DIAGNOSIS — Z95.810 CARDIAC DEFIBRILLATOR IN SITU: Primary | ICD-10-CM

## 2019-07-22 ENCOUNTER — HOSPITAL ENCOUNTER (OUTPATIENT)
Dept: LAB | Age: 84
Discharge: HOME | End: 2019-07-22
Payer: MEDICARE

## 2019-07-22 ENCOUNTER — TELEPHONE (OUTPATIENT)
Dept: INTERNAL MEDICINE CLINIC | Age: 84
End: 2019-07-22

## 2019-07-22 ENCOUNTER — OFFICE VISIT (OUTPATIENT)
Dept: INTERNAL MEDICINE CLINIC | Age: 84
End: 2019-07-22

## 2019-07-22 VITALS
RESPIRATION RATE: 16 BRPM | HEART RATE: 60 BPM | SYSTOLIC BLOOD PRESSURE: 156 MMHG | DIASTOLIC BLOOD PRESSURE: 81 MMHG | TEMPERATURE: 97.9 F | WEIGHT: 131.5 LBS | BODY MASS INDEX: 20.64 KG/M2 | HEIGHT: 67 IN | OXYGEN SATURATION: 100 %

## 2019-07-22 DIAGNOSIS — E53.8 B12 DEFICIENCY: ICD-10-CM

## 2019-07-22 DIAGNOSIS — I10 ESSENTIAL HYPERTENSION: ICD-10-CM

## 2019-07-22 DIAGNOSIS — R53.83 FATIGUE, UNSPECIFIED TYPE: Primary | ICD-10-CM

## 2019-07-22 DIAGNOSIS — D64.9 ANEMIA, UNSPECIFIED TYPE: ICD-10-CM

## 2019-07-22 PROCEDURE — 36415 COLL VENOUS BLD VENIPUNCTURE: CPT

## 2019-07-22 PROCEDURE — 83550 IRON BINDING TEST: CPT

## 2019-07-22 PROCEDURE — 82607 VITAMIN B-12: CPT

## 2019-07-22 PROCEDURE — 82728 ASSAY OF FERRITIN: CPT

## 2019-07-22 RX ORDER — FOSINOPIRL SODIUM 10 MG/1
30 TABLET ORAL DAILY
Qty: 180 TAB | Refills: 0 | Status: SHIPPED | OUTPATIENT
Start: 2019-07-22 | End: 2019-07-25 | Stop reason: ALTCHOICE

## 2019-07-22 NOTE — PROGRESS NOTES
Chief Complaint   Patient presents with    Fatigue       Patient presents for follow-up from appointment with Dr. Kendall Patino.  She reports she still has significant fatigue. Fatigue  Patient reports that she has been having persistent fatigue for over 3 months. She was seen by Dr. Mingo Josue and labs were redrawn. These were evaluated with her. She reports she has an increased shortness of breath with walking. She is being followed by her cardiologist but he has transitioned out of the system so she will be seeing Dr. Rodrigo Amor. She has been eating 3 meals a day but they have been small. She has a cold intolerance and thyroid has been within normal limits. She denies palpitations. She thinks her symptoms are due to her lack of estrogen in her system. She is very interested in restarting her estrogen. She denies lightheaded or dizziness. She reports weight loss of 2 to 3 pounds since last visit with me. She has decreased her Paxil from 1/2 tablets to 1 tablet. She reports she does not have any energy to do anything and when she walks from one room to the next she gets short of breath and then eventually fatigued. Patient was evaluated by her rheumatologist and a urine was checked for UTI but it was negative per her report. Ventricular tachycardia  She has a  dual-chamber ICD. Bronchitis      Subjective:   Charlee Golden is a 80 y.o. female with hypertension. Hypertension ROS: taking medications as instructed, no medication side effects noted, no TIA's, no chest pain on exertion, no dyspnea on exertion, no swelling of ankles. New concerns: Patient reports her blood pressure at home has been in the 140 range.         Past Medical History:   Diagnosis Date    Acute cystitis 05/23/2018    Anemia, unspecified     Anxiety     Arrhythmia     a fib    Arthritis     osteoarthritis, rheumatoid    Atrial fibrillation (HCC)     Dr. Jose Luis Hdz and Dr. Roberto Early  following    Autoimmune disease ([facility] [address] sjogren disease Dr. Nava Parkinson CAD (coronary artery disease)     HTN (hypertension)     Hyperlipidemia LDL goal < 100     Hypothyroid     Insomnia     Osteoarthritis     Sjogren's disease ([address]     Thoracic aneurysm without mention of rupture      Past Surgical History:   Procedure Laterality Date    HX BIV-IMPLANTABLE CARDIOVERTER DEFIBRILLATOR      HX CHOLECYSTECTOMY      HX COLONOSCOPY  3/2014    Dr. Nazario Mai    HX OTHER SURGICAL      hernia repairs    HX PARTIAL THYROIDECTOMY      HX JANNET AND BSO      HX VEIN STRIPPING      FL INSJ ELTRD CAR COLLIN SYS TM INSJ CVDFB/PM PLS GEN Left 2/22/2019    Lv Lead Placement performed by Yas Ramirez MD at [facility] CATH LAB    FL INSJ/RPLCMT PERM DFB W/TRNSVNS LDS 1/DUAL [facility] Left 2/22/2019    upgrade PM to ICD-Bsx performed by Yas Ramirez MD at [address] CATH LAB     Social History     Socioeconomic History    Marital status:      Spouse name: Not on file    Number of children: Not on file    Years of education: Not on file    Highest education level: Not on file   Tobacco Use    Smoking status: Never Smoker    Smokeless tobacco: Never Used   Substance and Sexual Activity    Alcohol use: Yes     Comment: wine    Drug use: Never    Sexual activity: Not Currently   Social History Narrative    ** Merged History Encounter **          Family History   Problem Relation Age of Onset    Heart Disease Father         aneurysm    Cancer Maternal Grandfather      Current Outpatient Medications   Medication Sig Dispense Refill    fosinopril (MONOPRIL) 10 mg tablet Take 3 Tabs by mouth daily. 180 Tab 0    Biotin 2,500 mcg cap Take  by mouth.  meloxicam (MOBIC) 7.5 mg tablet Take  by mouth daily.  PARoxetine (PAXIL) 10 mg tablet TAKE ONE AND ONE-HALF TABLET BY MOUTH DAILY 135 Tab 0    levothyroxine (SYNTHROID) 100 mcg tablet Take 1 Tab by mouth Daily (before breakfast).  TAKE ONE TABLET BY MOUTH DAILY BEFORE BREAKFAST 90 Tab 0    montelukast (SINGULAIR) 10 mg tablet TAKE ONE TABLET BY MOUTH DAILY 30 Tab 1    estradiol (ESTRACE) 0.5 mg tablet TAKE ONE TABLET BY MOUTH DAILY **PLEASE WEAN QUICKLY OR STOP THIS MEDICATION DUE TO CARDIOVASCULAR RISKS 10 Tab 0    amiodarone (CORDARONE) 200 mg tablet Take 200 mg by mouth daily.  ALPRAZolam (XANAX) 1 mg tablet Take 1 mg by mouth as needed.  ipratropium (ATROVENT) 42 mcg (0.06 %) nasal spray       atenolol (TENORMIN) 25 mg tablet Take 1 Tab by mouth daily. 90 Tab 0    eszopiclone (LUNESTA) 2 mg tablet Take 2 mg by mouth nightly.  guaiFENesin ER (MUCINEX) 600 mg ER tablet Take 600 mg by mouth two (2) times daily as needed for Congestion.  apixaban (ELIQUIS) 5 mg tablet Take 5 mg by mouth two (2) times a day.  albuterol (PROVENTIL HFA, VENTOLIN HFA, PROAIR HFA) 90 mcg/actuation inhaler Take 2 Puffs by inhalation every four (4) hours as needed for Wheezing or Shortness of Breath. Indications: Bronchospastic Pulmonary Disease 1 Inhaler 1    fluticasone (FLONASE) 50 mcg/actuation nasal spray 2 Sprays by Both Nostrils route daily as needed.  vitamin b comp & c no.4 (SUPER B COMPLEX + C) 150 mg tab Take 1 Tab by mouth daily.  cholecalciferol, vitamin D3, (VITAMIN D3) 2,000 unit tab Take 2,000 Units by mouth daily.  MULTIVITAMIN (MULTIPLE VITAMIN PO) Take 1 Tab by mouth daily.        Allergies   Allergen Reactions    Latex Hives     NOT ALLERGIC PER PT 02/01/2018    Pcn [Penicillins] Anaphylaxis    Sulfa (Sulfonamide Antibiotics) Anaphylaxis    Biaxin [Clarithromycin] Nausea Only    Biotin Unknown (comments)    Pcn [Penicillins] Hives    Sulfa (Sulfonamide Antibiotics) Nausea Only       Review of Systems - General ROS: positive for  - fatigue and malaise  negative for - fever  Cardiovascular ROS: no chest pain or dyspnea on exertion  Respiratory ROS: positive for - cough and sputum changes  negative for - tachypnea or wheezing    Visit Vitals  /81 (BP 1 Location: Left arm, BP Patient Position: Sitting)   Pulse 60   Temp 97.9 °F (36.6 °C) (Oral)   Resp 16   Ht 5' 7\" (1.702 m)   Wt 131 lb 8 oz (59.6 kg)   SpO2 100%   BMI 20.60 kg/m²     General Appearance:  Well developed, well nourished,alert and oriented x 3, and individual in no acute distress. Ears/Nose/Mouth/Throat:   Hearing grossly normal.         Neck: Supple, no lad, no bruits   Chest:    Crackles at right lung base   Cardiovascular:  Regular rate and rhythm, S1, S2 normal, no murmur. Abdomen:   Soft, non-tender, bowel sounds are active. Extremities: No edema bilaterally. Skin: Warm and dry, no suspicious lesions                 Diagnoses and all orders for this visit:    1. Fatigue, unspecified type  Multifactorial  Patient will add Ensure to her diet  She will increase her Paxil to 1-1/2 tablets  We will do some further labs to assess for deficiency  We will also check to see if she has pulmonary toxicity related to her amiodarone use. -     IRON PROFILE  -     FERRITIN  -     VITAMIN B12 & FOLATE  -     OCCULT BLOOD IMMUNOASSAY,DIAGNOSTIC; Future  -     REFERRAL TO PULMONARY DISEASE    2. Anemia, unspecified type  Recheck labs  -     IRON PROFILE  -     FERRITIN  -     OCCULT BLOOD IMMUNOASSAY,DIAGNOSTIC; Future    3. B12 deficiency  -     VITAMIN B12 & FOLATE    4. Essential hypertension  Not optimally controlled will increase lisinopril to 30 tablets  -     fosinopril (MONOPRIL) 10 mg tablet; Take 3 Tabs by mouth daily. I discussed with Dr. Julius Pulido NP and agrees to hold on estrogen initiation due to potential negative side effects of this medication with her underlying cardiovascular disease.       Dr. Janeen Eastman  [phone]

## 2019-07-22 NOTE — TELEPHONE ENCOUNTER
Attempted to reach pt to advise of the following per Dr. Christo Shaw:  Lissett Clemens her know that I did reach out to Dr. Nicolas Sheehan nurse practitioner who also agreed on holding estrogen therapy for now. Quinton Treadwell NP agrees that her cardiac status needs to be reevaluated before initiation of any medication like this. \"  Pt did not answer the call, I left a voicemail requesting a return call at her earliest convenience so she can be advised.

## 2019-07-22 NOTE — TELEPHONE ENCOUNTER
----- Message from Laura Rees sent at 7/19/2019  4:49 PM EDT -----  Regarding: Dr. Al Henderson General Message/Vendor Calls    Caller's first and last name: Shira Gibbs      Reason for call: Pt is requesting an order be called in for her at [facility] [phone]) due to her sneezing/nose running yellow and is prone to pneumonia.       Call back required yes/no and why: Yes, if request can be filled      Best contact number(s): [phone]        Details to clarify the request:      Laura Rees

## 2019-07-22 NOTE — Clinical Note
Laina Waldrop, please call patient and let her know that I did reach out to Dr. Kathy Pratt nurse practitioner who also agreed on holding estrogen therapy for now. The NP agrees that her cardiac status needs to be reevaluated before initiation of any medication like this.

## 2019-07-22 NOTE — TELEPHONE ENCOUNTER
Returned call to pt, she states she feels \"freezing then sweating and fatigue\", denies cough, and states when she does her sinus rinse it is now clear and she is not sneezing anymore. She states she overall feels bad. Appt scheduled for today at 11:40am with Dr. Abbi Merida.  Pt thanks

## 2019-07-22 NOTE — PROGRESS NOTES
I spent 40 minutes with this patient and greater than 50% of the time was spent on management and counseling with regards to her fatigue. We discussed multiple factors which may be causing her fatigue as above. She will need to be seen by cardiology as well as pulmonary. She does not have any signs of infection.   She does not have any lymphadenopathy on exam.  She does need to be checked for blood in her stool and will do fit test.

## 2019-07-23 ENCOUNTER — TELEPHONE (OUTPATIENT)
Dept: INTERNAL MEDICINE CLINIC | Age: 84
End: 2019-07-23

## 2019-07-23 LAB
FERRITIN SERPL-MCNC: 26 NG/ML (ref 15–150)
FOLATE SERPL-MCNC: 9.4 NG/ML
IRON SATN MFR SERPL: 21 % (ref 15–55)
IRON SERPL-MCNC: 91 UG/DL (ref 27–139)
TIBC SERPL-MCNC: 430 UG/DL (ref 250–450)
UIBC SERPL-MCNC: 339 UG/DL (ref 118–369)
VIT B12 SERPL-MCNC: 1870 PG/ML (ref 232–1245)

## 2019-07-23 NOTE — TELEPHONE ENCOUNTER
----- Message from [facility] sent at 7/22/2019  6:26 PM EDT -----  Regarding: Dr. Mary Salamanca  Pt [phone] is returning a call she recvd from the practice.

## 2019-07-23 NOTE — TELEPHONE ENCOUNTER
Returned call to pt. She has been advised, per Dr. Hernando Chatman, that Dr. Hernando Chatman  did reach out to Dr. Poppy Keating nurse practitioner who also agreed on holding estrogen therapy for now. Susan Lino NP agrees that her cardiac status needs to be reevaluated before initiation of any medication like this. Pt voices understanding and states she may schedule an appt with her cardiologist to attempt to get \"clearence\" for estrogen. She also states that her head was hurting last night and she took another antihypertensive. She did not check her blood pressure prior to taking more medication. I advised her of the risks of doing this as she may bottom out her blood pressure.  Pt voices understanding of all above information and thanks

## 2019-08-09 ENCOUNTER — TELEPHONE (OUTPATIENT)
Dept: INTERNAL MEDICINE CLINIC | Age: 84
End: 2019-08-09

## 2019-08-09 NOTE — TELEPHONE ENCOUNTER
Replied to their office and advised them that we did not order the Ct scan, it was ordered in the ER and may be helpful if they request ER records.

## 2019-08-09 NOTE — TELEPHONE ENCOUNTER
Patient states Dr Han Choi office is requesting a copy of any records retaining to ref and her CAT scan , fax to [phone]

## 2019-08-17 DIAGNOSIS — E03.9 ACQUIRED HYPOTHYROIDISM: ICD-10-CM

## 2019-08-18 RX ORDER — LEVOTHYROXINE SODIUM 100 UG/1
TABLET ORAL
Qty: 90 TAB | Refills: 0 | Status: SHIPPED | OUTPATIENT
Start: 2019-08-18 | End: 2019-11-02 | Stop reason: SDUPTHER

## 2019-08-22 DIAGNOSIS — R53.83 FATIGUE, UNSPECIFIED TYPE: ICD-10-CM

## 2019-08-22 DIAGNOSIS — D64.9 ANEMIA, UNSPECIFIED TYPE: ICD-10-CM

## 2019-09-03 ENCOUNTER — TELEPHONE (OUTPATIENT)
Dept: INTERNAL MEDICINE CLINIC | Age: 84
End: 2019-09-03

## 2019-09-03 NOTE — TELEPHONE ENCOUNTER
Pt states her b/p is high and she doesn't know why, states it may be due to the medication. Would like to speak with a nurse. Thanks.

## 2019-09-03 NOTE — TELEPHONE ENCOUNTER
Returned call to pt. She states her BP has been elevated recently and has been fatigued. She states her BP has been 160/75, 156/74 and today was 159/68. She said her cardiologist increased one of her medications (unsure of which one) several days ago. I advised her follow up with her cardiologist as Dr. Gerry Hinds recommended she see them for reevaluation in 7/2019 and since they have been making changes and treating her BP recently per pt.  Pt voices understanding and thanks

## 2019-09-24 ENCOUNTER — TELEPHONE (OUTPATIENT)
Dept: INTERNAL MEDICINE CLINIC | Age: 84
End: 2019-09-24

## 2019-09-24 RX ORDER — DOXYCYCLINE 100 MG/1
100 TABLET ORAL 2 TIMES DAILY
Qty: 20 TAB | Refills: 0 | Status: SHIPPED | OUTPATIENT
Start: 2019-09-24 | End: 2019-10-17 | Stop reason: ALTCHOICE

## 2019-09-24 NOTE — TELEPHONE ENCOUNTER
I called pt to advise of PCP's message. I advise if sx get worse and/or develops fever she needs to be seen in urgent care or the office. Pt verbalized understanding.

## 2019-09-24 NOTE — TELEPHONE ENCOUNTER
I spoke with patient, she states its hard to get to the office now and would like an rx sent to the pharmacy for her sx. Pt c/o yellow nasal drainage, tired, headache and nasal congestion. Pt denies fever and sore throat.

## 2019-09-24 NOTE — TELEPHONE ENCOUNTER
----- Message from Krystin Walker sent at 9/24/2019  9:18 AM EDT -----  Regarding: Kevin/telephone   Pt is requesting a Rx for her sinus and congestion. She did not want to schedule an appointment at this time. Pts number is [phone] and Russell Cade number is [phone].

## 2019-10-11 DIAGNOSIS — Z79.899 ENCOUNTER FOR MONITORING AMIODARONE THERAPY: ICD-10-CM

## 2019-10-11 DIAGNOSIS — Z51.81 ENCOUNTER FOR MONITORING AMIODARONE THERAPY: ICD-10-CM

## 2019-10-11 DIAGNOSIS — E03.8 OTHER SPECIFIED HYPOTHYROIDISM: ICD-10-CM

## 2019-10-11 DIAGNOSIS — I48.0 PAROXYSMAL ATRIAL FIBRILLATION (HCC): Chronic | ICD-10-CM

## 2019-10-11 DIAGNOSIS — I47.20 VENTRICULAR TACHYCARDIA: ICD-10-CM

## 2019-10-17 ENCOUNTER — HOSPITAL ENCOUNTER (OUTPATIENT)
Dept: LAB | Age: 84
Discharge: HOME | End: 2019-10-17
Payer: MEDICARE

## 2019-10-17 ENCOUNTER — OFFICE VISIT (OUTPATIENT)
Dept: INTERNAL MEDICINE CLINIC | Age: 84
End: 2019-10-17

## 2019-10-17 VITALS
HEIGHT: 67 IN | WEIGHT: 134 LBS | SYSTOLIC BLOOD PRESSURE: 168 MMHG | DIASTOLIC BLOOD PRESSURE: 67 MMHG | BODY MASS INDEX: 21.03 KG/M2 | TEMPERATURE: 97.6 F | RESPIRATION RATE: 12 BRPM | HEART RATE: 62 BPM

## 2019-10-17 DIAGNOSIS — I10 ESSENTIAL HYPERTENSION: Primary | ICD-10-CM

## 2019-10-17 DIAGNOSIS — R53.83 FATIGUE, UNSPECIFIED TYPE: ICD-10-CM

## 2019-10-17 DIAGNOSIS — J06.9 VIRAL UPPER RESPIRATORY TRACT INFECTION: ICD-10-CM

## 2019-10-17 PROCEDURE — 87086 URINE CULTURE/COLONY COUNT: CPT

## 2019-10-17 RX ORDER — PREDNISONE 10 MG/1
10 TABLET ORAL
Qty: 7 TAB | Refills: 0 | Status: SHIPPED | OUTPATIENT
Start: 2019-10-17 | End: 2020-05-26 | Stop reason: ALTCHOICE

## 2019-10-17 RX ORDER — FOSINOPIRL SODIUM 10 MG/1
TABLET ORAL
Qty: 270 TAB | Refills: 0 | Status: SHIPPED | OUTPATIENT
Start: 2019-10-17 | End: 2019-12-09 | Stop reason: SDUPTHER

## 2019-10-17 NOTE — PROGRESS NOTES
Chief Complaint   Patient presents with    Cough     Patient presents with her future daughter-in-law. Future daughter-in-law will be marrying her son Hetal Carey who usually accompanies patient at their visits. URI  Patient reports she has been having increased congestion, stuffiness in her nose and pain and pressure under her eyes. She reports no ear pain. She is coughing up a yellow discharge and thick and now has turned to more white and thick. She is using Mucinex with some water. She was on Doxy but reports she is still having symptoms. She has no chest pain. She does have more shortness of breath when she is walking. Since her pacer she has been doing better, cardiac perspective. She denies fevers or night sweats but she does have chills. She reports a 3 pound weight loss which is unintentional.  She reports a lot of fatigue. Subjective:   Mando Zaman is a 80 y.o. female with hypertension. Hypertension ROS: taking medications as instructed, no medication side effects noted, no TIA's, no chest pain on exertion, no dyspnea on exertion, no swelling of ankles. New concerns: Patient reports her blood pressure at home has been in the 140 range.         Past Medical History:   Diagnosis Date    Acute cystitis 05/23/2018    Anemia, unspecified     Anxiety     Arrhythmia     a fib    Arthritis     osteoarthritis, rheumatoid    Atrial fibrillation (HCC)     Dr. Long Rhodes and Dr. Bansal Never  following    Autoimmune disease [facility])     sjogren disease Dr. Leopoldo Sánchez CAD (coronary artery disease)     HTN (hypertension)     Hyperlipidemia LDL goal < 100     Hypothyroid     Insomnia     Osteoarthritis     Sjogren's disease ([facility] [address]     Thoracic aneurysm without mention of rupture      Past Surgical History:   Procedure Laterality Date    HX BIV-IMPLANTABLE CARDIOVERTER DEFIBRILLATOR      HX CHOLECYSTECTOMY      HX COLONOSCOPY  3/2014    Dr. Novak Edu      hernia repairs    HX PARTIAL THYROIDECTOMY      HX JANNET AND BSO      HX VEIN STRIPPING      [place] INSJ ELTRD CAR COLLIN SYS TM INSJ CVDFB/PM PLS GEN Left 2/22/2019    Lv Lead Placement performed by Enmanuel Cannon MD at [facility]    [place] INSJ/RPLCMT PERM DFB W/TRNSVNS LDS 1/DUAL [facility] Left 2/22/2019    upgrade PM to ICD-Bsx performed by Enmanuel Cannon MD at [facility]     Social History     Socioeconomic History    Marital status:      Spouse name: Not on file    Number of children: Not on file    Years of education: Not on file    Highest education level: Not on file   Tobacco Use    Smoking status: Never Smoker    Smokeless tobacco: Never Used   Substance and Sexual Activity    Alcohol use: Yes     Comment: wine    Drug use: Never    Sexual activity: Not Currently   Social History Narrative    ** Merged History Encounter **          Family History   Problem Relation Age of Onset    Heart Disease Father         aneurysm    Cancer Maternal Grandfather      Current Outpatient Medications   Medication Sig Dispense Refill    fosinopril (MONOPRIL) 10 mg tablet Take 3 po in the am and 2 po in the evening 270 Tab 0    predniSONE (DELTASONE) 10 mg tablet Take 10 mg by mouth daily (with breakfast). 7 Tab 0    levothyroxine (SYNTHROID) 100 mcg tablet TAKE ONE TABLET BY MOUTH DAILY BEFORE BREAKFAST 90 Tab 0    Biotin 2,500 mcg cap Take  by mouth.  meloxicam (MOBIC) 7.5 mg tablet Take  by mouth daily.  PARoxetine (PAXIL) 10 mg tablet TAKE ONE AND ONE-HALF TABLET BY MOUTH DAILY 135 Tab 0    montelukast (SINGULAIR) 10 mg tablet TAKE ONE TABLET BY MOUTH DAILY 30 Tab 1    estradiol (ESTRACE) 0.5 mg tablet TAKE ONE TABLET BY MOUTH DAILY **PLEASE WEAN QUICKLY OR STOP THIS MEDICATION DUE TO CARDIOVASCULAR RISKS 10 Tab 0    amiodarone (CORDARONE) 200 mg tablet Take 100 mg by mouth daily.  ALPRAZolam (XANAX) 1 mg tablet Take 1 mg by mouth as needed.       ipratropium (ATROVENT) 42 mcg (0.06 %) nasal spray       atenolol (TENORMIN) 25 mg tablet Take 1 Tab by mouth daily. 90 Tab 0    eszopiclone (LUNESTA) 2 mg tablet Take 2 mg by mouth nightly.  guaiFENesin ER (MUCINEX) 600 mg ER tablet Take 600 mg by mouth two (2) times daily as needed for Congestion.  apixaban (ELIQUIS) 5 mg tablet Take 5 mg by mouth two (2) times a day.  albuterol (PROVENTIL HFA, VENTOLIN HFA, PROAIR HFA) 90 mcg/actuation inhaler Take 2 Puffs by inhalation every four (4) hours as needed for Wheezing or Shortness of Breath. Indications: Bronchospastic Pulmonary Disease 1 Inhaler 1    fluticasone (FLONASE) 50 mcg/actuation nasal spray 2 Sprays by Both Nostrils route daily as needed.  vitamin b comp & c no.4 (SUPER B COMPLEX + C) 150 mg tab Take 1 Tab by mouth daily.  cholecalciferol, vitamin D3, (VITAMIN D3) 2,000 unit tab Take 2,000 Units by mouth daily.  MULTIVITAMIN (MULTIPLE VITAMIN PO) Take 1 Tab by mouth daily. Allergies   Allergen Reactions    Latex Hives     NOT ALLERGIC PER PT 02/01/2018    Pcn [Penicillins] Anaphylaxis    Sulfa (Sulfonamide Antibiotics) Anaphylaxis    Biaxin [Clarithromycin] Nausea Only    Biotin Unknown (comments)    Pcn [Penicillins] Hives    Sulfa (Sulfonamide Antibiotics) Nausea Only       Review of Systems - General ROS: positive for  - fatigue and malaise  negative for - fever  Cardiovascular ROS: no chest pain or dyspnea on exertion  Respiratory ROS: positive for - cough and sputum changes  negative for - tachypnea or wheezing    Visit Vitals  /67 (BP 1 Location: Left arm, BP Patient Position: Sitting)   Pulse 62   Temp 97.6 °F (36.4 °C) (Oral)   Resp 12   Ht 5' 7\" (1.702 m)   Wt 134 lb (60.8 kg)   BMI 20.99 kg/m²     General Appearance:  Well developed, well nourished,alert and oriented x 3, and individual in no acute distress.    Ears/Nose/Mouth/Throat:   Hearing grossly normal.         Neck: Supple, no lad, no bruits   Chest:    Crackles at right lung base   Cardiovascular:  Regular rate and rhythm, S1, S2 normal, no murmur. Abdomen:   Soft, non-tender, bowel sounds are active. Extremities: No edema bilaterally. Skin: Warm and dry, no suspicious lesions                 Diagnoses and all orders for this visit:    1. Essential hypertension not optimally controlled    Max is 80 mg/day so we will try her at 30 mg in the morning and 20 at night. We will monitor her symptoms. Symptoms may be related to higher blood pressure as well. -     fosinopril (MONOPRIL) 10 mg tablet; Take 3 po in the am and 2 po in the evening    2. Viral upper respiratory tract infection  Patient has finished doxy  She does not have any fevers  Over-the-counter is not improving  We will try her with low-dose prednisone she will let me know if she develops fever 3 nasal discharge or worse symptoms. She looks very stable clinically however if she should get progressively worse will go to the ER.  -     predniSONE (DELTASONE) 10 mg tablet; Take 10 mg by mouth daily (with breakfast). -     CBC WITH AUTOMATED DIFF  -     METABOLIC PANEL, COMPREHENSIVE    3.  Fatigue, unspecified type   We will check for underlying UTI.  -     CULTURE, URINE  -     URINALYSIS W/ RFLX MICROSCOPIC  -     CBC WITH AUTOMATED DIFF  -     METABOLIC PANEL, COMPREHENSIVE

## 2019-10-18 LAB
ALBUMIN SERPL-MCNC: 4.2 G/DL (ref 3.2–4.6)
ALBUMIN/GLOB SERPL: 1.6 {RATIO} (ref 1.2–2.2)
ALP SERPL-CCNC: 72 IU/L (ref 39–117)
ALT SERPL-CCNC: 9 IU/L (ref 0–32)
APPEARANCE UR: CLEAR
AST SERPL-CCNC: 20 IU/L (ref 0–40)
BASOPHILS # BLD AUTO: 0.1 X10E3/UL (ref 0–0.2)
BASOPHILS NFR BLD AUTO: 2 %
BILIRUB SERPL-MCNC: 0.3 MG/DL (ref 0–1.2)
BILIRUB UR QL STRIP: NEGATIVE
BUN SERPL-MCNC: 22 MG/DL (ref 10–36)
BUN/CREAT SERPL: 25 (ref 12–28)
CALCIUM SERPL-MCNC: 8.9 MG/DL (ref 8.7–10.3)
CHLORIDE SERPL-SCNC: 97 MMOL/L (ref 96–106)
CO2 SERPL-SCNC: 26 MMOL/L (ref 20–29)
COLOR UR: YELLOW
CREAT SERPL-MCNC: 0.89 MG/DL (ref 0.57–1)
EOSINOPHIL # BLD AUTO: 0.1 X10E3/UL (ref 0–0.4)
EOSINOPHIL NFR BLD AUTO: 3 %
ERYTHROCYTE [DISTWIDTH] IN BLOOD BY AUTOMATED COUNT: 12.9 % (ref 12.3–15.4)
GLOBULIN SER CALC-MCNC: 2.6 G/DL (ref 1.5–4.5)
GLUCOSE SERPL-MCNC: 87 MG/DL (ref 65–99)
GLUCOSE UR QL: NEGATIVE
HCT VFR BLD AUTO: 35.9 % (ref 34–46.6)
HGB BLD-MCNC: 11.8 G/DL (ref 11.1–15.9)
HGB UR QL STRIP: NEGATIVE
IMM GRANULOCYTES # BLD AUTO: 0 X10E3/UL (ref 0–0.1)
IMM GRANULOCYTES NFR BLD AUTO: 0 %
INTERPRETATION: NORMAL
KETONES UR QL STRIP: NEGATIVE
LEUKOCYTE ESTERASE UR QL STRIP: NEGATIVE
LYMPHOCYTES # BLD AUTO: 0.6 X10E3/UL (ref 0.7–3.1)
LYMPHOCYTES NFR BLD AUTO: 19 %
MCH RBC QN AUTO: 28.6 PG (ref 26.6–33)
MCHC RBC AUTO-ENTMCNC: 32.9 G/DL (ref 31.5–35.7)
MCV RBC AUTO: 87 FL (ref 79–97)
MICRO URNS: NORMAL
MONOCYTES # BLD AUTO: 0.3 X10E3/UL (ref 0.1–0.9)
MONOCYTES NFR BLD AUTO: 11 %
NEUTROPHILS # BLD AUTO: 2.1 X10E3/UL (ref 1.4–7)
NEUTROPHILS NFR BLD AUTO: 65 %
NITRITE UR QL STRIP: NEGATIVE
PH UR STRIP: 6 [PH] (ref 5–7.5)
PLATELET # BLD AUTO: 163 X10E3/UL (ref 150–450)
POTASSIUM SERPL-SCNC: 5.1 MMOL/L (ref 3.5–5.2)
PROT SERPL-MCNC: 6.8 G/DL (ref 6–8.5)
PROT UR QL STRIP: NEGATIVE
RBC # BLD AUTO: 4.13 X10E6/UL (ref 3.77–5.28)
SODIUM SERPL-SCNC: 138 MMOL/L (ref 134–144)
SP GR UR: 1.01 (ref 1–1.03)
UROBILINOGEN UR STRIP-MCNC: 0.2 MG/DL (ref 0.2–1)
WBC # BLD AUTO: 3.2 X10E3/UL (ref 3.4–10.8)

## 2019-10-19 LAB — BACTERIA UR CULT: NORMAL

## 2019-10-24 ENCOUNTER — PATIENT OUTREACH (OUTPATIENT)
Dept: INTERNAL MEDICINE CLINIC | Age: 84
End: 2019-10-24

## 2019-11-02 DIAGNOSIS — E03.9 ACQUIRED HYPOTHYROIDISM: ICD-10-CM

## 2019-11-02 RX ORDER — LEVOTHYROXINE SODIUM 100 UG/1
TABLET ORAL
Qty: 90 TAB | Refills: 0 | Status: SHIPPED | OUTPATIENT
Start: 2019-11-02 | End: 2020-02-11

## 2019-12-09 DIAGNOSIS — I10 ESSENTIAL HYPERTENSION: ICD-10-CM

## 2019-12-09 RX ORDER — FOSINOPIRL SODIUM 10 MG/1
TABLET ORAL
Qty: 270 TAB | Refills: 0 | Status: SHIPPED | OUTPATIENT
Start: 2019-12-09 | End: 2020-02-03

## 2019-12-14 ENCOUNTER — HOSPITAL ENCOUNTER (EMERGENCY)
Age: 84
Discharge: HOME | End: 2019-12-14
Attending: EMERGENCY MEDICINE
Payer: MEDICARE

## 2019-12-14 ENCOUNTER — APPOINTMENT (OUTPATIENT)
Dept: GENERAL RADIOLOGY | Age: 84
End: 2019-12-14
Attending: NURSE PRACTITIONER
Payer: MEDICARE

## 2019-12-14 VITALS
TEMPERATURE: 97.7 F | BODY MASS INDEX: 21.19 KG/M2 | OXYGEN SATURATION: 97 % | SYSTOLIC BLOOD PRESSURE: 158 MMHG | HEART RATE: 62 BPM | DIASTOLIC BLOOD PRESSURE: 68 MMHG | WEIGHT: 135 LBS | HEIGHT: 67 IN | RESPIRATION RATE: 14 BRPM

## 2019-12-14 DIAGNOSIS — S61.216A LACERATION OF RIGHT LITTLE FINGER WITHOUT FOREIGN BODY WITHOUT DAMAGE TO NAIL, INITIAL ENCOUNTER: Primary | ICD-10-CM

## 2019-12-14 PROCEDURE — 73140 X-RAY EXAM OF FINGER(S): CPT

## 2019-12-14 PROCEDURE — 90715 TDAP VACCINE 7 YRS/> IM: CPT | Performed by: NURSE PRACTITIONER

## 2019-12-14 PROCEDURE — 75810000293 HC SIMP/SUPERF WND  RPR

## 2019-12-14 PROCEDURE — 74011000250 HC RX REV CODE- 250: Performed by: NURSE PRACTITIONER

## 2019-12-14 PROCEDURE — 90471 IMMUNIZATION ADMIN: CPT

## 2019-12-14 PROCEDURE — 74011250636 HC RX REV CODE- 250/636: Performed by: NURSE PRACTITIONER

## 2019-12-14 PROCEDURE — 99282 EMERGENCY DEPT VISIT SF MDM: CPT

## 2019-12-14 RX ORDER — LIDOCAINE HYDROCHLORIDE 10 MG/ML
10 INJECTION, SOLUTION EPIDURAL; INFILTRATION; INTRACAUDAL; PERINEURAL ONCE
Status: COMPLETED | OUTPATIENT
Start: 2019-12-14 | End: 2019-12-14

## 2019-12-14 RX ORDER — BACITRACIN 500 UNIT/G
1 PACKET (EA) TOPICAL ONCE
Status: COMPLETED | OUTPATIENT
Start: 2019-12-14 | End: 2019-12-14

## 2019-12-14 RX ADMIN — BACITRACIN 1 PACKET: 500 OINTMENT TOPICAL at 17:04

## 2019-12-14 RX ADMIN — TETANUS TOXOID, REDUCED DIPHTHERIA TOXOID AND ACELLULAR PERTUSSIS VACCINE, ADSORBED 0.5 ML: 5; 2.5; 8; 8; 2.5 SUSPENSION INTRAMUSCULAR at 17:09

## 2019-12-14 RX ADMIN — LIDOCAINE HYDROCHLORIDE 10 ML: 10 INJECTION, SOLUTION EPIDURAL; INFILTRATION; INTRACAUDAL; PERINEURAL at 17:04

## 2019-12-14 NOTE — ED PROVIDER NOTES
80 y.o. female with past medical history significant for hyperlipidemia, osteoarthritis, hypothyroid, A-fib, anemia, CAD, and sjogren's disease who presents from private vehicle with chief complaint of right fifth finger laceration. Pt reports her right fifth finger was jammed in a car door PTA. Pt c/o right fifth finger pain since the injury. Pt is anticoagulated on Eliquis. Pt has no other complaints. There are no other acute medical concerns at this time. PCP: Franc Gooden MD    Note written by Cliff Farooq, as dictated by Tevin Jones DO 3:38 PM        The history is provided by the patient and a relative. No  was used.         Past Medical History:   Diagnosis Date    Acute cystitis 05/23/2018    Anemia, unspecified     Anxiety     Arrhythmia     a fib    Arthritis     osteoarthritis, rheumatoid    Atrial fibrillation (HCC)     Dr. Jaz Phillips and Dr. Raquel Castillo  following    Autoimmune disease [facility])     sjogren disease Dr. Elijah Thomas CAD (coronary artery disease)     HTN (hypertension)     Hyperlipidemia LDL goal < 100     Hypothyroid     Insomnia     Osteoarthritis     Sjogren's disease ([address]     Thoracic aneurysm without mention of rupture        Past Surgical History:   Procedure Laterality Date    HX BIV-IMPLANTABLE CARDIOVERTER DEFIBRILLATOR      HX CHOLECYSTECTOMY      HX COLONOSCOPY  3/2014    Dr. Ines Marmolejo    HX OTHER SURGICAL      hernia repairs    HX PARTIAL THYROIDECTOMY      HX JANNET AND BSO      HX VEIN STRIPPING      [place] INSJ ELTRD CAR COLLIN SYS TM INSJ CVDFB/PM PLS GEN Left 2/22/2019    Lv Lead Placement performed by Chris Osuna MD at [address] [facility]    [place] INSJ/RPLCMT PERM DFB W/TRNSVNS LDS 1/DUAL [facility] Left 2/22/2019    upgrade PM to ICD-Bsx performed by Chris Osuna MD at [address] CATH LAB         Family History:   Problem Relation Age of Onset    Heart Disease Father         aneurysm    Cancer Maternal Grandfather Social History     Socioeconomic History    Marital status:      Spouse name: Not on file    Number of children: Not on file    Years of education: Not on file    Highest education level: Not on file   Occupational History    Not on file   Social Needs    Financial resource strain: Not on file    Food insecurity:     Worry: Not on file     Inability: Not on file    Transportation needs:     Medical: Not on file     Non-medical: Not on file   Tobacco Use    Smoking status: Never Smoker    Smokeless tobacco: Never Used   Substance and Sexual Activity    Alcohol use: Yes     Comment: wine    Drug use: Never    Sexual activity: Not Currently   Lifestyle    Physical activity:     Days per week: Not on file     Minutes per session: Not on file    Stress: Not on file   Relationships    Social connections:     Talks on phone: Not on file     Gets together: Not on file     Attends Yarsani service: Not on file     Active member of club or organization: Not on file     Attends meetings of clubs or organizations: Not on file     Relationship status: Not on file    Intimate partner violence:     Fear of current or ex partner: Not on file     Emotionally abused: Not on file     Physically abused: Not on file     Forced sexual activity: Not on file   Other Topics Concern    Not on file   Social History Narrative    ** Merged History Encounter **              ALLERGIES: Latex; Pcn [penicillins]; Sulfa (sulfonamide antibiotics); Biaxin [clarithromycin]; Biotin; Pcn [penicillins]; and Sulfa (sulfonamide antibiotics)    Review of Systems   Constitutional: Negative for activity change, appetite change, chills and fever. HENT: Negative for congestion, rhinorrhea, sinus pressure, sneezing and sore throat. Eyes: Negative for photophobia and visual disturbance. Respiratory: Negative for cough and shortness of breath. Cardiovascular: Negative for chest pain.    Gastrointestinal: Negative for abdominal pain, blood in stool, constipation, diarrhea, nausea and vomiting. Genitourinary: Negative for difficulty urinating, dysuria, flank pain, frequency, hematuria, menstrual problem, urgency, vaginal bleeding and vaginal discharge. Musculoskeletal: Negative for arthralgias, back pain, myalgias and neck pain. Skin: Positive for wound. Negative for rash. Neurological: Negative for syncope, weakness, numbness and headaches. Psychiatric/Behavioral: Negative for self-injury and suicidal ideas. All other systems reviewed and are negative. Vitals:    12/14/19 1318   BP: 158/68   Pulse: 62   Resp: 14   Temp: 97.7 °F (36.5 °C)   SpO2: 97%   Weight: 61.2 kg (135 lb)   Height: 5' 7\" (1.702 m)            Physical Exam  Vitals signs and nursing note reviewed. Constitutional:       General: She is not in acute distress. Appearance: She is well-developed. She is not diaphoretic. HENT:      Head: Normocephalic and atraumatic. Eyes:      Conjunctiva/sclera: Conjunctivae normal.      Pupils: Pupils are equal, round, and reactive to light. Neck:      Musculoskeletal: Neck supple. Cardiovascular:      Rate and Rhythm: Normal rate and regular rhythm. Heart sounds: Normal heart sounds. Pulmonary:      Effort: Pulmonary effort is normal.      Breath sounds: Normal breath sounds. Abdominal:      General: There is no distension. Palpations: Abdomen is soft. Tenderness: There is no tenderness. Musculoskeletal:         General: No tenderness. Right hand: She exhibits laceration. She exhibits normal range of motion. Comments: Right fifth finger over the medial aspect of the right finger had a 2 cm linear laceration. 2 cm tissue avulsion with flap of skin attached at the base (at the volar aspect of DIP joint and fingertip). FROM with no evidence of flexor tendon injury. Skin:     General: Skin is warm and dry.       Capillary Refill: Capillary refill takes less than 2 seconds. Normal cap refill. Findings: Laceration present. Neurological:      Mental Status: She is alert and oriented to person, place, and time. Sensory: Sensation is intact. Comments: Intact sensation. Note written by Cliff Alvarado, as dictated by Margarito Ornelas DO 3:38 PM      MDM   59-year-old female presents with finger laceration and avulsion. Tdap was updated. Patient appears to have F ROM of the digit with no evidence of tendon laceration. X-ray was viewed by myself and read by radiology showing no acute fracture. Wound was repaired, as below with linear laceration sutured and avulsed skin repaired most of the way tacking down the flapped skin, although in doing so it was discovered that small portion of this injured skin was fully avulsed so was unable to fully cover the avulsed area. This area will need to heal by secondary intention. Wound was dressed with bacitracin and was dressed at the bedside. She was recommended PCP, urgent care, or return to the ED for suture removal and wound recheck. Return precautions were given for worsening or concerns specifically noting signs of infection.     Wound Repair  Date/Time: 12/14/2019 4:30 PM  Performed by: attendingPreparation: skin prepped with Betadine  Location details: right small finger  Wound length:2.5 cm or less (2 cm linear laceration, with a crescent avulsion/laceration 2 cm over volar aspect of finger)  Anesthesia: digital block    Anesthesia:  Local Anesthetic: lidocaine 1% without epinephrine  Anesthetic total: 3 mL  Foreign bodies: no foreign bodies  Irrigation solution: saline  Irrigation method: syringe  Skin closure: 4-0 nylon  Number of sutures: 10  Technique: simple  Approximation: loose (partially avulsed skin unable to be fully reapproximated)  Dressing: antibiotic ointment and gauze roll  Patient tolerance: Patient tolerated the procedure well with no immediate complications  My total time at bedside, performing this procedure was 1-15 minutes.

## 2019-12-14 NOTE — ED NOTES
1:17 PM  I have just evaluated the patient. I have reviewed Her vital signs and determined there is currently no worsening in their condition or physical exam. I have talked with the patient and the family and advised them that I am the provider in triage and have ordered lab work, x rays and other diagnostic tests. I have advised them that we will try and get them to the back as soon as possible. I have also advised them that should they have a worsening condition or any problems before they are sent back to the main ED, to contact me or the triage nurse. Initial Complaint: right finger laceration    Started: just PTA    Endorses: On Eliquis. Caught finger in car door. Skin is pushed back on the mclean surface of the fright 5th finger. Feels a little \"woozie\"   Denies: deformity of the finger. No further complaints. Orders placed.      Primary care provider: MD Hedy Cameron NP

## 2019-12-18 DIAGNOSIS — Z91.09 ENVIRONMENTAL ALLERGIES: ICD-10-CM

## 2019-12-18 RX ORDER — MONTELUKAST SODIUM 10 MG/1
TABLET ORAL
Qty: 30 TAB | Refills: 0 | Status: SHIPPED | OUTPATIENT
Start: 2019-12-18 | End: 2020-01-17

## 2019-12-30 ENCOUNTER — OFFICE VISIT (OUTPATIENT)
Dept: INTERNAL MEDICINE CLINIC | Age: 84
End: 2019-12-30

## 2019-12-30 ENCOUNTER — HOSPITAL ENCOUNTER (OUTPATIENT)
Dept: LAB | Age: 84
Discharge: HOME | End: 2019-12-30

## 2019-12-30 ENCOUNTER — HOSPITAL ENCOUNTER (OUTPATIENT)
Dept: GENERAL RADIOLOGY | Age: 84
Discharge: HOME | End: 2019-12-30
Attending: INTERNAL MEDICINE
Payer: MEDICARE

## 2019-12-30 VITALS
HEIGHT: 67 IN | HEART RATE: 68 BPM | TEMPERATURE: 97.6 F | WEIGHT: 134 LBS | BODY MASS INDEX: 21.03 KG/M2 | DIASTOLIC BLOOD PRESSURE: 70 MMHG | OXYGEN SATURATION: 99 % | SYSTOLIC BLOOD PRESSURE: 117 MMHG | RESPIRATION RATE: 18 BRPM

## 2019-12-30 DIAGNOSIS — R05.9 COUGH: Primary | ICD-10-CM

## 2019-12-30 DIAGNOSIS — R05.9 COUGH: ICD-10-CM

## 2019-12-30 LAB
ANION GAP SERPL CALC-SCNC: 6 MMOL/L (ref 5–15)
BASOPHILS # BLD: 0 K/UL (ref 0–0.1)
BASOPHILS NFR BLD: 1 % (ref 0–1)
BUN SERPL-MCNC: 31 MG/DL (ref 6–20)
BUN/CREAT SERPL: 26 (ref 12–20)
CALCIUM SERPL-MCNC: 9.3 MG/DL (ref 8.5–10.1)
CHLORIDE SERPL-SCNC: 98 MMOL/L (ref 97–108)
CO2 SERPL-SCNC: 29 MMOL/L (ref 21–32)
CREAT SERPL-MCNC: 1.2 MG/DL (ref 0.55–1.02)
DIFFERENTIAL METHOD BLD: ABNORMAL
EOSINOPHIL # BLD: 0 K/UL (ref 0–0.4)
EOSINOPHIL NFR BLD: 1 % (ref 0–7)
ERYTHROCYTE [DISTWIDTH] IN BLOOD BY AUTOMATED COUNT: 14.6 % (ref 11.5–14.5)
GLUCOSE SERPL-MCNC: 109 MG/DL (ref 65–100)
HCT VFR BLD AUTO: 38.6 % (ref 35–47)
HGB BLD-MCNC: 12.2 G/DL (ref 11.5–16)
IMM GRANULOCYTES # BLD AUTO: 0 K/UL (ref 0–0.04)
IMM GRANULOCYTES NFR BLD AUTO: 1 % (ref 0–0.5)
LYMPHOCYTES # BLD: 0.3 K/UL (ref 0.8–3.5)
LYMPHOCYTES NFR BLD: 9 % (ref 12–49)
MCH RBC QN AUTO: 29.5 PG (ref 26–34)
MCHC RBC AUTO-ENTMCNC: 31.6 G/DL (ref 30–36.5)
MCV RBC AUTO: 93.5 FL (ref 80–99)
MONOCYTES # BLD: 0.3 K/UL (ref 0–1)
MONOCYTES NFR BLD: 11 % (ref 5–13)
NEUTS SEG # BLD: 2.5 K/UL (ref 1.8–8)
NEUTS SEG NFR BLD: 77 % (ref 32–75)
NRBC # BLD: 0 K/UL (ref 0–0.01)
NRBC BLD-RTO: 0 PER 100 WBC
PLATELET # BLD AUTO: 156 K/UL (ref 150–400)
PMV BLD AUTO: 11.1 FL (ref 8.9–12.9)
POTASSIUM SERPL-SCNC: 4.1 MMOL/L (ref 3.5–5.1)
RBC # BLD AUTO: 4.13 M/UL (ref 3.8–5.2)
RBC MORPH BLD: ABNORMAL
SODIUM SERPL-SCNC: 133 MMOL/L (ref 136–145)
WBC # BLD AUTO: 3.1 K/UL (ref 3.6–11)

## 2019-12-30 PROCEDURE — 71046 X-RAY EXAM CHEST 2 VIEWS: CPT

## 2019-12-30 RX ORDER — LORATADINE 10 MG/1
10 TABLET ORAL
Qty: 30 TAB | Refills: 0 | Status: SHIPPED | OUTPATIENT
Start: 2019-12-30 | End: 2020-01-29

## 2019-12-30 NOTE — PROGRESS NOTES
Assessment and Plan   Diagnoses and all orders for this visit:    70-year-old female with a history of A. fib, CAD, hypertension, hypothyroidism who presents to clinic for worsening cough and malaise. Cough started in October. 1. Cough  -     XR CHEST PA LAT; Future  -     CBC WITH AUTOMATED DIFF; Future  -     METABOLIC PANEL, BASIC; Future  Unclear etiology at this time. Cough present since October and seems to be worsening recently. Has already had 2 weeks of doxycycline which has not made a difference. Lungs are clear on exam.  Not sure if this is actually an infection. Still has 1 more day of doxycycline left. We will get a chest x-ray to rule out pneumonia. Wonder if cough is from sinus drainage. Patient already on Singulair. Recommend using Flonase consistently. Could use Mucinex D if needed. Consider antihistamine if chest x-ray is negative. Benefits, risks, possible drug interactions, and side effects of all new medications were reviewed with the patient. Pt verbalized understanding. Return to clinic: 2 weeks to follow-up symptoms    Jose Khan MD  [facility]  12/30/2019    Future Appointments   Date Time Provider Nikolas Victoria   1/28/2020  9:30 AM Fausto ESPINOZA SCHED        Subjective   Chief Complaint   Cough, malaise    Martinez Ge is a 80 y.o. female     Accompanied by her daughter-in-law    Presents to clinic for a chief complaint of productive cough, nasal congestion and drainage, malaise, fatigue, voice hoarseness. Patient reports that she was seen in October by Dr. Duy You and was given prednisone. Was then seen by urgent care a couple weeks ago who gave her a 14-day course of doxycycline. Was also seen at that time for her right fifth digit wound after crushing her finger in a door that required stitches.   Reports that she has not had any improvement since October and actually seems to be getting worse in the past week. Denies any sore throat. Endorses some shortness of breath chest pain. Review of Systems   Constitutional: Negative for chills and fever. HENT: Positive for congestion. Respiratory: Positive for cough, sputum production and shortness of breath. Cardiovascular: Negative for chest pain. Gastrointestinal: Negative for abdominal pain, nausea and vomiting. Objective   Vitals:       Visit Vitals  /70 (BP 1 Location: Left arm, BP Patient Position: Sitting)   Pulse 68   Temp 97.6 °F (36.4 °C) (Oral)   Resp 18   Ht 5' 7\" (1.702 m)   Wt 134 lb (60.8 kg)   SpO2 99%   BMI 20.99 kg/m²        Physical Exam  Constitutional:       Appearance: Normal appearance. HENT:      Right Ear: Tympanic membrane, ear canal and external ear normal.      Left Ear: Tympanic membrane, ear canal and external ear normal.      Nose: Congestion present. Mouth/Throat:      Mouth: Mucous membranes are moist.      Pharynx: No posterior oropharyngeal erythema. Comments: Hoarse voice  Eyes:      Conjunctiva/sclera: Conjunctivae normal.   Cardiovascular:      Rate and Rhythm: Normal rate and regular rhythm. Heart sounds: No murmur. No friction rub. No gallop. Pulmonary:      Effort: No respiratory distress. Breath sounds: No wheezing, rhonchi or rales. Lymphadenopathy:      Cervical: No cervical adenopathy. Neurological:      Mental Status: She is alert.           Voice recognition software is utilized and this note may contain transcription errors

## 2019-12-30 NOTE — PROGRESS NOTES
Please call the pt. Chest x-ray was negative for an infection. Symptoms are possibly due to allergies. Recommend starting Claritin. I have sent a prescription for this but it is over-the-counter.

## 2019-12-31 NOTE — PROGRESS NOTES
Advised based on her labs, she looks dehydrated.  Advised drinking a lot of fluid and reinstated this several times. She will call and make appointment with Dr Stanley Brewer in 2 weeks. Advised to call if things don't get better.

## 2019-12-31 NOTE — PROGRESS NOTES
Please call the pt. Based on her labs, she looks dehydrated. Please encourage increasing fluid intake and make sure she has a follow-up appointment with Valentino Palma in the next 2 weeks.

## 2019-12-31 NOTE — PROGRESS NOTES
Called patient (HIPPA verified) advised Chest  x-ray was negative for an infection.  Symptoms are possibly due to allergies.  Recommend starting Claritin. Patient verbalized understanding.

## 2020-01-02 ENCOUNTER — APPOINTMENT (OUTPATIENT)
Dept: CT IMAGING | Age: 85
End: 2020-01-02
Attending: EMERGENCY MEDICINE
Payer: MEDICARE

## 2020-01-02 ENCOUNTER — APPOINTMENT (OUTPATIENT)
Dept: GENERAL RADIOLOGY | Age: 85
End: 2020-01-02
Attending: STUDENT IN AN ORGANIZED HEALTH CARE EDUCATION/TRAINING PROGRAM
Payer: MEDICARE

## 2020-01-02 ENCOUNTER — HOSPITAL ENCOUNTER (EMERGENCY)
Age: 85
Discharge: HOME | End: 2020-01-02
Attending: EMERGENCY MEDICINE
Payer: MEDICARE

## 2020-01-02 VITALS
HEIGHT: 67 IN | TEMPERATURE: 98 F | DIASTOLIC BLOOD PRESSURE: 55 MMHG | OXYGEN SATURATION: 95 % | HEART RATE: 64 BPM | RESPIRATION RATE: 18 BRPM | WEIGHT: 132 LBS | BODY MASS INDEX: 20.72 KG/M2 | SYSTOLIC BLOOD PRESSURE: 128 MMHG

## 2020-01-02 DIAGNOSIS — R05.9 COUGH: Primary | ICD-10-CM

## 2020-01-02 LAB
ALBUMIN SERPL-MCNC: 2.8 G/DL (ref 3.5–5)
ALBUMIN/GLOB SERPL: 0.9 {RATIO} (ref 1.1–2.2)
ALP SERPL-CCNC: 76 U/L (ref 45–117)
ALT SERPL-CCNC: 13 U/L (ref 12–78)
ANION GAP SERPL CALC-SCNC: 6 MMOL/L (ref 5–15)
AST SERPL-CCNC: 19 U/L (ref 15–37)
BASOPHILS # BLD: 0 K/UL (ref 0–0.1)
BASOPHILS NFR BLD: 0 % (ref 0–1)
BILIRUB SERPL-MCNC: 0.6 MG/DL (ref 0.2–1)
BUN SERPL-MCNC: 27 MG/DL (ref 6–20)
BUN/CREAT SERPL: 22 (ref 12–20)
CALCIUM SERPL-MCNC: 7.9 MG/DL (ref 8.5–10.1)
CHLORIDE SERPL-SCNC: 98 MMOL/L (ref 97–108)
CO2 SERPL-SCNC: 27 MMOL/L (ref 21–32)
COMMENT, HOLDF: NORMAL
CREAT SERPL-MCNC: 1.21 MG/DL (ref 0.55–1.02)
DIFFERENTIAL METHOD BLD: ABNORMAL
EOSINOPHIL # BLD: 0 K/UL (ref 0–0.4)
EOSINOPHIL NFR BLD: 0 % (ref 0–7)
ERYTHROCYTE [DISTWIDTH] IN BLOOD BY AUTOMATED COUNT: 14.8 % (ref 11.5–14.5)
GLOBULIN SER CALC-MCNC: 3.2 G/DL (ref 2–4)
GLUCOSE SERPL-MCNC: 101 MG/DL (ref 65–100)
HCT VFR BLD AUTO: 31 % (ref 35–47)
HGB BLD-MCNC: 10.2 G/DL (ref 11.5–16)
IMM GRANULOCYTES # BLD AUTO: 0 K/UL (ref 0–0.04)
IMM GRANULOCYTES NFR BLD AUTO: 0 % (ref 0–0.5)
LYMPHOCYTES # BLD: 0.5 K/UL (ref 0.8–3.5)
LYMPHOCYTES NFR BLD: 10 % (ref 12–49)
MCH RBC QN AUTO: 30.1 PG (ref 26–34)
MCHC RBC AUTO-ENTMCNC: 32.9 G/DL (ref 30–36.5)
MCV RBC AUTO: 91.4 FL (ref 80–99)
MONOCYTES # BLD: 0.5 K/UL (ref 0–1)
MONOCYTES NFR BLD: 10 % (ref 5–13)
NEUTS SEG # BLD: 3.7 K/UL (ref 1.8–8)
NEUTS SEG NFR BLD: 80 % (ref 32–75)
NRBC # BLD: 0 K/UL (ref 0–0.01)
NRBC BLD-RTO: 0 PER 100 WBC
PLATELET # BLD AUTO: 122 K/UL (ref 150–400)
PMV BLD AUTO: 10.7 FL (ref 8.9–12.9)
POTASSIUM SERPL-SCNC: 3.4 MMOL/L (ref 3.5–5.1)
PROT SERPL-MCNC: 6 G/DL (ref 6.4–8.2)
RBC # BLD AUTO: 3.39 M/UL (ref 3.8–5.2)
RBC MORPH BLD: ABNORMAL
SAMPLES BEING HELD,HOLD: NORMAL
SODIUM SERPL-SCNC: 131 MMOL/L (ref 136–145)
WBC # BLD AUTO: 4.7 K/UL (ref 3.6–11)

## 2020-01-02 PROCEDURE — 71045 X-RAY EXAM CHEST 1 VIEW: CPT

## 2020-01-02 PROCEDURE — 80053 COMPREHEN METABOLIC PANEL: CPT

## 2020-01-02 PROCEDURE — 85025 COMPLETE CBC W/AUTO DIFF WBC: CPT

## 2020-01-02 PROCEDURE — 71250 CT THORAX DX C-: CPT

## 2020-01-02 PROCEDURE — 99283 EMERGENCY DEPT VISIT LOW MDM: CPT

## 2020-01-02 PROCEDURE — 36415 COLL VENOUS BLD VENIPUNCTURE: CPT

## 2020-01-02 RX ORDER — BENZONATATE 100 MG/1
100 CAPSULE ORAL
Qty: 30 CAP | Refills: 0 | Status: SHIPPED | OUTPATIENT
Start: 2020-01-02 | End: 2020-01-09

## 2020-01-03 NOTE — ED NOTES
Dr. Nancy Means gave and reviewed discharge instructions with the patient and caregiver. The patient and caregiver verbalized understanding. The patient and caregiver was given opportunity for questions. Patient discharged in stable condition to the waiting room via wheelchair with friend.

## 2020-01-03 NOTE — ED PROVIDER NOTES
80 y.o. female with past medical history significant for ventricular tachycardia, a fib, Thoracic aneurysm without rupture, Sjogren's disease, HTN, Hypothyroid who presents from home via EMS with chief complaint of cough. Pt was seen by Orion Jones NP for dispatch homehealth today for c/o wheezing, cough, SOB, weakness, and decreased appetite. She had a chest x ray, blood work, negative flu swabs done. Was diagnosed with pneumonia and told to come into ED for treatment. Home health ordered 5 day regimen of levaquin for pt. Cough first began around 12/26/19 and pt states at times it can be severe enough to the point that she feels like she is choking. Also reports fever (108F this morning), dehydration today per home health, decreased urinary output, and increased fluid intake over the past 2-3 days. Also in ED room states \"I feel sick all over\" adding \"i'm having noises in my head\", describes the noise stating its \"like a swirling\". EMS reports VSS and SpO2 of 98% on RA during transit. Lives alone in her home and presents to ED with her neighbor. Pt denies any confusion. There are no other acute medical concerns at this time. Old Chart Review: Seen on 12/23/19 and prescribed 1 week regimen of doxycycline that has now been finished. Saw her PCP on Monday 12/30/19 and had normal chest x ray at the time. Pt's last hospital admission was in Feb 2019. Was last seen in ED for finger laceration on 12/14/19. Surgical hx - partial thyroidectomy, JANNET & BSO, cholecystectomy, cardioverter/pacemaker placement   Social hx - Tobacco use: non smoker, Alcohol Use: yes, wine drinker - unspecified frequency, Drug Use: denies    PCP: Gilbert Joshi MD    Note written by Cliff Morales, as dictated by Joselyn Vila MD 8:20 PM.      The history is provided by the patient and the EMS personnel. No  was used.         Past Medical History:   Diagnosis Date    Acute cystitis 05/23/2018    Anemia, unspecified     Anxiety     Arrhythmia     a fib    Arthritis     osteoarthritis, rheumatoid    Atrial fibrillation (HCC)     Dr. Thomas Mcgregor and Dr. Star Rushing  following    Autoimmune disease [facility])     sjogren disease Dr. Naun Staton    CAD (coronary artery disease)     HTN (hypertension)     Hyperlipidemia LDL goal < 100     Hypothyroid     Insomnia     Osteoarthritis     Sjogren's disease ([address]     Thoracic aneurysm without mention of rupture        Past Surgical History:   Procedure Laterality Date    HX BIV-IMPLANTABLE CARDIOVERTER DEFIBRILLATOR      HX CHOLECYSTECTOMY      HX COLONOSCOPY  3/2014    Dr. Leticia Mendieta      hernia repairs    HX PARTIAL THYROIDECTOMY      HX JANNET AND BSO      HX VEIN STRIPPING      [place] INSJ ELTRD CAR COLLIN SYS TM INSJ CVDFB/PM PLS GEN Left 2/22/2019    Lv Lead Placement performed by Diego Mtz MD at [address] [facility] CATH LAB    [place] INSJ/RPLCMT PERM DFB W/TRNSVNS LDS 1/DUAL [facility] Left 2/22/2019    upgrade PM to ICD-Bsx performed by Diego Mtz MD at [facility] [facility] CATH LAB         Family History:   Problem Relation Age of Onset    Heart Disease Father         aneurysm    Cancer Maternal Grandfather        Social History     Socioeconomic History    Marital status:      Spouse name: Not on file    Number of children: Not on file    Years of education: Not on file    Highest education level: Not on file   Occupational History    Not on file   Social Needs    Financial resource strain: Not on file    Food insecurity:     Worry: Not on file     Inability: Not on file    Transportation needs:     Medical: Not on file     Non-medical: Not on file   Tobacco Use    Smoking status: Never Smoker    Smokeless tobacco: Never Used   Substance and Sexual Activity    Alcohol use: Yes     Comment: wine    Drug use: Never    Sexual activity: Not Currently   Lifestyle    Physical activity:     Days per week: Not on file     Minutes per session: Not on file    Stress: Not on file   Relationships    Social connections:     Talks on phone: Not on file     Gets together: Not on file     Attends Christianity service: Not on file     Active member of club or organization: Not on file     Attends meetings of clubs or organizations: Not on file     Relationship status: Not on file    Intimate partner violence:     Fear of current or ex partner: Not on file     Emotionally abused: Not on file     Physically abused: Not on file     Forced sexual activity: Not on file   Other Topics Concern    Not on file   Social History Narrative    ** Merged History Encounter **              ALLERGIES: Latex; Pcn [penicillins]; Sulfa (sulfonamide antibiotics); Biaxin [clarithromycin]; Biotin; Pcn [penicillins]; and Sulfa (sulfonamide antibiotics)    Review of Systems   Constitutional: Positive for appetite change and fever. Negative for chills. HENT: Positive for tinnitus. Negative for ear pain and sore throat. Eyes: Negative for pain. Respiratory: Positive for cough, shortness of breath and wheezing. Negative for chest tightness. Cardiovascular: Negative for chest pain. Gastrointestinal: Negative for abdominal pain. Genitourinary: Positive for decreased urine volume. Negative for flank pain. Musculoskeletal: Negative for back pain. Skin: Negative for rash. Neurological: Positive for weakness. Negative for headaches. Psychiatric/Behavioral: Negative for confusion. All other systems reviewed and are negative. Vitals:    01/02/20 1958   BP: 112/50   Pulse: (!) 59   Resp: 17   Temp: 97.7 °F (36.5 °C)   SpO2: 98%   Weight: 59.9 kg (132 lb)   Height: 5' 7\" (1.702 m)            Physical Exam  Vitals signs and nursing note reviewed. Constitutional:       General: She is not in acute distress. Comments: Pt is elderly    HENT:      Head: Normocephalic and atraumatic.       Mouth/Throat:      Mouth: Mucous membranes are moist.   Eyes: General: No scleral icterus. Conjunctiva/sclera: Conjunctivae normal.      Pupils: Pupils are equal, round, and reactive to light. Neck:      Musculoskeletal: Neck supple. Trachea: No tracheal deviation. Cardiovascular:      Rate and Rhythm: Normal rate and regular rhythm. Pulmonary:      Effort: Pulmonary effort is normal. No respiratory distress. Breath sounds: Normal breath sounds. No wheezing or rales. Abdominal:      General: There is no distension. Palpations: Abdomen is soft. Tenderness: There is no tenderness. Genitourinary:     Comments: deferred  Musculoskeletal:         General: No deformity. Skin:     General: Skin is warm and dry. Neurological:      General: No focal deficit present. Mental Status: She is alert. Psychiatric:         Mood and Affect: Mood normal.        Note written by Cliff Latham, as dictated by Laya Green MD 8:20 PM    MDM  Number of Diagnoses or Management Options  Cough:   Diagnosis management comments: 22-year-old female presents after being sent in for pneumonia. Chest x-ray does not show pneumonia. CT scan ordered and did not show pneumonia. She does have a thoracic aneurysm that she knows about. Could not find a comparison. She was advised to follow-up closely with her primary care doctor. She was started on [employer]. Her vital signs are stable. She was given return precautions.          Procedures

## 2020-01-03 NOTE — DISCHARGE INSTRUCTIONS
Patient Education        Cough: Care Instructions  Your Care Instructions    A cough is your body's response to something that bothers your throat or airways. Many things can cause a cough. You might cough because of a cold or the flu, bronchitis, or asthma. Smoking, postnasal drip, allergies, and stomach acid that backs up into your throat also can cause coughs. A cough is a symptom, not a disease. Most coughs stop when the cause, such as a cold, goes away. You can take a few steps at home to cough less and feel better. Follow-up care is a key part of your treatment and safety. Be sure to make and go to all appointments, and call your doctor if you are having problems. It's also a good idea to know your test results and keep a list of the medicines you take. How can you care for yourself at home? · Drink lots of water and other fluids. This helps thin the mucus and soothes a dry or sore throat. Honey or lemon juice in hot water or tea may ease a dry cough. · Take cough medicine as directed by your doctor. · Prop up your head on pillows to help you breathe and ease a dry cough. · Try cough drops to soothe a dry or sore throat. Cough drops don't stop a cough. Medicine-flavored cough drops are no better than candy-flavored drops or hard candy. · Do not smoke. Avoid secondhand smoke. If you need help quitting, talk to your doctor about stop-smoking programs and medicines. These can increase your chances of quitting for good. When should you call for help? Call 911 anytime you think you may need emergency care.  For example, call if:    · You have severe trouble breathing.    Call your doctor now or seek immediate medical care if:    · You cough up blood.     · You have new or worse trouble breathing.     · You have a new or higher fever.     · You have a new rash.    Watch closely for changes in your health, and be sure to contact your doctor if:    · You cough more deeply or more often, especially if you notice more mucus or a change in the color of your mucus.     · You have new symptoms, such as a sore throat, an earache, or sinus pain.     · You do not get better as expected. Where can you learn more? Go to http://kathleen-zabrina.info/. Enter D279 in the search box to learn more about \"Cough: Care Instructions. \"  Current as of: June 9, 2019  Content Version: 12.2  © 3466-4461 [employer]. Care instructions adapted under license by [employer] (which disclaims liability or warranty for this information). If you have questions about a medical condition or this instruction, always ask your healthcare professional. [address] any warranty or liability for your use of this information.

## 2020-01-03 NOTE — ED TRIAGE NOTES
Pt. Westley Mina in by EMS from home for cough and dx of pneumonia, had xrays and blood work done today at home with home health. States has had fever. Was negative for the flu.

## 2020-01-07 DIAGNOSIS — R06.09 DYSPNEA ON EXERTION: ICD-10-CM

## 2020-01-07 DIAGNOSIS — R53.82 CHRONIC FATIGUE: ICD-10-CM

## 2020-01-07 RX ORDER — ALBUTEROL SULFATE 90 UG/1
2 AEROSOL, METERED RESPIRATORY (INHALATION)
Qty: 1 INHALER | Refills: 1 | Status: SHIPPED | OUTPATIENT
Start: 2020-01-07 | End: 2020-02-06

## 2020-01-08 ENCOUNTER — TELEPHONE (OUTPATIENT)
Dept: INTERNAL MEDICINE CLINIC | Age: 85
End: 2020-01-08

## 2020-01-08 ENCOUNTER — DOCUMENTATION ONLY (OUTPATIENT)
Dept: INTERNAL MEDICINE CLINIC | Age: 85
End: 2020-01-08

## 2020-01-08 NOTE — PROGRESS NOTES
Called patient to check how she is feeling. Reports she picked up the inhaler today and will continue with the Claritin. Will let us know if things don't get better.

## 2020-01-17 DIAGNOSIS — Z91.09 ENVIRONMENTAL ALLERGIES: ICD-10-CM

## 2020-01-17 RX ORDER — MONTELUKAST SODIUM 10 MG/1
TABLET ORAL
Qty: 30 TAB | Refills: 0 | Status: SHIPPED | OUTPATIENT
Start: 2020-01-17 | End: 2020-02-15

## 2020-02-02 DIAGNOSIS — I10 ESSENTIAL HYPERTENSION: ICD-10-CM

## 2020-02-03 RX ORDER — FOSINOPIRL SODIUM 10 MG/1
TABLET ORAL
Qty: 270 TAB | Refills: 0 | Status: SHIPPED | OUTPATIENT
Start: 2020-02-03 | End: 2020-07-28

## 2020-02-11 DIAGNOSIS — E03.9 ACQUIRED HYPOTHYROIDISM: ICD-10-CM

## 2020-02-11 RX ORDER — LEVOTHYROXINE SODIUM 100 UG/1
TABLET ORAL
Qty: 90 TAB | Refills: 0 | Status: SHIPPED | OUTPATIENT
Start: 2020-02-11 | End: 2020-05-12

## 2020-04-01 ENCOUNTER — VIRTUAL VISIT (OUTPATIENT)
Dept: INTERNAL MEDICINE CLINIC | Age: 85
End: 2020-04-01

## 2020-04-01 DIAGNOSIS — J01.00 ACUTE NON-RECURRENT MAXILLARY SINUSITIS: Primary | ICD-10-CM

## 2020-04-01 RX ORDER — LEVOFLOXACIN 500 MG/1
500 TABLET, FILM COATED ORAL DAILY
Qty: 10 TAB | Refills: 0 | Status: CANCELLED | OUTPATIENT
Start: 2020-04-01

## 2020-04-01 RX ORDER — DOXYCYCLINE 100 MG/1
100 TABLET ORAL 2 TIMES DAILY
Qty: 20 TAB | Refills: 0 | Status: SHIPPED | OUTPATIENT
Start: 2020-04-01 | End: 2020-05-26 | Stop reason: ALTCHOICE

## 2020-04-01 NOTE — PROGRESS NOTES
Melissa Lassiter is a 80 y.o. female who was seen by synchronous (real-time) audio technology on 4/1/2020. This is a phone encounter. The patient did not have the ability to get on video. Consent:  She and/or her healthcare decision maker is aware that this patient-initiated Telehealth encounter is a billable service, with coverage as determined by her insurance carrier. She is aware that she may receive a bill and has provided verbal consent to proceed: Yes    I was in the office while conducting this encounter. Assessment & Plan:   Diagnoses and all orders for this visit:    1. Acute non-recurrent maxillary sinusitis  Patient is having persistent symptoms with history of progression. Given her age and COVID precautions will treat. Patient will continue with rest and fluids. If she gets any shortness of breath she will use her albuterol and let me know. -     doxycycline (ADOXA) 100 mg tablet; Take 1 Tab by mouth two (2) times a day. DO NOT TAKE WITH PEPTO BISMOL OR MULTIVITAMINS. Coding Help - Use CPT Codes 88642-28527, 37062-11465 for Established and New Patients respectively, either employing EM elements or Time rules. Other codes (example consult codes) may also apply. I spent at least 18 minutes with this established patient, and >50% of the time was spent counseling and/or coordinating care regarding Her antibiotic and amiodarone  712  Subjective:   Melissa Lassiter was seen for Sinus Pain and Nasal Discharge (yellow)    Sinusitis  Mrs. Rashmi Ramsye reports that she has been having sinus pain and pressure as well as yellow nasal discharge for the past 3 to 4 days. Historically her symptoms get progressively worse and she is interested in treatment. She reports fatigue and malaise. She has fatigue with walking around her house. She has some mild shortness of breath with exertion but this is her baseline. She reports her son lives close by and can provide help if needed.   She has an albuterol inhaler at home. She has not been using it. Atrial fibrillation  Patient is on amiodarone. She has been reduced to 100 mg/day. Prior to Admission medications    Medication Sig Start Date End Date Taking? Authorizing Provider   doxycycline (ADOXA) 100 mg tablet Take 1 Tab by mouth two (2) times a day. DO NOT TAKE WITH PEPTO BISMOL OR MULTIVITAMINS. 4/1/20  Yes Jeison Brown MD   montelukast (SINGULAIR) 10 mg tablet TAKE ONE TABLET BY MOUTH DAILY 2/15/20  Yes Cristiano Mahajan MD   levothyroxine (SYNTHROID) 100 mcg tablet TAKE ONE TABLET BY MOUTH DAILY BEFORE BREAKFAST 2/11/20  Yes Jeison Brown MD   fosinopriL (MONOPRIL) 10 mg tablet TAKE THREE TABLETS BY MOUTH EVERY MORNING AND TAKE TWO TABLETS BY MOUTH EVERY EVENING 2/3/20  Yes Jeison Brown MD   bismuth subsalicylate (PEPTO-BISMOL PO) Take  by mouth as needed. Yes Provider, Historical   predniSONE (DELTASONE) 10 mg tablet Take 10 mg by mouth daily (with breakfast). 10/17/19  Yes Jeison Brown MD   Biotin 2,500 mcg cap Take  by mouth. Yes Provider, Historical   meloxicam (MOBIC) 7.5 mg tablet Take  by mouth daily. Yes Provider, Historical   PARoxetine (PAXIL) 10 mg tablet TAKE ONE AND ONE-HALF TABLET BY MOUTH DAILY 6/29/19  Yes Jeison Brown MD   estradiol (ESTRACE) 0.5 mg tablet TAKE ONE TABLET BY MOUTH DAILY **PLEASE WEAN QUICKLY OR STOP THIS MEDICATION DUE TO CARDIOVASCULAR RISKS 4/20/19  Yes Jeison Brown MD   amiodarone (CORDARONE) 200 mg tablet Take 100 mg by mouth daily. Yes Provider, Historical   ALPRAZolam (XANAX) 1 mg tablet Take 1 mg by mouth as needed. 2/8/19  Yes Provider, Historical   ipratropium (ATROVENT) 42 mcg (0.06 %) nasal spray  2/14/19  Yes Provider, Historical   atenolol (TENORMIN) 25 mg tablet Take 1 Tab by mouth daily. 2/25/19  Yes Arpit Goodman NP   eszopiclone (LUNESTA) 2 mg tablet Take 2 mg by mouth nightly.    Yes Provider, Historical   guaiFENesin ER (MUCINEX) 600 mg ER tablet Take 600 mg by mouth two (2) times daily as needed for Congestion. Yes Provider, Historical   apixaban (ELIQUIS) 5 mg tablet Take 5 mg by mouth two (2) times a day. Yes Provider, Historical   fluticasone (FLONASE) 50 mcg/actuation nasal spray 2 Sprays by Both Nostrils route daily as needed. 7/3/16  Yes Provider, Historical   vitamin b comp & c no.4 (SUPER B COMPLEX + C) 150 mg tab Take 1 Tab by mouth daily. Yes Provider, Historical   cholecalciferol, vitamin D3, (VITAMIN D3) 2,000 unit tab Take 2,000 Units by mouth daily. Yes Provider, Historical   MULTIVITAMIN (MULTIPLE VITAMIN PO) Take 1 Tab by mouth daily. Yes Provider, Historical     Allergies   Allergen Reactions    Latex Hives     NOT ALLERGIC PER PT 02/01/2018    Pcn [Penicillins] Anaphylaxis    Sulfa (Sulfonamide Antibiotics) Anaphylaxis    Biaxin [Clarithromycin] Nausea Only    Biotin Unknown (comments)    Pcn [Penicillins] Hives    Sulfa (Sulfonamide Antibiotics) Nausea Only       Patient Active Problem List    Diagnosis Date Noted    Ventricular tachycardia ([facility] [address] [address] 02/16/2019    Elevated serum creatinine 02/16/2019    Advanced care planning/counseling discussion 12/03/2015    Personal history of colonic polyps 03/19/2014    Anxiety     Insomnia     HTN (hypertension)     Hyperlipidemia LDL goal < 100     Osteoarthritis     Hypothyroid     CAD (coronary artery disease)     Atrial fibrillation (HCC)     Thoracic aneurysm without mention of rupture     Anemia      Current Outpatient Medications   Medication Sig Dispense Refill    doxycycline (ADOXA) 100 mg tablet Take 1 Tab by mouth two (2) times a day. DO NOT TAKE WITH PEPTO BISMOL OR MULTIVITAMINS.  20 Tab 0    montelukast (SINGULAIR) 10 mg tablet TAKE ONE TABLET BY MOUTH DAILY 90 Tab 0    levothyroxine (SYNTHROID) 100 mcg tablet TAKE ONE TABLET BY MOUTH DAILY BEFORE BREAKFAST 90 Tab 0    fosinopriL (MONOPRIL) 10 mg tablet TAKE THREE TABLETS BY MOUTH EVERY MORNING AND TAKE TWO TABLETS BY MOUTH EVERY EVENING 270 Tab 0    bismuth subsalicylate (PEPTO-BISMOL PO) Take  by mouth as needed.  predniSONE (DELTASONE) 10 mg tablet Take 10 mg by mouth daily (with breakfast). 7 Tab 0    Biotin 2,500 mcg cap Take  by mouth.  meloxicam (MOBIC) 7.5 mg tablet Take  by mouth daily.  PARoxetine (PAXIL) 10 mg tablet TAKE ONE AND ONE-HALF TABLET BY MOUTH DAILY 135 Tab 0    estradiol (ESTRACE) 0.5 mg tablet TAKE ONE TABLET BY MOUTH DAILY **PLEASE WEAN QUICKLY OR STOP THIS MEDICATION DUE TO CARDIOVASCULAR RISKS 10 Tab 0    amiodarone (CORDARONE) 200 mg tablet Take 100 mg by mouth daily.  ALPRAZolam (XANAX) 1 mg tablet Take 1 mg by mouth as needed.  ipratropium (ATROVENT) 42 mcg (0.06 %) nasal spray       atenolol (TENORMIN) 25 mg tablet Take 1 Tab by mouth daily. 90 Tab 0    eszopiclone (LUNESTA) 2 mg tablet Take 2 mg by mouth nightly.  guaiFENesin ER (MUCINEX) 600 mg ER tablet Take 600 mg by mouth two (2) times daily as needed for Congestion.  apixaban (ELIQUIS) 5 mg tablet Take 5 mg by mouth two (2) times a day.  fluticasone (FLONASE) 50 mcg/actuation nasal spray 2 Sprays by Both Nostrils route daily as needed.  vitamin b comp & c no.4 (SUPER B COMPLEX + C) 150 mg tab Take 1 Tab by mouth daily.  cholecalciferol, vitamin D3, (VITAMIN D3) 2,000 unit tab Take 2,000 Units by mouth daily.  MULTIVITAMIN (MULTIPLE VITAMIN PO) Take 1 Tab by mouth daily.        Allergies   Allergen Reactions    Latex Hives     NOT ALLERGIC PER PT 02/01/2018    Pcn [Penicillins] Anaphylaxis    Sulfa (Sulfonamide Antibiotics) Anaphylaxis    Biaxin [Clarithromycin] Nausea Only    Biotin Unknown (comments)    Pcn [Penicillins] Hives    Sulfa (Sulfonamide Antibiotics) Nausea Only     Past Medical History:   Diagnosis Date    Acute cystitis 05/23/2018    Anemia, unspecified     Anxiety     Arrhythmia     a fib    Arthritis     osteoarthritis, rheumatoid    Atrial fibrillation (HCC)     Dr. Lester Pa and Dr. La Rodriguez  following    Autoimmune disease [facility])     sjogren disease Dr. Leena Maxwell CAD (coronary artery disease)     HTN (hypertension)     Hyperlipidemia LDL goal < 100     Hypothyroid     Insomnia     Osteoarthritis     Sjogren's disease ([facility]     Thoracic aneurysm without mention of rupture      Past Surgical History:   Procedure Laterality Date    HX BIV-IMPLANTABLE CARDIOVERTER DEFIBRILLATOR      HX CHOLECYSTECTOMY      HX COLONOSCOPY  3/2014    Dr. Stacey Collins    HX OTHER SURGICAL      hernia repairs    HX PARTIAL THYROIDECTOMY      HX JANNET AND BSO      HX VEIN STRIPPING      [place] INSJ ELTRD CAR COLLIN SYS TM INSJ DFB/PM PLS GEN Left 2/22/2019    Lv Lead Placement performed by Germain Mathis MD at [address] [facility] CATH LAB    [place] INSJ/RPLCMT PERM DFB W/TRNSVNS LDS 1/DUAL [facility] Left 2/22/2019    upgrade PM to ICD-Bsx performed by Germain Mathis MD at [address] [facility] CATH LAB     Family History   Problem Relation Age of Onset    Heart Disease Father         aneurysm    Cancer Maternal Grandfather      Social History     Tobacco Use    Smoking status: Never Smoker    Smokeless tobacco: Never Used   Substance Use Topics    Alcohol use: Yes     Comment: wine       ROS    PHYSICAL EXAMINATION:  Not able to obtain as this is a phone call. She was not wheezing or short of breath when talking. We discussed the expected course, resolution and complications of the diagnosis(es) in detail. Medication risks, benefits, costs, interactions, and alternatives were discussed as indicated. I advised her to contact the office if her condition worsens, changes or fails to improve as anticipated. She expressed understanding with the diagnosis(es) and plan.      Pursuant to the emergency declaration under the [facility] waiver authority and the [place] [employer] and Response Supplemental Appropriations Act, this Virtual  Visit was conducted, with patient's consent, to reduce the patient's risk of exposure to COVID-19 and provide continuity of care for an established patient. Services were provided through a video synchronous discussion virtually to substitute for in-person clinic visit.     Silvio Favre, MD

## 2020-04-16 ENCOUNTER — TELEPHONE (OUTPATIENT)
Dept: INTERNAL MEDICINE CLINIC | Age: 85
End: 2020-04-16

## 2020-04-16 NOTE — TELEPHONE ENCOUNTER
----- Message from Oscar Mehta sent at 4/16/2020  2:18 PM EDT -----  Regarding: Aniceto Newberry MD/ telephone  General Message/Vendor Calls    Caller's first and last name:  Thi Leonardo       Reason for call: Pt is requesting a call back in reference to her stomach issues that she has been having for a while. Pt states that this doesn't happen all the time but sometimes after she eats in the morning this is not happening right now.   Pt states that she is not up to another visit with the dr. Stalin Pope required yes/no and why:      Best contact number(s): [phone]      Details to clarify the request:      Oscar Mehta

## 2020-04-17 NOTE — TELEPHONE ENCOUNTER
I spoke with patient, she is still having diarrhea that is only present in the morning after eating breakfast. It has been present for a long time. Pt denies fever, cough, SOB and vomiting. She eats a bland diet, cereal and rice. Only occurs after breakfast. Pt wants to ask PCP what can she take.

## 2020-04-17 NOTE — TELEPHONE ENCOUNTER
If she is eating wheat or milk/dairy she should try to stop this for a week. The safest thing she can take is metamucil in the evening as it will add bulk to her stool. If that is not helping she can use lomotil for 4 days to see if this will help.

## 2020-04-21 RX ORDER — PAROXETINE 10 MG/1
TABLET, FILM COATED ORAL
Qty: 135 TAB | Refills: 0 | Status: SHIPPED | OUTPATIENT
Start: 2020-04-21 | End: 2020-07-19

## 2020-05-11 DIAGNOSIS — Z91.09 ENVIRONMENTAL ALLERGIES: ICD-10-CM

## 2020-05-11 RX ORDER — MONTELUKAST SODIUM 10 MG/1
TABLET ORAL
Qty: 90 TAB | Refills: 0 | Status: SHIPPED | OUTPATIENT
Start: 2020-05-11 | End: 2020-08-07

## 2020-05-22 ENCOUNTER — TELEPHONE (OUTPATIENT)
Dept: INTERNAL MEDICINE CLINIC | Age: 85
End: 2020-05-22

## 2020-05-22 RX ORDER — LOPERAMIDE HCL 2 MG
TABLET ORAL
Qty: 30 TAB | Refills: 1 | Status: ON HOLD | OUTPATIENT
Start: 2020-05-22 | End: 2020-10-21

## 2020-05-22 NOTE — TELEPHONE ENCOUNTER
----- Message from Anshul Amaya sent at 5/22/2020  2:13 PM EDT -----  Regarding: DR Krys Keys / Hansel Steinberg Message/Vendor Calls    Pt is requesting medication to be called into the [employer] [place] listed in chart. Due to persistent diarrhea.              Callback required   Best contact number(s):[phone]

## 2020-05-22 NOTE — TELEPHONE ENCOUNTER
I spoke with patient, she has diarrhea almost every day for the last 6 months. Sometimes she has diarrhea 3 times a day. She has lots of gas. Pt denies fever. She would like a rx sent to the pharmacy.

## 2020-05-22 NOTE — TELEPHONE ENCOUNTER
Please double check no blood in stool, fever or abdominal pain.  Please ensure she is drinking fluids

## 2020-05-22 NOTE — TELEPHONE ENCOUNTER
Pt denies blood in stool, fever and abdominal pain. She is drinking fluids all day. She would like both rx sent to the pharmacy as she doesn't drive and it will be easier to have someone pick them up for her. Telephone visit schedule for next week.

## 2020-05-22 NOTE — TELEPHONE ENCOUNTER
Sounds like chronic , last 6 months. She can try imodium over the counter. I can send in banana flakes as well but this should be over the counter as well.

## 2020-05-26 ENCOUNTER — VIRTUAL VISIT (OUTPATIENT)
Dept: INTERNAL MEDICINE CLINIC | Age: 85
End: 2020-05-26

## 2020-05-26 VITALS — BODY MASS INDEX: 20.72 KG/M2 | RESPIRATION RATE: 12 BRPM | WEIGHT: 132 LBS | HEIGHT: 67 IN

## 2020-05-26 DIAGNOSIS — R19.7 DIARRHEA, UNSPECIFIED TYPE: Primary | ICD-10-CM

## 2020-05-26 DIAGNOSIS — D64.9 ANEMIA, UNSPECIFIED TYPE: ICD-10-CM

## 2020-05-26 DIAGNOSIS — I10 ESSENTIAL HYPERTENSION: ICD-10-CM

## 2020-05-26 NOTE — PROGRESS NOTES
Juan Francisco Hernandez is a 80 y.o. female who was seen by synchronous (real-time) audio technology on 5/26/2020.        12 minutes    This is a phone encounter. The patient did not have the ability to get on video. Consent:  She and/or her healthcare decision maker is aware that this patient-initiated Telehealth encounter is a billable service, with coverage as determined by her insurance carrier. She is aware that she may receive a bill and has provided verbal consent to proceed: Yes    I was in the office while conducting this encounter. Assessment & Plan:   Diagnoses and all orders for this visit:    1. Acute non-recurrent maxillary sinusitis  Patient is having persistent symptoms with history of progression. Given her age and COVID precautions will treat. Patient will continue with rest and fluids. If she gets any shortness of breath she will use her albuterol and let me know. -     doxycycline (ADOXA) 100 mg tablet; Take 1 Tab by mouth two (2) times a day. DO NOT TAKE WITH PEPTO BISMOL OR MULTIVITAMINS. Coding Help - Use CPT Codes 34906-39469, [id number] for Established and New Patients respectively, either employing EM elements or Time rules. Other codes (example consult codes) may also apply. I spent at least 18 minutes with this established patient, and >50% of the time was spent counseling and/or coordinating care regarding Her antibiotic and amiodarone  712  Subjective:   Juan Francisco Hernandez was seen for Diarrhea      Diarrhea  Diarrhea and constipation but since imodium stool ing daily with some form  Metamucil stopped 2 weeks ago   She reports she is actually making formed stool. She is taking the Imodium. She is not constipated. She reports a long history of constipation and then some diarrhea. She has not been able to get the banana flakes. Subjective:   Juan Francisco Hernandez is a 80 y.o. female with hypertension.     Hypertension ROS: taking medications as instructed, no medication side effects noted, no TIA's, no chest pain on exertion, no dyspnea on exertion, no swelling of ankles. New concerns: She will be back in the cardiology office for. defib check  Some sob      Atrial fibrillation  Patient is on amiodarone. She has been reduced to 100 mg/day. Prior to Admission medications    Medication Sig Start Date End Date Taking? Authorizing Provider   loperamide (Imodium A-D) 2 mg tablet Take 1-2 tabs following first loose stool and then 1 tab after each stool. Do not exceed 8 tabs/day. 5/22/20  Yes Shelley Brown MD   banana flakes trans-galactooligosaccharide (BANATROL PLUS) powder Take 1 Packet by mouth two (2) times a day. 5/22/20  Yes Shahida Chandler MD   levothyroxine (SYNTHROID) 100 mcg tablet TAKE ONE TABLET BY MOUTH DAILY BEFORE BREAKFAST 5/12/20  Yes Shelley Brown MD   montelukast (SINGULAIR) 10 mg tablet TAKE ONE TABLET BY MOUTH DAILY 5/11/20  Yes Shahida Chandler MD   PARoxetine (PAXIL) 10 mg tablet TAKE ONE AND ONE-HALF TABLET BY MOUTH DAILY 4/21/20  Yes Shelley Brown MD   fosinopriL (MONOPRIL) 10 mg tablet TAKE THREE TABLETS BY MOUTH EVERY MORNING AND TAKE TWO TABLETS BY MOUTH EVERY EVENING 2/3/20  Yes Shelley Brown MD   bismuth subsalicylate (PEPTO-BISMOL PO) Take  by mouth as needed. Yes Provider, Historical   Biotin 2,500 mcg cap Take  by mouth. Yes Provider, Historical   meloxicam (MOBIC) 7.5 mg tablet Take  by mouth daily. Yes Provider, Historical   estradiol (ESTRACE) 0.5 mg tablet TAKE ONE TABLET BY MOUTH DAILY **PLEASE WEAN QUICKLY OR STOP THIS MEDICATION DUE TO CARDIOVASCULAR RISKS 4/20/19  Yes Shelley Brown MD   amiodarone (CORDARONE) 200 mg tablet Take 100 mg by mouth daily. Yes Provider, Historical   ALPRAZolam (XANAX) 1 mg tablet Take 1 mg by mouth as needed.  2/8/19  Yes Provider, Historical   ipratropium (ATROVENT) 42 mcg (0.06 %) nasal spray  2/14/19  Yes Provider, Historical atenolol (TENORMIN) 25 mg tablet Take 1 Tab by mouth daily. 2/25/19  Yes Majo Mejia NP   eszopiclone (LUNESTA) 2 mg tablet Take 2 mg by mouth nightly. Yes Provider, Historical   guaiFENesin ER (MUCINEX) 600 mg ER tablet Take 600 mg by mouth two (2) times daily as needed for Congestion. Yes Provider, Historical   apixaban (ELIQUIS) 5 mg tablet Take 5 mg by mouth two (2) times a day. Yes Provider, Historical   fluticasone (FLONASE) 50 mcg/actuation nasal spray 2 Sprays by Both Nostrils route daily as needed. 7/3/16  Yes Provider, Historical   vitamin b comp & c no.4 (SUPER B COMPLEX + C) 150 mg tab Take 1 Tab by mouth daily. Yes Provider, Historical   cholecalciferol, vitamin D3, (VITAMIN D3) 2,000 unit tab Take 2,000 Units by mouth daily. Yes Provider, Historical   MULTIVITAMIN (MULTIPLE VITAMIN PO) Take 1 Tab by mouth daily. Yes Provider, Historical   doxycycline (ADOXA) 100 mg tablet Take 1 Tab by mouth two (2) times a day. DO NOT TAKE WITH PEPTO BISMOL OR MULTIVITAMINS. 4/1/20 5/26/20  Pedro Lucio MD   predniSONE (DELTASONE) 10 mg tablet Take 10 mg by mouth daily (with breakfast).  10/17/19 5/26/20  Pedro Lucio MD     Allergies   Allergen Reactions    Latex Hives     NOT ALLERGIC PER PT 02/01/2018    Pcn [Penicillins] Anaphylaxis    Sulfa (Sulfonamide Antibiotics) Anaphylaxis    Biaxin [Clarithromycin] Nausea Only    Biotin Unknown (comments)    Pcn [Penicillins] Hives    Sulfa (Sulfonamide Antibiotics) Nausea Only       Patient Active Problem List    Diagnosis Date Noted    Ventricular tachycardia ([facility] [address] 02/16/2019    Elevated serum creatinine 02/16/2019    Advanced care planning/counseling discussion 12/03/2015    Personal history of colonic polyps 03/19/2014    Anxiety     Insomnia     HTN (hypertension)     Hyperlipidemia LDL goal < 100     Osteoarthritis     Hypothyroid     CAD (coronary artery disease)     Atrial fibrillation [facility])     Thoracic aneurysm without mention of rupture     Anemia      Current Outpatient Medications   Medication Sig Dispense Refill    loperamide (Imodium A-D) 2 mg tablet Take 1-2 tabs following first loose stool and then 1 tab after each stool. Do not exceed 8 tabs/day. 30 Tab 1    banana flakes trans-galactooligosaccharide (BANATROL PLUS) powder Take 1 Packet by mouth two (2) times a day. 20 Packet 0    levothyroxine (SYNTHROID) 100 mcg tablet TAKE ONE TABLET BY MOUTH DAILY BEFORE BREAKFAST 90 Tab 0    montelukast (SINGULAIR) 10 mg tablet TAKE ONE TABLET BY MOUTH DAILY 90 Tab 0    PARoxetine (PAXIL) 10 mg tablet TAKE ONE AND ONE-HALF TABLET BY MOUTH DAILY 135 Tab 0    fosinopriL (MONOPRIL) 10 mg tablet TAKE THREE TABLETS BY MOUTH EVERY MORNING AND TAKE TWO TABLETS BY MOUTH EVERY EVENING 270 Tab 0    bismuth subsalicylate (PEPTO-BISMOL PO) Take  by mouth as needed.  Biotin 2,500 mcg cap Take  by mouth.  meloxicam (MOBIC) 7.5 mg tablet Take  by mouth daily.  estradiol (ESTRACE) 0.5 mg tablet TAKE ONE TABLET BY MOUTH DAILY **PLEASE WEAN QUICKLY OR STOP THIS MEDICATION DUE TO CARDIOVASCULAR RISKS 10 Tab 0    amiodarone (CORDARONE) 200 mg tablet Take 100 mg by mouth daily.  ALPRAZolam (XANAX) 1 mg tablet Take 1 mg by mouth as needed.  ipratropium (ATROVENT) 42 mcg (0.06 %) nasal spray       atenolol (TENORMIN) 25 mg tablet Take 1 Tab by mouth daily. 90 Tab 0    eszopiclone (LUNESTA) 2 mg tablet Take 2 mg by mouth nightly.  guaiFENesin ER (MUCINEX) 600 mg ER tablet Take 600 mg by mouth two (2) times daily as needed for Congestion.  apixaban (ELIQUIS) 5 mg tablet Take 5 mg by mouth two (2) times a day.  fluticasone (FLONASE) 50 mcg/actuation nasal spray 2 Sprays by Both Nostrils route daily as needed.  vitamin b comp & c no.4 (SUPER B COMPLEX + C) 150 mg tab Take 1 Tab by mouth daily.  cholecalciferol, vitamin D3, (VITAMIN D3) 2,000 unit tab Take 2,000 Units by mouth daily.  MULTIVITAMIN (MULTIPLE VITAMIN PO) Take 1 Tab by mouth daily. Allergies   Allergen Reactions    Latex Hives     NOT ALLERGIC PER PT 02/01/2018    Pcn [Penicillins] Anaphylaxis    Sulfa (Sulfonamide Antibiotics) Anaphylaxis    Biaxin [Clarithromycin] Nausea Only    Biotin Unknown (comments)    Pcn [Penicillins] Hives    Sulfa (Sulfonamide Antibiotics) Nausea Only     Past Medical History:   Diagnosis Date    Acute cystitis 05/23/2018    Anemia, unspecified     Anxiety     Arrhythmia     a fib    Arthritis     osteoarthritis, rheumatoid    Atrial fibrillation (HCC)     Dr. Nate Rea and Dr. Luzma Neves  following    Autoimmune disease [facility])     sjogren disease Dr. Jami Pitts CAD (coronary artery disease)     HTN (hypertension)     Hyperlipidemia LDL goal < 100     Hypothyroid     Insomnia     Osteoarthritis     Sjogren's disease ([address]     Thoracic aneurysm without mention of rupture      Past Surgical History:   Procedure Laterality Date    HX BIV-IMPLANTABLE CARDIOVERTER DEFIBRILLATOR      HX CHOLECYSTECTOMY      HX COLONOSCOPY  3/2014    Dr. Perry Garcia    HX OTHER SURGICAL      hernia repairs    HX PARTIAL THYROIDECTOMY      HX JANNET AND BSO      HX VEIN STRIPPING      HI INSJ ELTRD CAR COLLIN SYS TM INSJ DFB/PM PLS GEN Left 2/22/2019    Lv Lead Placement performed by Miki Marmolejo MD at [facility] CATH LAB    HI INSJ/RPLCMT PERM DFB W/TRNSVNS LDS 1/DUAL [facility] Left 2/22/2019    upgrade PM to ICD-Bsx performed by Miki Marmolejo MD at [facility] CATH LAB     Family History   Problem Relation Age of Onset    Heart Disease Father         aneurysm    Cancer Maternal Grandfather      Social History     Tobacco Use    Smoking status: Never Smoker    Smokeless tobacco: Never Used   Substance Use Topics    Alcohol use: Yes     Comment: wine       ROS    PHYSICAL EXAMINATION:  Not able to obtain as this is a phone call. She was not wheezing or short of breath when talking.     We discussed the expected course, resolution and complications of the diagnosis(es) in detail. Medication risks, benefits, costs, interactions, and alternatives were discussed as indicated. I advised her to contact the office if her condition worsens, changes or fails to improve as anticipated. She expressed understanding with the diagnosis(es) and plan. Pursuant to the emergency declaration under the [address] [place] [address] waiver authority and the [employer] Act, this Virtual  Visit was conducted, with patient's consent, to reduce the patient's risk of exposure to COVID-19 and provide continuity of care for an established patient. Services were provided through a video synchronous discussion virtually to substitute for in-person clinic visit.     Madeline Jefferson MD

## 2020-06-05 NOTE — TELEPHONE ENCOUNTER
----- Message from James Mcduffie sent at 6/5/2020 11:57 AM EDT -----  Regarding: Dr. Kristie Lane: [phone] (if not patient):  Relationship of caller (if not patient):   Best contact number(s): [phone]  Name of medication and dosage if known: \"ipratropium bromide\" nose spray  Is patient out of this medication (yes/no):  unknown  Pharmacy name:  [facility] listed in chart? (yes/no): Yes  Pharmacy phone number:   Details to clarify the request:    Allergist recommended pt call primary as pt cannot get to allergist office for appt, and allergist stated pcp could prescribe.

## 2020-06-08 RX ORDER — IPRATROPIUM BROMIDE 42 UG/1
1 SPRAY, METERED NASAL 3 TIMES DAILY
Qty: 15 ML | Refills: 2 | Status: ON HOLD | OUTPATIENT
Start: 2020-06-08 | End: 2020-10-21

## 2020-06-12 ENCOUNTER — TELEPHONE (OUTPATIENT)
Dept: INTERNAL MEDICINE CLINIC | Age: 85
End: 2020-06-12

## 2020-06-12 NOTE — TELEPHONE ENCOUNTER
Returned call to pt. She is asking where to go to get her labs done. She has been advised to come to the office to have them drawn.  Pt voices understanding and thanks

## 2020-06-19 ENCOUNTER — HOSPITAL ENCOUNTER (OUTPATIENT)
Dept: LAB | Age: 85
Discharge: HOME | End: 2020-06-19

## 2020-06-19 DIAGNOSIS — D64.9 ANEMIA, UNSPECIFIED TYPE: ICD-10-CM

## 2020-06-19 DIAGNOSIS — R19.7 DIARRHEA, UNSPECIFIED TYPE: ICD-10-CM

## 2020-06-19 LAB
ALBUMIN SERPL-MCNC: 4 G/DL (ref 3.5–5)
ALBUMIN/GLOB SERPL: 1.3 {RATIO} (ref 1.1–2.2)
ALP SERPL-CCNC: 77 U/L (ref 45–117)
ALT SERPL-CCNC: 18 U/L (ref 12–78)
ANION GAP SERPL CALC-SCNC: 7 MMOL/L (ref 5–15)
AST SERPL-CCNC: 20 U/L (ref 15–37)
BILIRUB SERPL-MCNC: 0.6 MG/DL (ref 0.2–1)
BUN SERPL-MCNC: 24 MG/DL (ref 6–20)
BUN/CREAT SERPL: 21 (ref 12–20)
CALCIUM SERPL-MCNC: 8.8 MG/DL (ref 8.5–10.1)
CHLORIDE SERPL-SCNC: 103 MMOL/L (ref 97–108)
CO2 SERPL-SCNC: 28 MMOL/L (ref 21–32)
CREAT SERPL-MCNC: 1.12 MG/DL (ref 0.55–1.02)
ERYTHROCYTE [DISTWIDTH] IN BLOOD BY AUTOMATED COUNT: 14 % (ref 11.5–14.5)
FERRITIN SERPL-MCNC: 15 NG/ML (ref 26–388)
GLOBULIN SER CALC-MCNC: 3.1 G/DL (ref 2–4)
GLUCOSE SERPL-MCNC: 107 MG/DL (ref 65–100)
HCT VFR BLD AUTO: 37 % (ref 35–47)
HGB BLD-MCNC: 11.6 G/DL (ref 11.5–16)
IRON SATN MFR SERPL: 18 % (ref 20–50)
IRON SERPL-MCNC: 84 UG/DL (ref 35–150)
MAGNESIUM SERPL-MCNC: 2 MG/DL (ref 1.6–2.4)
MCH RBC QN AUTO: 30.1 PG (ref 26–34)
MCHC RBC AUTO-ENTMCNC: 31.4 G/DL (ref 30–36.5)
MCV RBC AUTO: 96.1 FL (ref 80–99)
NRBC # BLD: 0 K/UL (ref 0–0.01)
NRBC BLD-RTO: 0 PER 100 WBC
PLATELET # BLD AUTO: 138 K/UL (ref 150–400)
PMV BLD AUTO: 11 FL (ref 8.9–12.9)
POTASSIUM SERPL-SCNC: 5.1 MMOL/L (ref 3.5–5.1)
PROT SERPL-MCNC: 7.1 G/DL (ref 6.4–8.2)
RBC # BLD AUTO: 3.85 M/UL (ref 3.8–5.2)
SODIUM SERPL-SCNC: 138 MMOL/L (ref 136–145)
TIBC SERPL-MCNC: 455 UG/DL (ref 250–450)
TSH SERPL DL<=0.05 MIU/L-ACNC: 2.13 UIU/ML (ref 0.36–3.74)
WBC # BLD AUTO: 2.4 K/UL (ref 3.6–11)

## 2020-06-21 NOTE — PROGRESS NOTES
Please have patient make a follow-up office visit this week to discuss her labs on a Tuesday Wednesday or Thursday afternoon or if she prefers a telephone/virtual visit.

## 2020-06-22 NOTE — TELEPHONE ENCOUNTER
Per PCP request, nurse spoke with patient & got her scheduled for a Telephone Call Only appt on Wednesday June 24, 2020 at 1:00pm to review patient's recent lab results. Nurse explained telephonic & virtual visits, as well as, informing patient that these services are billable to the patient's insurance company similar to an office visit. Patient verbalized consent to bill health insurance. Patient does not have 'smart' technology, so this will be a telephone call only. Please call patient on home ph# [phone].

## 2020-06-24 ENCOUNTER — VIRTUAL VISIT (OUTPATIENT)
Dept: INTERNAL MEDICINE CLINIC | Age: 85
End: 2020-06-24

## 2020-06-24 DIAGNOSIS — E61.1 IRON DEFICIENCY: ICD-10-CM

## 2020-06-24 DIAGNOSIS — Z13.39 SCREENING FOR ALCOHOLISM: ICD-10-CM

## 2020-06-24 DIAGNOSIS — Z78.0 POSTMENOPAUSAL STATE: ICD-10-CM

## 2020-06-24 DIAGNOSIS — Z13.31 SCREENING FOR DEPRESSION: ICD-10-CM

## 2020-06-24 DIAGNOSIS — Z00.00 MEDICARE ANNUAL WELLNESS VISIT, SUBSEQUENT: Primary | ICD-10-CM

## 2020-06-24 DIAGNOSIS — R21 RASH: ICD-10-CM

## 2020-06-24 RX ORDER — TRIAMCINOLONE ACETONIDE 1 MG/G
CREAM TOPICAL 2 TIMES DAILY
Qty: 15 G | Refills: 0 | Status: ON HOLD | OUTPATIENT
Start: 2020-06-24 | End: 2020-10-21

## 2020-06-24 RX ORDER — LANOLIN ALCOHOL/MO/W.PET/CERES
325 CREAM (GRAM) TOPICAL
Qty: 30 TAB | Refills: 0 | Status: SHIPPED | OUTPATIENT
Start: 2020-06-24 | End: 2020-07-14 | Stop reason: SDUPTHER

## 2020-06-24 RX ORDER — LANOLIN ALCOHOL/MO/W.PET/CERES
325 CREAM (GRAM) TOPICAL
Qty: 30 TAB | Refills: 1 | Status: SHIPPED | OUTPATIENT
Start: 2020-06-24 | End: 2020-06-24 | Stop reason: SDUPTHER

## 2020-06-24 NOTE — PATIENT INSTRUCTIONS
Medicare Wellness Visit, Female The best way to live healthy is to have a lifestyle where you eat a well-balanced diet, exercise regularly, limit alcohol use, and quit all forms of tobacco/nicotine, if applicable. Regular preventive services are another way to keep healthy. Preventive services (vaccines, screening tests, monitoring & exams) can help personalize your care plan, which helps you manage your own care. Screening tests can find health problems at the earliest stages, when they are easiest to treat. [facility] follows the current, evidence-based guidelines published by the [facility] Wilber Jacinto ([facility]) when recommending preventive services for our patients. Because we follow these guidelines, sometimes recommendations change over time as research supports it. (For example, mammograms used to be recommended annually. Even though Medicare will still pay for an annual mammogram, the newer guidelines recommend a mammogram every two years for women of average risk). Of course, you and your doctor may decide to screen more often for some diseases, based on your risk and your co-morbidities (chronic disease you are already diagnosed with). Preventive services for you include: - Medicare offers their members a free annual wellness visit, which is time for you and your primary care provider to discuss and plan for your preventive service needs. Take advantage of this benefit every year! 
-All adults over the age of 72 should receive the recommended pneumonia vaccines. Current USPSTF guidelines recommend a series of two vaccines for the best pneumonia protection.  
-All adults should have a flu vaccine yearly and a tetanus vaccine every 10 years.  
-All adults age 48 and older should receive the shingles vaccines (series of two vaccines). -All adults age 38-68 who are overweight should have a diabetes screening test once every three years. -All adults born between [birth year] and 1965 should be screened once for Hepatitis C. 
-Other screening tests and preventive services for persons with diabetes include: an eye exam to screen for diabetic retinopathy, a kidney function test, a foot exam, and stricter control over your cholesterol.  
-Cardiovascular screening for adults with routine risk involves an electrocardiogram (ECG) at intervals determined by your doctor.  
-Colorectal cancer screenings should be done for adults age 54-65 with no increased risk factors for colorectal cancer. There are a number of acceptable methods of screening for this type of cancer. Each test has its own benefits and drawbacks. Discuss with your doctor what is most appropriate for you during your annual wellness visit. The different tests include: colonoscopy (considered the best screening method), a fecal occult blood test, a fecal DNA test, and sigmoidoscopy. 
 
-A bone mass density test is recommended when a woman turns 65 to screen for osteoporosis. This test is only recommended one time, as a screening. Some providers will use this same test as a disease monitoring tool if you already have osteoporosis. -Breast cancer screenings are recommended every other year for women of normal risk, age 54-69. 
-Cervical cancer screenings for women over age 72 are only recommended with certain risk factors. Here is a list of your current Health Maintenance items (your personalized list of preventive services) with a due date: 
Health Maintenance Due Topic Date Due  Shingles Vaccine (1 of 2) 04/22/1978  Pneumococcal Vaccine (1 of 1 - PPSV23) 04/22/1993 [address] [place] [address] Annual Well Visit  03/22/2018  Glaucoma Screening   04/29/2018

## 2020-06-24 NOTE — PROGRESS NOTES
I was in the office while conducting this encounter. Consent:  She and/or her healthcare decision maker is aware that this patient-initiated Telehealth encounter is a billable service, with coverage as determined by her insurance carrier. She is aware that she may receive a bill and has provided verbal consent to proceed: Yes    This virtual visit was conducted telephone encounter only. -  I affirm this is a Patient Initiated Episode with an Established Patient who has not had a related appointment within my department in the past 7 days or scheduled within the next 24 hours. Note: this encounter is not billable if this call serves to triage the patient into an appointment for the relevant concern. Regarding the episodic part of this visit. I spent 18 minutes with her on the phone and greater than 50 spent of the time was spent in management and counseling with regards to her new labs of iron deficiency anemia and also new rash. Total Time: minutes: 11-20 minutes. Patient presents for discussion of her labs recently done. She is also having fatigue and a new rash in the back of her neck. Labs are reviewed with patient. Anemia   she has a slight leukopenia and thrombocytopenia. I discussed with patient that her hemoglobin was normal however her iron stores were low. She reports that she does not eat very much red meat. She has not had a colonoscopy and does not want a colonoscopy. She denies blood in her stool. She notes she would not get a colonoscopy even if she had blood in her stool. She reports she does have a fatigue and cold intolerance. She denies bruising or easy bleeding. She notes in the past that iron supplementation has caused her some constipation.       Kidney function is stable      Elevated glucose  Patient reports that she does not feel thirsty and does not have any nocturia    diarrhea improved from last visit      Rash  Patient reports she has been breaking out in the back of her neck. It is itchy. There are no blisters. She is not having any pain. She is using an over-the-counter hydrocortisone with mild relief. She notes it is in the right base of her neck and extending up into her hairline. Symptoms are moderate in terms of severity. This is the Subsequent Medicare Annual Wellness Exam, performed 12 months or more after the Initial AWV or the last Subsequent AWV    Consent: Santos Wilson, who was seen by synchronous (real-time) audio-video technology, and/or her healthcare decision maker, is aware that this patient-initiated, Telehealth encounter on 6/24/2020 is a billable service. While AWVs are fully covered by Medicare, any services rendered on this date that are not included in an AWV are subject to additional billing, with coverage as determined by her insurance carrier. She is aware that she may receive a bill for any such additional services and has provided verbal consent to proceed: Yes. I have reviewed the patient's medical history in detail and updated the computerized patient record.      History     Patient Active Problem List   Diagnosis Code    Anxiety F41.9    Insomnia G47.00    HTN (hypertension) I10    Hyperlipidemia LDL goal < 100 E78.5    Osteoarthritis M19.90   [facility] Hypothyroid E03.9    CAD (coronary artery disease) I25.10    Atrial fibrillation ([facility] [address] I48.91    Thoracic aneurysm without mention of rupture I71.2    Anemia D64.9    Personal history of colonic polyps Z86.010    Advanced care planning/counseling discussion Z71.89    Ventricular tachycardia (HCC) I47.2    Elevated serum creatinine R79.89     Past Medical History:   Diagnosis Date    Acute cystitis 05/23/2018    Anemia, unspecified     Anxiety     Arrhythmia     a fib    Arthritis     osteoarthritis, rheumatoid    Atrial fibrillation (HCC)     Dr. Arlene Severance and Dr. Kami Melissa  following    Autoimmune disease [facility])     sjogren disease Dr. Quyen Evans CAD (coronary artery disease)     HTN (hypertension)     Hyperlipidemia LDL goal < 100     Hypothyroid     Insomnia     Osteoarthritis     Sjogren's disease ([facility] [address]     Thoracic aneurysm without mention of rupture       Past Surgical History:   Procedure Laterality Date    HX BIV-IMPLANTABLE CARDIOVERTER DEFIBRILLATOR      HX CHOLECYSTECTOMY      HX COLONOSCOPY  3/2014    Dr. Cristina Barrera    HX OTHER SURGICAL      hernia repairs    HX PARTIAL THYROIDECTOMY      HX JANNET AND BSO      HX VEIN STRIPPING      NV INSJ ELTRD CAR COLLIN SYS TM INSJ DFB/PM PLS GEN Left 2/22/2019    Lv Lead Placement performed by Hola Mendoza MD at [facility] CATH LAB    NV INSJ/RPLCMT PERM DFB W/TRNSVNS LDS 1/DUAL [facility] Left 2/22/2019    upgrade PM to ICD-Bsx performed by Hola Mendoza MD at [facility] LAB     Current Outpatient Medications   Medication Sig Dispense Refill    ferrous sulfate 325 mg (65 mg iron) tablet Take 1 Tab by mouth Daily (before breakfast). Caution may cause constipation. Please take colace if needed. Increase to bid if tolerated. 30 Tab 0    triamcinolone acetonide (KENALOG) 0.1 % topical cream Apply  to affected area two (2) times a day. use thin layer x 7 days. Do not use if sx getting worse 15 g 0    pneumococcal 23-philipp ps vaccine (PNEUMOVAX 23) 25 mcg/0.5 mL injection 0.5 mL by IntraMUSCular route once for 1 dose. 0.5 mL 0    ipratropium (ATROVENT) 42 mcg (0.06 %) nasal spray 1 [place] by Both Nostrils route three (3) times daily. 15 mL 2    loperamide (Imodium A-D) 2 mg tablet Take 1-2 tabs following first loose stool and then 1 tab after each stool. Do not exceed 8 tabs/day.  30 Tab 1    levothyroxine (SYNTHROID) 100 mcg tablet TAKE ONE TABLET BY MOUTH DAILY BEFORE BREAKFAST 90 Tab 0    montelukast (SINGULAIR) 10 mg tablet TAKE ONE TABLET BY MOUTH DAILY 90 Tab 0    PARoxetine (PAXIL) 10 mg tablet TAKE ONE AND ONE-HALF TABLET BY MOUTH DAILY 135 Tab 0    fosinopriL (MONOPRIL) 10 mg tablet TAKE THREE TABLETS BY MOUTH EVERY MORNING AND TAKE TWO TABLETS BY MOUTH EVERY EVENING 270 Tab 0    bismuth subsalicylate (PEPTO-BISMOL PO) Take  by mouth as needed.  Biotin 2,500 mcg cap Take  by mouth.  meloxicam (MOBIC) 7.5 mg tablet Take  by mouth daily.  estradiol (ESTRACE) 0.5 mg tablet TAKE ONE TABLET BY MOUTH DAILY **PLEASE WEAN QUICKLY OR STOP THIS MEDICATION DUE TO CARDIOVASCULAR RISKS 10 Tab 0    amiodarone (CORDARONE) 200 mg tablet Take 100 mg by mouth daily.  ALPRAZolam (XANAX) 1 mg tablet Take 1 mg by mouth as needed.  atenolol (TENORMIN) 25 mg tablet Take 1 Tab by mouth daily. 90 Tab 0    eszopiclone (LUNESTA) 2 mg tablet Take 2 mg by mouth nightly.  guaiFENesin ER (MUCINEX) 600 mg ER tablet Take 600 mg by mouth two (2) times daily as needed for Congestion.  apixaban (ELIQUIS) 5 mg tablet Take 5 mg by mouth two (2) times a day.  fluticasone (FLONASE) 50 mcg/actuation nasal spray 2 Sprays by Both Nostrils route daily as needed.  vitamin b comp & c no.4 (SUPER B COMPLEX + C) 150 mg tab Take 1 Tab by mouth daily.  cholecalciferol, vitamin D3, (VITAMIN D3) 2,000 unit tab Take 2,000 Units by mouth daily.  MULTIVITAMIN (MULTIPLE VITAMIN PO) Take 1 Tab by mouth daily.        Allergies   Allergen Reactions    Latex Hives     NOT ALLERGIC PER PT 02/01/2018    Pcn [Penicillins] Anaphylaxis    Sulfa (Sulfonamide Antibiotics) Anaphylaxis    Biaxin [Clarithromycin] Nausea Only    Biotin Unknown (comments)    Pcn [Penicillins] Hives    Sulfa (Sulfonamide Antibiotics) Nausea Only       Family History   Problem Relation Age of Onset    Heart Disease Father         aneurysm    Cancer Maternal Grandfather      Social History     Tobacco Use    Smoking status: Never Smoker    Smokeless tobacco: Never Used   Substance Use Topics    Alcohol use: Yes     Comment: wine       Depression Risk Factor Screening:     3 most recent PHQ Screens 6/24/2020   PHQ Not Done -   Little interest or pleasure in doing things Not at all   Feeling down, depressed, irritable, or hopeless Not at all   Total Score PHQ 2 0       Alcohol Risk Factor Screening:   Do you average 1 drink per night or more than 7 drinks a week:  No    On any one occasion in the past three months have you have had more than 3 drinks containing alcohol:  No      Functional Ability and Level of Safety:   Hearing: Hearing is good. Activities of Daily Living: The home contains: no safety equipment. Patient does total self care     Ambulation: with no difficulty     Fall Risk:  Fall Risk Assessment, last 12 mths 12/30/2019   Able to walk? Yes   Fall in past 12 months? No   Fall with injury? -   Number of falls in past 12 months -   Fall Risk Score -     Abuse Screen:  Patient is not abused       Cognitive Screening   Has your family/caregiver stated any concerns about your memory: no    Cognitive Screening: Normal - Verbal Fluency Test    Patient Care Team   Patient Care Team:  Rubin Mota MD as PCP - General (Internal Medicine)  Rubin Mota MD as PCP - [facility] Empaneled Provider  Rubin Mota MD (Internal Medicine)  Stefanie Escobar RN as Ambulatory Care Manager    Assessment/Plan   Education and counseling provided:  Are appropriate based on today's review and evaluation  Pneumococcal Vaccine ordered patient is due for her Pneumovax  Influenza Vaccine patient gets every year in the fall  Screening Mammography patient adamantly does not want a mammogram.  Discussed we are looking for breast cancer she does not want to have treated if found to have breast cancer   colorectal cancer screening tests patient adamantly does not want a colonoscopy. Discussed with her that we are looking for colon cancer. She would not want to have treated if she was diagnosed with colon cancer. Bone mass measurement (DEXA)  Screening for glaucoma        3.  Medicare annual wellness visit, subsequent  -     pneumococcal 23-philipp ps vaccine (PNEUMOVAX 23) 25 mcg/0.5 mL injection; 0.5 mL by IntraMUSCular route once for 1 dose.  -     REFERRAL TO OPHTHALMOLOGY    4. Screening for alcoholism  Patient does not have alcohol issues  5. Screening for depression  Patient is not depressed  -     DEPRESSION SCREEN ANNUAL    6. Postmenopausal state  Patient will have her bone density and follow-up with Dr. Lila Bucio who is a rheumatologist.    -     DEXA BONE DENSITY STUDY AXIAL; Future        Health Maintenance Due   Topic Date Due    Shingrix Vaccine Age 49> (1 of 2) 04/22/1978    Pneumococcal 65+ years (1 of 1 - PPSV23) 04/22/1993    Medicare Yearly Exam  03/22/2018    GLAUCOMA SCREENING Q2Y  04/29/2018       Danie Youngblood is a 80 y.o. female who was evaluated by an audio-video encounter for concerns as above. Patient identification was verified prior to start of the visit. A caregiver was present when appropriate. Due to this being a TeleHealth encounter (During WKKHQ-52 public health emergency), evaluation of the following organ systems was limited: Vitals/Constitutional/EENT/Resp/CV/GI//MS/Neuro/Skin/Heme-Lymph-Imm. Pursuant to the emergency declaration under the [facility] waiver authority and the [employer] and [employer] Act, this Virtual Visit was conducted, with patient's (and/or legal guardian's) consent, to reduce the patient's risk of exposure to COVID-19 and provide necessary medical care. Services were provided through a synchronous discussion virtually to substitute for in-person clinic visit. I was in the office. The patient was at home. Basilia Angel MD       Aside from patient's Medicare wellness visit I spent an additional 18 minutes with this patient greater than 50% of the time was spent in management and counseling with regards to her room new rash and new findings on her labs. Please see above.

## 2020-07-14 ENCOUNTER — VIRTUAL VISIT (OUTPATIENT)
Dept: INTERNAL MEDICINE CLINIC | Age: 85
End: 2020-07-14

## 2020-07-14 DIAGNOSIS — E61.1 IRON DEFICIENCY: ICD-10-CM

## 2020-07-14 RX ORDER — LORATADINE 10 MG/1
10 TABLET ORAL
Status: ON HOLD | COMMUNITY
End: 2020-10-21

## 2020-07-14 RX ORDER — LANOLIN ALCOHOL/MO/W.PET/CERES
325 CREAM (GRAM) TOPICAL
Qty: 60 TAB | Refills: 0 | Status: ON HOLD | OUTPATIENT
Start: 2020-07-14 | End: 2020-10-21

## 2020-07-14 NOTE — PROGRESS NOTES
Chief Complaint   Patient presents with    Shingles    Anemia         I was in the office while conducting this encounter. Consent:  She and/or her healthcare decision maker is aware that this patient-initiated Telehealth encounter is a billable service, with coverage as determined by her insurance carrier. She is aware that she may receive a bill and has provided verbal consent to proceed: Yes    This virtual visit was conducted telephone encounter only. -  I affirm this is a Patient Initiated Episode with an Established Patient who has not had a related appointment within my department in the past 7 days or scheduled within the next 24 hours. Note: this encounter is not billable if this call serves to triage the patient into an appointment for the relevant concern. Total Time: minutes: 11-20 minutes. I spent 12 minutes on the telephone with this patient and greater than 50% of the time was spent in management and counseling with regards to her shingles and avoidance of gabapentin given improvement with rash, use of iron and discussion with regards to colonoscopy. Her blood pressure is stable. Shingles   Patient reports her shingles pain has improved as of today. She was seen by [facility] a week ago and was diagnosed with shingles. She was given Valtrex and gabapentin. Anemia  Patient reports that she is out of her iron. She denies blood in her stool. She has fatigue but attributes this to her age. She has no abdominal pain. We discussed having a colonoscopy given her iron stores being low. We discussed colonoscopy. She did not want to do a colonoscopy due to her age and risk with dehydration and cardiovascular issues with the prep. She will continue to do her iron supplementation for another month and will recheck her labs. Subjective:   Dorita Sears is a 80 y.o. female with hypertension.     Hypertension ROS: taking medications as instructed, no medication side effects noted, no TIA's, no chest pain on exertion, no dyspnea on exertion, no swelling of ankles. New concerns: None.    129/80

## 2020-07-19 RX ORDER — PAROXETINE 10 MG/1
TABLET, FILM COATED ORAL
Qty: 135 TAB | Refills: 0 | Status: SHIPPED | OUTPATIENT
Start: 2020-07-19 | End: 2020-10-18

## 2020-07-28 DIAGNOSIS — E03.9 ACQUIRED HYPOTHYROIDISM: ICD-10-CM

## 2020-07-28 DIAGNOSIS — I10 ESSENTIAL HYPERTENSION: ICD-10-CM

## 2020-07-28 RX ORDER — LEVOTHYROXINE SODIUM 100 UG/1
TABLET ORAL
Qty: 90 TAB | Refills: 0 | Status: SHIPPED | OUTPATIENT
Start: 2020-07-28 | End: 2020-11-19

## 2020-07-28 RX ORDER — FOSINOPIRL SODIUM 10 MG/1
TABLET ORAL
Qty: 270 TAB | Refills: 0 | Status: SHIPPED | OUTPATIENT
Start: 2020-07-28 | End: 2020-09-20

## 2020-09-10 ENCOUNTER — OFFICE VISIT (OUTPATIENT)
Dept: INTERNAL MEDICINE CLINIC | Age: 85
End: 2020-09-10
Payer: MEDICARE

## 2020-09-10 VITALS
RESPIRATION RATE: 12 BRPM | TEMPERATURE: 98.3 F | HEART RATE: 59 BPM | DIASTOLIC BLOOD PRESSURE: 69 MMHG | OXYGEN SATURATION: 99 % | BODY MASS INDEX: 21.66 KG/M2 | SYSTOLIC BLOOD PRESSURE: 145 MMHG | HEIGHT: 67 IN | WEIGHT: 138 LBS

## 2020-09-10 DIAGNOSIS — R22.1 NODULE OF SKIN OF NECK: ICD-10-CM

## 2020-09-10 DIAGNOSIS — I10 ESSENTIAL HYPERTENSION: Primary | ICD-10-CM

## 2020-09-10 DIAGNOSIS — D50.9 IRON DEFICIENCY ANEMIA, UNSPECIFIED IRON DEFICIENCY ANEMIA TYPE: ICD-10-CM

## 2020-09-10 DIAGNOSIS — I48.91 ATRIAL FIBRILLATION, UNSPECIFIED TYPE (HCC): ICD-10-CM

## 2020-09-10 DIAGNOSIS — E53.8 B12 DEFICIENCY DUE TO DIET: ICD-10-CM

## 2020-09-10 LAB
ANION GAP SERPL CALC-SCNC: 7 MMOL/L (ref 5–15)
APPEARANCE UR: CLEAR
BACTERIA URNS QL MICRO: NEGATIVE /HPF
BILIRUB UR QL: NEGATIVE
BUN SERPL-MCNC: 18 MG/DL (ref 6–20)
BUN/CREAT SERPL: 18 (ref 12–20)
CALCIUM SERPL-MCNC: 9.1 MG/DL (ref 8.5–10.1)
CHLORIDE SERPL-SCNC: 102 MMOL/L (ref 97–108)
CO2 SERPL-SCNC: 25 MMOL/L (ref 21–32)
COLOR UR: ABNORMAL
CREAT SERPL-MCNC: 1.01 MG/DL (ref 0.55–1.02)
EPITH CASTS URNS QL MICRO: ABNORMAL /LPF
ERYTHROCYTE [DISTWIDTH] IN BLOOD BY AUTOMATED COUNT: 14.4 % (ref 11.5–14.5)
GLUCOSE SERPL-MCNC: 95 MG/DL (ref 65–100)
GLUCOSE UR STRIP.AUTO-MCNC: NEGATIVE MG/DL
HCT VFR BLD AUTO: 37.5 % (ref 35–47)
HGB BLD-MCNC: 11.7 G/DL (ref 11.5–16)
HGB UR QL STRIP: NEGATIVE
KETONES UR QL STRIP.AUTO: NEGATIVE MG/DL
LEUKOCYTE ESTERASE UR QL STRIP.AUTO: ABNORMAL
MCH RBC QN AUTO: 30.9 PG (ref 26–34)
MCHC RBC AUTO-ENTMCNC: 31.2 G/DL (ref 30–36.5)
MCV RBC AUTO: 98.9 FL (ref 80–99)
NITRITE UR QL STRIP.AUTO: NEGATIVE
NRBC # BLD: 0 K/UL (ref 0–0.01)
NRBC BLD-RTO: 0 PER 100 WBC
OTHER,OTHU: ABNORMAL
PH UR STRIP: 5.5 [PH] (ref 5–8)
PLATELET # BLD AUTO: 140 K/UL (ref 150–400)
PMV BLD AUTO: 11.1 FL (ref 8.9–12.9)
POTASSIUM SERPL-SCNC: 4.8 MMOL/L (ref 3.5–5.1)
PROT UR STRIP-MCNC: 30 MG/DL
RBC # BLD AUTO: 3.79 M/UL (ref 3.8–5.2)
RBC #/AREA URNS HPF: ABNORMAL /HPF (ref 0–5)
SODIUM SERPL-SCNC: 134 MMOL/L (ref 136–145)
SP GR UR REFRACTOMETRY: 1.01 (ref 1–1.03)
UROBILINOGEN UR QL STRIP.AUTO: 0.2 EU/DL (ref 0.2–1)
WBC # BLD AUTO: 3 K/UL (ref 3.6–11)
WBC URNS QL MICRO: ABNORMAL /HPF (ref 0–4)

## 2020-09-10 PROCEDURE — 99214 OFFICE O/P EST MOD 30 MIN: CPT | Performed by: INTERNAL MEDICINE

## 2020-09-10 PROCEDURE — 1101F PT FALLS ASSESS-DOCD LE1/YR: CPT | Performed by: INTERNAL MEDICINE

## 2020-09-10 PROCEDURE — 1090F PRES/ABSN URINE INCON ASSESS: CPT | Performed by: INTERNAL MEDICINE

## 2020-09-10 PROCEDURE — G8427 DOCREV CUR MEDS BY ELIG CLIN: HCPCS | Performed by: INTERNAL MEDICINE

## 2020-09-10 PROCEDURE — G8536 NO DOC ELDER MAL SCRN: HCPCS | Performed by: INTERNAL MEDICINE

## 2020-09-10 PROCEDURE — G0463 HOSPITAL OUTPT CLINIC VISIT: HCPCS | Performed by: INTERNAL MEDICINE

## 2020-09-10 PROCEDURE — G8432 DEP SCR NOT DOC, RNG: HCPCS | Performed by: INTERNAL MEDICINE

## 2020-09-10 PROCEDURE — G8420 CALC BMI NORM PARAMETERS: HCPCS | Performed by: INTERNAL MEDICINE

## 2020-09-10 NOTE — PROGRESS NOTES
Diagnoses and all orders for this visit:    1. Essential hypertension  On recheck 140/72  Continue blood pressure medication  -     URINALYSIS W/ RFLX MICROSCOPIC; Future  -     METABOLIC PANEL, BASIC; Future    2. Iron deficiency anemia, unspecified iron deficiency anemia type  Patient is still taking iron  We will assess to see if she needs to continue.  -     CBC W/O DIFF; Future  -     FERRITIN; Future  -     IRON PROFILE; Future    3. B12 deficiency due to diet  Patient does not eat very much red meat. Will check for B12 deficiency  -     VITAMIN B12 & FOLATE; Future    4. Nodule of skin of neck  Palpated left neck and no lymphadenopathy but more consistent with a skin nodule  I discussed with patient that if she feels that this is getting bigger over time that we will do a CT of her neck given her age. I did not appreciate any cervical adenopathy    5. Atrial fibrillation, unspecified type (HCC)  Chronic  Continue Eliquis no history of falls or bleeding   Continue meds  Follow-up with cardiology       Chief Complaint   Patient presents with    Mass     neck      Left neck mass  Patient reports that she was feeling the area of her shingles and also felt a small bump at her left neck. She reports it is not painful. It is mobile and not fixed. She denies fevers night sweats or chills. Atrial fibrillation  Patient is being followed by cardiology. She continues on Eliquis. She denies bleeding or falls. Subjective:   Hannah Louis is a 80 y.o. female with hypertension. Hypertension ROS: taking medications as instructed, no medication side effects noted, no TIA's, no chest pain on exertion, no dyspnea on exertion, no swelling of ankles. New concerns: None. Iron deficiency anemia  She notes she is still taking her iron tablets. She is not having significant constipation. She does not eat very much red meat. She does have some mild fatigue but is overall stable.           Past Medical History: Diagnosis Date    Acute cystitis 05/23/2018    Anemia, unspecified     Anxiety     Arrhythmia     a fib    Arthritis     osteoarthritis, rheumatoid    Atrial fibrillation (HCC)     Dr. Joanna Goddard and Dr. Bryn Bah  following    Autoimmune disease [facility])     sjogren disease Dr. Adarsh Cespedes    CAD (coronary artery disease)     HTN (hypertension)     Hyperlipidemia LDL goal < 100     Hypothyroid     Insomnia     Osteoarthritis     Sjogren's disease ([address]     Thoracic aneurysm without mention of rupture      Past Surgical History:   Procedure Laterality Date    HX BIV-IMPLANTABLE CARDIOVERTER DEFIBRILLATOR      HX CHOLECYSTECTOMY      HX COLONOSCOPY  3/2014    Dr. Cassi Reece      hernia repairs    HX PARTIAL THYROIDECTOMY      HX JANNET AND BSO      HX VEIN STRIPPING      DC INSJ ELTRD CAR COLLIN SYS TM INSJ DFB/PM PLS GEN Left 2/22/2019    Lv Lead Placement performed by Erick Wilson MD at [facility] CATH LAB    DC INSJ/RPLCMT PERM DFB W/TRNSVNS LDS 1/DUAL [facility] Left 2/22/2019    upgrade PM to ICD-Bsx performed by Erick Wilson MD at [facility] CATH LAB     Social History     Socioeconomic History    Marital status:      Spouse name: Not on file    Number of children: Not on file    Years of education: Not on file    Highest education level: Not on file   Tobacco Use    Smoking status: Never Smoker    Smokeless tobacco: Never Used   Substance and Sexual Activity    Alcohol use: Yes     Comment: wine    Drug use: Never    Sexual activity: Not Currently   Social History Narrative    ** Merged History Encounter **          Family History   Problem Relation Age of Onset    Heart Disease Father         aneurysm    Cancer Maternal Grandfather      Current Outpatient Medications   Medication Sig Dispense Refill    montelukast (SINGULAIR) 10 mg tablet Take 1 Tab by mouth daily as needed (allergies).  90 Tab 0    levothyroxine (SYNTHROID) 100 mcg tablet TAKE ONE TABLET BY MOUTH DAILY BEFORE BREAKFAST 90 Tab 0    fosinopriL (MONOPRIL) 10 mg tablet TAKE THREE TABLETS BY MOUTH EVERY MORNING THEN TAKE TWO TABLETS BY MOUTH EVERY EVENING 270 Tab 0    PARoxetine (PAXIL) 10 mg tablet TAKE ONE AND ONE-HALF TABLET BY MOUTH DAILY 135 Tab 0    loratadine (Claritin) 10 mg tablet Take 10 mg by mouth.  ferrous sulfate 325 mg (65 mg iron) tablet Take 1 Tab by mouth Daily (before breakfast). Caution may cause constipation. Please take colace if needed. 60 Tab 0    triamcinolone acetonide (KENALOG) 0.1 % topical cream Apply  to affected area two (2) times a day. use thin layer x 7 days. Do not use if sx getting worse 15 g 0    ipratropium (ATROVENT) 42 mcg (0.06 %) nasal spray 1 [place] by Both Nostrils route three (3) times daily. 15 mL 2    loperamide (Imodium A-D) 2 mg tablet Take 1-2 tabs following first loose stool and then 1 tab after each stool. Do not exceed 8 tabs/day. 30 Tab 1    bismuth subsalicylate (PEPTO-BISMOL PO) Take  by mouth as needed.  Biotin 2,500 mcg cap Take  by mouth.  meloxicam (MOBIC) 7.5 mg tablet Take  by mouth daily.  estradiol (ESTRACE) 0.5 mg tablet TAKE ONE TABLET BY MOUTH DAILY **PLEASE WEAN QUICKLY OR STOP THIS MEDICATION DUE TO CARDIOVASCULAR RISKS (Patient taking differently: Indications: not taking) 10 Tab 0    amiodarone (CORDARONE) 200 mg tablet Take 100 mg by mouth daily.  ALPRAZolam (XANAX) 1 mg tablet Take 1 mg by mouth as needed.  atenolol (TENORMIN) 25 mg tablet Take 1 Tab by mouth daily. 90 Tab 0    eszopiclone (LUNESTA) 2 mg tablet Take 2 mg by mouth nightly.  guaiFENesin ER (MUCINEX) 600 mg ER tablet Take 600 mg by mouth two (2) times daily as needed for Congestion.  apixaban (ELIQUIS) 5 mg tablet Take 5 mg by mouth two (2) times a day.  fluticasone (FLONASE) 50 mcg/actuation nasal spray 2 Sprays by Both Nostrils route daily as needed.       vitamin b comp & c no.4 (SUPER B COMPLEX + C) 150 mg tab Take 1 Tab by mouth daily.  cholecalciferol, vitamin D3, (VITAMIN D3) 2,000 unit tab Take 2,000 Units by mouth daily.  MULTIVITAMIN (MULTIPLE VITAMIN PO) Take 1 Tab by mouth daily. Allergies   Allergen Reactions    Latex Hives     NOT ALLERGIC PER PT 02/01/2018    Pcn [Penicillins] Anaphylaxis    Sulfa (Sulfonamide Antibiotics) Anaphylaxis    Biaxin [Clarithromycin] Nausea Only    Biotin Unknown (comments)    Pcn [Penicillins] Hives    Sulfa (Sulfonamide Antibiotics) Nausea Only       Review of Systems - General ROS: negative for - chills or fever  Cardiovascular ROS: no chest pain or dyspnea on exertion  Respiratory ROS: no cough, shortness of breath, or wheezing    Visit Vitals  /69 (BP 1 Location: Left arm, BP Patient Position: Sitting)   Pulse (!) 59   Temp 98.3 °F (36.8 °C) (Oral)   Resp 12   Ht 5' 7\" (1.702 m)   Wt 138 lb (62.6 kg)   SpO2 99%   BMI 21.61 kg/m²     General Appearance:  Well developed, well nourished,alert and oriented x 3, and individual in no acute distress. Ears/Nose/Mouth/Throat:   Hearing grossly normal.         Neck: Supple, no lad, no bruits   Chest:   Lungs clear to auscultation bilaterally. Cardiovascular:  Regular rate and rhythm, S1, S2 normal, no murmur. Abdomen:   Soft, non-tender, bowel sounds are active. Extremities: No edema bilaterally.     Skin: Warm and dry, no suspicious lesions

## 2020-09-11 LAB
FERRITIN SERPL-MCNC: 28 NG/ML (ref 8–252)
FOLATE SERPL-MCNC: 13.8 NG/ML (ref 5–21)
IRON SATN MFR SERPL: 32 % (ref 20–50)
IRON SERPL-MCNC: 126 UG/DL (ref 35–150)
TIBC SERPL-MCNC: 392 UG/DL (ref 250–450)
VIT B12 SERPL-MCNC: >2000 PG/ML (ref 193–986)

## 2020-09-20 DIAGNOSIS — I10 ESSENTIAL HYPERTENSION: ICD-10-CM

## 2020-09-20 RX ORDER — FOSINOPIRL SODIUM 10 MG/1
TABLET ORAL
Qty: 270 TAB | Refills: 0 | Status: ON HOLD | OUTPATIENT
Start: 2020-09-20 | End: 2020-10-21

## 2020-09-29 ENCOUNTER — TELEPHONE (OUTPATIENT)
Dept: INTERNAL MEDICINE CLINIC | Age: 85
End: 2020-09-29

## 2020-09-29 NOTE — TELEPHONE ENCOUNTER
----- Message from Lulu Collet sent at 9/29/2020 11:15 AM EDT -----  Regarding: Dr. Lavell Pacheco Telephone  Caller's first and last name: Mp Lujan   Reason for call: Requested a call back   Callback required yes/no and why: Yes   Best contact number(s): [phone]  Details to clarify the request: Pt stated she would like Dr. Toni Bryan nurse to mail her results from her labs she had done she would like a call back to speak with Nurse Terrie Lefort regarding this matter.

## 2020-10-18 RX ORDER — PAROXETINE 10 MG/1
TABLET, FILM COATED ORAL
Qty: 135 TAB | Refills: 0 | Status: SHIPPED | OUTPATIENT
Start: 2020-10-18 | End: 2021-01-14

## 2020-10-20 ENCOUNTER — TELEPHONE (OUTPATIENT)
Dept: INTERNAL MEDICINE CLINIC | Age: 85
End: 2020-10-20

## 2020-10-20 NOTE — TELEPHONE ENCOUNTER
----- Message from [place] sent at 10/20/2020 11:24 AM EDT -----  Regarding: Dr. Enrique De La Cruz  General Message/Vendor Calls    Caller's first and last name: Alda Zamora      Reason for call: Pt has had diarrhea for 5 days. Would like to see if provider could call in rx.        Callback required yes/no and why: yes      Best contact number(s): [phone]      Details to clarify the request: [facility]

## 2020-10-21 ENCOUNTER — APPOINTMENT (OUTPATIENT)
Dept: GENERAL RADIOLOGY | Age: 85
DRG: 178 | End: 2020-10-21
Attending: STUDENT IN AN ORGANIZED HEALTH CARE EDUCATION/TRAINING PROGRAM
Payer: MEDICARE

## 2020-10-21 ENCOUNTER — HOSPITAL ENCOUNTER (INPATIENT)
Age: 85
LOS: 3 days | Discharge: HOME HEALTH | DRG: 178 | End: 2020-10-26
Attending: STUDENT IN AN ORGANIZED HEALTH CARE EDUCATION/TRAINING PROGRAM | Admitting: INTERNAL MEDICINE
Payer: MEDICARE

## 2020-10-21 DIAGNOSIS — R77.8 ELEVATED TROPONIN: ICD-10-CM

## 2020-10-21 DIAGNOSIS — D69.6 THROMBOCYTOPENIA (HCC): ICD-10-CM

## 2020-10-21 DIAGNOSIS — D72.819 LEUKOPENIA, UNSPECIFIED TYPE: ICD-10-CM

## 2020-10-21 DIAGNOSIS — R06.02 SOB (SHORTNESS OF BREATH): ICD-10-CM

## 2020-10-21 DIAGNOSIS — E87.1 HYPONATREMIA: ICD-10-CM

## 2020-10-21 DIAGNOSIS — Z20.822 SUSPECTED COVID-19 VIRUS INFECTION: Primary | ICD-10-CM

## 2020-10-21 PROBLEM — R53.1 WEAKNESS: Status: ACTIVE | Noted: 2020-10-21

## 2020-10-21 PROBLEM — R05.9 COUGH: Status: ACTIVE | Noted: 2020-10-21

## 2020-10-21 LAB
ALBUMIN SERPL-MCNC: 2.9 G/DL (ref 3.5–5)
ALBUMIN/GLOB SERPL: 0.9 {RATIO} (ref 1.1–2.2)
ALP SERPL-CCNC: 79 U/L (ref 45–117)
ALT SERPL-CCNC: 18 U/L (ref 12–78)
ANION GAP SERPL CALC-SCNC: 4 MMOL/L (ref 5–15)
AST SERPL-CCNC: 23 U/L (ref 15–37)
B PERT DNA SPEC QL NAA+PROBE: NOT DETECTED
BASOPHILS # BLD: 0 K/UL (ref 0–0.1)
BASOPHILS NFR BLD: 0 % (ref 0–1)
BILIRUB SERPL-MCNC: 1 MG/DL (ref 0.2–1)
BNP SERPL-MCNC: 4611 PG/ML
BORDETELLA PARAPERTUSSIS PCR, BORPAR: NOT DETECTED
BUN SERPL-MCNC: 11 MG/DL (ref 6–20)
BUN/CREAT SERPL: 14 (ref 12–20)
C PNEUM DNA SPEC QL NAA+PROBE: NOT DETECTED
CALCIUM SERPL-MCNC: 7.6 MG/DL (ref 8.5–10.1)
CHLORIDE SERPL-SCNC: 97 MMOL/L (ref 97–108)
CO2 SERPL-SCNC: 28 MMOL/L (ref 21–32)
COMMENT, HOLDF: NORMAL
CREAT SERPL-MCNC: 0.79 MG/DL (ref 0.55–1.02)
DIFFERENTIAL METHOD BLD: ABNORMAL
EOSINOPHIL # BLD: 0 K/UL (ref 0–0.4)
EOSINOPHIL NFR BLD: 0 % (ref 0–7)
ERYTHROCYTE [DISTWIDTH] IN BLOOD BY AUTOMATED COUNT: 13.2 % (ref 11.5–14.5)
FLUAV H1 2009 PAND RNA SPEC QL NAA+PROBE: NOT DETECTED
FLUAV H1 RNA SPEC QL NAA+PROBE: NOT DETECTED
FLUAV H3 RNA SPEC QL NAA+PROBE: NOT DETECTED
FLUAV SUBTYP SPEC NAA+PROBE: NOT DETECTED
FLUBV RNA SPEC QL NAA+PROBE: NOT DETECTED
GLOBULIN SER CALC-MCNC: 3.3 G/DL (ref 2–4)
GLUCOSE SERPL-MCNC: 108 MG/DL (ref 65–100)
HADV DNA SPEC QL NAA+PROBE: NOT DETECTED
HCOV 229E RNA SPEC QL NAA+PROBE: NOT DETECTED
HCOV HKU1 RNA SPEC QL NAA+PROBE: NOT DETECTED
HCOV NL63 RNA SPEC QL NAA+PROBE: NOT DETECTED
HCOV OC43 RNA SPEC QL NAA+PROBE: NOT DETECTED
HCT VFR BLD AUTO: 35.3 % (ref 35–47)
HGB BLD-MCNC: 11.9 G/DL (ref 11.5–16)
HMPV RNA SPEC QL NAA+PROBE: NOT DETECTED
HPIV1 RNA SPEC QL NAA+PROBE: NOT DETECTED
HPIV2 RNA SPEC QL NAA+PROBE: NOT DETECTED
HPIV3 RNA SPEC QL NAA+PROBE: NOT DETECTED
HPIV4 RNA SPEC QL NAA+PROBE: NOT DETECTED
IMM GRANULOCYTES # BLD AUTO: 0 K/UL (ref 0–0.04)
IMM GRANULOCYTES NFR BLD AUTO: 0 % (ref 0–0.5)
INR PPP: 1.1 (ref 0.9–1.1)
LACTATE BLD-SCNC: 0.79 MMOL/L (ref 0.4–2)
LYMPHOCYTES # BLD: 0.5 K/UL (ref 0.8–3.5)
LYMPHOCYTES NFR BLD: 19 % (ref 12–49)
M PNEUMO DNA SPEC QL NAA+PROBE: NOT DETECTED
MAGNESIUM SERPL-MCNC: 1.7 MG/DL (ref 1.6–2.4)
MCH RBC QN AUTO: 31.6 PG (ref 26–34)
MCHC RBC AUTO-ENTMCNC: 33.7 G/DL (ref 30–36.5)
MCV RBC AUTO: 93.6 FL (ref 80–99)
MONOCYTES # BLD: 0.3 K/UL (ref 0–1)
MONOCYTES NFR BLD: 12 % (ref 5–13)
NEUTS SEG # BLD: 1.6 K/UL (ref 1.8–8)
NEUTS SEG NFR BLD: 69 % (ref 32–75)
NRBC # BLD: 0 K/UL (ref 0–0.01)
NRBC BLD-RTO: 0 PER 100 WBC
PLATELET # BLD AUTO: 128 K/UL (ref 150–400)
PMV BLD AUTO: 10 FL (ref 8.9–12.9)
POTASSIUM SERPL-SCNC: 3.9 MMOL/L (ref 3.5–5.1)
PROT SERPL-MCNC: 6.2 G/DL (ref 6.4–8.2)
PROTHROMBIN TIME: 11.1 SEC (ref 9–11.1)
RBC # BLD AUTO: 3.77 M/UL (ref 3.8–5.2)
RBC MORPH BLD: ABNORMAL
RSV RNA SPEC QL NAA+PROBE: NOT DETECTED
RV+EV RNA SPEC QL NAA+PROBE: NOT DETECTED
SAMPLES BEING HELD,HOLD: NORMAL
SODIUM SERPL-SCNC: 129 MMOL/L (ref 136–145)
TROPONIN I SERPL-MCNC: 0.06 NG/ML
TSH SERPL DL<=0.05 MIU/L-ACNC: 1.64 UIU/ML (ref 0.36–3.74)
WBC # BLD AUTO: 2.4 K/UL (ref 3.6–11)

## 2020-10-21 PROCEDURE — 83880 ASSAY OF NATRIURETIC PEPTIDE: CPT

## 2020-10-21 PROCEDURE — 80053 COMPREHEN METABOLIC PANEL: CPT

## 2020-10-21 PROCEDURE — 83735 ASSAY OF MAGNESIUM: CPT

## 2020-10-21 PROCEDURE — 84484 ASSAY OF TROPONIN QUANT: CPT

## 2020-10-21 PROCEDURE — 87635 SARS-COV-2 COVID-19 AMP PRB: CPT

## 2020-10-21 PROCEDURE — 71045 X-RAY EXAM CHEST 1 VIEW: CPT

## 2020-10-21 PROCEDURE — 74011250636 HC RX REV CODE- 250/636: Performed by: INTERNAL MEDICINE

## 2020-10-21 PROCEDURE — 99285 EMERGENCY DEPT VISIT HI MDM: CPT

## 2020-10-21 PROCEDURE — 93005 ELECTROCARDIOGRAM TRACING: CPT

## 2020-10-21 PROCEDURE — 36415 COLL VENOUS BLD VENIPUNCTURE: CPT

## 2020-10-21 PROCEDURE — 77030038269 HC DRN EXT URIN PURWCK BARD -A

## 2020-10-21 PROCEDURE — 0100U RESPIRATORY PANEL,PCR,NASOPHARYNGEAL: CPT

## 2020-10-21 PROCEDURE — 74011250637 HC RX REV CODE- 250/637: Performed by: INTERNAL MEDICINE

## 2020-10-21 PROCEDURE — 85610 PROTHROMBIN TIME: CPT

## 2020-10-21 PROCEDURE — 87040 BLOOD CULTURE FOR BACTERIA: CPT

## 2020-10-21 PROCEDURE — 84443 ASSAY THYROID STIM HORMONE: CPT

## 2020-10-21 PROCEDURE — 99218 HC RM OBSERVATION: CPT

## 2020-10-21 PROCEDURE — 83605 ASSAY OF LACTIC ACID: CPT

## 2020-10-21 PROCEDURE — 85025 COMPLETE CBC W/AUTO DIFF WBC: CPT

## 2020-10-21 RX ORDER — PAROXETINE 10 MG/1
15 TABLET, FILM COATED ORAL DAILY
Status: DISCONTINUED | OUTPATIENT
Start: 2020-10-22 | End: 2020-10-21

## 2020-10-21 RX ORDER — SODIUM CHLORIDE 9 MG/ML
100 INJECTION, SOLUTION INTRAVENOUS CONTINUOUS
Status: DISCONTINUED | OUTPATIENT
Start: 2020-10-21 | End: 2020-10-21

## 2020-10-21 RX ORDER — LEVOTHYROXINE SODIUM 50 UG/1
100 TABLET ORAL
Status: DISCONTINUED | OUTPATIENT
Start: 2020-10-22 | End: 2020-10-26 | Stop reason: HOSPADM

## 2020-10-21 RX ORDER — PROMETHAZINE HYDROCHLORIDE 25 MG/1
12.5 TABLET ORAL
Status: DISCONTINUED | OUTPATIENT
Start: 2020-10-21 | End: 2020-10-26 | Stop reason: HOSPADM

## 2020-10-21 RX ORDER — ONDANSETRON 2 MG/ML
4 INJECTION INTRAMUSCULAR; INTRAVENOUS
Status: DISCONTINUED | OUTPATIENT
Start: 2020-10-21 | End: 2020-10-26 | Stop reason: HOSPADM

## 2020-10-21 RX ORDER — SODIUM CHLORIDE 0.9 % (FLUSH) 0.9 %
5-40 SYRINGE (ML) INJECTION AS NEEDED
Status: DISCONTINUED | OUTPATIENT
Start: 2020-10-21 | End: 2020-10-26 | Stop reason: HOSPADM

## 2020-10-21 RX ORDER — ASPIRIN 81 MG/1
81 TABLET ORAL DAILY
COMMUNITY
End: 2021-10-30

## 2020-10-21 RX ORDER — ESZOPICLONE 3 MG/1
3 TABLET, FILM COATED ORAL
COMMUNITY
End: 2021-10-30

## 2020-10-21 RX ORDER — AMIODARONE HYDROCHLORIDE 200 MG/1
100 TABLET ORAL DAILY
Status: DISCONTINUED | OUTPATIENT
Start: 2020-10-22 | End: 2020-10-26 | Stop reason: HOSPADM

## 2020-10-21 RX ORDER — ACETAMINOPHEN 325 MG/1
650 TABLET ORAL
Status: DISCONTINUED | OUTPATIENT
Start: 2020-10-21 | End: 2020-10-26 | Stop reason: HOSPADM

## 2020-10-21 RX ORDER — SODIUM CHLORIDE 9 MG/ML
50 INJECTION, SOLUTION INTRAVENOUS CONTINUOUS
Status: DISCONTINUED | OUTPATIENT
Start: 2020-10-21 | End: 2020-10-26 | Stop reason: HOSPADM

## 2020-10-21 RX ORDER — POLYETHYLENE GLYCOL 3350 17 G/17G
17 POWDER, FOR SOLUTION ORAL DAILY PRN
Status: DISCONTINUED | OUTPATIENT
Start: 2020-10-21 | End: 2020-10-26 | Stop reason: HOSPADM

## 2020-10-21 RX ORDER — FLUTICASONE PROPIONATE 50 MCG
2 SPRAY, SUSPENSION (ML) NASAL
Status: DISCONTINUED | OUTPATIENT
Start: 2020-10-21 | End: 2020-10-26 | Stop reason: HOSPADM

## 2020-10-21 RX ORDER — TRIAMCINOLONE ACETONIDE 1 MG/G
CREAM TOPICAL
COMMUNITY
End: 2021-02-16 | Stop reason: ALTCHOICE

## 2020-10-21 RX ORDER — PAROXETINE 10 MG/5ML
15 SUSPENSION ORAL DAILY
Status: DISCONTINUED | OUTPATIENT
Start: 2020-10-22 | End: 2020-10-26 | Stop reason: HOSPADM

## 2020-10-21 RX ORDER — MONTELUKAST SODIUM 10 MG/1
10 TABLET ORAL
Status: DISCONTINUED | OUTPATIENT
Start: 2020-10-21 | End: 2020-10-26 | Stop reason: HOSPADM

## 2020-10-21 RX ORDER — CETIRIZINE HCL 10 MG
10 TABLET ORAL DAILY
COMMUNITY

## 2020-10-21 RX ORDER — SODIUM CHLORIDE 0.9 % (FLUSH) 0.9 %
5-40 SYRINGE (ML) INJECTION EVERY 8 HOURS
Status: DISCONTINUED | OUTPATIENT
Start: 2020-10-21 | End: 2020-10-26 | Stop reason: HOSPADM

## 2020-10-21 RX ORDER — AMLODIPINE BESYLATE 5 MG/1
2.5 TABLET ORAL DAILY
COMMUNITY
End: 2021-10-04

## 2020-10-21 RX ORDER — ALPRAZOLAM 0.5 MG/1
1 TABLET ORAL
Status: DISCONTINUED | OUTPATIENT
Start: 2020-10-21 | End: 2020-10-26 | Stop reason: HOSPADM

## 2020-10-21 RX ORDER — ESZOPICLONE 2 MG/1
3 TABLET, FILM COATED ORAL
Status: DISCONTINUED | OUTPATIENT
Start: 2020-10-21 | End: 2020-10-26 | Stop reason: HOSPADM

## 2020-10-21 RX ORDER — ACETAMINOPHEN 650 MG/1
650 SUPPOSITORY RECTAL
Status: DISCONTINUED | OUTPATIENT
Start: 2020-10-21 | End: 2020-10-26 | Stop reason: HOSPADM

## 2020-10-21 RX ORDER — LANOLIN ALCOHOL/MO/W.PET/CERES
325 CREAM (GRAM) TOPICAL
Status: DISCONTINUED | OUTPATIENT
Start: 2020-10-22 | End: 2020-10-21

## 2020-10-21 RX ORDER — AMIODARONE HYDROCHLORIDE 100 MG/1
100 TABLET ORAL DAILY
COMMUNITY
End: 2022-04-04

## 2020-10-21 RX ORDER — ASCORBIC ACID 500 MG
500 TABLET ORAL DAILY
COMMUNITY

## 2020-10-21 RX ORDER — FOSINOPIRL SODIUM 10 MG/1
20 TABLET ORAL 2 TIMES DAILY
COMMUNITY
End: 2020-11-14

## 2020-10-21 RX ORDER — ATENOLOL 25 MG/1
25 TABLET ORAL DAILY
Status: DISCONTINUED | OUTPATIENT
Start: 2020-10-22 | End: 2020-10-26 | Stop reason: HOSPADM

## 2020-10-21 RX ADMIN — Medication 10 ML: at 22:00

## 2020-10-21 RX ADMIN — APIXABAN 5 MG: 5 TABLET, FILM COATED ORAL at 22:33

## 2020-10-21 RX ADMIN — SODIUM CHLORIDE 75 ML/HR: 900 INJECTION, SOLUTION INTRAVENOUS at 20:32

## 2020-10-21 RX ADMIN — ESZOPICLONE 3 MG: 2 TABLET, FILM COATED ORAL at 22:33

## 2020-10-21 NOTE — ED PROVIDER NOTES
Chief Complaint   Patient presents with    Shortness of Breath     This is a 55-year-old female with atrial fibrillation on Eliquis with good compliance, Sjogren's disease, hypothyroidism, she has an ICD in place for a history of VT arrest in 2019 with her cardiac catheterization at the time demonstrating nonobstructive coronary disease, presenting EMS for shortness of breath that started yesterday. She lives alone, ambulates independently with a cane or walker as needed, and says that she visited her son 2 weeks ago and he was subsequently found to be COVID positive. Reports fatigue, malaise, generalized weakness, exertional dyspnea. No chest pain. Has had a cough, nasal congestion, minimal appetite. No vomiting but endorses diarrhea. Has not had anything to eat or drink today. No other systemic complaints. No hx underlying pulmonary disease. No hx DVT/PE while on Eliquis. Symptoms are moderate in nature without alleviating or exacerbating factors.       Past Medical History:   Diagnosis Date    Acute cystitis 05/23/2018    Anemia, unspecified     Anxiety     Arrhythmia     a fib    Arthritis     osteoarthritis, rheumatoid    Atrial fibrillation (HCC)     Dr. Mackenzie Cordoba and Dr. Didier Blair  following    Autoimmune disease [facility])     sjogren disease Dr. Jose Ng CAD (coronary artery disease)     HTN (hypertension)     Hyperlipidemia LDL goal < 100     Hypothyroid     Insomnia     Osteoarthritis     Sjogren's disease ([facility] [address]     Thoracic aneurysm without mention of rupture        Past Surgical History:   Procedure Laterality Date    HX BIV-IMPLANTABLE CARDIOVERTER DEFIBRILLATOR      HX CHOLECYSTECTOMY      HX COLONOSCOPY  3/2014    Dr. Lux Munroe    HX OTHER SURGICAL      hernia repairs    HX PARTIAL THYROIDECTOMY      HX JANNET AND BSO      HX VEIN STRIPPING      [place] INSJ ELTRD CAR COLLIN SYS TM INSJ DFB/PM PLS GEN Left 2/22/2019    Lv Lead Placement performed by uSsan Olsen MD at [address] [place] [address] CATH LAB    UT INSJ/RPLCMT PERM DFB W/TRNSVNS LDS 1/DUAL [facility] Left 2/22/2019    upgrade PM to ICD-Bsx performed by Brooklyn Terrell MD at [facility] LAB         Family History:   Problem Relation Age of Onset    Heart Disease Father         aneurysm    Cancer Maternal Grandfather        Social History     Socioeconomic History    Marital status:      Spouse name: Not on file    Number of children: Not on file    Years of education: Not on file    Highest education level: Not on file   Occupational History    Not on file   Social Needs    Financial resource strain: Not on file    Food insecurity     Worry: Not on file     Inability: Not on file    Transportation needs     Medical: Not on file     Non-medical: Not on file   Tobacco Use    Smoking status: Never Smoker    Smokeless tobacco: Never Used   Substance and Sexual Activity    Alcohol use: Yes     Comment: wine    Drug use: Never    Sexual activity: Not Currently   Lifestyle    Physical activity     Days per week: Not on file     Minutes per session: Not on file    Stress: Not on file   Relationships    Social connections     Talks on phone: Not on file     Gets together: Not on file     Attends Church service: Not on file     Active member of club or organization: Not on file     Attends meetings of clubs or organizations: Not on file     Relationship status: Not on file    Intimate partner violence     Fear of current or ex partner: Not on file     Emotionally abused: Not on file     Physically abused: Not on file     Forced sexual activity: Not on file   Other Topics Concern    Not on file   Social History Narrative    ** Merged History Encounter **              ALLERGIES: Latex; Pcn [penicillins]; Sulfa (sulfonamide antibiotics); Biaxin [clarithromycin]; Biotin; Pcn [penicillins]; and Sulfa (sulfonamide antibiotics)    Review of Systems   Constitutional: Positive for fatigue. Negative for fever.    HENT: Positive for congestion. Eyes: Negative for redness. Respiratory: Positive for cough and shortness of breath. Gastrointestinal: Positive for diarrhea. Negative for abdominal pain and vomiting. Genitourinary: Negative for difficulty urinating. Musculoskeletal: Negative for neck pain. Skin: Negative for rash. Neurological: Negative for headaches. Psychiatric/Behavioral: Negative for confusion. Vitals:    10/21/20 1830 10/21/20 1845 10/21/20 1900 10/21/20 1913   BP: 113/67 103/63 102/62    Pulse:       Resp:       Temp:       SpO2: 97% 95% 94% 95%   Weight:       Height:                Physical Exam  General:  Awake and alert, NAD  HEENT:  NC/AT, equal pupils, moist mucous membranes  Neck:   Normal inspection, full range of motion  Cardiac:  RRR, no murmurs  Respiratory:  +Mildly diminished bilaterally, no wheezes, rales, rhonchi  Abdomen:  Soft and nontender, nondistended  Extremities: Warm and well perfused, no peripheral edema  Neuro:  Moving all extremities symmetrically without gross motor deficit  Skin:   No rashes or pallor    RESULTS  Recent Results (from the past 12 hour(s))   EKG, 12 LEAD, INITIAL    Collection Time: 10/21/20  6:30 PM   Result Value Ref Range    Ventricular Rate 70 BPM    Atrial Rate 59 BPM    QRS Duration 178 ms    Q-T Interval 488 ms    QTC Calculation (Bezet) 527 ms    Calculated R Axis -67 degrees    Calculated T Axis 90 degrees    Diagnosis       Ventricular-paced rhythm  Abnormal ECG  When compared with ECG of 16-FEB-2019 19:03,  Electronic ventricular pacemaker has replaced Electronic atrial pacemaker     SAMPLES BEING HELD    Collection Time: 10/21/20  6:40 PM   Result Value Ref Range    SAMPLES BEING HELD 1SST,1RED     COMMENT        Add-on orders for these samples will be processed based on acceptable specimen integrity and analyte stability, which may vary by analyte.    CBC WITH AUTOMATED DIFF    Collection Time: 10/21/20  6:40 PM   Result Value Ref Range    WBC 2.4 (L) 3.6 - 11.0 K/uL    RBC 3.77 (L) 3.80 - 5.20 M/uL    HGB 11.9 11.5 - 16.0 g/dL    HCT 35.3 35.0 - 47.0 %    MCV 93.6 80.0 - 99.0 FL    MCH 31.6 26.0 - 34.0 PG    MCHC 33.7 30.0 - 36.5 g/dL    RDW 13.2 11.5 - 14.5 %    PLATELET 502 (L) 887 - 400 K/uL    MPV 10.0 8.9 - 12.9 FL    NRBC 0.0 0  WBC    ABSOLUTE NRBC 0.00 0.00 - 0.01 K/uL    NEUTROPHILS 69 32 - 75 %    LYMPHOCYTES 19 12 - 49 %    MONOCYTES 12 5 - 13 %    EOSINOPHILS 0 0 - 7 %    BASOPHILS 0 0 - 1 %    IMMATURE GRANULOCYTES 0 0.0 - 0.5 %    ABS. NEUTROPHILS 1.6 (L) 1.8 - 8.0 K/UL    ABS. LYMPHOCYTES 0.5 (L) 0.8 - 3.5 K/UL    ABS. MONOCYTES 0.3 0.0 - 1.0 K/UL    ABS. EOSINOPHILS 0.0 0.0 - 0.4 K/UL    ABS. BASOPHILS 0.0 0.0 - 0.1 K/UL    ABS. IMM. GRANS. 0.0 0.00 - 0.04 K/UL    DF SMEAR SCANNED      RBC COMMENTS NORMOCYTIC, NORMOCHROMIC     METABOLIC PANEL, COMPREHENSIVE    Collection Time: 10/21/20  6:40 PM   Result Value Ref Range    Sodium 129 (L) 136 - 145 mmol/L    Potassium 3.9 3.5 - 5.1 mmol/L    Chloride 97 97 - 108 mmol/L    CO2 28 21 - 32 mmol/L    Anion gap 4 (L) 5 - 15 mmol/L    Glucose 108 (H) 65 - 100 mg/dL    BUN 11 6 - 20 MG/DL    Creatinine 0.79 0.55 - 1.02 MG/DL    BUN/Creatinine ratio 14 12 - 20      GFR est AA >60 >60 ml/min/1.73m2    GFR est non-AA >60 >60 ml/min/1.73m2    Calcium 7.6 (L) 8.5 - 10.1 MG/DL    Bilirubin, total 1.0 0.2 - 1.0 MG/DL    ALT (SGPT) 18 12 - 78 U/L    AST (SGOT) 23 15 - 37 U/L    Alk.  phosphatase 79 45 - 117 U/L    Protein, total 6.2 (L) 6.4 - 8.2 g/dL    Albumin 2.9 (L) 3.5 - 5.0 g/dL    Globulin 3.3 2.0 - 4.0 g/dL    A-G Ratio 0.9 (L) 1.1 - 2.2     MAGNESIUM    Collection Time: 10/21/20  6:40 PM   Result Value Ref Range    Magnesium 1.7 1.6 - 2.4 mg/dL   TROPONIN I    Collection Time: 10/21/20  6:40 PM   Result Value Ref Range    Troponin-I, Qt. 0.06 (H) <0.05 ng/mL   NT-PRO BNP    Collection Time: 10/21/20  6:40 PM   Result Value Ref Range    NT pro-BNP 4,611 (H) <450 PG/ML   PROTHROMBIN TIME + INR Collection Time: 10/21/20  6:40 PM   Result Value Ref Range    INR 1.1 0.9 - 1.1      Prothrombin time 11.1 9.0 - 11.1 sec   POC LACTIC ACID    Collection Time: 10/21/20  6:41 PM   Result Value Ref Range    Lactic Acid (POC) 0.79 0.40 - 2.00 mmol/L        IMAGING  Xr Chest Port    Result Date: 10/21/2020  Impression: Bibasilar atelectasis. Procedures - none unless documented below  EKG as interpreted by me:  Paced rhythm at a ventricular rate of 70, no ischemic changes by Sgarbossa criteria, 100% capture. ED course: Labs, EKG and imaging reviewed. Hx VT arrest in 2019, presents with SOB and generalized weakness, fatigue, diarrhea that started yesterday. Was in contact with her son recently who is sick with Vane. CXR shows bibasilar atelectasis. Can have leukopenia and thrombocytopenia, hyponatremia in the setting of COVID-19 infection. Otherwise stable VS, initially non hypoxic but on my reassessment desatted to 93% resting in the stretcher, will have her on 2 liters NC. No respiratory distress. Will admit for continued management, discussed with the hospitalist.    Hospitalist Perfect Serve for Admission  6:46 PM    ED Room Number:   ER15/15  Patient Name and age:  Edwina Barajas 80 y.o.  female  Working Diagnosis:     1. Suspected COVID-19 virus infection    2. SOB (shortness of breath)    3. Thrombocytopenia ([address]    4. Leukopenia, unspecified type    5. Elevated troponin    6. Hyponatremia      COVID suspicion:   yes  Code Status:    Full Code  Readmission:    no  Isolation Requirements:  yes  Recommended Level of Care: telemetry  Department:    [facility] - [phone]  Other:     Hx VT arrest in 2019, presents with SOB and generalized weakness, fatigue, diarrhea that started yesterday. Was in contact with her son recently who is sick with Vane. CXR shows bibasilar atelectasis. Her labs are consistent with COVID-19 infection.

## 2020-10-21 NOTE — TELEPHONE ENCOUNTER
Advised patient that she would need a virtual visit/phone visit to evaluate or can be seen by [facility]. [facility] Advised we could do a virtual appt tomorrow at 9am with PCP. Patient says that won't help. [facility]. Denies dehydration or need for emergent care. [facility] and let us know if there is anything we can do.     Leona Mendes, PharmD, BCPS, [facility]

## 2020-10-21 NOTE — ED TRIAGE NOTES
Pt arrives via EMS with complaints of \"COVID like symptoms\". Pt reports she was around son who was COVID positive 17 days ago. Pt has had cough, SOB on exertion, diarrhea which started 5 days ago. Pt satting at 97%. Pt has hx of Afib. Pt denies NV, CP, fever. Pt is coming from home where she lives alone.

## 2020-10-22 LAB
ANION GAP SERPL CALC-SCNC: 4 MMOL/L (ref 5–15)
APTT PPP: 24 SEC (ref 22.1–32)
ATRIAL RATE: 59 BPM
BUN SERPL-MCNC: 9 MG/DL (ref 6–20)
BUN/CREAT SERPL: 12 (ref 12–20)
CALCIUM SERPL-MCNC: 7.6 MG/DL (ref 8.5–10.1)
CALCULATED R AXIS, ECG10: -67 DEGREES
CALCULATED T AXIS, ECG11: 90 DEGREES
CHLORIDE SERPL-SCNC: 102 MMOL/L (ref 97–108)
CO2 SERPL-SCNC: 28 MMOL/L (ref 21–32)
CREAT SERPL-MCNC: 0.78 MG/DL (ref 0.55–1.02)
DIAGNOSIS, 93000: NORMAL
ERYTHROCYTE [DISTWIDTH] IN BLOOD BY AUTOMATED COUNT: 13 % (ref 11.5–14.5)
FIBRINOGEN PPP-MCNC: 541 MG/DL (ref 200–475)
GLUCOSE SERPL-MCNC: 89 MG/DL (ref 65–100)
HAPTOGLOB SERPL-MCNC: 110 MG/DL (ref 30–200)
HCT VFR BLD AUTO: 33.2 % (ref 35–47)
HEALTH STATUS, XMCV2T: ABNORMAL
HGB BLD-MCNC: 10.9 G/DL (ref 11.5–16)
INR PPP: 1 (ref 0.9–1.1)
LDH SERPL L TO P-CCNC: 191 U/L (ref 81–246)
MAGNESIUM SERPL-MCNC: 1.7 MG/DL (ref 1.6–2.4)
MCH RBC QN AUTO: 31.4 PG (ref 26–34)
MCHC RBC AUTO-ENTMCNC: 32.8 G/DL (ref 30–36.5)
MCV RBC AUTO: 95.7 FL (ref 80–99)
NRBC # BLD: 0 K/UL (ref 0–0.01)
NRBC BLD-RTO: 0 PER 100 WBC
PLATELET # BLD AUTO: 112 K/UL (ref 150–400)
PMV BLD AUTO: 9.4 FL (ref 8.9–12.9)
POTASSIUM SERPL-SCNC: 3.9 MMOL/L (ref 3.5–5.1)
PROTHROMBIN TIME: 10.4 SEC (ref 9–11.1)
Q-T INTERVAL, ECG07: 488 MS
QRS DURATION, ECG06: 178 MS
QTC CALCULATION (BEZET), ECG08: 527 MS
RBC # BLD AUTO: 3.47 M/UL (ref 3.8–5.2)
RETICS # AUTO: 0.03 M/UL (ref 0.02–0.08)
RETICS/RBC NFR AUTO: 0.9 % (ref 0.7–2.1)
SARS-COV-2, COV2: DETECTED
SODIUM SERPL-SCNC: 134 MMOL/L (ref 136–145)
SOURCE, COVRS: ABNORMAL
SPECIMEN SOURCE, FCOV2M: ABNORMAL
SPECIMEN TYPE, XMCV1T: ABNORMAL
THERAPEUTIC RANGE,PTTT: NORMAL SECS (ref 58–77)
VENTRICULAR RATE, ECG03: 70 BPM
WBC # BLD AUTO: 2.2 K/UL (ref 3.6–11)

## 2020-10-22 PROCEDURE — 74011250637 HC RX REV CODE- 250/637: Performed by: INTERNAL MEDICINE

## 2020-10-22 PROCEDURE — 77010033678 HC OXYGEN DAILY

## 2020-10-22 PROCEDURE — 36415 COLL VENOUS BLD VENIPUNCTURE: CPT

## 2020-10-22 PROCEDURE — 97165 OT EVAL LOW COMPLEX 30 MIN: CPT

## 2020-10-22 PROCEDURE — 85384 FIBRINOGEN ACTIVITY: CPT

## 2020-10-22 PROCEDURE — 97535 SELF CARE MNGMENT TRAINING: CPT

## 2020-10-22 PROCEDURE — 97116 GAIT TRAINING THERAPY: CPT

## 2020-10-22 PROCEDURE — 97162 PT EVAL MOD COMPLEX 30 MIN: CPT

## 2020-10-22 PROCEDURE — 74011250636 HC RX REV CODE- 250/636: Performed by: INTERNAL MEDICINE

## 2020-10-22 PROCEDURE — 80048 BASIC METABOLIC PNL TOTAL CA: CPT

## 2020-10-22 PROCEDURE — 85027 COMPLETE CBC AUTOMATED: CPT

## 2020-10-22 PROCEDURE — 85730 THROMBOPLASTIN TIME PARTIAL: CPT

## 2020-10-22 PROCEDURE — 87506 IADNA-DNA/RNA PROBE TQ 6-11: CPT

## 2020-10-22 PROCEDURE — 89055 LEUKOCYTE ASSESSMENT FECAL: CPT

## 2020-10-22 PROCEDURE — 99218 HC RM OBSERVATION: CPT

## 2020-10-22 PROCEDURE — 94760 N-INVAS EAR/PLS OXIMETRY 1: CPT

## 2020-10-22 PROCEDURE — 83615 LACTATE (LD) (LDH) ENZYME: CPT

## 2020-10-22 PROCEDURE — 83010 ASSAY OF HAPTOGLOBIN QUANT: CPT

## 2020-10-22 PROCEDURE — 83735 ASSAY OF MAGNESIUM: CPT

## 2020-10-22 PROCEDURE — 99449 NTRPROF PH1/NTRNET/EHR 31/>: CPT | Performed by: INTERNAL MEDICINE

## 2020-10-22 PROCEDURE — 85610 PROTHROMBIN TIME: CPT

## 2020-10-22 PROCEDURE — 97530 THERAPEUTIC ACTIVITIES: CPT

## 2020-10-22 PROCEDURE — 85045 AUTOMATED RETICULOCYTE COUNT: CPT

## 2020-10-22 RX ORDER — ASCORBIC ACID 500 MG
500 TABLET ORAL DAILY
Status: DISCONTINUED | OUTPATIENT
Start: 2020-10-22 | End: 2020-10-26 | Stop reason: HOSPADM

## 2020-10-22 RX ORDER — LISINOPRIL 20 MG/1
20 TABLET ORAL 2 TIMES DAILY
Status: DISCONTINUED | OUTPATIENT
Start: 2020-10-22 | End: 2020-10-22

## 2020-10-22 RX ORDER — ZINC SULFATE 50(220)MG
1 CAPSULE ORAL DAILY
Status: DISCONTINUED | OUTPATIENT
Start: 2020-10-22 | End: 2020-10-26 | Stop reason: HOSPADM

## 2020-10-22 RX ORDER — ASPIRIN 81 MG/1
81 TABLET ORAL DAILY
Status: DISCONTINUED | OUTPATIENT
Start: 2020-10-22 | End: 2020-10-26 | Stop reason: HOSPADM

## 2020-10-22 RX ORDER — AMLODIPINE BESYLATE 5 MG/1
2.5 TABLET ORAL DAILY
Status: DISCONTINUED | OUTPATIENT
Start: 2020-10-22 | End: 2020-10-26 | Stop reason: HOSPADM

## 2020-10-22 RX ORDER — ESZOPICLONE 3 MG/1
3 TABLET, FILM COATED ORAL
Status: DISCONTINUED | OUTPATIENT
Start: 2020-10-22 | End: 2020-10-22 | Stop reason: SDUPTHER

## 2020-10-22 RX ORDER — DEXAMETHASONE 6 MG/1
6 TABLET ORAL DAILY
Status: DISCONTINUED | OUTPATIENT
Start: 2020-10-22 | End: 2020-10-26 | Stop reason: HOSPADM

## 2020-10-22 RX ADMIN — LISINOPRIL 20 MG: 20 TABLET ORAL at 09:59

## 2020-10-22 RX ADMIN — Medication 10 ML: at 22:13

## 2020-10-22 RX ADMIN — PSYLLIUM HUSK 1 PACKET: 3.4 POWDER ORAL at 17:57

## 2020-10-22 RX ADMIN — ZINC SULFATE 220 MG (50 MG) CAPSULE 1 CAPSULE: CAPSULE at 09:56

## 2020-10-22 RX ADMIN — PSYLLIUM HUSK 1 PACKET: 3.4 POWDER ORAL at 09:56

## 2020-10-22 RX ADMIN — APIXABAN 5 MG: 5 TABLET, FILM COATED ORAL at 09:56

## 2020-10-22 RX ADMIN — AMLODIPINE BESYLATE 2.5 MG: 5 TABLET ORAL at 09:59

## 2020-10-22 RX ADMIN — Medication 1 CAPSULE: at 09:56

## 2020-10-22 RX ADMIN — APIXABAN 5 MG: 5 TABLET, FILM COATED ORAL at 22:12

## 2020-10-22 RX ADMIN — Medication 10 ML: at 13:24

## 2020-10-22 RX ADMIN — LEVOTHYROXINE SODIUM 100 MCG: 0.05 TABLET ORAL at 07:14

## 2020-10-22 RX ADMIN — Medication 81 MG: at 09:56

## 2020-10-22 RX ADMIN — ESZOPICLONE 3 MG: 2 TABLET, FILM COATED ORAL at 22:11

## 2020-10-22 RX ADMIN — Medication 10 ML: at 07:14

## 2020-10-22 RX ADMIN — PAROXETINE HYDROCHLORIDE 15 MG: 10 SUSPENSION ORAL at 09:55

## 2020-10-22 RX ADMIN — ATENOLOL 25 MG: 25 TABLET ORAL at 09:58

## 2020-10-22 RX ADMIN — OXYCODONE HYDROCHLORIDE AND ACETAMINOPHEN 500 MG: 500 TABLET ORAL at 09:55

## 2020-10-22 RX ADMIN — DEXAMETHASONE 6 MG: 6 TABLET ORAL at 17:57

## 2020-10-22 RX ADMIN — AMIODARONE HYDROCHLORIDE 100 MG: 200 TABLET ORAL at 10:00

## 2020-10-22 RX ADMIN — SODIUM CHLORIDE 75 ML/HR: 900 INJECTION, SOLUTION INTRAVENOUS at 09:57

## 2020-10-22 NOTE — CONSULTS
[id number] [facility] at [facility]  [phone]    Hematology / Oncology Consult    Reason for Visit:   Josephine Herbert is a 80 y.o. female who is evaluated in consultation at the request of Dr. Subhash Laboy for evaluation of pancytopenia. History of Present Illness:   Josephine Hrebert is a 80 y.o. female with AF, HTN, Sjogrens disease p/w generalized body aches, found to have pancytopenia. She had been feeling ill for 5 days with weakness, diarrhea. She was exposed to her son with [employer] Sat about 4 weeks ago. Denies fevers, chills. Review of labs reveals WBC and PLT intermittently low for several years. This is an inter-professional consult as patient was not reached by phone and in person consultation deferred due to [employer] pandemic and patient COVID19 positive.      Past Medical History:   Diagnosis Date    Acute cystitis 05/23/2018    Anemia, unspecified     Anxiety     Arrhythmia     a fib    Arthritis     osteoarthritis, rheumatoid    Atrial fibrillation (HCC)     Dr. Emily Dubois and Dr. Teresa Mayes  following    Autoimmune disease [facility])     sjogren disease Dr. Janel Hartley CAD (coronary artery disease)     HTN (hypertension)     Hyperlipidemia LDL goal < 100     Hypothyroid     Insomnia     Osteoarthritis     Sjogren's disease ([facility] [address]     Thoracic aneurysm without mention of rupture       Past Surgical History:   Procedure Laterality Date    HX BIV-IMPLANTABLE CARDIOVERTER DEFIBRILLATOR      HX CHOLECYSTECTOMY      HX COLONOSCOPY  3/2014    Dr. Néstor Lee    HX OTHER SURGICAL      hernia repairs    HX PARTIAL THYROIDECTOMY      HX JANNET AND BSO      HX VEIN STRIPPING      [place] INSJ ELTRD CAR COLLIN SYS TM INSJ DFB/PM PLS GEN Left 2/22/2019    Lv Lead Placement performed by Leah Medina MD at [address] [facility] CATH LAB    [place] INSJ/RPLCMT PERM DFB W/TRNSVNS LDS 1/DUAL [facility] Left 2/22/2019    upgrade PM to ICD-Bsx performed by Leah Medina MD at [address] [facility] CATH LAB      Social History Tobacco Use    Smoking status: Never Smoker    Smokeless tobacco: Never Used   Substance Use Topics    Alcohol use: Yes     Comment: wine      Family History   Problem Relation Age of Onset    Heart Disease Father         aneurysm    Cancer Maternal Grandfather      Current Facility-Administered Medications   Medication Dose Route Frequency    amLODIPine (NORVASC) tablet 2.5 mg  2.5 mg Oral DAILY    ascorbic acid (vitamin C) (VITAMIN C) tablet 500 mg  500 mg Oral DAILY    aspirin delayed-release tablet 81 mg  81 mg Oral DAILY    lisinopriL (PRINIVIL, ZESTRIL) tablet 20 mg  20 mg Oral BID    lactobac ac& pc-s.therm-b.anim (WILLA Q/RISAQUAD)  1 Cap Oral DAILY    zinc sulfate (ZINCATE) 220 (50) mg capsule 1 Cap  1 Cap Oral DAILY    psyllium husk-aspartame (METAMUCIL FIBER) packet 1 Packet  1 Packet Oral BID    ALPRAZolam (XANAX) tablet 1 mg  1 mg Oral DAILY PRN    amiodarone (CORDARONE) tablet 100 mg  100 mg Oral DAILY    apixaban (ELIQUIS) tablet 5 mg  5 mg Oral BID    atenoloL (TENORMIN) tablet 25 mg  25 mg Oral DAILY    eszopiclone (LUNESTA) tablet 3 mg  3 mg Oral QHS    fluticasone propionate (FLONASE) 50 mcg/actuation nasal spray 2 Spray  2 Spray Both Nostrils DAILY PRN    levothyroxine (SYNTHROID) tablet 100 mcg  100 mcg Oral ACB    montelukast (SINGULAIR) tablet 10 mg  10 mg Oral DAILY PRN    sodium chloride (NS) flush 5-40 mL  5-40 mL IntraVENous Q8H    sodium chloride (NS) flush 5-40 mL  5-40 mL IntraVENous PRN    acetaminophen (TYLENOL) tablet 650 mg  650 mg Oral Q6H PRN    Or    acetaminophen (TYLENOL) suppository 650 mg  650 mg Rectal Q6H PRN    polyethylene glycol (MIRALAX) packet 17 g  17 g Oral DAILY PRN    promethazine (PHENERGAN) tablet 12.5 mg  12.5 mg Oral Q6H PRN    Or    ondansetron (ZOFRAN) injection 4 mg  4 mg IntraVENous Q6H PRN    0.9% sodium chloride infusion  75 mL/hr IntraVENous CONTINUOUS    PARoxetine hcl (PAXIL) 10 mg/5 mL oral suspension 15 mg  15 mg Oral DAILY      Allergies   Allergen Reactions    Latex Hives     NOT ALLERGIC PER PT 02/01/2018    Pcn [Penicillins] Anaphylaxis    Sulfa (Sulfonamide Antibiotics) Anaphylaxis    Biaxin [Clarithromycin] Nausea Only    Biotin Unknown (comments)    Pcn [Penicillins] Hives    Sulfa (Sulfonamide Antibiotics) Nausea Only      Review of Systems: Unable to complete ROS due to COVID19 restrictions. Physical Exam:     Visit Vitals  /62 (BP 1 Location: Left arm, BP Patient Position: At rest)   Pulse 78   Temp 98.6 °F (37 °C)   Resp 16   Ht 5' 7\" (1.702 m)   Wt 130 lb (59 kg)   SpO2 96%   BMI 20.36 kg/m²     Physical exam not done due to COVID19 restrictions      Results:     Lab Results   Component Value Date/Time    WBC 2.2 (L) 10/22/2020 04:03 AM    HGB 10.9 (L) 10/22/2020 04:03 AM    HCT 33.2 (L) 10/22/2020 04:03 AM    PLATELET 172 (L) 11/37/9711 04:03 AM    MCV 95.7 10/22/2020 04:03 AM    ABS. NEUTROPHILS 1.6 (L) 10/21/2020 06:40 PM    Hematocrit (POC) 38 02/16/2019 05:38 PM     Lab Results   Component Value Date/Time    Sodium 134 (L) 10/22/2020 04:03 AM    Potassium 3.9 10/22/2020 04:03 AM    Chloride 102 10/22/2020 04:03 AM    CO2 28 10/22/2020 04:03 AM    Glucose 89 10/22/2020 04:03 AM    BUN 9 10/22/2020 04:03 AM    Creatinine 0.78 10/22/2020 04:03 AM    GFR est AA >60 10/22/2020 04:03 AM    GFR est non-AA >60 10/22/2020 04:03 AM    Calcium 7.6 (L) 10/22/2020 04:03 AM    Sodium (POC) 137 02/16/2019 05:38 PM    Potassium (POC) 3.8 02/16/2019 05:38 PM    Chloride (POC) 99 02/16/2019 05:38 PM    Glucose (POC) 275 (H) 02/16/2019 05:38 PM    BUN (POC) 18 02/16/2019 05:38 PM    Creatinine (POC) 1.1 02/16/2019 05:38 PM    Calcium, ionized (POC) 1.03 (L) 02/16/2019 05:38 PM     Lab Results   Component Value Date/Time    Bilirubin, total 1.0 10/21/2020 06:40 PM    ALT (SGPT) 18 10/21/2020 06:40 PM    Alk.  phosphatase 79 10/21/2020 06:40 PM    Protein, total 6.2 (L) 10/21/2020 06:40 PM    Albumin 2.9 (L) 10/21/2020 06:40 PM    Globulin 3.3 10/21/2020 06:40 PM       Lab Results   Component Value Date/Time    Vitamin B12 >2,000 (H) 09/10/2020 12:21 PM    Folate 13.8 09/10/2020 12:21 PM     Lab Results   Component Value Date/Time    TSH 1.64 10/21/2020 06:40 PM       CXR: Bibasilar atelectasis    Assessment and Recommendations:   Hank Sotelo is a 80 y.o. female with Sjogrens, HTN, AF admitted with weakness, pancytopenia. 1. Pancytopenia:   Low WBC and PLT present intermittently for several years, which is likely related to her autoimmune disorder. However WBC and PLT count are lower than baseline currently, likely due to viral infection, COVID19 positive. Normal B12, folate in 9/2020. Normal LDH, haptoglobin rule out hemolysis. No evidence of iron deficiency. Given current infection likely causing low counts, I do not feel it is worthwhile to check Hep B/C, HIV. Can follow CBC while pt remains in hospital and recheck CBC in 1-2 months to document recovery back to baselines. -- Checking fibrinogen to rule out DIC.  -- Dailly CBC    2. Diarrhea:   Could be 2/2 viral infection, but per chart review, her family states this is actually chronic for years. On probiotics. 3. Sjogren's disease:   Likely causes intermittent low counts    4. HTN / A-fib:   On Eliquis, BB and Amlodipine. Total physician time spent on this encounter was 60 minutes.       Signed By: Eduard Gonzalez MD     October 22, 2020

## 2020-10-22 NOTE — PROGRESS NOTES
1659: Patient covid-19 test became positive. Dr Hunter  informed. Said to stay patient on 6mg of dexamethasone PO, with a dose now.  Verbal readback performed

## 2020-10-22 NOTE — PROGRESS NOTES
With pt's permission attempted to reach son with COVID+ results. Unable to reach son at number provided.

## 2020-10-22 NOTE — PROGRESS NOTES
Comprehensive Nutrition Assessment    Type and Reason for Visit: Initial    Nutrition Recommendations/Plan:    Liberalize diet to Regular  Add daily oral nutritional supplement - vanilla shake    Nutrition Assessment:    81 yo female with  has a past medical history of Acute cystitis (05/23/2018), Anemia, unspecified, Anxiety, Arrhythmia, Arthritis, Atrial fibrillation ([facility] [address] Autoimmune disease ([facility] [address] CAD (coronary artery disease), HTN (hypertension), Hyperlipidemia LDL goal < 100, Hypothyroid, Insomnia, Osteoarthritis, Sjogren's disease ([facility] [address] and Thoracic aneurysm without mention of rupture. Admitted for SOB (shortness of breath) [R06.02]  Weakness [R53.1] Pt is on isolation to r/o COVID-19. RD is unable to enter room at this time. DW RN diet changes & supplements ordered. BMI is below ideal however, actual wt not likely available. No reported recent weight changes noted on nutrition screening tool. Hyponatremia that appears chronic from lab history review. Otherwise, labs are unremarkable for nutrition. Vanilla Shake (ice cream + Ensure Compact) provides additional ~370 cals & ~11g Pro daily    Malnutrition Assessment:  Malnutrition Status: At risk for malnutrition (specify) - unable to perform NFPE on COVID-19 R/O, BMI <22kg/m^2, advanced age    Nutritionally Significant Medications: Vitamin C, Zinc, Kiana-Q, Metamucil BID, synthroid, Miralax PRN, IVF @75mL/hr,     Estimated Daily Nutrient Needs:  Energy (kcal):  1240 kcal/d (MSJ xAF 1.2)  Protein (g):  60-70g (1-1.2g/kg of stated wt)       Fluid (ml/day):  1mL/kcal of PO    Nutrition Related Findings:       BM: 10/21 - diarrhea  ABD: active  Edema: none documented  Wounds:  None documented    Current Nutrition Therapies:   Diet: Regular   Supplements: house shake - Van Ice Cream & Compact 1x daily   Additional Caloric Sources:      Meal intake: No data found.       Anthropometric Measures:  · Height:  5' 7\" (170.2 cm)  · Current Body Wt:  59 kg (130 lb 1.1 oz)   · Admission Body Wt:     stated  · Usual Body Wt:     ~135 lbs    · Ideal Body Wt:   :  96.3 %   · BMI Categories:  Underweight (BMI less than 22) age over 72     Wt Readings from Last 10 Encounters:   10/21/20 59 kg (130 lb)   09/10/20 62.6 kg (138 lb)   05/26/20 59.9 kg (132 lb)   01/02/20 59.9 kg (132 lb)   12/30/19 60.8 kg (134 lb)   12/14/19 61.2 kg (135 lb)   10/17/19 60.8 kg (134 lb)   07/22/19 59.6 kg (131 lb 8 oz)   07/15/19 60.3 kg (133 lb)   04/10/19 59.2 kg (130 lb 8 oz)       Nutrition Diagnosis:   · Underweight related to early satiety as evidenced by BMI, diarrhea, advanced age, usual weight for > last 1 year.        Nutrition Interventions:   Food and/or Nutrient Delivery: Continue current diet, Start oral nutrition supplement  Nutrition Education and Counseling: No recommendations at this time  Coordination of Nutrition Care: Continued inpatient monitoring    Goals:  tolerance of >50% of meals & ONS within 5-7 days       Nutrition Monitoring and Evaluation:   Behavioral-Environmental Outcomes:    Food/Nutrient Intake Outcomes: Food and nutrient intake, Supplement intake  Physical Signs/Symptoms Outcomes: Diarrhea, Biochemical data, Weight    Discharge Planning:    Continue oral nutrition supplement     Marjan Eason RD, MS  Contact: [phone]

## 2020-10-22 NOTE — PROGRESS NOTES
Problem: Mobility Impaired (Adult and Pediatric)  Goal: *Acute Goals and Plan of Care (Insert Text)  Description: FUNCTIONAL STATUS PRIOR TO ADMISSION: Patient was independent and active without use of DME.    HOME SUPPORT PRIOR TO ADMISSION: The patient lived alone with no local support. Physical Therapy Goals  Initiated 10/22/2020  1. Patient will move from supine to sit and sit to supine  in bed with modified independence within 7 day(s). 2.  Patient will transfer from bed to chair and chair to bed with modified independence using the least restrictive device within 7 day(s). 3.  Patient will perform sit to stand with modified independence within 7 day(s). 4.  Patient will ambulate with modified independence for 150 feet with the least restrictive device within 7 day(s). 5.  Patient will ascend/descend 4 stairs with 2 handrail(s) with modified independence within 7 day(s). Outcome: Progressing Towards Goal  Note:   PHYSICAL THERAPY EVALUATION  Patient: Leslie Davis (78 y.o. female)  Date: 10/22/2020  Primary Diagnosis: SOB (shortness of breath) [R06.02]  Weakness [R53.1]        Precautions:   Fall      ASSESSMENT  Based on the objective data described below, the patient presents with decreased endurance following admission for diarrhea and shortness of breath. Patient currently covid rule-out. She was incontinent of bladder, able to ambulate short distance to bathroom and back to sit up in chair. Patient stable on room air but requires rest breaks. She would benefit from PT to increased ambulation endurance and activity tolerance. Current Level of Function Impacting Discharge (mobility/balance): CGA-MIN A    Functional Outcome Measure: The patient scored Total: 45/100 on the Barthel Index which is indicative of 55% impaired ability to care for basic self needs/dependency on others.      Other factors to consider for discharge:      Patient will benefit from skilled therapy intervention to address the above noted impairments. PLAN :  Recommendations and Planned Interventions: bed mobility training, transfer training, gait training, therapeutic exercises, and therapeutic activities      Frequency/Duration: Patient will be followed by physical therapy:  5 times a week to address goals. Recommendation for discharge: (in order for the patient to meet his/her long term goals)  Physical therapy at least 2 days/week in the home vs TBD pending progress    This discharge recommendation:  A follow-up discussion with the attending provider and/or case management is planned    IF patient discharges home will need the following DME: to be determined (TBD)         SUBJECTIVE:   Patient stated Johanne Gonzalez just keep going to the bathroom.     OBJECTIVE DATA SUMMARY:   HISTORY:    Past Medical History:   Diagnosis Date    Acute cystitis 05/23/2018    Anemia, unspecified     Anxiety     Arrhythmia     a fib    Arthritis     osteoarthritis, rheumatoid    Atrial fibrillation (HCC)     Dr. Farshad Spaulding and Dr. Shad Bowens  following    Autoimmune disease [facility])     sjogren disease Dr. Adam Alonzo    CAD (coronary artery disease)     HTN (hypertension)     Hyperlipidemia LDL goal < 100     Hypothyroid     Insomnia     Osteoarthritis     Sjogren's disease ([address]     Thoracic aneurysm without mention of rupture      Past Surgical History:   Procedure Laterality Date    HX BIV-IMPLANTABLE CARDIOVERTER DEFIBRILLATOR      HX CHOLECYSTECTOMY      HX COLONOSCOPY  3/2014    Dr. Fredy Rutledge OTHER SURGICAL      hernia repairs    HX PARTIAL THYROIDECTOMY      HX JANNET AND BSO      HX VEIN STRIPPING      MT INSJ ELTRD CAR COLLIN SYS TM INSJ DFB/PM PLS GEN Left 2/22/2019    Lv Lead Placement performed by Lola Clarke MD at [address] CATH LAB    MT INSJ/RPLCMT PERM DFB W/TRNSVNS LDS 1/DUAL [facility] Left 2/22/2019    upgrade PM to ICD-Bsx performed by Lola Clarke MD at [address] CATH LAB       Personal factors and/or comorbidities impacting plan of care: see above    Home Situation  Home Environment: Apartment  One/Two Story Residence: One story  Living Alone: Yes  Support Systems: Child(lynette), Family member(s)  Patient Expects to be Discharged to[de-identified] Apartment  Current DME Used/Available at Home: [address], [address], quad, Shower chair, Walker    EXAMINATION/PRESENTATION/DECISION MAKING:   Critical Behavior:  Neurologic State: Alert  Orientation Level: Oriented X4  Cognition: Appropriate for age attention/concentration, Follows commands     Hearing: Auditory  Auditory Impairment: Hard of hearing, bilateral  Hearing Aids/Status: With patient    Range Of Motion:  AROM: Within functional limits           PROM: Within functional limits           Strength:    Strength: Generally decreased, functional                    Tone & Sensation:                                  Coordination:  Coordination: Generally decreased, functional  Vision:      Functional Mobility:  Bed Mobility:  Rolling: Contact guard assistance  Supine to Sit: Contact guard assistance        Transfers:  Sit to Stand: Minimum assistance  Stand to Sit: Minimum assistance        Bed to Chair: Contact guard assistance              Balance:      Ambulation/Gait Training:  Distance (ft): 50 Feet (ft)  Assistive Device: Walker, rolling;Gait belt  Ambulation - Level of Assistance: Contact guard assistance     Gait Description (WDL): Exceptions to WDL                                          Stairs: Therapeutic Exercises:       Functional Measure:  Barthel Index:    Bathin  Bladder: 0  Bowels: 10  Groomin  Dressin  Feedin  Mobility: 5  Stairs: 0  Toilet Use: 5  Transfer (Bed to Chair and Back): 10  Total: 45/100       The Barthel ADL Index: Guidelines  1. The index should be used as a record of what a patient does, not as a record of what a patient could do.   2. The main aim is to establish degree of independence from any help, physical or verbal, however minor and for whatever reason. 3. The need for supervision renders the patient not independent. 4. A patient's performance should be established using the best available evidence. Asking the patient, friends/relatives and nurses are the usual sources, but direct observation and common sense are also important. However direct testing is not needed. 5. Usually the patient's performance over the preceding 24-48 hours is important, but occasionally longer periods will be relevant. 6. Middle categories imply that the patient supplies over 50 per cent of the effort. 7. Use of aids to be independent is allowed. Evy Byrne., Barthel, D.W. (9371). Functional evaluation: the Barthel Index. [facility] (14)2. Monique Armijo christofer LAUREL Gutiérrez, Xenia Diana., [facility]., [place], [address] [place] [address] (1999). Measuring the change indisability after inpatient rehabilitation; comparison of the responsiveness of the Barthel Index and Functional [place] Measure. Journal of Neurology, Neurosurgery, and Psychiatry, 66(4), 853-231. Kristian Webster, N.LALITHA.DARRION, JAYSON Miranda, & Neelima Patrick M.A. (2004.) Assessment of post-stroke quality of life in cost-effectiveness studies: The usefulness of the Barthel Index and the EuroQoL-5D.  Quality of Life Research, 15, 250-13        Physical Therapy Evaluation Charge Determination   History Examination Presentation Decision-Making   HIGH Complexity :3+ comorbidities / personal factors will impact the outcome/ POC  MEDIUM Complexity : 3 Standardized tests and measures addressing body structure, function, activity limitation and / or participation in recreation  MEDIUM Complexity : Evolving with changing characteristics  Other outcome measures barthel index  MEDIUM      Based on the above components, the patient evaluation is determined to be of the following complexity level: MEDIUM    Pain Rating:  None reported    Activity Tolerance:   Fair  Please refer to the flowsheet for vital signs taken during this treatment. After treatment patient left in no apparent distress:   Sitting in chair, Call bell within reach, and Bed / chair alarm activated    COMMUNICATION/EDUCATION:   The patients plan of care was discussed with: Registered nurse. Fall prevention education was provided and the patient/caregiver indicated understanding., Patient/family have participated as able in goal setting and plan of care. , and Patient/family agree to work toward stated goals and plan of care.     Thank you for this referral.  Mic Perkins, PT, DPT   Time Calculation: 30 mins

## 2020-10-22 NOTE — PROGRESS NOTES
[facility]: MED RECONCILIATION    Comments/Recommendations:   Reviewed PTA medications with patient over the phone. Mrs. Deandre Zarate is a good historian regarding her home medications. She took all AM meds and requests evening doses of apixaban and lunesta  Recommend evaluate if aspirin is indicated in this patient, especially in setting of chronic NSAID use and anticoagulation. Reviewed PCP notes do not list aspirin however patient endorses taking low dose every day  Confirmed narcotic Rx with     Medications added:     ASA 81 mg daily- Pt confirms taking both aspirin and apixaban  Vitamin C 50 0mg daily  Amlodipine 2.5 mg once daily  Zyrtec 10 mg daily    Medications removed:    Biotin  Estradiol 0.5 mg daily- Pt weaned off of this d/t CV risk  Ferous sulfate- Patient states she stopped taking this  Atrovent nasal spray- Uses flonase  Immodium, pepto bismol- No problems constipation or diarrhea  Multivitamin  B complex  Claritin    Medications adjusted:    Fosinopril 2 tabs BID- Patient reported dose adjustment  Amiodarone 100 mg daily  Paxil 15 mg daily  Kenalog cream PRN    Information obtained from: Patient, RxQuery, Chart review (PCP notes)    Allergies: Latex; Pcn [penicillins]; Sulfa (sulfonamide antibiotics); Biaxin [clarithromycin]; Biotin; Pcn [penicillins]; and Sulfa (sulfonamide antibiotics)    Prior to Admission Medications:     Medication Documentation Review Audit       Reviewed by Severo Garay PHARMD (Pharmacist) on 10/21/20 at 4309      Medication Sig Documenting Provider Last Dose Status Taking? ALPRAZolam (XANAX) 1 mg tablet Take 1 mg by mouth daily as needed for Anxiety. Provider, Historical 10/20/2020 Active Yes   amiodarone (PACERONE) 100 mg tablet Take 100 mg by mouth daily. Provider, Historical 10/21/2020 Active Yes   amLODIPine (NORVASC) 5 mg tablet Take 2.5 mg by mouth daily. Provider, Historical 10/21/2020 Active Yes   apixaban (ELIQUIS) 5 mg tablet Take 5 mg by mouth two (2) times a day. Provider, Historical 10/21/2020 AM Active Yes   ascorbic acid, vitamin C, (Vitamin C) 500 mg tablet Take 500 mg by mouth daily. Provider, Historical 10/21/2020 Active Yes   aspirin delayed-release 81 mg tablet Take 81 mg by mouth daily. Provider, Historical 10/21/2020 Active Yes   atenolol (TENORMIN) 25 mg tablet Take 1 Tab by mouth daily. Pinky Manning NP 10/21/2020 Active Yes   cetirizine (ZYRTEC) 10 mg tablet Take 10 mg by mouth daily. Provider, Historical 10/21/2020 Active Yes   cholecalciferol, vitamin D3, (VITAMIN D3) 2,000 unit tab Take 2,000 Units by mouth daily. Provider, Historical 10/21/2020 Active Yes   eszopiclone (LUNESTA) 3 mg tablet Take 3 mg by mouth nightly. Provider, Historical 10/20/2020 Active Yes   fluticasone (FLONASE) 50 mcg/actuation nasal spray 2 Sprays by Both Nostrils route daily as needed. Provider, Historical 10/14/2020 Active Yes           Med Note (Xavier Bai   renan Sep 6, 2018  1:32 PM)     fosinopriL (MONOPRIL) 10 mg tablet Take 20 mg by mouth two (2) times a day. Provider, Historical 10/21/2020 AM Active Yes   guaiFENesin ER (MUCINEX) 600 mg ER tablet Take 600 mg by mouth two (2) times daily as needed for Congestion. Provider, Historical 10/14/2020 Active Yes   levothyroxine (SYNTHROID) 100 mcg tablet TAKE ONE TABLET BY MOUTH DAILY BEFORE BREAKFAST Booker Raphael MD 10/21/2020 Active Yes   meloxicam (MOBIC) 7.5 mg tablet Take 7.5 mg by mouth daily. Provider, Historical 10/21/2020 Active Yes   montelukast (SINGULAIR) 10 mg tablet Take 1 Tab by mouth daily as needed (allergies). Booker Raphael MD 10/14/2020 Active Yes   PARoxetine (PAXIL) 10 mg tablet TAKE 1 AND 1/2 TABLET BY MOUTH DAILY Booker Raphael MD 10/21/2020 Active Yes   triamcinolone acetonide (KENALOG) 0.1 % topical cream Apply  to affected area two (2) times daily as needed for Skin Irritation.  use thin layer Provider, Historical 9/21/2020 Active Yes                  Iam Lin Юлия Mendoza, [phone]: [phone]

## 2020-10-22 NOTE — PROGRESS NOTES
Javid Blum [facility] [address]  [address]  [phone]      Medical Progress Note      NAME: Bonna Siemens   :  1928  MRM:  [id number]    Date/Time of service: 10/22/2020  11:44 AM       Subjective:     Chief Complaint:  Patient was personally seen and examined by me during this time period. Chart reviewed. Weakness and diarrhea is improving       Objective:       Vitals:       Last 24hrs VS reviewed since prior progress note.  Most recent are:    Visit Vitals  /76 (BP 1 Location: Right arm, BP Patient Position: At rest)   Pulse 71   Temp 98.2 °F (36.8 °C)   Resp 18   Ht 5' 7\" (1.702 m)   Wt 59 kg (130 lb)   SpO2 97%   BMI 20.36 kg/m²     SpO2 Readings from Last 6 Encounters:   10/22/20 97%   09/10/20 99%   20 95%   19 99%   19 97%   19 100%    O2 Flow Rate (L/min): 2 l/min   No intake or output data in the 24 hours ending 10/22/20 1144     Exam:     Physical Exam:    Gen:  Elderly, thin, frail, disheveled, NAD  HEENT:  Pink conjunctivae, PERRL, hearing intact to voice, dry mucous membranes  Neck:  Supple, without masses, thyroid non-tender  Resp:  No accessory muscle use, clear breath sounds without wheezes rales or rhonchi  Card:  No murmurs, normal S1, S2 without thrills, bruits or peripheral edema  Abd:  Soft, non-tender, non-distended, normoactive bowel sounds are present  Musc:  No cyanosis or clubbing  Skin:  No rashes   Neuro:  Cranial nerves 3-12 are grossly intact, follows commands appropriately  Psych:  Poor insight, oriented to person, place and time, alert    Medications Reviewed: (see below)    Lab Data Reviewed: (see below)    ______________________________________________________________________    Medications:     Current Facility-Administered Medications   Medication Dose Route Frequency    amLODIPine (NORVASC) tablet 2.5 mg  2.5 mg Oral DAILY    ascorbic acid (vitamin C) (VITAMIN C) tablet 500 mg  500 mg Oral DAILY    aspirin delayed-release tablet 81 mg  81 mg Oral DAILY    lactobac ac& pc-s.therm-b.anim (WILLA Q/RISAQUAD)  1 Cap Oral DAILY    zinc sulfate (ZINCATE) 220 (50) mg capsule 1 Cap  1 Cap Oral DAILY    psyllium husk-aspartame (METAMUCIL FIBER) packet 1 Packet  1 Packet Oral BID    ALPRAZolam (XANAX) tablet 1 mg  1 mg Oral DAILY PRN    amiodarone (CORDARONE) tablet 100 mg  100 mg Oral DAILY    apixaban (ELIQUIS) tablet 5 mg  5 mg Oral BID    atenoloL (TENORMIN) tablet 25 mg  25 mg Oral DAILY    eszopiclone (LUNESTA) tablet 3 mg  3 mg Oral QHS    fluticasone propionate (FLONASE) 50 mcg/actuation nasal spray 2 Spray  2 Spray Both Nostrils DAILY PRN    levothyroxine (SYNTHROID) tablet 100 mcg  100 mcg Oral ACB    montelukast (SINGULAIR) tablet 10 mg  10 mg Oral DAILY PRN    sodium chloride (NS) flush 5-40 mL  5-40 mL IntraVENous Q8H    sodium chloride (NS) flush 5-40 mL  5-40 mL IntraVENous PRN    acetaminophen (TYLENOL) tablet 650 mg  650 mg Oral Q6H PRN    Or    acetaminophen (TYLENOL) suppository 650 mg  650 mg Rectal Q6H PRN    polyethylene glycol (MIRALAX) packet 17 g  17 g Oral DAILY PRN    promethazine (PHENERGAN) tablet 12.5 mg  12.5 mg Oral Q6H PRN    Or    ondansetron (ZOFRAN) injection 4 mg  4 mg IntraVENous Q6H PRN    0.9% sodium chloride infusion  75 mL/hr IntraVENous CONTINUOUS    PARoxetine hcl (PAXIL) 10 mg/5 mL oral suspension 15 mg  15 mg Oral DAILY          Lab Review:     Recent Labs     10/22/20  0403 10/21/20  1840   WBC 2.2* 2.4*   HGB 10.9* 11.9   HCT 33.2* 35.3   * 128*     Recent Labs     10/22/20  0403 10/21/20  1840   * 129*   K 3.9 3.9    97   CO2 28 28   GLU 89 108*   BUN 9 11   CREA 0.78 0.79   CA 7.6* 7.6*   MG 1.7 1.7   ALB  --  2.9*   TBILI  --  1.0   ALT  --  18   INR  --  1.1     Lab Results   Component Value Date/Time    Glucose (POC) 275 (H) 02/16/2019 05:38 PM          Assessment / Plan:     79 yo hx of HTN, CAD, Pafib, VT s/p ICD, hypothyroid, presented w/ weakness, diarrhea, pancytopenia    1) Weakness/myalgia: likely due to a viral illness. COVID pending. Cont IVF, supportive care    2) Diarrhea: chronic. Per family, has been going on for years. Cont pro-biotics. Add fiber    3) Leukopenia: likely due to viral infection. Iron studies/B12/folate wnl. Will check LDH, Haptoglobin, retic count. Hematology to eval    4) HTN: cont atenolol, norvasc. Hold lisinopril due to dehydration    5) Pafib: cont amio, atenolol, eliquis    6) Hx of VT: s/p ICD. Cont Amio    7) Hx of CAD: cont ASA, Eliquis    8) Hypothyroid: TSH 1.64.   Cont synthroid    Total time spent with patient: 39 Minutes **I personally saw and examined the patient during this time period**                 Care Plan discussed with: Patient, nursing, son    Discussed:  Care Plan    Prophylaxis:  eliquis    Disposition:  Home w/Family           ___________________________________________________    Attending Physician: Huy Kilgore MD

## 2020-10-22 NOTE — PROGRESS NOTES
Problem: Self Care Deficits Care Plan (Adult)  Goal: *Acute Goals and Plan of Care (Insert Text)  Description:   FUNCTIONAL STATUS PRIOR TO ADMISSION: Patient was modified independent using a rollator for functional mobility. Patient was modified independent for basic ADLs. Neighbor or son provide transportation. Since COVID, son has been getting pt's groceries. HOME SUPPORT: The patient lived alone with children and neighbor locally to provide assistance. Occupational Therapy Goals  Initiated 10/22/2020  1. Patient will perform grooming, standing at sink for at least 5 minutes, with modified independence within 7 day(s). 2.  Patient will perform lower body dressing with modified independence within 7 day(s). 3.  Patient will perform bathing, sitting and standing PRN, with supervision/set-up within 7 day(s). 4.  Patient will perform toilet transfers with modified independence within 7 day(s). 5.  Patient will perform all aspects of toileting with modified independence within 7 day(s). 6.  Patient will participate in upper extremity therapeutic exercise/activities with independence for 10 minutes within 7 day(s). 7.  Patient will utilize energy conservation techniques during functional activities with verbal cues within 7 day(s). Outcome: Progressing Towards Goal     OCCUPATIONAL THERAPY EVALUATION  Patient: Madison Avila (81 y.o. female)  Date: 10/22/2020  Primary Diagnosis: SOB (shortness of breath) [R06.02]  Weakness [R53.1]        Precautions:  Fall    ASSESSMENT  Based on the objective data described below, the patient presents with decreased activity tolerance and endurance following admission for SOB and diarrhea. Pt currently being ruled out for COVID-19 and reports contact with her son who tested positive. Pt is received in bed, is pleasant, and agreeable to participate.  She is received on 2L O2 via NC and is able to tolerate standing ADLs in bathroom on RA with SpO2 at 94% with activity. She requires overall CGA for mobility and standing grooming tasks with min verbal cues for rw mgmt. Pt is able to demonstrate crossed leg technique to manage LB dressing task while seated EOB. Pt requesting to return to supine at end of session 2/2 fatigue but is receptive to education on activity pacing and maintaining tolerance while in acute setting to prevent further deconditioning. Will follow 3x/week to maximize safety and independence with ADLs. Current Level of Function Impacting Discharge (ADLs/self-care): overall CGA to min A for ADLs    Functional Outcome Measure: The patient scored Total: 45/100 on the Barthel Index outcome measure which is indicative of 55% impaired ability to care for basic self needs/dependency on others. Other factors to consider for discharge: lives alone; tolerated RA for session; decreased endurance     Patient will benefit from skilled therapy intervention to address the above noted impairments. PLAN :  Recommendations and Planned Interventions: self care training, functional mobility training, therapeutic exercise, balance training, therapeutic activities, endurance activities, patient education, home safety training and family training/education    Frequency/Duration: Patient will be followed by occupational therapy 3 times a week to address goals. Recommendation for discharge: (in order for the patient to meet his/her long term goals)  Anticipate none vs. TBD pending progress    This discharge recommendation:  Has not yet been discussed the attending provider and/or case management    IF patient discharges home will need the following DME: TBD       SUBJECTIVE:   Patient stated I sat up earlier and I know I need to make sure I'm not in bed all day.     OBJECTIVE DATA SUMMARY:   HISTORY:   Past Medical History:   Diagnosis Date    Acute cystitis 05/23/2018    Anemia, unspecified     Anxiety     Arrhythmia     a fib    Arthritis osteoarthritis, rheumatoid    Atrial fibrillation (HCC)     Dr. Chidi Carrasquillo and Dr. Sabrina Khan  following    Autoimmune disease [facility])     sjogren disease Dr. Berna Thrasher CAD (coronary artery disease)     HTN (hypertension)     Hyperlipidemia LDL goal < 100     Hypothyroid     Insomnia     Osteoarthritis     Sjogren's disease ([facility] [address]     Thoracic aneurysm without mention of rupture      Past Surgical History:   Procedure Laterality Date    HX BIV-IMPLANTABLE CARDIOVERTER DEFIBRILLATOR      HX CHOLECYSTECTOMY      HX COLONOSCOPY  3/2014    Dr. Seth Lim    HX OTHER SURGICAL      hernia repairs    HX PARTIAL THYROIDECTOMY      HX JANNET AND BSO      HX VEIN STRIPPING      VA INSJ ELTRD CAR COLLIN SYS TM INSJ DFB/PM PLS GEN Left 2/22/2019    Lv Lead Placement performed by Ernesto Weiner MD at [address] [place] [address] CATH LAB    VA INSJ/RPLCMT PERM DFB W/TRNSVNS LDS 1/DUAL [facility] Left 2/22/2019    upgrade PM to ICD-Bsx performed by Ernesto Weiner MD at [address] [place] [address] CATH LAB       Expanded or extensive additional review of patient history:     Home Situation  Home Environment: Apartment  One/Two Story Residence: One story  Living Alone: Yes  Support Systems: Child(lynette), Family member(s)  Patient Expects to be Discharged to[de-identified] Apartment  Current DME Used/Available at Home: Shower chair, Gaila Comfort, straight, Grab bars  Tub or Shower Type: Shower    Hand dominance: Right    EXAMINATION OF PERFORMANCE DEFICITS:  Cognitive/Behavioral Status:  Neurologic State: Alert  Orientation Level: Oriented X4  Cognition: Appropriate decision making; Appropriate safety awareness; Appropriate for age attention/concentration; Follows commands  Perception: Appears intact  Perseveration: No perseveration noted  Safety/Judgement: Awareness of environment; Fall prevention;Home safety; Insight into deficits    Hearing:   Auditory  Auditory Impairment: Hard of hearing, bilateral  Hearing Aids/Status: With patient    Range of Motion:  AROM: Within functional limits  PROM: Within functional limits    Strength:  Strength: Generally decreased, functional    Coordination:  Coordination: Within functional limits(in UEs)  Fine Motor Skills-Upper: Left Intact; Right Intact    Gross Motor Skills-Upper: Left Intact; Right Intact    Balance:  Sitting: Intact  Standing: With support(with rolling walker)  Standing - Static: Good  Standing - Dynamic : Good;Constant support    Functional Mobility and Transfers for ADLs:  Bed Mobility:  Rolling: Contact guard assistance  Supine to Sit: Contact guard assistance  Sit to Supine: Stand-by assistance  Scooting: Supervision    Transfers:  Sit to Stand: Contact guard assistance  Stand to Sit: Contact guard assistance  Bed to Chair: Contact guard assistance  Bathroom Mobility: Contact guard assistance  Toilet Transfer : Contact guard assistance(infer based on observations)  Assistive Device : Gait Belt;Walker, rolling    ADL Assessment:  Feeding: Independent    Oral Facial Hygiene/Grooming: Stand-by assistance;Contact guard assistance(standing at sink)    Bathing: Contact guard assistance(infer for standing bathing tasks)    Upper Body Dressing: Setup    Lower Body Dressing: Contact guard assistance(good reach to distal LEs; able to cross legs)    Toileting: Stand by assistance;Contact guard assistance    ADL Intervention and task modifications:  Grooming  Grooming Assistance: Stand-by assistance;Contact guard assistance  Position Performed: Standing(at sink with rolling walker)  Washing Face: Stand-by assistance  Brushing Teeth: Stand-by assistance;Contact guard assistance  Cues: Verbal cues provided(for safety with RW at sink)    Lower Body Dressing Assistance  Slip on Shoes with Back: Stand-by assistance  Leg Crossed Method Used: Yes  Position Performed: Seated edge of bed  Cues: Don    Cognitive Retraining  Safety/Judgement: Awareness of environment; Fall prevention;Home safety; Insight into deficits    Instructed patient to increase time sitting OOB in chair for pulmonary hygiene, skin integrity, and to increase endurance in preparation for ADLs. Instructed patient to sit OOB for all meals. Patient verbalized understanding of same. Functional Measure:  Barthel Index:    Bathin  Bladder: 0  Bowels: 10  Groomin  Dressin  Feedin  Mobility: 5  Stairs: 0  Toilet Use: 5  Transfer (Bed to Chair and Back): 10  Total: 45/100        The Barthel ADL Index: Guidelines  1. The index should be used as a record of what a patient does, not as a record of what a patient could do. 2. The main aim is to establish degree of independence from any help, physical or verbal, however minor and for whatever reason. 3. The need for supervision renders the patient not independent. 4. A patient's performance should be established using the best available evidence. Asking the patient, friends/relatives and nurses are the usual sources, but direct observation and common sense are also important. However direct testing is not needed. 5. Usually the patient's performance over the preceding 24-48 hours is important, but occasionally longer periods will be relevant. 6. Middle categories imply that the patient supplies over 50 per cent of the effort. 7. Use of aids to be independent is allowed. Kvng Byrne., Barthel, DFinaW. (0890). Functional evaluation: the Barthel Index. [facility] (14)2. LAUREL Thomas, Valery Zacarias., Ravindra Duval., George, [address] [place] [address] (). Measuring the change indisability after inpatient rehabilitation; comparison of the responsiveness of the Barthel Index and Functional [place] Measure. Journal of Neurology, Neurosurgery, and Psychiatry, 66(4), 954-256. Suzette Bolton, N.LALITHA.A, JAYSON Miranda, & Arvin Saleh M.A. (2004.) Assessment of post-stroke quality of life in cost-effectiveness studies: The usefulness of the Barthel Index and the EuroQoL-5D.  Quality of Life Research, 13, 80-90     Occupational Therapy Evaluation Charge Determination   History Examination Decision-Making   LOW Complexity : Brief history review  LOW Complexity : 1-3 performance deficits relating to physical, cognitive , or psychosocial skils that result in activity limitations and / or participation restrictions  LOW Complexity : No comorbidities that affect functional and no verbal or physical assistance needed to complete eval tasks       Based on the above components, the patient evaluation is determined to be of the following complexity level: LOW   Pain Rating:  Pt reporting no pain    Activity Tolerance:   Fair, SpO2 stable on RA and requires rest breaks  Please refer to the flowsheet for vital signs taken during this treatment. After treatment patient left in no apparent distress:    Supine in bed, Call bell within reach and Side rails x 3    COMMUNICATION/EDUCATION:   The patients plan of care was discussed with: Physical therapist and Registered nurse. Home safety education was provided and the patient/caregiver indicated understanding., Patient/family have participated as able in goal setting and plan of care. and Patient/family agree to work toward stated goals and plan of care. This patients plan of care is appropriate for delegation to [facility].     Thank you for this referral.  Jason Little OT  Time Calculation: 29 mins

## 2020-10-22 NOTE — PROGRESS NOTES
Updated pt's son Ayaz Mims of COVID+ results. Pt's son request MD to update him with new plan of care. Md notified.

## 2020-10-22 NOTE — PROGRESS NOTES
Advance Care Planning (ACP) Provider Note - Comprehensive      Date of ACP Conversation:  10/21/20    Persons included in Conversation:  patient   Length of ACP Conversation in minutes:  16 minutes     Authorized Decision Maker (if patient is incapable of making informed decisions): This person is: Mrs Maria Teresa Mondragon for ALL Patients with Decision Making Capacity:  Importance of advance care planning, including choosing a healthcare agent to communicate patient's healthcare decisions if patient lost the ability to make decisions. Review of Existing Advance Directive:  Patient and family do not have an advance directive readily available for review. Active Diagnoses:   Weakness, diarrgea    These active diagnoses are of sufficient risk that focused discussion on advance care planning is indicated in order to allow the patient to thoughtfully consider personal goals of care; and, if situations arise that prevent the ability to personally give input, to ensure appropriate representation of their personal desires for different levels and aggressiveness of care. For Serious or Chronic Illness:  Understanding of medical condition     Reviewed pt's clinical status. Zohra Mayer has multiple medical problems including afib, hypothyroidism, CAD  and is being admitted for diarrhea. We reviewed our treatment plan and anticipated discharge plans. Further, we discussed pt's wishes on how she would like to proceed (aggressive/heroic resuscitation vs not intervening and allowing nature takes its course) if she were to suffer cardiopulmonary arrest.    Understanding of CPR, goals and expected outcomes, benefits and burdens discussed. Information on CPR success provided (many factors lower a patients chance of survival, including advanced age, performance status, malignancy, and presence of multiple comorbidities); Individual asked to communicate understanding of information in his/her own words.     CPR works best to restart the heart when there is a sudden event, like a heart attack, in someone who is otherwise healthy. Unfortunately, CPR does not typically restart the heart for people who have serious health conditions or who are very sick. \"     \"In the event your heart stopped as a result of an underlying serious health condition, would you want attempts to be made to restart your heart (answer \"yes\" for attempt to resuscitate) or would you prefer a natural death (answer \"no\" for do not attempt to resuscitate)? \" NO    Patient made it very clear to me that she would not want heroic measures for resuscitation in the event of cardiopulmonary arrest, including chest compressions, defibrillation, intubation/mechanical ventilation.        Interventions Provided:  Referral made for ACP follow-up assistance to: Palliative care 50

## 2020-10-22 NOTE — ED NOTES
TRANSFER - OUT REPORT:    Verbal report given to [facility] Javier RN(name) on Barron Brennan  being transferred to 4th floor(unit) for routine progression of care       Report consisted of patients Situation, Background, Assessment and   Recommendations(SBAR). Information from the following report(s) SBAR, ED Summary, Intake/Output, MAR, Recent Results and Cardiac Rhythm Paced was reviewed with the receiving nurse. Lines:   Peripheral IV 10/21/20 Right Antecubital (Active)   Site Assessment Clean, dry, & intact 10/21/20 1811   Phlebitis Assessment 0 10/21/20 1811   Infiltration Assessment 0 10/21/20 1811   Dressing Status Clean, dry, & intact 10/21/20 1811   Dressing Type Tape 10/21/20 1811   Hub Color/Line Status Green;Flushed;Patent 10/21/20 1811   Action Taken Blood drawn 10/21/20 1811        Opportunity for questions and clarification was provided.       Patient transported with:   Tech   Visit Vitals  /84 (BP 1 Location: Left arm, BP Patient Position: At rest)   Pulse 71   Temp 98.1 °F (36.7 °C)   Resp 16   Ht 5' 7\" (1.702 m)   Wt 59 kg (130 lb)   SpO2 96%   BMI 20.36 kg/m²

## 2020-10-23 LAB
ANION GAP SERPL CALC-SCNC: 4 MMOL/L (ref 5–15)
BUN SERPL-MCNC: 13 MG/DL (ref 6–20)
BUN/CREAT SERPL: 17 (ref 12–20)
CALCIUM SERPL-MCNC: 7.9 MG/DL (ref 8.5–10.1)
CAMPYLOBACTER SPECIES, DNA: NEGATIVE
CHLORIDE SERPL-SCNC: 105 MMOL/L (ref 97–108)
CO2 SERPL-SCNC: 25 MMOL/L (ref 21–32)
CREAT SERPL-MCNC: 0.75 MG/DL (ref 0.55–1.02)
CRP SERPL-MCNC: 2.12 MG/DL (ref 0–0.6)
D DIMER PPP FEU-MCNC: 0.6 MG/L FEU (ref 0–0.65)
ENTEROTOXIGEN E COLI, DNA: NEGATIVE
ERYTHROCYTE [DISTWIDTH] IN BLOOD BY AUTOMATED COUNT: 12.8 % (ref 11.5–14.5)
FERRITIN SERPL-MCNC: 117 NG/ML (ref 26–388)
GLUCOSE SERPL-MCNC: 167 MG/DL (ref 65–100)
HCT VFR BLD AUTO: 34.8 % (ref 35–47)
HGB BLD-MCNC: 11.5 G/DL (ref 11.5–16)
LDH SERPL L TO P-CCNC: 179 U/L (ref 81–246)
MAGNESIUM SERPL-MCNC: 1.9 MG/DL (ref 1.6–2.4)
MCH RBC QN AUTO: 31.3 PG (ref 26–34)
MCHC RBC AUTO-ENTMCNC: 33 G/DL (ref 30–36.5)
MCV RBC AUTO: 94.6 FL (ref 80–99)
NRBC # BLD: 0 K/UL (ref 0–0.01)
NRBC BLD-RTO: 0 PER 100 WBC
P SHIGELLOIDES DNA STL QL NAA+PROBE: NEGATIVE
PHOSPHATE SERPL-MCNC: 3 MG/DL (ref 2.6–4.7)
PLATELET # BLD AUTO: 137 K/UL (ref 150–400)
PMV BLD AUTO: 9.8 FL (ref 8.9–12.9)
POTASSIUM SERPL-SCNC: 4.1 MMOL/L (ref 3.5–5.1)
RBC # BLD AUTO: 3.68 M/UL (ref 3.8–5.2)
SALMONELLA SPECIES, DNA: NEGATIVE
SHIGA TOXIN PRODUCING, DNA: NEGATIVE
SHIGELLA SP+EIEC IPAH STL QL NAA+PROBE: NEGATIVE
SODIUM SERPL-SCNC: 134 MMOL/L (ref 136–145)
VIBRIO SPECIES, DNA: NEGATIVE
WBC # BLD AUTO: 1.8 K/UL (ref 3.6–11)
WBC #/AREA STL HPF: NORMAL /HPF (ref 0–4)
Y. ENTEROCOLITICA, DNA: NEGATIVE

## 2020-10-23 PROCEDURE — 84100 ASSAY OF PHOSPHORUS: CPT

## 2020-10-23 PROCEDURE — 74011250636 HC RX REV CODE- 250/636: Performed by: INTERNAL MEDICINE

## 2020-10-23 PROCEDURE — 85027 COMPLETE CBC AUTOMATED: CPT

## 2020-10-23 PROCEDURE — 85379 FIBRIN DEGRADATION QUANT: CPT

## 2020-10-23 PROCEDURE — 83735 ASSAY OF MAGNESIUM: CPT

## 2020-10-23 PROCEDURE — 82728 ASSAY OF FERRITIN: CPT

## 2020-10-23 PROCEDURE — 97535 SELF CARE MNGMENT TRAINING: CPT

## 2020-10-23 PROCEDURE — 99218 HC RM OBSERVATION: CPT

## 2020-10-23 PROCEDURE — 65270000029 HC RM PRIVATE

## 2020-10-23 PROCEDURE — 83615 LACTATE (LD) (LDH) ENZYME: CPT

## 2020-10-23 PROCEDURE — 74011250637 HC RX REV CODE- 250/637: Performed by: INTERNAL MEDICINE

## 2020-10-23 PROCEDURE — 36415 COLL VENOUS BLD VENIPUNCTURE: CPT

## 2020-10-23 PROCEDURE — 80048 BASIC METABOLIC PNL TOTAL CA: CPT

## 2020-10-23 PROCEDURE — 86140 C-REACTIVE PROTEIN: CPT

## 2020-10-23 RX ORDER — BENZONATATE 100 MG/1
100 CAPSULE ORAL
Status: DISCONTINUED | OUTPATIENT
Start: 2020-10-23 | End: 2020-10-26 | Stop reason: HOSPADM

## 2020-10-23 RX ORDER — GUAIFENESIN 600 MG/1
600 TABLET, EXTENDED RELEASE ORAL EVERY 12 HOURS
Status: DISCONTINUED | OUTPATIENT
Start: 2020-10-23 | End: 2020-10-26 | Stop reason: HOSPADM

## 2020-10-23 RX ADMIN — ATENOLOL 25 MG: 25 TABLET ORAL at 09:48

## 2020-10-23 RX ADMIN — LEVOTHYROXINE SODIUM 100 MCG: 0.05 TABLET ORAL at 06:18

## 2020-10-23 RX ADMIN — Medication 10 ML: at 21:17

## 2020-10-23 RX ADMIN — ESZOPICLONE 3 MG: 2 TABLET, FILM COATED ORAL at 21:17

## 2020-10-23 RX ADMIN — APIXABAN 5 MG: 5 TABLET, FILM COATED ORAL at 09:48

## 2020-10-23 RX ADMIN — OXYCODONE HYDROCHLORIDE AND ACETAMINOPHEN 500 MG: 500 TABLET ORAL at 09:48

## 2020-10-23 RX ADMIN — AMLODIPINE BESYLATE 2.5 MG: 5 TABLET ORAL at 09:48

## 2020-10-23 RX ADMIN — PAROXETINE HYDROCHLORIDE 15 MG: 10 SUSPENSION ORAL at 09:49

## 2020-10-23 RX ADMIN — APIXABAN 5 MG: 5 TABLET, FILM COATED ORAL at 21:17

## 2020-10-23 RX ADMIN — DEXAMETHASONE 6 MG: 6 TABLET ORAL at 09:48

## 2020-10-23 RX ADMIN — GUAIFENESIN 600 MG: 600 TABLET ORAL at 21:17

## 2020-10-23 RX ADMIN — AMIODARONE HYDROCHLORIDE 100 MG: 200 TABLET ORAL at 09:48

## 2020-10-23 RX ADMIN — SODIUM CHLORIDE 75 ML/HR: 900 INJECTION, SOLUTION INTRAVENOUS at 02:09

## 2020-10-23 RX ADMIN — Medication 10 ML: at 06:19

## 2020-10-23 RX ADMIN — PSYLLIUM HUSK 1 PACKET: 3.4 POWDER ORAL at 09:49

## 2020-10-23 RX ADMIN — ZINC SULFATE 220 MG (50 MG) CAPSULE 1 CAPSULE: CAPSULE at 09:48

## 2020-10-23 RX ADMIN — Medication 1 CAPSULE: at 09:48

## 2020-10-23 RX ADMIN — Medication 81 MG: at 09:48

## 2020-10-23 RX ADMIN — GUAIFENESIN 600 MG: 600 TABLET ORAL at 12:29

## 2020-10-23 NOTE — PROGRESS NOTES
Physical Therapy Note    Attempted to see pt for PT treatment session. However, pt just recently returned to bed prior to my entering after sitting up in the chair for 2+ hours. She asked to defer PT at this time d/t fatigue. Will follow up at a later time as schedule allows. Thank you.     Diann Angulo PT, DPT

## 2020-10-23 NOTE — PROGRESS NOTES
[facility]  [address]  [phone]      Medical Progress Note      NAME: Thierry De La Rosa   :  1928  MRM:  [id number]    Date/Time of service: 10/23/2020  11:03 AM       Subjective:     Chief Complaint:  Patient was personally seen and examined by me during this time period. Chart reviewed. No diarrhea today, but still very weak       Objective:       Vitals:       Last 24hrs VS reviewed since prior progress note.  Most recent are:    Visit Vitals  /72 (BP 1 Location: Right arm, BP Patient Position: At rest)   Pulse 74   Temp 97.2 °F (36.2 °C)   Resp 16   Ht 5' 7\" (1.702 m)   Wt 59 kg (130 lb)   SpO2 96%   BMI 20.36 kg/m²     SpO2 Readings from Last 6 Encounters:   10/23/20 96%   09/10/20 99%   20 95%   19 99%   19 97%   19 100%    O2 Flow Rate (L/min): 2 l/min       Intake/Output Summary (Last 24 hours) at 10/23/2020 1103  Last data filed at 10/23/2020 9486  Gross per 24 hour   Intake 2333.75 ml   Output 600 ml   Net 1733.75 ml        Exam:     Physical Exam:    Gen:  Elderly, thin, frail, disheveled, NAD  HEENT:  Pink conjunctivae, PERRL, hearing intact to voice, dry mucous membranes  Neck:  Supple, without masses, thyroid non-tender  Resp:  No accessory muscle use, clear breath sounds without wheezes rales or rhonchi  Card:  No murmurs, normal S1, S2 without thrills, bruits or peripheral edema  Abd:  Soft, non-tender, non-distended, normoactive bowel sounds are present  Musc:  No cyanosis or clubbing  Skin:  No rashes   Neuro:  Cranial nerves 3-12 are grossly intact, follows commands appropriately  Psych:  Poor insight, oriented to person, place and time, alert    Medications Reviewed: (see below)    Lab Data Reviewed: (see below)    ______________________________________________________________________    Medications:     Current Facility-Administered Medications   Medication Dose Route Frequency    amLODIPine (NORVASC) tablet 2.5 mg  2.5 mg Oral DAILY    ascorbic acid (vitamin C) (VITAMIN C) tablet 500 mg  500 mg Oral DAILY    aspirin delayed-release tablet 81 mg  81 mg Oral DAILY    lactobac ac& pc-s.therm-b.anim (WILLA Q/RISAQUAD)  1 Cap Oral DAILY    zinc sulfate (ZINCATE) 220 (50) mg capsule 1 Cap  1 Cap Oral DAILY    psyllium husk-aspartame (METAMUCIL FIBER) packet 1 Packet  1 Packet Oral BID    dexAMETHasone (DECADRON) tablet 6 mg  6 mg Oral DAILY    ALPRAZolam (XANAX) tablet 1 mg  1 mg Oral DAILY PRN    amiodarone (CORDARONE) tablet 100 mg  100 mg Oral DAILY    apixaban (ELIQUIS) tablet 5 mg  5 mg Oral BID    atenoloL (TENORMIN) tablet 25 mg  25 mg Oral DAILY    eszopiclone (LUNESTA) tablet 3 mg  3 mg Oral QHS    fluticasone propionate (FLONASE) 50 mcg/actuation nasal spray 2 Spray  2 Spray Both Nostrils DAILY PRN    levothyroxine (SYNTHROID) tablet 100 mcg  100 mcg Oral ACB    montelukast (SINGULAIR) tablet 10 mg  10 mg Oral DAILY PRN    sodium chloride (NS) flush 5-40 mL  5-40 mL IntraVENous Q8H    sodium chloride (NS) flush 5-40 mL  5-40 mL IntraVENous PRN    acetaminophen (TYLENOL) tablet 650 mg  650 mg Oral Q6H PRN    Or    acetaminophen (TYLENOL) suppository 650 mg  650 mg Rectal Q6H PRN    polyethylene glycol (MIRALAX) packet 17 g  17 g Oral DAILY PRN    promethazine (PHENERGAN) tablet 12.5 mg  12.5 mg Oral Q6H PRN    Or    ondansetron (ZOFRAN) injection 4 mg  4 mg IntraVENous Q6H PRN    0.9% sodium chloride infusion  75 mL/hr IntraVENous CONTINUOUS    PARoxetine hcl (PAXIL) 10 mg/5 mL oral suspension 15 mg  15 mg Oral DAILY          Lab Review:     Recent Labs     10/23/20  0332 10/22/20  0403 10/21/20  1840   WBC 1.8* 2.2* 2.4*   HGB 11.5 10.9* 11.9   HCT 34.8* 33.2* 35.3   * 112* 128*     Recent Labs     10/23/20  0332 10/22/20  1953 10/22/20  0403 10/21/20  1840   *  --  134* 129*   K 4.1  --  3.9 3.9     --  102 97   CO2 25  --  28 28   *  --  89 108* BUN 13  --  9 11   CREA 0.75  --  0.78 0.79   CA 7.9*  --  7.6* 7.6*   MG 1.9  --  1.7 1.7   PHOS 3.0  --   --   --    ALB  --   --   --  2.9*   TBILI  --   --   --  1.0   ALT  --   --   --  18   INR  --  1.0  --  1.1     Lab Results   Component Value Date/Time    Glucose (POC) 275 (H) 02/16/2019 05:38 PM          Assessment / Plan:     79 yo hx of HTN, CAD, Pafib, VT s/p ICD, hypothyroid, presented w/ weakness, diarrhea, pancytopenia, COVID-19 infection    1) COVID-19 infection: has mild dyspnea. Diarrhea is improving. Cont O2 for comfort. Encourage incentive spirometry, nebs prn. Monitor inflammatory labs    2) Weakness/myalgia: still very weak. Likely due to a viral illness. COVID pending. Cont IVF, supportive care. Consult PT/OT, might need SNF    3) Diarrhea: chronic, but improving. Per family, has been going on for years. Cont pro-biotics, fiber    4) Leukopenia: likely due to viral infection. Iron studies/B12/folate wnl. No evidence of hemolysis. Hematology is following    5) HTN: cont atenolol, norvasc. Hold lisinopril due to dehydration    6) Pafib: cont amio, atenolol, eliquis    7) Hx of VT: s/p ICD. Cont Amio    8) Hx of CAD: cont ASA, Eliquis    9) Hypothyroid: TSH 1.64.   Cont synthroid    Total time spent with patient: 28 Minutes **I personally saw and examined the patient during this time period**                 Care Plan discussed with: Patient, nursing    Discussed:  Care Plan    Prophylaxis:  eliquis    Disposition:  Home w/Family vs SNF           ___________________________________________________    Attending Physician: Stefany Ward MD

## 2020-10-23 NOTE — PROGRESS NOTES
Problem: Self Care Deficits Care Plan (Adult)  Goal: *Acute Goals and Plan of Care (Insert Text)  Description:   FUNCTIONAL STATUS PRIOR TO ADMISSION: Patient was modified independent using a rollator for functional mobility. Patient was modified independent for basic ADLs. Neighbor or son provide transportation. Since COVID, son has been getting pt's groceries. HOME SUPPORT: The patient lived alone with children and neighbor locally to provide assistance. Occupational Therapy Goals  Initiated 10/22/2020  1. Patient will perform grooming, standing at sink for at least 5 minutes, with modified independence within 7 day(s). 2.  Patient will perform lower body dressing with modified independence within 7 day(s). 3.  Patient will perform bathing, sitting and standing PRN, with supervision/set-up within 7 day(s). 4.  Patient will perform toilet transfers with modified independence within 7 day(s). 5.  Patient will perform all aspects of toileting with modified independence within 7 day(s). 6.  Patient will participate in upper extremity therapeutic exercise/activities with independence for 10 minutes within 7 day(s). 7.  Patient will utilize energy conservation techniques during functional activities with verbal cues within 7 day(s). Outcome: Progressing Towards Goal     OCCUPATIONAL THERAPY TREATMENT  Patient: Odessa Nelson (65 y.o. female)  Date: 10/23/2020  Diagnosis: SOB (shortness of breath) [R06.02]  Weakness [R53.1]  Weakness [R53.1]   SOB (shortness of breath)       Precautions: Fall  Chart, occupational therapy assessment, plan of care, and goals were reviewed. ASSESSMENT  Patient continues with skilled OT services and is progressing towards goals. Patient is received in bed, pleasant and agreeable to participate with therapy. She performs partial morning ADL routine on RA, noted to desaturate down to 91% with activity, with good recovery to 94-95% with cued PLB.  She performs standing tasks at sink and toileting with overall SBA/CGA. Pt reporting she prefers rollator and requires intermittent assistance and cues for RW safety/mgmt. Noted bleeding around IV site upon return to chair; Notified RN. Continue to recommend follow up [facility] therapy services and increased assistance for safety upon return home. Current Level of Function Impacting Discharge (ADLs): SBA/CGA for standing tasks at sink; supervision to CGA LB ADLs    Other factors to consider for discharge: mild NAVA on RA         PLAN :  Patient continues to benefit from skilled intervention to address the above impairments. Continue treatment per established plan of care. to address goals. Recommend with staff: 1 person assist mobility to bathroom for toileting; ADLs as able; OOB to chair for meals    Recommend next OT session: simple home mgmt task; gathering ADL items    Recommendation for discharge: (in order for the patient to meet his/her long term goals)  Occupational therapy at least 2 days/week in the home AND ensure assist and/or supervision for safety with bathing and IADLs    This discharge recommendation:  Has not yet been discussed the attending provider and/or case management    IF patient discharges home will need the following DME: TBD regarding RW. Pt uses rollator at baseline       SUBJECTIVE:   Patient stated I prefer my rollator, this one [RW] doesn't roll as easily.     OBJECTIVE DATA SUMMARY:   Cognitive/Behavioral Status:  Neurologic State: Alert  Orientation Level: Oriented X4  Cognition: Appropriate decision making; Appropriate for age attention/concentration; Appropriate safety awareness; Follows commands  Perception: Appears intact  Perseveration: No perseveration noted  Safety/Judgement: Awareness of environment; Fall prevention; Insight into deficits;Home safety    Functional Mobility and Transfers for ADLs:  Bed Mobility:  Supine to Sit: Stand-by assistance  Scooting: Supervision    Transfers:  Sit to Stand: Contact guard assistance  Functional Transfers  Bathroom Mobility: Contact guard assistance  Toilet Transfer : Contact guard assistance(with rolling walker)  Bed to Chair: Contact guard assistance    Balance:  Sitting: Intact  Standing: With support(with rolling walker)  Standing - Static: Good  Standing - Dynamic : Good    ADL Intervention:  Grooming  Grooming Assistance: Stand-by assistance;Contact guard assistance  Position Performed: Standing(at sink with rolling walker)  Washing Hands: Stand-by assistance  Brushing Teeth: Stand-by assistance;Contact guard assistance  Cues: Verbal cues provided(for safety with rolling walker)    Lower Body Bathing  Perineal  : Stand-by assistance  Position Performed: Seated on toilet  Cues: Verbal cues provided  Adaptive Equipment: Walker;Grab bar    Lower Body Dressing Assistance  Slip on Shoes with Back: Supervision  Leg Crossed Method Used: Yes  Position Performed: Seated edge of bed  Cues: Jannette Carlton  Bladder Hygiene: Supervision(seated)  Clothing Management: Contact guard assistance    Cognitive Retraining  Safety/Judgement: Awareness of environment; Fall prevention; Insight into deficits;Home safety    Pain:  Pt reporting no pain     Activity Tolerance:   Fair, requires rest breaks and observed SOB with activity  Please refer to the flowsheet for vital signs taken during this treatment. After treatment patient left in no apparent distress:   Sitting in chair, Call bell within reach, Bed / chair alarm activated and RN present    COMMUNICATION/COLLABORATION:   The patients plan of care was discussed with: Physical therapist and Registered nurse.      Eladio Ramires OT  Time Calculation: 37 mins

## 2020-10-23 NOTE — PROGRESS NOTES
10/23/2020   12:31 PM  Reason for Admission:Emergency -  1. Suspected COVID-19 virus infection    2. SOB (shortness of breath)    3. Thrombocytopenia ([address]    4. Leukopenia, unspecified type    5. Elevated troponin    6. Hyponatremia      Assessment:   []In person with pt   [x]Via p/c with pt, charted address and phone numbers confirmed   []With family member in person. Who/Relation:     [x]With family member via p/c. Who/Relation: lvm with pt's sonBerkley at [phone]. VM was also left with pt's son on 10/22. No return phone call received. []Chart Review    RUR: 14%  Risk Level: [x]Low []Moderate []High  Value-based purchasing: [x] Yes [] No  Bundle patient: [] Yes [x] No   Specify:     Advance Directive:   Pt confirmed she is a DNR and her son is her MPOA. Pt reported she will ask her son to bring her AMD to [facility].      Assessment:    Age: 80    Sex: [] Male [x]Female     Residency: []Private residence [x]Apartment []Assisted Living []LTC []Other:   Exterior Steps: 0  Interior Steps: 0    Lives With: []With spouse []Other family members []Underage children [x]Alone []Care provider []Other:    Prior functioning:  [x]Independent []Dependent []Partial dependence   Pt requires assistance with: []Bathing []Dressing []Toileting []Ambulation     Prior DME required:  [x]None []RW []Cane []Crutches []Bedside commode []CPAP []Home O2 (Liter/Provider: ) []Nebulizer   []Shower Chair []Wheelchair []Hospital Bed []Nely []Stair lift []Rollator []Other:    DME available: []None []RW [x]Cane []Crutches []Bedside commode []CPAP []Home O2 (Liter/Provider: ) []Nebulizer   []Shower Chair []Wheelchair []Hospital Bed []Nely []Stair lift [x]Rollator []Other:    Rehab history: []None []Outpatient PT [x]Home Health (Through [facility] confirmed this through chart review ) [x]SNF ([facility]) []IPR (Provider/Date: ) []LTC (Provider/Date: )    Discharge Concerns: [x]Yes []No []Unknown   Describe: lives alone    Insurer: [employer] and [employer]    PCP: Lin Severino   Name of Practice: [facility]    Current patient: [x]Yes []No   Approximate date of last visit: 9/10   Access to virtual PCP visits: [x]Yes []No  Pt also utilized [facility] often at home. Pharmacy: Tung [facility] at [facility]: Pt's son         Transition of care plan:    [x]Unable to determine at this time. Awaiting clinical progress, and disposition recommendations. CM discussed discharge with pt. Pt reported her son cannot assist her at home and she cannot afford personal care. Pt declined SNF placement. Pt reported once she is feeling better she will be amendable to discharging home with home health. Pt was unable to tell CM if she wanted to use the same home health agency as she had used in the past.   CM will follow.  TARIQ Stearns  Care Management Interventions  PCP Verified by CM: Yes(Pau Brown )  Palliative Care Criteria Met (RRAT>21 & CHF Dx)?: No  Mode of Transport at Discharge: (TBD, likely son )  Daot Signup: No  Discharge Durable Medical Equipment: No  Physical Therapy Consult: Yes  Occupational Therapy Consult: Yes  Speech Therapy Consult: No  Current Support Network: Lives Alone  Confirm Follow Up Transport: Family(Pt's nieghbor or son )  Discharge Location  Discharge Placement: Unable to determine at this time

## 2020-10-23 NOTE — PROGRESS NOTES
Problem: Falls - Risk of  Goal: *Absence of Falls  Description: Document Prosper Click Fall Risk and appropriate interventions in the flowsheet. Outcome: Progressing Towards Goal  Note: Fall Risk Interventions:  Mobility Interventions: Communicate number of staff needed for ambulation/transfer, PT Consult for mobility concerns, PT Consult for assist device competence, Utilize walker, cane, or other assistive device         Medication Interventions: Bed/chair exit alarm    Elimination Interventions: Patient to call for help with toileting needs              Problem: Patient Education: Go to Patient Education Activity  Goal: Patient/Family Education  Outcome: Progressing Towards Goal     Problem: Patient Education: Go to Patient Education Activity  Goal: Patient/Family Education  Outcome: Progressing Towards Goal     Problem: Patient Education: Go to Patient Education Activity  Goal: Patient/Family Education  Outcome: Progressing Towards Goal     Problem: Airway Clearance - Ineffective  Goal: Achieve or maintain patent airway  Outcome: Progressing Towards Goal     Problem: Gas Exchange - Impaired  Goal: Absence of hypoxia  Outcome: Progressing Towards Goal  Goal: Promote optimal lung function  Outcome: Progressing Towards Goal     Problem: Breathing Pattern - Ineffective  Goal: Ability to achieve and maintain a regular respiratory rate  Outcome: Progressing Towards Goal     Problem:  Body Temperature -  Risk of, Imbalanced  Goal: Ability to maintain a body temperature within defined limits  Outcome: Progressing Towards Goal  Goal: Will regain or maintain usual level of consciousness  Outcome: Progressing Towards Goal  Goal: Complications related to the disease process, condition or treatment will be avoided or minimized  Outcome: Progressing Towards Goal     Problem: Isolation Precautions - Risk of Spread of Infection  Goal: Prevent transmission of infectious organism to others  Outcome: Progressing Towards Goal Problem: Nutrition Deficits  Goal: Optimize nutrtional status  Outcome: Progressing Towards Goal     Problem: Risk for Fluid Volume Deficit  Goal: Maintain normal heart rhythm  Outcome: Progressing Towards Goal  Goal: Maintain absence of muscle cramping  Outcome: Progressing Towards Goal  Goal: Maintain normal serum potassium, sodium, calcium, phosphorus, and pH  Outcome: Progressing Towards Goal     Problem: Loneliness or Risk for Loneliness  Goal: Demonstrate positive use of time alone when socialization is not possible  Outcome: Progressing Towards Goal     Problem: Fatigue  Goal: Verbalize increase energy and improved vitality  Outcome: Progressing Towards Goal     Problem: Patient Education: Go to Patient Education Activity  Goal: Patient/Family Education  Outcome: Progressing Towards Goal

## 2020-10-23 NOTE — PROGRESS NOTES
Spiritual Care Assessment/Progress Note  [address]      NAME: Isabel Navarro      MRN: [id number]  AGE: 80 y.o.  SEX: female  Zoroastrianism Affiliation: Buddhist   Language: English     10/23/2020     Total Time (in minutes): 16     Spiritual Assessment begun in [facility] 4M POST SURG ORT 2 through conversation with:         [x]Patient        [] Family    [] Friend(s)        Reason for Consult: Initial/Spiritual assessment, patient floor     Spiritual beliefs: (Please include comment if needed)     [x] Identifies with a charlie tradition:         [] Supported by a charlie community:            [] Claims no spiritual orientation:           [] Seeking spiritual identity:                [] Adheres to an individual form of spirituality:           [] Not able to assess:                           Identified resources for coping:      [x] Prayer                               [] Music                  [] Guided Imagery     [x] Family/friends                 [] Pet visits     [] Devotional reading                         [] Unknown     [] Other:                                               Interventions offered during this visit: (See comments for more details)    Patient Interventions: Affirmation of emotions/emotional suffering, Affirmation of charlie, Catharsis/review of pertinent events in supportive environment, Normalization of emotional/spiritual concerns, Prayer (actual), Prayer (assurance of)           Plan of Care:     [x] Support spiritual and/or cultural needs    [] Support AMD and/or advance care planning process      [] Support grieving process   [] Coordinate Rites and/or Rituals    [] Coordination with community clergy   [] No spiritual needs identified at this time   [] Detailed Plan of Care below (See Comments)  [] Make referral to Music Therapy  [] Make referral to Pet Therapy     [] Make referral to Addiction services  [] Make referral to [facility]  [] Make referral to Spiritual Care Partner  [] No future visits requested        [x] Follow up visits as needed     Comments:  for initial visit.  called pt's room phone since is have covid contact precautions. Pt was appreciative of the call and support. She shared that prayer would be helpful so  had prayer with pt and will continue to keep pt in prayer. Let her know of  support and availability. Please contact [id number] [facility] for further support.  follow up as needed.      [address] Rabia Wong, MACE   [phone]

## 2020-10-23 NOTE — ACP (ADVANCE CARE PLANNING)
Advance Care Planning     Advance Care Planning Activator (Inpatient)  Conversation Note      Date of ACP Conversation: 10/23/20     Conversation Conducted with:   Patient with Decision Making Capacity    ACP Activator: Tessa Gresham Decision Maker:    Current Designated Health Care Decision Maker:   Primary Decision MakeJoel Larsen [facility] - [phone]    Care Preferences    Ventilation: \"If you were in your present state of health and suddenly became very ill and were unable to breathe on your own, what would your preference be about the use of a ventilator (breathing machine) if it were available to you? \"      If patient would desire the use of a ventilator (breathing machine), answer \"yes\", if not \"no\":NO, pt confirmed DNR    \"If your health worsens and it becomes clear that your chance of recovery is unlikely, what would your preference be about the use of a ventilator (breathing machine) if it were available to you? \"     Would the patient desire the use of a ventilator (breathing machine)? NO, pt confirmed DNR      Resuscitation  \"CPR works best to restart the heart when there is a sudden event, like a heart attack, in someone who is otherwise healthy. Unfortunately, CPR does not typically restart the heart for people who have serious health conditions or who are very sick. \"    \"In the event your heart stopped as a result of an underlying serious health condition, would you want attempts to be made to restart your heart (answer \"yes\" for attempt to resuscitate) or would you prefer a natural death (answer \"no\" for do not attempt to resuscitate)? \" No, pt confirmed DNR    [] Yes  [x] No   Educated Patient / Mone Winters regarding differences between Advance Directives and portable DNR orders.     Length of ACP Conversation in minutes:  5    Conversation Outcomes:  [x] ACP discussion completed  [] Existing advance directive reviewed with patient; no changes to patient's previously recorded wishes     [] New Advance Directive completed   [] Portable Do Not Resuscitate prepared for Provider review and signature  [] POLST/POST/MOLST/MOST prepared for Provider review and signature      Follow-up plan:    [] Schedule follow-up conversation to continue planning  [] Referred individual to Provider for additional questions/concerns   [] Advised patient/agent/surrogate to review completed ACP document and update if needed with changes in condition, patient preferences or care setting     [] This note routed to one or more involved healthcare providers

## 2020-10-24 LAB
ANION GAP SERPL CALC-SCNC: 5 MMOL/L (ref 5–15)
BUN SERPL-MCNC: 16 MG/DL (ref 6–20)
BUN/CREAT SERPL: 23 (ref 12–20)
CALCIUM SERPL-MCNC: 8.1 MG/DL (ref 8.5–10.1)
CHLORIDE SERPL-SCNC: 108 MMOL/L (ref 97–108)
CO2 SERPL-SCNC: 26 MMOL/L (ref 21–32)
CREAT SERPL-MCNC: 0.71 MG/DL (ref 0.55–1.02)
ERYTHROCYTE [DISTWIDTH] IN BLOOD BY AUTOMATED COUNT: 13.1 % (ref 11.5–14.5)
GLUCOSE SERPL-MCNC: 114 MG/DL (ref 65–100)
HCT VFR BLD AUTO: 34.6 % (ref 35–47)
HGB BLD-MCNC: 11.4 G/DL (ref 11.5–16)
MAGNESIUM SERPL-MCNC: 1.8 MG/DL (ref 1.6–2.4)
MCH RBC QN AUTO: 31.2 PG (ref 26–34)
MCHC RBC AUTO-ENTMCNC: 32.9 G/DL (ref 30–36.5)
MCV RBC AUTO: 94.8 FL (ref 80–99)
NRBC # BLD: 0 K/UL (ref 0–0.01)
NRBC BLD-RTO: 0 PER 100 WBC
PHOSPHATE SERPL-MCNC: 2.4 MG/DL (ref 2.6–4.7)
PLATELET # BLD AUTO: 159 K/UL (ref 150–400)
PMV BLD AUTO: 10 FL (ref 8.9–12.9)
POTASSIUM SERPL-SCNC: 3.8 MMOL/L (ref 3.5–5.1)
RBC # BLD AUTO: 3.65 M/UL (ref 3.8–5.2)
SODIUM SERPL-SCNC: 139 MMOL/L (ref 136–145)
WBC # BLD AUTO: 3.8 K/UL (ref 3.6–11)

## 2020-10-24 PROCEDURE — 84100 ASSAY OF PHOSPHORUS: CPT

## 2020-10-24 PROCEDURE — 74011250637 HC RX REV CODE- 250/637: Performed by: INTERNAL MEDICINE

## 2020-10-24 PROCEDURE — 74011250636 HC RX REV CODE- 250/636: Performed by: INTERNAL MEDICINE

## 2020-10-24 PROCEDURE — 74011000250 HC RX REV CODE- 250: Performed by: INTERNAL MEDICINE

## 2020-10-24 PROCEDURE — 65270000029 HC RM PRIVATE

## 2020-10-24 PROCEDURE — 85027 COMPLETE CBC AUTOMATED: CPT

## 2020-10-24 PROCEDURE — 36415 COLL VENOUS BLD VENIPUNCTURE: CPT

## 2020-10-24 PROCEDURE — 83735 ASSAY OF MAGNESIUM: CPT

## 2020-10-24 PROCEDURE — 80048 BASIC METABOLIC PNL TOTAL CA: CPT

## 2020-10-24 RX ADMIN — Medication 81 MG: at 09:23

## 2020-10-24 RX ADMIN — GUAIFENESIN 600 MG: 600 TABLET ORAL at 09:23

## 2020-10-24 RX ADMIN — Medication 10 ML: at 14:56

## 2020-10-24 RX ADMIN — DEXAMETHASONE 6 MG: 6 TABLET ORAL at 09:23

## 2020-10-24 RX ADMIN — GUAIFENESIN 600 MG: 600 TABLET ORAL at 21:46

## 2020-10-24 RX ADMIN — ESZOPICLONE 3 MG: 2 TABLET, FILM COATED ORAL at 21:45

## 2020-10-24 RX ADMIN — Medication 10 ML: at 07:04

## 2020-10-24 RX ADMIN — ATENOLOL 25 MG: 25 TABLET ORAL at 09:28

## 2020-10-24 RX ADMIN — APIXABAN 5 MG: 5 TABLET, FILM COATED ORAL at 09:24

## 2020-10-24 RX ADMIN — SODIUM CHLORIDE 75 ML/HR: 900 INJECTION, SOLUTION INTRAVENOUS at 09:27

## 2020-10-24 RX ADMIN — Medication 10 ML: at 21:46

## 2020-10-24 RX ADMIN — SODIUM CHLORIDE: 900 INJECTION, SOLUTION INTRAVENOUS at 12:20

## 2020-10-24 RX ADMIN — Medication 1 CAPSULE: at 09:23

## 2020-10-24 RX ADMIN — AMLODIPINE BESYLATE 2.5 MG: 5 TABLET ORAL at 09:29

## 2020-10-24 RX ADMIN — APIXABAN 5 MG: 5 TABLET, FILM COATED ORAL at 21:46

## 2020-10-24 RX ADMIN — LEVOTHYROXINE SODIUM 100 MCG: 0.05 TABLET ORAL at 07:05

## 2020-10-24 RX ADMIN — OXYCODONE HYDROCHLORIDE AND ACETAMINOPHEN 500 MG: 500 TABLET ORAL at 09:23

## 2020-10-24 RX ADMIN — PAROXETINE HYDROCHLORIDE 15 MG: 10 SUSPENSION ORAL at 09:30

## 2020-10-24 RX ADMIN — ZINC SULFATE 220 MG (50 MG) CAPSULE 1 CAPSULE: CAPSULE at 09:23

## 2020-10-24 RX ADMIN — AMIODARONE HYDROCHLORIDE 100 MG: 200 TABLET ORAL at 09:28

## 2020-10-24 NOTE — PROGRESS NOTES
Javid [address]  [address] [place] [address]  [phone]      Medical Progress Note      NAME: Shabana Calloway   :  1928  MRM:  [id number]    Date/Time of service: 10/24/2020  11:03 AM       Subjective:     Chief Complaint:  Patient was personally seen and examined by me during this time period. Chart reviewed. No dyspnea. Weakness improving. Does not want SNF       Objective:       Vitals:       Last 24hrs VS reviewed since prior progress note.  Most recent are:    Visit Vitals  /72 (BP 1 Location: Left arm, BP Patient Position: At rest)   Pulse 62   Temp 97.6 °F (36.4 °C)   Resp 18   Ht 5' 7\" (1.702 m)   Wt 59 kg (130 lb)   SpO2 94%   BMI 20.36 kg/m²     SpO2 Readings from Last 6 Encounters:   10/24/20 94%   09/10/20 99%   20 95%   19 99%   19 97%   19 100%    O2 Flow Rate (L/min): 2 l/min       Intake/Output Summary (Last 24 hours) at 10/24/2020 1036  Last data filed at 10/24/2020 0412  Gross per 24 hour   Intake 150 ml   Output --   Net 150 ml        Exam:     Physical Exam:    Gen:  Elderly, thin, frail, disheveled, NAD  HEENT:  Pink conjunctivae, PERRL, hearing intact to voice, dry mucous membranes  Neck:  Supple, without masses, thyroid non-tender  Resp:  No accessory muscle use, clear breath sounds without wheezes rales or rhonchi  Card:  No murmurs, normal S1, S2 without thrills, bruits or peripheral edema  Abd:  Soft, non-tender, non-distended, normoactive bowel sounds are present  Musc:  No cyanosis or clubbing  Skin:  No rashes   Neuro:  Cranial nerves 3-12 are grossly intact, follows commands appropriately  Psych:  Poor insight, oriented to person, place and time, alert    Medications Reviewed: (see below)    Lab Data Reviewed: (see below)    ______________________________________________________________________    Medications:     Current Facility-Administered Medications   Medication Dose Route Frequency    potassium phosphate 30 mmol in 0.9% sodium chloride 250 mL infusion   IntraVENous ONCE    guaiFENesin ER (MUCINEX) tablet 600 mg  600 mg Oral Q12H    benzonatate (TESSALON) capsule 100 mg  100 mg Oral TID PRN    ipratropium-albuterol (COMBIVENT RESPIMAT) 20 mcg-100 mcg inhalation spray  1 Puff Inhalation Q4H PRN    amLODIPine (NORVASC) tablet 2.5 mg  2.5 mg Oral DAILY    ascorbic acid (vitamin C) (VITAMIN C) tablet 500 mg  500 mg Oral DAILY    aspirin delayed-release tablet 81 mg  81 mg Oral DAILY    lactobac ac& pc-s.therm-b.anim (WILLA Q/RISAQUAD)  1 Cap Oral DAILY    zinc sulfate (ZINCATE) 220 (50) mg capsule 1 Cap  1 Cap Oral DAILY    psyllium husk-aspartame (METAMUCIL FIBER) packet 1 Packet  1 Packet Oral BID    dexAMETHasone (DECADRON) tablet 6 mg  6 mg Oral DAILY    ALPRAZolam (XANAX) tablet 1 mg  1 mg Oral DAILY PRN    amiodarone (CORDARONE) tablet 100 mg  100 mg Oral DAILY    apixaban (ELIQUIS) tablet 5 mg  5 mg Oral BID    atenoloL (TENORMIN) tablet 25 mg  25 mg Oral DAILY    eszopiclone (LUNESTA) tablet 3 mg  3 mg Oral QHS    fluticasone propionate (FLONASE) 50 mcg/actuation nasal spray 2 Spray  2 Spray Both Nostrils DAILY PRN    levothyroxine (SYNTHROID) tablet 100 mcg  100 mcg Oral ACB    montelukast (SINGULAIR) tablet 10 mg  10 mg Oral DAILY PRN    sodium chloride (NS) flush 5-40 mL  5-40 mL IntraVENous Q8H    sodium chloride (NS) flush 5-40 mL  5-40 mL IntraVENous PRN    acetaminophen (TYLENOL) tablet 650 mg  650 mg Oral Q6H PRN    Or    acetaminophen (TYLENOL) suppository 650 mg  650 mg Rectal Q6H PRN    polyethylene glycol (MIRALAX) packet 17 g  17 g Oral DAILY PRN    promethazine (PHENERGAN) tablet 12.5 mg  12.5 mg Oral Q6H PRN    Or    ondansetron (ZOFRAN) injection 4 mg  4 mg IntraVENous Q6H PRN    0.9% sodium chloride infusion  75 mL/hr IntraVENous CONTINUOUS    PARoxetine hcl (PAXIL) 10 mg/5 mL oral suspension 15 mg  15 mg Oral DAILY          Lab Review:     Recent Labs 10/24/20  0656 10/23/20  0332 10/22/20  0403   WBC 3.8 1.8* 2.2*   HGB 11.4* 11.5 10.9*   HCT 34.6* 34.8* 33.2*    137* 112*     Recent Labs     10/24/20  0656 10/23/20  0332 10/22/20  1953 10/22/20  0403 10/21/20  1840    134*  --  134* 129*   K 3.8 4.1  --  3.9 3.9    105  --  102 97   CO2 26 25  --  28 28   * 167*  --  89 108*   BUN 16 13  --  9 11   CREA 0.71 0.75  --  0.78 0.79   CA 8.1* 7.9*  --  7.6* 7.6*   MG 1.8 1.9  --  1.7 1.7   PHOS 2.4* 3.0  --   --   --    ALB  --   --   --   --  2.9*   TBILI  --   --   --   --  1.0   ALT  --   --   --   --  18   INR  --   --  1.0  --  1.1     Lab Results   Component Value Date/Time    Glucose (POC) 275 (H) 02/16/2019 05:38 PM          Assessment / Plan:     79 yo hx of HTN, CAD, Pafib, VT s/p ICD, hypothyroid, presented w/ weakness, diarrhea, pancytopenia, COVID-19 infection    1) COVID-19 infection: improving. Has mild dyspnea but not hypoxic. Diarrhea is improving. Cont O2 for comfort. Encourage incentive spirometry, nebs prn. Monitor inflammatory labs    2) Weakness/myalgia: still very weak. Likely due to a viral illness. COVID pending. Cont IVF, supportive care. Consult PT/OT. Refused SNF, likely home with [facility] on 10/25    3) Diarrhea: chronic, but improving. Per family, has been going on for years. Cont pro-biotics, fiber    4) Leukopenia: likely due to viral infection. Iron studies/B12/folate wnl. No evidence of hemolysis. Hematology is following    5) HTN: cont atenolol, norvasc. Hold lisinopril due to dehydration    6) Pafib: cont amio, atenolol, eliquis    7) Hx of VT: s/p ICD. Cont Amio    8) Hx of CAD: cont ASA, Eliquis    9) Hypothyroid: TSH 1.64.   Cont synthroid    Total time spent with patient: 22 Minutes **I personally saw and examined the patient during this time period**                 Care Plan discussed with: Patient, nursing    Discussed:  Care Plan    Prophylaxis:  eliquis    Disposition:  Home w/Family w/ Dashawn Salcido            ___________________________________________________    Attending Physician: Joshua Lambert MD

## 2020-10-24 NOTE — PROGRESS NOTES
10/24/2020  Case Management Note    12:10 PM  Seems patient has been open to [facility] before, it is unclear if she is currently open to them and son is not sure. I have left a voicemail for the on call nurse at [facility] to give me a call back to see if she is currently open to them.     Jaki Hernandez, BS

## 2020-10-24 NOTE — PROGRESS NOTES
CMS Note  10/24/2020    Due to patient's isolation contact, CMS attempted to call patient's son via phone. Patient was provided a copy for their record.   Zakiya Young CMS

## 2020-10-24 NOTE — PROGRESS NOTES
Case Management Note    HH consult noted. LVM for patient's son Isabell Orozco for [facility] choices.     Lin Jones BS

## 2020-10-24 NOTE — PROGRESS NOTES
Bedside and Verbal shift change report given to [id number] (oncoming nurse) by Diane Luna (offgoing nurse). Report included the following information SBAR, Kardex and MAR.

## 2020-10-25 PROCEDURE — 74011250637 HC RX REV CODE- 250/637: Performed by: INTERNAL MEDICINE

## 2020-10-25 PROCEDURE — 65270000029 HC RM PRIVATE

## 2020-10-25 PROCEDURE — 74011250636 HC RX REV CODE- 250/636: Performed by: INTERNAL MEDICINE

## 2020-10-25 PROCEDURE — 94760 N-INVAS EAR/PLS OXIMETRY 1: CPT

## 2020-10-25 RX ADMIN — AMIODARONE HYDROCHLORIDE 100 MG: 200 TABLET ORAL at 08:50

## 2020-10-25 RX ADMIN — APIXABAN 5 MG: 5 TABLET, FILM COATED ORAL at 21:12

## 2020-10-25 RX ADMIN — AMLODIPINE BESYLATE 2.5 MG: 5 TABLET ORAL at 08:51

## 2020-10-25 RX ADMIN — ZINC SULFATE 220 MG (50 MG) CAPSULE 1 CAPSULE: CAPSULE at 08:50

## 2020-10-25 RX ADMIN — GUAIFENESIN 600 MG: 600 TABLET ORAL at 08:50

## 2020-10-25 RX ADMIN — ATENOLOL 25 MG: 25 TABLET ORAL at 08:49

## 2020-10-25 RX ADMIN — Medication 81 MG: at 08:49

## 2020-10-25 RX ADMIN — Medication 10 ML: at 17:57

## 2020-10-25 RX ADMIN — DIBASIC SODIUM PHOSPHATE, MONOBASIC POTASSIUM PHOSPHATE AND MONOBASIC SODIUM PHOSPHATE 1 TABLET: 852; 155; 130 TABLET ORAL at 08:49

## 2020-10-25 RX ADMIN — SODIUM CHLORIDE 50 ML/HR: 900 INJECTION, SOLUTION INTRAVENOUS at 12:13

## 2020-10-25 RX ADMIN — ESZOPICLONE 3 MG: 2 TABLET, FILM COATED ORAL at 21:12

## 2020-10-25 RX ADMIN — DEXAMETHASONE 6 MG: 6 TABLET ORAL at 08:49

## 2020-10-25 RX ADMIN — PAROXETINE HYDROCHLORIDE 15 MG: 10 SUSPENSION ORAL at 10:44

## 2020-10-25 RX ADMIN — LEVOTHYROXINE SODIUM 100 MCG: 0.05 TABLET ORAL at 07:03

## 2020-10-25 RX ADMIN — OXYCODONE HYDROCHLORIDE AND ACETAMINOPHEN 500 MG: 500 TABLET ORAL at 08:50

## 2020-10-25 RX ADMIN — DIBASIC SODIUM PHOSPHATE, MONOBASIC POTASSIUM PHOSPHATE AND MONOBASIC SODIUM PHOSPHATE 1 TABLET: 852; 155; 130 TABLET ORAL at 17:55

## 2020-10-25 RX ADMIN — GUAIFENESIN 600 MG: 600 TABLET ORAL at 21:12

## 2020-10-25 RX ADMIN — PSYLLIUM HUSK 1 PACKET: 3.4 POWDER ORAL at 08:54

## 2020-10-25 RX ADMIN — Medication 10 ML: at 21:16

## 2020-10-25 RX ADMIN — Medication 1 CAPSULE: at 08:49

## 2020-10-25 RX ADMIN — APIXABAN 5 MG: 5 TABLET, FILM COATED ORAL at 08:50

## 2020-10-25 NOTE — PROGRESS NOTES
1530: Pt's son Che Barnett brought pt's clothes. He requested to talk to MD as he and his wife do not think pt is ready to live by herself. Pt needing help to get OOB to BR with [facility]--Pt stated she could use help at home to provide her with meals.

## 2020-10-25 NOTE — PROGRESS NOTES
Javid Felizelsen [facility]  [address] [facility], [place], [address] [place] [address]  [phone]      Medical Progress Note      NAME: Josephine Herbert   :  1928  MRM:  [id number]    Date of service: 10/25/2020  7:57 AM       Assessment and Plan:   1. COVID-19 infection: improving. Not hypoxic. Diarrhea is improving.       2.  Weakness/myalgia: Likely due to a viral illness. Supportive care. PT/OT recommended Merged with [facility] PT/OT. Refused SNF      3. Diarrhea: chronic, but improving. Per family, has been going on for years. Cont pro-biotics, fiber     4.  Pancytopenia. likely due to viral infection. Iron studies/B12/folate wnl. No evidence of hemolysis. evaluated by Hematology       5. HTN: cont atenolol, norvasc.        6.  Pafib: cont amio, atenolol, eliquis     7. Hx of VT: s/p ICD. Cont Amio     8. Hx of CAD: cont ASA, Eliquis     9. Hypothyroid: TSH 1.64. Cont synthroid              Subjective:     Chief Complaint[de-identified] Patient was seen and examined as a follow up for COVID 19. Chart was reviewed. weak, but refused SNF     ROS:  (bold if positive, if negative)    Tolerating PT  Tolerating Diet        Objective:     Last 24hrs VS reviewed since prior progress note.  Most recent are:    Visit Vitals  /81 (BP 1 Location: Left arm, BP Patient Position: At rest)   Pulse 69   Temp 97.6 °F (36.4 °C)   Resp 17   Ht 5' 7\" (1.702 m)   Wt 59 kg (130 lb)   SpO2 94%   BMI 20.36 kg/m²     SpO2 Readings from Last 6 Encounters:   10/25/20 94%   09/10/20 99%   20 95%   19 99%   19 97%   19 100%    O2 Flow Rate (L/min): 2 l/min       Intake/Output Summary (Last 24 hours) at 10/25/2020 0757  Last data filed at 10/24/2020 1220  Gross per 24 hour   Intake 365 ml   Output --   Net 365 ml        Physical Exam:    Gen:  Well-developed, well-nourished, in no acute distress  HEENT:  Pink conjunctivae, PERRL, hearing intact to voice, moist mucous membranes  Neck:  Supple, without masses, thyroid non-tender  Resp:  No accessory muscle use, clear breath sounds without wheezes rales or rhonchi  Card:  No murmurs, normal S1, S2 without thrills, bruits or peripheral edema  Abd:  Soft, non-tender, non-distended, normoactive bowel sounds are present, no palpable organomegaly and no detectable hernias  Lymph:  No cervical or inguinal adenopathy  Musc:  No cyanosis or clubbing  Skin:  No rashes or ulcers, skin turgor is good  Neuro:  Cranial nerves are grossly intact, no focal motor weakness, follows commands appropriately  Psych:  Good insight, oriented to person, place and time, alert  __________________________________________________________________  Medications Reviewed: (see below)  Medications:     Current Facility-Administered Medications   Medication Dose Route Frequency    phosphorus (K PHOS NEUTRAL) 250 mg tablet 1 Tab  1 Tab Oral BID    guaiFENesin ER (MUCINEX) tablet 600 mg  600 mg Oral Q12H    benzonatate (TESSALON) capsule 100 mg  100 mg Oral TID PRN    ipratropium-albuterol (COMBIVENT RESPIMAT) 20 mcg-100 mcg inhalation spray  1 Puff Inhalation Q4H PRN    amLODIPine (NORVASC) tablet 2.5 mg  2.5 mg Oral DAILY    ascorbic acid (vitamin C) (VITAMIN C) tablet 500 mg  500 mg Oral DAILY    aspirin delayed-release tablet 81 mg  81 mg Oral DAILY    lactobac ac& pc-s.therm-b.anim (WILLA Q/RISAQUAD)  1 Cap Oral DAILY    zinc sulfate (ZINCATE) 220 (50) mg capsule 1 Cap  1 Cap Oral DAILY    psyllium husk-aspartame (METAMUCIL FIBER) packet 1 Packet  1 Packet Oral BID    dexAMETHasone (DECADRON) tablet 6 mg  6 mg Oral DAILY    ALPRAZolam (XANAX) tablet 1 mg  1 mg Oral DAILY PRN    amiodarone (CORDARONE) tablet 100 mg  100 mg Oral DAILY    apixaban (ELIQUIS) tablet 5 mg  5 mg Oral BID    atenoloL (TENORMIN) tablet 25 mg  25 mg Oral DAILY    eszopiclone (LUNESTA) tablet 3 mg  3 mg Oral QHS    fluticasone propionate (FLONASE) 50 mcg/actuation nasal spray 2 Spray  2 Spray Both Nostrils DAILY PRN    levothyroxine (SYNTHROID) tablet 100 mcg  100 mcg Oral ACB    montelukast (SINGULAIR) tablet 10 mg  10 mg Oral DAILY PRN    sodium chloride (NS) flush 5-40 mL  5-40 mL IntraVENous Q8H    sodium chloride (NS) flush 5-40 mL  5-40 mL IntraVENous PRN    acetaminophen (TYLENOL) tablet 650 mg  650 mg Oral Q6H PRN    Or    acetaminophen (TYLENOL) suppository 650 mg  650 mg Rectal Q6H PRN    polyethylene glycol (MIRALAX) packet 17 g  17 g Oral DAILY PRN    promethazine (PHENERGAN) tablet 12.5 mg  12.5 mg Oral Q6H PRN    Or    ondansetron (ZOFRAN) injection 4 mg  4 mg IntraVENous Q6H PRN    0.9% sodium chloride infusion  50 mL/hr IntraVENous CONTINUOUS    PARoxetine hcl (PAXIL) 10 mg/5 mL oral suspension 15 mg  15 mg Oral DAILY        Lab Data Reviewed: (see below)  Lab Review:     Recent Labs     10/24/20  0656 10/23/20  0332   WBC 3.8 1.8*   HGB 11.4* 11.5   HCT 34.6* 34.8*    137*     Recent Labs     10/24/20  0656 10/23/20  0332 10/22/20  1953    134*  --    K 3.8 4.1  --     105  --    CO2 26 25  --    * 167*  --    BUN 16 13  --    CREA 0.71 0.75  --    CA 8.1* 7.9*  --    MG 1.8 1.9  --    PHOS 2.4* 3.0  --    INR  --   --  1.0     Lab Results   Component Value Date/Time    Glucose (POC) 275 (H) 02/16/2019 05:38 PM     No results for input(s): PH, PCO2, PO2, HCO3, FIO2 in the last 72 hours.   Recent Labs     10/22/20  1953   INR 1.0     All Micro Results     Procedure Component Value Units Date/Time    CULTURE, BLOOD [[id number]] Collected:  10/21/20 1840    Order Status:  Completed Specimen:  Blood Updated:  10/25/20 0602     Special Requests: NO SPECIAL REQUESTS        Culture result: NO GROWTH 4 DAYS       ENTERIC BACTERIA PANEL, DNA [[id number]] Collected:  10/22/20 1252    Order Status:  Completed Specimen:  Stool Updated:  10/23/20 1249     Shigella species, DNA Negative        Campylobacter species, DNA Negative        Vibrio species, DNA Negative        Enterotoxigen E Coli, DNA Negative        Shiga toxin producing, DNA Negative        Salmonella species, DNA Negative        P. shigelloides, DNA Negative        Y. enterocolitica, DNA Negative       RESPIRATORY PANEL,PCR,NASOPHARYNGEAL [[id number]] Collected:  10/21/20 2033    Order Status:  Completed Specimen:  Nasopharyngeal Updated:  10/21/20 2359     Adenovirus Not detected        Coronavirus 229E Not detected        Coronavirus HKU1 Not detected        Coronavirus CVNL63 Not detected        Coronavirus OC43 Not detected        Metapneumovirus Not detected        Rhinovirus and Enterovirus Not detected        Influenza A Not detected        Influenza A, subtype H1 Not detected        Influenza A, subtype H3 Not detected        INFLUENZA A H1N1 PCR Not detected        Influenza B Not detected        Parainfluenza 1 Not detected        Parainfluenza 2 Not detected        Parainfluenza 3 Not detected        Parainfluenza virus 4 Not detected        RSV by PCR Not detected        B. parapertussis, PCR Not detected        Bordetella pertussis - PCR Not detected        Chlamydophila pneumoniae DNA, QL, PCR Not detected        Mycoplasma pneumoniae DNA, QL, PCR Not detected       CULTURE, BLOOD, PAIRED [[id number]]     Order Status:  Canceled Specimen:  Blood           I have reviewed notes of prior 24hr. Other pertinent lab:      Total time spent with patient: [address] discussed with: Patient, Nursing Staff and >50% of time spent in counseling and coordination of care    Discussed:  Care Plan    Prophylaxis:  eliquis    Disposition:  Home w/Family           ___________________________________________________    Attending Physician: Boone Bonilla MD

## 2020-10-26 ENCOUNTER — HOME HEALTH ADMISSION (OUTPATIENT)
Dept: HOME HEALTH SERVICES | Facility: HOME HEALTH | Age: 85
End: 2020-10-26
Payer: MEDICARE

## 2020-10-26 VITALS
WEIGHT: 130 LBS | RESPIRATION RATE: 18 BRPM | HEART RATE: 69 BPM | HEIGHT: 67 IN | TEMPERATURE: 98 F | DIASTOLIC BLOOD PRESSURE: 73 MMHG | SYSTOLIC BLOOD PRESSURE: 115 MMHG | OXYGEN SATURATION: 94 % | BODY MASS INDEX: 20.4 KG/M2

## 2020-10-26 PROBLEM — U07.1 COVID-19: Status: ACTIVE | Noted: 2020-10-26

## 2020-10-26 PROCEDURE — 74011250637 HC RX REV CODE- 250/637: Performed by: INTERNAL MEDICINE

## 2020-10-26 PROCEDURE — 74011250636 HC RX REV CODE- 250/636: Performed by: INTERNAL MEDICINE

## 2020-10-26 PROCEDURE — 94760 N-INVAS EAR/PLS OXIMETRY 1: CPT

## 2020-10-26 RX ORDER — LOPERAMIDE HYDROCHLORIDE 2 MG/1
2 CAPSULE ORAL AS NEEDED
Qty: 20 CAP | Refills: 0 | Status: SHIPPED | OUTPATIENT
Start: 2020-10-26 | End: 2020-11-05

## 2020-10-26 RX ORDER — ZINC SULFATE 50(220)MG
220 CAPSULE ORAL DAILY
Qty: 30 CAP | Refills: 0 | Status: SHIPPED | OUTPATIENT
Start: 2020-10-26 | End: 2021-02-16 | Stop reason: ALTCHOICE

## 2020-10-26 RX ORDER — POLYETHYLENE GLYCOL 3350 17 G/17G
17 POWDER, FOR SOLUTION ORAL DAILY
Status: DISCONTINUED | OUTPATIENT
Start: 2020-10-26 | End: 2020-10-26 | Stop reason: HOSPADM

## 2020-10-26 RX ORDER — DEXAMETHASONE 6 MG/1
TABLET ORAL
Qty: 5 TAB | Refills: 0 | Status: SHIPPED | OUTPATIENT
Start: 2020-10-26 | End: 2020-11-06 | Stop reason: ALTCHOICE

## 2020-10-26 RX ADMIN — AMIODARONE HYDROCHLORIDE 100 MG: 200 TABLET ORAL at 09:25

## 2020-10-26 RX ADMIN — DIBASIC SODIUM PHOSPHATE, MONOBASIC POTASSIUM PHOSPHATE AND MONOBASIC SODIUM PHOSPHATE 1 TABLET: 852; 155; 130 TABLET ORAL at 09:25

## 2020-10-26 RX ADMIN — POLYETHYLENE GLYCOL 3350 17 G: 17 POWDER, FOR SOLUTION ORAL at 09:25

## 2020-10-26 RX ADMIN — PAROXETINE HYDROCHLORIDE 15 MG: 10 SUSPENSION ORAL at 09:24

## 2020-10-26 RX ADMIN — LEVOTHYROXINE SODIUM 100 MCG: 0.05 TABLET ORAL at 06:49

## 2020-10-26 RX ADMIN — PSYLLIUM HUSK 1 PACKET: 3.4 POWDER ORAL at 09:25

## 2020-10-26 RX ADMIN — Medication 81 MG: at 09:25

## 2020-10-26 RX ADMIN — APIXABAN 5 MG: 5 TABLET, FILM COATED ORAL at 09:25

## 2020-10-26 RX ADMIN — AMLODIPINE BESYLATE 2.5 MG: 5 TABLET ORAL at 09:26

## 2020-10-26 RX ADMIN — GUAIFENESIN 600 MG: 600 TABLET ORAL at 09:26

## 2020-10-26 RX ADMIN — DEXAMETHASONE 6 MG: 6 TABLET ORAL at 09:25

## 2020-10-26 RX ADMIN — ZINC SULFATE 220 MG (50 MG) CAPSULE 1 CAPSULE: CAPSULE at 09:25

## 2020-10-26 RX ADMIN — Medication 10 ML: at 06:50

## 2020-10-26 RX ADMIN — Medication 1 CAPSULE: at 09:25

## 2020-10-26 RX ADMIN — ATENOLOL 25 MG: 25 TABLET ORAL at 09:25

## 2020-10-26 RX ADMIN — OXYCODONE HYDROCHLORIDE AND ACETAMINOPHEN 500 MG: 500 TABLET ORAL at 09:26

## 2020-10-26 NOTE — ROUTINE PROCESS
Unable to schedule PCP RENITA appt with Dr. Rut Alexandra as her scheduling system does not show any availability for Telehealth appts--due to patient being COVID positive, the appt would have to be virtual.    [facility] appt has been scheduled for Oct 28.  The office will call the patient to confirm an appt time and before arrival.

## 2020-10-26 NOTE — PROGRESS NOTES
Bedside shift change report given to Althea (oncoming nurse) by Cecilia Mendez (offgoing nurse). Report included the following information SBAR, Kardex, Intake/Output and Recent Results. Pt states has already rcvd flu vaccine.

## 2020-10-26 NOTE — DISCHARGE SUMMARY
Hospitalist Discharge Summary     Patient ID:    Barron Brennan  [id number]  67 y.o.  4/22/1928    Admit date of service: 10/21/2020    Discharge date of service: 10/26/2020    Admission Diagnoses: SOB (shortness of breath) [R06.02]  Weakness [R53.1]  Weakness [R53.1]    Chronic Diagnoses:    Problem List as of 10/26/2020 Date Reviewed: 12/30/2019          Codes Class Noted - Resolved    COVID-19 ICD-10-CM: U07.1  ICD-9-CM: 079.89  10/26/2020 - Present        * (Principal) SOB (shortness of breath) ICD-10-CM: R06.02  ICD-9-CM: 786.05  10/21/2020 - Present        Cough ICD-10-CM: R05  ICD-9-CM: 786.2  10/21/2020 - Present        Leukopenia ICD-10-CM: D72.819  ICD-9-CM: 288.50  10/21/2020 - Present        Thrombocytopenia ([address] ICD-10-CM: D69.6  ICD-9-CM: 287.5  10/21/2020 - Present        Weakness ICD-10-CM: R53.1  ICD-9-CM: 780.79  10/21/2020 - Present        Ventricular tachycardia (HCC) ICD-10-CM: I47.2  ICD-9-CM: 427.1  2/16/2019 - Present        Elevated serum creatinine ICD-10-CM: R79.89  ICD-9-CM: 790.99  2/16/2019 - Present        Advanced care planning/counseling discussion ICD-10-CM: Z71.89  ICD-9-CM: V65.49  12/3/2015 - Present    Overview Signed 12/3/2015  1:10 PM by James Olivo RN     Patient states that completed AMD at home. NN encouraged patient to bring a copy to the office for scanning into the medical record. Patient verbalized understanding. Patient verbalized that her son is the primary medical decision maker.               Personal history of colonic polyps ICD-10-CM: Z86.010  ICD-9-CM: V12.72  3/19/2014 - Present        Anxiety ICD-10-CM: F41.9  ICD-9-CM: 300.00  Unknown - Present        Insomnia ICD-10-CM: G47.00  ICD-9-CM: 780.52  Unknown - Present        HTN (hypertension) (Chronic) ICD-10-CM: I10  ICD-9-CM: 401.9  Unknown - Present        Hyperlipidemia LDL goal < 100 ICD-10-CM: E78.5  ICD-9-CM: 272.4  Unknown - Present        Osteoarthritis ICD-10-CM: M19.90  ICD-9-CM: 715.90 Unknown - Present        Hypothyroid ICD-10-CM: E03.9  ICD-9-CM: 244.9  Unknown - Present        CAD (coronary artery disease) (Chronic) ICD-10-CM: I25.10  ICD-9-CM: 414.00  Unknown - Present        Atrial fibrillation (HCC) (Chronic) ICD-10-CM: I48.91  ICD-9-CM: 427.31  Unknown - Present        Thoracic aneurysm without mention of rupture ICD-10-CM: I71.2  ICD-9-CM: 146. 2  Unknown - Present        Anemia ICD-10-CM: D64.9  ICD-9-CM: 642. 9  Unknown - Present              Discharge Medications:   Current Discharge Medication List      START taking these medications    Details   dexAMETHasone (DECADRON) 6 mg tablet Take once a day  Qty: 5 Tab, Refills: 0      L. acidoph & paracasei- S therm- Bifido (WILLA-Q/RISAQUAD) 8 billion cell cap cap Take 1 Cap by mouth daily. Qty: 20 Cap, Refills: 0      zinc sulfate (ZINCATE) 220 (50) mg capsule Take 1 Cap by mouth daily. Qty: 30 Cap, Refills: 0      loperamide (IMODIUM) 2 mg capsule Take 1 Cap by mouth as needed for Diarrhea for up to 10 days. Qty: 20 Cap, Refills: 0         CONTINUE these medications which have NOT CHANGED    Details   aspirin delayed-release 81 mg tablet Take 81 mg by mouth daily. amiodarone (PACERONE) 100 mg tablet Take 100 mg by mouth daily. ascorbic acid, vitamin C, (Vitamin C) 500 mg tablet Take 500 mg by mouth daily. amLODIPine (NORVASC) 5 mg tablet Take 2.5 mg by mouth daily. cetirizine (ZYRTEC) 10 mg tablet Take 10 mg by mouth daily. eszopiclone (LUNESTA) 3 mg tablet Take 3 mg by mouth nightly. fosinopriL (MONOPRIL) 10 mg tablet Take 20 mg by mouth two (2) times a day. triamcinolone acetonide (KENALOG) 0.1 % topical cream Apply  to affected area two (2) times daily as needed for Skin Irritation. use thin layer      PARoxetine (PAXIL) 10 mg tablet TAKE 1 AND 1/2 TABLET BY MOUTH DAILY  Qty: 135 Tab, Refills: 0      montelukast (SINGULAIR) 10 mg tablet Take 1 Tab by mouth daily as needed (allergies).   Qty: 90 Tab, Refills: 0    Associated Diagnoses: Environmental allergies      levothyroxine (SYNTHROID) 100 mcg tablet TAKE ONE TABLET BY MOUTH DAILY BEFORE BREAKFAST  Qty: 90 Tab, Refills: 0    Associated Diagnoses: Acquired hypothyroidism      meloxicam (MOBIC) 7.5 mg tablet Take 7.5 mg by mouth daily. ALPRAZolam (XANAX) 1 mg tablet Take 1 mg by mouth daily as needed for Anxiety. atenolol (TENORMIN) 25 mg tablet Take 1 Tab by mouth daily. Qty: 90 Tab, Refills: 0    Associated Diagnoses: Essential hypertension      guaiFENesin ER (MUCINEX) 600 mg ER tablet Take 600 mg by mouth two (2) times daily as needed for Congestion. apixaban (ELIQUIS) 5 mg tablet Take 5 mg by mouth two (2) times a day. fluticasone (FLONASE) 50 mcg/actuation nasal spray 2 Sprays by Both Nostrils route daily as needed. cholecalciferol, vitamin D3, (VITAMIN D3) 2,000 unit tab Take 2,000 Units by mouth daily. Follow up Care:    1. Jade Bowling MD in 1-2 weeks  2. Diet:  Regular Diet    Disposition:  Home. Advanced Directive:    Discharge Exam:  See today's note. CONSULTATIONS: None    Significant Diagnostic Studies:   Recent Labs     10/24/20  0656   WBC 3.8   HGB 11.4*   HCT 34.6*        Recent Labs     10/24/20  0656      K 3.8      CO2 26   BUN 16   CREA 0.71   *   CA 8.1*   MG 1.8   PHOS 2.4*     No results for input(s): ALT, AP, TBIL, TBILI, TP, ALB, GLOB, GGT, AML, LPSE in the last 72 hours. No lab exists for component: SGOT, GPT, AMYP, HLPSE  No results for input(s): INR, PTP, APTT, INREXT in the last 72 hours. No results for input(s): FE, TIBC, PSAT, FERR in the last 72 hours. No results for input(s): PH, PCO2, PO2 in the last 72 hours. No results for input(s): CPK, CKMB in the last 72 hours.     No lab exists for component: TROPONINI  Lab Results   Component Value Date/Time    Glucose (POC) 275 (H) 02/16/2019 05:38 PM             HOSPITAL COURSE & TREATMENT RENDERED:   1.  COVID-19 infection: improving. Not hypoxic. Diarrhea has resolved. On dexamethasone, ascorbic acid, zinc       2. Weakness/myalgia: Likely due to a viral illness. Supportive care. PT/OT recommended [facility] PT/OT.  Refused SNF      3. Diarrhea: chronic, but improving.  Per family, has been going on for years.  Cont pro-biotics, fiber     4.  Pancytopenia. likely due to viral infection.  Iron studies/B12/folate wnl.  No evidence of hemolysis.  evaluated by Hematology       5. HTN: cont atenolol, norvasc.        6. Pafib: cont amio, atenolol, eliquis     7. Hx of VT: s/p ICD.  Cont Amio     8. Hx of CAD: cont ASA, Eliquis     9. Hypothyroid: TSH 1.64.  Cont synthroid        Discharged in improved condition.     Spent 35 minutes    Signed:  Salvador Greer MD  10/26/2020  8:18 AM

## 2020-10-26 NOTE — PROGRESS NOTES
Javid Blum [facility]  [address] [facility], [address] [place] [address]  [phone]      Medical Progress Note      NAME: Marguerite Almanzar   :  1928  MRM:  [id number]    Date of service: 10/26/2020  7:57 AM       Assessment and Plan:   1. COVID-19 infection: improving. Not hypoxic. Diarrhea has resolved. On dexamethasone, ascorbic acid, zinc       2. Weakness/myalgia: Likely due to a viral illness. Supportive care. PT/OT recommended [facility] PT/OT. Refused SNF      3. Diarrhea: chronic, but improving. Per family, has been going on for years. Cont pro-biotics, fiber     4.  Pancytopenia. likely due to viral infection. Iron studies/B12/folate wnl. No evidence of hemolysis. evaluated by Hematology       5. HTN: cont atenolol, norvasc.        6.  Pafib: cont amio, atenolol, eliquis     7. Hx of VT: s/p ICD. Cont Amio     8. Hx of CAD: cont ASA, Eliquis     9. Hypothyroid: TSH 1.64. Cont synthroid              Subjective:     Chief Complaint[de-identified] Patient was seen and examined as a follow up for COVID 19. Chart was reviewed. weak, but refused SNF. Felt well, wants to go home      ROS:  (bold if positive, if negative)    Tolerating PT  Tolerating Diet        Objective:     Last 24hrs VS reviewed since prior progress note.  Most recent are:    Visit Vitals  /69 (BP 1 Location: Right arm, BP Patient Position: At rest)   Pulse 80   Temp 97.5 °F (36.4 °C)   Resp 16   Ht 5' 7\" (1.702 m)   Wt 59 kg (130 lb)   SpO2 96%   BMI 20.36 kg/m²     SpO2 Readings from Last 6 Encounters:   10/26/20 96%   09/10/20 99%   20 95%   19 99%   19 97%   19 100%    O2 Flow Rate (L/min): 2 l/min       Intake/Output Summary (Last 24 hours) at 10/26/2020 0723  Last data filed at 10/25/2020 1210  Gross per 24 hour   Intake 590 ml   Output --   Net 590 ml        Physical Exam:    Gen:  Well-developed, well-nourished, in no acute distress  HEENT:  Pink conjunctivae, PERRL, hearing intact to voice, moist mucous membranes  Neck:  Supple, without masses, thyroid non-tender  Resp:  No accessory muscle use, clear breath sounds without wheezes rales or rhonchi  Card:  No murmurs, normal S1, S2 without thrills, bruits or peripheral edema  Abd:  Soft, non-tender, non-distended, normoactive bowel sounds are present, no palpable organomegaly and no detectable hernias  Lymph:  No cervical or inguinal adenopathy  Musc:  No cyanosis or clubbing  Skin:  No rashes or ulcers, skin turgor is good  Neuro:  Cranial nerves are grossly intact, no focal motor weakness, follows commands appropriately  Psych:  Good insight, oriented to person, place and time, alert  __________________________________________________________________  Medications Reviewed: (see below)  Medications:     Current Facility-Administered Medications   Medication Dose Route Frequency    phosphorus (K PHOS NEUTRAL) 250 mg tablet 1 Tab  1 Tab Oral BID    guaiFENesin ER (MUCINEX) tablet 600 mg  600 mg Oral Q12H    benzonatate (TESSALON) capsule 100 mg  100 mg Oral TID PRN    ipratropium-albuterol (COMBIVENT RESPIMAT) 20 mcg-100 mcg inhalation spray  1 Puff Inhalation Q4H PRN    amLODIPine (NORVASC) tablet 2.5 mg  2.5 mg Oral DAILY    ascorbic acid (vitamin C) (VITAMIN C) tablet 500 mg  500 mg Oral DAILY    aspirin delayed-release tablet 81 mg  81 mg Oral DAILY    lactobac ac& pc-s.therm-b.anim (WILLA Q/RISAQUAD)  1 Cap Oral DAILY    zinc sulfate (ZINCATE) 220 (50) mg capsule 1 Cap  1 Cap Oral DAILY    psyllium husk-aspartame (METAMUCIL FIBER) packet 1 Packet  1 Packet Oral BID    dexAMETHasone (DECADRON) tablet 6 mg  6 mg Oral DAILY    ALPRAZolam (XANAX) tablet 1 mg  1 mg Oral DAILY PRN    amiodarone (CORDARONE) tablet 100 mg  100 mg Oral DAILY    apixaban (ELIQUIS) tablet 5 mg  5 mg Oral BID    atenoloL (TENORMIN) tablet 25 mg  25 mg Oral DAILY    eszopiclone (LUNESTA) tablet 3 mg  3 mg Oral QHS    fluticasone propionate (FLONASE) 50 mcg/actuation nasal spray 2 Spray  2 Spray Both Nostrils DAILY PRN    levothyroxine (SYNTHROID) tablet 100 mcg  100 mcg Oral ACB    montelukast (SINGULAIR) tablet 10 mg  10 mg Oral DAILY PRN    sodium chloride (NS) flush 5-40 mL  5-40 mL IntraVENous Q8H    sodium chloride (NS) flush 5-40 mL  5-40 mL IntraVENous PRN    acetaminophen (TYLENOL) tablet 650 mg  650 mg Oral Q6H PRN    Or    acetaminophen (TYLENOL) suppository 650 mg  650 mg Rectal Q6H PRN    polyethylene glycol (MIRALAX) packet 17 g  17 g Oral DAILY PRN    promethazine (PHENERGAN) tablet 12.5 mg  12.5 mg Oral Q6H PRN    Or    ondansetron (ZOFRAN) injection 4 mg  4 mg IntraVENous Q6H PRN    0.9% sodium chloride infusion  50 mL/hr IntraVENous CONTINUOUS    PARoxetine hcl (PAXIL) 10 mg/5 mL oral suspension 15 mg  15 mg Oral DAILY        Lab Data Reviewed: (see below)  Lab Review:     Recent Labs     10/24/20  0656   WBC 3.8   HGB 11.4*   HCT 34.6*        Recent Labs     10/24/20  0656      K 3.8      CO2 26   *   BUN 16   CREA 0.71   CA 8.1*   MG 1.8   PHOS 2.4*     Lab Results   Component Value Date/Time    Glucose (POC) 275 (H) 02/16/2019 05:38 PM     No results for input(s): PH, PCO2, PO2, HCO3, FIO2 in the last 72 hours. No results for input(s): INR, INREXT, INREXT in the last 72 hours.   All Micro Results     Procedure Component Value Units Date/Time    CULTURE, BLOOD [[id number]] Collected:  10/21/20 1840    Order Status:  Completed Specimen:  Blood Updated:  10/25/20 0602     Special Requests: NO SPECIAL REQUESTS        Culture result: NO GROWTH 4 DAYS       ENTERIC BACTERIA PANEL, DNA [[id number]] Collected:  10/22/20 1252    Order Status:  Completed Specimen:  Stool Updated:  10/23/20 1249     Shigella species, DNA Negative        Campylobacter species, DNA Negative        Vibrio species, DNA Negative        Enterotoxigen E Coli, DNA Negative        Shiga toxin producing, DNA Negative        Salmonella species, DNA Negative        P. shigelloides, DNA Negative        Y. enterocolitica, DNA Negative       RESPIRATORY PANEL,PCR,NASOPHARYNGEAL [[id number]] Collected:  10/21/20 2033    Order Status:  Completed Specimen:  Nasopharyngeal Updated:  10/21/20 2359     Adenovirus Not detected        Coronavirus 229E Not detected        Coronavirus HKU1 Not detected        Coronavirus CVNL63 Not detected        Coronavirus OC43 Not detected        Metapneumovirus Not detected        Rhinovirus and Enterovirus Not detected        Influenza A Not detected        Influenza A, subtype H1 Not detected        Influenza A, subtype H3 Not detected        INFLUENZA A H1N1 PCR Not detected        Influenza B Not detected        Parainfluenza 1 Not detected        Parainfluenza 2 Not detected        Parainfluenza 3 Not detected        Parainfluenza virus 4 Not detected        RSV by PCR Not detected        B. parapertussis, PCR Not detected        Bordetella pertussis - PCR Not detected        Chlamydophila pneumoniae DNA, QL, PCR Not detected        Mycoplasma pneumoniae DNA, QL, PCR Not detected       CULTURE, BLOOD, PAIRED [[id number]]     Order Status:  Canceled Specimen:  Blood           I have reviewed notes of prior 24hr. Other pertinent lab:      Total time spent with patient: [address] discussed with: Patient, Nursing Staff and >50% of time spent in counseling and coordination of care    Discussed:  Care Plan    Prophylaxis:  eliquis    Disposition:  Home w/Family           ___________________________________________________    Attending Physician: Navin Flores MD

## 2020-10-26 NOTE — PROGRESS NOTES
10/26/2020 10:39 AM Discharge order and home health order received. Called and spoke with pt who was agreeable for discharge home today. CM reviewed 2nd IMM letter over the phone with pt. Pt had no questions or concerns in regards to IMM letter. Choice of home health provider given to pt, pt selected [facility] has preference, referral sent and accepted. [address] [facility] is aware pt will discharge home today. CM called pt's son, Kaye Coates who confirmed discharge today and he will transport pt home at [facility] today. Pt's son was provided 4th floor nurses station number to call when downstairs to have nursing bring pt to his car.   TARIQ Farris    Care Management Interventions  PCP Verified by CM: Yes(Pau Brown )  Palliative Care Criteria Met (RRAT>21 & CHF Dx)?: No  Mode of Transport at Discharge: (TBD, likely son )  Transition of Care Consult (CM Consult): [address]: Yes  MyChart Signup: No  Discharge Durable Medical Equipment: No  Physical Therapy Consult: Yes  Occupational Therapy Consult: Yes  Speech Therapy Consult: No  Current Support Network: Lives Alone  Confirm Follow Up Transport: Family(Pt's nieghbor or son )  The Plan for Transition of Care is Related to the Following Treatment Goals : COVID  The Patient and/or Patient Representative was Provided with a Choice of Provider and Agrees with the Discharge Plan?: Yes  Freedom of Choice List was Provided with Basic Dialogue that Supports the Patient's Individualized Plan of Care/Goals, Treatment Preferences and Shares the Quality Data Associated with the Providers?: Yes  Discharge Location  Discharge Placement: Home with home health

## 2020-10-26 NOTE — PROGRESS NOTES
Problem: Falls - Risk of  Goal: *Absence of Falls  Description: Document Kishore Allan Fall Risk and appropriate interventions in the flowsheet. Outcome: Progressing Towards Goal  Note: Fall Risk Interventions:  Mobility Interventions: Communicate number of staff needed for ambulation/transfer, PT Consult for mobility concerns, PT Consult for assist device competence, Utilize walker, cane, or other assistive device         Medication Interventions: Bed/chair exit alarm    Elimination Interventions: Patient to call for help with toileting needs              Problem: Patient Education: Go to Patient Education Activity  Goal: Patient/Family Education  Outcome: Progressing Towards Goal     Problem: Patient Education: Go to Patient Education Activity  Goal: Patient/Family Education  Outcome: Progressing Towards Goal     Problem: Patient Education: Go to Patient Education Activity  Goal: Patient/Family Education  Outcome: Progressing Towards Goal     Problem: Airway Clearance - Ineffective  Goal: Achieve or maintain patent airway  Outcome: Progressing Towards Goal     Problem: Gas Exchange - Impaired  Goal: Absence of hypoxia  Outcome: Progressing Towards Goal  Goal: Promote optimal lung function  Outcome: Progressing Towards Goal     Problem: Breathing Pattern - Ineffective  Goal: Ability to achieve and maintain a regular respiratory rate  Outcome: Progressing Towards Goal     Problem:  Body Temperature -  Risk of, Imbalanced  Goal: Ability to maintain a body temperature within defined limits  Outcome: Progressing Towards Goal  Goal: Will regain or maintain usual level of consciousness  Outcome: Progressing Towards Goal  Goal: Complications related to the disease process, condition or treatment will be avoided or minimized  Outcome: Progressing Towards Goal     Problem: Isolation Precautions - Risk of Spread of Infection  Goal: Prevent transmission of infectious organism to others  Outcome: Progressing Towards Goal Problem: Nutrition Deficits  Goal: Optimize nutrtional status  Outcome: Progressing Towards Goal     Problem: Risk for Fluid Volume Deficit  Goal: Maintain normal heart rhythm  Outcome: Progressing Towards Goal  Goal: Maintain absence of muscle cramping  Outcome: Progressing Towards Goal  Goal: Maintain normal serum potassium, sodium, calcium, phosphorus, and pH  Outcome: Progressing Towards Goal     Problem: Loneliness or Risk for Loneliness  Goal: Demonstrate positive use of time alone when socialization is not possible  Outcome: Progressing Towards Goal     Problem: Fatigue  Goal: Verbalize increase energy and improved vitality  Outcome: Progressing Towards Goal     Problem: Patient Education: Go to Patient Education Activity  Goal: Patient/Family Education  Outcome: Progressing Towards Goal

## 2020-10-26 NOTE — DISCHARGE INSTRUCTIONS
ACUTE DIAGNOSES:  SOB (shortness of breath) [R06.02]  Weakness [R53.1]  Weakness [R53.1]    CHRONIC MEDICAL DIAGNOSES:  Problem List as of 10/26/2020 Date Reviewed: 12/30/2019          Codes Class Noted - Resolved    COVID-19 ICD-10-CM: U07.1  ICD-9-CM: 079.89  10/26/2020 - Present        * (Principal) SOB (shortness of breath) ICD-10-CM: R06.02  ICD-9-CM: 786.05  10/21/2020 - Present        Cough ICD-10-CM: R05  ICD-9-CM: 786.2  10/21/2020 - Present        Leukopenia ICD-10-CM: D72.819  ICD-9-CM: 288.50  10/21/2020 - Present        Thrombocytopenia ([address] ICD-10-CM: D69.6  ICD-9-CM: 287.5  10/21/2020 - Present        Weakness ICD-10-CM: R53.1  ICD-9-CM: 780.79  10/21/2020 - Present        Ventricular tachycardia (HCC) ICD-10-CM: I47.2  ICD-9-CM: 427.1  2/16/2019 - Present        Elevated serum creatinine ICD-10-CM: R79.89  ICD-9-CM: 790.99  2/16/2019 - Present        Advanced care planning/counseling discussion ICD-10-CM: Z71.89  ICD-9-CM: V65.49  12/3/2015 - Present    Overview Signed 12/3/2015  1:10 PM by Milagro Eldridge RN     Patient states that completed AMD at home. NN encouraged patient to bring a copy to the office for scanning into the medical record. Patient verbalized understanding. Patient verbalized that her son is the primary medical decision maker.               Personal history of colonic polyps ICD-10-CM: Z86.010  ICD-9-CM: V12.72  3/19/2014 - Present        Anxiety ICD-10-CM: F41.9  ICD-9-CM: 300.00  Unknown - Present        Insomnia ICD-10-CM: G47.00  ICD-9-CM: 780.52  Unknown - Present        HTN (hypertension) (Chronic) ICD-10-CM: I10  ICD-9-CM: 401.9  Unknown - Present        Hyperlipidemia LDL goal < 100 ICD-10-CM: E78.5  ICD-9-CM: 272.4  Unknown - Present        Osteoarthritis ICD-10-CM: M19.90  ICD-9-CM: 715.90  Unknown - Present        Hypothyroid ICD-10-CM: E03.9  ICD-9-CM: 244.9  Unknown - Present        CAD (coronary artery disease) (Chronic) ICD-10-CM: I25.10  ICD-9-CM: 414.00 Unknown - Present        Atrial fibrillation (HCC) (Chronic) ICD-10-CM: I48.91  ICD-9-CM: 427.31  Unknown - Present        Thoracic aneurysm without mention of rupture ICD-10-CM: I71.2  ICD-9-CM: 299. 2  Unknown - Present        Anemia ICD-10-CM: D64.9  ICD-9-CM: 692. 9  Unknown - Present              DISCHARGE MEDICATIONS:          · It is important that you take the medication exactly as they are prescribed. · Keep your medication in the bottles provided by the pharmacist and keep a list of the medication names, dosages, and times to be taken in your wallet. · Do not take other medications without consulting your doctor. DIET:  Regular Diet    ACTIVITY: Activity as tolerated    ADDITIONAL INFORMATION: If you experience any of the following symptoms then please call your primary care physician or return to the emergency room if you cannot get hold of your doctor: Fever, chills, nausea, vomiting, diarrhea, change in mentation, falling, bleeding, shortness of breath. FOLLOW UP CARE:  Dr. Cain Christian MD  you are to call and set up an appointment to see them in 5 days. Information obtained by :  I understand that if any problems occur once I am at home I am to contact my physician. I understand and acknowledge receipt of the instructions indicated above.                                                                                                                                            Physician's or R.N.'s Signature                                                                  Date/Time                                                                                                                                              Patient or Representative Signature                                                          Date/Time

## 2020-10-26 NOTE — PROGRESS NOTES
Home Care Face to Face Encounter    Patients Name: Prieto Calderón    YOB: 1928    Primary Diagnosis: COVID 23    Date of Face to Face:   10/26/20                                  Face to Face Encounter findings are related to primary reason for home care:   yes. 1. I certify that the patient needs intermittent care as follows: physical therapy: strengthening, stretching/ROM, transfer training and gait/stair training  occupational therapy:  ADL safety (ie. cooking, bathing, dressing) and ROM    2. I certify that this patient is homebound, that is: 1) patient requires the use of a walker device, special transportation, or assistance of another to leave the home; or 2) patient's condition makes leaving the home medically contraindicated; and 3) patient has a normal inability to leave the home and leaving the home requires considerable and taxing effort. Patient may leave the home for infrequent and short duration for medical reasons, and occasional absences for non-medical reasons. Homebound status is due to the following functional limitations: Patient's ambulation limited secondary to severe pain and requires the use of an assistive device and the assistance of a caregiver for safe completion. Patient with strength and ROM deficits limiting ambulation endurance requiring the use of an assistive device and the assistance of a caregiver. Patient deemed temporarily homebound secondary to increased risk for infection when leaving home and going out into the community. 3. I certify that this patient is under my care and that I, or a nurse practitioner or  [id number], or clinical nurse specialist, or certified nurse midwife, working with me, had a Face-to-Face Encounter that meets the physician Face-to-Face Encounter requirements.   The following are the clinical findings from the [address] [place] [address] encounter that support the need for skilled services and is a summary of the encounter:      See discharge summary      Radha Waldrop MD  10/26/2020

## 2020-10-27 ENCOUNTER — HOME CARE VISIT (OUTPATIENT)
Dept: SCHEDULING | Facility: HOME HEALTH | Age: 85
End: 2020-10-27
Payer: MEDICARE

## 2020-10-27 ENCOUNTER — PATIENT OUTREACH (OUTPATIENT)
Dept: CASE MANAGEMENT | Age: 85
End: 2020-10-27

## 2020-10-27 VITALS
TEMPERATURE: 98.3 F | OXYGEN SATURATION: 99 % | DIASTOLIC BLOOD PRESSURE: 74 MMHG | SYSTOLIC BLOOD PRESSURE: 138 MMHG | HEART RATE: 74 BPM | RESPIRATION RATE: 18 BRPM

## 2020-10-27 LAB
BACTERIA SPEC CULT: NORMAL
SERVICE CMNT-IMP: NORMAL

## 2020-10-27 PROCEDURE — 3331090001 HH PPS REVENUE CREDIT

## 2020-10-27 PROCEDURE — 400013 HH SOC

## 2020-10-27 PROCEDURE — 3331090002 HH PPS REVENUE DEBIT

## 2020-10-27 PROCEDURE — G0299 HHS/HOSPICE OF RN EA 15 MIN: HCPCS

## 2020-10-27 NOTE — PROGRESS NOTES
Patient was admitted to [facility] on 10/21/2020 and discharged on 10/26/2020 for SOB. Outreach made within 2 business days of discharge: Yes       Top Discharge Challenges to be reviewed by the provider   Additional needs identified to be addressed with provider no  none  Discussed COVID-19 related testing which was available at this time. Test results were positive. Patient informed of results, if available? no   Method of communication with provider : chart routing       Advance Care Planning:   Does patient have an Advance Directive:  currently not on file; however, reports she has one CTN requested copy to be scanned into chart or AMD can be uploaded via [employer] portal. Patient verbalized understanding    Inpatient Readmission Risk score:   Was this a readmission? no     Patients top risk factors for readmission: medical condition and medication management  Interventions to address risk factors: close follow up with PCP    Care Transition Nurse (CTN) contacted the patient by telephone to perform post hospital discharge assessment. Verified name and  with patient as identifiers. Provided introduction to self, and explanation of the CTN role. Patient reports she feels ok. Reports fatigue. Denies chest pain, fever, sob. Patient plans on purchasing no touch thermometer. Tolerating PO. Reviewed fall prevention/safety precaution and worsening COVID symptoms. Reports she uses her walker all the time. No bowel /bladder concerns. CTN reviewed discharge instructions, medical action plan and red flags with patient who verbalized understanding. Patient given an opportunity to ask questions and does not have any further questions or concerns at this time. The patient agrees to contact the PCP office for questions related to their healthcare. Medication reconciliation was performed with patient, who verbalizes understanding of administration of home medications.  Advised obtaining a 90-day supply of all daily and as-needed medications. Patient needs to get zinc sulfate. Decadron 2 mg tablet filled. Reviewed taking three pills once daily. Singulair discontinued. Patient reports she no longer takes  Referral to Pharm D needed: no     Home Health/Outpatient orders at discharge: home health care, PT, OT and [address]: YOKO PFEIFFER [facility]  Date of initial visit: 10/27/2020    Durable Medical Equipment ordered at discharge: None      Covid Risk Education    Patient has following risk factors of: COVID+, HTN, CAD. Education provided regarding infection prevention, and signs and symptoms of COVID-19 and when to seek medical attention with patient who verbalized understanding. Discussed exposure protocols and quarantine From CDC: Are you at higher risk for severe illness?  and given an opportunity for questions and concerns. The patient agrees to contact the COVID-19 hotline [phone] PCP office for questions related to COVID-19. For more information on steps you can take to protect yourself, see CDC's How to Protect Yourself     Discussed follow-up appointments. If no appointment was previously scheduled, appointment scheduling offered: yes  [address] follow up appointment(s):   Future Appointments   Date Time Provider Nikolas Victoria   10/30/2020 To Be Determined Eitan Welch   11/6/2020 10:35 AM Marcia Valentin MD [facility] BS AMB   11/13/2020  1:40 PM Marcia Brown MD [facility] AMB     Non-[facility] follow up appointment(s): NA  Plan for follow-up call in 7-10 days based on severity of symptoms and risk factors. CTN provided contact information for future needs. Goals Addressed                 This Visit's Progress     Attend follow up appointments on schedule          10/27/20   [facility] scheduled for 10/28       Supportive resources in place to maintain patient in the community (ie. Home Health)        Understands red flags post discharge.

## 2020-10-27 NOTE — ACP (ADVANCE CARE PLANNING)
Advance Care Planning:   Does patient have an Advance Directive:  currently not on file; however, reports she has one.  CTN requested copy to be scanned into chart or AMD can be uploaded via [employer] portal. Patient verbalized understanding

## 2020-10-28 ENCOUNTER — HOME CARE VISIT (OUTPATIENT)
Dept: SCHEDULING | Facility: HOME HEALTH | Age: 85
End: 2020-10-28
Payer: MEDICARE

## 2020-10-28 VITALS
SYSTOLIC BLOOD PRESSURE: 102 MMHG | OXYGEN SATURATION: 94 % | RESPIRATION RATE: 12 BRPM | TEMPERATURE: 98.4 F | HEART RATE: 74 BPM | DIASTOLIC BLOOD PRESSURE: 57 MMHG

## 2020-10-28 PROCEDURE — G0151 HHCP-SERV OF PT,EA 15 MIN: HCPCS

## 2020-10-28 PROCEDURE — 3331090002 HH PPS REVENUE DEBIT

## 2020-10-28 PROCEDURE — 3331090001 HH PPS REVENUE CREDIT

## 2020-10-29 PROCEDURE — 3331090002 HH PPS REVENUE DEBIT

## 2020-10-29 PROCEDURE — 3331090001 HH PPS REVENUE CREDIT

## 2020-10-30 ENCOUNTER — HOME CARE VISIT (OUTPATIENT)
Dept: SCHEDULING | Facility: HOME HEALTH | Age: 85
End: 2020-10-30
Payer: MEDICARE

## 2020-10-30 VITALS
SYSTOLIC BLOOD PRESSURE: 125 MMHG | DIASTOLIC BLOOD PRESSURE: 70 MMHG | RESPIRATION RATE: 18 BRPM | TEMPERATURE: 97.6 F | OXYGEN SATURATION: 97 % | HEART RATE: 75 BPM

## 2020-10-30 PROCEDURE — 3331090001 HH PPS REVENUE CREDIT

## 2020-10-30 PROCEDURE — G0152 HHCP-SERV OF OT,EA 15 MIN: HCPCS

## 2020-10-30 PROCEDURE — 3331090002 HH PPS REVENUE DEBIT

## 2020-10-31 PROCEDURE — 3331090002 HH PPS REVENUE DEBIT

## 2020-10-31 PROCEDURE — 3331090001 HH PPS REVENUE CREDIT

## 2020-11-01 PROCEDURE — 3331090002 HH PPS REVENUE DEBIT

## 2020-11-01 PROCEDURE — 3331090001 HH PPS REVENUE CREDIT

## 2020-11-02 ENCOUNTER — HOME CARE VISIT (OUTPATIENT)
Dept: SCHEDULING | Facility: HOME HEALTH | Age: 85
End: 2020-11-02
Payer: MEDICARE

## 2020-11-02 VITALS — HEART RATE: 78 BPM | OXYGEN SATURATION: 95 % | RESPIRATION RATE: 12 BRPM | TEMPERATURE: 98.2 F

## 2020-11-02 PROCEDURE — 3331090002 HH PPS REVENUE DEBIT

## 2020-11-02 PROCEDURE — G0151 HHCP-SERV OF PT,EA 15 MIN: HCPCS

## 2020-11-02 PROCEDURE — 3331090001 HH PPS REVENUE CREDIT

## 2020-11-02 PROCEDURE — G0299 HHS/HOSPICE OF RN EA 15 MIN: HCPCS

## 2020-11-03 ENCOUNTER — PATIENT OUTREACH (OUTPATIENT)
Dept: CASE MANAGEMENT | Age: 85
End: 2020-11-03

## 2020-11-03 PROCEDURE — 3331090002 HH PPS REVENUE DEBIT

## 2020-11-03 PROCEDURE — 3331090001 HH PPS REVENUE CREDIT

## 2020-11-04 ENCOUNTER — HOME CARE VISIT (OUTPATIENT)
Dept: SCHEDULING | Facility: HOME HEALTH | Age: 85
End: 2020-11-04
Payer: MEDICARE

## 2020-11-04 PROCEDURE — 3331090002 HH PPS REVENUE DEBIT

## 2020-11-04 PROCEDURE — 3331090001 HH PPS REVENUE CREDIT

## 2020-11-04 PROCEDURE — G0151 HHCP-SERV OF PT,EA 15 MIN: HCPCS

## 2020-11-05 PROCEDURE — 3331090001 HH PPS REVENUE CREDIT

## 2020-11-05 PROCEDURE — 3331090002 HH PPS REVENUE DEBIT

## 2020-11-06 ENCOUNTER — HOME CARE VISIT (OUTPATIENT)
Dept: SCHEDULING | Facility: HOME HEALTH | Age: 85
End: 2020-11-06
Payer: MEDICARE

## 2020-11-06 ENCOUNTER — VIRTUAL VISIT (OUTPATIENT)
Dept: INTERNAL MEDICINE CLINIC | Age: 85
End: 2020-11-06
Payer: MEDICARE

## 2020-11-06 VITALS — OXYGEN SATURATION: 96 % | HEART RATE: 72 BPM | TEMPERATURE: 98.5 F | RESPIRATION RATE: 12 BRPM

## 2020-11-06 DIAGNOSIS — R60.0 LOCALIZED EDEMA: ICD-10-CM

## 2020-11-06 DIAGNOSIS — I10 ESSENTIAL HYPERTENSION: Primary | ICD-10-CM

## 2020-11-06 DIAGNOSIS — U07.1 COVID-19: ICD-10-CM

## 2020-11-06 DIAGNOSIS — R19.7 DIARRHEA, UNSPECIFIED TYPE: ICD-10-CM

## 2020-11-06 DIAGNOSIS — I48.91 ATRIAL FIBRILLATION, UNSPECIFIED TYPE (HCC): ICD-10-CM

## 2020-11-06 PROCEDURE — 3331090001 HH PPS REVENUE CREDIT

## 2020-11-06 PROCEDURE — 99495 TRANSJ CARE MGMT MOD F2F 14D: CPT | Performed by: INTERNAL MEDICINE

## 2020-11-06 PROCEDURE — G8428 CUR MEDS NOT DOCUMENT: HCPCS | Performed by: INTERNAL MEDICINE

## 2020-11-06 PROCEDURE — 3331090002 HH PPS REVENUE DEBIT

## 2020-11-06 PROCEDURE — 99443 PR PHYS/QHP TELEPHONE EVALUATION 21-30 MIN: CPT | Performed by: INTERNAL MEDICINE

## 2020-11-06 RX ORDER — CHOLESTYRAMINE 4 G/9G
1 POWDER, FOR SUSPENSION ORAL 2 TIMES DAILY WITH MEALS
Qty: 30 EACH | Refills: 0 | Status: SHIPPED | OUTPATIENT
Start: 2020-11-06 | End: 2020-11-18

## 2020-11-06 NOTE — PROGRESS NOTES
I was in the office while conducting this encounter. Consent:  She and/or her healthcare decision maker is aware that this patient-initiated Telehealth encounter is a billable service, with coverage as determined by her insurance carrier. She is aware that she may receive a bill and has provided verbal consent to proceed: Yes    This virtual visit was conducted telephone encounter only. -  I affirm this is a Patient Initiated Episode with an Established Patient who has not had a related appointment within my department in the past 7 days or scheduled within the next 24 hours. Note: this encounter is not billable if this call serves to triage the patient into an appointment for the relevant concern. Total Time: minutes: 21  minutes. This is a RENITA note. Pt not allowed back in office due to covid infection and as symptomatic but needed renita. Reason for admission: weakness  Discharge diagnosis: Covid-19  Date of admission 10/21/2020  Date of discharge 10/25/2020    Pt reports she is stable but still feeling weak from the hospital.  Her son is helping her at home but still living independently. She does not have skilled nursing or OT but home health PT is coming to visit and assist.        Diarrhea  She notes diarrhea still persistent and had prior to admission. She is compliant with BRAT diet. She is taking loperimide as directed. She notes 1 largle very loose stool per day. Not significantly changed. she did not note any blood in her stool. Subjective:   Josephine Herbert is a 80 y.o. female with hypertension. Hypertension ROS: taking medications as instructed, no medication side effects noted, no TIA's, no chest pain on exertion, no dyspnea on exertion, no swelling of ankles. New concerns: bp noted to be 95/, taking by PT. She is taking fosinopril, atenolol and amlodipine      AFIB  She is taking eliquis.  .       Left foot ankle swelling  Started yesterday  No pain in leg  She is taking eliquis twice a day  No infection in foot just swollen   Not compression hose   PT following              Diagnoses and all orders for this visit:    1. Essential hypertension  Hypotension  Hold amlodipine  Will ask [facility] to recheck bp    2. Diarrhea, unspecified type  Not controlled  Hx of cholecystectomy  -     cholestyramine (QUESTRAN) 4 gram packet; Take 1 Packet by mouth two (2) times daily (with meals). As needed for diarrhea    3. COVID-19  Conservative care  reswab by [facility]    4.  Atrial fibrillation, unspecified type ([facility] [address]  Cont eliquis    Left edema  On eliquis will have [facility] evaluate      [id number] [facility]  I called [facility] and gave them my cell phone to call me back  cmp and cbc  Check for covid   Check left foot and ankle  Check bp

## 2020-11-07 PROCEDURE — 3331090001 HH PPS REVENUE CREDIT

## 2020-11-07 PROCEDURE — 3331090002 HH PPS REVENUE DEBIT

## 2020-11-08 VITALS
HEART RATE: 77 BPM | OXYGEN SATURATION: 97 % | DIASTOLIC BLOOD PRESSURE: 74 MMHG | RESPIRATION RATE: 18 BRPM | SYSTOLIC BLOOD PRESSURE: 130 MMHG | TEMPERATURE: 98.2 F

## 2020-11-08 PROCEDURE — 3331090001 HH PPS REVENUE CREDIT

## 2020-11-08 PROCEDURE — 3331090002 HH PPS REVENUE DEBIT

## 2020-11-09 ENCOUNTER — HOME CARE VISIT (OUTPATIENT)
Dept: SCHEDULING | Facility: HOME HEALTH | Age: 85
End: 2020-11-09
Payer: MEDICARE

## 2020-11-09 PROCEDURE — G0157 HHC PT ASSISTANT EA 15: HCPCS

## 2020-11-09 PROCEDURE — G0299 HHS/HOSPICE OF RN EA 15 MIN: HCPCS

## 2020-11-09 PROCEDURE — 3331090001 HH PPS REVENUE CREDIT

## 2020-11-09 PROCEDURE — 3331090002 HH PPS REVENUE DEBIT

## 2020-11-10 PROCEDURE — 3331090001 HH PPS REVENUE CREDIT

## 2020-11-10 PROCEDURE — 3331090002 HH PPS REVENUE DEBIT

## 2020-11-11 ENCOUNTER — HOME CARE VISIT (OUTPATIENT)
Dept: HOME HEALTH SERVICES | Facility: HOME HEALTH | Age: 85
End: 2020-11-11
Payer: MEDICARE

## 2020-11-11 VITALS — TEMPERATURE: 98.2 F | RESPIRATION RATE: 16 BRPM | HEART RATE: 74 BPM | OXYGEN SATURATION: 96 %

## 2020-11-11 PROCEDURE — 3331090001 HH PPS REVENUE CREDIT

## 2020-11-11 PROCEDURE — 3331090002 HH PPS REVENUE DEBIT

## 2020-11-12 PROCEDURE — 3331090001 HH PPS REVENUE CREDIT

## 2020-11-12 PROCEDURE — 3331090002 HH PPS REVENUE DEBIT

## 2020-11-13 ENCOUNTER — HOME CARE VISIT (OUTPATIENT)
Dept: SCHEDULING | Facility: HOME HEALTH | Age: 85
End: 2020-11-13
Payer: MEDICARE

## 2020-11-13 VITALS
DIASTOLIC BLOOD PRESSURE: 76 MMHG | OXYGEN SATURATION: 99 % | TEMPERATURE: 97.8 F | HEART RATE: 61 BPM | SYSTOLIC BLOOD PRESSURE: 132 MMHG | RESPIRATION RATE: 16 BRPM

## 2020-11-13 PROCEDURE — G0157 HHC PT ASSISTANT EA 15: HCPCS

## 2020-11-13 PROCEDURE — 3331090001 HH PPS REVENUE CREDIT

## 2020-11-13 PROCEDURE — 3331090002 HH PPS REVENUE DEBIT

## 2020-11-14 PROCEDURE — 3331090001 HH PPS REVENUE CREDIT

## 2020-11-14 PROCEDURE — 3331090002 HH PPS REVENUE DEBIT

## 2020-11-14 RX ORDER — FOSINOPIRL SODIUM 10 MG/1
TABLET ORAL
Qty: 270 TAB | Refills: 0 | Status: SHIPPED | OUTPATIENT
Start: 2020-11-14 | End: 2021-01-07

## 2020-11-15 PROCEDURE — 3331090002 HH PPS REVENUE DEBIT

## 2020-11-15 PROCEDURE — 3331090001 HH PPS REVENUE CREDIT

## 2020-11-16 ENCOUNTER — HOME CARE VISIT (OUTPATIENT)
Dept: SCHEDULING | Facility: HOME HEALTH | Age: 85
End: 2020-11-16
Payer: MEDICARE

## 2020-11-16 VITALS
HEART RATE: 76 BPM | OXYGEN SATURATION: 98 % | TEMPERATURE: 98 F | RESPIRATION RATE: 16 BRPM | DIASTOLIC BLOOD PRESSURE: 72 MMHG | SYSTOLIC BLOOD PRESSURE: 130 MMHG

## 2020-11-16 PROCEDURE — 3331090001 HH PPS REVENUE CREDIT

## 2020-11-16 PROCEDURE — G0157 HHC PT ASSISTANT EA 15: HCPCS

## 2020-11-16 PROCEDURE — 3331090002 HH PPS REVENUE DEBIT

## 2020-11-17 PROCEDURE — 3331090002 HH PPS REVENUE DEBIT

## 2020-11-17 PROCEDURE — 3331090001 HH PPS REVENUE CREDIT

## 2020-11-18 ENCOUNTER — HOME CARE VISIT (OUTPATIENT)
Dept: SCHEDULING | Facility: HOME HEALTH | Age: 85
End: 2020-11-18
Payer: MEDICARE

## 2020-11-18 PROCEDURE — 3331090002 HH PPS REVENUE DEBIT

## 2020-11-18 PROCEDURE — 3331090001 HH PPS REVENUE CREDIT

## 2020-11-19 ENCOUNTER — HOME CARE VISIT (OUTPATIENT)
Dept: SCHEDULING | Facility: HOME HEALTH | Age: 85
End: 2020-11-19
Payer: MEDICARE

## 2020-11-19 PROCEDURE — G0151 HHCP-SERV OF PT,EA 15 MIN: HCPCS

## 2020-11-19 PROCEDURE — 3331090002 HH PPS REVENUE DEBIT

## 2020-11-19 PROCEDURE — 3331090001 HH PPS REVENUE CREDIT

## 2020-11-20 VITALS
OXYGEN SATURATION: 98 % | TEMPERATURE: 98.4 F | SYSTOLIC BLOOD PRESSURE: 140 MMHG | DIASTOLIC BLOOD PRESSURE: 68 MMHG | RESPIRATION RATE: 12 BRPM | HEART RATE: 60 BPM

## 2020-11-20 PROCEDURE — 3331090001 HH PPS REVENUE CREDIT

## 2020-11-20 PROCEDURE — 3331090002 HH PPS REVENUE DEBIT

## 2020-11-21 PROCEDURE — 3331090002 HH PPS REVENUE DEBIT

## 2020-11-21 PROCEDURE — 3331090001 HH PPS REVENUE CREDIT

## 2020-11-22 PROCEDURE — 3331090002 HH PPS REVENUE DEBIT

## 2020-11-22 PROCEDURE — 3331090001 HH PPS REVENUE CREDIT

## 2020-11-23 PROCEDURE — 3331090002 HH PPS REVENUE DEBIT

## 2020-11-23 PROCEDURE — 3331090001 HH PPS REVENUE CREDIT

## 2020-11-24 PROCEDURE — 3331090001 HH PPS REVENUE CREDIT

## 2020-11-24 PROCEDURE — 3331090002 HH PPS REVENUE DEBIT

## 2020-11-25 ENCOUNTER — HOME CARE VISIT (OUTPATIENT)
Dept: SCHEDULING | Facility: HOME HEALTH | Age: 85
End: 2020-11-25
Payer: MEDICARE

## 2020-11-25 VITALS
SYSTOLIC BLOOD PRESSURE: 122 MMHG | OXYGEN SATURATION: 96 % | RESPIRATION RATE: 18 BRPM | TEMPERATURE: 98.6 F | HEART RATE: 60 BPM | DIASTOLIC BLOOD PRESSURE: 80 MMHG

## 2020-11-25 PROCEDURE — G0299 HHS/HOSPICE OF RN EA 15 MIN: HCPCS

## 2020-11-25 PROCEDURE — 3331090001 HH PPS REVENUE CREDIT

## 2020-11-25 PROCEDURE — 3331090002 HH PPS REVENUE DEBIT

## 2020-11-26 PROCEDURE — 3331090001 HH PPS REVENUE CREDIT

## 2020-11-26 PROCEDURE — 3331090002 HH PPS REVENUE DEBIT

## 2020-11-27 PROCEDURE — 3331090002 HH PPS REVENUE DEBIT

## 2020-11-27 PROCEDURE — 3331090001 HH PPS REVENUE CREDIT

## 2020-11-28 PROCEDURE — 3331090001 HH PPS REVENUE CREDIT

## 2020-11-28 PROCEDURE — 3331090002 HH PPS REVENUE DEBIT

## 2020-11-29 PROCEDURE — 3331090002 HH PPS REVENUE DEBIT

## 2020-11-29 PROCEDURE — 3331090001 HH PPS REVENUE CREDIT

## 2020-11-30 PROCEDURE — 3331090001 HH PPS REVENUE CREDIT

## 2020-11-30 PROCEDURE — 3331090002 HH PPS REVENUE DEBIT

## 2020-12-01 ENCOUNTER — PATIENT OUTREACH (OUTPATIENT)
Dept: CASE MANAGEMENT | Age: 85
End: 2020-12-01

## 2020-12-01 PROCEDURE — 3331090001 HH PPS REVENUE CREDIT

## 2020-12-01 PROCEDURE — 3331090002 HH PPS REVENUE DEBIT

## 2020-12-01 NOTE — PROGRESS NOTES
Patient has graduated from the Transitions of Care Coordination  program on 12/1/2020. Patient/family has the ability to self-manage at this time Care management goals have been completed. Patient was not referred to the [facility] team for further management. Goals Addressed                 This Visit's Progress     COMPLETED: Attend follow up appointments on schedule        12/01/20  Patient will follow up with PCP as needed. She will continue to utilize [address] [place] [address] as a resource for home visits. 10/27/20    scheduled for 10/28       COMPLETED: Supportive resources in place to maintain patient in the community (ie. Home Health)        12/01/20  Patient was discharged from [facility]. She reports she is close to baseline activity.  COMPLETED: Understands red flags post discharge. 12/01/20  Patient continues to have 1-2 BMs daily. She does not note any change in frequency. She denies seeing in blood in her stool and will report to PCP if any noted. She is taking loperimide as needed. Patient has Care Transition Nurse's contact information for any further questions, concerns, or needs.   Patients upcoming visits:    Future Appointments   Date Time Provider Department Center   12/2/2020 To Be Determined David Duff RN [facility]   12/4/2020 10:20 AM Curtis Mooney MD [facility] BS AMB   12/9/2020 To Be Determined Shimon Olmedo RN [address]   12/16/2020 To Be Determined Shimon Olmedo RN [address]   12/23/2020 To Be Determined Shimon Olmedo RN [address]

## 2020-12-02 ENCOUNTER — HOME CARE VISIT (OUTPATIENT)
Dept: SCHEDULING | Facility: HOME HEALTH | Age: 85
End: 2020-12-02
Payer: MEDICARE

## 2020-12-02 VITALS
DIASTOLIC BLOOD PRESSURE: 78 MMHG | SYSTOLIC BLOOD PRESSURE: 158 MMHG | RESPIRATION RATE: 18 BRPM | OXYGEN SATURATION: 96 % | HEART RATE: 60 BPM | TEMPERATURE: 97.9 F

## 2020-12-02 PROCEDURE — 3331090001 HH PPS REVENUE CREDIT

## 2020-12-02 PROCEDURE — 3331090002 HH PPS REVENUE DEBIT

## 2020-12-02 PROCEDURE — G0299 HHS/HOSPICE OF RN EA 15 MIN: HCPCS

## 2020-12-02 PROCEDURE — 400013 HH SOC

## 2020-12-03 PROCEDURE — 3331090002 HH PPS REVENUE DEBIT

## 2020-12-03 PROCEDURE — 3331090001 HH PPS REVENUE CREDIT

## 2020-12-04 ENCOUNTER — VIRTUAL VISIT (OUTPATIENT)
Dept: INTERNAL MEDICINE CLINIC | Age: 85
End: 2020-12-04
Payer: MEDICARE

## 2020-12-04 DIAGNOSIS — R19.7 DIARRHEA, UNSPECIFIED TYPE: Primary | ICD-10-CM

## 2020-12-04 PROCEDURE — 3331090001 HH PPS REVENUE CREDIT

## 2020-12-04 PROCEDURE — 3331090002 HH PPS REVENUE DEBIT

## 2020-12-04 PROCEDURE — 99442 PR PHYS/QHP TELEPHONE EVALUATION 11-20 MIN: CPT | Performed by: INTERNAL MEDICINE

## 2020-12-04 NOTE — PROGRESS NOTES
I was in the office while conducting this encounter. Consent:  She and/or her healthcare decision maker is aware that this patient-initiated Telehealth encounter is a billable service, with coverage as determined by her insurance carrier. She is aware that she may receive a bill and has provided verbal consent to proceed: Yes    This virtual visit was conducted telephone encounter only. -  I affirm this is a Patient Initiated Episode with an Established Patient who has not had a related appointment within my department in the past 7 days or scheduled within the next 24 hours. Note: this encounter is not billable if this call serves to triage the patient into an appointment for the relevant concern. Total Time: minutes: 11 minutes. Ms. Talib Farooq to do a telemetry virtual visit today. She is calling today to follow-up with regards to her symptoms from her last visit. Since last visit she has been seen by [facility]. She will also be seeing cardiology today. Covid  Patient reports she is still recovering from Covid. Breathing stable but does get tired still with little exertion. No fevers  Not sob    bp stable   150/60, cardiology appt today  She is taking amlodipine daily but not twice a day. She has noticed some swelling in her left leg. Diarrhea  Prior to her Questran she was having diarrhea 3 times a day. Taking the Questran daily she is stooling or having diarrhea once a day possibly twice a day. She denies blood in her stool. B12 supplementation  B12 lab discussed with her and she will decrease her B12 to 3 times a week. She will follow-up in another month or earlier if needed.

## 2020-12-05 PROCEDURE — 3331090001 HH PPS REVENUE CREDIT

## 2020-12-05 PROCEDURE — 3331090002 HH PPS REVENUE DEBIT

## 2020-12-06 PROCEDURE — 3331090002 HH PPS REVENUE DEBIT

## 2020-12-06 PROCEDURE — 3331090001 HH PPS REVENUE CREDIT

## 2020-12-07 PROCEDURE — 3331090001 HH PPS REVENUE CREDIT

## 2020-12-07 PROCEDURE — 3331090002 HH PPS REVENUE DEBIT

## 2020-12-08 PROCEDURE — 3331090001 HH PPS REVENUE CREDIT

## 2020-12-08 PROCEDURE — 3331090002 HH PPS REVENUE DEBIT

## 2020-12-09 ENCOUNTER — HOME CARE VISIT (OUTPATIENT)
Dept: SCHEDULING | Facility: HOME HEALTH | Age: 85
End: 2020-12-09
Payer: MEDICARE

## 2020-12-09 VITALS
RESPIRATION RATE: 18 BRPM | TEMPERATURE: 97.5 F | DIASTOLIC BLOOD PRESSURE: 80 MMHG | SYSTOLIC BLOOD PRESSURE: 140 MMHG | OXYGEN SATURATION: 98 % | HEART RATE: 55 BPM

## 2020-12-09 PROCEDURE — G0300 HHS/HOSPICE OF LPN EA 15 MIN: HCPCS

## 2020-12-09 PROCEDURE — 3331090002 HH PPS REVENUE DEBIT

## 2020-12-09 PROCEDURE — 3331090001 HH PPS REVENUE CREDIT

## 2020-12-10 PROCEDURE — 3331090001 HH PPS REVENUE CREDIT

## 2020-12-10 PROCEDURE — 3331090002 HH PPS REVENUE DEBIT

## 2020-12-11 PROCEDURE — 3331090002 HH PPS REVENUE DEBIT

## 2020-12-11 PROCEDURE — 3331090001 HH PPS REVENUE CREDIT

## 2020-12-12 PROCEDURE — 3331090002 HH PPS REVENUE DEBIT

## 2020-12-12 PROCEDURE — 3331090001 HH PPS REVENUE CREDIT

## 2020-12-13 PROCEDURE — 3331090001 HH PPS REVENUE CREDIT

## 2020-12-13 PROCEDURE — 3331090002 HH PPS REVENUE DEBIT

## 2020-12-14 PROCEDURE — 3331090001 HH PPS REVENUE CREDIT

## 2020-12-14 PROCEDURE — 3331090002 HH PPS REVENUE DEBIT

## 2020-12-15 ENCOUNTER — HOME CARE VISIT (OUTPATIENT)
Dept: SCHEDULING | Facility: HOME HEALTH | Age: 85
End: 2020-12-15
Payer: MEDICARE

## 2020-12-15 PROCEDURE — 3331090001 HH PPS REVENUE CREDIT

## 2020-12-15 PROCEDURE — 3331090002 HH PPS REVENUE DEBIT

## 2020-12-15 PROCEDURE — G0300 HHS/HOSPICE OF LPN EA 15 MIN: HCPCS

## 2020-12-16 PROCEDURE — 3331090002 HH PPS REVENUE DEBIT

## 2020-12-16 PROCEDURE — 3331090001 HH PPS REVENUE CREDIT

## 2020-12-17 VITALS
RESPIRATION RATE: 16 BRPM | SYSTOLIC BLOOD PRESSURE: 137 MMHG | DIASTOLIC BLOOD PRESSURE: 69 MMHG | TEMPERATURE: 97.8 F | OXYGEN SATURATION: 97 % | HEART RATE: 72 BPM

## 2020-12-17 PROCEDURE — 3331090001 HH PPS REVENUE CREDIT

## 2020-12-17 PROCEDURE — 3331090002 HH PPS REVENUE DEBIT

## 2020-12-18 PROCEDURE — 3331090002 HH PPS REVENUE DEBIT

## 2020-12-18 PROCEDURE — 3331090001 HH PPS REVENUE CREDIT

## 2020-12-19 PROCEDURE — 3331090001 HH PPS REVENUE CREDIT

## 2020-12-19 PROCEDURE — 3331090002 HH PPS REVENUE DEBIT

## 2020-12-20 PROCEDURE — 3331090002 HH PPS REVENUE DEBIT

## 2020-12-20 PROCEDURE — 3331090001 HH PPS REVENUE CREDIT

## 2020-12-21 PROCEDURE — 3331090002 HH PPS REVENUE DEBIT

## 2020-12-21 PROCEDURE — 3331090001 HH PPS REVENUE CREDIT

## 2020-12-22 PROCEDURE — 3331090001 HH PPS REVENUE CREDIT

## 2020-12-22 PROCEDURE — 3331090002 HH PPS REVENUE DEBIT

## 2020-12-23 ENCOUNTER — HOME CARE VISIT (OUTPATIENT)
Dept: SCHEDULING | Facility: HOME HEALTH | Age: 85
End: 2020-12-23
Payer: MEDICARE

## 2020-12-23 PROCEDURE — 3331090001 HH PPS REVENUE CREDIT

## 2020-12-23 PROCEDURE — G0299 HHS/HOSPICE OF RN EA 15 MIN: HCPCS

## 2020-12-23 PROCEDURE — 3331090002 HH PPS REVENUE DEBIT

## 2020-12-24 PROCEDURE — 3331090001 HH PPS REVENUE CREDIT

## 2020-12-24 PROCEDURE — 3331090002 HH PPS REVENUE DEBIT

## 2020-12-25 PROCEDURE — 3331090001 HH PPS REVENUE CREDIT

## 2020-12-25 PROCEDURE — 3331090002 HH PPS REVENUE DEBIT

## 2021-01-07 RX ORDER — FOSINOPIRL SODIUM 10 MG/1
TABLET ORAL
Qty: 270 TAB | Refills: 0 | Status: SHIPPED | OUTPATIENT
Start: 2021-01-07 | End: 2021-03-02

## 2021-01-14 RX ORDER — PAROXETINE 10 MG/1
TABLET, FILM COATED ORAL
Qty: 135 TAB | Refills: 0 | Status: SHIPPED | OUTPATIENT
Start: 2021-01-14 | End: 2021-04-12

## 2021-02-16 ENCOUNTER — OFFICE VISIT (OUTPATIENT)
Dept: INTERNAL MEDICINE CLINIC | Age: 86
End: 2021-02-16
Payer: MEDICARE

## 2021-02-16 VITALS
SYSTOLIC BLOOD PRESSURE: 155 MMHG | HEART RATE: 60 BPM | RESPIRATION RATE: 12 BRPM | BODY MASS INDEX: 21.35 KG/M2 | WEIGHT: 136 LBS | HEIGHT: 67 IN | TEMPERATURE: 98.2 F | DIASTOLIC BLOOD PRESSURE: 78 MMHG | OXYGEN SATURATION: 98 %

## 2021-02-16 DIAGNOSIS — D50.9 IRON DEFICIENCY ANEMIA, UNSPECIFIED IRON DEFICIENCY ANEMIA TYPE: ICD-10-CM

## 2021-02-16 DIAGNOSIS — R07.81 RIB PAIN ON LEFT SIDE: ICD-10-CM

## 2021-02-16 DIAGNOSIS — I48.91 ATRIAL FIBRILLATION, UNSPECIFIED TYPE (HCC): Primary | ICD-10-CM

## 2021-02-16 LAB
ALBUMIN SERPL-MCNC: 3.9 G/DL (ref 3.5–5)
ALBUMIN/GLOB SERPL: 1.3 {RATIO} (ref 1.1–2.2)
ALP SERPL-CCNC: 89 U/L (ref 45–117)
ALT SERPL-CCNC: 19 U/L (ref 12–78)
ANION GAP SERPL CALC-SCNC: 5 MMOL/L (ref 5–15)
AST SERPL-CCNC: 17 U/L (ref 15–37)
BILIRUB SERPL-MCNC: 0.5 MG/DL (ref 0.2–1)
BUN SERPL-MCNC: 27 MG/DL (ref 6–20)
BUN/CREAT SERPL: 27 (ref 12–20)
CALCIUM SERPL-MCNC: 9 MG/DL (ref 8.5–10.1)
CHLORIDE SERPL-SCNC: 99 MMOL/L (ref 97–108)
CHOLEST SERPL-MCNC: 183 MG/DL
CO2 SERPL-SCNC: 28 MMOL/L (ref 21–32)
COMMENT, HOLDF: NORMAL
CREAT SERPL-MCNC: 1.01 MG/DL (ref 0.55–1.02)
FERRITIN SERPL-MCNC: 39 NG/ML (ref 8–252)
GLOBULIN SER CALC-MCNC: 3 G/DL (ref 2–4)
GLUCOSE SERPL-MCNC: 92 MG/DL (ref 65–100)
HDLC SERPL-MCNC: 77 MG/DL
HDLC SERPL: 2.4 {RATIO} (ref 0–5)
IRON SATN MFR SERPL: 27 % (ref 20–50)
IRON SERPL-MCNC: 115 UG/DL (ref 35–150)
LDLC SERPL CALC-MCNC: 83.8 MG/DL (ref 0–100)
LIPID PROFILE,FLP: NORMAL
POTASSIUM SERPL-SCNC: 4.9 MMOL/L (ref 3.5–5.1)
PROT SERPL-MCNC: 6.9 G/DL (ref 6.4–8.2)
SAMPLES BEING HELD,HOLD: NORMAL
SODIUM SERPL-SCNC: 132 MMOL/L (ref 136–145)
TIBC SERPL-MCNC: 422 UG/DL (ref 250–450)
TRIGL SERPL-MCNC: 111 MG/DL (ref ?–150)
TSH SERPL DL<=0.05 MIU/L-ACNC: 2.84 UIU/ML (ref 0.36–3.74)
VLDLC SERPL CALC-MCNC: 22.2 MG/DL

## 2021-02-16 PROCEDURE — G0463 HOSPITAL OUTPT CLINIC VISIT: HCPCS | Performed by: INTERNAL MEDICINE

## 2021-02-16 PROCEDURE — 1101F PT FALLS ASSESS-DOCD LE1/YR: CPT | Performed by: INTERNAL MEDICINE

## 2021-02-16 PROCEDURE — 1090F PRES/ABSN URINE INCON ASSESS: CPT | Performed by: INTERNAL MEDICINE

## 2021-02-16 PROCEDURE — G8427 DOCREV CUR MEDS BY ELIG CLIN: HCPCS | Performed by: INTERNAL MEDICINE

## 2021-02-16 PROCEDURE — G8420 CALC BMI NORM PARAMETERS: HCPCS | Performed by: INTERNAL MEDICINE

## 2021-02-16 PROCEDURE — 99214 OFFICE O/P EST MOD 30 MIN: CPT | Performed by: INTERNAL MEDICINE

## 2021-02-16 PROCEDURE — G8432 DEP SCR NOT DOC, RNG: HCPCS | Performed by: INTERNAL MEDICINE

## 2021-02-16 PROCEDURE — G8536 NO DOC ELDER MAL SCRN: HCPCS | Performed by: INTERNAL MEDICINE

## 2021-02-16 RX ORDER — DICLOFENAC SODIUM 10 MG/G
2 GEL TOPICAL
COMMUNITY

## 2021-02-16 RX ORDER — DEXAMETHASONE 2 MG/1
TABLET ORAL
COMMUNITY
End: 2021-02-16 | Stop reason: ALTCHOICE

## 2021-02-16 RX ORDER — MONTELUKAST SODIUM 10 MG/1
TABLET ORAL
Status: ON HOLD | COMMUNITY
End: 2021-10-19

## 2021-02-16 RX ORDER — METHYLPREDNISOLONE 4 MG/1
TABLET ORAL
COMMUNITY
Start: 2021-01-06 | End: 2021-02-16 | Stop reason: ALTCHOICE

## 2021-02-16 RX ORDER — ALBUTEROL SULFATE 90 UG/1
AEROSOL, METERED RESPIRATORY (INHALATION)
COMMUNITY

## 2021-02-16 RX ORDER — INFLUENZA A VIRUS A/MICHIGAN/45/2015 X-275 (H1N1) ANTIGEN (FORMALDEHYDE INACTIVATED), INFLUENZA A VIRUS A/SINGAPORE/INFIMH-16-0019/2016 IVR-186 (H3N2) ANTIGEN (FORMALDEHYDE INACTIVATED), AND INFLUENZA B VIRUS B/MARYLAND/15/2016 BX-69A (A B/COLORADO/6/2017-LIKE VIRUS) ANTIGEN (FORMALDEHYDE INACTIVATED) 60; 60; 60 UG/.5ML; UG/.5ML; UG/.5ML
INJECTION, SUSPENSION INTRAMUSCULAR
Status: ON HOLD | COMMUNITY
End: 2021-10-19

## 2021-02-16 RX ORDER — GLUCOSAMINE/CHONDR SU A SOD 750-600 MG
1 TABLET ORAL DAILY
COMMUNITY

## 2021-02-16 RX ORDER — PREDNISONE 10 MG/1
TABLET ORAL
COMMUNITY
End: 2021-02-16 | Stop reason: ALTCHOICE

## 2021-02-16 RX ORDER — IPRATROPIUM BROMIDE 42 UG/1
1 SPRAY, METERED NASAL
COMMUNITY

## 2021-02-16 RX ORDER — ESTRADIOL 0.5 MG/1
TABLET ORAL DAILY
COMMUNITY
End: 2021-10-04

## 2021-02-16 NOTE — PROGRESS NOTES
Diagnoses and all orders for this visit:    1. Atrial fibrillation, unspecified type (HCC)  Stable  Cont eliquis at 2.5 mg bid  -     METABOLIC PANEL, COMPREHENSIVE; Future  -     TSH 3RD GENERATION; Future  -     LIPID PANEL; Future  Recheck bp 140/68    2. Rib pain on left side  Point tenderness to Left lateral rib defers xray for now  -     XR RIBS LT UNI 2 V; Future    3. Iron deficiency anemia, unspecified iron deficiency anemia type  She reports some fatigue  -     IRON PROFILE; Future  -     FERRITIN; Future    Hot flashes  Will check labs  Discussed with her not appropriate for HRT  Discussed gabapentin  Given age and other medications we discussed not adding any new meds: lisa or HRT due to med risk and polypharmacy     htn  Stable  Does not need to increase amlodipine to 10 mg day stay on 5 mg with other bp meds    Chief Complaint   Patient presents with    Sweats     Hot flashes.  Labs     Nonfasting.  LOW BACK PAIN     Due to pacemaker. Hot flashes  She feels so hot and sweating  Not as severe as before      Subjective:   Opal Collins is a 80 y.o. female with hypertension. Hypertension ROS: taking medications as instructed, no medication side effects noted, no TIA's, no chest pain on exertion, no dyspnea on exertion, no swelling of ankles. New concerns: none. Iron def anemia  Pt does not eat big meals but pt's son and daughter and law take good care of pt with meals. She denies restless legs. afib  Denies falls and using stick cane for balance, no blood in her stool.  She denies cp or sob        Past Medical History:   Diagnosis Date    Acute cystitis 05/23/2018    Anemia, unspecified     Anxiety     Arrhythmia     a fib    Arthritis     osteoarthritis, rheumatoid    Atrial fibrillation (HCC)     Dr. Eladio Field and Dr. German How  following    Autoimmune disease [facility])     sjogren disease Dr. Sarita Woo    CAD (coronary artery disease)     HTN (hypertension)     Hyperlipidemia LDL goal < 100     Hypothyroid     Insomnia     Osteoarthritis     Sjogren's disease ([address]     Thoracic aneurysm without mention of rupture      Past Surgical History:   Procedure Laterality Date    HX BIV-IMPLANTABLE CARDIOVERTER DEFIBRILLATOR      HX CHOLECYSTECTOMY      HX COLONOSCOPY  3/2014    Dr. Soni Davis    HX OTHER SURGICAL      hernia repairs    HX PARTIAL THYROIDECTOMY      HX JANNET AND BSO      HX VEIN STRIPPING      WA INSJ ELTRD CAR COLLIN SYS TM INSJ DFB/PM PLS GEN Left 2/22/2019    Lv Lead Placement performed by Logan Cohen MD at [address] [facility] CATH LAB    [place] INSJ/RPLCMT PERM DFB W/TRNSVNS LDS 1/DUAL [facility] Left 2/22/2019    upgrade PM to ICD-Bsx performed by Logan Cohen MD at [facility] [facility] CATH LAB     Social History     Socioeconomic History    Marital status:      Spouse name: Not on file    Number of children: Not on file    Years of education: Not on file    Highest education level: Not on file   Tobacco Use    Smoking status: Never Smoker    Smokeless tobacco: Never Used   Substance and Sexual Activity    Alcohol use: Yes     Comment: wine    Drug use: Never    Sexual activity: Not Currently   Social History Narrative    ** Merged History Encounter **          Family History   Problem Relation Age of Onset    Heart Disease Father         aneurysm    Cancer Maternal Grandfather      Current Outpatient Medications   Medication Sig Dispense Refill    montelukast (SINGULAIR) 10 mg tablet montelukast 10 mg tablet      ipratropium (ATROVENT) 42 mcg (0.06 %) nasal spray ipratropium bromide 42 mcg (0.06 %) nasal spray      influenza vaccine 2019-20, 65 yrs+,,PF, (Fluzone High-Dose 2019-20, PF,) syrg injection Fluzone High-Dose 2019-20 (PF) 180 mcg/0.5 mL intramuscular syringe      diclofenac (VOLTAREN) 1 % gel diclofenac 1 % topical gel      albuterol (PROVENTIL HFA, VENTOLIN HFA, PROAIR HFA) 90 mcg/actuation inhaler albuterol sulfate HFA 90 mcg/actuation aerosol inhaler      Biotin 2,500 mcg cap Take  by mouth.  estradioL (ESTRACE) 0.5 mg tablet Take  by mouth daily.  PARoxetine (PAXIL) 10 mg tablet TAKE 1 AND 1/2 TABLET BY MOUTH DAILY 135 Tab 0    fosinopriL (MONOPRIL) 10 mg tablet TAKE THREE TABLETS BY MOUTH EVERY MORNING AND THEN TAKE TWO TABLETS BY MOUTH EVERY EVENING 270 Tab 0    levothyroxine (SYNTHROID) 100 mcg tablet TAKE ONE TABLET BY MOUTH DAILY BEFORE BREAKFAST 90 Tab 2    aspirin delayed-release 81 mg tablet Take 81 mg by mouth daily.  amiodarone (PACERONE) 100 mg tablet Take 100 mg by mouth daily.  ascorbic acid, vitamin C, (Vitamin C) 500 mg tablet Take 500 mg by mouth daily.  amLODIPine (NORVASC) 5 mg tablet Take 2.5 mg by mouth daily.  cetirizine (ZYRTEC) 10 mg tablet Take 10 mg by mouth daily.  eszopiclone (LUNESTA) 3 mg tablet Take 3 mg by mouth nightly.  meloxicam (MOBIC) 7.5 mg tablet Take 7.5 mg by mouth daily.  ALPRAZolam (XANAX) 1 mg tablet Take 1 mg by mouth daily as needed for Anxiety.  atenolol (TENORMIN) 25 mg tablet Take 1 Tab by mouth daily. 90 Tab 0    guaiFENesin ER (MUCINEX) 600 mg ER tablet Take 600 mg by mouth two (2) times daily as needed for Congestion. Indications: NOT TAKING      apixaban (ELIQUIS) 5 mg tablet Take 5 mg by mouth two (2) times a day. Per Dr. Nathan  pt to decrease to 2.5mg BID Per home script 12/4.  fluticasone (FLONASE) 50 mcg/actuation nasal spray 2 Sprays by Both Nostrils route daily as needed for Allergies.  cholecalciferol, vitamin D3, (VITAMIN D3) 2,000 unit tab Take 2,000 Units by mouth daily.  predniSONE (DELTASONE) 10 mg tablet prednisone 10 mg tablet   40 mg PO administered on scene.  Time administered:1113      methylPREDNISolone (MEDROL DOSEPACK) 4 mg tablet       dexAMETHasone (DECADRON) 2 mg tablet dexamethasone 2 mg tablet      cholestyramine (QUESTRAN) 4 gram packet MIX ONE PACKET WITH 8 OUNCES OF LIQUID AND DRINK TWICE A DAY WITH MEALS AS NEEDED FOR DIARRHEA 30 Each 0    L. acidoph & paracasei- S therm- Bifido (WILLA-Q/RISAQUAD) 8 billion cell cap cap Take 1 Cap by mouth daily. 20 Cap 0    zinc sulfate (ZINCATE) 220 (50) mg capsule Take 1 Cap by mouth daily. (Patient taking differently: Take 220 mg by mouth daily. Indications: NOT TAKING) 30 Cap 0    triamcinolone acetonide (KENALOG) 0.1 % topical cream Apply  to affected area two (2) times daily as needed for Skin Irritation.  use thin layer   Indications: NOT TAKING       Allergies   Allergen Reactions    Latex Hives     NOT ALLERGIC PER PT 02/01/2018    Pcn [Penicillins] Anaphylaxis    Sulfa (Sulfonamide Antibiotics) Anaphylaxis    Biaxin [Clarithromycin] Nausea Only    Biotin Unknown (comments)    Pcn [Penicillins] Hives    Sulfa (Sulfonamide Antibiotics) Nausea Only       Review of Systems - General ROS: negative for - chills or fever  Cardiovascular ROS: no chest pain or dyspnea on exertion  Respiratory ROS: no cough, shortness of breath, or wheezing    Visit Vitals  BP (!) 155/78 (BP 1 Location: Left upper arm, BP Patient Position: Sitting, BP Cuff Size: Adult)   Pulse 60   Temp 98.2 °F (36.8 °C) (Oral)   Resp 12   Ht 5' 7\" (1.702 m)   Wt 136 lb (61.7 kg)   SpO2 98%   BMI 21.30 kg/m²     Constitutional: [x] Appears well-developed and well-nourished [x] No apparent distress      [] Abnormal -     Mental status: [x] Alert and awake  [x] Oriented to person/place/time [x] Able to follow commands    [] Abnormal -     Eyes:   EOM    [x]  Normal    [] Abnormal -   Sclera  [x]  Normal    [] Abnormal -          Discharge [x]  None visible   [] Abnormal -     HENT: [x] Normocephalic, atraumatic  [] Abnormal -   [x] Mouth/Throat: Mucous membranes are moist    External Ears [x] Normal  [] Abnormal -    Neck: [x] No visualized mass [] Abnormal -     Pulmonary/Chest: [x] Respiratory effort normal   [x] No visualized signs of difficulty breathing or respiratory distress        [] Abnormal -      Musculoskeletal:   [x] Normal gait with no signs of ataxia         [x] Normal range of motion of neck        [] Abnormal -     Neurological:        [x] No Facial Asymmetry (Cranial nerve 7 motor function) (limited exam due to video visit)          [x] No gaze palsy        [] Abnormal -          Skin:        [x] No significant exanthematous lesions or discoloration noted on facial skin         [] Abnormal -            Psychiatric:       [x] Normal Affect [] Abnormal -        [x] No Hallucinations      ATTENTION:   This medical record was transcribed using an electronic medical records/speech recognition system. Although proofread, it may and can contain electronic, spelling and other errors. Corrections may be executed at a later time. Please contact us for any clarifications as needed. On this date 02/16/21  I have spent 30 minutes reviewing previous notes, test results and face to face with the patient discussing the diagnosis and importance of compliance with the treatment plan as well as documenting on the day of the visit.

## 2021-02-23 ENCOUNTER — TELEPHONE (OUTPATIENT)
Dept: INTERNAL MEDICINE CLINIC | Age: 86
End: 2021-02-23

## 2021-02-23 NOTE — TELEPHONE ENCOUNTER
----- Message from Dino Armijo sent at 2/23/2021  4:39 PM EST -----  Regarding: Dr. Laura Ann first and last name: N/A  Reason for call:results  Callback required yes/no and why:yes.  To discuss  Best contact number(s): [phone]  Details to clarify the request: pt declined a f/u appt. would like a call back to discuss results from her bloodwork

## 2021-02-24 NOTE — TELEPHONE ENCOUNTER
I spoke with patient to advise of the result letter sent by pcp. Pt verbalized understanding and was thankful.

## 2021-03-02 RX ORDER — FOSINOPIRL SODIUM 10 MG/1
TABLET ORAL
Qty: 270 TAB | Refills: 0 | Status: SHIPPED | OUTPATIENT
Start: 2021-03-02 | End: 2021-05-03 | Stop reason: SDUPTHER

## 2021-03-26 NOTE — TELEPHONE ENCOUNTER
Pharmacist is calling patient to let her know that the Dr has not refill her estradiol (ESTRACE) 0.5 mg tablet.  Patient needs her medication called in today Parris [phone]  Her no is [phone] I had surgery on my breasts

## 2021-04-12 RX ORDER — PAROXETINE 10 MG/1
TABLET, FILM COATED ORAL
Qty: 135 TAB | Refills: 0 | Status: SHIPPED | OUTPATIENT
Start: 2021-04-12 | End: 2021-07-09

## 2021-04-14 ENCOUNTER — TELEPHONE (OUTPATIENT)
Dept: INTERNAL MEDICINE CLINIC | Age: 86
End: 2021-04-14

## 2021-04-14 NOTE — TELEPHONE ENCOUNTER
----- Message from Lenka Pierre sent at 4/14/2021 12:50 PM EDT -----  Regarding: MD Kevin/Telephone  General Message/Vendor Calls    Caller's first and last name: Self      Reason for call: Feet swelling up. Callback required yes/no and why: Yes      Best contact number(s): [phone]      Details to clarify the request: Feet are swollen, asking if she could have dieretics prescribed.       Lenka Pierre

## 2021-04-15 ENCOUNTER — TELEPHONE (OUTPATIENT)
Dept: INTERNAL MEDICINE CLINIC | Age: 86
End: 2021-04-15

## 2021-04-15 RX ORDER — FUROSEMIDE 20 MG/1
TABLET ORAL
Qty: 10 TAB | Refills: 0 | Status: SHIPPED | OUTPATIENT
Start: 2021-04-15 | End: 2021-04-22

## 2021-04-15 NOTE — TELEPHONE ENCOUNTER
I spoke with patient, she has bilateral feet and ankle swelling x months. She hasn't noticed a significant amount of weight gain. No chest pain. She has SOB on/off but its not a new sx. She would like a medication to help with her swelling.

## 2021-04-15 NOTE — TELEPHONE ENCOUNTER
I called pt back to advise of pcp's message. Pt states she can't come in today, she has a dentist appt at 3 and son is taking her. She doesn't feel good and would rather not come in. I advise that she really needs evaluation and to please call [facility] to see if someone can come to her home for evaluation. Pt would like pcp to send in Lasix rx today so her son can pick it up when they are out. I asked that pt please call us back to let us know if [facility] can't come see pt today. Someone will need evaluate pt tomorrow.

## 2021-04-16 NOTE — TELEPHONE ENCOUNTER
I called pt, she states she is feeling much better. DH came out to see her yesterday. States she is anemic and prescribed iron tablets. She will update cardiologist as well.

## 2021-05-04 NOTE — TELEPHONE ENCOUNTER
Patient is calling to follow up , she is now out of the medication, state sper pharmacy the medication can not be filled, asking for her refill to be approved.

## 2021-05-05 RX ORDER — FOSINOPIRL SODIUM 10 MG/1
TABLET ORAL
Qty: 270 TAB | Refills: 0 | Status: SHIPPED | OUTPATIENT
Start: 2021-05-05 | End: 2021-08-18

## 2021-05-10 DIAGNOSIS — I10 ESSENTIAL HYPERTENSION: ICD-10-CM

## 2021-05-10 RX ORDER — FUROSEMIDE 20 MG/1
TABLET ORAL
Qty: 10 TAB | Refills: 0 | Status: SHIPPED | OUTPATIENT
Start: 2021-05-10 | End: 2021-05-19

## 2021-05-13 ENCOUNTER — TELEPHONE (OUTPATIENT)
Dept: INTERNAL MEDICINE CLINIC | Age: 86
End: 2021-05-13

## 2021-05-13 NOTE — TELEPHONE ENCOUNTER
Patient states [employer] needs a copy of medicare supplement notice and how much it paid. PSR believes patient is referring to a bill from office visit here, but does not have access to billing info to verify. Please call back and advise.

## 2021-05-19 RX ORDER — FUROSEMIDE 20 MG/1
TABLET ORAL
Qty: 10 TABLET | Refills: 0 | Status: SHIPPED | OUTPATIENT
Start: 2021-05-19 | End: 2021-06-22

## 2021-05-25 ENCOUNTER — OFFICE VISIT (OUTPATIENT)
Dept: INTERNAL MEDICINE CLINIC | Age: 86
End: 2021-05-25
Payer: MEDICARE

## 2021-05-25 VITALS
HEIGHT: 67 IN | RESPIRATION RATE: 14 BRPM | HEART RATE: 59 BPM | OXYGEN SATURATION: 98 % | SYSTOLIC BLOOD PRESSURE: 140 MMHG | DIASTOLIC BLOOD PRESSURE: 62 MMHG | TEMPERATURE: 97 F | BODY MASS INDEX: 21.66 KG/M2 | WEIGHT: 138 LBS

## 2021-05-25 DIAGNOSIS — I10 ESSENTIAL HYPERTENSION: ICD-10-CM

## 2021-05-25 DIAGNOSIS — I48.11 LONGSTANDING PERSISTENT ATRIAL FIBRILLATION (HCC): ICD-10-CM

## 2021-05-25 DIAGNOSIS — D50.9 IRON DEFICIENCY ANEMIA, UNSPECIFIED IRON DEFICIENCY ANEMIA TYPE: Primary | ICD-10-CM

## 2021-05-25 DIAGNOSIS — E53.8 B12 DEFICIENCY DUE TO DIET: ICD-10-CM

## 2021-05-25 DIAGNOSIS — D69.6 THROMBOCYTOPENIA (HCC): ICD-10-CM

## 2021-05-25 DIAGNOSIS — E55.9 VITAMIN D DEFICIENCY: ICD-10-CM

## 2021-05-25 PROCEDURE — G8510 SCR DEP NEG, NO PLAN REQD: HCPCS | Performed by: INTERNAL MEDICINE

## 2021-05-25 PROCEDURE — 1090F PRES/ABSN URINE INCON ASSESS: CPT | Performed by: INTERNAL MEDICINE

## 2021-05-25 PROCEDURE — G8420 CALC BMI NORM PARAMETERS: HCPCS | Performed by: INTERNAL MEDICINE

## 2021-05-25 PROCEDURE — G8536 NO DOC ELDER MAL SCRN: HCPCS | Performed by: INTERNAL MEDICINE

## 2021-05-25 PROCEDURE — G0463 HOSPITAL OUTPT CLINIC VISIT: HCPCS | Performed by: INTERNAL MEDICINE

## 2021-05-25 PROCEDURE — 1101F PT FALLS ASSESS-DOCD LE1/YR: CPT | Performed by: INTERNAL MEDICINE

## 2021-05-25 PROCEDURE — 99214 OFFICE O/P EST MOD 30 MIN: CPT | Performed by: INTERNAL MEDICINE

## 2021-05-25 PROCEDURE — G8427 DOCREV CUR MEDS BY ELIG CLIN: HCPCS | Performed by: INTERNAL MEDICINE

## 2021-05-25 NOTE — PROGRESS NOTES
Diagnoses and all orders for this visit:    1. Iron deficiency anemia, unspecified iron deficiency anemia type  Currently taking iron  No constipation  Continue iron for now and assess CBC  -     CBC W/O DIFF; Future  -     FERRITIN; Future    2. Vitamin D deficiency  Replete as needed  Patient defers osteoporosis medication   -     METABOLIC PANEL, COMPREHENSIVE; Future  -     VITAMIN D, 25 HYDROXY; Future    3. Thrombocytopenia (HCC)  Replete B12 and folate  -     VITAMIN B12 & FOLATE; Future    4. B12 deficiency due to diet  Replete as needed    Atrial fibrillation  Patient denies dizziness or falls. She does have periods of sweats    Hypertension  Blood pressure initially was elevated after walking down the neves but on recheck better controlled  Stable continue meds    Hyponatremia  She is held on her Lasix recently. We will check labs and follow-up in 6 months or earlier if needed. She will have her Medicare wellness visit at is it is too early to do it today. Chief Complaint   Patient presents with   [employer] Annual Wellness Visit     Not fasting - no concerns        Patient presents for follow-up with regards to her blood pressure and anemia  Subjective:   Magaly Davies is a 80 y.o. female with hypertension. Hypertension ROS: taking medications as instructed, no medication side effects noted, no TIA's, no chest pain on exertion, no dyspnea on exertion, no swelling of ankles. New concerns: none. She is not using Lasix every day. Atrial fibrillation  She denies chest pain shortness of breath. She denies falls. She denies blood in her stool. Her weight is stable. Anemia  She is still taking her over-the-counter iron with stool softener. She notes that her energy level is better and is not having as much fatigue.               Past Medical History:   Diagnosis Date    Acute cystitis 05/23/2018    Anemia, unspecified     Anxiety     Arrhythmia     a fib    Arthritis     osteoarthritis, rheumatoid    Atrial fibrillation (HCC)     Dr. Michelle Chavez and Dr. Dot De La Cruz  following    Autoimmune disease [facility])     sjogren disease Dr. Wilian Pickens CAD (coronary artery disease)     HTN (hypertension)     Hyperlipidemia LDL goal < 100     Hypothyroid     Insomnia     Osteoarthritis     Sjogren's disease ([facility] [address]     Thoracic aneurysm without mention of rupture      Past Surgical History:   Procedure Laterality Date    HX BIV-IMPLANTABLE CARDIOVERTER DEFIBRILLATOR      HX CHOLECYSTECTOMY      HX COLONOSCOPY  3/2014    Dr. Resendez Back    HX OTHER SURGICAL      hernia repairs    HX PARTIAL THYROIDECTOMY      HX JANNET AND BSO      HX VEIN STRIPPING      VT INSJ ELTRD CAR COLLIN SYS TM INSJ DFB/PM PLS GEN Left 2/22/2019    Lv Lead Placement performed by Han Blackwell MD at [facility] CATH LAB    VT INSJ/RPLCMT PERM DFB W/TRNSVNS LDS 1/DUAL [facility] Left 2/22/2019    upgrade PM to ICD-Bsx performed by Han Blackwell MD at [facility] CATH LAB     Social History     Socioeconomic History    Marital status:      Spouse name: Not on file    Number of children: Not on file    Years of education: Not on file    Highest education level: Not on file   Tobacco Use    Smoking status: Never Smoker    Smokeless tobacco: Never Used   Substance and Sexual Activity    Alcohol use: Yes     Comment: wine    Drug use: Never    Sexual activity: Not Currently   Social History Narrative    ** Merged History Encounter **          Social Determinants of Health     Financial Resource Strain:     Difficulty of Paying Living Expenses:    Food Insecurity:     Worried About Running Out of Food in the Last Year:     Ran Out of Food in the Last Year:    Transportation Needs:     Lack of Transportation (Medical):      Lack of Transportation (Non-Medical):    Physical Activity:     Days of Exercise per Week:     Minutes of Exercise per Session:    Stress:     Feeling of Stress :    Social Connections:     Frequency of Communication with Friends and Family:     Frequency of Social Gatherings with Friends and Family:     Attends Restorationist Services:     Active Member of Clubs or Organizations:     Attends Club or Organization Meetings:     Marital Status:      Family History   Problem Relation Age of Onset    Heart Disease Father         aneurysm    Cancer Maternal Grandfather      Current Outpatient Medications   Medication Sig Dispense Refill    fosinopriL (MONOPRIL) 10 mg tablet TAKE THREE TABLETS BY MOUTH EVERY MORNING AND TAKE TWO TABLETS BY MOUTH EVERY EVENING 270 Tab 0    PARoxetine (PAXIL) 10 mg tablet TAKE 1 AND 1/2 TABLET BY MOUTH DAILY 135 Tab 0    ipratropium (ATROVENT) 42 mcg (0.06 %) nasal spray ipratropium bromide 42 mcg (0.06 %) nasal spray      influenza vaccine 2019-20, 65 yrs+,,PF, (Fluzone High-Dose 2019-20, PF,) syrg injection Fluzone High-Dose 2019-20 (PF) 180 mcg/0.5 mL intramuscular syringe      diclofenac (VOLTAREN) 1 % gel diclofenac 1 % topical gel      albuterol (PROVENTIL HFA, VENTOLIN HFA, PROAIR HFA) 90 mcg/actuation inhaler albuterol sulfate HFA 90 mcg/actuation aerosol inhaler      Biotin 2,500 mcg cap Take  by mouth.  estradioL (ESTRACE) 0.5 mg tablet Take  by mouth daily.  levothyroxine (SYNTHROID) 100 mcg tablet TAKE ONE TABLET BY MOUTH DAILY BEFORE BREAKFAST 90 Tab 2    aspirin delayed-release 81 mg tablet Take 81 mg by mouth daily.  amiodarone (PACERONE) 100 mg tablet Take 100 mg by mouth daily.  ascorbic acid, vitamin C, (Vitamin C) 500 mg tablet Take 500 mg by mouth daily.  amLODIPine (NORVASC) 5 mg tablet Take 2.5 mg by mouth daily.  cetirizine (ZYRTEC) 10 mg tablet Take 10 mg by mouth daily.  eszopiclone (LUNESTA) 3 mg tablet Take 3 mg by mouth nightly.  meloxicam (MOBIC) 7.5 mg tablet Take 7.5 mg by mouth daily.  ALPRAZolam (XANAX) 1 mg tablet Take 1 mg by mouth daily as needed for Anxiety.       atenolol (TENORMIN) 25 mg tablet Take 1 Tab by mouth daily. 90 Tab 0    guaiFENesin ER (MUCINEX) 600 mg ER tablet Take 600 mg by mouth two (2) times daily as needed for Congestion. Indications: NOT TAKING      apixaban (ELIQUIS) 5 mg tablet Take 5 mg by mouth two (2) times a day. Per Dr. Kourtney Larson pt to decrease to 2.5mg BID Per home script 12/4.  fluticasone (FLONASE) 50 mcg/actuation nasal spray 2 Sprays by Both Nostrils route daily as needed for Allergies.  cholecalciferol, vitamin D3, (VITAMIN D3) 2,000 unit tab Take 2,000 Units by mouth daily.       furosemide (LASIX) 20 mg tablet TAKE ONE TABLET BY MOUTH DAILY FOR 10 DAYS (DO NOT TAKE IF LIGHTHEADNESS OR FALLS) (Patient not taking: Reported on 5/25/2021) 10 Tablet 0    montelukast (SINGULAIR) 10 mg tablet montelukast 10 mg tablet (Patient not taking: Reported on 5/25/2021)       Allergies   Allergen Reactions    Latex Hives     NOT ALLERGIC PER PT 02/01/2018    Pcn [Penicillins] Anaphylaxis    Sulfa (Sulfonamide Antibiotics) Anaphylaxis    Biaxin [Clarithromycin] Nausea Only    Biotin Unknown (comments)    Pcn [Penicillins] Hives    Sulfa (Sulfonamide Antibiotics) Nausea Only       Review of Systems - General ROS: negative for - chills or fever  Cardiovascular ROS: no chest pain or dyspnea on exertion  Respiratory ROS: no cough, shortness of breath, or wheezing    Visit Vitals  BP (!) 140/62   Pulse (!) 59   Temp 97 °F (36.1 °C) (Oral)   Resp 14   Ht 5' 7\" (1.702 m)   Wt 138 lb (62.6 kg)   SpO2 98%   BMI 21.61 kg/m²     Constitutional: [x] Appears well-developed and well-nourished [x] No apparent distress      [] Abnormal -     Mental status: [x] Alert and awake  [x] Oriented to person/place/time [x] Able to follow commands    [] Abnormal -     Eyes:   EOM    [x]  Normal    [] Abnormal -   Sclera  [x]  Normal    [] Abnormal -          Discharge [x]  None visible   [] Abnormal -     HENT: [x] Normocephalic, atraumatic  [] Abnormal -   [x] Mouth/Throat: Mucous membranes are moist    External Ears [x] Normal  [] Abnormal -    Neck: [x] No visualized mass [] Abnormal -     Pulmonary/Chest: [x] Respiratory effort normal   [x] No visualized signs of difficulty breathing or respiratory distress        [] Abnormal -      Musculoskeletal:   [x] Normal gait with no signs of ataxia         [x] Normal range of motion of neck        [] Abnormal -     Neurological:        [x] No Facial Asymmetry (Cranial nerve 7 motor function) (limited exam due to video visit)          [x] No gaze palsy        [] Abnormal -          Skin:        [x] No significant exanthematous lesions or discoloration noted on facial skin         [] Abnormal -            Psychiatric:       [x] Normal Affect [] Abnormal -        [x] No Hallucinations      ATTENTION:   This medical record was transcribed using an electronic medical records/speech recognition system. Although proofread, it may and can contain electronic, spelling and other errors. Corrections may be executed at a later time. Please contact us for any clarifications as needed. On this date 05/25/21  I have spent 30 minutes reviewing previous notes, test results and face to face with the patient discussing the diagnosis and importance of compliance with the treatment plan as well as documenting on the day of the visit.

## 2021-05-26 LAB
25(OH)D3 SERPL-MCNC: 33.9 NG/ML (ref 30–100)
ALBUMIN SERPL-MCNC: 4.1 G/DL (ref 3.5–5)
ALBUMIN/GLOB SERPL: 1.2 {RATIO} (ref 1.1–2.2)
ALP SERPL-CCNC: 88 U/L (ref 45–117)
ALT SERPL-CCNC: 23 U/L (ref 12–78)
ANION GAP SERPL CALC-SCNC: 9 MMOL/L (ref 5–15)
AST SERPL-CCNC: 19 U/L (ref 15–37)
BILIRUB SERPL-MCNC: 0.5 MG/DL (ref 0.2–1)
BUN SERPL-MCNC: 22 MG/DL (ref 6–20)
BUN/CREAT SERPL: 24 (ref 12–20)
CALCIUM SERPL-MCNC: 9.2 MG/DL (ref 8.5–10.1)
CHLORIDE SERPL-SCNC: 97 MMOL/L (ref 97–108)
CO2 SERPL-SCNC: 27 MMOL/L (ref 21–32)
CREAT SERPL-MCNC: 0.91 MG/DL (ref 0.55–1.02)
ERYTHROCYTE [DISTWIDTH] IN BLOOD BY AUTOMATED COUNT: 13.6 % (ref 11.5–14.5)
FERRITIN SERPL-MCNC: 44 NG/ML (ref 26–388)
FOLATE SERPL-MCNC: 27.2 NG/ML (ref 5–21)
GLOBULIN SER CALC-MCNC: 3.3 G/DL (ref 2–4)
GLUCOSE SERPL-MCNC: 93 MG/DL (ref 65–100)
HCT VFR BLD AUTO: 41.2 % (ref 35–47)
HGB BLD-MCNC: 12.9 G/DL (ref 11.5–16)
MCH RBC QN AUTO: 31.3 PG (ref 26–34)
MCHC RBC AUTO-ENTMCNC: 31.3 G/DL (ref 30–36.5)
MCV RBC AUTO: 100 FL (ref 80–99)
NRBC # BLD: 0 K/UL (ref 0–0.01)
NRBC BLD-RTO: 0 PER 100 WBC
PLATELET # BLD AUTO: 203 K/UL (ref 150–400)
PMV BLD AUTO: 10.2 FL (ref 8.9–12.9)
POTASSIUM SERPL-SCNC: 4.6 MMOL/L (ref 3.5–5.1)
PROT SERPL-MCNC: 7.4 G/DL (ref 6.4–8.2)
RBC # BLD AUTO: 4.12 M/UL (ref 3.8–5.2)
SODIUM SERPL-SCNC: 133 MMOL/L (ref 136–145)
VIT B12 SERPL-MCNC: 857 PG/ML (ref 193–986)
WBC # BLD AUTO: 4 K/UL (ref 3.6–11)

## 2021-06-08 ENCOUNTER — TELEPHONE (OUTPATIENT)
Dept: INTERNAL MEDICINE CLINIC | Age: 86
End: 2021-06-08

## 2021-06-08 RX ORDER — ATENOLOL 25 MG/1
25 TABLET ORAL DAILY
Qty: 90 TABLET | Refills: 0 | Status: SHIPPED
Start: 2021-06-08 | End: 2021-10-30

## 2021-06-08 NOTE — TELEPHONE ENCOUNTER
----- Message from Tammie Hartman sent at 6/12/2020 11:05 AM EDT -----  Regarding: Dr. Lane ks: [phone]  Provider rescheduled pt has no preferences for new appt. PT requires additional information for lab orders. Yes

## 2021-06-22 RX ORDER — FUROSEMIDE 20 MG/1
TABLET ORAL
Qty: 10 TABLET | Refills: 0 | Status: SHIPPED | OUTPATIENT
Start: 2021-06-22 | End: 2021-07-05

## 2021-07-05 RX ORDER — FUROSEMIDE 20 MG/1
TABLET ORAL
Qty: 10 TABLET | Refills: 0 | Status: SHIPPED | OUTPATIENT
Start: 2021-07-05 | End: 2021-07-14

## 2021-07-08 ENCOUNTER — TELEPHONE (OUTPATIENT)
Dept: CARDIOLOGY CLINIC | Age: 86
End: 2021-07-08

## 2021-07-08 NOTE — TELEPHONE ENCOUNTER
Patient's son calling to speak to the nurse about a message left to the patient about a medication that says it was never prescribed by the doctor, medication, amiodarone 100 mg , the patient sons states issue was solved on last visit, please advise      [phone]

## 2021-07-09 RX ORDER — PAROXETINE 10 MG/1
TABLET, FILM COATED ORAL
Qty: 135 TABLET | Refills: 0 | Status: SHIPPED | OUTPATIENT
Start: 2021-07-09 | End: 2021-10-10

## 2021-07-09 NOTE — TELEPHONE ENCOUNTER
Returned call to Patient's son Fay Allen, patient ID verified using two patient identifiers. Informed him that patient has not been seen in our office by Dr. Edd Aguirre since 2019 and that her Amiodarone has never been prescribed by Dr. Edd Aguirre. Fay Allen states patient sees Dr. Valdemar De La Garza @ [facility]. And that refill request was sent to the wrong Dr. He states patient was just seen in May by Dr. Valdemar De La Garza and she has 3 refills on her Amiodarone.

## 2021-07-14 RX ORDER — FUROSEMIDE 20 MG/1
TABLET ORAL
Qty: 10 TABLET | Refills: 0 | Status: SHIPPED | OUTPATIENT
Start: 2021-07-14 | End: 2021-07-22

## 2021-07-22 RX ORDER — FUROSEMIDE 20 MG/1
TABLET ORAL
Qty: 10 TABLET | Refills: 0 | Status: SHIPPED | OUTPATIENT
Start: 2021-07-22 | End: 2021-08-02

## 2021-08-02 RX ORDER — FUROSEMIDE 20 MG/1
TABLET ORAL
Qty: 10 TABLET | Refills: 0 | Status: SHIPPED | OUTPATIENT
Start: 2021-08-02 | End: 2021-08-10

## 2021-08-11 DIAGNOSIS — E03.9 ACQUIRED HYPOTHYROIDISM: ICD-10-CM

## 2021-08-11 RX ORDER — LEVOTHYROXINE SODIUM 100 UG/1
TABLET ORAL
Qty: 90 TABLET | Refills: 2 | Status: SHIPPED | OUTPATIENT
Start: 2021-08-11 | End: 2021-10-09

## 2021-08-18 RX ORDER — FOSINOPIRL SODIUM 10 MG/1
TABLET ORAL
Qty: 270 TABLET | Refills: 0 | Status: SHIPPED | OUTPATIENT
Start: 2021-08-18 | End: 2021-10-09

## 2021-09-02 ENCOUNTER — TELEPHONE (OUTPATIENT)
Dept: INTERNAL MEDICINE CLINIC | Age: 86
End: 2021-09-02

## 2021-09-02 NOTE — TELEPHONE ENCOUNTER
Patient called to check and see when she last had her flu shot. PSR told her it appeared to be in October of 2019. Patient appreciated the information and stated she would contact her pharmacy.

## 2021-10-04 ENCOUNTER — OFFICE VISIT (OUTPATIENT)
Dept: INTERNAL MEDICINE CLINIC | Age: 86
End: 2021-10-04
Payer: MEDICARE

## 2021-10-04 VITALS
TEMPERATURE: 98.4 F | OXYGEN SATURATION: 94 % | BODY MASS INDEX: 22.07 KG/M2 | RESPIRATION RATE: 16 BRPM | DIASTOLIC BLOOD PRESSURE: 76 MMHG | SYSTOLIC BLOOD PRESSURE: 158 MMHG | HEART RATE: 60 BPM | HEIGHT: 67 IN | WEIGHT: 140.6 LBS

## 2021-10-04 DIAGNOSIS — I71.20 THORACIC AORTIC ANEURYSM WITHOUT RUPTURE: ICD-10-CM

## 2021-10-04 DIAGNOSIS — I48.11 LONGSTANDING PERSISTENT ATRIAL FIBRILLATION (HCC): ICD-10-CM

## 2021-10-04 DIAGNOSIS — Z00.00 MEDICARE ANNUAL WELLNESS VISIT, SUBSEQUENT: Primary | ICD-10-CM

## 2021-10-04 DIAGNOSIS — D50.9 IRON DEFICIENCY ANEMIA, UNSPECIFIED IRON DEFICIENCY ANEMIA TYPE: ICD-10-CM

## 2021-10-04 DIAGNOSIS — R19.7 DIARRHEA, UNSPECIFIED TYPE: ICD-10-CM

## 2021-10-04 DIAGNOSIS — I10 ESSENTIAL HYPERTENSION: ICD-10-CM

## 2021-10-04 DIAGNOSIS — Z13.31 SCREENING FOR DEPRESSION: ICD-10-CM

## 2021-10-04 DIAGNOSIS — R23.2 HOT FLASHES: ICD-10-CM

## 2021-10-04 DIAGNOSIS — E03.9 ACQUIRED HYPOTHYROIDISM: ICD-10-CM

## 2021-10-04 DIAGNOSIS — Z23 NEEDS FLU SHOT: ICD-10-CM

## 2021-10-04 DIAGNOSIS — E53.8 LOW SERUM VITAMIN B12: ICD-10-CM

## 2021-10-04 DIAGNOSIS — Z13.39 SCREENING FOR ALCOHOLISM: ICD-10-CM

## 2021-10-04 PROCEDURE — G8427 DOCREV CUR MEDS BY ELIG CLIN: HCPCS | Performed by: INTERNAL MEDICINE

## 2021-10-04 PROCEDURE — G8420 CALC BMI NORM PARAMETERS: HCPCS | Performed by: INTERNAL MEDICINE

## 2021-10-04 PROCEDURE — G8432 DEP SCR NOT DOC, RNG: HCPCS | Performed by: INTERNAL MEDICINE

## 2021-10-04 PROCEDURE — 99214 OFFICE O/P EST MOD 30 MIN: CPT | Performed by: INTERNAL MEDICINE

## 2021-10-04 PROCEDURE — 1090F PRES/ABSN URINE INCON ASSESS: CPT | Performed by: INTERNAL MEDICINE

## 2021-10-04 PROCEDURE — G0463 HOSPITAL OUTPT CLINIC VISIT: HCPCS | Performed by: INTERNAL MEDICINE

## 2021-10-04 PROCEDURE — G0439 PPPS, SUBSEQ VISIT: HCPCS | Performed by: INTERNAL MEDICINE

## 2021-10-04 PROCEDURE — G8536 NO DOC ELDER MAL SCRN: HCPCS | Performed by: INTERNAL MEDICINE

## 2021-10-04 PROCEDURE — 1101F PT FALLS ASSESS-DOCD LE1/YR: CPT | Performed by: INTERNAL MEDICINE

## 2021-10-04 PROCEDURE — 90694 VACC AIIV4 NO PRSRV 0.5ML IM: CPT | Performed by: INTERNAL MEDICINE

## 2021-10-04 RX ORDER — ZOSTER VACCINE RECOMBINANT, ADJUVANTED 50 MCG/0.5
KIT INTRAMUSCULAR
Qty: 0.5 ML | Refills: 1 | Status: ON HOLD | OUTPATIENT
Start: 2021-10-04 | End: 2021-10-19

## 2021-10-04 RX ORDER — AMLODIPINE BESYLATE 5 MG/1
5 TABLET ORAL DAILY
Qty: 90 TABLET | Refills: 1 | Status: SHIPPED | OUTPATIENT
Start: 2021-10-04

## 2021-10-04 NOTE — PROGRESS NOTES
Diagnoses and all orders for this visit:    1. Medicare annual wellness visit, subsequent  Patient presents to follow-up with regards to her thyroid, diarrhea, and hot flushes  -     varicella-zoster recombinant, PF, (Shingrix, PF,) 50 mcg/0.5 mL susr injection; 0.5mL by IntraMUSCular route once now and then repeat in 2-6 months  -     pneumococcal 23-philipp ps vaccine (PNEUMOVAX 23) 25 mcg/0.5 mL injection; 0.5 mL by IntraMUSCular route once for 1 dose.  -     REFERRAL TO OPHTHALMOLOGY    2. Iron deficiency anemia, unspecified iron deficiency anemia type  She is currently on iron 1 tablet daily  Unclear etiology if due to decreased dietary intake, blood loss  Discussed with patient and she does not want endoscopy or colonoscopy. Shared decision and risk of colonoscopy in her age outweighs the benefit. She agrees to continue on iron and monitor symptoms.    -     CBC WITH AUTOMATED DIFF; Future  -     CBC W/O DIFF; Future  -     FERRITIN; Future  -     IRON PROFILE; Future    3. Acquired hypothyroidism  History of diarrhea  Continue meds for now    -     T4, FREE; Future  -     TSH 3RD GENERATION; Future  -     T4, FREE; Future    4. Longstanding persistent atrial fibrillation (HCC)  Continue Eliquis  Follow-up with cardiology, Tess NP with Dr. Cassie Russo; Future  -     NT-PRO BNP; Future    5. Essential hypertension  Not optimally controlled  We will increase amlodipine to 5 mg daily    -     HEPATIC FUNCTION PANEL; Future  -     amLODIPine (NORVASC) 5 mg tablet; Take 1 Tablet by mouth daily.  -     METABOLIC PANEL, BASIC; Future    6. Low serum vitamin B12  Replete as needed  -     VITAMIN B12 & FOLATE; Future    7. Thoracic aortic aneurysm without rupture (HCC)  Continue ultrasound and follow-up with cardiology    8. Screening for alcoholism  Denies alcohol use    9. Screening for depression  Denies depression    10.  Needs flu shot  Flu shot given today  -     FLU (FLUAD QUAD INFLUENZA VACCINE,QUAD,ADJUVANTED)    Hot flashes  Discussed with patient that again not standard of care to have significant cardiovascular disease and to be initiated back on hormone placement therapy. Diarrhea  On history patient has a hard plug and then followed by very loose stool/diarrhea  Discussed with patient and aim to avoid plug and so we will try Colace  Metamucil or Citrucel may make  stool plug bigger  Cholestyramine helping  Continue to monitor    Chief Complaint   Patient presents with    Follow-up     Iron defecient // pt c/o hot flashes that comes and goes     Other     Runny nose      Patient presents for her Medicare wellness visit but also complains of feeling \"lousy\"    Diarrhea  Patient reports that she has been having diarrhea. The cholestyramine is not helping her. She notes that she has a hard firm stool followed by progressive soft stools/diarrhea. She notes that she is stooling every day and is always preceded by hard stool or plug. Her weight is stable. She denies blood in her stool. She does not want a colonoscopy or endoscopy. She notes that yesterday she had the worst bowel movement where she had a hard plug and then significant fecal incontinence that it got on her and on the floor. She gives a history of constipation when she was younger as a child. Subjective:   Santos Wilson is a 80 y.o. female with hypertension. Hypertension ROS: taking medications as instructed, no medication side effects noted, no TIA's, no chest pain on exertion, no dyspnea on exertion, no swelling of ankles. New concerns: Blood pressure not optimally controlled in office and she is not checking her blood pressure at home. She was last seen by cardiology in July. Hot flashes  She reports having intermittent hot flashes. She would like her hormone replacement back. Atrial fibrillation  She has a defibrillator. She is not having any shortness of breath or palpitations.     Iron deficiency anemia  She reports she does not have any blood in her stool. She notes her weight is stable. She has been taking over-the-counter Slow Fe daily. She defers colonoscopy or endoscopy. She does not eat very much meat in her diet.                 Past Medical History:   Diagnosis Date    Acute cystitis 05/23/2018    Anemia, unspecified     Anxiety     Arrhythmia     a fib    Arthritis     osteoarthritis, rheumatoid    Atrial fibrillation (HCC)     Dr. Manuela Henderson and Dr. Norma Narvaez  following    Autoimmune disease [facility])     sjogren disease Dr. Monae Ariaz CAD (coronary artery disease)     HTN (hypertension)     Hyperlipidemia LDL goal < 100     Hypothyroid     Insomnia     Osteoarthritis     Sjogren's disease ([facility] [address]     Thoracic aneurysm without mention of rupture      Past Surgical History:   Procedure Laterality Date    HX BIV-IMPLANTABLE CARDIOVERTER DEFIBRILLATOR      HX CHOLECYSTECTOMY      HX COLONOSCOPY  3/2014    Dr. Sanya Sheldon      hernia repairs    HX PARTIAL THYROIDECTOMY      HX JANNET AND BSO      HX VEIN STRIPPING      [place] INSJ ELTRD CAR COLLIN SYS TM INSJ DFB/PM PLS GEN Left 2/22/2019    Lv Lead Placement performed by Bernadette Flynn MD at [facility] CATH LAB    [place] INSJ/RPLCMT PERM DFB W/TRNSVNS LDS 1/DUAL [facility] Left 2/22/2019    upgrade PM to ICD-Bsx performed by Bernadette Flynn MD at [facility] [facility] CATH LAB     Social History     Socioeconomic History    Marital status:      Spouse name: Not on file    Number of children: Not on file    Years of education: Not on file    Highest education level: Not on file   Tobacco Use    Smoking status: Never Smoker    Smokeless tobacco: Never Used   Vaping Use    Vaping Use: Never used   Substance and Sexual Activity    Alcohol use: Yes     Comment: wine    Drug use: Never    Sexual activity: Not Currently   Social History Narrative    ** Merged History Encounter **          Social Determinants of Health     Financial Resource Strain:     Difficulty of Paying Living Expenses:    Food Insecurity:     Worried About Running Out of Food in the Last Year:     [address] in the Last Year:    Transportation Needs:     Lack of Transportation (Medical):  Lack of Transportation (Non-Medical):    Physical Activity:     Days of Exercise per Week:     Minutes of Exercise per Session:    Stress:     Feeling of Stress :    Social Connections:     Frequency of Communication with Friends and Family:     Frequency of Social Gatherings with Friends and Family:     Attends Taoism Services:     Active Member of Clubs or Organizations:     Attends Club or Organization Meetings:     Marital Status:      Family History   Problem Relation Age of Onset    Heart Disease Father         aneurysm    Cancer Maternal Grandfather      Current Outpatient Medications   Medication Sig Dispense Refill    ferrous sulfate (IRON PO) Take  by mouth daily.  fosinopriL (MONOPRIL) 10 mg tablet TAKE THREE TABLETS BY MOUTH EVERY MORNING AND TAKE TWO TABLETS BY MOUTH EVERY EVENING 270 Tablet 0    levothyroxine (SYNTHROID) 100 mcg tablet TAKE ONE TABLET BY MOUTH DAILY BEFORE BREAKFAST 90 Tablet 2    furosemide (LASIX) 20 mg tablet TAKE ONE TABLET BY MOUTH DAILY FOR 10 DAYS (DO NOT TAKE IF LIGHTHEADED OR FALLS) 20 Tablet 0    PARoxetine (PAXIL) 10 mg tablet TAKE 1 AND 1/2 TABLET BY MOUTH DAILY 135 Tablet 0    atenoloL (TENORMIN) 25 mg tablet Take 1 Tablet by mouth daily.  90 Tablet 0    montelukast (SINGULAIR) 10 mg tablet montelukast 10 mg tablet (Patient not taking: Reported on 5/25/2021)      ipratropium (ATROVENT) 42 mcg (0.06 %) nasal spray ipratropium bromide 42 mcg (0.06 %) nasal spray      influenza vaccine 2019-20, 65 yrs+,,PF, (Fluzone High-Dose 2019-20, PF,) syrg injection Fluzone High-Dose 2019-20 (PF) 180 mcg/0.5 mL intramuscular syringe      diclofenac (VOLTAREN) 1 % gel diclofenac 1 % topical gel      albuterol (PROVENTIL HFA, VENTOLIN HFA, PROAIR HFA) 90 mcg/actuation inhaler albuterol sulfate HFA 90 mcg/actuation aerosol inhaler      Biotin 2,500 mcg cap Take  by mouth.  aspirin delayed-release 81 mg tablet Take 81 mg by mouth daily.  amiodarone (PACERONE) 100 mg tablet Take 100 mg by mouth daily.  ascorbic acid, vitamin C, (Vitamin C) 500 mg tablet Take 500 mg by mouth daily.  amLODIPine (NORVASC) 5 mg tablet Take 2.5 mg by mouth daily.  cetirizine (ZYRTEC) 10 mg tablet Take 10 mg by mouth daily.  eszopiclone (LUNESTA) 3 mg tablet Take 3 mg by mouth nightly.  meloxicam (MOBIC) 7.5 mg tablet Take 7.5 mg by mouth daily.  apixaban (ELIQUIS) 5 mg tablet Take 5 mg by mouth two (2) times a day. Per Dr. Cole Howard pt to decrease to 2.5mg BID Per home script 12/4.  fluticasone (FLONASE) 50 mcg/actuation nasal spray 2 Sprays by Both Nostrils route daily as needed for Allergies.  cholecalciferol, vitamin D3, (VITAMIN D3) 2,000 unit tab Take 2,000 Units by mouth daily.        Allergies   Allergen Reactions    Latex Hives     NOT ALLERGIC PER PT 02/01/2018    Pcn [Penicillins] Anaphylaxis    Sulfa (Sulfonamide Antibiotics) Anaphylaxis    Biaxin [Clarithromycin] Nausea Only    Biotin Unknown (comments)    Covid-19 Vaccine, Mrna, [id number], Lnp-S (Moderna) Rash    Pcn [Penicillins] Hives    Sulfa (Sulfonamide Antibiotics) Nausea Only       Review of Systems - General ROS: negative for - chills or fever  Cardiovascular ROS: no chest pain or dyspnea on exertion  Respiratory ROS: no cough, shortness of breath, or wheezing    Visit Vitals  BP (!) 158/76 (BP 1 Location: Left upper arm, BP Patient Position: Sitting, BP Cuff Size: Adult)   Pulse 60   Temp 98.4 °F (36.9 °C) (Oral)   Resp 16   Ht 5' 7\" (1.702 m)   Wt 140 lb 9.6 oz (63.8 kg)   SpO2 94%   BMI 22.02 kg/m²     Constitutional: [x] Appears well-developed and well-nourished [x] No apparent distress      [] Abnormal -     Mental status: [x] Alert and awake  [x] Oriented to person/place/time [x] Able to follow commands    [] Abnormal -     Eyes:   EOM    [x]  Normal    [] Abnormal -   Sclera  [x]  Normal    [] Abnormal -          Discharge [x]  None visible   [] Abnormal -     HENT: [x] Normocephalic, atraumatic  [] Abnormal -   [x] Mouth/Throat: Mucous membranes are moist    External Ears [x] Normal  [] Abnormal -    Neck: [x] No visualized mass [] Abnormal -     Pulmonary/Chest: [x] Respiratory effort normal   [x] No visualized signs of difficulty breathing or respiratory distress        [] Abnormal -      Musculoskeletal:   [x] Normal gait with no signs of ataxia         [x] Normal range of motion of neck        [] Abnormal -     Neurological:        [x] No Facial Asymmetry (Cranial nerve 7 motor function) (limited exam due to video visit)          [x] No gaze palsy        [] Abnormal -          Skin:        [x] No significant exanthematous lesions or discoloration noted on facial skin         [] Abnormal -            Psychiatric:       [x] Normal Affect [] Abnormal -        [x] No Hallucinations      ATTENTION:   This medical record was transcribed using an electronic medical records/speech recognition system. Although proofread, it may and can contain electronic, spelling and other errors. Corrections may be executed at a later time. Please contact us for any clarifications as needed. Aside from patient's Medicare wellness visit on this date 10/04/21  I have spent 30 minutes reviewing previous notes, test results and face to face with the patient discussing the diagnosis and importance of compliance with the treatment plan as well as documenting on the day of the visit.                                                                This is the Subsequent Medicare Annual Wellness Exam, performed 12 months or more after the Initial AWV or the last Subsequent AWV    I have reviewed the patient's medical history in detail and updated the computerized patient record. Assessment/Plan   Education and counseling provided:  Are appropriate based on today's review and evaluation  End-of-Life planning (with patient's consent) patient has an son is aware  Pneumococcal Vaccine will recheck to see if she received Pneumovax vaccine. Influenza Vaccine high-dose flu vaccine given today  Colorectal cancer screening tests defers against colonoscopy due to risk of  prep and procedure  Bone mass measurement (DEXA) defers bone density. She prefers not to be on any additional medication  Screening for glaucoma follow-up ophthalmology. She sees Dr. Denise Yuen    1. Medicare annual wellness visit, subsequent as above  -     varicella-zoster recombinant, PF, (Shingrix, PF,) 50 mcg/0.5 mL susr injection; 0.5mL by IntraMUSCular route once now and then repeat in 2-6 months, Print, Disp-0.5 mL, R-1  -     pneumococcal 23-philipp ps vaccine (PNEUMOVAX 23) 25 mcg/0.5 mL injection; 0.5 mL by IntraMUSCular route once for 1 dose., Print, Disp-0.5 mL, R-0  -     REFERRAL TO OPHTHALMOLOGY  2. Iron deficiency anemia, unspecified iron deficiency anemia type  -     CBC WITH AUTOMATED DIFF; Future  -     CBC W/O DIFF; Future  -     FERRITIN; Future  -     IRON PROFILE; Future  3. Acquired hypothyroidism  -     T4, FREE; Future  -     TSH 3RD GENERATION; Future  4. Longstanding persistent atrial fibrillation (HCC)  -     HEPATIC FUNCTION PANEL; Future  -     NT-PRO BNP; Future  5. Essential hypertension  -     HEPATIC FUNCTION PANEL; Future  -     amLODIPine (NORVASC) 5 mg tablet; Take 1 Tablet by mouth daily. , Normal, Disp-90 Tablet, R-1  -     METABOLIC PANEL, BASIC; Future  6. Low serum vitamin B12  -     VITAMIN B12 & FOLATE; Future  7. Thoracic aortic aneurysm without rupture ([facility] [address]  8. Screening for alcoholism  9. Screening for depression  10. Needs flu shot  -     FLU (FLUAD QUAD INFLUENZA VACCINE,QUAD,ADJUVANTED)  11.  Hot flashes  Discussed with patient and not standard of care to have her replacement therapy at 80 and cardiovascular disease. HRT may put her at risk. She is aware       Depression Risk Factor Screening     3 most recent PHQ Screens 5/25/2021   PHQ Not Done -   Little interest or pleasure in doing things Not at all   Feeling down, depressed, irritable, or hopeless Not at all   Total Score PHQ 2 0       Alcohol Risk Screen    Do you average more than 1 drink per night or more than 7 drinks a week:  No    On any one occasion in the past three months have you have had more than 3 drinks containing alcohol:  No        Functional Ability and Level of Safety    Hearing: Hearing is good. Activities of Daily Living: The home contains: no safety equipment. Patient does total self care      Ambulation: with no difficulty     Fall Risk:  Fall Risk Assessment, last 12 mths 10/4/2021   Able to walk? Yes   Fall in past 12 months? 0   Number of falls in past 12 months -   Fall with injury?  -      Abuse Screen:  Patient is not abused       Cognitive Screening    Has your family/caregiver stated any concerns about your memory: no     Cognitive Screening: Normal - Verbal Fluency Test    Health Maintenance Due     Health Maintenance Due   Topic Date Due    Shingrix Vaccine Age 49> (1 of 2) Never done    Pneumococcal 65+ years (1 of 1 - PPSV23) Never done    COVID-19 Vaccine (2 - Moderna 2-dose series) 04/02/2021    Medicare Yearly Exam  06/25/2021    Flu Vaccine (1) 09/01/2021       Patient Care Team   Patient Care Team:  Daysi Joyner MD as PCP - General (Internal Medicine)  Daysi Joyner MD as PCP [facility]  Daysi Joyner MD (Internal Medicine)    History     Patient Active Problem List   Diagnosis Code    Anxiety F41.9    Insomnia G47.00    HTN (hypertension) I10    Hyperlipidemia LDL goal < 100 E78.5    Osteoarthritis M19.90   [place] Hypothyroid E03.9    CAD (coronary artery disease) I25.10    Atrial fibrillation ([address] I48.91    Thoracic aneurysm without mention of rupture I71.2    Anemia D64.9    Personal history of colonic polyps Z86.010    Advanced care planning/counseling discussion Z71.89    Ventricular tachycardia (HCC) I47.2    Elevated serum creatinine R79.89    SOB (shortness of breath) R06.02    Cough R05.9    Leukopenia D72.819    Thrombocytopenia (HCC) D69.6    Weakness R53.1    COVID-19 U07.1     Past Medical History:   Diagnosis Date    Acute cystitis 05/23/2018    Anemia, unspecified     Anxiety     Arrhythmia     a fib    Arthritis     osteoarthritis, rheumatoid    Atrial fibrillation (HCC)     Dr. Sarah Ambrose and Dr. Shawna Stephen  following    Autoimmune disease [facility])     sjogren disease Dr. Gretchen Cosme CAD (coronary artery disease)     HTN (hypertension)     Hyperlipidemia LDL goal < 100     Hypothyroid     Insomnia     Osteoarthritis     Sjogren's disease ([address]     Thoracic aneurysm without mention of rupture       Past Surgical History:   Procedure Laterality Date    HX BIV-IMPLANTABLE CARDIOVERTER DEFIBRILLATOR      HX CHOLECYSTECTOMY      HX COLONOSCOPY  3/2014    Dr. Liam Avery    HX OTHER SURGICAL      hernia repairs    HX PARTIAL THYROIDECTOMY      HX JANNET AND BSO      HX VEIN STRIPPING      [place] INSJ ELTRD CAR COLLIN SYS TM INSJ DFB/PM PLS GEN Left 2/22/2019    Lv Lead Placement performed by Katarzyna Burgess MD at [facility] CATH LAB    [place] INSJ/RPLCMT PERM DFB W/TRNSVNS LDS 1/DUAL [facility] Left 2/22/2019    upgrade PM to ICD-Bsx performed by Katarzyna Burgess MD at [facility] CATH LAB     Current Outpatient Medications   Medication Sig Dispense Refill    ferrous sulfate (IRON PO) Take  by mouth daily.  varicella-zoster recombinant, PF, (Shingrix, PF,) 50 mcg/0.5 mL susr injection 0.5mL by IntraMUSCular route once now and then repeat in 2-6 months 0.5 mL 1    pneumococcal 23-philipp ps vaccine (PNEUMOVAX 23) 25 mcg/0.5 mL injection 0.5 mL by IntraMUSCular route once for 1 dose.  0.5 mL 0    fosinopriL (MONOPRIL) 10 mg tablet TAKE THREE TABLETS BY MOUTH EVERY MORNING AND TAKE TWO TABLETS BY MOUTH EVERY EVENING 270 Tablet 0    levothyroxine (SYNTHROID) 100 mcg tablet TAKE ONE TABLET BY MOUTH DAILY BEFORE BREAKFAST 90 Tablet 2    furosemide (LASIX) 20 mg tablet TAKE ONE TABLET BY MOUTH DAILY FOR 10 DAYS (DO NOT TAKE IF LIGHTHEADED OR FALLS) 20 Tablet 0    PARoxetine (PAXIL) 10 mg tablet TAKE 1 AND 1/2 TABLET BY MOUTH DAILY 135 Tablet 0    atenoloL (TENORMIN) 25 mg tablet Take 1 Tablet by mouth daily. 90 Tablet 0    montelukast (SINGULAIR) 10 mg tablet montelukast 10 mg tablet (Patient not taking: Reported on 5/25/2021)      ipratropium (ATROVENT) 42 mcg (0.06 %) nasal spray ipratropium bromide 42 mcg (0.06 %) nasal spray      influenza vaccine 2019-20, 65 yrs+,,PF, (Fluzone High-Dose 2019-20, PF,) syrg injection Fluzone High-Dose 2019-20 (PF) 180 mcg/0.5 mL intramuscular syringe      diclofenac (VOLTAREN) 1 % gel diclofenac 1 % topical gel      albuterol (PROVENTIL HFA, VENTOLIN HFA, PROAIR HFA) 90 mcg/actuation inhaler albuterol sulfate HFA 90 mcg/actuation aerosol inhaler      Biotin 2,500 mcg cap Take  by mouth.  aspirin delayed-release 81 mg tablet Take 81 mg by mouth daily.  amiodarone (PACERONE) 100 mg tablet Take 100 mg by mouth daily.  ascorbic acid, vitamin C, (Vitamin C) 500 mg tablet Take 500 mg by mouth daily.  amLODIPine (NORVASC) 5 mg tablet Take 2.5 mg by mouth daily.  cetirizine (ZYRTEC) 10 mg tablet Take 10 mg by mouth daily.  eszopiclone (LUNESTA) 3 mg tablet Take 3 mg by mouth nightly.  meloxicam (MOBIC) 7.5 mg tablet Take 7.5 mg by mouth daily.  apixaban (ELIQUIS) 5 mg tablet Take 5 mg by mouth two (2) times a day. Per Dr. Jaz Keating pt to decrease to 2.5mg BID Per home script 12/4.  fluticasone (FLONASE) 50 mcg/actuation nasal spray 2 Sprays by Both Nostrils route daily as needed for Allergies.       cholecalciferol, vitamin D3, (VITAMIN D3) 2,000 unit tab Take 2,000 Units by mouth daily.        Allergies   Allergen Reactions    Latex Hives     NOT ALLERGIC PER PT 02/01/2018    Pcn [Penicillins] Anaphylaxis    Sulfa (Sulfonamide Antibiotics) Anaphylaxis    Biaxin [Clarithromycin] Nausea Only    Biotin Unknown (comments)    Covid-19 Vaccine, Mrna, [id number], Lnp-S (Moderna) Rash    Pcn [Penicillins] Hives    Sulfa (Sulfonamide Antibiotics) Nausea Only       Family History   Problem Relation Age of Onset    Heart Disease Father         aneurysm    Cancer Maternal Grandfather      Social History     Tobacco Use    Smoking status: Never Smoker    Smokeless tobacco: Never Used   Substance Use Topics    Alcohol use: Yes     Comment: flower Dailey MD

## 2021-10-04 NOTE — PROGRESS NOTES
Patient present for routine immunizations. Pt denies any symptoms , reactions or allergies that would exclude them from being immunized today. Risks and adverse reactions were discussed and the VIS was given to them. All questions were addressed. Pt was observed for 10 min post injection. There were no reactions observed.     [address]

## 2021-10-04 NOTE — PATIENT INSTRUCTIONS
Medicare Wellness Visit, Female     The best way to live healthy is to have a lifestyle where you eat a well-balanced diet, exercise regularly, limit alcohol use, and quit all forms of tobacco/nicotine, if applicable. Regular preventive services are another way to keep healthy. Preventive services (vaccines, screening tests, monitoring & exams) can help personalize your care plan, which helps you manage your own care. Screening tests can find health problems at the earliest stages, when they are easiest to treat. [facility] follows the current, evidence-based guidelines published by the [facility] ([facility]) when recommending preventive services for our patients. Because we follow these guidelines, sometimes recommendations change over time as research supports it. (For example, mammograms used to be recommended annually. Even though Medicare will still pay for an annual mammogram, the newer guidelines recommend a mammogram every two years for women of average risk). Of course, you and your doctor may decide to screen more often for some diseases, based on your risk and your co-morbidities (chronic disease you are already diagnosed with). Preventive services for you include:  - Medicare offers their members a free annual wellness visit, which is time for you and your primary care provider to discuss and plan for your preventive service needs. Take advantage of this benefit every year!  -All adults over the age of 72 should receive the recommended pneumonia vaccines. Current USPSTF guidelines recommend a series of two vaccines for the best pneumonia protection.   -All adults should have a flu vaccine yearly and a tetanus vaccine every 10 years.   -All adults age 48 and older should receive the shingles vaccines (series of two vaccines).       -All adults age 38-68 who are overweight should have a diabetes screening test once every three years.   -All adults born between [birth year] and 1965 should be screened once for Hepatitis C.  -Other screening tests and preventive services for persons with diabetes include: an eye exam to screen for diabetic retinopathy, a kidney function test, a foot exam, and stricter control over your cholesterol.   -Cardiovascular screening for adults with routine risk involves an electrocardiogram (ECG) at intervals determined by your doctor.   -Colorectal cancer screenings should be done for adults age 54-65 with no increased risk factors for colorectal cancer. There are a number of acceptable methods of screening for this type of cancer. Each test has its own benefits and drawbacks. Discuss with your doctor what is most appropriate for you during your annual wellness visit. The different tests include: colonoscopy (considered the best screening method), a fecal occult blood test, a fecal DNA test, and sigmoidoscopy.    -A bone mass density test is recommended when a woman turns 65 to screen for osteoporosis. This test is only recommended one time, as a screening. Some providers will use this same test as a disease monitoring tool if you already have osteoporosis. -Breast cancer screenings are recommended every other year for women of normal risk, age 54-69.  -Cervical cancer screenings for women over age 72 are only recommended with certain risk factors. Here is a list of your current Health Maintenance items (your personalized list of preventive services) with a due date:  Health Maintenance Due   Topic Date Due    Shingles Vaccine (1 of 2) Never done    Pneumococcal Vaccine (1 of 1 - PPSV23) Never done    COVID-19 Vaccine (2 - Moderna 2-dose series) 04/02/2021    Annual Well Visit  06/25/2021    Yearly Flu Vaccine (1) 09/01/2021         Vaccine Information Statement    Influenza (Flu) Vaccine (Inactivated or Recombinant): What You Need to Know    Many vaccine information statements are available in Swedish and other languages. See www.immunize.org/vis. Hojas de información sobre vacunas están disponibles en español y en muchos otros idiomas. Visite www.immunize.org/vis. 1. Why get vaccinated? Influenza vaccine can prevent influenza (flu). Flu is a contagious disease that spreads around the [facility] every year, usually between October and May. Anyone can get the flu, but it is more dangerous for some people. Infants and young children, people 72 years and older, pregnant people, and people with certain health conditions or a weakened immune system are at greatest risk of flu complications. Pneumonia, bronchitis, sinus infections, and ear infections are examples of flu-related complications. If you have a medical condition, such as heart disease, cancer, or diabetes, flu can make it worse. Flu can cause fever and chills, sore throat, muscle aches, fatigue, cough, headache, and runny or stuffy nose. Some people may have vomiting and diarrhea, though this is more common in children than adults. In an average year, thousands of people in the [facility] die from flu, and many more are hospitalized. Flu vaccine prevents millions of illnesses and flu-related visits to the doctor each year. 2. Influenza vaccines     CDC recommends everyone 6 months and older get vaccinated every flu season. Children 6 months through 6years of age may need 2 doses during a single flu season. Everyone else needs only 1 dose each flu season. It takes about 2 weeks for protection to develop after vaccination. There are many flu viruses, and they are always changing. Each year a new flu vaccine is made to protect against the influenza viruses believed to be likely to cause disease in the upcoming flu season. Even when the vaccine doesnt exactly match these viruses, it may still provide some protection. Influenza vaccine does not cause flu. Influenza vaccine may be given at the same time as other vaccines.     3. Talk with your health care provider    Tell your vaccination provider if the person getting the vaccine:   Has had an allergic reaction after a previous dose of influenza vaccine, or has any severe, life-threatening allergies    Has ever had Guillain-Barré Syndrome (also called GBS)    In some cases, your health care provider may decide to postpone influenza vaccination until a future visit. Influenza vaccine can be administered at any time during pregnancy. People who are or will be pregnant during influenza season should receive inactivated influenza vaccine. People with minor illnesses, such as a cold, may be vaccinated. People who are moderately or severely ill should usually wait until they recover before getting influenza vaccine. Your health care provider can give you more information. 4. Risks of a vaccine reaction     Soreness, redness, and swelling where the shot is given, fever, muscle aches, and headache can happen after influenza vaccination.  There may be a very small increased risk of Guillain-Barré Syndrome (GBS) after inactivated influenza vaccine (the flu shot). [facility] children who get the flu shot along with pneumococcal vaccine (PCV13) and/or DTaP vaccine at the same time might be slightly more likely to have a seizure caused by fever. Tell your health care provider if a child who is getting flu vaccine has ever had a seizure. People sometimes faint after medical procedures, including vaccination. Tell your provider if you feel dizzy or have vision changes or ringing in the ears. As with any medicine, there is a very remote chance of a vaccine causing a severe allergic reaction, other serious injury, or death. 5. What if there is a serious problem? An allergic reaction could occur after the vaccinated person leaves the clinic.  If you see signs of a severe allergic reaction (hives, swelling of the face and throat, difficulty breathing, a fast heartbeat, dizziness, or weakness), call 9-1-1 and get the person to the nearest hospital.    For other signs that concern you, call your health care provider. Adverse reactions should be reported to the Vaccine Adverse Event Reporting System (VAERS). Your health care provider will usually file this report, or you can do it yourself. Visit the VAERS website at www.vaers. [facility].gov or call 4-156.521.8202. VAERS is only for reporting reactions, and VAERS staff members do not give medical advice. 6. The National Vaccine Injury Compensation Program    The [facility] Vaccine Injury Compensation Program (VICP) is a federal program that was created to compensate people who may have been injured by certain vaccines. Claims regarding alleged injury or death due to vaccination have a time limit for filing, which may be as short as two years. Visit the VICP website at www.[facility].gov/vaccinecompensation or call 3-338.129.8548 to learn about the program and about filing a claim. 7. How can I learn more?  Ask your health care provider.  Call your local or state health department.  Visit the website of the Food and Drug Administration (FDA) for vaccine package inserts and additional information at www.fda.gov/vaccines-blood-biologics/vaccines.  Contact the Centers for Disease Control and Prevention (CDC):  - Call 1-839.371.7524 (1-800-CDC-INFO) or  - Visit CDCs influenza website at www.cdc.gov/flu. Vaccine Information Statement   Inactivated Influenza Vaccine   8/6/2021  42 HARITHA Harris [id number]   Department of Health and Human Services  Centers for Disease Control and Prevention    Office Use Only

## 2021-10-04 NOTE — PROGRESS NOTES
Room:     Identified pt with two pt identifiers(name and ). Reviewed record in preparation for visit and have obtained necessary documentation. All patient medications has been reviewed. Chief Complaint   Patient presents with    Follow-up     Iron defecient // pt c/o hot flashes that comes and goes     Other     Runny nose        Health Maintenance Due   Topic    Shingrix Vaccine Age 50> (1 of 2)    Pneumococcal 65+ years (1 of 1 - PPSV23)    COVID-19 Vaccine (2 - Moderna 2-dose series)    Medicare Yearly Exam     Flu Vaccine (1)       Vitals:    10/04/21 1414   BP: (!) 158/76   Pulse: 60   Resp: 16   Temp: 98.4 °F (36.9 °C)   TempSrc: Oral   SpO2: 94%   Weight: 140 lb 9.6 oz (63.8 kg)   Height: 5' 7\" (1.702 m)   PainSc:   0 - No pain       4. Have you been to the ER, urgent care clinic since your last visit? Hospitalized since your last visit? No    5. Have you seen or consulted any other health care providers outside of the [address] [place] [address] since your last visit? Include any pap smears or colon screening. No          Patient is accompanied by self I have received verbal consent from Shyann Wade to discuss any/all medical information while they are present in the room.

## 2021-10-05 LAB
COMMENT, HOLDF: NORMAL
SAMPLES BEING HELD,HOLD: NORMAL

## 2021-10-09 ENCOUNTER — PATIENT MESSAGE (OUTPATIENT)
Dept: INTERNAL MEDICINE CLINIC | Age: 86
End: 2021-10-09

## 2021-10-09 DIAGNOSIS — E03.9 ACQUIRED HYPOTHYROIDISM: ICD-10-CM

## 2021-10-09 RX ORDER — FOSINOPIRL SODIUM 10 MG/1
TABLET ORAL
Qty: 270 TABLET | Refills: 0 | Status: SHIPPED
Start: 2021-10-09 | End: 2021-10-30

## 2021-10-09 RX ORDER — LEVOTHYROXINE SODIUM 112 UG/1
112 TABLET ORAL
Qty: 90 TABLET | Refills: 0 | Status: SHIPPED | OUTPATIENT
Start: 2021-10-09 | End: 2022-01-04

## 2021-10-10 RX ORDER — PAROXETINE 10 MG/1
TABLET, FILM COATED ORAL
Qty: 135 TABLET | Refills: 0 | Status: SHIPPED | OUTPATIENT
Start: 2021-10-10 | End: 2022-01-10 | Stop reason: SDUPTHER

## 2021-10-14 ENCOUNTER — TELEPHONE (OUTPATIENT)
Dept: INTERNAL MEDICINE CLINIC | Age: 86
End: 2021-10-14

## 2021-10-14 NOTE — TELEPHONE ENCOUNTER
I spoke with patient to advise of pcp's message:     MD Andree Ambrose LPN Hi victoria can you please call pt and tell pt I sent her this [employer] message as well as letter. Loy Nuno her know I had to change her thryoid.  Recheck in 2.5 months.     Thank you.       Hi Ms. Mary Glasgow, thanks for coming in for your visit and for checking your labs.  Your sodium level is still slightly low but seems to be her baseline.  Please let your cardiologist know that it is slightly low and you can show him your labs.       It appears you are hypothyroid.  I will increase your thyroid medication slightly.  It may help you with your energy.  If you are feeling any increased palpitations, weight loss or tremor please let me know.  It may cause an increase in frequency of stool but it may help with constipation.       Thanks for checking and take care. Pt verbalized understanding. She doesn't use her [employer], her son uses it. She asked that I also call her son to give him this information as well. I called pt's son. No answer. LM on  that I was calling with test results and the message is also on [employer].

## 2021-10-19 ENCOUNTER — APPOINTMENT (OUTPATIENT)
Dept: CT IMAGING | Age: 86
DRG: 871 | End: 2021-10-19
Attending: INTERNAL MEDICINE
Payer: MEDICARE

## 2021-10-19 ENCOUNTER — APPOINTMENT (OUTPATIENT)
Dept: GENERAL RADIOLOGY | Age: 86
DRG: 871 | End: 2021-10-19
Attending: EMERGENCY MEDICINE
Payer: MEDICARE

## 2021-10-19 ENCOUNTER — HOSPITAL ENCOUNTER (INPATIENT)
Age: 86
LOS: 11 days | Discharge: NURSING, REHAB OR LONG-TERM CARE | DRG: 871 | End: 2021-10-30
Attending: STUDENT IN AN ORGANIZED HEALTH CARE EDUCATION/TRAINING PROGRAM | Admitting: INTERNAL MEDICINE
Payer: MEDICARE

## 2021-10-19 ENCOUNTER — APPOINTMENT (OUTPATIENT)
Dept: NON INVASIVE DIAGNOSTICS | Age: 86
DRG: 871 | End: 2021-10-19
Attending: INTERNAL MEDICINE
Payer: MEDICARE

## 2021-10-19 ENCOUNTER — APPOINTMENT (OUTPATIENT)
Dept: GENERAL RADIOLOGY | Age: 86
DRG: 871 | End: 2021-10-19
Attending: STUDENT IN AN ORGANIZED HEALTH CARE EDUCATION/TRAINING PROGRAM
Payer: MEDICARE

## 2021-10-19 DIAGNOSIS — R50.9 FEVER, UNSPECIFIED FEVER CAUSE: ICD-10-CM

## 2021-10-19 DIAGNOSIS — M54.9 OTHER ACUTE BACK PAIN: ICD-10-CM

## 2021-10-19 DIAGNOSIS — I48.19 PERSISTENT ATRIAL FIBRILLATION (HCC): ICD-10-CM

## 2021-10-19 DIAGNOSIS — R78.81 BACTEREMIA: ICD-10-CM

## 2021-10-19 DIAGNOSIS — R57.9 SHOCK (HCC): ICD-10-CM

## 2021-10-19 DIAGNOSIS — N12 PYELONEPHRITIS: ICD-10-CM

## 2021-10-19 DIAGNOSIS — R53.83 FATIGUE, UNSPECIFIED TYPE: ICD-10-CM

## 2021-10-19 DIAGNOSIS — N17.9 AKI (ACUTE KIDNEY INJURY) (HCC): Primary | ICD-10-CM

## 2021-10-19 DIAGNOSIS — N18.30 STAGE 3 CHRONIC KIDNEY DISEASE, UNSPECIFIED WHETHER STAGE 3A OR 3B CKD (HCC): ICD-10-CM

## 2021-10-19 DIAGNOSIS — R79.89 ELEVATED SERUM CREATININE: ICD-10-CM

## 2021-10-19 DIAGNOSIS — R78.81 POSITIVE BLOOD CULTURES: ICD-10-CM

## 2021-10-19 PROBLEM — E87.1 HYPONATREMIA: Status: ACTIVE | Noted: 2021-10-19

## 2021-10-19 PROBLEM — R53.1 GENERALIZED WEAKNESS: Status: ACTIVE | Noted: 2021-10-19

## 2021-10-19 LAB
ALBUMIN SERPL-MCNC: 2.6 G/DL (ref 3.5–5)
ALBUMIN/GLOB SERPL: 0.8 {RATIO} (ref 1.1–2.2)
ALP SERPL-CCNC: 80 U/L (ref 45–117)
ALT SERPL-CCNC: 13 U/L (ref 12–78)
AMORPH CRY URNS QL MICRO: ABNORMAL
ANION GAP SERPL CALC-SCNC: 8 MMOL/L (ref 5–15)
APPEARANCE UR: ABNORMAL
AST SERPL-CCNC: 33 U/L (ref 15–37)
BACTERIA URNS QL MICRO: NEGATIVE /HPF
BASOPHILS # BLD: 0 K/UL (ref 0–0.1)
BASOPHILS NFR BLD: 0 % (ref 0–1)
BILIRUB SERPL-MCNC: 1.1 MG/DL (ref 0.2–1)
BILIRUB UR QL CFM: NEGATIVE
BNP SERPL-MCNC: 5118 PG/ML
BUN SERPL-MCNC: 35 MG/DL (ref 6–20)
BUN/CREAT SERPL: 22 (ref 12–20)
CALCIUM SERPL-MCNC: 7.6 MG/DL (ref 8.5–10.1)
CAOX CRY URNS QL MICRO: ABNORMAL
CHLORIDE SERPL-SCNC: 99 MMOL/L (ref 97–108)
CO2 SERPL-SCNC: 24 MMOL/L (ref 21–32)
COLOR UR: ABNORMAL
COMMENT, HOLDF: NORMAL
COMMENT, HOLDF: NORMAL
COVID-19 RAPID TEST, COVR: NOT DETECTED
CREAT SERPL-MCNC: 1.6 MG/DL (ref 0.55–1.02)
DIFFERENTIAL METHOD BLD: ABNORMAL
EOSINOPHIL # BLD: 0 K/UL (ref 0–0.4)
EOSINOPHIL NFR BLD: 0 % (ref 0–7)
EPITH CASTS URNS QL MICRO: ABNORMAL /LPF
ERYTHROCYTE [DISTWIDTH] IN BLOOD BY AUTOMATED COUNT: 13.5 % (ref 11.5–14.5)
GLOBULIN SER CALC-MCNC: 3.2 G/DL (ref 2–4)
GLUCOSE SERPL-MCNC: 95 MG/DL (ref 65–100)
GLUCOSE UR STRIP.AUTO-MCNC: NEGATIVE MG/DL
HCT VFR BLD AUTO: 34.2 % (ref 35–47)
HGB BLD-MCNC: 11.4 G/DL (ref 11.5–16)
HGB UR QL STRIP: NEGATIVE
IMM GRANULOCYTES # BLD AUTO: 0 K/UL (ref 0–0.04)
IMM GRANULOCYTES NFR BLD AUTO: 0 % (ref 0–0.5)
KETONES UR QL STRIP.AUTO: ABNORMAL MG/DL
LACTATE SERPL-SCNC: 1.2 MMOL/L (ref 0.4–2)
LEUKOCYTE ESTERASE UR QL STRIP.AUTO: NEGATIVE
LIPASE SERPL-CCNC: 55 U/L (ref 73–393)
LYMPHOCYTES # BLD: 0.4 K/UL (ref 0.8–3.5)
LYMPHOCYTES NFR BLD: 7 % (ref 12–49)
MAGNESIUM SERPL-MCNC: 1.9 MG/DL (ref 1.6–2.4)
MCH RBC QN AUTO: 32 PG (ref 26–34)
MCHC RBC AUTO-ENTMCNC: 33.3 G/DL (ref 30–36.5)
MCV RBC AUTO: 96.1 FL (ref 80–99)
MONOCYTES # BLD: 0.2 K/UL (ref 0–1)
MONOCYTES NFR BLD: 3 % (ref 5–13)
NEUTS BAND NFR BLD MANUAL: 1 %
NEUTS SEG # BLD: 5.5 K/UL (ref 1.8–8)
NEUTS SEG NFR BLD: 89 % (ref 32–75)
NITRITE UR QL STRIP.AUTO: NEGATIVE
NRBC # BLD: 0 K/UL (ref 0–0.01)
NRBC BLD-RTO: 0 PER 100 WBC
PH UR STRIP: 5.5 [PH] (ref 5–8)
PLATELET # BLD AUTO: 64 K/UL (ref 150–400)
PMV BLD AUTO: 10.3 FL (ref 8.9–12.9)
POTASSIUM SERPL-SCNC: 3.8 MMOL/L (ref 3.5–5.1)
PROCALCITONIN SERPL-MCNC: 12.8 NG/ML
PROT SERPL-MCNC: 5.8 G/DL (ref 6.4–8.2)
PROT UR STRIP-MCNC: 100 MG/DL
RBC # BLD AUTO: 3.56 M/UL (ref 3.8–5.2)
RBC #/AREA URNS HPF: ABNORMAL /HPF (ref 0–5)
RBC MORPH BLD: ABNORMAL
SAMPLES BEING HELD,HOLD: NORMAL
SAMPLES BEING HELD,HOLD: NORMAL
SODIUM SERPL-SCNC: 131 MMOL/L (ref 136–145)
SOURCE, COVRS: NORMAL
SP GR UR REFRACTOMETRY: 1.01 (ref 1–1.03)
TROPONIN-HIGH SENSITIVITY: 29 NG/L (ref 0–51)
TSH SERPL DL<=0.05 MIU/L-ACNC: 1.81 UIU/ML (ref 0.36–3.74)
UR CULT HOLD, URHOLD: NORMAL
UROBILINOGEN UR QL STRIP.AUTO: 0.2 EU/DL (ref 0.2–1)
WBC # BLD AUTO: 6.1 K/UL (ref 3.6–11)
WBC URNS QL MICRO: ABNORMAL /HPF (ref 0–4)

## 2021-10-19 PROCEDURE — 74011000250 HC RX REV CODE- 250: Performed by: INTERNAL MEDICINE

## 2021-10-19 PROCEDURE — 87184 SC STD DISK METHOD PER PLATE: CPT

## 2021-10-19 PROCEDURE — 75810000455 HC PLCMT CENT VENOUS CATH LVL 2 5182

## 2021-10-19 PROCEDURE — 74011250636 HC RX REV CODE- 250/636: Performed by: STUDENT IN AN ORGANIZED HEALTH CARE EDUCATION/TRAINING PROGRAM

## 2021-10-19 PROCEDURE — 71045 X-RAY EXAM CHEST 1 VIEW: CPT

## 2021-10-19 PROCEDURE — 81001 URINALYSIS AUTO W/SCOPE: CPT

## 2021-10-19 PROCEDURE — 36415 COLL VENOUS BLD VENIPUNCTURE: CPT

## 2021-10-19 PROCEDURE — 96360 HYDRATION IV INFUSION INIT: CPT

## 2021-10-19 PROCEDURE — 85025 COMPLETE CBC W/AUTO DIFF WBC: CPT

## 2021-10-19 PROCEDURE — 87040 BLOOD CULTURE FOR BACTERIA: CPT

## 2021-10-19 PROCEDURE — 83605 ASSAY OF LACTIC ACID: CPT

## 2021-10-19 PROCEDURE — 87077 CULTURE AEROBIC IDENTIFY: CPT

## 2021-10-19 PROCEDURE — 74011250637 HC RX REV CODE- 250/637: Performed by: STUDENT IN AN ORGANIZED HEALTH CARE EDUCATION/TRAINING PROGRAM

## 2021-10-19 PROCEDURE — 84145 PROCALCITONIN (PCT): CPT

## 2021-10-19 PROCEDURE — 74011250636 HC RX REV CODE- 250/636: Performed by: INTERNAL MEDICINE

## 2021-10-19 PROCEDURE — 02HV33Z INSERTION OF INFUSION DEVICE INTO SUPERIOR VENA CAVA, PERCUTANEOUS APPROACH: ICD-10-PCS | Performed by: STUDENT IN AN ORGANIZED HEALTH CARE EDUCATION/TRAINING PROGRAM

## 2021-10-19 PROCEDURE — 87186 SC STD MICRODIL/AGAR DIL: CPT

## 2021-10-19 PROCEDURE — 74176 CT ABD & PELVIS W/O CONTRAST: CPT

## 2021-10-19 PROCEDURE — 74011250637 HC RX REV CODE- 250/637: Performed by: INTERNAL MEDICINE

## 2021-10-19 PROCEDURE — XW033N5 INTRODUCTION OF MEROPENEM-VABORBACTAM ANTI-INFECTIVE INTO PERIPHERAL VEIN, PERCUTANEOUS APPROACH, NEW TECHNOLOGY GROUP 5: ICD-10-PCS | Performed by: INTERNAL MEDICINE

## 2021-10-19 PROCEDURE — 83735 ASSAY OF MAGNESIUM: CPT

## 2021-10-19 PROCEDURE — 51701 INSERT BLADDER CATHETER: CPT

## 2021-10-19 PROCEDURE — 74011000258 HC RX REV CODE- 258: Performed by: INTERNAL MEDICINE

## 2021-10-19 PROCEDURE — 83690 ASSAY OF LIPASE: CPT

## 2021-10-19 PROCEDURE — 99285 EMERGENCY DEPT VISIT HI MDM: CPT

## 2021-10-19 PROCEDURE — 87635 SARS-COV-2 COVID-19 AMP PRB: CPT

## 2021-10-19 PROCEDURE — 80053 COMPREHEN METABOLIC PANEL: CPT

## 2021-10-19 PROCEDURE — 83880 ASSAY OF NATRIURETIC PEPTIDE: CPT

## 2021-10-19 PROCEDURE — 84484 ASSAY OF TROPONIN QUANT: CPT

## 2021-10-19 PROCEDURE — 77030038269 HC DRN EXT URIN PURWCK BARD -A

## 2021-10-19 PROCEDURE — 93005 ELECTROCARDIOGRAM TRACING: CPT

## 2021-10-19 PROCEDURE — 99223 1ST HOSP IP/OBS HIGH 75: CPT | Performed by: INTERNAL MEDICINE

## 2021-10-19 PROCEDURE — 84443 ASSAY THYROID STIM HORMONE: CPT

## 2021-10-19 PROCEDURE — 65610000006 HC RM INTENSIVE CARE

## 2021-10-19 RX ORDER — ACETAMINOPHEN 325 MG/1
650 TABLET ORAL ONCE
Status: COMPLETED | OUTPATIENT
Start: 2021-10-19 | End: 2021-10-19

## 2021-10-19 RX ORDER — AMIODARONE HYDROCHLORIDE 200 MG/1
100 TABLET ORAL DAILY
Status: DISCONTINUED | OUTPATIENT
Start: 2021-10-20 | End: 2021-10-30 | Stop reason: HOSPADM

## 2021-10-19 RX ORDER — NOREPINEPHRINE BITARTRATE/D5W 8 MG/250ML
.5-16 PLASTIC BAG, INJECTION (ML) INTRAVENOUS
Status: DISCONTINUED | OUTPATIENT
Start: 2021-10-19 | End: 2021-10-22

## 2021-10-19 RX ORDER — VANCOMYCIN/0.9 % SOD CHLORIDE 1.5G/250ML
1500 PLASTIC BAG, INJECTION (ML) INTRAVENOUS ONCE
Status: COMPLETED | OUTPATIENT
Start: 2021-10-19 | End: 2021-10-19

## 2021-10-19 RX ORDER — SODIUM CHLORIDE 0.9 % (FLUSH) 0.9 %
5-40 SYRINGE (ML) INJECTION AS NEEDED
Status: DISCONTINUED | OUTPATIENT
Start: 2021-10-19 | End: 2021-10-30 | Stop reason: HOSPADM

## 2021-10-19 RX ORDER — SODIUM CHLORIDE 9 MG/ML
1000 INJECTION, SOLUTION INTRAVENOUS ONCE
Status: COMPLETED | OUTPATIENT
Start: 2021-10-19 | End: 2021-10-19

## 2021-10-19 RX ORDER — SENNOSIDES 8.6 MG/1
1 TABLET ORAL DAILY PRN
Status: DISCONTINUED | OUTPATIENT
Start: 2021-10-19 | End: 2021-10-30 | Stop reason: HOSPADM

## 2021-10-19 RX ORDER — ACETAMINOPHEN 325 MG/1
650 TABLET ORAL
Status: DISCONTINUED | OUTPATIENT
Start: 2021-10-19 | End: 2021-10-30 | Stop reason: HOSPADM

## 2021-10-19 RX ORDER — SODIUM CHLORIDE 0.9 % (FLUSH) 0.9 %
5-40 SYRINGE (ML) INJECTION EVERY 8 HOURS
Status: DISCONTINUED | OUTPATIENT
Start: 2021-10-19 | End: 2021-10-30 | Stop reason: HOSPADM

## 2021-10-19 RX ORDER — ACETAMINOPHEN 650 MG/1
650 SUPPOSITORY RECTAL
Status: DISCONTINUED | OUTPATIENT
Start: 2021-10-19 | End: 2021-10-30 | Stop reason: HOSPADM

## 2021-10-19 RX ADMIN — SODIUM CHLORIDE 1000 ML: 9 INJECTION, SOLUTION INTRAVENOUS at 12:49

## 2021-10-19 RX ADMIN — VANCOMYCIN HYDROCHLORIDE 1500 MG: 10 INJECTION, POWDER, LYOPHILIZED, FOR SOLUTION INTRAVENOUS at 13:18

## 2021-10-19 RX ADMIN — MEROPENEM 500 MG: 500 INJECTION, POWDER, FOR SOLUTION INTRAVENOUS at 13:17

## 2021-10-19 RX ADMIN — CEFEPIME HYDROCHLORIDE 2 G: 2 INJECTION, POWDER, FOR SOLUTION INTRAVENOUS at 21:01

## 2021-10-19 RX ADMIN — Medication 10 ML: at 21:01

## 2021-10-19 RX ADMIN — ACETAMINOPHEN 650 MG: 325 TABLET ORAL at 11:28

## 2021-10-19 RX ADMIN — Medication 10 ML: at 13:22

## 2021-10-19 RX ADMIN — NOREPINEPHRINE BITARTRATE 4 MCG/MIN: 1 INJECTION, SOLUTION, CONCENTRATE INTRAVENOUS at 15:30

## 2021-10-19 RX ADMIN — SODIUM CHLORIDE 1000 ML: 9 INJECTION, SOLUTION INTRAVENOUS at 11:00

## 2021-10-19 RX ADMIN — PHENYLEPHRINE HYDROCHLORIDE 30 MCG/MIN: 10 INJECTION INTRAVENOUS at 13:15

## 2021-10-19 RX ADMIN — APIXABAN 2.5 MG: 2.5 TABLET, FILM COATED ORAL at 17:24

## 2021-10-19 NOTE — PROGRESS NOTES
1500: TRANSFER - IN REPORT:    Verbal report received from Anjelica Mirza RN on Jaziel Marie  being received from ED for routine progression of care      Report consisted of patients Situation, Background, Assessment and   Recommendations(SBAR). Information from the following report(s) SBAR, ED Summary and Cardiac Rhythm SR V-paced was reviewed with the receiving nurse. Opportunity for questions and clarification was provided. Assessment completed upon patients arrival to unit and care assumed. Neuro: A&O X4, pupils PERLAA @ 3mm, pt is very [place]    Cardio:S1S2 present, V-paced, hypotensive    Respiratory: On R.A sats @96, clear lungs sounds, symmetrical bilateral chest expansion. GI/: BS active x4, semi soft abd, pt reports having a BM \"a couple of days ago\". Purewick in place. Skin: Intact, left hip surgical scar noted. 1900: Bedside and Verbal shift change report given to Alphonza Phalen, RN (oncoming nurse) by Raul Chavarria RN (offgoing nurse). Report included the following information SBAR, Kardex, ED Summary, MAR, Recent Results and Cardiac Rhythm SR-Vpaced.

## 2021-10-19 NOTE — CONSULTS
Barrington Brown MD., [facility]    [address] [place], [address] [facility]    Office [phone],B [phone]           10/19/2021     Admit Date: 10/19/2021      Brennan Rodriguez is a 80 y.o. female admitted for Generalized weakness [R53.1]. 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 Currently on Levophed gtt. Will review echo. Will see as needed. Please call if needed. Please do not hesitate to contact us with questions or concerns. See note below for details. Barrington Brown MD      Cardiac Testing/Procedures: A. Cardiac Cath/PCI: 2/19/19 R CFA acess - ultrasound guided; micropuncture access     LCA - EBU 3.5  RCA - JR4     L Main:  Med; Nml     LAD: Med; Mid 30% tandem lesion; Small vessel distally; D1/D2 - MLI     LCflex: Med; Prox 30%; OM1 - MLI     RCA: Med to Large; Mid  - 50% lesion; PDA and PLB - med; MLI     LVEDP: 12     LVEF: Not assessed; Nml by ECHO     No significant gradient across aortic valvve     Specimens Removed : None     Closure Device: Manual    B.ECHO/HORACE: 2/17/19 - · Es timated left ventricular ejection fraction is 61 - 65%. · Left atrial cavity size is mildly dilated. · Mild aortic valve sclerosis. Aortic valve leaflet calcification present. Mild aortic valve regurgitation is present. · Mild mitral annular calcification. · Mild tricuspid valve regurgitation is present.     C.StressNuclear/Stress ECHO/Stress test:    D.Vascular:    E. EP:    F. Miscellaneous:    History:     Patient  is a 80 y.o. female admitted for generalized weakness/failure to thrive. Poor historian. History obtained from chart. No chest pain, dyspnea at rest.    High-sensitivity troponin 29   Creatinine 1.6.   NT proBNP 5118  Chest x-ray-NAD      PMH/PSH/FH/Soc Hx:     Past Medical History:   Diagnosis Date    Acute cystitis 05/23/2018    Anemia, unspecified     Anxiety     Arrhythmia     a fib    Arthritis     osteoarthritis, rheumatoid    Atrial fibrillation (HCC)     Dr. Prabhakar Castillo and Dr. Idalia Hall  following    Autoimmune disease [facility])     sjogren disease Dr. Griffin Choudhury CAD (coronary artery disease)     HTN (hypertension)     Hyperlipidemia LDL goal < 100     Hypothyroid     Insomnia     Osteoarthritis     Sjogren's disease ([facility] [address]     Thoracic aneurysm without mention of rupture       Past Surgical History:   Procedure Laterality Date    HX BIV-IMPLANTABLE CARDIOVERTER DEFIBRILLATOR      HX CHOLECYSTECTOMY      HX COLONOSCOPY  3/2014    Dr. Harpreet Elmore    HX OTHER SURGICAL      hernia repairs    HX PARTIAL THYROIDECTOMY      HX JANNET AND BSO      HX VEIN STRIPPING      [place] INSJ ELTRD CAR COLLIN SYS TM INSJ DFB/PM PLS GEN Left 2/22/2019    Lv Lead Placement performed by Sheryle Grade, MD at [facility] CATH LAB    [place] INSJ/RPLCMT PERM DFB W/TRNSVNS LDS 1/DUAL [facility] Left 2/22/2019    upgrade PM to ICD-Bsx performed by Sheryle Grade, MD at [facility] CATH LAB     Allergies   Allergen Reactions    Latex Hives     NOT ALLERGIC PER PT 02/01/2018    Pcn [Penicillins] Anaphylaxis    Sulfa (Sulfonamide Antibiotics) Anaphylaxis    Biaxin [Clarithromycin] Nausea Only    Biotin Unknown (comments)    Covid-19 Vaccine, Mrna, [id number], Lnp-S (Moderna) Rash    Pcn [Penicillins] Hives    Sulfa (Sulfonamide Antibiotics) Nausea Only     Family History   Problem Relation Age of Onset    Heart Disease Father         aneurysm    Cancer Maternal Grandfather       Social History     Tobacco Use    Smoking status: Never Smoker    Smokeless tobacco: Never Used   Vaping Use    Vaping Use: Never used   Substance Use Topics    Alcohol use: Yes     Comment: wine    Drug use: Never           Medications:       Current Facility-Administered Medications   Medication Dose Route Frequency    sodium chloride (NS) flush 5-40 mL  5-40 mL IntraVENous Q8H    sodium chloride (NS) flush 5-40 mL  5-40 mL IntraVENous PRN    acetaminophen (TYLENOL) tablet 650 mg  650 mg Oral Q6H PRN    Or    acetaminophen (TYLENOL) suppository 650 mg  650 mg Rectal Q6H PRN    senna (SENOKOT) tablet 8.6 mg  1 Tablet Oral DAILY PRN    meropenem (MERREM) 500 mg in sterile water (preservative free) 10 mL IV syringe  0.5 g IntraVENous Q12H    vancomycin (VANCOCIN) 1500 mg in  ml infusion  1,500 mg IntraVENous ONCE    VANCOMYCIN - Pharmacy dosing per random levels. Look for one time orders. Other Rx Dosing/Monitoring    PHENYLephrine (SHRUTHI-SYNEPHRINE) 30 mg in 0.9% sodium chloride 250 mL infusion   mcg/min IntraVENous TITRATE    lactated ringers bolus infusion 500 mL  500 mL IntraVENous ONCE     Current Outpatient Medications   Medication Sig    PARoxetine (PAXIL) 10 mg tablet TAKE 1 AND 1/2 TABLET BY MOUTH DAILY    fosinopriL (MONOPRIL) 10 mg tablet TAKE THREE TABLETS BY MOUTH EVERY MORNING AND TAKE TWO TABLETS BY MOUTH EVERY EVENING    levothyroxine (SYNTHROID) 112 mcg tablet Take 1 Tablet by mouth Daily (before breakfast).  ferrous sulfate (IRON PO) Take  by mouth daily.  varicella-zoster recombinant, PF, (Shingrix, PF,) 50 mcg/0.5 mL susr injection 0.5mL by IntraMUSCular route once now and then repeat in 2-6 months    amLODIPine (NORVASC) 5 mg tablet Take 1 Tablet by mouth daily.  furosemide (LASIX) 20 mg tablet TAKE ONE TABLET BY MOUTH DAILY FOR 10 DAYS (DO NOT TAKE IF LIGHTHEADED OR FALLS)    atenoloL (TENORMIN) 25 mg tablet Take 1 Tablet by mouth daily.     montelukast (SINGULAIR) 10 mg tablet montelukast 10 mg tablet (Patient not taking: Reported on 5/25/2021)    ipratropium (ATROVENT) 42 mcg (0.06 %) nasal spray ipratropium bromide 42 mcg (0.06 %) nasal spray    influenza vaccine 2019-20, 65 yrs+,,PF, (Fluzone High-Dose 2019-20, PF,) syrg injection Fluzone High-Dose 2019-20 (PF) 180 mcg/0.5 mL intramuscular syringe    diclofenac (VOLTAREN) 1 % gel diclofenac 1 % topical gel    albuterol (PROVENTIL HFA, VENTOLIN HFA, PROAIR HFA) 90 mcg/actuation inhaler albuterol sulfate HFA 90 mcg/actuation aerosol inhaler    Biotin 2,500 mcg cap Take  by mouth.  aspirin delayed-release 81 mg tablet Take 81 mg by mouth daily.  amiodarone (PACERONE) 100 mg tablet Take 100 mg by mouth daily.  ascorbic acid, vitamin C, (Vitamin C) 500 mg tablet Take 500 mg by mouth daily.  cetirizine (ZYRTEC) 10 mg tablet Take 10 mg by mouth daily.  eszopiclone (LUNESTA) 3 mg tablet Take 3 mg by mouth nightly.  meloxicam (MOBIC) 7.5 mg tablet Take 7.5 mg by mouth daily.  apixaban (ELIQUIS) 5 mg tablet Take 5 mg by mouth two (2) times a day. Per Dr. Levi Baker pt to decrease to 2.5mg BID Per home script 12/4.  fluticasone (FLONASE) 50 mcg/actuation nasal spray 2 Sprays by Both Nostrils route daily as needed for Allergies.  cholecalciferol, vitamin D3, (VITAMIN D3) 2,000 unit tab Take 2,000 Units by mouth daily.        Review of Systems:     As in HPI - Limited - all other ROS negative    Physical Exam:       Visit Vitals  BP (!) 98/31   Pulse 72   Temp (!) 102 °F (38.9 °C)   Resp 17   Ht 5' 7\" (1.702 m)   Wt 139 lb (63 kg)   SpO2 94%   BMI 21.77 kg/m²         Telemetry:     Gen: Elderly  Neck: Supple,No JVD,    Resp: No accessory muscle use, Clear breath sounds, No rales or rhonchi  Card: iregular Rate,Rythm,Normal S1, S2,2/6 sys murmur+  Abd:  Soft,BS+,   MSK: No cyanosis  Skin: No rashes    Neuro: moving all four extremities ,   Psych:  Poor insight, oriented to person  LE: No edema    EKG: V paced rhythm with underlying breakthrough rhythm of A. fib with native QRS complexes        Cxray: Reviewed     LABS: Reviewed      Care Plan discussed with: Patient and Nursing Staff      Total time:      mins     Noemi Noriega MD

## 2021-10-19 NOTE — ED TRIAGE NOTES
Pt brought in by EMS from home for severe weakness. BG 82. Pt hasn't gotten out of bed in two days. Pt has body aches and hard to move bilateral legs. Pt denies cough or any covid symptoms. Pt not eating or drinking much. Decreased urination.

## 2021-10-19 NOTE — PROGRESS NOTES
1900- Bedside and Verbal shift change report given to Jerrold Alpers, RN(oncoming nurse) by Bryanna Greer RN (offgoing nurse). Report included the following information SBAR, Kardex, Intake/Output, MAR, Recent Results, Cardiac Rhythm V paced and Alarm Parameters . Primary Nurse Jerrold Alpers, RN and Bryanna Greer RN performed a dual skin assessment on this patient No impairment noted  Luciano score is 16    2000- Pt is resting in bed at this time. She is currently on Levo at 6. No complaints at this time. 0000- No changes on reassessment. 0400- No changes on reassessment. 0700- Bedside and Verbal shift change report given to Clearnce Homans, RN (oncoming nurse) by Jerrold Alpers, RN (offgoing nurse). Report included the following information SBAR, Kardex, Intake/Output, MAR, Recent Results, Cardiac Rhythm V-paced, Afib and Alarm Parameters .

## 2021-10-19 NOTE — H&P
[facility]  [address] [address]  [phone]    Admission History and Physical      NAME:       Makayla Castañeda   :       1928   MRN:      [id number]     PCP:      Annalisa Benton MD     Date of service:   10/19/2021     Chief  Complaint: Generalized weakness    History Of Presenting Illness:       Ms. Jovanni Pulido is a 80 y.o. female who is being admitted for shock, suspected sepsis of unclear etiology. Ms. Jovanni Pulido presented to our Emergency Department today complaining of generalized weakness. She is hard of hearing and I have discussed with her son via telephone. He sates that she lives alone, usually functional and has been having intermittent diarrhea which he also states is an old problem. She was unable to get out of bed when he went to see her and he called EMS to bring her to the ED. Upon arrival, she was noted to be hypotensive and later developed a fever. She had no cough, nausea or vomiting. Son says she has bladder problems. A CXR was clear. She received an IV fluid bolus but she remained hypotensive. She will be admitted to hospital for further management. Allergies   Allergen Reactions    Latex Hives     NOT ALLERGIC PER PT 2018    Pcn [Penicillins] Anaphylaxis    Sulfa (Sulfonamide Antibiotics) Anaphylaxis    Biaxin [Clarithromycin] Nausea Only    Biotin Unknown (comments)    Covid-19 Vaccine, Mrna, [id number], Lnp-S (Moderna) Rash    Pcn [Penicillins] Hives    Sulfa (Sulfonamide Antibiotics) Nausea Only       Prior to Admission medications    Medication Sig Start Date End Date Taking?  Authorizing Provider   PARoxetine (PAXIL) 10 mg tablet TAKE 1 AND 1/2 TABLET BY MOUTH DAILY 10/10/21   Annalisa Benton MD   fosinopriL (MONOPRIL) 10 mg tablet TAKE THREE TABLETS BY MOUTH EVERY MORNING AND TAKE TWO TABLETS BY MOUTH EVERY EVENING 10/9/21   Marilu Tom MD   levothyroxine (SYNTHROID) 112 mcg tablet Take 1 Tablet by mouth Daily (before breakfast). 10/9/21   Marilu Tom MD   ferrous sulfate (IRON PO) Take  by mouth daily. Provider, Historical   varicella-zoster recombinant, PF, (Shingrix, PF,) 50 mcg/0.5 mL susr injection 0.5mL by IntraMUSCular route once now and then repeat in 2-6 months 10/4/21   Romayne Cord, Davee Lynn, MD   amLODIPine (NORVASC) 5 mg tablet Take 1 Tablet by mouth daily. 10/4/21   Marilu Tom MD   furosemide (LASIX) 20 mg tablet TAKE ONE TABLET BY MOUTH DAILY FOR 10 DAYS (DO NOT TAKE IF LIGHTHEADED OR FALLS) 8/10/21   Teagan Brown MD   atenoloL (TENORMIN) 25 mg tablet Take 1 Tablet by mouth daily. 6/8/21   Marilu Tom MD   montelukast (SINGULAIR) 10 mg tablet montelukast 10 mg tablet  Patient not taking: Reported on 5/25/2021    Provider, Historical   ipratropium (ATROVENT) 42 mcg (0.06 %) nasal spray ipratropium bromide 42 mcg (0.06 %) nasal spray    Provider, Historical   influenza vaccine 2019-20, 65 yrs+,,PF, (Fluzone High-Dose 2019-20, PF,) syrg injection Fluzone High-Dose 2019-20 (PF) 180 mcg/0.5 mL intramuscular syringe    Provider, Historical   diclofenac (VOLTAREN) 1 % gel diclofenac 1 % topical gel    Provider, Historical   albuterol (PROVENTIL HFA, VENTOLIN HFA, PROAIR HFA) 90 mcg/actuation inhaler albuterol sulfate HFA 90 mcg/actuation aerosol inhaler    Provider, Historical   Biotin 2,500 mcg cap Take  by mouth. Provider, Historical   aspirin delayed-release 81 mg tablet Take 81 mg by mouth daily. Provider, Historical   amiodarone (PACERONE) 100 mg tablet Take 100 mg by mouth daily. Provider, Historical   ascorbic acid, vitamin C, (Vitamin C) 500 mg tablet Take 500 mg by mouth daily. Provider, Historical   cetirizine (ZYRTEC) 10 mg tablet Take 10 mg by mouth daily.     Provider, Historical eszopiclone (LUNESTA) 3 mg tablet Take 3 mg by mouth nightly. Provider, Historical   meloxicam (MOBIC) 7.5 mg tablet Take 7.5 mg by mouth daily. Provider, Historical   apixaban (ELIQUIS) 5 mg tablet Take 5 mg by mouth two (2) times a day. Per Dr. Zafar Lax pt to decrease to 2.5mg BID Per home script 12/4. 12/4/20   Provider, Historical   fluticasone (FLONASE) 50 mcg/actuation nasal spray 2 Sprays by Both Nostrils route daily as needed for Allergies. 7/3/16   Provider, Historical   cholecalciferol, vitamin D3, (VITAMIN D3) 2,000 unit tab Take 2,000 Units by mouth daily.     Provider, Historical       Past Medical History:   Diagnosis Date    Acute cystitis 05/23/2018    Anemia, unspecified     Anxiety     Arrhythmia     a fib    Arthritis     osteoarthritis, rheumatoid    Atrial fibrillation (HCC)     Dr. Tori Aguiar and Dr. Serenity Holland  following    Autoimmune disease [facility])     sjogren disease Dr. Samreen Montanez CAD (coronary artery disease)     HTN (hypertension)     Hyperlipidemia LDL goal < 100     Hypothyroid     Insomnia     Osteoarthritis     Sjogren's disease [facility]     Thoracic aneurysm without mention of rupture         Past Surgical History:   Procedure Laterality Date    HX BIV-IMPLANTABLE CARDIOVERTER DEFIBRILLATOR      HX CHOLECYSTECTOMY      HX COLONOSCOPY  3/2014    Dr. Soni Abdullahi    HX OTHER SURGICAL      hernia repairs    HX PARTIAL THYROIDECTOMY      HX JANNET AND BSO      HX VEIN STRIPPING      NM INSJ ELTRD CAR COLLIN SYS TM INSJ DFB/PM PLS GEN Left 2/22/2019    Lv Lead Placement performed by Vanesa Nunez MD at [facility] CATH LAB    NM INSJ/RPLCMT PERM DFB W/TRNSVNS LDS 1/DUAL [facility] Left 2/22/2019    upgrade PM to ICD-Bsx performed by Vanesa Nunez MD at [facility] CATH LAB       Social History     Tobacco Use    Smoking status: Never Smoker    Smokeless tobacco: Never Used   Substance Use Topics    Alcohol use: Yes     Comment: wine        Family History   Problem Relation Age of Onset    Heart Disease Father         aneurysm    Cancer Maternal Grandfather      Review of Systems: patient is a poor historian with decreased hearing    Examination:    Constitutional:    Visit Vitals  BP (!) 75/24   Pulse 63   Temp (!) 102 °F (38.9 °C)   Resp 19   Ht 5' 7\" (1.702 m)   Wt 63 kg (139 lb)   SpO2 94%   BMI 21.77 kg/m²         General:  Weak and critically ill female patient   Eyes: Pink conjunctivae, PERRLA with no discharge. Normal eye movements  Ear, Nose, Mouth & Throat: No ottorrhea, rhinorrhea, non tender sinuses, dry mucous membranes  Respiratory:  No accessory muscle use, decreased but clear breath sounds without crackles or wheezes  Cardiovascular:  No JVD or murmurs, regular and normal S1, S2 without thrills, bruits or peripheral edema. Capillary refil+, feeble distal pulses  GI & :  Soft abdomen, mild low abdominal discomfort, non-distended, normoactive bowel sounds with no palpable organomegaly  Heme:  No cervical or axillary adenopathy. Musculoskeletal:  No cyanosis, clubbing but generalized atrophy   Skin:  No rashes, bruising or ulcers   Neurological: Awake and alert, decreased hearing. No facial droop. Frail   Psychiatric:  Has little insight to her illness   ________________________________________________________________________    Data Review:    Labs:    Recent Labs     10/19/21  1036   WBC 6.1   HGB 11.4*   HCT 34.2*   PLT 64*     Recent Labs     10/19/21  1036   *   K 3.8   CL 99   CO2 24   GLU 95   BUN 35*   CREA 1.60*   CA 7.6*   MG 1.9   ALB 2.6*   ALT 13     No components found for: GLPOC  No results for input(s): PH, PCO2, PO2, HCO3, FIO2 in the last 72 hours. No results for input(s): INR, INREXT in the last 72 hours. Imaging Studies:      Chest X-ray - clear     Personally reviewed 12 lead EKG - paced rhythm     I have also reviewed available old medical records.      Assessment & Impression:     Ms. Rashmi Ramsey is a 80 y.o. female being evaluated for: Principal Problem:    Shock ([address] (10/19/2021)    Active Problems:    Anxiety ()      Hypothyroidism ()      CAD (coronary artery disease) ()      Persistent atrial fibrillation (HCC) ()      Ventricular tachycardia ([address] (2/16/2019)      Thrombocytopenia ([address] (10/21/2020)      Generalized weakness (10/19/2021)      CKD (chronic kidney disease) stage 3, GFR 30-59 ml/min ([address] (10/19/2021)      ELADIA (acute kidney injury) ([address] (10/19/2021)      Hyponatremia (10/19/2021)         Plan of management:    Shock ([address] (10/19/2021) POA: unclear as to the etiology but DDx include hypovolemia vs septic given her fever vs other. Admit to ICU. CXR is clear. UA not impressive. No leukocytosis or bandemia. Lactic acid is normal. Rapid CoV-19 is neg. Obtain blood cultures. RVP. Give another IV fluid bolus with maintenance fluids and monitor for volume overload. Get a CT scan abdomen and pelvis when feasible. Start empiric IV Meropenem and Vancomycin (given her allergies). Start IV Orion-Synephrine for now. Discussed with ED physician to get a central line then perhaps change to Levophed. Monitor clinical progress given she has a high risk for deterioration     Thrombocytopenia ([address] (10/21/2020) POA: unclear etiology but likely due to suspected sepsis. Get a peripheral smear. Monitor counts. Consult hematology if worsens    ELADIA (acute kidney injury) ([address] (10/19/2021) / Hyponatremia (10/19/2021) / CKD (chronic kidney disease) stage 3, GFR 30-59 ml/min ([address] (10/19/2021) POA: likely from volume depletion vs from sepsis. Gently hydrate and follow clinical progress    CAD (coronary artery disease) /  Ventricular tachycardia ([address] (2/16/2019) / Persistent atrial fibrillation (HCC) POA: son says she has an ICD. Given her hypotension, will check an Echocardiogram. Trend troponin and consult Cardiology    Anxiety POA: hold home medications     Hypothyroidism POA: check a TSH.  Hold Levothyroxine for now    Generalized weakness (10/19/2021) POA: due to current illness.  Will need PT, OT when feasible    Code Status: DNR - confirmed by her son    Surrogate decision maker: Family    Risk of deterioration: high      Total time spent for the care of the patient: Antonio Tejeda discussed with: Family, Nursing Staff and ED physician    Discussed:  Code Status, Care Plan and D/C Planning    Prophylaxis:  SCD's    Probable Disposition:  Home w/Family           ___________________________________________________    Attending Physician: Jessica Hull MD

## 2021-10-19 NOTE — PROGRESS NOTES
[facility]: MED RECONCILIATION    Comments/Recommendations:   Medication reconciliation completed by son at bedside who had medication list to review. Patient did not take any medications today prior to hospital arrival.  Confirmed preferred pharmacy as [facility] [address]. Medications added:     none    Medications removed:    Furosemide  Montelukast     Medications adjusted:    Eliquis 2.5mg BID adjusted from 5mg BID    Information obtained from: son, medication list, Rx query, outpatient notes      Allergies: Latex; Pcn [penicillins]; Sulfa (sulfonamide antibiotics); Biaxin [clarithromycin]; Biotin; Covid-19 vaccine, mrna, [id number], lnp-s (moderna); Pcn [penicillins]; and Sulfa (sulfonamide antibiotics)    Prior to Admission Medications:     Medication Documentation Review Audit       Reviewed by Isabelle Carreno (Pharmacist) on 10/19/21 at [address] [place] [address] Provider Last Dose Status Taking? albuterol (PROVENTIL HFA, VENTOLIN HFA, PROAIR HFA) 90 mcg/actuation inhaler albuterol sulfate HFA 90 mcg/actuation aerosol inhaler Provider, Historical  Active Yes   amiodarone (PACERONE) 100 mg tablet Take 100 mg by mouth daily. Provider, Historical 10/18/2021 Active Yes   amLODIPine (NORVASC) 5 mg tablet Take 1 Tablet by mouth daily. Melisa Portillo MD 10/18/2021 Active Yes   apixaban (ELIQUIS) 2.5 mg tablet Take 2.5 mg by mouth two (2) times a day. Provider, Historical 10/18/2021 Active Yes   ascorbic acid, vitamin C, (Vitamin C) 500 mg tablet Take 500 mg by mouth daily. Provider, Historical 10/18/2021 Active Yes   aspirin delayed-release 81 mg tablet Take 81 mg by mouth daily. Provider, Historical 10/18/2021 Active Yes   atenoloL (TENORMIN) 25 mg tablet Take 1 Tablet by mouth daily. Melisa Portillo MD 10/18/2021 Active Yes   Biotin 2,500 mcg cap Take 1 Caplet by mouth daily.  Provider, Historical 10/18/2021 Active Yes   cetirizine (ZYRTEC) 10 mg tablet Take 10 mg by mouth daily. Provider, Historical 10/18/2021 Active Yes   cholecalciferol, vitamin D3, (VITAMIN D3) 2,000 unit tab Take 2,000 Units by mouth daily. Provider, Historical 10/18/2021 Active Yes   diclofenac (VOLTAREN) 1 % gel Apply 2 g to affected area daily as needed. Provider, Historical  Active Yes   eszopiclone (LUNESTA) 3 mg tablet Take 3 mg by mouth nightly. Provider, Historical 10/18/2021 Active Yes   ferrous sulfate (IRON PO) Take 325 mg by mouth daily. Provider, Historical 10/18/2021 Active Yes   fluticasone (FLONASE) 50 mcg/actuation nasal spray 2 Sprays by Both Nostrils route daily as needed for Allergies. Provider, Historical  Active Yes           Med Note (Phill Lassiter   u Sep 6, 2018  1:32 PM)     fosinopriL (MONOPRIL) 10 mg tablet TAKE THREE TABLETS BY MOUTH EVERY MORNING AND TAKE TWO TABLETS BY MOUTH EVERY EVENING Melisa Portillo MD 10/18/2021 Active Yes   ipratropium (ATROVENT) 42 mcg (0.06 %) nasal spray 1 [place] by Both Nostrils route three (3) times daily as needed. Provider, Historical  Active Yes   levothyroxine (SYNTHROID) 112 mcg tablet Take 1 Tablet by mouth Daily (before breakfast). Melisa Portillo MD 10/18/2021 Active Yes   meloxicam (MOBIC) 7.5 mg tablet Take 7.5 mg by mouth daily.  Provider, Historical 10/18/2021 Active Yes   PARoxetine (PAXIL) 10 mg tablet TAKE 1 AND 1/2 TABLET BY MOUTH DAILY Melisa Portillo MD 10/18/2021 Active Yes                    Thank you,   Ondina Altamirano, PharmD, BCPS   Contact: [phone]

## 2021-10-19 NOTE — PROGRESS NOTES
[facility] Dosing Services: 10/19/21      The following medication: Cefepime was automatically dose-adjusted per [facility] P&T Committee Protocol, with respect to renal function. Consult provided for this   80 y.o. , female , for the indication of sepsis. Dosage changed to:  Cefepime 2gm IV every 24 hrs    Pt Weight:   Wt Readings from Last 1 Encounters:   10/19/21 63 kg (139 lb)         Previous Regimen Cefepime 1gm IV every 12 hrs   Serum Creatinine Lab Results   Component Value Date/Time    Creatinine 1.60 (H) 10/19/2021 10:36 AM    Creatinine (POC) 1.1 02/16/2019 05:38 PM       Creatinine Clearance Estimated Creatinine Clearance: 21.4 mL/min (A) (based on SCr of 1.6 mg/dL (H)). BUN Lab Results   Component Value Date/Time    BUN 35 (H) 10/19/2021 10:36 AM    BUN (POC) 18 02/16/2019 05:38 PM           Additional notes:      Pharmacy to continue to monitor patient daily. Will make dosage adjustments based upon changing renal function. Signed [facility]  [address] [place] [address] information:  [phone]

## 2021-10-19 NOTE — ED NOTES
R IJ Triple Lumen CVC placed by Dr. Charu Pollack. Verbal consent obtained and time out performed.  CXR ordered to verify placement

## 2021-10-19 NOTE — CONSULTS
Consult History and Physical Exam    NAME:  Opal Collins   :   1928   MRN:  [id number]     PCP:  King Arjun MD   Referring: Cheri Dent MD     Date/Time:  10/19/2021         Subjective:   REASON FOR CONSULT: shock    CHIEF COMPLAINT: generalized weakness     HISTORY OF PRESENT ILLNESS:     Ms. Adryan Martínez is a 80 y.o. female with a h/o a fib, Sjogren syndrome, CAD, HTN, DLD, and hypothyroidism who presented to the ED with a c/c of generalized weakness. She lives by herself. When her son went to visit her, she was unable to get out of bed so he called EMS. In the ED, she was hypotensive unresponsive to fluid boluses and was started on jada. She was febrile to 103.3, WBC 6.1, UA negative for UTI, BNP 5118, Cr 1.60 (baseline 0.91), LA 1.2. CXR with no consolidation. A central line was placed and she was started on levophed. She was given vancomycin and merrem. She is being admitted to the ICU for further management.     Past Medical History:   Diagnosis Date    Acute cystitis 2018    Anemia, unspecified     Anxiety     Arrhythmia     a fib    Arthritis     osteoarthritis, rheumatoid    Atrial fibrillation (HCC)     Dr. Eladio Field and Dr. German How  following    Autoimmune disease [facility])     sjogren disease Dr. Mayelin Santana CAD (coronary artery disease)     HTN (hypertension)     Hyperlipidemia LDL goal < 100     Hypothyroid     Insomnia     Osteoarthritis     Sjogren's disease ([facility]     Thoracic aneurysm without mention of rupture         Past Surgical History:   Procedure Laterality Date    HX BIV-IMPLANTABLE CARDIOVERTER DEFIBRILLATOR      HX CHOLECYSTECTOMY      HX COLONOSCOPY  3/2014    Dr. Dannielle Jones      hernia repairs    HX PARTIAL THYROIDECTOMY      HX JANNET AND BSO      HX VEIN STRIPPING      MI INSJ ELTRD CAR COLLIN SYS TM INSJ DFB/PM PLS GEN Left 2019    Lv Lead Placement performed by Zeus Velez MD at [facility] CATH LAB    MI INSJ/RPLCMT PERM DFB W/TRNSVNS LDS 1/DUAL [facility] Left 2/22/2019    upgrade PM to ICD-Bsx performed by Reagan Tavera MD at [facility] LAB       Social History     Tobacco Use    Smoking status: Never Smoker    Smokeless tobacco: Never Used   Substance Use Topics    Alcohol use: Yes     Comment: wine        Family History   Problem Relation Age of Onset    Heart Disease Father         aneurysm    Cancer Maternal Grandfather         Allergies   Allergen Reactions    Latex Hives     NOT ALLERGIC PER PT 02/01/2018    Pcn [Penicillins] Anaphylaxis    Sulfa (Sulfonamide Antibiotics) Anaphylaxis    Biaxin [Clarithromycin] Nausea Only    Biotin Unknown (comments)    Covid-19 Vaccine, Mrna, [id number], Lnp-S (Moderna) Rash    Pcn [Penicillins] Hives    Sulfa (Sulfonamide Antibiotics) Nausea Only        Prior to Admission medications    Medication Sig Start Date End Date Taking? Authorizing Provider   PARoxetine (PAXIL) 10 mg tablet TAKE 1 AND 1/2 TABLET BY MOUTH DAILY 10/10/21   Daysi Joyner MD   fosinopriL (MONOPRIL) 10 mg tablet TAKE THREE TABLETS BY MOUTH EVERY MORNING AND TAKE TWO TABLETS BY MOUTH EVERY EVENING 10/9/21   Grace Brown MD   levothyroxine (SYNTHROID) 112 mcg tablet Take 1 Tablet by mouth Daily (before breakfast). 10/9/21   Daysi Joyner MD   ferrous sulfate (IRON PO) Take  by mouth daily. Provider, Historical   varicella-zoster recombinant, PF, (Shingrix, PF,) 50 mcg/0.5 mL susr injection 0.5mL by IntraMUSCular route once now and then repeat in 2-6 months 10/4/21   Grace Amaya MD   amLODIPine (NORVASC) 5 mg tablet Take 1 Tablet by mouth daily. 10/4/21   Daysi Joyner MD   furosemide (LASIX) 20 mg tablet TAKE ONE TABLET BY MOUTH DAILY FOR 10 DAYS (DO NOT TAKE IF LIGHTHEADED OR FALLS) 8/10/21   Grace Brown MD   atenoloL (TENORMIN) 25 mg tablet Take 1 Tablet by mouth daily.  6/8/21   Daysi Joyner MD montelukast (SINGULAIR) 10 mg tablet montelukast 10 mg tablet  Patient not taking: Reported on 5/25/2021    Provider, Historical   ipratropium (ATROVENT) 42 mcg (0.06 %) nasal spray ipratropium bromide 42 mcg (0.06 %) nasal spray    Provider, Historical   influenza vaccine 2019-20, 65 yrs+,,PF, (Fluzone High-Dose 2019-20, PF,) syrg injection Fluzone High-Dose 2019-20 (PF) 180 mcg/0.5 mL intramuscular syringe    Provider, Historical   diclofenac (VOLTAREN) 1 % gel diclofenac 1 % topical gel    Provider, Historical   albuterol (PROVENTIL HFA, VENTOLIN HFA, PROAIR HFA) 90 mcg/actuation inhaler albuterol sulfate HFA 90 mcg/actuation aerosol inhaler    Provider, Historical   Biotin 2,500 mcg cap Take  by mouth. Provider, Historical   aspirin delayed-release 81 mg tablet Take 81 mg by mouth daily. Provider, Historical   amiodarone (PACERONE) 100 mg tablet Take 100 mg by mouth daily. Provider, Historical   ascorbic acid, vitamin C, (Vitamin C) 500 mg tablet Take 500 mg by mouth daily. Provider, Historical   cetirizine (ZYRTEC) 10 mg tablet Take 10 mg by mouth daily. Provider, Historical   eszopiclone (LUNESTA) 3 mg tablet Take 3 mg by mouth nightly. Provider, Historical   meloxicam (MOBIC) 7.5 mg tablet Take 7.5 mg by mouth daily. Provider, Historical   apixaban (ELIQUIS) 5 mg tablet Take 5 mg by mouth two (2) times a day. Per Dr. Cristy Perry pt to decrease to 2.5mg BID Per home script 12/4. 12/4/20   Provider, Historical   fluticasone (FLONASE) 50 mcg/actuation nasal spray 2 Sprays by Both Nostrils route daily as needed for Allergies. 7/3/16   Provider, Historical   cholecalciferol, vitamin D3, (VITAMIN D3) 2,000 unit tab Take 2,000 Units by mouth daily. Provider, Historical         Review of Systems:  ROS is limited due to patient's clinical condition.          Objective:      VITALS:    Vital signs reviewed; most recent are:    Visit Vitals  BP (!) 98/31   Pulse 72   Temp (!) 102 °F (38.9 °C)   Resp 17 Ht 5' 7\" (1.702 m)   Wt 63 kg (139 lb)   SpO2 94%   BMI 21.77 kg/m²     SpO2 Readings from Last 6 Encounters:   10/19/21 94%   10/04/21 94%   05/25/21 98%   02/16/21 98%   12/15/20 97%   12/09/20 98%            Intake/Output Summary (Last 24 hours) at 10/19/2021 1528  Last data filed at 10/19/2021 1226  Gross per 24 hour   Intake 1000 ml   Output 500 ml   Net 500 ml            Exam:     Physical Exam:  Performed via video assessment. Gen: Patient is alert. In NAD. Ill appearing. HEENT: NC/AT, EOMI  Chest: Chest movement is equal bilaterally  Cardiac: Cardiac monitor reveals SR  Extremities: Extremities appear well perfused with no obvious edema  Neuro: Nonfocal    Labs:    Recent Labs     10/19/21  1036   WBC 6.1   HGB 11.4*   HCT 34.2*   PLT 64*     Recent Labs     10/19/21  1036   *   K 3.8   CL 99   CO2 24   GLU 95   BUN 35*   CREA 1.60*   CA 7.6*   MG 1.9   ALB 2.6*   ALT 13     No components found for: GLPOC    No results for input(s): INR, INREXT in the last 72 hours. Assessment/Plan:    1. Shock: Likely septic vs hypovolemic vs less likely cardiogenic vs mixed  2. ELADIA  3. A fib on eliquis  4. H/o VT arrest s/p ICD in 2019  5. CAD    -Admit to ICU  -Levophed to keep MAP>65  -Continue vancomycin  -Change merrem to cefepime. I looked back at cultures since 2013 and she has no h/o ESBL or other MDR infection that I could find. Vanco and cefepime will provide adequate coverage for sepsis of unknown etiology. Don't believe we need anaerobic coverage at this time.  -Check procal  -Diet. Avoid additional IVFs for now given elevated BNP and evidence of pulmonary edema on CXR  -Resume eliquis. AM CBC. D/C if plt<50K or signs of bleeding.  -Amiodarone 100 qday  -Check echo  -Follow up blood cultures    DNR    Discussed:  Bedside RN    Prophylaxis:  Eliquis and SCDs    Critical Care Time  The patient is critically ill with septic shock.   If I do not acutely intervene upon these illnesses, the patient's life is in danger. These illnesses have required me to: (1) perform high complexity decision making for assessment and support; (2) assess the patient via video; (3) personally review the medical record and laboratory and diagnostic imaging results; (4) actively titrate high-alert medications; (5) manage the ventilator and actively titrate oxygen; (6) discuss this patient's case management with other healthcare providers; and (7) apply and interpret advanced monitoring techniques. As a result of this, I personally spent 35 minutes providing critical care services exclusively to this patient.   This was in exclusion of the time spent performing procedures or teaching.           ___________________________________________________    Antonia Caruso DO

## 2021-10-19 NOTE — PROGRESS NOTES
[facility] Dosing Services: Antimicrobial Stewardship Daily Doc    Consult for antibiotic dosing of Vancomycin by Dr. Kathy Kendrick  Indication: Sepsis of unknown etiology in penicillin allergic 79 yo female transferred from home. Hypotensive;  Tmax = 103.3;  WBC = 6.1; Lactic acic = 1.2; No procal; ELADIA with SCr = 1.60;  Estimated CrCl 21ml/min;  5/25/21 SCr = 0.91, likely baseline. Day of Therapy: 1    Ht Readings from Last 1 Encounters:   10/19/21 170.2 cm (67\")        Wt Readings from Last 1 Encounters:   10/19/21 63 kg (139 lb)        Vancomycin therapy:  Loading dose: 1500mg (24mg/kg) IV x 1 dose in ED ~1400   Maintenance dose: Dosing per random levels given CrCl < 29ml/min  Last level: N/A - new start     Plan: Draw random level with am labs 10/21; ~ 36 hours after loading dose;  Redose with 10-20mg/kg once random level <15mg/ml. Dose administration notes:        Date Dose & Interval Measured (mcg/mL) Extrapolated (mcg/mL)   ? ? ? ?   ? ? ? ?   ? ? ? ? Non-Kinetic Antimicrobial Dosing Regimen:   Current Regimen:  Meropenem 500mg IV Q12hrs  Recommendation: Continue current dose for eCrCl 21ml/min  Dose administration notes: Other Antimicrobial   (not dosed by pharmacist) None   Cultures 10/19 Blood - pending  10/19  COVID rapid - pending  10/19 Resp virus panel - pending       Serum Creatinine Lab Results   Component Value Date/Time    Creatinine 1.60 (H) 10/19/2021 10:36 AM    Creatinine (POC) 1.1 02/16/2019 05:38 PM         Creatinine Clearance Estimated Creatinine Clearance: 21.4 mL/min (A) (based on SCr of 1.6 mg/dL (H)). Temp Temp: (!) 103.3 °F (39.6 °C)       WBC Lab Results   Component Value Date/Time    WBC 6.1 10/19/2021 10:36 AM        Procalcitonin No results found for: PCT     For Antifungals, Metronidazole and Nafcillin: Lab Results   Component Value Date/Time    ALT (SGPT) 13 10/19/2021 10:36 AM    AST (SGOT) 33 10/19/2021 10:36 AM    Alk.  phosphatase 80 10/19/2021 10:36 AM Bilirubin, total 1.1 (H) 10/19/2021 10:36 AM        Mary Ann Easton

## 2021-10-19 NOTE — ED PROVIDER NOTES
Patient is a 42-year-old female presenting to the ED with several days of fatigue. Patient unable to say when she started feeling poorly but she says is been several days. Denies any cough or chest pain. Endorses vague shortness of breath. Patient states she lives alone and gets around with a walker. Patient appears to be a full code at this time. Attempted to call her son at patient's request.  Message left for the him to call [facility]. The history is provided by the patient and a relative. Fatigue  This is a new problem. The current episode started more than 2 days ago. The problem has been gradually worsening. There was no focality noted. Primary symptoms include disorientation. Pertinent negatives include no slurred speech and no speech difficulty. There has been no fever. Associated symptoms include shortness of breath and confusion. Pertinent negatives include no chest pain, no vomiting, no nausea and no bladder incontinence.  There were no medications administered prior to arrival.        Past Medical History:   Diagnosis Date    Acute cystitis 05/23/2018    Anemia, unspecified     Anxiety     Arrhythmia     a fib    Arthritis     osteoarthritis, rheumatoid    Atrial fibrillation (HCC)     Dr. Tori Aguiar and Dr. Serenity Holland  following    Autoimmune disease [facility])     sjogren disease Dr. Samreen Montanez CAD (coronary artery disease)     HTN (hypertension)     Hyperlipidemia LDL goal < 100     Hypothyroid     Insomnia     Osteoarthritis     Sjogren's disease ([facility] [address]     Thoracic aneurysm without mention of rupture        Past Surgical History:   Procedure Laterality Date    HX BIV-IMPLANTABLE CARDIOVERTER DEFIBRILLATOR      HX CHOLECYSTECTOMY      HX COLONOSCOPY  3/2014    Dr. Pascual Stockton      hernia repairs    HX PARTIAL THYROIDECTOMY      HX JANNET AND BSO      HX VEIN STRIPPING      IN INSJ ELTRD CAR COLLIN SYS TM INSJ DFB/PM PLS GEN Left 2/22/2019    Lv Lead Placement performed by Rosita Atwood MD at [address] [facility]    [place] INSJ/RPLCMT PERM DFB W/TRNSVNS LDS 1/DUAL [facility] Left 2/22/2019    upgrade PM to ICD-Bsx performed by Rosita Atwood MD at [facility] [facility]         Family History:   Problem Relation Age of Onset    Heart Disease Father         aneurysm    Cancer Maternal Grandfather        Social History     Socioeconomic History    Marital status:      Spouse name: Not on file    Number of children: Not on file    Years of education: Not on file    Highest education level: Not on file   Occupational History    Not on file   Tobacco Use    Smoking status: Never Smoker    Smokeless tobacco: Never Used   Vaping Use    Vaping Use: Never used   Substance and Sexual Activity    Alcohol use: Yes     Comment: wine    Drug use: Never    Sexual activity: Not Currently   Other Topics Concern    Not on file   Social History Narrative    ** Merged History Encounter **          Social Determinants of Health     Financial Resource Strain:     Difficulty of Paying Living Expenses:    Food Insecurity:     Worried About Running Out of Food in the Last Year:     [address] in the Last Year:    Transportation Needs:     Lack of Transportation (Medical):  Lack of Transportation (Non-Medical):    Physical Activity:     Days of Exercise per Week:     Minutes of Exercise per Session:    Stress:     Feeling of Stress :    Social Connections:     Frequency of Communication with Friends and Family:     Frequency of Social Gatherings with Friends and Family:     Attends [employer] Services:     Active Member of Clubs or Organizations:     Attends Club or Organization Meetings:     Marital Status:    Intimate Partner Violence:     Fear of Current or Ex-Partner:     Emotionally Abused:     Physically Abused:     Sexually Abused: ALLERGIES: Latex; Pcn [penicillins]; Sulfa (sulfonamide antibiotics); Biaxin [clarithromycin];  Biotin; Covid-19 vaccine, mrna, [id number], lnp-s (moderna); Pcn [penicillins]; and Sulfa (sulfonamide antibiotics)    Review of Systems   Constitutional: Positive for activity change, appetite change and fatigue. HENT: Negative. Eyes: Negative. Respiratory: Positive for shortness of breath. Cardiovascular: Negative. Negative for chest pain. Gastrointestinal: Negative. Negative for nausea and vomiting. Endocrine: Negative. Genitourinary: Negative. Negative for bladder incontinence. Musculoskeletal: Negative. Skin: Negative. Allergic/Immunologic: Negative. Neurological: Negative. Negative for speech difficulty. Hematological: Negative. Psychiatric/Behavioral: Positive for confusion. Vitals:    10/19/21 2000 10/19/21 2015 10/19/21 2030 10/19/21 2045   BP: 104/62 (!) 111/51  (!) 110/52   Pulse: 61 66 64 62   Resp: 22 21 26 19   Temp: 97.6 °F (36.4 °C)      SpO2: 97% 94% 96% 94%   Weight:       Height:                Physical Exam  Vitals and nursing note reviewed. Constitutional:       General: She is not in acute distress. Appearance: Normal appearance. She is ill-appearing. HENT:      Head: Normocephalic and atraumatic. Right Ear: External ear normal.      Left Ear: External ear normal.      Nose: Nose normal.      Mouth/Throat:      Mouth: Mucous membranes are dry. Pharynx: Oropharynx is clear. No posterior oropharyngeal erythema. Eyes:      Extraocular Movements: Extraocular movements intact. Conjunctiva/sclera: Conjunctivae normal.   Cardiovascular:      Rate and Rhythm: Normal rate. Pulses: Normal pulses. Heart sounds: Normal heart sounds. Pulmonary:      Effort: Pulmonary effort is normal.      Breath sounds: Normal breath sounds. Chest:      Chest wall: No deformity or tenderness. Abdominal:      General: Abdomen is flat. There is no distension. Tenderness: There is no abdominal tenderness.    Musculoskeletal:         General: No deformity or signs of injury. Normal range of motion. Cervical back: Normal range of motion and neck supple. No tenderness. Skin:     General: Skin is warm and dry. Capillary Refill: Capillary refill takes less than 2 seconds. Neurological:      General: No focal deficit present. Mental Status: She is alert and oriented to person, place, and time. Sensory: No sensory deficit. Motor: Weakness ( Generalized, no focality) present. Comments: Patient has generalized weakness but no focal symptoms at this time. Psychiatric:         Mood and Affect: Mood normal.         Behavior: Behavior normal.          [facility]  ED Course as of Oct 19 2235   Tue Oct 19, 2021   1119 WBC: 6.1 [AL]   1120 Sodium(!): 131 [AL]   1120 Creatinine(!): 1.60 [AL]   1203 EKG interpretation:   Rhythm: V paced with PVC's; and regular . Rate (approx.): 77; Axis: normal; Intervals: normal ; ST/T wave: normal; EKG documented and interpreted by Azalea Paz. Earl Avalos MD, ED MD.      [AL]      ED Course User Index  [AL] Kyle Arriola MD       LABORATORY RESULTS:  Labs Reviewed   CBC WITH AUTOMATED DIFF - Abnormal; Notable for the following components:       Result Value    RBC 3.56 (*)     HGB 11.4 (*)     HCT 34.2 (*)     PLATELET 64 (*)     NEUTROPHILS 89 (*)     LYMPHOCYTES 7 (*)     MONOCYTES 3 (*)     ABS.  LYMPHOCYTES 0.4 (*)     All other components within normal limits   METABOLIC PANEL, COMPREHENSIVE - Abnormal; Notable for the following components:    Sodium 131 (*)     BUN 35 (*)     Creatinine 1.60 (*)     BUN/Creatinine ratio 22 (*)     GFR est AA 36 (*)     GFR est non-AA 30 (*)     Calcium 7.6 (*)     Bilirubin, total 1.1 (*)     Protein, total 5.8 (*)     Albumin 2.6 (*)     A-G Ratio 0.8 (*)     All other components within normal limits   NT-PRO BNP - Abnormal; Notable for the following components:    NT pro-BNP 5,118 (*)     All other components within normal limits   LIPASE - Abnormal; Notable for the following components:    Lipase 55 (*)     All other components within normal limits   URINALYSIS W/MICROSCOPIC - Abnormal; Notable for the following components:    Appearance CLOUDY (*)     Protein 100 (*)     Ketone TRACE (*)     All other components within normal limits   URINE CULTURE HOLD SAMPLE   COVID-19 RAPID TEST   CULTURE, BLOOD, PAIRED   CULTURE, BLOOD   TROPONIN-HIGH SENSITIVITY   MAGNESIUM   SAMPLES BEING HELD   BILIRUBIN, CONFIRM   LACTIC ACID       IMAGING RESULTS:  XR CHEST PORT   Final Result   No acute process. MEDICATIONS GIVEN:  Medications   lactated ringers bolus infusion 500 mL ( IntraVENous Canceled Entry 10/19/21 1315)   sodium chloride 0.9 % bolus infusion 1,000 mL (0 mL IntraVENous IV Completed 10/19/21 1226)   acetaminophen (TYLENOL) tablet 650 mg (650 mg Oral Given 10/19/21 1128)   0.9% sodium chloride infusion 1,000 mL (1,000 mL IntraVENous New Bag 10/19/21 1249)   vancomycin (VANCOCIN) 1500 mg in  ml infusion (1,500 mg IntraVENous New Bag 10/19/21 1318)       Differential diagnosis: UTI, fatigue, infection, sepsis, fever    ED physician interpretation of imaging: Initial chest x-ray without pulmonary edema, focal pneumonia. ED physician interpretation of EKG: No STEMI. Interpretation ED course above  ED physician interpretation of laboratory results: Elevated BNP but patient has a BNP of 4000 at baseline per previous visit. Mild ELADIA. MDM: Patient is a 80-year-old female presenting ED with vague symptoms of fatigue and weakness but no focality found to be febrile without leukocytosis or lactic acidosis with stable vital signs initially but then became hypotensive requiring pressors and hospital admission to the ICU for further treatment and evaluation. Patient's vital signs initially stable. Pressures then became hypotensive requiring pressors and additional fluids as well as central line placement.     Patient is urine without signs of infection, chest x-ray unremarkable. Suspect bacteremia at this time with no other source. Patient admitted to the ICU for further treatment and management. DISPOSITION: Admitted    [address] for Admission  10:53 AM    ED Room Number: 7380/54  Patient Name and age:  Enmanuel Nova 80 y.o.  female  Working Diagnosis:   1. ELADIA (acute kidney injury) ([facility] [address]    2. Fatigue, unspecified type    3. Fever, unspecified fever cause    4. Persistent atrial fibrillation ([facility] [address]        COVID-19 Suspicion:  no  Sepsis present:  yes  Reassessment needed: yes  Code Status:  Do Not Resuscitate, per son is medical POA  Readmission: no  Isolation Requirements:  no  Recommended Level of Care:  med/surg  Department:  [facility] ED - [phone]    Other: Patient presenting on day 3 of fatigue. Fever to 103 rectally. Per nursing urine appears infected however bacteria results still pending. Blood cultures and lactic ordered. Holding antibiotics until UA is complete. Total critical care time spent exclusive of procedures:  40 minutes. Trenton Hansen. Racquel Toledo MD        Central Line    Date/Time: 10/19/2021 1:13 PM  Performed by: Leticia Rizzo MD  Authorized by: Leticia Rizzo MD     Consent:     Consent obtained:  Verbal    Consent given by:  Patient    Risks discussed:  Bleeding, infection and nerve damage    Alternatives discussed:  No treatment, delayed treatment and alternative treatment  Pre-procedure details:     Hand hygiene: Hand hygiene performed prior to insertion      Sterile barrier technique: All elements of maximal sterile technique followed      Skin preparation:  2% chlorhexidine    Skin preparation agent: Skin preparation agent completely dried prior to procedure    Anesthesia (see MAR for exact dosages):      Anesthesia method:  Local infiltration    Local anesthetic:  Lidocaine 1% WITH epi  Procedure details:     Location:  R internal jugular    Patient position:  Trendelenburg    Procedural supplies:  Triple lumen Catheter size:  7.5 Fr    Landmarks identified: yes      Ultrasound guidance: yes      Sterile ultrasound techniques: Sterile gel and sterile probe covers were used      Number of attempts:  1    Successful placement: yes    Post-procedure details:     Post-procedure:  Dressing applied and line sutured    Assessment:  Blood return through all ports, free fluid flow, no pneumothorax on x-ray and placement verified by x-ray    Patient tolerance of procedure:   Tolerated well, no immediate complications    Critical Care  Performed by: Kadie Wade MD  Authorized by: Kadie Wade MD     Critical care provider statement:     Critical care time (minutes):  40    Critical care start time:  10/19/2021 10:30 AM    Critical care end time:  10/19/2021 12:50 PM    Critical care time was exclusive of:  Separately billable procedures and treating other patients and teaching time    Critical care was necessary to treat or prevent imminent or life-threatening deterioration of the following conditions:  Sepsis    Critical care was time spent personally by me on the following activities:  Development of treatment plan with patient or surrogate, evaluation of patient's response to treatment, discussions with consultants, examination of patient, interpretation of cardiac output measurements, obtaining history from patient or surrogate, ordering and performing treatments and interventions, ordering and review of laboratory studies, ordering and review of radiographic studies, pulse oximetry, re-evaluation of patient's condition and review of old charts    I assumed direction of critical care for this patient from another provider in my specialty: no

## 2021-10-20 ENCOUNTER — APPOINTMENT (OUTPATIENT)
Dept: NON INVASIVE DIAGNOSTICS | Age: 86
DRG: 871 | End: 2021-10-20
Attending: INTERNAL MEDICINE
Payer: MEDICARE

## 2021-10-20 LAB
ALBUMIN SERPL-MCNC: 2.2 G/DL (ref 3.5–5)
ALBUMIN/GLOB SERPL: 0.7 {RATIO} (ref 1.1–2.2)
ALP SERPL-CCNC: 85 U/L (ref 45–117)
ALT SERPL-CCNC: 14 U/L (ref 12–78)
ANION GAP SERPL CALC-SCNC: 9 MMOL/L (ref 5–15)
AST SERPL-CCNC: 32 U/L (ref 15–37)
AV R PG: 23.07 MMHG
BASOPHILS # BLD: 0 K/UL (ref 0–0.1)
BASOPHILS NFR BLD: 0 % (ref 0–1)
BILIRUB SERPL-MCNC: 0.9 MG/DL (ref 0.2–1)
BUN SERPL-MCNC: 34 MG/DL (ref 6–20)
BUN/CREAT SERPL: 28 (ref 12–20)
CALCIUM SERPL-MCNC: 7.2 MG/DL (ref 8.5–10.1)
CHLORIDE SERPL-SCNC: 102 MMOL/L (ref 97–108)
CO2 SERPL-SCNC: 21 MMOL/L (ref 21–32)
CREAT SERPL-MCNC: 1.23 MG/DL (ref 0.55–1.02)
DIFFERENTIAL METHOD BLD: ABNORMAL
ECHO AR MAX VEL PISA: 240.17 CM/S
ECHO AV ANNULUS DIAM: 4.21 CM
ECHO AV AREA PEAK VELOCITY: 2.18 CM2
ECHO AV AREA/BSA PEAK VELOCITY: 1.3 CM2/M2
ECHO AV PEAK GRADIENT: 4.13 MMHG
ECHO AV PEAK VELOCITY: 101.64 CM/S
ECHO AV REGURGITANT PHT: 651.24 MS
ECHO IVC PROX: 1.59 CM
ECHO LA AREA 4C: 10.32 CM2
ECHO LA MAJOR AXIS: 3.43 CM
ECHO LA MINOR AXIS: 1.98 CM
ECHO LA VOL 2C: 27.26 ML (ref 22–52)
ECHO LA VOL 4C: 17.17 ML (ref 22–52)
ECHO LA VOL BP: 26.84 ML (ref 22–52)
ECHO LA VOL/BSA BIPLANE: 15.51 ML/M2 (ref 16–28)
ECHO LA VOLUME INDEX A2C: 15.76 ML/M2 (ref 16–28)
ECHO LA VOLUME INDEX A4C: 9.92 ML/M2 (ref 16–28)
ECHO LV E' LATERAL VELOCITY: 10.04 CM/S
ECHO LV E' SEPTAL VELOCITY: 8.97 CM/S
ECHO LV EDV A2C: 57.02 ML
ECHO LV EDV A4C: 68.47 ML
ECHO LV EDV BP: 63.06 ML (ref 56–104)
ECHO LV EDV INDEX A4C: 39.6 ML/M2
ECHO LV EDV INDEX BP: 36.5 ML/M2
ECHO LV EDV NDEX A2C: 33 ML/M2
ECHO LV EJECTION FRACTION A2C: 58 PERCENT
ECHO LV EJECTION FRACTION A4C: 50 PERCENT
ECHO LV EJECTION FRACTION BIPLANE: 54.5 PERCENT (ref 55–100)
ECHO LV ESV A2C: 23.8 ML
ECHO LV ESV A4C: 34.42 ML
ECHO LV ESV BP: 28.68 ML (ref 19–49)
ECHO LV ESV INDEX A2C: 13.8 ML/M2
ECHO LV ESV INDEX A4C: 19.9 ML/M2
ECHO LV ESV INDEX BP: 16.6 ML/M2
ECHO LV INTERNAL DIMENSION DIASTOLIC: 4.75 CM (ref 3.9–5.3)
ECHO LV INTERNAL DIMENSION SYSTOLIC: 3.38 CM
ECHO LV IVSD: 0.77 CM (ref 0.6–0.9)
ECHO LV MASS 2D: 117.5 G (ref 67–162)
ECHO LV MASS INDEX 2D: 67.9 G/M2 (ref 43–95)
ECHO LV POSTERIOR WALL DIASTOLIC: 0.76 CM (ref 0.6–0.9)
ECHO LVOT CARDIAC OUTPUT: 7.24 LITER/MINUTE
ECHO LVOT DIAM: 1.97 CM
ECHO LVOT PEAK GRADIENT: 2.1 MMHG
ECHO LVOT PEAK VELOCITY: 72.49 CM/S
ECHO LVOT SV: 48.2 ML
ECHO LVOT VTI: 15.76 CM
ECHO MV E DECELERATION TIME (DT): 157.39 MS
ECHO MV E VELOCITY: 92.49 CM/S
ECHO MV E/E' LATERAL: 9.21
ECHO MV E/E' RATIO (AVERAGED): 9.76
ECHO MV E/E' SEPTAL: 10.31
ECHO PV MAX VELOCITY: 73.21 CM/S
ECHO PV PEAK INSTANTANEOUS GRADIENT SYSTOLIC: 2.2 MMHG
ECHO RV INTERNAL DIMENSION: 3.18 CM
ECHO RV TAPSE: 0.92 CM (ref 1.5–2)
ECHO TV REGURGITANT MAX VELOCITY: 243.58 CM/S
ECHO TV REGURGITANT MAX VELOCITY: 482.4 CM/S
ECHO TV REGURGITANT PEAK GRADIENT: 23.78 MMHG
EOSINOPHIL # BLD: 0 K/UL (ref 0–0.4)
EOSINOPHIL NFR BLD: 0 % (ref 0–7)
ERYTHROCYTE [DISTWIDTH] IN BLOOD BY AUTOMATED COUNT: 13.7 % (ref 11.5–14.5)
GLOBULIN SER CALC-MCNC: 3 G/DL (ref 2–4)
GLUCOSE SERPL-MCNC: 151 MG/DL (ref 65–100)
HCT VFR BLD AUTO: 34.1 % (ref 35–47)
HGB BLD-MCNC: 11.3 G/DL (ref 11.5–16)
IMM GRANULOCYTES # BLD AUTO: 0 K/UL
IMM GRANULOCYTES NFR BLD AUTO: 0 %
LA VOL DISK BP: 22.96 ML (ref 22–52)
LVOT MG: 1 MMHG
LYMPHOCYTES # BLD: 0.7 K/UL (ref 0.8–3.5)
LYMPHOCYTES NFR BLD: 6 % (ref 12–49)
MCH RBC QN AUTO: 31.7 PG (ref 26–34)
MCHC RBC AUTO-ENTMCNC: 33.1 G/DL (ref 30–36.5)
MCV RBC AUTO: 95.8 FL (ref 80–99)
MONOCYTES # BLD: 0.9 K/UL (ref 0–1)
MONOCYTES NFR BLD: 8 % (ref 5–13)
NEUTS BAND NFR BLD MANUAL: 13 % (ref 0–6)
NEUTS SEG # BLD: 9.5 K/UL (ref 1.8–8)
NEUTS SEG NFR BLD: 73 % (ref 32–75)
NRBC # BLD: 0 K/UL (ref 0–0.01)
NRBC BLD-RTO: 0 PER 100 WBC
PLATELET # BLD AUTO: 52 K/UL (ref 150–400)
PMV BLD AUTO: 11 FL (ref 8.9–12.9)
POTASSIUM SERPL-SCNC: 3.6 MMOL/L (ref 3.5–5.1)
PROT SERPL-MCNC: 5.2 G/DL (ref 6.4–8.2)
RBC # BLD AUTO: 3.56 M/UL (ref 3.8–5.2)
RBC MORPH BLD: ABNORMAL
SODIUM SERPL-SCNC: 132 MMOL/L (ref 136–145)
TROPONIN-HIGH SENSITIVITY: 31 NG/L (ref 0–51)
TROPONIN-HIGH SENSITIVITY: 33 NG/L (ref 0–51)
VANCOMYCIN SERPL-MCNC: 7.4 UG/ML
WBC # BLD AUTO: 11.1 K/UL (ref 3.6–11)

## 2021-10-20 PROCEDURE — 74011250636 HC RX REV CODE- 250/636: Performed by: INTERNAL MEDICINE

## 2021-10-20 PROCEDURE — 97530 THERAPEUTIC ACTIVITIES: CPT

## 2021-10-20 PROCEDURE — 84484 ASSAY OF TROPONIN QUANT: CPT

## 2021-10-20 PROCEDURE — 93306 TTE W/DOPPLER COMPLETE: CPT

## 2021-10-20 PROCEDURE — 74011000258 HC RX REV CODE- 258: Performed by: INTERNAL MEDICINE

## 2021-10-20 PROCEDURE — 85025 COMPLETE CBC W/AUTO DIFF WBC: CPT

## 2021-10-20 PROCEDURE — 74011000250 HC RX REV CODE- 250: Performed by: INTERNAL MEDICINE

## 2021-10-20 PROCEDURE — 80053 COMPREHEN METABOLIC PANEL: CPT

## 2021-10-20 PROCEDURE — 93306 TTE W/DOPPLER COMPLETE: CPT | Performed by: STUDENT IN AN ORGANIZED HEALTH CARE EDUCATION/TRAINING PROGRAM

## 2021-10-20 PROCEDURE — 65610000006 HC RM INTENSIVE CARE

## 2021-10-20 PROCEDURE — 74011250637 HC RX REV CODE- 250/637: Performed by: INTERNAL MEDICINE

## 2021-10-20 PROCEDURE — 97165 OT EVAL LOW COMPLEX 30 MIN: CPT

## 2021-10-20 PROCEDURE — 51798 US URINE CAPACITY MEASURE: CPT

## 2021-10-20 PROCEDURE — APPSS30 APP SPLIT SHARED TIME 16-30 MINUTES: Performed by: NURSE PRACTITIONER

## 2021-10-20 PROCEDURE — 77030038269 HC DRN EXT URIN PURWCK BARD -A

## 2021-10-20 PROCEDURE — 80202 ASSAY OF VANCOMYCIN: CPT

## 2021-10-20 PROCEDURE — 77030019905 HC CATH URETH INTMIT MDII -A

## 2021-10-20 PROCEDURE — 77030005513 HC CATH URETH FOL11 MDII -B

## 2021-10-20 PROCEDURE — 97162 PT EVAL MOD COMPLEX 30 MIN: CPT

## 2021-10-20 PROCEDURE — 99231 SBSQ HOSP IP/OBS SF/LOW 25: CPT | Performed by: INTERNAL MEDICINE

## 2021-10-20 PROCEDURE — 36415 COLL VENOUS BLD VENIPUNCTURE: CPT

## 2021-10-20 RX ORDER — MELOXICAM 7.5 MG/1
7.5 TABLET ORAL DAILY
Status: DISCONTINUED | OUTPATIENT
Start: 2021-10-20 | End: 2021-10-29

## 2021-10-20 RX ORDER — LEVOTHYROXINE SODIUM 112 UG/1
112 TABLET ORAL
Status: DISCONTINUED | OUTPATIENT
Start: 2021-10-21 | End: 2021-10-30 | Stop reason: HOSPADM

## 2021-10-20 RX ORDER — DICLOFENAC SODIUM 10 MG/G
2 GEL TOPICAL
Status: DISCONTINUED | OUTPATIENT
Start: 2021-10-20 | End: 2021-10-30 | Stop reason: HOSPADM

## 2021-10-20 RX ADMIN — Medication 10 ML: at 05:36

## 2021-10-20 RX ADMIN — Medication 10 ML: at 22:02

## 2021-10-20 RX ADMIN — APIXABAN 2.5 MG: 2.5 TABLET, FILM COATED ORAL at 08:07

## 2021-10-20 RX ADMIN — NOREPINEPHRINE BITARTRATE 7 MCG/MIN: 1 INJECTION, SOLUTION, CONCENTRATE INTRAVENOUS at 04:46

## 2021-10-20 RX ADMIN — CEFEPIME HYDROCHLORIDE 2 G: 2 INJECTION, POWDER, FOR SOLUTION INTRAVENOUS at 20:34

## 2021-10-20 RX ADMIN — Medication 10 ML: at 13:18

## 2021-10-20 RX ADMIN — ACETAMINOPHEN 650 MG: 325 TABLET ORAL at 16:07

## 2021-10-20 RX ADMIN — MELOXICAM 7.5 MG: 7.5 TABLET ORAL at 14:32

## 2021-10-20 RX ADMIN — AMIODARONE HYDROCHLORIDE 100 MG: 200 TABLET ORAL at 08:07

## 2021-10-20 RX ADMIN — VANCOMYCIN HYDROCHLORIDE 1000 MG: 1 INJECTION, POWDER, LYOPHILIZED, FOR SOLUTION INTRAVENOUS at 15:57

## 2021-10-20 NOTE — PROGRESS NOTES
10/20/2021   3:10 PM  Pt's son Chuy Lee at bedside, CM met and confirmed assessment  information as previously w/ pt. Chuy Lee did confirm pt had previously been to the [address] of [address] [place] [address] for rehab. He has not discussed long term needs for pt's living situation/support system, he was not willing to discuss this now as he is awaiting results from current tests. CM will continue to follow and assist.  ANY [place]acon       1:12 PM  CM completed assessment w/ pt in person, CM wore mask and goggles at all times. Charted demographics verified, pt lives alone in 1st floor apartment home, there are no entry steps, pt denies any falls in last 6 months. At baseline pt reports to be ambulatory w/ Rollator , independent w/ ADLs and relies on son Pratik Lema for transportation, she states Chuy Lee is her only family and helps her w/ grocery shopping, she manages her own medications. Reason for Admission:   Emergent for weakness  Pt is a 79 yo  female who [facility] ED c/o weakness  PMHx; anemia, arthritis, arhythmia, CAD, HTN, hyperlipidemia, hypothyroid,thoracic aneurysm without mention of rupture                  RUR Score:     15 % Moderate Risk of readmission             PCP: First and Last name:   Crissy Mayer MD     Name of Practice:    Are you a current patient: Yes/No: yes    Approximate date of last visit: > 30 days   Can you participate in a virtual visit if needed: Yes    Do you (patient/family) have any concerns for transition/discharge?      Pt lives alone w/ limited support from stacy Solis December for utilizing home health:   PT, OT juan miguel pending, pt previously had [facility], has had rehab admission, bu  Cannot recall where  DME: rollator, raised toilet seat, pt does not have home O2  Rx: [employer], pt uses [facility] [employer], reports no difficulty affording her medications  COVID Vax Hx: Audie Early, fully vaccinated  Current Advanced Directive/Advance Care Plan:  DNR      Healthcare Decision Maker:   Click here to complete [address] including selection of the Healthcare Decision Maker Relationship (ie \"Primary\")            Primary Decision MakerBerkley Mukherjee - [phone]    Transition of Care Plan:        RUR 15 % Moderate Risk of readmission  LOS 1 Day  1. Hospital admission for medical management   2. Cardiology consult  3. PT/OT evals  4. CM to follow through for treatment/response  5. DC when stable, dispo SNF vs home w/ HH; PTA pt lived alone, iADLS, ambulatory w/ rollator   6. Outpatient f/u PCP, specialists  7. Transport TBD pending progress  8. CM will continue to follow and assist w/ DC plan  Care Management Interventions  PCP Verified by CM: Yes Cindy Valadez MD, last visit > 30 days)  Palliative Care Criteria Met (RRAT>21 & CHF Dx)?: No  Mode of Transport at Discharge:  Other (see comment) (TBD)  Transition of Care Consult (CM Consult): Discharge Planning  Physical Therapy Consult: Yes  Occupational Therapy Consult: Yes  Speech Therapy Consult: No  Support Systems: Child(lynette) (pt lives alone in pvt residence, at baseline pt is ambualtory w/ rolalotr, iADLS, relies on family for transport)  Confirm Follow Up Transport: Family  Discharge Location  Discharge Placement: Skilled nursing facility  Christiano Petersen

## 2021-10-20 NOTE — PROGRESS NOTES
[facility] Pharmacy Dosing Services: Antimicrobial Stewardship Daily Doc    Consult for antibiotic dosing of Vancomycin by Dr. Rosalie Ann  Indication: Sepsis of unknown etiology in penicillin allergic 81 yo female transferred from home. Day of Therapy: 2    Ht Readings from Last 1 Encounters:   10/20/21 170.2 cm (67\")        Wt Readings from Last 1 Encounters:   10/20/21 63 kg (138 lb 14.2 oz)        Vancomycin:  Loading dose: 1500mg (24mg/kg) IV x 1 dose in ED ~1300, 10/19/21  Maintenance dose: Dosing per random levels given CrCl < 29ml/min    Plan:   Scr improved. Urine output 1200 ml  Will check level at 1300 today (24 hrs from the loading dose)  Target Trough: </= 15 mcg/ml        Date Dose & Interval Measured (mcg/mL) Extrapolated (mcg/mL)   ? ? ? ?   ? ? ? ?   ? ? ? ? Cefepime:   Cefepime 2 gm IV Q24h    Other Antimicrobial   (not dosed by pharmacist)    Cultures 10/19 Blood - NGsf  10/19  COVID rapid - Negative    Procalcitonin 12.8 on 10/19/21   Serum Creatinine Lab Results   Component Value Date/Time    Creatinine 1.23 (H) 10/20/2021 02:48 AM    Creatinine (POC) 1.1 02/16/2019 05:38 PM         Creatinine Clearance Estimated Creatinine Clearance: 27.8 mL/min (A) (based on SCr of 1.23 mg/dL (H)). Temp Temp: 97.3 °F (36.3 °C)       WBC Lab Results   Component Value Date/Time    WBC 11.1 (H) 10/20/2021 02:48 AM        Procalcitonin Lab Results   Component Value Date/Time    Procalcitonin 12.80 10/19/2021 03:42 PM        For Antifungals, Metronidazole and Nafcillin: Lab Results   Component Value Date/Time    ALT (SGPT) 14 10/20/2021 02:48 AM    AST (SGOT) 32 10/20/2021 02:48 AM    Alk.  phosphatase 85 10/20/2021 02:48 AM    Bilirubin, total 0.9 10/20/2021 02:48 AM        Pharmacist Carolyn [facility], PHARMD

## 2021-10-20 NOTE — PROGRESS NOTES
Javid Blum [facility]  [address] [facility], [place], [address] [place] [address]  [phone]      Medical Progress Note      NAME:         Meseret Montero   :        1928  MRM:        [id number]    Date of service: 10/20/2021      Subjective: Patient has been seen and examined as a follow up for multiple medical issues. Chart, labs, diagnostics reviewed. She remains weak, still on vasopressors. No cough or fever. No chills. Discussed with her nurse    Objective:    Vital Signs:    Visit Vitals  BP (!) 105/54   Pulse 84   Temp 97.5 °F (36.4 °C)   Resp 20   Ht 5' 7\" (1.702 m)   Wt 63 kg (138 lb 14.2 oz)   SpO2 95%   BMI 21.75 kg/m²          Intake/Output Summary (Last 24 hours) at 10/20/2021 1322  Last data filed at 10/20/2021 1200  Gross per 24 hour   Intake 850.34 ml   Output 1100 ml   Net -249.66 ml        Physical Examination:    General:   Weak and remains critically ill. Not in distress   Eyes:   pink conjunctivae, PERRLA with no discharge. ENT:   no ottorrhea or rhinorrhea with dry mucous membranes  Neck: no masses, thyroid non-tender and trachea central.  Pulm:  no accessory muscle use, decreased breath sounds with scattered crackles. No wheezes  Card:  no JVD or murmurs, has regular and normal S1, S2 without thrills, bruits or peripheral edema  Abd:  Soft, non-tender, non-distended, normoactive bowel sounds with no palpable organomegaly  Musc:  No cyanosis, clubbing, atrophy or deformities. Skin:  No rashes, bruising or ulcers. Neuro: Awake and alert. Decreased hearing.  Generally a non focal exam. Follows commands appropriately  Psych:  Has a fair insight to her illness     Current Facility-Administered Medications   Medication Dose Route Frequency    sodium chloride (NS) flush 5-40 mL  5-40 mL IntraVENous Q8H    sodium chloride (NS) flush 5-40 mL  5-40 mL IntraVENous PRN    acetaminophen (TYLENOL) tablet 650 mg  650 mg Oral Q6H PRN    Or    acetaminophen (TYLENOL) suppository 650 mg  650 mg Rectal Q6H PRN    senna (SENOKOT) tablet 8.6 mg  1 Tablet Oral DAILY PRN    VANCOMYCIN - Pharmacy dosing per random levels. Look for one time orders. Other Rx Dosing/Monitoring    NOREPINephrine (LEVOPHED) 8 mg in 5% dextrose 250mL (32 mcg/mL) infusion  0.5-16 mcg/min IntraVENous TITRATE    cefepime (MAXIPIME) 2 g in sterile water (preservative free) 10 mL IV syringe  2 g IntraVENous Q24H    [Held by provider] apixaban (ELIQUIS) tablet 2.5 mg  2.5 mg Oral BID    amiodarone (CORDARONE) tablet 100 mg  100 mg Oral DAILY        Laboratory data and review:    Recent Labs     10/20/21  0248 10/19/21  1036   WBC 11.1* 6.1   HGB 11.3* 11.4*   HCT 34.1* 34.2*   PLT 52* 64*     Recent Labs     10/20/21  0248 10/19/21  1036   * 131*   K 3.6 3.8    99   CO2 21 24   * 95   BUN 34* 35*   CREA 1.23* 1.60*   CA 7.2* 7.6*   MG  --  1.9   ALB 2.2* 2.6*   ALT 14 13     No components found for: [place]    Diagnostics: Imaging studies have been reviewed    Telemetry reviewed by me:   normal sinus rhythm    Assessment and Plan:    Shock ([facility] (10/19/2021) POA: unclear as to the etiology but DDx include hypovolemia vs septic given her fever vs other. CXR is clear. UA not impressive. Has bandemia today. Lactic acid is normal. Rapid CoV-19 is neg. RVP neg. Blood cultures neg. CT abdomen concerning for pyelonephritis. Echo showed a normal LV function. Continue IV Cefepime and Vancomycin (given her allergies). Wean down IV Levophed to keep MAP > 65. Monitor clinical progress       ELADIA (acute kidney injury) ([facility] (10/19/2021) / Hyponatremia (10/19/2021) / CKD (chronic kidney disease) stage 3, GFR 30-59 ml/min ([facility]) (10/19/2021) POA: likely from volume depletion vs from sepsis.  Continue to gently hydrate and follow progress      CAD (coronary artery disease) /  Ventricular tachycardia ([facility] (2/16/2019) / Persistent atrial fibrillation (HCC) POA: son says she has an ICD. Echo showed a normal LV function. Troponin neg. Seen by cardiology and no further testing recommended at this time. Continue Amiodarone. Eliquis on hold due to thrombocytopenia      Thrombocytopenia ([address] (10/21/2020) POA: unclear etiology but likely due to suspected sepsis. Continue to monitor     Anxiety POA: hold home medications      Hypothyroidism POA: TSH is normal. Resume Levothyroxine      Generalized weakness (10/19/2021) POA: due to current illness.  Will need PT, OT when feasible    Total time spent for the patient's care: [address] discussed with: Patient, Care Manager and Nursing Staff    Discussed:  Care Plan and D/C Planning    Prophylaxis:  Eliquis    Anticipated Disposition:  SNF/LTC           ___________________________________________________    Attending Physician:   Maite Mcmanus MD

## 2021-10-20 NOTE — PROGRESS NOTES
CARDIOLOGY PROGRESS NOTE        [address] [place] [address]., [address], [place], [address] [facility]  Phone [phone]; Fax [phone]          10/20/2021 9:53 AM       Admit Date:           10/19/2021  Admit Diagnosis:  Generalized weakness [R53.1]  :          1928   MRN:          [id number]        Assessment/Plan  1. Sepsis/generalized weakness: on IV abx , levophed. 2.  Hx of  VT arrest status post upgrade of the pacemaker to ICD on 2019. ECHO today shows normal EF, no WMA. 3. History of atrial fibrillation-on atenolol/Eliquis. Eliquis PTA-continue if no contraindications   4. Hx HTN: OP meds on hold given need for pressors   5. Nonobstructive mild CAD by cardiac cath-2019     Will sign off and see prn. Please call if needed. Pt personally seen and examined. Chart reviewed. Agree with advanced NP's history, exam and  A/P with changes/additons. Blood pressure (P) 115/73, pulse (P) 90, temperature 97.3 °F (36.3 °C), resp. rate 23, height 5' 7\" (1.702 m), weight 138 lb 14.2 oz (63 kg), SpO2 (P) 94 %. CVS-S1-S2 present, Irr, 2/6 systolic murmur present      A/P -  Sepsis/Shock  Afib with CVR- on anticoagulation/atenolol  HTN  S/p AICD - hx of VT arrest    Discussed with patient/nursing/family    Jocelin Arana MD, [facility]          We discussed the expected course, resolution and complications of the diagnosis(es) in detail. 10/20 0701 - 10/20 1900  In: 18.8 [I.V.:18.8]  Out: 0     Last 3 Recorded Weights in this Encounter    10/19/21 1011 10/20/21 0740   Weight: 63 kg (139 lb) 63 kg (138 lb 14.2 oz)         10/18 1901 - 10/20 07  In: 1794.9 [P.O.:500; I.V.:1294.9]  Out: 1200 [Urine:1200]    SUBJECTIVE      Patient  is a 80 y.o. female admitted for generalized weakness/failure to thrive. Poor historian. History obtained from chart. High-sensitivity troponin 29   Creatinine 1.6. NT proBNP 5118  Chest x-ray-NAD    ECHO in process. Juan Francisco Hernandez unable to obtain 2/2 weakness. Current Facility-Administered Medications   Medication Dose Route Frequency    Vancomycin - Please draw RANDOM Vancomycin level around 1300 today. Thank you!!  1 Each Other ONCE    sodium chloride (NS) flush 5-40 mL  5-40 mL IntraVENous Q8H    sodium chloride (NS) flush 5-40 mL  5-40 mL IntraVENous PRN    acetaminophen (TYLENOL) tablet 650 mg  650 mg Oral Q6H PRN    Or    acetaminophen (TYLENOL) suppository 650 mg  650 mg Rectal Q6H PRN    senna (SENOKOT) tablet 8.6 mg  1 Tablet Oral DAILY PRN    VANCOMYCIN - Pharmacy dosing per random levels. Look for one time orders. Other Rx Dosing/Monitoring    NOREPINephrine (LEVOPHED) 8 mg in 5% dextrose 250mL (32 mcg/mL) infusion  0.5-16 mcg/min IntraVENous TITRATE    cefepime (MAXIPIME) 2 g in sterile water (preservative free) 10 mL IV syringe  2 g IntraVENous Q24H    apixaban (ELIQUIS) tablet 2.5 mg  2.5 mg Oral BID    amiodarone (CORDARONE) tablet 100 mg  100 mg Oral DAILY      OBJECTIVE               Intake/Output Summary (Last 24 hours) at 10/20/2021 0953  Last data filed at 10/20/2021 0800  Gross per 24 hour   Intake 1813.69 ml   Output 1200 ml   Net 613.69 ml       Review of Systems - History obtained from the patient AS PER  HPI        PHYSICAL EXAM        Visit Vitals  BP (!) 98/48   Pulse 84   Temp 97.3 °F (36.3 °C)   Resp 25   Ht 5' 7\" (1.702 m)   Wt 63 kg (138 lb 14.2 oz)   SpO2 94%   BMI 21.75 kg/m²       Gen: Elderly,  in no acute distress  alert and oriented x 3  HEENT:  Pink conjunctivae, Hearing grossly normal.No scleral icterus or conjunctival, oral mucosa dry   Neck: Supple,  No JVD, No Carotid Bruit, Thyroid- non tender No cervical lymphadenopathy  Resp: No accessory muscle use, Clear breath sounds, No rales or rhonchi  Card: Regular Rate,Rythm,Normal S1, S2, 2/6 systolic murmur, rubs or gallop. No thrills.    MSK: No cyanosis or clubbing, good capillary refill  Skin: No rashes or ulcers, no bruising  Neuro:  Moving all four extremities, no focal deficit, follows commands appropriately  Psych:  Good insight, oriented to person, place and time, alert, Nml Affect  LE: No edema       DATA REVIEW      2/22/2019: Upgrade of dual chamber pacemaker to a dual-chamber [employer] ICD for secondary prevention    2/19/19 cath: L Main: Med; Nml LAD: Med; Mid 30% tandem lesion; Small vessel distally; D1/D2 - MLI LCflex: Med; Prox 30%; OM1 - MLI RCA: Med to Large; Mid - 50% lesion; PDA and PLB - med; MLI LVEDP: 12     6/16/16 stress EF 61% no ischemia  6/24/11 stress EF 71% no ischemia    2/10/17 DCCV a fib > SR 1 shock 150 joules    9/14/16 dual chamber PPM, medtronic    2/18/16 ECHO EF 65% mild/mod hypertrophy, mild/mod MR/TR  6/28/12: ECHO EF 60% mild AI, MR    Chest CT 3/12/12 aneurysmal dilation of ascending thoracic aorta 4.4 cm in greatest diameter Proximal descending aorta 4.2 cm in greatest diameter. Mod calcified plaque   involving LAD, and prox cflex    Cardiac monitor: paced         Laboratory and Imaging have been reviewed by me and are notable for  No results for input(s): CPK, CKMB, TROIQ in the last 72 hours.   Recent Labs     10/20/21  0248 10/19/21  1036   * 131*   K 3.6 3.8   CO2 21 24   BUN 34* 35*   CREA 1.23* 1.60*   * 95   MG  --  1.9   WBC 11.1* 6.1   HGB 11.3* 11.4*   HCT 34.1* 34.2*   PLT 52* 64*             Gonzales Ferrari, NP

## 2021-10-20 NOTE — PROGRESS NOTES
10/20/2021  9:45 AM  CM attempted to complete assessment w/ pt, requested I return later this AM, additionally attempted to contact pt's son Alec Pinto [phone], no answer  Will attempt again  ANY Leger

## 2021-10-20 NOTE — PROGRESS NOTES
Spiritual Care Assessment/Progress Note  [address]      NAME: Ashli Nina      MRN: [id number]  AGE: 80 y.o.  SEX: female  Holiness Affiliation: Sikh   Language: English     10/20/2021     Total Time (in minutes): 30     Spiritual Assessment begun in [facility] INTENSIVE CARE through conversation with:         [x]Patient        [x] Family    [] Friend(s)        Reason for Consult: Initial/Spiritual assessment, critical care     Spiritual beliefs: (Please include comment if needed)     [x] Identifies with a charlie tradition:  Gnosticist          [] Supported by a charlie community:            [] Claims no spiritual orientation:           [] Seeking spiritual identity:                [] Adheres to an individual form of spirituality:           [] Not able to assess:                           Identified resources for coping:      [x] Prayer                               [] Music                  [] Guided Imagery     [x] Family/friends                 [] Pet visits     [] Devotional reading                         [] Unknown     [] Other:                                               Interventions offered during this visit: (See comments for more details)    Patient Interventions: Catharsis/review of pertinent events in supportive environment, Affirmation of emotions/emotional suffering, Normalization of emotional/spiritual concerns, Prayer (actual), Prayer (assurance of), Affirmation of charlie, Iconic (affirming the presence of God/Higher Power)     Family/Friend(s): End of life issues discussed, Initial Assessment, Catharsis/review of pertinent events in supportive environment, Life review/legacy, Normalization of emotional/spiritual concerns, Affirmation of emotions/emotional suffering     Plan of Care:     [] Support spiritual and/or cultural needs    [] Support AMD and/or advance care planning process      [] Support grieving process   [] Coordinate Rites and/or Rituals    [] Coordination with community clergy   [] No spiritual needs identified at this time   [] Detailed Plan of Care below (See Comments)  [] Make referral to Music Therapy  [] Make referral to Pet Therapy     [] Make referral to Addiction services  [] Make referral to [facility]  [] Make referral to Spiritual Care Partner  [] No future visits requested        [x] Follow up upon further referrals     Comments:   visited patient, Miss Zara Starks in the ICU, for critical care initial assessment. Patient appeared to be in much pain and voice that she was. Her son Ceasar Silverio was present by her bedside. Patient and son engaged  in conversation. Ceasar Silverio shared about their family, stating he is currently the only close family to patient. His father, and an only brother passed some years back. He lives very close to mom and tries to offer his best support. When  inquired from patient how she would like to be cutler[pported, she requested for prayer. Prayer was offered, in addition to calm words of comfort. Patient and son thanked  for the visit and prayer. Spiritual care is still available for support upon further referrals. Visited by: Shantanu Agarwal.    Paging Service: [phone])

## 2021-10-20 NOTE — PROGRESS NOTES
Problem: Mobility Impaired (Adult and Pediatric)  Goal: *Acute Goals and Plan of Care (Insert Text)  Description: FUNCTIONAL STATUS PRIOR TO ADMISSION: Patient was modified independent using a rollator for functional mobility. HOME SUPPORT PRIOR TO ADMISSION: The patient lived alone with no local support. Physical Therapy Goals  Initiated 10/20/2021  1. Patient will move from supine to sit and sit to supine  in bed with minimal assistance/contact guard assist within 7 day(s). 2.  Patient will transfer from bed to chair and chair to bed with minimal assistance/contact guard assist using the least restrictive device within 7 day(s). 3.  Patient will perform sit to stand with minimal assistance/contact guard assist within 7 day(s). 4.  Patient will ambulate with minimal assistance/contact guard assist for 25 feet with the least restrictive device within 7 day(s). Outcome: Not Met   PHYSICAL THERAPY EVALUATION  Patient: Spike Harmon (89 y.o. female)  Date: 10/20/2021  Primary Diagnosis: Generalized weakness [R53.1]        Precautions:   Fall (Per Critical Care note 10/19/21 MAP>65); ASSESSMENT  Based on the objective data described below, the patient presents with significantly reduced strength, balance, functional mobility, fear of movement, back pain, and overall impaired functional mobility in the setting of hospital admission for generalized weakness. PTA patient lived alone and was Quique for ambulation with rollator and able to dress, bathe herself independently. Patient reports difficulty getting out of bed beginning a few days ago prompting visit to the ED. Today patient complaining of significant back pain with bed mobility and when sitting EOB; she reports she has had severe arthritis in her back for \"a long time. \" Patient requires additional time and motivation to participate secondary to her fear of mobility and fear of eliciting her back pain.  Attempted log roll to avoid pressures on patient's back, however patient reporting pain with this too - she requires modAx2 to transfer supine > SEOB. Vitals stable and MAP > 65 with sitting edge of bed. Patient tolerates sitting EOB for approximately 5 minutes but is unwilling to progress mobility to standing. She scoots up laterally in bed maxAx2 and transfers back to supine with maxAx2. Recommend SNF level rehab upon d/c. Current Level of Function Impacting Discharge (mobility/balance): maxAx2 did not progress to ambulation    Functional Outcome Measure: The patient scored Total: 15/100 on the Barthel Index which is indicative of 85% impaired ability to care for basic self needs/dependency on others. Other factors to consider for discharge: pt lives alone and is far below her functional baseline of Quique     Patient will benefit from skilled therapy intervention to address the above noted impairments. PLAN :  Recommendations and Planned Interventions: bed mobility training, transfer training, gait training, therapeutic exercises, patient and family training/education, and therapeutic activities      Frequency/Duration: Patient will be followed by physical therapy:  5 times a week to address goals.     Recommendation for discharge: (in order for the patient to meet his/her long term goals)  Therapy up to 5 days/week in SNF setting    This discharge recommendation:  Has not yet been discussed the attending provider and/or case management    IF patient discharges home will need the following DME: to be determined (TBD)         SUBJECTIVE:   Patient stated I can't\" re: stand up    OBJECTIVE DATA SUMMARY:   HISTORY:    Past Medical History:   Diagnosis Date    Acute cystitis 05/23/2018    Anemia, unspecified     Anxiety     Arrhythmia     a fib    Arthritis     osteoarthritis, rheumatoid    Atrial fibrillation (HCC)     Dr. Nataliia Hall and Dr. John Mahoney  following    Autoimmune disease [facility])     sjogren disease Dr. Collin Cool    CAD (coronary artery disease)     HTN (hypertension)     Hyperlipidemia LDL goal < 100     Hypothyroid     Insomnia     Osteoarthritis     Sjogren's disease ([facility]     Thoracic aneurysm without mention of rupture      Past Surgical History:   Procedure Laterality Date    HX BIV-IMPLANTABLE CARDIOVERTER DEFIBRILLATOR      HX CHOLECYSTECTOMY      HX COLONOSCOPY  3/2014    Dr. Chaparro [facility] OTHER SURGICAL      hernia repairs    HX PARTIAL THYROIDECTOMY      HX JANNET AND BSO      HX VEIN STRIPPING      MT INSJ ELTRD CAR COLLIN SYS TM INSJ DFB/PM PLS GEN Left 2/22/2019    Lv Lead Placement performed by Hola Mendoza MD at [address] [facility] CATH LAB    MT INSJ/RPLCMT PERM DFB W/TRNSVNS LDS 1/DUAL [facility] Left 2/22/2019    upgrade PM to ICD-Bsx performed by Hola Mendoza MD at [facility] [facility] CATH LAB       Personal factors and/or comorbidities impacting plan of care: pt reports significant arthritis in her spine limiting her participation in therapy today    Home Situation  Home Environment: Private residence  Living Alone: Yes  Support Systems: Child(lynette) (pt lives alone in pvt residence, at baseline pt is ambualtory w/ rolalotr, iADLS, relies on family for transport)  Current DME Used/Available at Home: Lift chair, [address], rollator, [address] [facility] [address] chair  Tub or Shower Type: Shower    EXAMINATION/PRESENTATION/DECISION MAKING:   Critical Behavior:  Neurologic State: Alert  Orientation Level: Oriented to person, Disoriented to time, Oriented to situation, Oriented to place  Cognition: Follows commands, Appropriate for age attention/concentration     Hearing:     Skin:  all observed intact  Edema: none apparent   Range Of Motion:  AROM: Generally decreased, functional                       Strength:    Strength: Generally decreased, functional                                   Coordination:  Coordination: Generally decreased, functional  Vision:      Functional Mobility:  Bed Mobility:  Rolling:  Moderate assistance;Assist x2  Supine to Sit: Moderate assistance;Assist x2  Sit to Supine: Maximum assistance;Assist x2;Total assistance  Scooting: Maximum assistance;Assist x2  Transfers:      NT                       Balance:   Sitting: Impaired; With support  Sitting - Static: Fair (occasional); Prop sitting  Sitting - Dynamic: Not tested  Ambulation/Gait Training:      NT                                             Therapeutic Exercises:   Pt performs ankle pumps and hand opening and closing supine in bed in order to comply with recommendations to keep MAP > 65   Once these exercises are performed; MAP remains above 65 for entirety of therapy session - are reflected in flow sheet     Functional Measure:  Barthel Index:    Bathin  Bladder: 0  Bowels: 5  Groomin  Dressin  Feedin  Mobility: 0  Stairs: 0  Toilet Use: 0  Transfer (Bed to Chair and Back): 5  Total: 15/100       The Barthel ADL Index: Guidelines  1. The index should be used as a record of what a patient does, not as a record of what a patient could do. 2. The main aim is to establish degree of independence from any help, physical or verbal, however minor and for whatever reason. 3. The need for supervision renders the patient not independent. 4. A patient's performance should be established using the best available evidence. Asking the patient, friends/relatives and nurses are the usual sources, but direct observation and common sense are also important. However direct testing is not needed. 5. Usually the patient's performance over the preceding 24-48 hours is important, but occasionally longer periods will be relevant. 6. Middle categories imply that the patient supplies over 50 per cent of the effort. 7. Use of aids to be independent is allowed. Jef Parnell., Barthel, D.W. (3635). Functional evaluation: the Barthel Index. [address] [facility] (14)2. [place] LAUREL Waters, Nilay De La Rosa.Padmini.Bernarda, [facility] ().  Measuring the change indisability after inpatient rehabilitation; comparison of the responsiveness of the Barthel Index and Functional [place] Measure. Journal of Neurology, Neurosurgery, and Psychiatry, 66(4), 490-886. COMPA Stein.DARRION, JAYSON Miranda, & Amalia Hook M.A. (2004.) Assessment of post-stroke quality of life in cost-effectiveness studies: The usefulness of the Barthel Index and the EuroQoL-5D. Quality of Life Research, 15, 369-23        Physical Therapy Evaluation Charge Determination   History Examination Presentation Decision-Making   MEDIUM  Complexity : 1-2 comorbidities / personal factors will impact the outcome/ POC  MEDIUM Complexity : 3 Standardized tests and measures addressing body structure, function, activity limitation and / or participation in recreation  MEDIUM Complexity : Evolving with changing characteristics  MEDIUM Complexity : FOTO score of 26-74      Based on the above components, the patient evaluation is determined to be of the following complexity level: MEDIUM    Pain Rating:  Patient reporting significant back pain; discussed with RN    Activity Tolerance:   Poor and requires frequent rest breaks    After treatment patient left in no apparent distress:   Supine in bed, Call bell within reach, Bed / chair alarm activated, and Side rails x 3    COMMUNICATION/EDUCATION:   The patients plan of care was discussed with: Occupational therapist and Registered nurse. Fall prevention education was provided and the patient/caregiver indicated understanding. and Patient/family have participated as able in goal setting and plan of care.     Thank you for this referral.  Walda Carrel PT, DPT   Time Calculation: 25 mins

## 2021-10-20 NOTE — PROGRESS NOTES
[facility] Pharmacy Dosing Services: Antimicrobial Stewardship Daily Doc    Consult for antibiotic dosing of Vancomycin by Dr. Loida Stallworth  Indication: Sepsis of unknown etiology in penicillin allergic 79 yo female transferred from home. Day of Therapy: 2    Ht Readings from Last 1 Encounters:   10/20/21 170.2 cm (67\")        Wt Readings from Last 1 Encounters:   10/20/21 63 kg (138 lb 14.2 oz)        Vancomycin:  Loading dose: 1500mg (24mg/kg) IV x 1 dose in ED ~1300, 10/19/21  Maintenance dose: Dosing per random levels given CrCl < 29ml/min    Plan:   Scr improved. Urine output 1200 ml  24 hrs level 7.4 mcg/ml  Target Trough: </= 15 mcg/ml  Will give 15 mg/kg IV x1 now          Date Dose & Interval Measured (mcg/mL) Extrapolated (mcg/mL)   ? ? ? ?   ? ? ? ?   ? ? ? ? Cefepime:   Cefepime 2 gm IV Q24h    Other Antimicrobial   (not dosed by pharmacist)    Cultures 10/19 Blood - NGsf  10/19  COVID rapid - Negative    Procalcitonin 12.8 on 10/19/21   Serum Creatinine Lab Results   Component Value Date/Time    Creatinine 1.23 (H) 10/20/2021 02:48 AM    Creatinine (POC) 1.1 02/16/2019 05:38 PM         Creatinine Clearance Estimated Creatinine Clearance: 27.8 mL/min (A) (based on SCr of 1.23 mg/dL (H)). Temp Temp: 97.3 °F (36.3 °C)       WBC Lab Results   Component Value Date/Time    WBC 11.1 (H) 10/20/2021 02:48 AM        Procalcitonin Lab Results   Component Value Date/Time    Procalcitonin 12.80 10/19/2021 03:42 PM        For Antifungals, Metronidazole and Nafcillin: Lab Results   Component Value Date/Time    ALT (SGPT) 14 10/20/2021 02:48 AM    AST (SGOT) 32 10/20/2021 02:48 AM    Alk.  phosphatase 85 10/20/2021 02:48 AM    Bilirubin, total 0.9 10/20/2021 02:48 AM        Pharmacist [facility], PHARMD

## 2021-10-20 NOTE — PROGRESS NOTES
0700: Bedside and Verbal shift change report given to RONAK Reaves (oncoming nurse) by Brock Alcala RN (offgoing nurse). Report included the following information SBAR, Intake/Output, MAR, Recent Results and Cardiac Rhythm A fib + V paced. Primary Nurse Andrés Mcclellan and Brock Alcala RN performed a dual skin assessment on this patient No impairment noted  Luciano score is see flow sheet. 0800: Assessment completed. A/Ox4. Levo infusing at 5 for MAP > 65. Lung sounds diminished on room air. Assisted with bed pan. Purewick in place. 0815: Echo tech at bedside. 0900: Labs drawn and sent. 1000: Patient assisted with bed pan.   1100: Bladder scan completed and shows 406. Per Dr Mendel peralta to place russo for retention and follow nurse driven protocol. 1130: Russo placed using sterile technique with karan Carrasquillo RN  1200: Reassessment completed. 1300: Dr Jona Sterling at bedside. 1600: Reassessment completed. 1900: Bedside and Verbal shift change report given to Brock Alcala RN (oncoming nurse) by Julia Diallo RN (offgoing nurse). Report included the following information SBAR, Intake/Output, MAR, Recent Results and Cardiac Rhythm V paced/A fib.

## 2021-10-20 NOTE — PROGRESS NOTES
Progress Note  Date:10/20/2021       Room:[address] [place] [address]  Patient Name:Magdalena Ku     YOB: 1928     Age:89 y.o. Subjective      CC: no new complaints    24 HOUR: remains on NE at 5mcg. Getting TTE during eval.     Objective         Vitals Last 24 Hours:  TEMPERATURE:  Temp  Av.9 °F (37.2 °C)  Min: 97.3 °F (36.3 °C)  Max: 103.3 °F (39.6 °C)  RESPIRATIONS RANGE: Resp  Av.5  Min: 14  Max: 30  PULSE OXIMETRY RANGE: SpO2  Av.3 %  Min: 93 %  Max: 98 %  PULSE RANGE: Pulse  Av.7  Min: 56  Max: 85  BLOOD PRESSURE RANGE: Systolic (45KHU), RUPAL:899 , Min:63 , XPF:547   ; Diastolic (51DAZ), UZE:36, Min:23, Max:81    I/O (24Hr): Intake/Output Summary (Last 24 hours) at 10/20/2021 0941  Last data filed at 10/20/2021 0800  Gross per 24 hour   Intake 1813.69 ml   Output 1200 ml   Net 613.69 ml     Objective     Gen: awake, alert, interactive  HEENT: NCAT  Chest: symmetrical chest rise  CV: r/r/r  Abd: non-distended  Ext: no c/c/e  Neuro: moves all ext. No focal deficits. Labs/Imaging/Diagnostics    Labs:  CBC:  Recent Labs     10/20/21  0248 10/19/21  1036   WBC 11.1* 6.1   RBC 3.56* 3.56*   HGB 11.3* 11.4*   HCT 34.1* 34.2*   MCV 95.8 96.1   RDW 13.7 13.5   PLT 52* 64*     CHEMISTRIES:  Recent Labs     10/20/21  0248 10/19/21  1036   * 131*   K 3.6 3.8    99   CO2 21 24   BUN 34* 35*   CA 7.2* 7.6*   MG  --  1.9   PT/INR:No results for input(s): INR, INREXT in the last 72 hours. No lab exists for component: PROTIME  APTT:No results for input(s): APTT in the last 72 hours. LIVER PROFILE:  Recent Labs     10/20/21  0248 10/19/21  1036   AST 32 33   ALT 14 13     Lab Results   Component Value Date/Time    ALT (SGPT) 14 10/20/2021 02:48 AM    AST (SGOT) 32 10/20/2021 02:48 AM    Alk.  phosphatase 85 10/20/2021 02:48 AM    Bilirubin, direct CANCELED 2014 11:42 AM    Bilirubin, total 0.9 10/20/2021 02:48 AM       Imaging Last 24 Hours:  CT ABD PELV WO CONT    Result Date: 10/19/2021  EXAM: CT ABD PELV WO CONT INDICATION: fever, unclear source COMPARISON: CT abdomen and pelvis 2/5/2019 CONTRAST:  None. TECHNIQUE: Thin axial images were obtained through the abdomen and pelvis. Coronal and sagittal reformats were generated. Oral contrast was not administered. CT dose reduction was achieved through use of a standardized protocol tailored for this examination and automatic exposure control for dose modulation. The absence of intravenous contrast material reduces the sensitivity for evaluation of the vasculature and solid organs. FINDINGS: LOWER THORAX: Cardiomegaly. Coronary artery calcifications. Partially visualized cardiac ICD. Trace bilateral pleural effusions with associated atelectasis. Interlobular septal thickening, likely reflecting pulmonary edema. LIVER: Stable 1.6 cm hypodensity adjacent to falciform ligament, previously characterized as a cyst. BILIARY TREE: Cholecystectomy. CBD is not dilated. SPLEEN: Splenomegaly PANCREAS: No focal abnormality. ADRENALS: Unremarkable. KIDNEYS/URETERS: No calculus or hydronephrosis. Wall thickening involving the right renal pelvis and ureter with surrounding fat stranding and right perinephric fat stranding. STOMACH: Unremarkable. SMALL BOWEL: No dilatation or wall thickening. COLON: Diverticulosis. No dilatation or wall thickening. APPENDIX: Normal PERITONEUM: No ascites or pneumoperitoneum. RETROPERITONEUM: No lymphadenopathy. Extensive atherosclerosis without aortic aneurysm. REPRODUCTIVE ORGANS: Hysterectomy. URINARY BLADDER: No mass or calculus. BONES: No destructive bone lesion. Marked degenerative changes and dextroscoliosis in the lumbar spine. Marked right hip osteoarthritis. Partially visualized left total hip arthroplasty ABDOMINAL WALL: Evidence of prior right inguinal hernia repair. ADDITIONAL COMMENTS: N/A     1.   Wall thickening involving the right renal pelvis and ureter with surrounding fat stranding and right perinephric fat stranding, correlate for urinary tract infection and/or pyelonephritis. 2.  Pulmonary edema pattern in the lung bases with trace bilateral pleural effusions. XR CHEST PORT    Result Date: 10/19/2021  EXAM:  XR CHEST PORT INDICATION: CVC placement COMPARISON: Chest radiograph 10/19/2021 TECHNIQUE: Semiupright portable chest AP view FINDINGS: New right jugular approach central venous catheter terminating in the cavoatrial junction. Stable left pectoral ICD. Cardiomediastinal silhouette stably enlarged. Prominent vascular congestion similar to prior. No definite pleural effusions. No focal consolidation. No definite pneumothorax. New right jugular approach central venous catheter terminating in the cavoatrial junction. XR CHEST PORT    Result Date: 10/19/2021  INDICATION: weakness EXAM:  AP CHEST RADIOGRAPH COMPARISON: October 21, 2020 FINDINGS: AP portable view of the chest demonstrates left subclavian pacemaker. Heart size is normal. There is no edema, effusion, consolidation, or pneumothorax. The osseous structures are unremarkable. No acute process. Assessment//Plan     79 y/o with hx a-fib, Sjogren, CAD, HTN, HLD and hypothyroid admitted admitted 10/19 with hypotension and weakness. Shock:  -- etiology unclear  -- infectious w/u unrevealing so far  -- UA neg, BC pending.    -- CXR without clear pneumonia  -- TTE pending  -- ? Hypovolemia and ELADIA in setting of taking BP meds. Potentially taking too much BP med accidentally? Sepsis:  -- started on broad empiric abx, but WBC normal on admission and no clear source. Changed 10/19 from vanc/bella to vanc/cefepime  -- low threshold to de-escalate. -- WBC elevated 10/20 and bands increased but could just be stress response. -- CT with pyleo but no flank pain, fever and no WBC on admit. Also UA neg.   Unusual to have infection and none of those signs/sxs    ELADIA:  -- suspect pre-renal/ATN  -- avoid nephrtoxins  -- no acute indication for renal replacement. Thrombocytopenia:  -- currently being attributed to sepsis but low threshold for further w/u if this does not seem to be the issue  -- too early for HIT and not on hep. A-fib:  -- continued home Eliquis, but will hold given 1/2 life of med and continuing drop of plt    HTN:   -- holding home meds    VT:  -- hx of VT arrest  -- ICD 2/2019    Hypothyroid:  -- TSH pending    Weakness:  -- suspect secondary to above issues  -- PT/OT  -- further w/u if not improving. Urinary Retention:  -- requiring russo  -- could be that retention is leading to ELADIA and decreased metabolization of BP meds. -- will need to continue to eval once russo removed and possibly involve urology. Dispo: long conversation with son Isaac Saunders 10/20 regarding care. He'd like frequent updates. CCM time 40 min. Pt at risk of life threatening deterioration from weakness, shock. Pt seen and evaluated via tele interaction. Audio and video used for this encounter.     Electronically signed by Santhosh Cobos DO on 10/20/2021 at 9:41 AM

## 2021-10-20 NOTE — PROGRESS NOTES
Problem: Self Care Deficits Care Plan (Adult)  Goal: *Acute Goals and Plan of Care (Insert Text)  Description: FUNCTIONAL STATUS PRIOR TO ADMISSION: Patient was modified independent using a rollator for functional mobility. HOME SUPPORT PRIOR TO ADMISSION: The patient lived alone with no local support. Occupational Therapy Goals  Initiated 10/20/2021  1. Patient will perform grooming with modified independence within 7 day(s). 2.  Patient will perform upper body dressing and bathing with supervision/set-up within 7 day(s). 3.  Patient will perform lower body dressing and bathing with minimal assistance within 7 day(s). 4.  Patient will perform toilet transfers to [facility] with moderate assistance within 7 day(s). 5.  Patient will perform all aspects of toileting with moderate assistance within 7 day(s). 6.  Patient will participate in upper extremity therapeutic exercise/activities with supervision/set-up for 10 minutes within 7 day(s). 7.  Patient will utilize energy conservation techniques during functional activities with verbal cues within 7 day(s). Outcome: Progressing Towards Goal   OCCUPATIONAL THERAPY EVALUATION  Patient: Latosha Ogden (42 y.o. female)  Date: 10/20/2021  Primary Diagnosis: Generalized weakness [R53.1]        Precautions:  Fall     ASSESSMENT  Based on the objective data described below, the patient presents with decreased activity tolerance, generalized weakness, poor endurance, fear of falling and pain/anxiety, pain, and impaired balance following admission on 10/19 for sepsis with c/o weakness. Patient requiring low dose pressor support (Levo) but has been cleared by MD for activity as tolerated. All vitals remained stable with activity including BP with MAP remaining >65 (goal set by MD). Patient disoriented to time but otherwise oriented and presents with mild confusion.   Patient performed bed mobility with significant x2 person assist.  She was able to come to sitting EOB and perform scooting from unsupported sitting position in preparation for ADLs but unable to advance functional mobility. Patient with c/o significant back pain with any movement and even with static sitting; She required return to supine at end of tx. Patient requires assistance for all ADLs; Increased assistance needed for LB ADLs and she is unable to attempt standing tasks this date. Patient would benefit from continued skilled OT to progress towards goals and improve overall independence. Current Level of Function Impacting Discharge (ADLs/self-care): Patient required mod to max A x2 for bed mobility. Patient requires supervision to mod A for UB ADLs and max to total A for LB ADLs. Functional Outcome Measure: The patient scored Total: 15/100 on the Barthel Index outcome measure. Patient will benefit from skilled therapy intervention to address the above noted impairments. PLAN :  Recommendations and Planned Interventions: self care training, functional mobility training, therapeutic exercise, balance training, therapeutic activities, endurance activities, patient education, home safety training, and family training/education    Frequency/Duration: Patient will be followed by occupational therapy 5 times a week to address goals. Recommendation for discharge: (in order for the patient to meet his/her long term goals)  Therapy up to 5 days/week in SNF setting    This discharge recommendation:  Has been made in collaboration with the attending provider and/or case management    IF patient discharges home will need the following DME: none       SUBJECTIVE:   Patient stated My back hurts so bad.     OBJECTIVE DATA SUMMARY:   HISTORY:   Past Medical History:   Diagnosis Date    Acute cystitis 05/23/2018    Anemia, unspecified     Anxiety     Arrhythmia     a fib    Arthritis     osteoarthritis, rheumatoid    Atrial fibrillation (HCC)     Dr. Angle Marcial and Dr. Rissa Coats  following Autoimmune disease ([facility]     sjogren disease Dr. Amber Amaya    CAD (coronary artery disease)     HTN (hypertension)     Hyperlipidemia LDL goal < 100     Hypothyroid     Insomnia     Osteoarthritis     [facility]     Thoracic aneurysm without mention of rupture      Past Surgical History:   Procedure Laterality Date    HX BIV-IMPLANTABLE CARDIOVERTER DEFIBRILLATOR      HX CHOLECYSTECTOMY      HX COLONOSCOPY  3/2014    Dr. Gissell Gomez OTHER SURGICAL      hernia repairs    HX PARTIAL THYROIDECTOMY      HX JANNET AND BSO      HX VEIN STRIPPING      AL INSJ ELTRD CAR COLLIN SYS TM INSJ DFB/PM PLS GEN Left 2/22/2019    Lv Lead Placement performed by Bernadette Flynn MD at [facility] CATH LAB    AL INSJ/RPLCMT PERM DFB W/TRNSVNS LDS 1/DUAL [facility] [place] Left 2/22/2019    upgrade PM to ICD-Bsx performed by Bernadette Flynn MD at [facility] CATH LAB       Expanded or extensive additional review of patient history:     Home Situation  Home Environment: Private residence  Living Alone: Yes  Support Systems: Child(lynette) (pt lives alone in pvt residence, at baseline pt is ambualtory w/ rolalotr, iADLS, relies on family for transport)  Current DME Used/Available at Home: Lift chair, [place], rollator, [facility] chair  Tub or Shower Type: Shower    Hand dominance: Right    EXAMINATION OF PERFORMANCE DEFICITS:  Cognitive/Behavioral Status:  Neurologic State: Alert  Orientation Level: Oriented to person;Oriented to place;Oriented to situation;Disoriented to time  Cognition: Follows commands  Perception: Cues to maintain midline in sitting  Perseveration: No perseveration noted  Safety/Judgement: Awareness of environment    Skin: Intact in the uppers    Edema: None noted in the uppers    Vision/Perceptual:    Tracking: Able to track stimulus in all quadrants w/o difficulty    Diplopia: No    Acuity: Within Defined Limits         Range of Motion:  AROM: Generally decreased, functional      Strength:  Strength: Generally decreased, functional in the uppers    Coordination:  Fine Motor Skills-Upper: Left Intact; Right Intact    Gross Motor Skills-Upper: Left Intact; Right Intact    Tone & Sensation:  Tone: normal  Sensation: intact    Balance:  Sitting: Impaired  Sitting - Static: Poor (constant support)  Sitting - Dynamic: Poor (constant support)    Functional Mobility and Transfers for ADLs:  Bed Mobility:  Rolling: Moderate assistance;Assist x2  Supine to Sit: Moderate assistance;Assist x2  Sit to Supine: Maximum assistance;Assist x2;Total assistance  Scooting: Maximum assistance;Assist x2    ADL Assessment:  Feeding: Setup;Supervision    Oral Facial Hygiene/Grooming: Minimum assistance    Bathing: Maximum assistance    Upper Body Dressing: Moderate assistance    Lower Body Dressing: Total assistance    Toileting: Maximum assistance      Cognitive Retraining  Safety/Judgement: Awareness of environment    Functional Measure:    Barthel Index:  Bathin  Bladder: 0  Bowels: 5  Groomin  Dressin  Feedin  Mobility: 0  Stairs: 0  Toilet Use: 0  Transfer (Bed to Chair and Back): 5  Total: 15/100      The Barthel ADL Index: Guidelines  1. The index should be used as a record of what a patient does, not as a record of what a patient could do. 2. The main aim is to establish degree of independence from any help, physical or verbal, however minor and for whatever reason. 3. The need for supervision renders the patient not independent. 4. A patient's performance should be established using the best available evidence. Asking the patient, friends/relatives and nurses are the usual sources, but direct observation and common sense are also important. However direct testing is not needed. 5. Usually the patient's performance over the preceding 24-48 hours is important, but occasionally longer periods will be relevant. 6. Middle categories imply that the patient supplies over 50 per cent of the effort.   7. Use of aids to be independent is allowed. Score Interpretation (from [facility])    Independent   60-79 Minimally independent   40-59 Partially dependent   20-39 Very dependent   <20 Totally dependent     -Fadumo Kim., Barthel, D.W. (1965). Functional evaluation: the Barthel Index. [address] ([address] (1997). The Barthel activities of daily living index: self-reporting versus actual performance in the old (> or = 75 years). Journal of [address] [place] [address] 45(7), [facility], J.J.M.F, Rob Mcclendon., Julia Lund. (1999). Measuring the change in disability after inpatient rehabilitation; comparison of the responsiveness of the Barthel Index and Functional [place] Measure. Journal of Neurology, Neurosurgery, and Psychiatry, 66(4), 621-878. JOSE MIGUEL Ramos, JAYSON Miranda, & Harriett Epps M.A. (2004) Assessment of post-stroke quality of life in cost-effectiveness studies: The usefulness of the Barthel Index and the EuroQoL-5D. Quality of Life Research, 15, 420-57     Occupational Therapy Evaluation Charge Determination   History Examination Decision-Making   LOW Complexity : Brief history review  LOW Complexity : 1-3 performance deficits relating to physical, cognitive , or psychosocial skils that result in activity limitations and / or participation restrictions  LOW Complexity : No comorbidities that affect functional and no verbal or physical assistance needed to complete eval tasks       Based on the above components, the patient evaluation is determined to be of the following complexity level: LOW     Activity Tolerance:   Good    After treatment patient left in no apparent distress:    Supine in bed, Call bell within reach, and Bed / chair alarm activated    COMMUNICATION/EDUCATION:   The patients plan of care was discussed with: Physical therapist, Registered nurse, and patient .      Home safety education was provided and the patient/caregiver indicated understanding., Patient/family have participated as able in goal setting and plan of care. , and Patient/family agree to work toward stated goals and plan of care. This patients plan of care is appropriate for delegation to MICHAEL.     Thank you for this referral.  K[place]ak Miranda OTR/L  Time Calculation: 26 mins

## 2021-10-21 LAB
ANION GAP SERPL CALC-SCNC: 10 MMOL/L (ref 5–15)
BASOPHILS # BLD: 0 K/UL (ref 0–0.1)
BASOPHILS NFR BLD: 0 % (ref 0–1)
BUN SERPL-MCNC: 32 MG/DL (ref 6–20)
BUN/CREAT SERPL: 34 (ref 12–20)
CALCIUM SERPL-MCNC: 7.4 MG/DL (ref 8.5–10.1)
CHLORIDE SERPL-SCNC: 105 MMOL/L (ref 97–108)
CO2 SERPL-SCNC: 20 MMOL/L (ref 21–32)
CREAT SERPL-MCNC: 0.95 MG/DL (ref 0.55–1.02)
DIFFERENTIAL METHOD BLD: ABNORMAL
EOSINOPHIL # BLD: 0 K/UL (ref 0–0.4)
EOSINOPHIL NFR BLD: 0 % (ref 0–7)
ERYTHROCYTE [DISTWIDTH] IN BLOOD BY AUTOMATED COUNT: 13.6 % (ref 11.5–14.5)
GLUCOSE SERPL-MCNC: 116 MG/DL (ref 65–100)
HCT VFR BLD AUTO: 33.5 % (ref 35–47)
HGB BLD-MCNC: 11.2 G/DL (ref 11.5–16)
IMM GRANULOCYTES # BLD AUTO: 0 K/UL
IMM GRANULOCYTES NFR BLD AUTO: 0 %
LYMPHOCYTES # BLD: 0.3 K/UL (ref 0.8–3.5)
LYMPHOCYTES NFR BLD: 5 % (ref 12–49)
MCH RBC QN AUTO: 31.5 PG (ref 26–34)
MCHC RBC AUTO-ENTMCNC: 33.4 G/DL (ref 30–36.5)
MCV RBC AUTO: 94.1 FL (ref 80–99)
MONOCYTES # BLD: 0.2 K/UL (ref 0–1)
MONOCYTES NFR BLD: 4 % (ref 5–13)
NEUTS BAND NFR BLD MANUAL: 4 % (ref 0–6)
NEUTS SEG # BLD: 4.9 K/UL (ref 1.8–8)
NEUTS SEG NFR BLD: 87 % (ref 32–75)
NRBC # BLD: 0 K/UL (ref 0–0.01)
NRBC BLD-RTO: 0 PER 100 WBC
PLATELET # BLD AUTO: 45 K/UL (ref 150–400)
PMV BLD AUTO: 11.7 FL (ref 8.9–12.9)
POTASSIUM SERPL-SCNC: 3.4 MMOL/L (ref 3.5–5.1)
RBC # BLD AUTO: 3.56 M/UL (ref 3.8–5.2)
RBC MORPH BLD: ABNORMAL
SODIUM SERPL-SCNC: 135 MMOL/L (ref 136–145)
WBC # BLD AUTO: 5.4 K/UL (ref 3.6–11)

## 2021-10-21 PROCEDURE — 51798 US URINE CAPACITY MEASURE: CPT

## 2021-10-21 PROCEDURE — 80048 BASIC METABOLIC PNL TOTAL CA: CPT

## 2021-10-21 PROCEDURE — 74011000250 HC RX REV CODE- 250: Performed by: INTERNAL MEDICINE

## 2021-10-21 PROCEDURE — 74011250637 HC RX REV CODE- 250/637: Performed by: INTERNAL MEDICINE

## 2021-10-21 PROCEDURE — 74011250636 HC RX REV CODE- 250/636: Performed by: INTERNAL MEDICINE

## 2021-10-21 PROCEDURE — 77030038269 HC DRN EXT URIN PURWCK BARD -A

## 2021-10-21 PROCEDURE — 65660000000 HC RM CCU STEPDOWN

## 2021-10-21 PROCEDURE — 85025 COMPLETE CBC W/AUTO DIFF WBC: CPT

## 2021-10-21 PROCEDURE — 97530 THERAPEUTIC ACTIVITIES: CPT

## 2021-10-21 PROCEDURE — 97530 THERAPEUTIC ACTIVITIES: CPT | Performed by: OCCUPATIONAL THERAPIST

## 2021-10-21 PROCEDURE — 36415 COLL VENOUS BLD VENIPUNCTURE: CPT

## 2021-10-21 RX ORDER — SIMETHICONE 80 MG
80 TABLET,CHEWABLE ORAL
Status: DISCONTINUED | OUTPATIENT
Start: 2021-10-21 | End: 2021-10-30 | Stop reason: HOSPADM

## 2021-10-21 RX ORDER — PAROXETINE HYDROCHLORIDE 20 MG/1
10 TABLET, FILM COATED ORAL DAILY
Status: DISCONTINUED | OUTPATIENT
Start: 2021-10-22 | End: 2021-10-30 | Stop reason: HOSPADM

## 2021-10-21 RX ADMIN — SIMETHICONE 80 MG: 80 TABLET, CHEWABLE ORAL at 13:20

## 2021-10-21 RX ADMIN — MELOXICAM 7.5 MG: 7.5 TABLET ORAL at 08:12

## 2021-10-21 RX ADMIN — DICLOFENAC SODIUM 2 G: 10 GEL TOPICAL at 02:32

## 2021-10-21 RX ADMIN — CEFTRIAXONE 1 G: 1 INJECTION, POWDER, FOR SOLUTION INTRAMUSCULAR; INTRAVENOUS at 09:39

## 2021-10-21 RX ADMIN — SIMETHICONE 80 MG: 80 TABLET, CHEWABLE ORAL at 02:32

## 2021-10-21 RX ADMIN — Medication 10 ML: at 06:22

## 2021-10-21 RX ADMIN — SODIUM CHLORIDE, POTASSIUM CHLORIDE, SODIUM LACTATE AND CALCIUM CHLORIDE 1000 ML: 600; 310; 30; 20 INJECTION, SOLUTION INTRAVENOUS at 09:33

## 2021-10-21 RX ADMIN — Medication 10 ML: at 13:20

## 2021-10-21 RX ADMIN — AMIODARONE HYDROCHLORIDE 100 MG: 200 TABLET ORAL at 08:12

## 2021-10-21 RX ADMIN — SIMETHICONE 80 MG: 80 TABLET, CHEWABLE ORAL at 20:01

## 2021-10-21 RX ADMIN — LEVOTHYROXINE SODIUM 112 MCG: 0.11 TABLET ORAL at 08:12

## 2021-10-21 RX ADMIN — ACETAMINOPHEN 650 MG: 325 TABLET ORAL at 19:59

## 2021-10-21 NOTE — PROGRESS NOTES
Transition of Care Plan - RUR 15%:        1. Patient continues to require medical management   2. PT/OT following - rec SNF  3. Discharge when stable to SNF. 4. Transport TBD pending progress  5.  CM will follow and assist w/ discharge plan    Braulio Jett, RADHA

## 2021-10-21 NOTE — PROGRESS NOTES
Javid Blum [facility]  [address] [facility], [address] [place] [address]  [phone]      Medical Progress Note      NAME:         Armen Weber   :        1928  MRM:        [id number]    Date of service: 10/21/2021      Subjective: Patient has been seen and examined as a follow up for multiple medical issues. Chart, labs, diagnostics reviewed. She is more alert now. Remains weak. Appetite remains low. No fever or chills. Objective:    Vital Signs:    Visit Vitals  BP (!) 99/57   Pulse 89   Temp 97.8 °F (36.6 °C)   Resp 21   Ht 5' 7\" (1.702 m)   Wt 63 kg (138 lb 14.2 oz)   SpO2 94%   BMI 21.75 kg/m²          Intake/Output Summary (Last 24 hours) at 10/21/2021 1301  Last data filed at 10/21/2021 1200  Gross per 24 hour   Intake 1342.17 ml   Output 740 ml   Net 602.17 ml        Physical Examination:    General:   Weak and remains critically ill. Not in distress   Eyes:   pink conjunctivae, PERRLA with no discharge. ENT:   no ottorrhea or rhinorrhea with dry mucous membranes  Neck: no masses, thyroid non-tender and trachea central.  Pulm: decreased breath sounds with scattered crackles. No wheezes  Card:  no JVD or murmurs, has regular and normal S1, S2 without thrills, bruits or peripheral edema  Abd:  Soft, non-tender, non-distended, normoactive bowel sounds   Musc:  No cyanosis, clubbing, atrophy or deformities. Skin:  No rashes, bruising or ulcers. Neuro: Awake and alert. Decreased hearing.  Generally a non focal exam.   Psych:  Has a fair insight to her illness     Current Facility-Administered Medications   Medication Dose Route Frequency    simethicone (MYLICON) tablet 80 mg  80 mg Oral QID PRN    cefTRIAXone (ROCEPHIN) 1 g in sterile water (preservative free) 10 mL IV syringe  1 g IntraVENous Q24H    levothyroxine (SYNTHROID) tablet 112 mcg  112 mcg Oral ACB    meloxicam (MOBIC) tablet 7.5 mg  7.5 mg Oral DAILY    diclofenac (VOLTAREN) 1 % topical gel 2 g  2 g Topical DAILY PRN    sodium chloride (NS) flush 5-40 mL  5-40 mL IntraVENous Q8H    sodium chloride (NS) flush 5-40 mL  5-40 mL IntraVENous PRN    acetaminophen (TYLENOL) tablet 650 mg  650 mg Oral Q6H PRN    Or    acetaminophen (TYLENOL) suppository 650 mg  650 mg Rectal Q6H PRN    senna (SENOKOT) tablet 8.6 mg  1 Tablet Oral DAILY PRN    NOREPINephrine (LEVOPHED) 8 mg in 5% dextrose 250mL (32 mcg/mL) infusion  0.5-16 mcg/min IntraVENous TITRATE    [Held by provider] apixaban (ELIQUIS) tablet 2.5 mg  2.5 mg Oral BID    amiodarone (CORDARONE) tablet 100 mg  100 mg Oral DAILY        Laboratory data and review:    Recent Labs     10/21/21  0415 10/20/21  0248 10/19/21  1036   WBC 5.4 11.1* 6.1   HGB 11.2* 11.3* 11.4*   HCT 33.5* 34.1* 34.2*   PLT 45* 52* 64*     Recent Labs     10/21/21  0415 10/20/21  0248 10/19/21  1036   * 132* 131*   K 3.4* 3.6 3.8    102 99   CO2 20* 21 24   * 151* 95   BUN 32* 34* 35*   CREA 0.95 1.23* 1.60*   CA 7.4* 7.2* 7.6*   MG  --   --  1.9   ALB  --  2.2* 2.6*   ALT  --  14 13     No components found for: [place]    Diagnostics: Imaging studies have been reviewed    Telemetry reviewed by me:   normal sinus rhythm    Assessment and Plan:    Shock ([address] (10/19/2021) POA: unclear as to the etiology but DDx include hypovolemia, medications vs septic given she had fever on admission. Much better and off Levophed with soft BPs. CXR is clear. UA not impressive. Has bandemia 10/20. Lactic acid normal. Rapid CoV-19 is neg. RVP neg. Blood cultures neg. CT abdomen concerning for pyelonephritis. Echo showed a normal LV function. Has been on IV Cefepime and Vancomycin (given her allergies). De-escalated to IV Ceftriaxone.  Monitor clinical progress       ELADIA (acute kidney injury) ([facility] (10/19/2021) / Hyponatremia (10/19/2021) / CKD (chronic kidney disease) stage 3, GFR 30-59 ml/min ([facility]) (10/19/2021) POA: mild and likely from volume depletion vs from sepsis. Continue to gently hydrate, replete K+  and follow BMP       CAD (coronary artery disease) /  Ventricular tachycardia ([facility] (2/16/2019) / Persistent atrial fibrillation (HCC) POA: son says she has an ICD. Echo showed a normal LV function. Troponin neg. Seen by cardiology and no further testing recommended at this time. Continue Amiodarone. Eliquis on hold due to thrombocytopenia      Thrombocytopenia ([facility] [facility] (10/21/2020) POA: unclear etiology but likely due to suspected sepsis. Continue to monitor for now    Anxiety POA: resume Paxil       Hypothyroidism POA: TSH is normal. Continue Levothyroxine      Generalized weakness (10/19/2021) POA: due to current illness.  PT, OT when feasible    Total time spent for the patient's care: [address] discussed with: Patient, Care Manager and Nursing Staff    Discussed:  Care Plan and D/C Planning    Prophylaxis:  Eliquis    Anticipated Disposition:  SNF/LTC           ___________________________________________________    Attending Physician:   Bobby Phillip MD

## 2021-10-21 NOTE — PROGRESS NOTES
[facility] Pharm[facility] Dosing Services: Antimicrobial Stewardship Daily Doc    Consult for antibiotic dosing of Vancomycin by Dr. Geoff Bhatt  Indication: Sepsis of unknown etiology in penicillin allergic 79 yo female transferred from home. Day of Therapy: 3    Ht Readings from Last 1 Encounters:   10/20/21 170.2 cm (67\")        Wt Readings from Last 1 Encounters:   10/20/21 63 kg (138 lb 14.2 oz)        Vancomycin:  Loading dose: 1500mg (24mg/kg) IV x 1 dose in ED ~1300, 10/19/21  Maintenance dose: Dosing per random levels given CrCl     Plan:   Scr and urine output improved. WBC WNL, afebrile, blood pressure improving and off pressor support. Start 1000mg every 24 hrs at 1800 today. Check level in 1-2 days. Date Dose & Interval Measured (mcg/mL) Extrapolated (mcg/mL)   ?10/20 ? 1500mg x1 ?7.4 ?   ? ? ? ?   ? ? ? ? Other Antimicrobial   (not dosed by pharmacist) Cefepime 2gm IV every 12 hrs   Cultures 10/19 Blood - NGsf  10/19  COVID rapid - Negative    Procalcitonin 12.8 on 10/19/21   Serum Creatinine Lab Results   Component Value Date/Time    Creatinine 0.95 10/21/2021 04:15 AM    Creatinine (POC) 1.1 02/16/2019 05:38 PM         Creatinine Clearance Estimated Creatinine Clearance: 36 mL/min (based on SCr of 0.95 mg/dL). Temp Temp: 97.9 °F (36.6 °C)       WBC Lab Results   Component Value Date/Time    WBC 5.4 10/21/2021 04:15 AM        Procalcitonin Lab Results   Component Value Date/Time    Procalcitonin 12.80 10/19/2021 03:42 PM        For Antifungals, Metronidazole and Nafcillin: Lab Results   Component Value Date/Time    ALT (SGPT) 14 10/20/2021 02:48 AM    AST (SGOT) 32 10/20/2021 02:48 AM    Alk. phosphatase 85 10/20/2021 02:48 AM    Bilirubin, total 0.9 10/20/2021 02:48 AM        Pharmacist Isaac Mera

## 2021-10-21 NOTE — PROGRESS NOTES
Progress Note  Date:10/21/2021       Room:[address] [place] [address]  Patient Name:Magdalena Dwyer     YOB: 1928     Age:89 y.o. Subjective      CC: no new complaints    24 HOUR: off NE. Currently on RA. Objective         Vitals Last 24 Hours:  TEMPERATURE:  Temp  Av.5 °F (36.4 °C)  Min: 96.9 °F (36.1 °C)  Max: 98 °F (36.7 °C)  RESPIRATIONS RANGE: Resp  Av.7  Min: 12  Max: 25  PULSE OXIMETRY RANGE: SpO2  Av.2 %  Min: 92 %  Max: 98 %  PULSE RANGE: Pulse  Av.7  Min: 69  Max: 99  BLOOD PRESSURE RANGE: Systolic (32TTS), UYO:743 , Min:84 , PGL:990   ; Diastolic (70CND), XNF:50, Min:43, Max:89    I/O (24Hr): Intake/Output Summary (Last 24 hours) at 10/21/2021 0848  Last data filed at 10/21/2021 0800  Gross per 24 hour   Intake 368.82 ml   Output 1140 ml   Net -771.18 ml     Objective:  Vital signs: (most recent): Blood pressure (!) 103/53, pulse 95, temperature 97.9 °F (36.6 °C), resp. rate 21, height 5' 7\" (1.702 m), weight 63 kg (138 lb 14.2 oz), SpO2 94 %. Gen: awake,  interactive  HEENT: NCAT  Chest: symmetrical chest rise  CV: r/r/r  Abd: non-distended  Ext: no c/c/e  Neuro: moves all ext. No focal deficits. Labs/Imaging/Diagnostics    Labs:  CBC:  Recent Labs     10/21/21  0415 10/20/21  0248 10/19/21  1036   WBC 5.4 11.1* 6.1   RBC 3.56* 3.56* 3.56*   HGB 11.2* 11.3* 11.4*   HCT 33.5* 34.1* 34.2*   MCV 94.1 95.8 96.1   RDW 13.6 13.7 13.5   PLT 45* 52* 64*     CHEMISTRIES:  Recent Labs     10/21/21  0415 10/20/21  0248 10/19/21  1036   * 132* 131*   K 3.4* 3.6 3.8    102 99   CO2 * 21 24   BUN 32* 34* 35*   CA 7.4* 7.2* 7.6*   MG  --   --  1.9   PT/INR:No results for input(s): INR, INREXT, INREXT in the last 72 hours. No lab exists for component: PROTIME  APTT:No results for input(s): APTT in the last 72 hours.   LIVER PROFILE:  Recent Labs     10/20/21  0248 10/19/21  1036   AST 32 33   ALT 14 13     Lab Results   Component Value Date/Time    ALT (SGPT) 14 10/20/2021 02:48 AM    AST (SGOT) 32 10/20/2021 02:48 AM    Alk. phosphatase 85 10/20/2021 02:48 AM    Bilirubin, direct CANCELED 02/21/2014 11:42 AM    Bilirubin, total 0.9 10/20/2021 02:48 AM       Imaging Last 24 Hours:  No results found. Assessment//Plan     81 y/o with hx a-fib, Sjogren, CAD, HTN, HLD and hypothyroid admitted admitted 10/19 with hypotension and weakness. Shock:  -- etiology unclear  -- infectious w/u unrevealing so far with exception of possible pyelo  -- UA neg, BC pending.    -- CXR without clear pneumonia  -- TTE without evidence of cardiogenic shock  -- suspect pyelo vs urinary retention->ELADIA->decreased clearance of BP meds    Sepsis/Pyelo:  -- started on broad empiric abx, but WBC normal on admission and no clear source. Changed 10/19 from vanc/bella to vanc/cefepime  -- will decrease to ctx 10/21 as unlikely to be related to pseudomonas or MRSA. Not all that clear there's an infection but given lack of other clear etiology would continue for at least 7 days. -- WBC elevated 10/20 and bands increased but could just be stress response. Normal 10/21. -- CT with pyleo but no flank pain, fever and no WBC on admit. Also UA neg. Unusual to have infection and none of those signs/sxs    ELADIA:  -- suspect pre-renal/ATN vs obstructive  -- pt with some urinary retention that might have lead to ELADIA  -- d/c russo and bladder scan q6.   -- avoid nephrtoxins  -- no acute indication for renal replacement. Thrombocytopenia:  -- currently being attributed to sepsis but low threshold for further w/u if this does not seem to be the issue  -- too early for HIT and not on hep. -- continues to decrease. -- consider heme consult if not improving.     A-fib:  -- initially continued home Eliquis, but will hold given 1/2 life of med and continuing drop of plt    HTN:   -- holding home meds    VT:  -- hx of VT arrest  -- ICD 2/2019    Hypothyroid:  -- TSH wnl    Weakness:  -- suspect secondary to above issues  -- PT/OT  -- further w/u if not improving. Encephalopathy:  -- per son, pt not quite at baseline  -- likely critical illness  -- no indication of CNS infection or new event. -- further w/u if not improving to baseline    Dispo: long conversation with son Gi Layer 10/20 regarding care. He'd like frequent updates. CCM time 35 min. Pt at risk of life threatening deterioration from weakness, shock. Pt seen and evaluated via tele interaction. Audio and video used for this encounter.     Electronically signed by Cristian Coyne DO on 10/21/2021 at 9:41 AM

## 2021-10-21 NOTE — PROGRESS NOTES
Problem: Mobility Impaired (Adult and Pediatric)  Goal: *Acute Goals and Plan of Care (Insert Text)  Description: FUNCTIONAL STATUS PRIOR TO ADMISSION: Patient was modified independent using a rollator for functional mobility. HOME SUPPORT PRIOR TO ADMISSION: The patient lived alone with no local support. Physical Therapy Goals  Initiated 10/20/2021  1. Patient will move from supine to sit and sit to supine  in bed with minimal assistance/contact guard assist within 7 day(s). 2.  Patient will transfer from bed to chair and chair to bed with minimal assistance/contact guard assist using the least restrictive device within 7 day(s). 3.  Patient will perform sit to stand with minimal assistance/contact guard assist within 7 day(s). 4.  Patient will ambulate with minimal assistance/contact guard assist for 25 feet with the least restrictive device within 7 day(s). Outcome: Progressing Towards Goal   PHYSICAL THERAPY TREATMENT  Patient: Armen Weber (19 y.o. female)  Date: 10/21/2021  Diagnosis: Generalized weakness [R53.1] Shock ([address]       Precautions: Fall (Per Critical Care note 10/19/21 MAP>65)  Chart, physical therapy assessment, plan of care and goals were reviewed. ASSESSMENT  Patient continues with skilled PT services and is progressing towards goals. Patient this afternoon with improved tolerance to physical therapy compared to session yesterday. Patient continues to report significant arthritic back pain limiting her mobility; she demonstrates significant pain via shutting her eyes closed through mobility and is unable to relax her UE muscles from tensing. RN notified. Patient transfers via log roll modAx2 to EOB. She tolerates sitting for several minutes and MAP again remained >65 with SEOB today. Patient transfers sit > stand with maxAx2 and additional time.  She tolerates standing for approximately 1.5 minutes while perianal hygiene is performed totalA and she laterally steps 2ft with modAx2. Heart rate briefly up to 120 bpm during ambulation. Anticipate patient may be able to transfer to bedside chair tomorrow with therapy. Current Level of Function Impacting Discharge (mobility/balance): mod/maxAx2    Other factors to consider for discharge: Pt lives alone PTA         PLAN :  Patient continues to benefit from skilled intervention to address the above impairments. Continue treatment per established plan of care. to address goals. Recommendation for discharge: (in order for the patient to meet his/her long term goals)  Therapy up to 5 days/week in SNF setting    This discharge recommendation:  Has not yet been discussed the attending provider and/or case management    IF patient discharges home will need the following DME: to be determined (TBD)       SUBJECTIVE:   Patient stated he lives ten minutes away.  re: her son    OBJECTIVE DATA SUMMARY:   Patient received supine in bed and was agreeable to participate in PT session. Patient was cleared by nursing to participate in PT session. Critical Behavior:  Neurologic State: Alert  Orientation Level: Oriented X4  Cognition: Appropriate decision making, Appropriate for age attention/concentration, Appropriate safety awareness, Follows commands  Safety/Judgement: Awareness of environment  Functional Mobility Training:  Bed Mobility:  Rolling: Minimum assistance;Assist x2  Supine to Sit: Moderate assistance;Assist x2  Sit to Supine: Moderate assistance;Assist x2  Scooting: Maximum assistance;Assist x2        Transfers:  Sit to Stand: Maximum assistance;Assist x2  Stand to Sit: Minimum assistance;Assist x2                             Balance:  Sitting: Impaired; With support  Sitting - Static: Fair (occasional)  Sitting - Dynamic: Poor (constant support)  Standing: Impaired; With support  Standing - Static: Fair;Constant support  Standing - Dynamic : Fair;Constant support  Ambulation/Gait Training:  Distance (ft): 2 Feet (ft) (laterally along edge of bed)  Assistive Device: Walker, rolling;Brace/Splint  Ambulation - Level of Assistance: Moderate assistance;Assist x2        Gait Abnormalities: Shuffling gait; Decreased step clearance              Speed/Tayler: Pace decreased (<100 feet/min); Shuffled  Step Length: Right shortened;Left shortened                          Pain Rating:  Patient reporting significant back pain; nursing aware    Activity Tolerance:   Fair and requires rest breaks    After treatment patient left in no apparent distress:   Supine in bed, Heels elevated for pressure relief, Call bell within reach, Bed / chair alarm activated, and Side rails x 3    COMMUNICATION/COLLABORATION:   The patients plan of care was discussed with: Occupational therapist and Registered nurse.      Gilma Castellanos PT, DPT   Time Calculation: 21 mins

## 2021-10-21 NOTE — PROGRESS NOTES
[facility] Dosing Services: 10/21/21    The following medication: Cefepime was automatically dose-adjusted per [facility] P&T Committee Protocol, with respect to renal function. Consult provided for this   80 y.o. , female , for the indication of sepsis. Dosage changed to:  Cefepime 2gm IV every 12 hrs    Pt Weight:   Wt Readings from Last 1 Encounters:   10/20/21 63 kg (138 lb 14.2 oz)         Previous Regimen Cefepime 2gm IV every 24 hrs   Serum Creatinine Lab Results   Component Value Date/Time    Creatinine 0.95 10/21/2021 04:15 AM    Creatinine (POC) 1.1 02/16/2019 05:38 PM       Creatinine Clearance Estimated Creatinine Clearance: 36 mL/min (based on SCr of 0.95 mg/dL). BUN Lab Results   Component Value Date/Time    BUN 32 (H) 10/21/2021 04:15 AM    BUN (POC) 18 02/16/2019 05:38 PM           Additional notes:      Pharmacy to continue to monitor patient daily. Will make dosage adjustments based upon changing renal function. Signed [facility]  [address] [place] [address] information:  [phone]

## 2021-10-21 NOTE — PROGRESS NOTES
1900- Bedside and Verbal shift change report given to Jazmyn Jarquin RN(oncoming nurse) by Graciela Sandoval RN (offgoing nurse). Report included the following information SBAR, Kardex, Intake/Output, MAR, Recent Results, Cardiac Rhythm Afib, V-paced and Alarm Parameters . Primary Nurse Jazmyn Jarquin RN and Graciela Sandoval RN performed a dual skin assessment on this patient No impairment noted  Luciano score is 15    2000- Pt is resting in bed at this time. No complaints at this time. Dorman care provided. 0000- No changes on assessment. 0400- No changes on assessment. 0700- Bedside and Verbal shift change report given to Graciela Sandoval RN (oncoming nurse) by Jazmyn Jarquin RN (offgoing nurse). Report included the following information SBAR, Kardex, Intake/Output, MAR, Recent Results, Cardiac Rhythm Afib, V-paced and Alarm Parameters .

## 2021-10-21 NOTE — PROGRESS NOTES
0700: Bedside and Verbal shift change report given to RONAK Reaves (oncoming nurse) by Priya Guzman RN (offgoing nurse). Report included the following information SBAR, Intake/Output, MAR, Recent Results and Cardiac Rhythm A fib + V paced. Primary Nurse Eugenie Vaughan and Priya Guzman RN performed a dual skin assessment on this patient No impairment noted  Luciano score is see flow sheet. 0800: Assessment completed. A/Ox4. Levo off. Dorman in place and draining. 0930: Dr Karime Nelson notified of dropping BP. Orders for bolus. Dorman removed. 1200: Reassessment completed. Patient has not voided. 1400: Bath provided. Bladder scan shows 220, order to notify provider if >500. Will continue to monitor. 1600: Reassessment completed. 1700: Patient has not voided, bladder scan at 260. Will continue to monitor. 1900: Bedside and Verbal shift change report given to RONAK Moran (oncoming nurse) by Irma Akers RN (offgoing nurse). Report included the following information SBAR, Intake/Output, MAR, Recent Results and Cardiac Rhythm A fib/V paced.

## 2021-10-21 NOTE — PROGRESS NOTES
Problem: Self Care Deficits Care Plan (Adult)  Goal: *Acute Goals and Plan of Care (Insert Text)  Description: FUNCTIONAL STATUS PRIOR TO ADMISSION: Patient was modified independent using a rollator for functional mobility. HOME SUPPORT PRIOR TO ADMISSION: The patient lived alone with no local support. Occupational Therapy Goals  Initiated 10/20/2021  1. Patient will perform grooming with modified independence within 7 day(s). 2.  Patient will perform upper body dressing and bathing with supervision/set-up within 7 day(s). 3.  Patient will perform lower body dressing and bathing with minimal assistance within 7 day(s). 4.  Patient will perform toilet transfers to [facility] with moderate assistance within 7 day(s). 5.  Patient will perform all aspects of toileting with moderate assistance within 7 day(s). 6.  Patient will participate in upper extremity therapeutic exercise/activities with supervision/set-up for 10 minutes within 7 day(s). 7.  Patient will utilize energy conservation techniques during functional activities with verbal cues within 7 day(s). Outcome: Progressing Towards Goal       OCCUPATIONAL THERAPY TREATMENT  Patient: Yonathan Messer (14 y.o. female)  Date: 10/21/2021  Diagnosis: Generalized weakness [R53.1] Shock ([address]       Precautions: Fall (Per Critical Care note 10/19/21 MAP>65)  Chart, occupational therapy assessment, plan of care, and goals were reviewed. ASSESSMENT  Patient continues with skilled OT services and is progressing towards goals. Patient with significant back pain with bed mobility, sitting and standing, vitals stable throughout.  RN aware and pain decreased in supine with bilateral LE's elevated     Current Level of Function Impacting Discharge (ADLs): limited by back pain, mod assist of 2 for bed mobility and sit to stand    Other factors to consider for discharge:          PLAN :  Patient continues to benefit from skilled intervention to address the above impairments. Continue treatment per established plan of care to address goals. Recommend with staff: reposition frequently     Recommend next OT session: as tolerated, positioning, ? Pain med prior tx    Recommendation for discharge: (in order for the patient to meet his/her long term goals)  Therapy up to 5 days/week in SNF setting    This discharge recommendation:  Has not yet been discussed the attending provider and/or case management    IF patient discharges home will need the following DME: none       SUBJECTIVE:   Patient stated I was feeling pretty good till you guys got here.     OBJECTIVE DATA SUMMARY:   Cognitive/Behavioral Status:  Neurologic State: Alert  Orientation Level: Oriented X4  Cognition: Appropriate decision making; Appropriate for age attention/concentration; Appropriate safety awareness; Follows commands  Perception: Appears intact     Safety/Judgement: Awareness of environment    Functional Mobility and Transfers for ADLs:  Bed Mobility:  Rolling: Minimum assistance; Additional time;Bed Modified;Assist x2  Supine to Sit: Moderate assistance;Assist x2; Additional time  Sit to Supine: Moderate assistance;Assist x2; Additional time  Scooting: Maximum assistance;Assist x2    Transfers:  Sit to Stand: Maximum assistance;Assist x2;Adaptive equipment; Additional time (pulling up on walker)     Bed to Chair: Other (comment) (side step to head of bed ~4 steps with mod 2)    Balance:  Sitting: Impaired; With support  Sitting - Static: Fair (occasional)  Sitting - Dynamic: Poor (constant support)  Standing: Impaired; With support  Standing - Static: Fair;Constant support  Standing - Dynamic : Fair;Constant support    ADL Intervention:     Educated on benefit of movement and sitting edge of bed and standing   Educated on benefit of floating bilateral heels     Cognitive Retraining  Safety/Judgement: Awareness of environment    Therapeutic Exercises:   Re-educated on ankle pumps and UE AROM    Pain:  Significant back pain upon sitting edge of bed, increased whincing, closing eyes, RN aware, decreased upon return to bed    Activity Tolerance:   Poor    After treatment patient left in no apparent distress:   Supine in bed, Heels elevated for pressure relief, and Side rails x 3    COMMUNICATION/COLLABORATION:   The patients plan of care was discussed with: Physical therapist and Registered nurse.      Shai Ledbetter OTR/L  Time Calculation: 21 mins

## 2021-10-22 LAB
ANION GAP SERPL CALC-SCNC: 5 MMOL/L (ref 5–15)
APPEARANCE UR: CLEAR
ATRIAL RATE: 63 BPM
BACTERIA URNS QL MICRO: NEGATIVE /HPF
BASOPHILS # BLD: 0 K/UL (ref 0–0.1)
BASOPHILS NFR BLD: 1 % (ref 0–1)
BILIRUB UR QL: NEGATIVE
BUN SERPL-MCNC: 24 MG/DL (ref 6–20)
BUN/CREAT SERPL: 32 (ref 12–20)
CALCIUM SERPL-MCNC: 7.7 MG/DL (ref 8.5–10.1)
CALCULATED R AXIS, ECG10: 35 DEGREES
CALCULATED T AXIS, ECG11: 81 DEGREES
CHLORIDE SERPL-SCNC: 102 MMOL/L (ref 97–108)
CO2 SERPL-SCNC: 25 MMOL/L (ref 21–32)
COLOR UR: ABNORMAL
CREAT SERPL-MCNC: 0.76 MG/DL (ref 0.55–1.02)
DIAGNOSIS, 93000: NORMAL
DIFFERENTIAL METHOD BLD: ABNORMAL
EOSINOPHIL # BLD: 0.1 K/UL (ref 0–0.4)
EOSINOPHIL NFR BLD: 3 % (ref 0–7)
EPITH CASTS URNS QL MICRO: ABNORMAL /LPF
ERYTHROCYTE [DISTWIDTH] IN BLOOD BY AUTOMATED COUNT: 13.3 % (ref 11.5–14.5)
GLUCOSE SERPL-MCNC: 98 MG/DL (ref 65–100)
GLUCOSE UR STRIP.AUTO-MCNC: NEGATIVE MG/DL
HCT VFR BLD AUTO: 31.8 % (ref 35–47)
HGB BLD-MCNC: 10.8 G/DL (ref 11.5–16)
HGB UR QL STRIP: ABNORMAL
IMM GRANULOCYTES # BLD AUTO: 0 K/UL
IMM GRANULOCYTES NFR BLD AUTO: 0 %
KETONES UR QL STRIP.AUTO: NEGATIVE MG/DL
LEUKOCYTE ESTERASE UR QL STRIP.AUTO: NEGATIVE
LYMPHOCYTES # BLD: 0.5 K/UL (ref 0.8–3.5)
LYMPHOCYTES NFR BLD: 12 % (ref 12–49)
MAGNESIUM SERPL-MCNC: 1.8 MG/DL (ref 1.6–2.4)
MCH RBC QN AUTO: 32 PG (ref 26–34)
MCHC RBC AUTO-ENTMCNC: 34 G/DL (ref 30–36.5)
MCV RBC AUTO: 94.4 FL (ref 80–99)
METAMYELOCYTES NFR BLD MANUAL: 1 %
MONOCYTES # BLD: 0.4 K/UL (ref 0–1)
MONOCYTES NFR BLD: 11 % (ref 5–13)
NEUTS BAND NFR BLD MANUAL: 1 % (ref 0–6)
NEUTS SEG # BLD: 2.7 K/UL (ref 1.8–8)
NEUTS SEG NFR BLD: 71 % (ref 32–75)
NITRITE UR QL STRIP.AUTO: NEGATIVE
NRBC # BLD: 0 K/UL (ref 0–0.01)
NRBC BLD-RTO: 0 PER 100 WBC
PATH REV BLD -IMP: NORMAL
PH UR STRIP: 6 [PH] (ref 5–8)
PHOSPHATE SERPL-MCNC: 2 MG/DL (ref 2.6–4.7)
PLATELET # BLD AUTO: 48 K/UL (ref 150–400)
PMV BLD AUTO: 12 FL (ref 8.9–12.9)
POTASSIUM SERPL-SCNC: 3.5 MMOL/L (ref 3.5–5.1)
PROT UR STRIP-MCNC: NEGATIVE MG/DL
Q-T INTERVAL, ECG07: 422 MS
QRS DURATION, ECG06: 82 MS
QTC CALCULATION (BEZET), ECG08: 477 MS
RBC # BLD AUTO: 3.37 M/UL (ref 3.8–5.2)
RBC #/AREA URNS HPF: ABNORMAL /HPF (ref 0–5)
RBC MORPH BLD: ABNORMAL
SODIUM SERPL-SCNC: 132 MMOL/L (ref 136–145)
SP GR UR REFRACTOMETRY: 1.01 (ref 1–1.03)
UA: UC IF INDICATED,UAUC: ABNORMAL
UROBILINOGEN UR QL STRIP.AUTO: 0.2 EU/DL (ref 0.2–1)
VENTRICULAR RATE, ECG03: 77 BPM
WBC # BLD AUTO: 3.8 K/UL (ref 3.6–11)
WBC URNS QL MICRO: ABNORMAL /HPF (ref 0–4)

## 2021-10-22 PROCEDURE — 74011250637 HC RX REV CODE- 250/637: Performed by: INTERNAL MEDICINE

## 2021-10-22 PROCEDURE — 2709999900 HC NON-CHARGEABLE SUPPLY

## 2021-10-22 PROCEDURE — 83735 ASSAY OF MAGNESIUM: CPT

## 2021-10-22 PROCEDURE — 77030040361 HC SLV COMPR DVT MDII -B

## 2021-10-22 PROCEDURE — 74011000250 HC RX REV CODE- 250: Performed by: INTERNAL MEDICINE

## 2021-10-22 PROCEDURE — 74011250636 HC RX REV CODE- 250/636: Performed by: INTERNAL MEDICINE

## 2021-10-22 PROCEDURE — 84100 ASSAY OF PHOSPHORUS: CPT

## 2021-10-22 PROCEDURE — 77030005513 HC CATH URETH FOL11 MDII -B

## 2021-10-22 PROCEDURE — 36415 COLL VENOUS BLD VENIPUNCTURE: CPT

## 2021-10-22 PROCEDURE — 77030019905 HC CATH URETH INTMIT MDII -A

## 2021-10-22 PROCEDURE — 97110 THERAPEUTIC EXERCISES: CPT

## 2021-10-22 PROCEDURE — 81001 URINALYSIS AUTO W/SCOPE: CPT

## 2021-10-22 PROCEDURE — 85025 COMPLETE CBC W/AUTO DIFF WBC: CPT

## 2021-10-22 PROCEDURE — 51798 US URINE CAPACITY MEASURE: CPT

## 2021-10-22 PROCEDURE — 65270000029 HC RM PRIVATE

## 2021-10-22 PROCEDURE — 80048 BASIC METABOLIC PNL TOTAL CA: CPT

## 2021-10-22 PROCEDURE — 77030038269 HC DRN EXT URIN PURWCK BARD -A

## 2021-10-22 RX ORDER — TRAMADOL HYDROCHLORIDE 50 MG/1
50 TABLET ORAL
Status: DISCONTINUED | OUTPATIENT
Start: 2021-10-22 | End: 2021-10-30 | Stop reason: HOSPADM

## 2021-10-22 RX ORDER — PROCHLORPERAZINE EDISYLATE 5 MG/ML
10 INJECTION INTRAMUSCULAR; INTRAVENOUS
Status: DISCONTINUED | OUTPATIENT
Start: 2021-10-22 | End: 2021-10-30 | Stop reason: HOSPADM

## 2021-10-22 RX ADMIN — Medication 40 ML: at 01:05

## 2021-10-22 RX ADMIN — AMIODARONE HYDROCHLORIDE 100 MG: 200 TABLET ORAL at 09:11

## 2021-10-22 RX ADMIN — MELOXICAM 7.5 MG: 7.5 TABLET ORAL at 10:17

## 2021-10-22 RX ADMIN — DICLOFENAC SODIUM 2 G: 10 GEL TOPICAL at 03:30

## 2021-10-22 RX ADMIN — Medication 10 ML: at 13:46

## 2021-10-22 RX ADMIN — CEFTRIAXONE 1 G: 1 INJECTION, POWDER, FOR SOLUTION INTRAMUSCULAR; INTRAVENOUS at 09:12

## 2021-10-22 RX ADMIN — Medication 10 ML: at 07:59

## 2021-10-22 RX ADMIN — PAROXETINE HYDROCHLORIDE 10 MG: 20 TABLET, FILM COATED ORAL at 09:11

## 2021-10-22 RX ADMIN — Medication 10 ML: at 22:20

## 2021-10-22 RX ADMIN — TRAMADOL HYDROCHLORIDE 50 MG: 50 TABLET, COATED ORAL at 19:26

## 2021-10-22 RX ADMIN — LEVOTHYROXINE SODIUM 112 MCG: 0.11 TABLET ORAL at 07:59

## 2021-10-22 RX ADMIN — SODIUM PHOSPHATE, MONOBASIC, MONOHYDRATE: 276; 142 INJECTION, SOLUTION INTRAVENOUS at 10:17

## 2021-10-22 NOTE — PROGRESS NOTES
Bedside and Verbal shift change report given to [address] [place] [address] Drive, RN (oncoming nurse) by Reyna Ortiz RN (offgoing nurse). Report included the following information SBAR, Kardex, ED Summary, Procedure Summary, Intake/Output, MAR, Recent Results, Med Rec Status, Cardiac Rhythm afib, V-paced, Alarm Parameters , Quality Measures and Dual Neuro Assessment. Primary Nurse Alysia Servin RN and Reyna Ortiz RN performed a dual skin assessment on this patient No impairment noted  Luciano score, see flowsheet. 0800 Pt assessed, see flowsheet. A&OX4, moves all extremities. 1947 Per Dr. Piyush Carranza insert russo catheter due to retention. Bladder scan 754ml. Consult for urology placed. 1000 Pt up to bedside to commode with 1 assist and use of walker to attempt to void. Voided 100ml. Medium brown loose stool assessed. Pt care provided. Full linen change and CHG provided. 1035 Indwelling russo catheter placed using sterile technique. 1140 Per Dr. Piyush Carranza- fabian to pull central line. 1200 Pt reassessed, see flowsheet. Assessment unchanged. 1207 HOB flat, central line removed, quick clot applied, pressure held for 10  Minutes. No signs of bleeding or hematoma. Pt will remain flat for 30  Minutes. [address] to give report, nurse unavailable.  Called to give report, nurse unavailable. 1325 TRANSFER - OUT REPORT:    Verbal report given to Charmaine(name) on Ay[facility]  being transferred to [facility](unit) for routine progression of care       Report consisted of patients Situation, Background, Assessment and   Recommendations(SBAR). Information from the following report(s) SBAR, Kardex, ED Summary, Procedure Summary, Intake/Output, MAR, Recent Results, Med Rec Status, Cardiac Rhythm Afib, V-paced, Alarm Parameters , Quality Measures and Dual Neuro Assessment was reviewed with the receiving nurse.     Lines:   Peripheral IV 10/19/21 Left Antecubital (Active)   Site Assessment Clean, dry, & intact 10/22/21 0800   Phlebitis Assessment 0 10/22/21 0800   Infiltration Assessment 0 10/22/21 0800   Dressing Status Clean, dry, & intact 10/22/21 0800   Dressing Type Tape;Transparent 10/22/21 0800   Hub Color/Line Status Pink;Flushed;Capped 10/22/21 0800   Action Taken Open ports on tubing capped 10/22/21 0800   Alcohol Cap Used Yes 10/22/21 0800       Peripheral IV 10/19/21 Right Antecubital (Active)   Site Assessment Clean, dry, & intact 10/22/21 0800   Phlebitis Assessment 0 10/22/21 0800   Infiltration Assessment 0 10/22/21 0800   Dressing Status Clean, dry, & intact 10/22/21 0800   Dressing Type Tape;Transparent 10/22/21 0800   Hub Color/Line Status Pink;Flushed;Capped 10/22/21 0800   Action Taken Open ports on tubing capped 10/22/21 0800   Alcohol Cap Used Yes 10/22/21 0800        Opportunity for questions and clarification was provided.       Patient transported with:   [employer]

## 2021-10-22 NOTE — PROGRESS NOTES
Problem: Mobility Impaired (Adult and Pediatric)  Goal: *Acute Goals and Plan of Care (Insert Text)  Description: FUNCTIONAL STATUS PRIOR TO ADMISSION: Patient was modified independent using a rollator for functional mobility. HOME SUPPORT PRIOR TO ADMISSION: The patient lived alone with no local support. Physical Therapy Goals  Initiated 10/20/2021  1. Patient will move from supine to sit and sit to supine  in bed with minimal assistance/contact guard assist within 7 day(s). 2.  Patient will transfer from bed to chair and chair to bed with minimal assistance/contact guard assist using the least restrictive device within 7 day(s). 3.  Patient will perform sit to stand with minimal assistance/contact guard assist within 7 day(s). 4.  Patient will ambulate with minimal assistance/contact guard assist for 25 feet with the least restrictive device within 7 day(s). Outcome: Progressing Towards Goal   PHYSICAL THERAPY TREATMENT  Patient: Americo Orr (18 y.o. female)  Date: 10/22/2021  Diagnosis: Generalized weakness [R53.1] Shock ([address]       Precautions: Fall (Per Critical Care note 10/19/21 MAP>65)  Chart, physical therapy assessment, plan of care and goals were reviewed. ASSESSMENT  Patient continues with skilled PT services and is progressing towards goals. Patient this afternoon with fair participation in physical therapy treatment. Patient declining mobility to EOB or ambulation today secondary to fatigue after room transfer to 5th floor. Patient however agreeable to therapeutic exercises in bed so long as \"I don't have to move my back. \" Patient participates in therapeutic exercises as below. Educated on activity expectations over the weekend to include these exercises as performed today. Exercises written on white board with patient verbalizing and demonstrating understanding of these. Continue to recommend Snf upon d/c.       Current Level of Function Impacting Discharge (mobility/balance): mod/maxAx2 at last session for sit <> stand and short distance lateral ambulation    Other factors to consider for discharge: none additional         PLAN :  Patient continues to benefit from skilled intervention to address the above impairments. Continue treatment per established plan of care. to address goals. Recommendation for discharge: (in order for the patient to meet his/her long term goals)  Therapy up to 5 days/week in SNF setting    This discharge recommendation:  Has been made in collaboration with the attending provider and/or case management    IF patient discharges home will need the following DME: to be determined (TBD)       SUBJECTIVE:   Patient stated as long as I don't have to move my back.  re: patient willingness to participate in PT    OBJECTIVE DATA SUMMARY:   Critical Behavior:  Neurologic State: Alert  Orientation Level: Oriented X4  Cognition: Appropriate decision making, Appropriate for age attention/concentration, Appropriate safety awareness, Follows commands  Safety/Judgement: Awareness of environment  Functional Mobility Training:  Bed Mobility:      NTT              Transfers:            NTT                       Balance:     Ambulation/Gait Training:               NTT                                           Therapeutic Exercises:       EXERCISE   Sets   Reps   Active Active Assist   Passive Self ROM   Comments   Ankle Pumps 1 20 [x] [] [] []    Quad Sets/Glut Sets   [] [] [] []    Hamstring Sets   [] [] [] []    Short Arc Quads   [] [] [] []    Heel Slides 1 10 [x] [] [] []    Straight Leg Raises 1 10 [x] [] [] []    Hip abd/add 1 10 [x] [] [] []    Long Arc Quads   [] [] [] []    Marching   [] [] [] []       [] [] [] []        Pain Rating:  Patient complaining of back pain 10/10 which she says is consistent with her baseline - she says she has a salve that helps; offered to roll to apply salve with patient stating \"I don't need it right now\"    Activity Tolerance:   Fair and tolerates ADLs without rest breaks    After treatment patient left in no apparent distress:   Supine in bed, Call bell within reach, Bed / chair alarm activated, and Side rails x 3    COMMUNICATION/COLLABORATION:   The patients plan of care was discussed with: Occupational therapy assistant and Registered nurse.      Veronica Waldron PT, DPT     Time Calculation: 16 mins

## 2021-10-22 NOTE — PROGRESS NOTES
0105 Patient unable to void , straight cath done, 550 ml UO.  0720 Bedside shift change report given to [facility] . Report included the following information SBAR, Kardex, ED Summary, Intake/Output, MAR, Accordion, Recent Results, Med Rec Status and Cardiac Rhythm at fib. /V paced.

## 2021-10-22 NOTE — PROGRESS NOTES
TRANSFER - IN REPORT:    Verbal report received from Jayden Garcia (name) on Allene Primrose  being received from ICU (unit) for routine progression of care      Report consisted of patients Situation, Background, Assessment and   Recommendations(SBAR). Information from the following report(s) SBAR, Kardex, ED Summary, Intake/Output, MAR, Recent Results and Med Rec Status was reviewed with the receiving nurse. Opportunity for questions and clarification was provided. Assessment completed upon patients arrival to unit and care assumed.

## 2021-10-22 NOTE — PROGRESS NOTES
Javid Blum [facility] [address]  [address] [place] [address]  [phone]      Medical Progress Note      NAME: Opal Collins   :  1928  MRM:  [id number]    Date/Time of service: 10/22/2021  1:18 PM       Subjective:     Chief Complaint:  Patient was personally seen and examined by me during this time period. Chart reviewed. No fevers, chills. Off pressors        Objective:       Vitals:       Last 24hrs VS reviewed since prior progress note.  Most recent are:    Visit Vitals  BP (!) 105/55   Pulse 97   Temp 97.7 °F (36.5 °C)   Resp 18   Ht 5' 7\" (1.702 m)   Wt 63 kg (138 lb 14.2 oz)   SpO2 94%   BMI 21.75 kg/m²     SpO2 Readings from Last 6 Encounters:   10/22/21 94%   10/04/21 94%   21 98%   21 98%   12/15/20 97%   20 98%            Intake/Output Summary (Last 24 hours) at 10/22/2021 1318  Last data filed at 10/22/2021 1300  Gross per 24 hour   Intake 250 ml   Output 1000 ml   Net -750 ml        Exam:     Physical Exam:    Gen:  Elderly, ill-appearing, weak, NAD  HEENT:  Pink conjunctivae, PERRL, hearing intact to voice, dry mucous membranes  Neck:  Supple, without masses, thyroid non-tender  Resp:  No accessory muscle use, clear breath sounds without wheezes rales or rhonchi  Card:  No murmurs, normal S1, S2 without thrills, bruits or peripheral edema  Abd:  Soft, non-tender, non-distended, normoactive bowel sounds are present  Musc:  No cyanosis or clubbing  Skin:  No rashes  Neuro:  Cranial nerves 3-12 are grossly intact, follows commands appropriately  Psych:  fair insight, oriented to person, place and time, alert    Medications Reviewed: (see below)    Lab Data Reviewed: (see below)    ______________________________________________________________________    Medications:     Current Facility-Administered Medications   Medication Dose Route Frequency    sodium phosphate 30 mmol in 0.9% sodium chloride 250 mL infusion   IntraVENous ONCE    simethicone (MYLICON) tablet 80 mg  80 mg Oral QID PRN    cefTRIAXone (ROCEPHIN) 1 g in sterile water (preservative free) 10 mL IV syringe  1 g IntraVENous Q24H    PARoxetine (PAXIL) tablet 10 mg  10 mg Oral DAILY    levothyroxine (SYNTHROID) tablet 112 mcg  112 mcg Oral ACB    meloxicam (MOBIC) tablet 7.5 mg  7.5 mg Oral DAILY    diclofenac (VOLTAREN) 1 % topical gel 2 g  2 g Topical DAILY PRN    sodium chloride (NS) flush 5-40 mL  5-40 mL IntraVENous Q8H    sodium chloride (NS) flush 5-40 mL  5-40 mL IntraVENous PRN    acetaminophen (TYLENOL) tablet 650 mg  650 mg Oral Q6H PRN    Or    acetaminophen (TYLENOL) suppository 650 mg  650 mg Rectal Q6H PRN    senna (SENOKOT) tablet 8.6 mg  1 Tablet Oral DAILY PRN    NOREPINephrine (LEVOPHED) 8 mg in 5% dextrose 250mL (32 mcg/mL) infusion  0.5-16 mcg/min IntraVENous TITRATE    [Held by provider] apixaban (ELIQUIS) tablet 2.5 mg  2.5 mg Oral BID    amiodarone (CORDARONE) tablet 100 mg  100 mg Oral DAILY          Lab Review:     Recent Labs     10/22/21  0326 10/21/21  0415 10/20/21  0248   WBC 3.8 5.4 11.1*   HGB 10.8* 11.2* 11.3*   HCT 31.8* 33.5* 34.1*   PLT 48* 45* 52*     Recent Labs     10/22/21  0326 10/21/21  0415 10/20/21  0248   * 135* 132*   K 3.5 3.4* 3.6    105 102   CO2 25 20* 21   GLU 98 116* 151*   BUN 24* 32* 34*   CREA 0.76 0.95 1.23*   CA 7.7* 7.4* 7.2*   MG 1.8  --   --    PHOS 2.0*  --   --    ALB  --   --  2.2*   TBILI  --   --  0.9   ALT  --   --  14     Lab Results   Component Value Date/Time    Glucose (POC) 275 (H) 02/16/2019 05:38 PM          Assessment / Plan:     79 yo hx of HTN, CAD, chronic afib s/p pacer/ICD, hypothyroid presented w/ weakness, septic shock, R pyelonephritis, ELADIA, thrombocytopenia    1) R pyelonephritis: septic shock POA, now resolved. Abd CT consistent with R pyelo. Will monitor cultures. Cont IV CTX. Monitor closley    2) Acute urine retention: cont russo. Voiding trial with Urology in 1 week.       3) ELADIA/CKD 3: now resolved with IVF    4) Thrombocytopenia: due to sepsis. Will monitor     5) HTN/CAD/chronic afib: s/p pacer/ICD. Cont Amio. Eliquis on hold due to thrombocytopenia    6) Hypothyroid: cont synthroid    7) Weakness: consult PT/OT    Total time spent with patient: 45 min **I personally saw and examined the patient during this time period**                 Care Plan discussed with: Patient, nursing.   Updated son on the phone    Discussed:  Care Plan    Prophylaxis:  SCD's    Disposition:  SNF/LTC           ___________________________________________________    Attending Physician: Jessie Caldwell MD

## 2021-10-22 NOTE — CONSULTS
Urology Consult    Patient: Lacey Dixon MRN: [id number]  SSN: [id number]    YOB: 1928  Age: 80 y.o. Sex: female          Date of Consultation:  October 22, 2021  Requesting Physician: Missy Aguila MD  Reason for Consultation:  Retention          History of Present Illness:  Lacey Dixon is a 80 y.o. female admitted 10/19/2021 to the hospital for Generalized weakness [R53.1]. She  a 80 y.o. female who is being admitted for shock, suspected sepsis of unclear etiology. Ms. Wilkie Primrose presented to our Emergency Department today complaining of generalized weakness. She is hard of hearing and I have discussed with her son via telephone. He sates that she lives alone, usually functional and has been having intermittent diarrhea which he also states is an old problem. She was unable to get out of bed when he went to see her and he called EMS to bring her to the ED. Upon arrival, she was noted to be hypotensive and later developed a fever. She had no cough, nausea or vomiting. Son says she has bladder problems. Today pt is retaining urine of > 750 ml and Urology has been consulted . CT - Wall thickening involving the right renal pelvis and ureter with surrounding  fat stranding and right perinephric fat stranding  Cr.0.76  plts 45    Pt is an established pt of  followed by Dr Joanie Hines for urge / stress incontinence , hx postmenopausal bleeding with neg work up . Pt last seen in 2017 . Pt denies any recent UTI or antibiotic use . Denies urinary frequency or  Dysuria prior to admission . Discussed with pt large volume retention and need for russo to decompress  bladder .     Past Medical History:   Diagnosis Date    Acute cystitis 05/23/2018    Anemia, unspecified     Anxiety     Arrhythmia     a fib    Arthritis     osteoarthritis, rheumatoid    Atrial fibrillation (HCC)     Dr. Fanny Luke and Dr. Byron Mendez  following    Autoimmune disease [facility])     sjogren disease Dr. Gera Norris CAD (coronary artery disease)     HTN (hypertension)     Hyperlipidemia LDL goal < 100     Hypothyroid     Insomnia     Osteoarthritis     Sjogren's disease ([facility] [address]     Thoracic aneurysm without mention of rupture         Past Surgical History:   Procedure Laterality Date    HX BIV-IMPLANTABLE CARDIOVERTER DEFIBRILLATOR      HX CHOLECYSTECTOMY      HX COLONOSCOPY  3/2014    Dr. Yoko Haq    HX OTHER SURGICAL      hernia repairs    HX PARTIAL THYROIDECTOMY      HX JANNET AND BSO      HX VEIN STRIPPING      NE INSJ ELTRD CAR COLLIN SYS TM INSJ DFB/PM PLS GEN Left 2/22/2019    Lv Lead Placement performed by Andria Stanley MD at [facility] CATH LAB    NE INSJ/RPLCMT PERM DFB W/TRNSVNS LDS 1/DUAL CHMBR Left 2/22/2019    upgrade PM to ICD-Bsx performed by Andria Stanley MD at [facility] CATH LAB       Allergies   Allergen Reactions    Latex Hives     NOT ALLERGIC PER PT 02/01/2018    Pcn [Penicillins] Anaphylaxis    Sulfa (Sulfonamide Antibiotics) Anaphylaxis    Biaxin [Clarithromycin] Nausea Only    Biotin Unknown (comments)    Covid-19 Vaccine, Mrna, [id number], Lnp-S (Moderna) Rash    Pcn [Penicillins] Hives    Sulfa (Sulfonamide Antibiotics) Nausea Only        Prior to Admission medications    Medication Sig Start Date End Date Taking? Authorizing Provider   PARoxetine (PAXIL) 10 mg tablet TAKE 1 AND 1/2 TABLET BY MOUTH DAILY 10/10/21  Yes Parth Brown MD   fosinopriL (MONOPRIL) 10 mg tablet TAKE THREE TABLETS BY MOUTH EVERY MORNING AND TAKE TWO TABLETS BY MOUTH EVERY EVENING 10/9/21  Yes Parth Brown MD   levothyroxine (SYNTHROID) 112 mcg tablet Take 1 Tablet by mouth Daily (before breakfast). 10/9/21  Yes Parth Brown MD   ferrous sulfate (IRON PO) Take 325 mg by mouth daily. Yes Provider, Historical   amLODIPine (NORVASC) 5 mg tablet Take 1 Tablet by mouth daily.  10/4/21  Yes Jayne Quinones MD   atenoloL (TENORMIN) 25 mg tablet Take 1 Tablet by mouth daily. 6/8/21  Yes Barrera Brown MD   ipratropium (ATROVENT) 42 mcg (0.06 %) nasal spray 1 [place] by Both Nostrils route three (3) times daily as needed. Yes Provider, Historical   diclofenac (VOLTAREN) 1 % gel Apply 2 g to affected area daily as needed. Yes Provider, Historical   albuterol (PROVENTIL HFA, VENTOLIN HFA, PROAIR HFA) 90 mcg/actuation inhaler albuterol sulfate HFA 90 mcg/actuation aerosol inhaler   Yes Provider, Historical   Biotin 2,500 mcg cap Take 1 Caplet by mouth daily. Yes Provider, Historical   aspirin delayed-release 81 mg tablet Take 81 mg by mouth daily. Yes Provider, Historical   amiodarone (PACERONE) 100 mg tablet Take 100 mg by mouth daily. Yes Provider, Historical   ascorbic acid, vitamin C, (Vitamin C) 500 mg tablet Take 500 mg by mouth daily. Yes Provider, Historical   cetirizine (ZYRTEC) 10 mg tablet Take 10 mg by mouth daily. Yes Provider, Historical   eszopiclone (LUNESTA) 3 mg tablet Take 3 mg by mouth nightly. Yes Provider, Historical   meloxicam (MOBIC) 7.5 mg tablet Take 7.5 mg by mouth daily. Yes Provider, Historical   apixaban (ELIQUIS) 2.5 mg tablet Take 2.5 mg by mouth two (2) times a day. 12/4/20  Yes Provider, Historical   fluticasone (FLONASE) 50 mcg/actuation nasal spray 2 Sprays by Both Nostrils route daily as needed for Allergies. 7/3/16  Yes Provider, Historical   cholecalciferol, vitamin D3, (VITAMIN D3) 2,000 unit tab Take 2,000 Units by mouth daily. Yes Provider, Historical       Social History     Tobacco Use    Smoking status: Never Smoker    Smokeless tobacco: Never Used   Substance Use Topics    Alcohol use: Yes     Comment: wine        Family History   Problem Relation Age of Onset    Heart Disease Father         aneurysm    Cancer Maternal Grandfather         ROS:  Admission ROS by Gael Azevedo MD from 10/19/2021 was reviewed and changes (other than per HPI) include: none.     Physical Exam:    Vital Signs:  Temp (24hrs), Av.8 °F (36.6 °C), Min:97.6 °F (36.4 °C), Max:97.9 °F (36.6 °C)   Blood pressure 119/62, pulse (!) 103, temperature 97.9 °F (36.6 °C), resp. rate 21, height 5' 7\" (1.702 m), weight 63 kg (138 lb 14.2 oz), SpO2 95 %. I&O's:  No intake/output data recorded. Appearance: Elderly frail , pleasant   Conjunctiva/Lids: conjunctivae and lids normal   Pupil/Iris: pupils equal, round   External Ears/Nose: normal no lesions or deformities   Hearing: grossly Resighini  Neck: supple   Thyroid: no palpable enlargement   Respiratory Effort: breathing easily   Abd. Aorta: no palpable enlargement   Lower Extremity: no edema   Abdomen/Flank: soft non-tender without masses; no CVA tenderness   Liver/Spleen: no organomegaly   Hernia: no ventral hernia   Lymph: no adenopathy   Gait/Station: not assessed   Digits/Nails: no clubbing cyanosis petechiae arthritic changes   Skin Inspection: warm and dry   Skin Palpation: no SQ nodularity   Sensation: no sensory deficits   Judgment/Insight: intact   Mood/Affect: normal      Lab Review:     CBC   Recent Labs     10/22/21  0326 10/21/21  0415 10/20/21  0248   WBC 3.8 5.4 11.1*   HGB 10.8* 11.2* 11.3*   HCT 31.8* 33.5* 34.1*   PLT 48* 45* 52*     CMP   Recent Labs     10/22/21  0326 10/21/21  0415 10/20/21  0248 10/19/21  1036 10/19/21  1036   * 135* 132*   < > 131*   K 3.5 3.4* 3.6   < > 3.8    105 102   < > 99   CO2 25 20* 21   < > 24   GLU 98 116* 151*   < > 95   BUN 24* 32* 34*   < > 35*   CREA 0.76 0.95 1.23*   < > 1.60*   CA 7.7* 7.4* 7.2*   < > 7.6*   MG 1.8  --   --   --  1.9   PHOS 2.0*  --   --   --   --    ALB  --   --  2.2*  --  2.6*   TBILI  --   --  0.9  --  1.1*   ALT  --   --  14  --  13    < > = values in this interval not displayed. Other No results for input(s): INR, PSA, INREXT in the last 72 hours.     No lab exists for component: PTT    UA:   Lab Results   Component Value Date/Time    Color DARK YELLOW 10/19/2021 11:01 AM    Appearance CLOUDY (A) 10/19/2021 11:01 AM    Specific gravity 1.013 10/19/2021 11:01 AM    pH (UA) 5.5 10/19/2021 11:01 AM    Protein 100 (A) 10/19/2021 11:01 AM    Glucose Negative 10/19/2021 11:01 AM    Ketone TRACE (A) 10/19/2021 11:01 AM    Bilirubin Negative 09/10/2020 12:21 PM    Urobilinogen 0.2 10/19/2021 11:01 AM    Nitrites Negative 10/19/2021 11:01 AM    Leukocyte Esterase Negative 10/19/2021 11:01 AM    Epithelial cells FEW 10/19/2021 11:01 AM    Bacteria Negative 10/19/2021 11:01 AM    WBC 0-4 10/19/2021 11:01 AM    RBC 0-5 10/19/2021 11:01 AM       Cultures:  BC NGTD     Imaging:      CT: Results for orders placed during the hospital encounter of 10/19/21    CT ABD PELV WO CONT    Narrative  EXAM: CT ABD PELV WO CONT    INDICATION: fever, unclear source    COMPARISON: CT abdomen and pelvis 2/5/2019    CONTRAST:  None. TECHNIQUE:  Thin axial images were obtained through the abdomen and pelvis. Coronal and  sagittal reformats were generated. Oral contrast was not administered. CT dose  reduction was achieved through use of a standardized protocol tailored for this  examination and automatic exposure control for dose modulation. The absence of intravenous contrast material reduces the sensitivity for  evaluation of the vasculature and solid organs. FINDINGS:  LOWER THORAX: Cardiomegaly. Coronary artery calcifications. Partially visualized  cardiac ICD. Trace bilateral pleural effusions with associated atelectasis. Interlobular septal thickening, likely reflecting pulmonary edema. LIVER: Stable 1.6 cm hypodensity adjacent to falciform ligament, previously  characterized as a cyst.  BILIARY TREE: Cholecystectomy. CBD is not dilated. SPLEEN: Splenomegaly  PANCREAS: No focal abnormality. ADRENALS: Unremarkable. KIDNEYS/URETERS: No calculus or hydronephrosis. Wall thickening involving the  right renal pelvis and ureter with surrounding fat stranding and right  perinephric fat stranding. STOMACH: Unremarkable.   SMALL BOWEL: No dilatation or wall thickening. COLON: Diverticulosis. No dilatation or wall thickening. APPENDIX: Normal  PERITONEUM: No ascites or pneumoperitoneum. RETROPERITONEUM: No lymphadenopathy. Extensive atherosclerosis without aortic  aneurysm. REPRODUCTIVE ORGANS: Hysterectomy. URINARY BLADDER: No mass or calculus. BONES: No destructive bone lesion. Marked degenerative changes and  dextroscoliosis in the lumbar spine. Marked right hip osteoarthritis. Partially  visualized left total hip arthroplasty  ABDOMINAL WALL: Evidence of prior right inguinal hernia repair. ADDITIONAL COMMENTS: N/A    Impression  1. Wall thickening involving the right renal pelvis and ureter with surrounding  fat stranding and right perinephric fat stranding, correlate for urinary tract  infection and/or pyelonephritis. 2.  Pulmonary edema pattern in the lung bases with trace bilateral pleural  effusions. Assessment:     Principal Problem:    Shock ([address] (10/19/2021)    Active Problems:    Anxiety ()      Hypothyroidism ()      CAD (coronary artery disease) ()      Persistent atrial fibrillation (HCC) ()      Ventricular tachycardia (HCC) (2/16/2019)      Thrombocytopenia ([address] (10/21/2020)      Generalized weakness (10/19/2021)      CKD (chronic kidney disease) stage 3, GFR 30-59 ml/min ([address] (10/19/2021)      ELADIA (acute kidney injury) ([address] (10/19/2021)      Hyponatremia (10/19/2021)          Plan:     1. Retention -  [place] for 7-10 days for bladder decompression . Office will arrange follow up for VT in 7-10 days  Thank you for consult , Urology will sign off     OP visit scheduled 11/3 at [phone] with Silvano Barba NP     Signed By: Kirby Braun.  Uyen Gil NP  - October 22, 2021

## 2021-10-23 LAB
ANION GAP SERPL CALC-SCNC: 4 MMOL/L (ref 5–15)
BUN SERPL-MCNC: 14 MG/DL (ref 6–20)
BUN/CREAT SERPL: 19 (ref 12–20)
CALCIUM SERPL-MCNC: 7.3 MG/DL (ref 8.5–10.1)
CHLORIDE SERPL-SCNC: 102 MMOL/L (ref 97–108)
CO2 SERPL-SCNC: 28 MMOL/L (ref 21–32)
CREAT SERPL-MCNC: 0.72 MG/DL (ref 0.55–1.02)
ERYTHROCYTE [DISTWIDTH] IN BLOOD BY AUTOMATED COUNT: 13.7 % (ref 11.5–14.5)
GLUCOSE SERPL-MCNC: 100 MG/DL (ref 65–100)
HCT VFR BLD AUTO: 32.3 % (ref 35–47)
HGB BLD-MCNC: 10.8 G/DL (ref 11.5–16)
MAGNESIUM SERPL-MCNC: 1.7 MG/DL (ref 1.6–2.4)
MCH RBC QN AUTO: 31.8 PG (ref 26–34)
MCHC RBC AUTO-ENTMCNC: 33.4 G/DL (ref 30–36.5)
MCV RBC AUTO: 95 FL (ref 80–99)
NRBC # BLD: 0 K/UL (ref 0–0.01)
NRBC BLD-RTO: 0 PER 100 WBC
PHOSPHATE SERPL-MCNC: 2.6 MG/DL (ref 2.6–4.7)
PLATELET # BLD AUTO: 67 K/UL (ref 150–400)
PMV BLD AUTO: 10.8 FL (ref 8.9–12.9)
POTASSIUM SERPL-SCNC: 3.6 MMOL/L (ref 3.5–5.1)
RBC # BLD AUTO: 3.4 M/UL (ref 3.8–5.2)
SODIUM SERPL-SCNC: 134 MMOL/L (ref 136–145)
WBC # BLD AUTO: 4.1 K/UL (ref 3.6–11)

## 2021-10-23 PROCEDURE — 80048 BASIC METABOLIC PNL TOTAL CA: CPT

## 2021-10-23 PROCEDURE — 74011250637 HC RX REV CODE- 250/637: Performed by: INTERNAL MEDICINE

## 2021-10-23 PROCEDURE — 85027 COMPLETE CBC AUTOMATED: CPT

## 2021-10-23 PROCEDURE — 36415 COLL VENOUS BLD VENIPUNCTURE: CPT

## 2021-10-23 PROCEDURE — 87040 BLOOD CULTURE FOR BACTERIA: CPT

## 2021-10-23 PROCEDURE — 83735 ASSAY OF MAGNESIUM: CPT

## 2021-10-23 PROCEDURE — 74011250636 HC RX REV CODE- 250/636: Performed by: INTERNAL MEDICINE

## 2021-10-23 PROCEDURE — 65270000029 HC RM PRIVATE

## 2021-10-23 PROCEDURE — 74011000250 HC RX REV CODE- 250: Performed by: INTERNAL MEDICINE

## 2021-10-23 PROCEDURE — 84100 ASSAY OF PHOSPHORUS: CPT

## 2021-10-23 RX ADMIN — LEVOTHYROXINE SODIUM 112 MCG: 0.11 TABLET ORAL at 06:18

## 2021-10-23 RX ADMIN — Medication 10 ML: at 21:01

## 2021-10-23 RX ADMIN — Medication 10 ML: at 13:33

## 2021-10-23 RX ADMIN — AMIODARONE HYDROCHLORIDE 100 MG: 200 TABLET ORAL at 09:42

## 2021-10-23 RX ADMIN — Medication 10 ML: at 06:18

## 2021-10-23 RX ADMIN — CEFTRIAXONE 2 G: 2 INJECTION, POWDER, FOR SOLUTION INTRAMUSCULAR; INTRAVENOUS at 10:51

## 2021-10-23 RX ADMIN — MELOXICAM 7.5 MG: 7.5 TABLET ORAL at 09:42

## 2021-10-23 RX ADMIN — TRAMADOL HYDROCHLORIDE 50 MG: 50 TABLET, COATED ORAL at 21:00

## 2021-10-23 RX ADMIN — PAROXETINE HYDROCHLORIDE 10 MG: 20 TABLET, FILM COATED ORAL at 09:42

## 2021-10-23 RX ADMIN — APIXABAN 2.5 MG: 2.5 TABLET, FILM COATED ORAL at 17:35

## 2021-10-23 RX ADMIN — APIXABAN 2.5 MG: 2.5 TABLET, FILM COATED ORAL at 09:42

## 2021-10-23 NOTE — ROUTINE PROCESS
Bedside shift change report given to Cyndee Hines (oncoming nurse) by Anya Vicente (offgoing nurse). Report included the following information SBAR, Kardex, ED Summary, Intake/Output, MAR, Recent Results and Med Rec Status.

## 2021-10-23 NOTE — PROGRESS NOTES
Javid Blum [facility] [address]  [facility], [address] [place] [address]  [phone]      Medical Progress Note      NAME: Eder Mcqueen   :  1928  MRM:  [id number]    Date/Time of service: 10/23/2021  10:57 AM       Subjective:     Chief Complaint:  Patient was personally seen and examined by me during this time period. Chart reviewed. No events overnight . No chest pain, SOB       Objective:       Vitals:       Last 24hrs VS reviewed since prior progress note.  Most recent are:    Visit Vitals  /73 (BP 1 Location: Left upper arm, BP Patient Position: At rest)   Pulse 95   Temp 98.2 °F (36.8 °C)   Resp 18   Ht 5' 7\" (1.702 m)   Wt 63 kg (138 lb 14.2 oz)   SpO2 94%   BMI 21.75 kg/m²     SpO2 Readings from Last 6 Encounters:   10/23/21 94%   10/04/21 94%   21 98%   21 98%   12/15/20 97%   20 98%            Intake/Output Summary (Last 24 hours) at 10/23/2021 1057  Last data filed at 10/23/2021 4663  Gross per 24 hour   Intake 490 ml   Output 2250 ml   Net -1760 ml        Exam:     Physical Exam:    Gen:  Elderly, ill-appearing, weak, NAD  HEENT:  Pink conjunctivae, PERRL, hearing intact to voice, dry mucous membranes  Neck:  Supple, without masses, thyroid non-tender  Resp:  No accessory muscle use, clear breath sounds without wheezes rales or rhonchi  Card:  No murmurs, normal S1, S2 without thrills, bruits or peripheral edema  Abd:  Soft, non-tender, non-distended, normoactive bowel sounds are present  Musc:  No cyanosis or clubbing  Skin:  No rashes  Neuro:  Cranial nerves 3-12 are grossly intact, follows commands appropriately  Psych:  fair insight, oriented to person, place and time, alert    Medications Reviewed: (see below)    Lab Data Reviewed: (see below)    ______________________________________________________________________    Medications:     Current Facility-Administered Medications   Medication Dose Route Frequency    cefTRIAXone (ROCEPHIN) 2 g in sterile water (preservative free) 20 mL IV syringe  2 g IntraVENous Q24H    prochlorperazine (COMPAZINE) injection 10 mg  10 mg IntraVENous Q6H PRN    traMADoL (ULTRAM) tablet 50 mg  50 mg Oral Q6H PRN    simethicone (MYLICON) tablet 80 mg  80 mg Oral QID PRN    PARoxetine (PAXIL) tablet 10 mg  10 mg Oral DAILY    levothyroxine (SYNTHROID) tablet 112 mcg  112 mcg Oral ACB    meloxicam (MOBIC) tablet 7.5 mg  7.5 mg Oral DAILY    diclofenac (VOLTAREN) 1 % topical gel 2 g  2 g Topical DAILY PRN    sodium chloride (NS) flush 5-40 mL  5-40 mL IntraVENous Q8H    sodium chloride (NS) flush 5-40 mL  5-40 mL IntraVENous PRN    acetaminophen (TYLENOL) tablet 650 mg  650 mg Oral Q6H PRN    Or    acetaminophen (TYLENOL) suppository 650 mg  650 mg Rectal Q6H PRN    senna (SENOKOT) tablet 8.6 mg  1 Tablet Oral DAILY PRN    apixaban (ELIQUIS) tablet 2.5 mg  2.5 mg Oral BID    amiodarone (CORDARONE) tablet 100 mg  100 mg Oral DAILY          Lab Review:     Recent Labs     10/23/21  0540 10/22/21  0326 10/21/21  0415   WBC 4.1 3.8 5.4   HGB 10.8* 10.8* 11.2*   HCT 32.3* 31.8* 33.5*   PLT 67* 48* 45*     Recent Labs     10/23/21  0540 10/22/21  0326 10/21/21  0415   * 132* 135*   K 3.6 3.5 3.4*    102 105   CO2 28 25 20*    98 116*   BUN 14 24* 32*   CREA 0.72 0.76 0.95   CA 7.3* 7.7* 7.4*   MG 1.7 1.8  --    PHOS 2.6 2.0*  --      Lab Results   Component Value Date/Time    Glucose (POC) 275 (H) 02/16/2019 05:38 PM          Assessment / Plan:     81 yo hx of HTN, CAD, chronic afib s/p pacer/ICD, hypothyroid presented w/ weakness, septic shock, R pyelonephritis, ELADIA, thrombocytopenia    1) R pyelonephritis: septic shock POA, now resolved. Abd CT consistent with R pyelo. Initial blood Cx grew an organism is 1/4 bottle. Will repeat blood Cx. Cont IV CTX. Monitor closely    2) Acute urine retention: cont russo. Will try to remove Russo Monday.   Urology following    3) ELADIA/CKD 3: now resolved with IVF    4) Thrombocytopenia: Improving. Due to sepsis. Will monitor     5) HTN/CAD/chronic afib: s/p pacer/ICD. Cont Amio. Restart Eliquis    6) Hypothyroid: cont synthroid    7) Weakness: cont PT/OT    Total time spent with patient: 35 min **I personally saw and examined the patient during this time period**                 Care Plan discussed with: Patient, nursing.   Updated son on the phone    Discussed:  Care Plan    Prophylaxis:  SCD's, Eliquis     Disposition:  SNF/LTC           ___________________________________________________    Attending Physician: Jenny Hernandez MD

## 2021-10-23 NOTE — ROUTINE PROCESS
Bedside shift change report given to [address]ma (oncoming nurse) by Leopold Das (offgoing nurse). Report included the following information SBAR, Kardex, ED Summary, Intake/Output, MAR, Recent Results and Med Rec Status.

## 2021-10-23 NOTE — PROGRESS NOTES
Bedside and Verbal shift change report given to Torie Alvarez (oncoming nurse) by Ami Cooney RN (offgoing nurse). Report included the following information SBAR, Kardex, Intake/Output, MAR, Accordion and Recent Results.

## 2021-10-24 LAB
ANION GAP SERPL CALC-SCNC: 5 MMOL/L (ref 5–15)
BUN SERPL-MCNC: 10 MG/DL (ref 6–20)
BUN/CREAT SERPL: 15 (ref 12–20)
CALCIUM SERPL-MCNC: 8 MG/DL (ref 8.5–10.1)
CHLORIDE SERPL-SCNC: 102 MMOL/L (ref 97–108)
CO2 SERPL-SCNC: 28 MMOL/L (ref 21–32)
CREAT SERPL-MCNC: 0.68 MG/DL (ref 0.55–1.02)
ERYTHROCYTE [DISTWIDTH] IN BLOOD BY AUTOMATED COUNT: 13.5 % (ref 11.5–14.5)
GLUCOSE SERPL-MCNC: 104 MG/DL (ref 65–100)
HCT VFR BLD AUTO: 35.4 % (ref 35–47)
HGB BLD-MCNC: 11.9 G/DL (ref 11.5–16)
MAGNESIUM SERPL-MCNC: 1.8 MG/DL (ref 1.6–2.4)
MCH RBC QN AUTO: 31.9 PG (ref 26–34)
MCHC RBC AUTO-ENTMCNC: 33.6 G/DL (ref 30–36.5)
MCV RBC AUTO: 94.9 FL (ref 80–99)
NRBC # BLD: 0 K/UL (ref 0–0.01)
NRBC BLD-RTO: 0 PER 100 WBC
PHOSPHATE SERPL-MCNC: 2.6 MG/DL (ref 2.6–4.7)
PLATELET # BLD AUTO: 92 K/UL (ref 150–400)
PMV BLD AUTO: 10.1 FL (ref 8.9–12.9)
POTASSIUM SERPL-SCNC: 3.7 MMOL/L (ref 3.5–5.1)
RBC # BLD AUTO: 3.73 M/UL (ref 3.8–5.2)
SODIUM SERPL-SCNC: 135 MMOL/L (ref 136–145)
WBC # BLD AUTO: 4 K/UL (ref 3.6–11)

## 2021-10-24 PROCEDURE — 74011250637 HC RX REV CODE- 250/637: Performed by: INTERNAL MEDICINE

## 2021-10-24 PROCEDURE — 74011250636 HC RX REV CODE- 250/636: Performed by: INTERNAL MEDICINE

## 2021-10-24 PROCEDURE — 85027 COMPLETE CBC AUTOMATED: CPT

## 2021-10-24 PROCEDURE — 74011000250 HC RX REV CODE- 250: Performed by: INTERNAL MEDICINE

## 2021-10-24 PROCEDURE — 80048 BASIC METABOLIC PNL TOTAL CA: CPT

## 2021-10-24 PROCEDURE — 83735 ASSAY OF MAGNESIUM: CPT

## 2021-10-24 PROCEDURE — 36415 COLL VENOUS BLD VENIPUNCTURE: CPT

## 2021-10-24 PROCEDURE — 84100 ASSAY OF PHOSPHORUS: CPT

## 2021-10-24 PROCEDURE — 65270000029 HC RM PRIVATE

## 2021-10-24 RX ADMIN — Medication 10 ML: at 12:49

## 2021-10-24 RX ADMIN — Medication 10 ML: at 06:37

## 2021-10-24 RX ADMIN — TRAMADOL HYDROCHLORIDE 50 MG: 50 TABLET, COATED ORAL at 09:21

## 2021-10-24 RX ADMIN — CEFTRIAXONE 2 G: 2 INJECTION, POWDER, FOR SOLUTION INTRAMUSCULAR; INTRAVENOUS at 09:11

## 2021-10-24 RX ADMIN — TRAMADOL HYDROCHLORIDE 50 MG: 50 TABLET, COATED ORAL at 17:21

## 2021-10-24 RX ADMIN — APIXABAN 2.5 MG: 2.5 TABLET, FILM COATED ORAL at 17:21

## 2021-10-24 RX ADMIN — APIXABAN 2.5 MG: 2.5 TABLET, FILM COATED ORAL at 09:11

## 2021-10-24 RX ADMIN — TRAMADOL HYDROCHLORIDE 50 MG: 50 TABLET, COATED ORAL at 23:48

## 2021-10-24 RX ADMIN — MELOXICAM 7.5 MG: 7.5 TABLET ORAL at 09:11

## 2021-10-24 RX ADMIN — Medication 10 ML: at 23:40

## 2021-10-24 RX ADMIN — AMIODARONE HYDROCHLORIDE 100 MG: 200 TABLET ORAL at 09:11

## 2021-10-24 RX ADMIN — PAROXETINE HYDROCHLORIDE 10 MG: 20 TABLET, FILM COATED ORAL at 09:11

## 2021-10-24 RX ADMIN — LEVOTHYROXINE SODIUM 112 MCG: 0.11 TABLET ORAL at 06:36

## 2021-10-24 NOTE — PROGRESS NOTES
Bedside and Verbal shift change report given to RONAK Germain (oncoming nurse) by Alexx Swain RN (offgoing nurse). Report included the following information SBAR, Kardex, Procedure Summary, Intake/Output, MAR, Accordion, Recent Results and Med Rec Status.

## 2021-10-24 NOTE — PROGRESS NOTES
Javid Blum [facility] [address]  [address] [place] [address]  [phone]      Medical Progress Note      NAME: Alistair Love   :  1928  MRM:  [id number]    Date/Time of service: 10/24/2021  11:16 AM       Subjective:     Chief Complaint:  Patient was personally seen and examined by me during this time period. Chart reviewed. C/o weakness. Spoke to daughter-in-law at bedside        Objective:       Vitals:       Last 24hrs VS reviewed since prior progress note.  Most recent are:    Visit Vitals  /72 (BP 1 Location: Left upper arm, BP Patient Position: Semi fowlers)   Pulse 98   Temp 97.8 °F (36.6 °C)   Resp 18   Ht 5' 7\" (1.702 m)   Wt 63 kg (138 lb 14.2 oz)   SpO2 93%   BMI 21.75 kg/m²     SpO2 Readings from Last 6 Encounters:   10/24/21 93%   10/04/21 94%   21 98%   21 98%   12/15/20 97%   20 98%            Intake/Output Summary (Last 24 hours) at 10/24/2021 1116  Last data filed at 10/24/2021 0911  Gross per 24 hour   Intake 740 ml   Output 600 ml   Net 140 ml        Exam:     Physical Exam:    Gen:  Elderly, ill-appearing, weak, NAD  HEENT:  Pink conjunctivae, PERRL, hearing intact to voice, dry mucous membranes  Neck:  Supple, without masses, thyroid non-tender  Resp:  No accessory muscle use, clear breath sounds without wheezes rales or rhonchi  Card:  No murmurs, normal S1, S2 without thrills, bruits or peripheral edema  Abd:  Soft, non-tender, non-distended, normoactive bowel sounds are present  Musc:  No cyanosis or clubbing  Skin:  No rashes  Neuro:  Cranial nerves 3-12 are grossly intact, follows commands appropriately  Psych:  fair insight, oriented to person, place and time, alert    Medications Reviewed: (see below)    Lab Data Reviewed: (see below)    ______________________________________________________________________    Medications:     Current Facility-Administered Medications   Medication Dose Route Frequency    cefTRIAXone (ROCEPHIN) 2 g in sterile water (preservative free) 20 mL IV syringe  2 g IntraVENous Q24H    prochlorperazine (COMPAZINE) injection 10 mg  10 mg IntraVENous Q6H PRN    traMADoL (ULTRAM) tablet 50 mg  50 mg Oral Q6H PRN    simethicone (MYLICON) tablet 80 mg  80 mg Oral QID PRN    PARoxetine (PAXIL) tablet 10 mg  10 mg Oral DAILY    levothyroxine (SYNTHROID) tablet 112 mcg  112 mcg Oral ACB    meloxicam (MOBIC) tablet 7.5 mg  7.5 mg Oral DAILY    diclofenac (VOLTAREN) 1 % topical gel 2 g  2 g Topical DAILY PRN    sodium chloride (NS) flush 5-40 mL  5-40 mL IntraVENous Q8H    sodium chloride (NS) flush 5-40 mL  5-40 mL IntraVENous PRN    acetaminophen (TYLENOL) tablet 650 mg  650 mg Oral Q6H PRN    Or    acetaminophen (TYLENOL) suppository 650 mg  650 mg Rectal Q6H PRN    senna (SENOKOT) tablet 8.6 mg  1 Tablet Oral DAILY PRN    apixaban (ELIQUIS) tablet 2.5 mg  2.5 mg Oral BID    amiodarone (CORDARONE) tablet 100 mg  100 mg Oral DAILY          Lab Review:     Recent Labs     10/24/21  0512 10/23/21  0540 10/22/21  0326   WBC 4.0 4.1 3.8   HGB 11.9 10.8* 10.8*   HCT 35.4 32.3* 31.8*   PLT 92* 67* 48*     Recent Labs     10/24/21  0512 10/23/21  0540 10/22/21  0326   * 134* 132*   K 3.7 3.6 3.5    102 102   CO2 28 28 25   * 100 98   BUN 10 14 24*   CREA 0.68 0.72 0.76   CA 8.0* 7.3* 7.7*   MG 1.8 1.7 1.8   PHOS 2.6 2.6 2.0*     Lab Results   Component Value Date/Time    Glucose (POC) 275 (H) 02/16/2019 05:38 PM          Assessment / Plan:     79 yo hx of HTN, CAD, chronic afib s/p pacer/ICD, hypothyroid presented w/ weakness, septic shock, R pyelonephritis, ELADIA, thrombocytopenia    1) R pyelonephritis: septic shock POA, now resolved. Abd CT consistent with R pyelo. Initial blood Cx grew gram pos warren in 1/4 bottle, likely a contaminant. Repeat blood Cx neg. Cont IV CTX. Monitor closely    2) Acute urine retention: cont russo. Will try to remove Russo on 10/25.   Urology following    3) ELADIA/CKD 3: now resolved with IVF    4) Thrombocytopenia: Improving. Due to sepsis. Will monitor     5) HTN/CAD/chronic afib: s/p pacer/ICD.   Cont Amio, eliquis     6) Hypothyroid: cont synthroid    7) Weakness: cont PT/OT    Total time spent with patient: 20 min **I personally saw and examined the patient during this time period**                 Care Plan discussed with: Patient, nursing, family     Discussed:  Care Plan    Prophylaxis:  SCD's, Eliquis     Disposition:  SNF/LTC           ___________________________________________________    Attending Physician: Betzaida Barber MD

## 2021-10-25 PROCEDURE — 74011250636 HC RX REV CODE- 250/636: Performed by: INTERNAL MEDICINE

## 2021-10-25 PROCEDURE — 97530 THERAPEUTIC ACTIVITIES: CPT

## 2021-10-25 PROCEDURE — 65270000029 HC RM PRIVATE

## 2021-10-25 PROCEDURE — 74011250637 HC RX REV CODE- 250/637: Performed by: INTERNAL MEDICINE

## 2021-10-25 PROCEDURE — 97535 SELF CARE MNGMENT TRAINING: CPT

## 2021-10-25 PROCEDURE — 51798 US URINE CAPACITY MEASURE: CPT

## 2021-10-25 PROCEDURE — 97116 GAIT TRAINING THERAPY: CPT

## 2021-10-25 PROCEDURE — 74011000250 HC RX REV CODE- 250: Performed by: INTERNAL MEDICINE

## 2021-10-25 RX ADMIN — CEFTRIAXONE 2 G: 2 INJECTION, POWDER, FOR SOLUTION INTRAMUSCULAR; INTRAVENOUS at 09:02

## 2021-10-25 RX ADMIN — LEVOTHYROXINE SODIUM 112 MCG: 0.11 TABLET ORAL at 07:09

## 2021-10-25 RX ADMIN — TRAMADOL HYDROCHLORIDE 50 MG: 50 TABLET, COATED ORAL at 22:38

## 2021-10-25 RX ADMIN — Medication 10 ML: at 07:10

## 2021-10-25 RX ADMIN — Medication 10 ML: at 22:39

## 2021-10-25 RX ADMIN — APIXABAN 2.5 MG: 2.5 TABLET, FILM COATED ORAL at 08:46

## 2021-10-25 RX ADMIN — PAROXETINE HYDROCHLORIDE 10 MG: 20 TABLET, FILM COATED ORAL at 08:46

## 2021-10-25 RX ADMIN — ACETAMINOPHEN 650 MG: 325 TABLET ORAL at 12:32

## 2021-10-25 RX ADMIN — MELOXICAM 7.5 MG: 7.5 TABLET ORAL at 08:46

## 2021-10-25 RX ADMIN — APIXABAN 2.5 MG: 2.5 TABLET, FILM COATED ORAL at 17:06

## 2021-10-25 RX ADMIN — Medication 10 ML: at 16:35

## 2021-10-25 RX ADMIN — Medication 10 ML: at 22:38

## 2021-10-25 RX ADMIN — AMIODARONE HYDROCHLORIDE 100 MG: 200 TABLET ORAL at 08:46

## 2021-10-25 NOTE — PROGRESS NOTES
Problem: Self Care Deficits Care Plan (Adult)  Goal: *Acute Goals and Plan of Care (Insert Text)  Description: FUNCTIONAL STATUS PRIOR TO ADMISSION: Patient was modified independent using a rollator for functional mobility. HOME SUPPORT PRIOR TO ADMISSION: The patient lived alone with no local support. Occupational Therapy Goals  Initiated 10/20/2021  1. Patient will perform grooming with modified independence within 7 day(s). 2.  Patient will perform upper body dressing and bathing with supervision/set-up within 7 day(s). 3.  Patient will perform lower body dressing and bathing with minimal assistance within 7 day(s). 4.  Patient will perform toilet transfers to [facility] with moderate assistance within 7 day(s). 5.  Patient will perform all aspects of toileting with moderate assistance within 7 day(s). 6.  Patient will participate in upper extremity therapeutic exercise/activities with supervision/set-up for 10 minutes within 7 day(s). 7.  Patient will utilize energy conservation techniques during functional activities with verbal cues within 7 day(s). Outcome: Progressing Towards Goal   OCCUPATIONAL THERAPY TREATMENT  Patient: Opal Collins (24 y.o. female)  Date: 10/25/2021  Diagnosis: Generalized weakness [R53.1] Shock ([address]       Precautions: Fall (Per Critical Care note 10/19/21 MAP>65)  Chart, occupational therapy assessment, plan of care, and goals were reviewed. ASSESSMENT  Patient continues with skilled OT services and is progressing towards goals. Pt seen for ADL re-training, needing max assist to sit edge of bed in order to comb her hair. HR increase to 129 with activity and pt verbalized back pain throughout tx. Assist x 2 to transfer bed to chair. Once in the chair pt looking uncomfortable and verbalized not being able to tolerate sitting position even 30 min. Assisted pt back to bed. BP stable.     Current Level of Function Impacting Discharge (ADLs): Contact guard grooming sitting edge of bed, max assist LB bathe, dress, toileting     Other factors to consider for discharge:          PLAN :  Patient continues to benefit from skilled intervention to address the above impairments. Continue treatment per established plan of care to address goals. Recommend with staff: bed in chair position for ADL's, there ex, there act, meals    Recommend next OT session: cont towards goals    Recommendation for discharge: (in order for the patient to meet his/her long term goals)  Therapy up to 5 days/week in SNF setting    This discharge recommendation:  Has not yet been discussed the attending provider and/or case management    IF patient discharges home will need the following DME:       SUBJECTIVE:   Patient stated My back is a problem.     OBJECTIVE DATA SUMMARY:   Cognitive/Behavioral Status:  Neurologic State: Alert  Orientation Level: Oriented X4  Cognition: Follows commands             Functional Mobility and Transfers for ADLs:  Bed Mobility:   Max assist    Transfers:  Sit to Stand: Moderate assistance;Assist x2          Balance:  Sitting: Impaired  Standing: Impaired    ADL Intervention:       Grooming  Grooming Assistance: Contact guard assistance  Position Performed: Seated edge of bed  Brushing/Combing Hair: Contact guard assistance       Lower Body Dressing Assistance  Socks: Total assistance (dependent)    Toileting  Toileting Assistance: Maximum assistance         Activity Tolerance:   Poor    After treatment patient left in no apparent distress:   Supine in bed and Call bell within reach    COMMUNICATION/COLLABORATION:   The patients plan of care was discussed with: Physical therapy assistant, Occupational therapist, and Registered nurse.      JOSE RAMON Mireles  Time Calculation: 40 mins

## 2021-10-25 NOTE — PROGRESS NOTES
Javid Blum [facility]  [address] [facility], [address] [place] [address]  [phone]      Medical Progress Note      NAME: Grace Temple   :  1928  MRM:  [id number]    Date/Time of service: 10/25/2021  10:03 AM       Subjective:     Chief Complaint:  Patient was personally seen and examined by me during this time period. Chart reviewed. Blood Cx growing gram neg. No fevers, chills       Objective:       Vitals:       Last 24hrs VS reviewed since prior progress note.  Most recent are:    Visit Vitals  /69 (BP 1 Location: Left upper arm, BP Patient Position: At rest)   Pulse 84   Temp 97.5 °F (36.4 °C)   Resp 17   Ht 5' 7\" (1.702 m)   Wt 63 kg (138 lb 14.2 oz)   SpO2 95%   BMI 21.75 kg/m²     SpO2 Readings from Last 6 Encounters:   10/25/21 95%   10/04/21 94%   21 98%   21 98%   12/15/20 97%   20 98%            Intake/Output Summary (Last 24 hours) at 10/25/2021 1003  Last data filed at 10/25/2021 0849  Gross per 24 hour   Intake 480 ml   Output 1975 ml   Net -1495 ml        Exam:     Physical Exam:    Gen:  Elderly, ill-appearing, weak, NAD  HEENT:  Pink conjunctivae, PERRL, hearing intact to voice, dry mucous membranes  Neck:  Supple, without masses, thyroid non-tender  Resp:  No accessory muscle use, clear breath sounds without wheezes rales or rhonchi  Card:  No murmurs, normal S1, S2 without thrills, bruits or peripheral edema  Abd:  Soft, non-tender, non-distended, normoactive bowel sounds are present  Musc:  No cyanosis or clubbing  Skin:  No rashes  Neuro:  Cranial nerves 3-12 are grossly intact, follows commands appropriately  Psych:  fair insight, oriented to person, place and time, alert    Medications Reviewed: (see below)    Lab Data Reviewed: (see below)    ______________________________________________________________________    Medications:     Current Facility-Administered Medications   Medication Dose Route Frequency    cefTRIAXone (ROCEPHIN) 2 g in sterile water (preservative free) 20 mL IV syringe  2 g IntraVENous Q24H    prochlorperazine (COMPAZINE) injection 10 mg  10 mg IntraVENous Q6H PRN    traMADoL (ULTRAM) tablet 50 mg  50 mg Oral Q6H PRN    simethicone (MYLICON) tablet 80 mg  80 mg Oral QID PRN    PARoxetine (PAXIL) tablet 10 mg  10 mg Oral DAILY    levothyroxine (SYNTHROID) tablet 112 mcg  112 mcg Oral ACB    meloxicam (MOBIC) tablet 7.5 mg  7.5 mg Oral DAILY    diclofenac (VOLTAREN) 1 % topical gel 2 g  2 g Topical DAILY PRN    sodium chloride (NS) flush 5-40 mL  5-40 mL IntraVENous Q8H    sodium chloride (NS) flush 5-40 mL  5-40 mL IntraVENous PRN    acetaminophen (TYLENOL) tablet 650 mg  650 mg Oral Q6H PRN    Or    acetaminophen (TYLENOL) suppository 650 mg  650 mg Rectal Q6H PRN    senna (SENOKOT) tablet 8.6 mg  1 Tablet Oral DAILY PRN    apixaban (ELIQUIS) tablet 2.5 mg  2.5 mg Oral BID    amiodarone (CORDARONE) tablet 100 mg  100 mg Oral DAILY          Lab Review:     Recent Labs     10/24/21  0512 10/23/21  0540   WBC 4.0 4.1   HGB 11.9 10.8*   HCT 35.4 32.3*   PLT 92* 67*     Recent Labs     10/24/21  0512 10/23/21  0540   * 134*   K 3.7 3.6    102   CO2 28 28   * 100   BUN 10 14   CREA 0.68 0.72   CA 8.0* 7.3*   MG 1.8 1.7   PHOS 2.6 2.6     Lab Results   Component Value Date/Time    Glucose (POC) 275 (H) 02/16/2019 05:38 PM          Assessment / Plan:     81 yo hx of HTN, CAD, chronic afib s/p pacer/ICD, hypothyroid presented w/ weakness, septic shock, R pyelonephritis, ELADIA, thrombocytopenia    1) R pyelonephritis/bacteremia: septic shock POA, now resolved. Abd CT consistent with R pyelo. Initial blood Cx grew gram pos warren in 1/4 bottle, likely a contaminant. Also growing 2/4 bottle gram neg rods. Awaiting speciation. Repeat blood Cx neg. Cont IV CTX. Monitor closely    2) Acute urine retention: cont russo.   Will try to remove Russo today, voiding trial.  Urology following    3) ELADIA/CKD 3: now resolved with IVF    4) Thrombocytopenia: Improving. Due to sepsis. Will monitor     5) HTN/CAD/chronic afib: s/p pacer/ICD.   Cont Amio, eliquis     6) Hypothyroid: cont synthroid    7) Weakness: cont PT/OT    Total time spent with patient: 35 min **I personally saw and examined the patient during this time period**                 Care Plan discussed with: Patient, nursing, family     Discussed:  Care Plan    Prophylaxis:  SCD's, Eliquis     Disposition:  SNF/LTC           ___________________________________________________    Attending Physician: Loco Gonzáles MD

## 2021-10-25 NOTE — PROGRESS NOTES
Nutrition Assessment     Type and Reason for Visit: Initial, RD nutrition re-screen/LOS    Nutrition Recommendations/Plan:   1. Continue current diet order. 2. Initiate Ensure Enlive BID    Nutrition Assessment:    Pt is a 80year old female admitted with Generalized weakness [R53.1]. She  has a past medical history of Acute cystitis (05/23/2018), Anemia, unspecified, Anxiety, Arrhythmia, Arthritis, Atrial fibrillation ([address] Autoimmune disease ([address] CAD (coronary artery disease), HTN (hypertension), Hyperlipidemia LDL goal < 100, Hypothyroid, Insomnia, Osteoarthritis, Sjogren's disease ([address] and Thoracic aneurysm without mention of rupture. Patient states decreased appetite x 6 days (\"since I've been here\"). No weight or appetite changes previous to this. No chewing/swallowing problems. No N/V/D at this time. States she is uncomfortable as she has not yet voided but feels as though she needs to. PO intake averaging 25% of all meals - voiced acceptance of protein supplement. FA    Patient Vitals for the past 168 hrs:   % Diet Eaten   10/24/21 1721 51 - 75%   10/24/21 1400 1 - 25%   10/23/21 1400 0%   10/23/21 1334 1 - 25%   10/23/21 0942 1 - 25%   10/22/21 1347 0%   10/19/21 1500 0%       Wt Readings from Last 10 Encounters:   10/20/21 63 kg (138 lb 14.2 oz)   10/04/21 63.8 kg (140 lb 9.6 oz)   05/25/21 62.6 kg (138 lb)   02/16/21 61.7 kg (136 lb)   10/21/20 59 kg (130 lb)   09/10/20 62.6 kg (138 lb)   05/26/20 59.9 kg (132 lb)   01/02/20 59.9 kg (132 lb)   12/30/19 60.8 kg (134 lb)   12/14/19 61.2 kg (135 lb)       Malnutrition Assessment:  Malnutrition Status: No malnutrition     Estimated Daily Nutrient Needs:  Energy (kcal):  0628-6953 (25-30)  Protein (g):  63-76 (1.0-1.2)       Fluid (ml/day):  6560-4296    Nutrition Related Findings:    ABD: Distended, active bowel sounds 10/25  Last BM: 10/22  Edema: None noted 10/25    Nutr.  Labs:  Lab Results   Component Value Date/Time    GFR est AA >60 10/24/2021 05:12 AM    GFR est non-AA >60 10/24/2021 05:12 AM    Creatinine (POC) 1.1 02/16/2019 05:38 PM    Creatinine 0.68 10/24/2021 05:12 AM    BUN 10 10/24/2021 05:12 AM    BUN (POC) 18 02/16/2019 05:38 PM    Sodium (POC) 137 02/16/2019 05:38 PM    Sodium 135 (L) 10/24/2021 05:12 AM    Potassium 3.7 10/24/2021 05:12 AM    Potassium (POC) 3.8 02/16/2019 05:38 PM    Chloride (POC) 99 02/16/2019 05:38 PM    Chloride 102 10/24/2021 05:12 AM    CO2 28 10/24/2021 05:12 AM     Lab Results   Component Value Date/Time    Glucose 104 (H) 10/24/2021 05:12 AM    Glucose (POC) 275 (H) 02/16/2019 05:38 PM     Lab Results   Component Value Date/Time    Hemoglobin A1c 6.0 (H) 03/14/2017 12:41 PM       Nutr. Meds:  Eliquis  Synthroid  Senna    Current Nutrition Therapies:  ADULT DIET Regular  DIET ONE TIME MESSAGE    Anthropometric Measures:  · Height:  5' 7.01\" (170.2 cm)  · Current Body Wt:  63 kg (138 lb 14.2 oz)  · BMI: 21.7    Nutrition Diagnosis:   · Inadequate protein-energy intake related to early satiety as evidenced by intake 0-25%    Nutrition Intervention:  Food and/or Nutrient Delivery: Continue current diet, Start oral nutrition supplement  Nutrition Education and Counseling: No recommendations at this time  Coordination of Nutrition Care: Continue to monitor while inpatient, Interdisciplinary rounds    Goals:  PO intake >/= 75% of all meals and supplements within 3 - 5 days       Nutrition Monitoring and Evaluation:   Behavioral-Environmental Outcomes: None identified  Food/Nutrient Intake Outcomes: Food and nutrient intake, Supplement intake  Physical Signs/Symptoms Outcomes: Biochemical data, Weight    Discharge Planning:     Too soon to determine     Electronically signed by Rosa Beckett RD on 44/17/7907  Contact Number: [phone] or via [employer]

## 2021-10-25 NOTE — PROGRESS NOTES
1700  Bladder scanned patient with 400 ml. MD notified and a russo insertion was placed. Pt was able to void after russo order. Hands off report has been given to the oncoming Nurse to continue monitoring patient output.

## 2021-10-25 NOTE — PROGRESS NOTES
Problem: Mobility Impaired (Adult and Pediatric)  Goal: *Acute Goals and Plan of Care (Insert Text)  Description: FUNCTIONAL STATUS PRIOR TO ADMISSION: Patient was modified independent using a rollator for functional mobility. HOME SUPPORT PRIOR TO ADMISSION: The patient lived alone with no local support. Physical Therapy Goals  Initiated 10/20/2021  1. Patient will move from supine to sit and sit to supine  in bed with minimal assistance/contact guard assist within 7 day(s). 2.  Patient will transfer from bed to chair and chair to bed with minimal assistance/contact guard assist using the least restrictive device within 7 day(s). 3.  Patient will perform sit to stand with minimal assistance/contact guard assist within 7 day(s). 4.  Patient will ambulate with minimal assistance/contact guard assist for 25 feet with the least restrictive device within 7 day(s). Outcome: Progressing Towards Goal  Note:   PHYSICAL THERAPY TREATMENT  Patient: Yonathan Messer (65 y.o. female)  Date: 10/25/2021  Diagnosis: Generalized weakness [R53.1] Shock ([address]       Precautions: Fall (Per Critical Care note 10/19/21 MAP>65)  Chart, physical therapy assessment, plan of care and goals were reviewed. ASSESSMENT  Patient continues with skilled PT services and progressing towards goals. Increased back pain with functional tranfers with Max A x 1 with verbal cues for log rolling to decreased strain on LB. Tolerated sitting EOB x 10 min  and sitting in chair 10 min. HR elevated 130 with activity. BP stable. Pt requested to return to bed 2/2 pain. Current Level of Function Impacting Discharge (mobility/balance): max A     Other factors to consider for discharge: lives alone         PLAN :  Patient continues to benefit from skilled intervention to address the above impairments. Continue treatment per established plan of care. to address goals.     Recommendation for discharge: (in order for the patient to meet his/her long term goals)  Therapy up to 5 days/week in SNF setting    This discharge recommendation:  Has been made in collaboration with the attending provider and/or case management    IF patient discharges home will need the following DME: to be determined (TBD)       SUBJECTIVE:   Patient stated i have terrible arthritis.     OBJECTIVE DATA SUMMARY:   Critical Behavior:  Neurologic State: Alert  Orientation Level: Oriented X4  Cognition: Follows commands  Safety/Judgement: Awareness of environment  Functional Mobility Training:  Bed Mobility:     Supine to Sit: Maximum assistance;Assist x1;Additional time  Sit to Supine: Maximum assistance; Additional time  Scooting: Maximum assistance        Transfers:  Sit to Stand: Moderate assistance;Assist x2  Stand to Sit: Moderate assistance;Assist x2                             Balance:  Sitting: Impaired  Standing: Impaired  Ambulation/Gait Training:  Distance (ft):  (3' and 6')  Assistive Device: Walker, rolling;Gait belt  Ambulation - Level of Assistance: Assist x2;Minimal assistance        Gait Abnormalities: Decreased step clearance;Shuffling gait        Base of Support: Narrowed     Speed/Tayler: Slow                       Stairs: Therapeutic Exercises:     Pain Ratin/10    Activity Tolerance:   Fair    After treatment patient left in no apparent distress:   Supine in bed, Call bell within reach, and Bed / chair alarm activated    COMMUNICATION/COLLABORATION:   The patients plan of care was discussed with: Occupational therapy assistant and Registered nurse.      Jocelyne Cuadra   Time Calculation: 40 mins

## 2021-10-25 NOTE — PROGRESS NOTES
Called about patient positive blood culture. Blood culture is positive for gram-negative rods. Patient is already on Rocephin which will continue while awaiting ID and sensitivity of organism.

## 2021-10-25 NOTE — PROGRESS NOTES
Bedside shift change report given to [address] [place] [address] (oncoming nurse) by Ananda Kaye (offgoing nurse). Report included the following information SBAR, Kardex and MAR.

## 2021-10-25 NOTE — PROGRESS NOTES
Bedside and Verbal shift change report given to RONAK Leiva (oncoming nurse) by Clive Boyd (offgoing nurse). Report included the following information SBAR, Kardex, Procedure Summary, Intake/Output, MAR, Accordion, Recent Results and Med Rec Status.

## 2021-10-26 LAB
ANION GAP SERPL CALC-SCNC: 5 MMOL/L (ref 5–15)
BUN SERPL-MCNC: 10 MG/DL (ref 6–20)
BUN/CREAT SERPL: 15 (ref 12–20)
CALCIUM SERPL-MCNC: 8.3 MG/DL (ref 8.5–10.1)
CHLORIDE SERPL-SCNC: 100 MMOL/L (ref 97–108)
CO2 SERPL-SCNC: 29 MMOL/L (ref 21–32)
CREAT SERPL-MCNC: 0.65 MG/DL (ref 0.55–1.02)
ERYTHROCYTE [DISTWIDTH] IN BLOOD BY AUTOMATED COUNT: 13.6 % (ref 11.5–14.5)
GLUCOSE SERPL-MCNC: 97 MG/DL (ref 65–100)
HCT VFR BLD AUTO: 35.8 % (ref 35–47)
HGB BLD-MCNC: 11.8 G/DL (ref 11.5–16)
MAGNESIUM SERPL-MCNC: 1.8 MG/DL (ref 1.6–2.4)
MCH RBC QN AUTO: 31.4 PG (ref 26–34)
MCHC RBC AUTO-ENTMCNC: 33 G/DL (ref 30–36.5)
MCV RBC AUTO: 95.2 FL (ref 80–99)
NRBC # BLD: 0 K/UL (ref 0–0.01)
NRBC BLD-RTO: 0 PER 100 WBC
PHOSPHATE SERPL-MCNC: 3 MG/DL (ref 2.6–4.7)
PLATELET # BLD AUTO: 173 K/UL (ref 150–400)
PMV BLD AUTO: 10 FL (ref 8.9–12.9)
POTASSIUM SERPL-SCNC: 4.4 MMOL/L (ref 3.5–5.1)
RBC # BLD AUTO: 3.76 M/UL (ref 3.8–5.2)
SARS-COV-2, COV2: NORMAL
SODIUM SERPL-SCNC: 134 MMOL/L (ref 136–145)
WBC # BLD AUTO: 5.6 K/UL (ref 3.6–11)

## 2021-10-26 PROCEDURE — 74011250637 HC RX REV CODE- 250/637: Performed by: INTERNAL MEDICINE

## 2021-10-26 PROCEDURE — 77030038269 HC DRN EXT URIN PURWCK BARD -A

## 2021-10-26 PROCEDURE — 99223 1ST HOSP IP/OBS HIGH 75: CPT | Performed by: INTERNAL MEDICINE

## 2021-10-26 PROCEDURE — 65270000029 HC RM PRIVATE

## 2021-10-26 PROCEDURE — 36415 COLL VENOUS BLD VENIPUNCTURE: CPT

## 2021-10-26 PROCEDURE — U0005 INFEC AGEN DETEC AMPLI PROBE: HCPCS

## 2021-10-26 PROCEDURE — 74011250636 HC RX REV CODE- 250/636: Performed by: INTERNAL MEDICINE

## 2021-10-26 PROCEDURE — 97530 THERAPEUTIC ACTIVITIES: CPT

## 2021-10-26 PROCEDURE — 83735 ASSAY OF MAGNESIUM: CPT

## 2021-10-26 PROCEDURE — 97116 GAIT TRAINING THERAPY: CPT

## 2021-10-26 PROCEDURE — 84100 ASSAY OF PHOSPHORUS: CPT

## 2021-10-26 PROCEDURE — 80048 BASIC METABOLIC PNL TOTAL CA: CPT

## 2021-10-26 PROCEDURE — 85027 COMPLETE CBC AUTOMATED: CPT

## 2021-10-26 PROCEDURE — 74011000250 HC RX REV CODE- 250: Performed by: INTERNAL MEDICINE

## 2021-10-26 RX ADMIN — CEFTRIAXONE 2 G: 2 INJECTION, POWDER, FOR SOLUTION INTRAMUSCULAR; INTRAVENOUS at 09:45

## 2021-10-26 RX ADMIN — Medication 5 ML: at 14:00

## 2021-10-26 RX ADMIN — SIMETHICONE 80 MG: 80 TABLET, CHEWABLE ORAL at 10:04

## 2021-10-26 RX ADMIN — Medication 10 ML: at 22:29

## 2021-10-26 RX ADMIN — MELOXICAM 7.5 MG: 7.5 TABLET ORAL at 09:43

## 2021-10-26 RX ADMIN — AMIODARONE HYDROCHLORIDE 100 MG: 200 TABLET ORAL at 09:42

## 2021-10-26 RX ADMIN — APIXABAN 2.5 MG: 2.5 TABLET, FILM COATED ORAL at 09:43

## 2021-10-26 RX ADMIN — PAROXETINE HYDROCHLORIDE 10 MG: 20 TABLET, FILM COATED ORAL at 09:43

## 2021-10-26 RX ADMIN — LEVOTHYROXINE SODIUM 112 MCG: 0.11 TABLET ORAL at 07:07

## 2021-10-26 RX ADMIN — TRAMADOL HYDROCHLORIDE 50 MG: 50 TABLET, COATED ORAL at 22:29

## 2021-10-26 RX ADMIN — APIXABAN 2.5 MG: 2.5 TABLET, FILM COATED ORAL at 17:38

## 2021-10-26 NOTE — PROGRESS NOTES
Problem: Mobility Impaired (Adult and Pediatric)  Goal: *Acute Goals and Plan of Care (Insert Text)  Description: FUNCTIONAL STATUS PRIOR TO ADMISSION: Patient was modified independent using a rollator for functional mobility. HOME SUPPORT PRIOR TO ADMISSION: The patient lived alone with no local support. Physical Therapy Goals  Initiated 10/20/2021  1. Patient will move from supine to sit and sit to supine  in bed with minimal assistance/contact guard assist within 7 day(s). 2.  Patient will transfer from bed to chair and chair to bed with minimal assistance/contact guard assist using the least restrictive device within 7 day(s). 3.  Patient will perform sit to stand with minimal assistance/contact guard assist within 7 day(s). 4.  Patient will ambulate with minimal assistance/contact guard assist for 25 feet with the least restrictive device within 7 day(s). Outcome: Progressing Towards Goal  Note:   PHYSICAL THERAPY TREATMENT  Patient: Danie Youngblood (17 y.o. female)  Date: 10/26/2021  Diagnosis: Generalized weakness [R53.1] Shock ([address]       Precautions: Fall (Per Critical Care note 10/19/21 MAP>65)  Chart, physical therapy assessment, plan of care and goals were reviewed. ASSESSMENT  Patient continues with skilled PT services and progressing towards goals. Improved pain control today. With decreased need for assistance, cues for log roll technique to limit strain on LB. Limited by impaired balance and decreased activity tolerance with brief bout gait training using RW. Educated pt to limit time in chair to 30-45 min to minimize fatigue. Current Level of Function Impacting Discharge (mobility/balance): Min A     Other factors to consider for discharge: lives alone         PLAN :  Patient continues to benefit from skilled intervention to address the above impairments. Continue treatment per established plan of care. to address goals.     Recommendation for discharge: (in order for the patient to meet his/her long term goals)  Therapy up to 5 days/week in SNF setting    This discharge recommendation:  Has been made in collaboration with the attending provider and/or case management    IF patient discharges home will need the following DME: to be determined (TBD)       SUBJECTIVE:   Patient stated i feel better today.     OBJECTIVE DATA SUMMARY:   Critical Behavior:  Neurologic State: Alert, Eyes open spontaneously, Appropriate for age  Orientation Level: Oriented X4  Cognition: Appropriate decision making, Follows commands  Safety/Judgement: Awareness of environment  Functional Mobility Training:  Bed Mobility:  Rolling: Contact guard assistance;Minimum assistance  Supine to Sit: Minimum assistance     Scooting: Contact guard assistance; Additional time;Assist x1        Transfers:  Sit to Stand: Minimum assistance;Assist x2  Stand to Sit: Minimum assistance;Assist x2                             Balance:  Sitting: Intact  Standing: Impaired  Standing - Static: Constant support; Fair  Standing - Dynamic : Constant support; Fair  Ambulation/Gait Training:  Distance (ft): 8 Feet (ft)  Assistive Device: Walker, rolling;Gait belt  Ambulation - Level of Assistance: Minimal assistance;Assist x1;Additional time        Gait Abnormalities: Decreased step clearance; Path deviations        Base of Support: Narrowed     Speed/Tayler: Slow  Step Length: Right shortened;Left shortened                    Stairs: Therapeutic Exercises:   Pain Ratin/10    Activity Tolerance:   Good    After treatment patient left in no apparent distress:   Sitting in chair, Call bell within reach, and Bed / chair alarm activated    COMMUNICATION/COLLABORATION:   The patients plan of care was discussed with: Occupational therapy assistant and Registered nurse.      Mahesh Braun   Time Calculation: 26 mins

## 2021-10-26 NOTE — PROGRESS NOTES
Bedside and Verbal shift change report given to [address] [place] [address] [address] (oncoming nurse) by Autumn Mccann RN (offgoing nurse). Report included the following information SBAR, Kardex, Intake/Output and MAR.

## 2021-10-26 NOTE — PROGRESS NOTES
10/26/2021   CARE MANAGEMENT NOTE:  CM reviewed EMR and handoff received from previous  (Christiano Gallegos). Reportedly, pt was admitted with right pyelo/bacteremia; septic shock, urine retention, ELADIA/CKD 3, HTN, and weakness. Reportedly, lives alone in 1st floor apt. Roro Susan Jocelin Coffey ([phone]) is the primary family contact. RUR 13%; LOS 6 days    Transition Plan of Care:  1. Cardiology, Urology following for medical management - pt passed voiding trial on 10/25. 2. SNF - CM left choice letter with pt and later met with son at the nurses station. Son states that he doesn't want pt discharged until pt is able to walk down the hallway. Son strongly requests to speak with MD.  As far as SNF choices, son only wants [facility]. 3.  Covid PCR test for placement was ordered on 10/26  4. Outpt f/u  5. [facility] is likely needed for transport    CM will continue to follow pt for SNF  D. Vaishali Luke

## 2021-10-26 NOTE — PROGRESS NOTES
R posterior wrist peripheral stick just now completed by this author, for morning lab draw: 1 lavendar top tube, 1 pistacchio top tube. Sent via tube system to lab.

## 2021-10-26 NOTE — PROGRESS NOTES
Physical therapy    Pt declining OOB activity at this time, assisted with setting up lunch tray. PT will attempt later this afternoon. Eleni Pride

## 2021-10-26 NOTE — PROGRESS NOTES
Urology Progress Note    Patient: Raphael Heart MRN: [id number]  SSN: [id number]    YOB: 1928  Age: 80 y.o. Sex: female        Assessment:     Raphael Heart is a 80 y.o. female admitted to the hospital on 10/19/2021 for sepsis, right pyelonephritis, ELADIA, urinary retention. Dorman placed on 10/22/2021 for 750cc. Passed voiding trial on 10/25/2021. Noted to have wall thickening of right renal pelvis and right ureter with surrounding fat stranding and right perinephric stranding on CT. Plan:     1. Passed voiding trial on 10/25/2021. Continue to monitor. Bladder scan at outpatient follow up. 2. Continue cx directed abx. UA without signs of infection. Urine culture not sent. Preliminary blood cultures gram positive rods 1/4.   3. Creatinine 1.6 on admission, now 0.65.   4. Discussed CT findings with patient. Will need outpatient follow up, likely repeat CT. If persistent thickening right renal pelvis and ureter may need ureteroscopy. Outpatient follow up arranged. Will sign off. Please call if needed. Reason For Visit:     Follow up for retention, pyelonephritis. Interval History:     Feels okay this morning. Catheter removed yesterday. Denies difficulty voiding. PVR last night 50cc.      ROS:     Denies fevers  Denies abdominal pain  Denies hematuria, frequency    Objective:     Visit Vitals  BP (!) 145/76 (BP 1 Location: Right upper arm, BP Patient Position: At rest)   Pulse 82   Temp 97.5 °F (36.4 °C)   Resp 18   Ht 5' 7.01\" (1.702 m)   Wt 63 kg (138 lb 14.2 oz)   SpO2 90%   BMI 21.75 kg/m²         Intake/Output Summary (Last 24 hours) at 10/26/2021 0914  Last data filed at 10/26/2021 2274  Gross per 24 hour   Intake 60 ml   Output 750 ml   Net -690 ml       Physical Exam  General: NAD  Respiratory: no distress, room air  Abdomen: soft, no distention  : no CVA tenderness  Neuro: Appropriate, no focal neurological deficits    Labs reviewed, October 26, 2021  Recent Results (from the past 24 hour(s))   CBC W/O DIFF    Collection Time: 10/26/21  3:47 AM   Result Value Ref Range    WBC 5.6 3.6 - 11.0 K/uL    RBC 3.76 (L) 3.80 - 5.20 M/uL    HGB 11.8 11.5 - 16.0 g/dL    HCT 35.8 35.0 - 47.0 %    MCV 95.2 80.0 - 99.0 FL    MCH 31.4 26.0 - 34.0 PG    MCHC 33.0 30.0 - 36.5 g/dL    RDW 13.6 11.5 - 14.5 %    PLATELET 624 561 - 037 K/uL    MPV 10.0 8.9 - 12.9 FL    NRBC 0.0 0  WBC    ABSOLUTE NRBC 0.00 0.00 - 2.03 K/uL   METABOLIC PANEL, BASIC    Collection Time: 10/26/21  3:47 AM   Result Value Ref Range    Sodium 134 (L) 136 - 145 mmol/L    Potassium 4.4 3.5 - 5.1 mmol/L    Chloride 100 97 - 108 mmol/L    CO2 29 21 - 32 mmol/L    Anion gap 5 5 - 15 mmol/L    Glucose 97 65 - 100 mg/dL    BUN 10 6 - 20 MG/DL    Creatinine 0.65 0.55 - 1.02 MG/DL    BUN/Creatinine ratio 15 12 - 20      GFR est AA >60 >60 ml/min/1.73m2    GFR est non-AA >60 >60 ml/min/1.73m2    Calcium 8.3 (L) 8.5 - 10.1 MG/DL   PHOSPHORUS    Collection Time: 10/26/21  3:47 AM   Result Value Ref Range    Phosphorus 3.0 2.6 - 4.7 MG/DL   MAGNESIUM    Collection Time: 10/26/21  3:47 AM   Result Value Ref Range    Magnesium 1.8 1.6 - 2.4 mg/dL       Imaging:  CT Results  (Last 48 hours)    None          Signed By: Panda Azar NP - October 26, 2021

## 2021-10-26 NOTE — PROGRESS NOTES
Discussed w/ ID Dr. Lucia Manzanares, who was advised by micro lab that there is GNR growing in blood culture.   Cont IV CTX 2gm per day for now

## 2021-10-26 NOTE — PROGRESS NOTES
Problem: Self Care Deficits Care Plan (Adult)  Goal: *Acute Goals and Plan of Care (Insert Text)  Description: FUNCTIONAL STATUS PRIOR TO ADMISSION: Patient was modified independent using a rollator for functional mobility. HOME SUPPORT PRIOR TO ADMISSION: The patient lived alone with no local support. Occupational Therapy Goals  Initiated 10/20/2021  1. Patient will perform grooming with modified independence within 7 day(s). 2.  Patient will perform upper body dressing and bathing with supervision/set-up within 7 day(s). 3.  Patient will perform lower body dressing and bathing with minimal assistance within 7 day(s). 4.  Patient will perform toilet transfers to [facility] with moderate assistance within 7 day(s). 5.  Patient will perform all aspects of toileting with moderate assistance within 7 day(s). 6.  Patient will participate in upper extremity therapeutic exercise/activities with supervision/set-up for 10 minutes within 7 day(s). 7.  Patient will utilize energy conservation techniques during functional activities with verbal cues within 7 day(s). Outcome: Progressing Towards Goal   OCCUPATIONAL THERAPY TREATMENT  Patient: Cheryl Shetty (36 y.o. female)  Date: 10/26/2021  Diagnosis: Generalized weakness [R53.1] Shock ([address]       Precautions: Fall (Per Critical Care note 10/19/21 MAP>65)  Chart, occupational therapy assessment, plan of care, and goals were reviewed. ASSESSMENT  Patient continues with skilled OT services and is progressing towards goals. Pt with improved pain control today. She needed assist x 2 to transfer to chair and can transfer to [facility] with nursing staff. Pt was able to engage with UE there ex seated in chair with improved tolerance.     Current Level of Function Impacting Discharge (ADLs): contact guard grooming, min assist x 2 to transfer to [facility]    Other factors to consider for discharge:          PLAN :  Patient continues to benefit from skilled intervention to address the above impairments. Continue treatment per established plan of care to address goals. Recommend with staff: out of bed to chair for ADL's, there ex, there act, meals    Recommend next OT session: cont towards goals    Recommendation for discharge: (in order for the patient to meet his/her long term goals)  Therapy up to 5 days/week in SNF setting    This discharge recommendation:  Has not yet been discussed the attending provider and/or case management    IF patient discharges home will need the following DME:        SUBJECTIVE:   Patient stated I feel more human today    OBJECTIVE DATA SUMMARY:   Cognitive/Behavioral Status:  Neurologic State: Alert; Appropriate for age  Orientation Level: Oriented X4  Cognition: Appropriate decision making             Functional Mobility and Transfers for ADLs:  Bed Mobility:  Rolling: Contact guard assistance;Minimum assistance  Supine to Sit: Minimum assistance  Scooting: Contact guard assistance; Additional time;Assist x1    Transfers:  Sit to Stand: Minimum assistance;Assist x2          Balance:  Sitting: Intact  Standing: Impaired  Standing - Static: Constant support; Fair  Standing - Dynamic : Constant support; Fair    ADL Intervention:     Pt sits in chair for grooming with contact guard, min assist x 2 to transfer to [facility]             Therapeutic Exercises:   Pt tolerates chest presses and shoulder ab/add 5 x without verbalizing pain. Activity Tolerance:   Fair    After treatment patient left in no apparent distress:   Sitting in chair    COMMUNICATION/COLLABORATION:   The patients plan of care was discussed with: Physical therapy assistant, Occupational therapist, and Registered nurse.      JOSE RAMON Mireles  Time Calculation: 20 mins

## 2021-10-26 NOTE — PROGRESS NOTES
[facility]  [address] [facility], [address] [place] [address]  [phone]      Medical Progress Note      NAME: Santos Wilson   :  1928  MRM:  [id number]    Date/Time: 10/26/2021        Assessment / Plan:     79 yo F w/ hx of HTN, CAD, chronic AF s/p pacer/ICD, hypothyroidism presenting w/ weakness, found to have R pyelonephritis, septic shock, ELADIA, and severe thrombocytopenia     R pyelonephritis/bacteremia: septic shock POA, now resolved. Abd CT showed evidence of R pyelonephritis. Appreciate ID evaluation. Pt has positive blood cultures. GPR isolated from a single blood culture was nonviable, likely contaminant. GNR growing in a second culture; multiple subcultures sent out. Cont IV CTX pending further culture data     Acute urine retention: passed voiding trail 10/25. Urology advised outpatient follow up     ELADIA/CKD 3: now resolved with IVF    Thrombocytopenia: 2/2 sepsis, resolved     HTN/CAD/chronic afib: s/p pacer/ICD. Cont Amio, Eliquis      Hypothyroid: cont LT4     Weakness: cont PT/OT. Anticipate need for SNF placement. Discussed with CM      Total time spent: 35 minutes  Time spent in the care of this patient including reviewing records, discussing with nursing and/or other providers on the treatment team, obtaining history and examining the patient, and discussing treatment plans. Care Plan discussed with: Patient, Nursing Staff and >50% of time spent in counseling and coordination of care    Discussed:  Care Plan and D/C Planning    Prophylaxis:  Eliquis    Disposition:  SNF/LTC         Subjective:     Chief Complaint:  Follow up pyelonephritis    Chart/notes/labs/studies reviewed, patient examined. Feels better. No fevers. No abd pain        Objective:       Vitals:        Last 24hrs VS reviewed since prior progress note.  Most recent are:    Visit Vitals  /72 (BP 1 Location: Right upper arm, BP Patient Position: At rest)   Pulse 100 Temp 98.4 °F (36.9 °C)   Resp 18   Ht 5' 7.01\" (1.702 m)   Wt 63 kg (138 lb 14.2 oz)   SpO2 94%   BMI 21.75 kg/m²     SpO2 Readings from Last 6 Encounters:   10/26/21 94%   10/04/21 94%   05/25/21 98%   02/16/21 98%   12/15/20 97%   12/09/20 98%            Intake/Output Summary (Last 24 hours) at 10/26/2021 1759  Last data filed at 10/26/2021 1741  Gross per 24 hour   Intake 510 ml   Output 2150 ml   Net -1640 ml          Exam:     Physical Exam:    Gen:  Elderly, well-developed, well-nourished, in no acute distress. HEENT:  Atraumatic, normocephalic. Sclerae nonicteric, hearing intact to voice  Neck:  Supple, without apparent masses. Resp:  No accessory muscle use, CTAB without wheezes, rales, or rhonchi  Card: RRR, without m/r/g. No LE edema  Abd:  +bowel sounds, soft, NTTP, nondistended  Neuro:  Face symmetric, speech fluent, follows commands appropriately, no focal weakness or numbness  Psych:  Alert, oriented to self and hospital. Fair insight     Medications Reviewed: (see below)    Lab Data Reviewed: (see below)    ______________________________________________________________________    Medications:     Current Facility-Administered Medications   Medication Dose Route Frequency    cefTRIAXone (ROCEPHIN) 2 g in sterile water (preservative free) 20 mL IV syringe  2 g IntraVENous Q24H    prochlorperazine (COMPAZINE) injection 10 mg  10 mg IntraVENous Q6H PRN    traMADoL (ULTRAM) tablet 50 mg  50 mg Oral Q6H PRN    simethicone (MYLICON) tablet 80 mg  80 mg Oral QID PRN    PARoxetine (PAXIL) tablet 10 mg  10 mg Oral DAILY    levothyroxine (SYNTHROID) tablet 112 mcg  112 mcg Oral ACB    meloxicam (MOBIC) tablet 7.5 mg  7.5 mg Oral DAILY    diclofenac (VOLTAREN) 1 % topical gel 2 g  2 g Topical DAILY PRN    sodium chloride (NS) flush 5-40 mL  5-40 mL IntraVENous Q8H    sodium chloride (NS) flush 5-40 mL  5-40 mL IntraVENous PRN    acetaminophen (TYLENOL) tablet 650 mg  650 mg Oral Q6H PRN    Or    acetaminophen (TYLENOL) suppository 650 mg  650 mg Rectal Q6H PRN    senna (SENOKOT) tablet 8.6 mg  1 Tablet Oral DAILY PRN    apixaban (ELIQUIS) tablet 2.5 mg  2.5 mg Oral BID    amiodarone (CORDARONE) tablet 100 mg  100 mg Oral DAILY            Lab Review:     Recent Labs     10/26/21  0347 10/24/21  0512   WBC 5.6 4.0   HGB 11.8 11.9   HCT 35.8 35.4    92*     Recent Labs     10/26/21  0347 10/24/21  0512   * 135*   K 4.4 3.7    102   CO2 29 28   GLU 97 104*   BUN 10 10   CREA 0.65 0.68   CA 8.3* 8.0*   MG 1.8 1.8   PHOS 3.0 2.6     No components found for: GLPOC  No results for input(s): PH, PCO2, PO2, HCO3, FIO2 in the last 72 hours. No results for input(s): INR, INREXT in the last 72 hours. No results found for: SDES  Lab Results   Component Value Date/Time    Culture result: NO GROWTH 3 DAYS 10/23/2021 10:45 AM    Culture result: (A) 10/19/2021 12:11 PM     GRAM POSITIVE RODS GROWING IN 1 OF 4 BOTTLES DRAWN SITES= LEFT AC ORGANISM NON VIABLE    Culture result: (A) 10/19/2021 12:11 PM     APPARENT GRAM NEGATIVE RODS GROWING IN 2ND OF 4 BOTTLES DRAWN (R AC SITE) NO GROWTH. UPON RE GRAM STAIN, NO ORGANISMS WERE SEEN. Culture result: (A) 10/19/2021 12:11 PM     PRELIMINARY REPORT OF APPARENT GRAM NEGATIVE RODS GROWING IN 2ND OF 4 BOTTLES DRAWN CALLED TO AND READ BACK BY MS BOBO RN ON 10/24/21 AT 1924 ([facility]). MS    Culture result: (A) 10/19/2021 12:11 PM     **RESUBBING BOTTLES 10/26/21, APPARENT GRAM NEGATIVE RODS GROWING IN 1 OF 2 BOTTLES DRAWN CALLED TO DR Zev Campo 10/26.  LWY    Culture result: REMAINING BOTTLE(S) HAS/HAVE NO GROWTH SO FAR 10/19/2021 12:11 PM              ___________________________________________________    Attending Physician: Melida Anton MD

## 2021-10-26 NOTE — PROGRESS NOTES
BEDSIDE_VERBAL_RECORDED_WRITTEN:[id number]::\"Bedside\"} shift change report given to [place] (oncoming nurse) by Chelle Kwon (offgoing nurse). Report included the following information SBAR, Kardex, ED Summary, Procedure Summary, Intake/Output, MAR, Recent Results and Med Rec Status, cardiac rhythm.

## 2021-10-26 NOTE — CONSULTS
Infectious Disease Consult    Impression/Plan   · Positive blood cultures. A gram-positive warren was isolated from a single blood culture which was nonviable. This likely represents a contaminant. A gram-negative warren is growing in a second culture. This was discussed with microbiology. Multiple subcultures have been sent out. Continue ceftriaxone pending further culture data  · Right-sided pyelonephritis. Urine culture not sent as UA was bland. Urology following and outpatient follow-up and imaging planned. · Multiple antibiotic allergies including penicillin, sulfa, and Biaxin. She is currently tolerating ceftriaxone  · ELADIA/CKD  · Chronic atrial fibrillation status post pacer/ICD        Anti-infectives:   1. Ceftriaxone    Subjective:   Date of Consultation:  October 26, 2021  Date of Admission: 10/19/2021   Referring Physician:     Please note patient is a poor historian. Per HPI \"Ms. Shayy Grove is a 80 y.o. female who is being admitted for shock, suspected sepsis of unclear etiology. Ms. Shayy Grove presented to our Emergency Department today complaining of generalized weakness. She is hard of hearing and I have discussed with her son via telephone. He sates that she lives alone, usually functional and has been having intermittent diarrhea which he also states is an old problem. She was unable to get out of bed when he went to see her and he called EMS to bring her to the ED. Upon arrival, she was noted to be hypotensive and later developed a fever. She had no cough, nausea or vomiting. Son says she has bladder problems. A CXR was clear. She received an IV fluid bolus but she remained hypotensive. She will be admitted to hospital for further management. \"    Work-up at this admission has revealed right-sided pyelonephritis. Urine culture was not sent as UA was bland. Blood cultures grew a GPR which was nonviable. Darrian Best is growing in a second culture which is being worked up by the microbiology lab.     The patient was initially treated with vancomycin and cefepime. She is now on ceftriaxone. David Montague   Discharge planning is underway and the infectious diseases service has been asked to assist with antibiotic management    Patient Active Problem List   Diagnosis Code    Anxiety F41.9    Insomnia G47.00    HTN (hypertension) I10    Hyperlipidemia LDL goal < 100 E78.5    Osteoarthritis M19.90    Hypothyroidism E03.9    CAD (coronary artery disease) I25.10    Persistent atrial fibrillation ([facility] I48.19    Thoracic aneurysm without mention of rupture I71.2    Anemia D64.9    Personal history of colonic polyps Z86.010    Advanced care planning/counseling discussion Z71.89    Ventricular tachycardia (HCC) I47.2    Elevated serum creatinine R79.89    SOB (shortness of breath) R06.02    Cough R05.9    Leukopenia D72.819    Thrombocytopenia (HCC) D69.6    Weakness R53.1    COVID-19 U07.1    Generalized weakness R53.1    Shock (HCC) R57.9    CKD (chronic kidney disease) stage 3, GFR 30-59 ml/min ([facility]) N18.30    ELADIA (acute kidney injury) ([facility] N17.9    Hyponatremia E87.1     Past Medical History:   Diagnosis Date    Acute cystitis 05/23/2018    Anemia, unspecified     Anxiety     Arrhythmia     a fib    Arthritis     osteoarthritis, rheumatoid    Atrial fibrillation (HCC)     Dr. Ledy Collins and Dr. Gladys Owen  following    Autoimmune disease [facility])     sjogren disease Dr. Shelly Husain CAD (coronary artery disease)     HTN (hypertension)     Hyperlipidemia LDL goal < 100     Hypothyroid     Insomnia     Osteoarthritis     Sjogren's disease ([facility]     Thoracic aneurysm without mention of rupture       Family History   Problem Relation Age of Onset    Heart Disease Father         aneurysm    Cancer Maternal Grandfather       Social History     Tobacco Use    Smoking status: Never Smoker    Smokeless tobacco: Never Used   Substance Use Topics    Alcohol use: Yes     Comment: wine     Past Surgical History:   Procedure Laterality Date    HX BIV-IMPLANTABLE CARDIOVERTER DEFIBRILLATOR      HX CHOLECYSTECTOMY      HX COLONOSCOPY  3/2014    Dr. Bonifacio Redmond    HX OTHER SURGICAL      hernia repairs    HX PARTIAL THYROIDECTOMY      HX JANNET AND BSO      HX VEIN STRIPPING      FL INSJ ELTRD CAR COLLIN SYS TM INSJ DFB/PM PLS GEN Left 2/22/2019    Lv Lead Placement performed by Desirae Moeller MD at [facility] CATH LAB    FL INSJ/RPLCMT PERM DFB W/TRNSVNS LDS 1/DUAL [facility] Left 2/22/2019    upgrade PM to ICD-Bsx performed by Desirae Moeller MD at [facility] CATH LAB      Prior to Admission medications    Medication Sig Start Date End Date Taking? Authorizing Provider   PARoxetine (PAXIL) 10 mg tablet TAKE 1 AND 1/2 TABLET BY MOUTH DAILY 10/10/21  Yes Jeison Brown MD   fosinopriL (MONOPRIL) 10 mg tablet TAKE THREE TABLETS BY MOUTH EVERY MORNING AND TAKE TWO TABLETS BY MOUTH EVERY EVENING 10/9/21  Yes Jeison Brown MD   levothyroxine (SYNTHROID) 112 mcg tablet Take 1 Tablet by mouth Daily (before breakfast). 10/9/21  Yes Jeison Brown MD   ferrous sulfate (IRON PO) Take 325 mg by mouth daily. Yes Provider, Historical   amLODIPine (NORVASC) 5 mg tablet Take 1 Tablet by mouth daily. 10/4/21  Yes Cristiano Mahajan MD   atenoloL (TENORMIN) 25 mg tablet Take 1 Tablet by mouth daily. 6/8/21  Yes Jeison Brown MD   ipratropium (ATROVENT) 42 mcg (0.06 %) nasal spray 1 [place] by Both Nostrils route three (3) times daily as needed. Yes Provider, Historical   diclofenac (VOLTAREN) 1 % gel Apply 2 g to affected area daily as needed. Yes Provider, Historical   albuterol (PROVENTIL HFA, VENTOLIN HFA, PROAIR HFA) 90 mcg/actuation inhaler albuterol sulfate HFA 90 mcg/actuation aerosol inhaler   Yes Provider, Historical   Biotin 2,500 mcg cap Take 1 Caplet by mouth daily. Yes Provider, Historical   aspirin delayed-release 81 mg tablet Take 81 mg by mouth daily.    Yes Provider, Historical   amiodarone (PACERONE) 100 mg tablet Take 100 mg by mouth daily. Yes Provider, Historical   ascorbic acid, vitamin C, (Vitamin C) 500 mg tablet Take 500 mg by mouth daily. Yes Provider, Historical   cetirizine (ZYRTEC) 10 mg tablet Take 10 mg by mouth daily. Yes Provider, Historical   eszopiclone (LUNESTA) 3 mg tablet Take 3 mg by mouth nightly. Yes Provider, Historical   meloxicam (MOBIC) 7.5 mg tablet Take 7.5 mg by mouth daily. Yes Provider, Historical   apixaban (ELIQUIS) 2.5 mg tablet Take 2.5 mg by mouth two (2) times a day. 20  Yes Provider, Historical   fluticasone (FLONASE) 50 mcg/actuation nasal spray 2 Sprays by Both Nostrils route daily as needed for Allergies. 7/3/16  Yes Provider, Historical   cholecalciferol, vitamin D3, (VITAMIN D3) 2,000 unit tab Take 2,000 Units by mouth daily. Yes Provider, Historical     Allergies   Allergen Reactions    Latex Hives     NOT ALLERGIC PER PT 2018    Pcn [Penicillins] Anaphylaxis    Sulfa (Sulfonamide Antibiotics) Anaphylaxis    Biaxin [Clarithromycin] Nausea Only    Biotin Unknown (comments)    Covid-19 Vaccine, Mrna, [id number], Lnp-S (Moderna) Rash    Pcn [Penicillins] Hives    Sulfa (Sulfonamide Antibiotics) Nausea Only        Review of Systems:  A comprehensive review of systems was negative except for that written in the History of Present Illness. Objective:   Blood pressure 98/66, pulse 84, temperature 98.5 °F (36.9 °C), resp. rate 18, height 5' 7.01\" (1.702 m), weight 138 lb 14.2 oz (63 kg), SpO2 94 %. Temp (24hrs), Av.1 °F (36.7 °C), Min:97.5 °F (36.4 °C), Max:98.6 °F (37 °C)       Exam:    General:  Alert, cooperative, well noursished, well developed, appears stated age   Eyes:  Sclera anicteric. Mouth/Throat:  MMM   Neck: Supple   Lungs:   Clear to auscultation bilaterally   CV:  Regular rate and rhythm   Abdomen:   Soft, non-tender, nondistended.   No suprapubic tenderness to palpation   Extremities: No edema   Skin:  No rash   Lymph nodes:    Musculoskeletal:  Moves all   Lines/Devices:   Peripheral   Psych: Alert and oriented, normal mood affect given the setting       Data Review:   Recent Results (from the past 24 hour(s))   CBC W/O DIFF    Collection Time: 10/26/21  3:47 AM   Result Value Ref Range    WBC 5.6 3.6 - 11.0 K/uL    RBC 3.76 (L) 3.80 - 5.20 M/uL    HGB 11.8 11.5 - 16.0 g/dL    HCT 35.8 35.0 - 47.0 %    MCV 95.2 80.0 - 99.0 FL    MCH 31.4 26.0 - 34.0 PG    MCHC 33.0 30.0 - 36.5 g/dL    RDW 13.6 11.5 - 14.5 %    PLATELET 060 338 - 557 K/uL    MPV 10.0 8.9 - 12.9 FL    NRBC 0.0 0  WBC    ABSOLUTE NRBC 0.00 0.00 - 4.77 K/uL   METABOLIC PANEL, BASIC    Collection Time: 10/26/21  3:47 AM   Result Value Ref Range    Sodium 134 (L) 136 - 145 mmol/L    Potassium 4.4 3.5 - 5.1 mmol/L    Chloride 100 97 - 108 mmol/L    CO2 29 21 - 32 mmol/L    Anion gap 5 5 - 15 mmol/L    Glucose 97 65 - 100 mg/dL    BUN 10 6 - 20 MG/DL    Creatinine 0.65 0.55 - 1.02 MG/DL    BUN/Creatinine ratio 15 12 - 20      GFR est AA >60 >60 ml/min/1.73m2    GFR est non-AA >60 >60 ml/min/1.73m2    Calcium 8.3 (L) 8.5 - 10.1 MG/DL   PHOSPHORUS    Collection Time: 10/26/21  3:47 AM   Result Value Ref Range    Phosphorus 3.0 2.6 - 4.7 MG/DL   MAGNESIUM    Collection Time: 10/26/21  3:47 AM   Result Value Ref Range    Magnesium 1.8 1.6 - 2.4 mg/dL        Microbiology:      Studies:      Signed By: Noa Campbell DO     October 26, 2021

## 2021-10-27 LAB
SARS-COV-2, XPLCVT: NOT DETECTED
SOURCE, COVRS: NORMAL

## 2021-10-27 PROCEDURE — 77030038269 HC DRN EXT URIN PURWCK BARD -A

## 2021-10-27 PROCEDURE — 74011250637 HC RX REV CODE- 250/637: Performed by: INTERNAL MEDICINE

## 2021-10-27 PROCEDURE — 65270000029 HC RM PRIVATE

## 2021-10-27 PROCEDURE — 74011000250 HC RX REV CODE- 250: Performed by: INTERNAL MEDICINE

## 2021-10-27 PROCEDURE — 99232 SBSQ HOSP IP/OBS MODERATE 35: CPT | Performed by: INTERNAL MEDICINE

## 2021-10-27 PROCEDURE — 74011250636 HC RX REV CODE- 250/636: Performed by: INTERNAL MEDICINE

## 2021-10-27 RX ADMIN — Medication 5 ML: at 14:00

## 2021-10-27 RX ADMIN — APIXABAN 2.5 MG: 2.5 TABLET, FILM COATED ORAL at 17:08

## 2021-10-27 RX ADMIN — SIMETHICONE 80 MG: 80 TABLET, CHEWABLE ORAL at 22:23

## 2021-10-27 RX ADMIN — CEFTRIAXONE 2 G: 2 INJECTION, POWDER, FOR SOLUTION INTRAMUSCULAR; INTRAVENOUS at 09:05

## 2021-10-27 RX ADMIN — LEVOTHYROXINE SODIUM 112 MCG: 0.11 TABLET ORAL at 06:37

## 2021-10-27 RX ADMIN — Medication 10 ML: at 06:37

## 2021-10-27 RX ADMIN — Medication 10 ML: at 21:37

## 2021-10-27 RX ADMIN — SIMETHICONE 80 MG: 80 TABLET, CHEWABLE ORAL at 15:02

## 2021-10-27 RX ADMIN — APIXABAN 2.5 MG: 2.5 TABLET, FILM COATED ORAL at 09:04

## 2021-10-27 RX ADMIN — AMIODARONE HYDROCHLORIDE 100 MG: 200 TABLET ORAL at 09:27

## 2021-10-27 RX ADMIN — TRAMADOL HYDROCHLORIDE 50 MG: 50 TABLET, COATED ORAL at 21:38

## 2021-10-27 RX ADMIN — PAROXETINE HYDROCHLORIDE 10 MG: 20 TABLET, FILM COATED ORAL at 09:04

## 2021-10-27 RX ADMIN — MELOXICAM 7.5 MG: 7.5 TABLET ORAL at 09:04

## 2021-10-27 NOTE — PROGRESS NOTES
attempted to visit Mrs. Luis Weeks of a follow up visit on the Med. Surgical Unit. Mrs. Luis Weeks was being assessed by her nurse at the time of the 's visit.  did not disturb her at this time. 's are available for further support upon referral  Perry Lai Daily.      Paging Service: [phone]

## 2021-10-27 NOTE — PROGRESS NOTES
10/27/2021   CARE MANAGEMENT NOTE:  CM reviewed EMR for clinical updates. Reportedly, pt was admitted with right pyelo/bacteremia; septic shock, urine retention, ELADIA/CKD 3, HTN, and weakness. Reportedly, lives alone in 1st floor apt. Son, Noemi Cuello ([phone]) is the primary family contact.     RUR 12%; LOS 7 days     Transition Plan of Care:  1. Cardiology, Urology, ID following for medical management. 2. SNF - referrals were made to [address] [place] [address] (denied), [facility] (denied), [facility] ([facility] and [facility] (accepted). 3.  Covid PCR test for placement was negative on 10/26  4. Outpt f/u  5. [facility] is likely needed for transport     CM will continue to follow pt for SNF  HIMA Bolton Pro

## 2021-10-27 NOTE — PROGRESS NOTES
Javid Blum [facility] [address]  [address] [facility], [address] [place] [address]  [phone]      Medical Progress Note      NAME: Grace Temple   :  1928  MRM:  [id number]    Date/Time: 10/27/2021        Assessment / Plan:     79 yo F w/ hx of HTN, CAD, chronic AF s/p pacer/ICD, hypothyroidism presenting w/ weakness, found to have R pyelonephritis, septic shock, ELADIA, and severe thrombocytopenia     R pyelonephritis/bacteremia: septic shock POA, now resolved. Abd CT showed evidence of R pyelonephritis. Appreciate ID evaluation. GPR isolated from a single blood culture was nonviable, likely contaminant. GNR growing in a second culture, showing Haemophilus parainfluenzae, pending sensitivities. Query need for HORACE as Haemophilus is one of HACEK organisms associated with endocarditis. Defer to ID, appreciate Dr. Alejandra Gomez. Cont IV CTX     Acute urine retention: passed voiding trial 10/25. Urology advised outpatient follow up     ELADIA/CKD 3: now resolved with IVF    Thrombocytopenia: 2/2 sepsis, resolved     HTN/CAD/chronic afib: s/p pacer/ICD. Cont Amio, Eliquis      Hypothyroid: cont LT4     Weakness: cont PT/OT. Anticipate need for SNF placement. Discussed with CM      Total time spent: 35 minute, d/w ID. Updated son via phone  Time spent in the care of this patient including reviewing records, discussing with nursing and/or other providers on the treatment team, obtaining history and examining the patient, and discussing treatment plans. Care Plan discussed with: Patient, Nursing Staff and >50% of time spent in counseling and coordination of care    Discussed:  Care Plan and D/C Planning    Prophylaxis:  Eliquis    Disposition:  SNF/LTC         Subjective:     Chief Complaint:  Follow up pyelonephritis    Chart/notes/labs/studies reviewed, patient examined. Feels fine. No fevers.  No abd pain, n/v/d       Objective:       Vitals:        Last 24hrs VS reviewed since prior progress note. Most recent are:    Visit Vitals  /72 (BP 1 Location: Left upper arm, BP Patient Position: At rest)   Pulse 83   Temp 97.9 °F (36.6 °C)   Resp 18   Ht 5' 7.01\" (1.702 m)   Wt 63 kg (138 lb 14.2 oz)   SpO2 96%   BMI 21.75 kg/m²     SpO2 Readings from Last 6 Encounters:   10/27/21 96%   10/04/21 94%   05/25/21 98%   02/16/21 98%   12/15/20 97%   12/09/20 98%            Intake/Output Summary (Last 24 hours) at 10/27/2021 1628  Last data filed at 10/27/2021 1230  Gross per 24 hour   Intake 630 ml   Output 1900 ml   Net -1270 ml          Exam:     Physical Exam:    Gen:  Elderly, well-developed, well-nourished, in no acute distress. HEENT:  Atraumatic, normocephalic. Sclerae nonicteric, hearing intact to voice  Neck:  Supple, without apparent masses. Resp:  No accessory muscle use, CTAB without wheezes, rales, or rhonchi  Card: RRR, without m/r/g. No LE edema  Abd:  +bowel sounds, soft, NTTP, nondistended  Neuro:  Face symmetric, speech fluent, follows commands appropriately, no focal weakness or numbness  Psych:  Alert, oriented to self and hospital. Fair insight     Medications Reviewed: (see below)    Lab Data Reviewed: (see below)    ______________________________________________________________________    Medications:     Current Facility-Administered Medications   Medication Dose Route Frequency    cefTRIAXone (ROCEPHIN) 2 g in sterile water (preservative free) 20 mL IV syringe  2 g IntraVENous Q24H    prochlorperazine (COMPAZINE) injection 10 mg  10 mg IntraVENous Q6H PRN    traMADoL (ULTRAM) tablet 50 mg  50 mg Oral Q6H PRN    simethicone (MYLICON) tablet 80 mg  80 mg Oral QID PRN    PARoxetine (PAXIL) tablet 10 mg  10 mg Oral DAILY    levothyroxine (SYNTHROID) tablet 112 mcg  112 mcg Oral ACB    meloxicam (MOBIC) tablet 7.5 mg  7.5 mg Oral DAILY    diclofenac (VOLTAREN) 1 % topical gel 2 g  2 g Topical DAILY PRN    sodium chloride (NS) flush 5-40 mL  5-40 mL IntraVENous Q8H    sodium chloride (NS) flush 5-40 mL  5-40 mL IntraVENous PRN    acetaminophen (TYLENOL) tablet 650 mg  650 mg Oral Q6H PRN    Or    acetaminophen (TYLENOL) suppository 650 mg  650 mg Rectal Q6H PRN    senna (SENOKOT) tablet 8.6 mg  1 Tablet Oral DAILY PRN    apixaban (ELIQUIS) tablet 2.5 mg  2.5 mg Oral BID    amiodarone (CORDARONE) tablet 100 mg  100 mg Oral DAILY            Lab Review:     Recent Labs     10/26/21  0347   WBC 5.6   HGB 11.8   HCT 35.8        Recent Labs     10/26/21  0347   *   K 4.4      CO2 29   GLU 97   BUN 10   CREA 0.65   CA 8.3*   MG 1.8   PHOS 3.0     No components found for: GLPOC  No results for input(s): PH, PCO2, PO2, HCO3, FIO2 in the last 72 hours. No results for input(s): INR, INREXT, INREXT in the last 72 hours. No results found for: SDES  Lab Results   Component Value Date/Time    Culture result: NO GROWTH 3 DAYS 10/23/2021 10:45 AM    Culture result: (A) 10/19/2021 12:11 PM     GRAM POSITIVE RODS GROWING IN 1 OF 4 BOTTLES DRAWN SITES= LEFT AC ORGANISM NON VIABLE    Culture result: (A) 10/19/2021 12:11 PM     APPARENT GRAM NEGATIVE RODS GROWING IN 2ND OF 4 BOTTLES DRAWN (R AC SITE) NO GROWTH. UPON RE GRAM STAIN, NO ORGANISMS WERE SEEN. Culture result: (A) 10/19/2021 12:11 PM     PRELIMINARY REPORT OF APPARENT GRAM NEGATIVE RODS GROWING IN 2ND OF 4 BOTTLES DRAWN CALLED TO AND READ BACK BY MS BOBO RN ON 10/24/21 AT 1924 ([facility] 530). MS    Culture result: (A) 10/19/2021 12:11 PM     **RESUBBING BOTTLES 10/26/21, APPARENT GRAM NEGATIVE RODS GROWING IN 1 OF 2 BOTTLES DRAWN CALLED TO DR Cornelius Rod 10/26.  LW    Culture result: REMAINING BOTTLE(S) HAS/HAVE NO GROWTH SO FAR 10/19/2021 12:11 PM              ___________________________________________________    Attending Physician: Jeremiah Sarah MD

## 2021-10-27 NOTE — PROGRESS NOTES
Bedside and Verbal shift change report given to [address] [place] [address] [address] (oncoming nurse) by Robyn Briseno RN (offgoing nurse). Report included the following information SBAR, Kardex, Intake/Output, MAR and Recent Results.

## 2021-10-27 NOTE — PROGRESS NOTES
[facility] Progress Note           Noa Campbell DO    [phone] Office  [phone]  Fax    10/27/2021      Assessment & Plan:   · Haemophilus isolated from 2 blood cultures. A gram-positive warren was also isolated from a single blood culture which was nonviable. This likely represents a contaminant. As the Haemophilus was isolated from 2 separate cultures this will need to be treated as a \"real\" infection. Per discussion with lab the organism should identified this afternoon by Vitaliy Valdez. Haemophilus is part of the HACEK group of organisms and we may need a HORACE to rule out endocarditis. Repeat blood cultures are sterile to date. Continue ceftriaxone. Further recommendations pending results of above   · Right-sided pyelonephritis. Urine culture not sent as UA was bland. Urology following and outpatient follow-up and imaging planned. · Multiple antibiotic allergies including penicillin, sulfa, and Biaxin. She is currently tolerating ceftriaxone  · ELADIA/CKD  · Chronic atrial fibrillation status post pacer/ICD          Subjective:     No complaints    Objective:     Vitals:   Visit Vitals  /80 (BP 1 Location: Right upper arm, BP Patient Position: At rest)   Pulse 84   Temp 97.6 °F (36.4 °C)   Resp 18   Ht 5' 7.01\" (1.702 m)   Wt 138 lb 14.2 oz (63 kg)   SpO2 94%   BMI 21.75 kg/m²        Tmax:  Temp (24hrs), Av °F (36.7 °C), Min:97.5 °F (36.4 °C), Max:98.6 °F (37 °C)      Exam:   Patient is intubated:  no    Physical Examination:   General:  Alert, cooperative, no distress   Head:  Normocephalic, atraumatic. Eyes:  Conjunctivae clear   Neck: Supple       Lungs:   No distress. Clear to auscultation bilaterally. Chest wall:     Heart:  Regular rate and rhythm   Abdomen:    Nondistended   Extremities: Moves all. Skin:  No rash   Neurologic: CNII-XII intact.  Normal strength     Labs:        No lab exists for component: ITNL   No results for input(s): CPK, CKMB, TROIQ in the last 72 hours. Recent Labs     10/26/21  0347   *   K 4.4      CO2 29   BUN 10   CREA 0.65   GLU 97   PHOS 3.0   MG 1.8   WBC 5.6   HGB 11.8   HCT 35.8        No results for input(s): INR, PTP, APTT, INREXT in the last 72 hours.   Needs: urine analysis, urine sodium, protein and creatinine  Lab Results   Component Value Date/Time    Creatinine, urine 22.4 05/07/2013 12:00 AM         Cultures:     No results found for: SDES      Radiology:     Medications       Current Facility-Administered Medications   Medication Dose Route Frequency Last Admin    cefTRIAXone (ROCEPHIN) 2 g in sterile water (preservative free) 20 mL IV syringe  2 g IntraVENous Q24H 2 g at 10/27/21 0905    prochlorperazine (COMPAZINE) injection 10 mg  10 mg IntraVENous Q6H PRN      traMADoL (ULTRAM) tablet 50 mg  50 mg Oral Q6H PRN 50 mg at 10/26/21 2229    simethicone (MYLICON) tablet 80 mg  80 mg Oral QID PRN 80 mg at 10/26/21 1004    PARoxetine (PAXIL) tablet 10 mg  10 mg Oral DAILY 10 mg at 10/27/21 0904    levothyroxine (SYNTHROID) tablet 112 mcg  112 mcg Oral  mcg at 10/27/21 7386    meloxicam (MOBIC) tablet 7.5 mg  7.5 mg Oral DAILY 7.5 mg at 10/27/21 3865    diclofenac (VOLTAREN) 1 % topical gel 2 g  2 g Topical DAILY PRN 2 g at 10/22/21 0330    sodium chloride (NS) flush 5-40 mL  5-40 mL IntraVENous Q8H 10 mL at 10/27/21 5051    sodium chloride (NS) flush 5-40 mL  5-40 mL IntraVENous PRN 10 mL at 10/25/21 2239    acetaminophen (TYLENOL) tablet 650 mg  650 mg Oral Q6H  mg at 10/25/21 1232    Or    acetaminophen (TYLENOL) suppository 650 mg  650 mg Rectal Q6H PRN      senna (SENOKOT) tablet 8.6 mg  1 Tablet Oral DAILY PRN      apixaban (ELIQUIS) tablet 2.5 mg  2.5 mg Oral BID 2.5 mg at 10/27/21 0670    amiodarone (CORDARONE) tablet 100 mg  100 mg Oral DAILY 100 mg at 10/27/21 4316           Case discussed with:      Joao Luna DO

## 2021-10-27 NOTE — PROGRESS NOTES
Bedside and Verbal shift change report given to RONAK Saxena (oncoming nurse) by Rox Roblero. Jorge L Coleman RN (offgoing nurse). Report included the following information SBAR, Kardex, Intake/Output, MAR and Recent Results.

## 2021-10-27 NOTE — PROGRESS NOTES
Attempted to call son multiple times to no avail.      Name Relation Home Work Antelope Child [phone]     VM left

## 2021-10-28 LAB
BACTERIA SPEC CULT: ABNORMAL
OTHER ANTIBIOTIC SUSC ISLT: ABNORMAL
SERVICE CMNT-IMP: ABNORMAL
SPECIMEN SOURCE: ABNORMAL

## 2021-10-28 PROCEDURE — 74011250636 HC RX REV CODE- 250/636: Performed by: INTERNAL MEDICINE

## 2021-10-28 PROCEDURE — 99232 SBSQ HOSP IP/OBS MODERATE 35: CPT | Performed by: INTERNAL MEDICINE

## 2021-10-28 PROCEDURE — 97116 GAIT TRAINING THERAPY: CPT

## 2021-10-28 PROCEDURE — 97535 SELF CARE MNGMENT TRAINING: CPT

## 2021-10-28 PROCEDURE — 74011250637 HC RX REV CODE- 250/637: Performed by: INTERNAL MEDICINE

## 2021-10-28 PROCEDURE — 77030038269 HC DRN EXT URIN PURWCK BARD -A

## 2021-10-28 PROCEDURE — 97530 THERAPEUTIC ACTIVITIES: CPT

## 2021-10-28 PROCEDURE — 99231 SBSQ HOSP IP/OBS SF/LOW 25: CPT | Performed by: INTERNAL MEDICINE

## 2021-10-28 PROCEDURE — 74011000250 HC RX REV CODE- 250: Performed by: INTERNAL MEDICINE

## 2021-10-28 PROCEDURE — 65270000029 HC RM PRIVATE

## 2021-10-28 RX ORDER — FACIAL-BODY WIPES
10 EACH TOPICAL
Status: COMPLETED | OUTPATIENT
Start: 2021-10-28 | End: 2021-10-28

## 2021-10-28 RX ADMIN — Medication 10 ML: at 21:38

## 2021-10-28 RX ADMIN — Medication 10 ML: at 05:45

## 2021-10-28 RX ADMIN — TRAMADOL HYDROCHLORIDE 50 MG: 50 TABLET, COATED ORAL at 19:05

## 2021-10-28 RX ADMIN — CEFTRIAXONE 2 G: 2 INJECTION, POWDER, FOR SOLUTION INTRAMUSCULAR; INTRAVENOUS at 09:56

## 2021-10-28 RX ADMIN — APIXABAN 2.5 MG: 2.5 TABLET, FILM COATED ORAL at 09:55

## 2021-10-28 RX ADMIN — ACETAMINOPHEN 650 MG: 325 TABLET ORAL at 09:55

## 2021-10-28 RX ADMIN — ACETAMINOPHEN 650 MG: 325 TABLET ORAL at 17:49

## 2021-10-28 RX ADMIN — APIXABAN 2.5 MG: 2.5 TABLET, FILM COATED ORAL at 17:49

## 2021-10-28 RX ADMIN — TRAMADOL HYDROCHLORIDE 50 MG: 50 TABLET, COATED ORAL at 05:46

## 2021-10-28 RX ADMIN — PAROXETINE HYDROCHLORIDE 10 MG: 20 TABLET, FILM COATED ORAL at 09:55

## 2021-10-28 RX ADMIN — MELOXICAM 7.5 MG: 7.5 TABLET ORAL at 09:55

## 2021-10-28 RX ADMIN — SIMETHICONE 80 MG: 80 TABLET, CHEWABLE ORAL at 02:07

## 2021-10-28 RX ADMIN — BISACODYL 10 MG: 10 SUPPOSITORY RECTAL at 12:58

## 2021-10-28 RX ADMIN — AMIODARONE HYDROCHLORIDE 100 MG: 200 TABLET ORAL at 09:55

## 2021-10-28 RX ADMIN — TRAMADOL HYDROCHLORIDE 50 MG: 50 TABLET, COATED ORAL at 12:58

## 2021-10-28 RX ADMIN — LEVOTHYROXINE SODIUM 112 MCG: 0.11 TABLET ORAL at 05:46

## 2021-10-28 RX ADMIN — STANDARDIZED SENNA CONCENTRATE 8.6 MG: 8.6 TABLET ORAL at 05:45

## 2021-10-28 NOTE — PROGRESS NOTES
Moris Mayo MD    [address] [facility], [address] [facility]    Office [phone],[facility] [phone]      10/28/2021     Admit Date: 10/19/2021        NAME: Werner Cano   :  1928     MRN: [id number]     Assessment/Plan:    Sepsis /R Pyelonephritis  Afib with CVR- on anticoagulation/atenolol  HTN  S/p AICD - hx of VT arrest    Plan  Per ID -Haemophilus parainfluenza isolated from 2 blood cultures. Recommend HORACE. ECHO 10/19/21 - Nml LVEF/no vegetations  Will probably need anesthesia if HORACE is needed. Will d/w Dr Nadege Bray ( ID)           10/19/21    ECHO ADULT COMPLETE 10/20/2021 10/20/2021    Interpretation Summary  · LV: Estimated LVEF is 60 - 65%. Normal cavity size and systolic function (ejection fraction normal). Upper normal wall thickness. Age-appropriate left ventricular diastolic function. · RV: Pacer/ICD present. · AO: Mild sinuses of Valsalva dilatation. · AV: Probably trileaflet aortic valve. Mild aortic valve regurgitation is present. · MV: Mitral valve non-specific thickening. Mild mitral valve regurgitation is present. · TV: Mild tricuspid valve regurgitation is present. Signed by: Mic Oden DO on 10/20/2021 10:33 AM        Please do not hesitate to contact us with questions or concerns. See note below for details. Moris Mayo MD      Subjective:    No CP/dyspnea. C/o \"pain allover\".  Does not like lying flat/\"need to sit up\"    ROS:  (bold if positive, if negative)          Current Facility-Administered Medications   Medication Dose Route Frequency    cefTRIAXone (ROCEPHIN) 2 g in sterile water (preservative free) 20 mL IV syringe  2 g IntraVENous Q24H    prochlorperazine (COMPAZINE) injection 10 mg  10 mg IntraVENous Q6H PRN    traMADoL (ULTRAM) tablet 50 mg  50 mg Oral Q6H PRN    simethicone (MYLICON) tablet 80 mg  80 mg Oral QID PRN    PARoxetine (PAXIL) tablet 10 mg  10 mg Oral DAILY    levothyroxine (SYNTHROID) tablet 112 mcg  112 mcg Oral ACB    meloxicam (MOBIC) tablet 7.5 mg  7.5 mg Oral DAILY    diclofenac (VOLTAREN) 1 % topical gel 2 g  2 g Topical DAILY PRN    sodium chloride (NS) flush 5-40 mL  5-40 mL IntraVENous Q8H    sodium chloride (NS) flush 5-40 mL  5-40 mL IntraVENous PRN    acetaminophen (TYLENOL) tablet 650 mg  650 mg Oral Q6H PRN    Or    acetaminophen (TYLENOL) suppository 650 mg  650 mg Rectal Q6H PRN    senna (SENOKOT) tablet 8.6 mg  1 Tablet Oral DAILY PRN    apixaban (ELIQUIS) tablet 2.5 mg  2.5 mg Oral BID    amiodarone (CORDARONE) tablet 100 mg  100 mg Oral DAILY       Physical Exam:  Visit Vitals  /74 (BP 1 Location: Right upper arm, BP Patient Position: At rest)   Pulse 92   Temp 97.7 °F (36.5 °C)   Resp 20   Ht 5' 7.01\" (1.702 m)   Wt 138 lb 14.2 oz (63 kg)   SpO2 97%   BMI 21.75 kg/m²      O2 Device: None (Room air)      Telemetry:     Gen: Well-developed, well-nourished, in no acute distress.  Elderly  Neck: Supple,No JVD,  Resp: No accessory muscle use, Dec AE bilt No rales or rhonchi  Card: Regular Rate,Rythm,Normal S1, S2,2/6 sys murmur+  Abd:  Soft, BS+,   LE: No edema    EKG:        Cxray:    LABS: reviewed        @  Care Plan discussed with:   Mari Ramires MD

## 2021-10-28 NOTE — PROGRESS NOTES
10/28/2021   CARE MANAGEMENT NOTE:  CM reviewed EMR for clinical updates.    Reportedly, pt was admitted with right pyelo/bacteremia; septic shock, urine retention, ELADIA/CKD 3, HTN, and weakness. Reportedly, lives alone in 1st floor apt. Son, Perla Shore ([phone]) is the primary family contact.     RUR 12%; LOS 8 days     Transition Plan of Care:  1.  Cardiology, Urology, ID following for medical management - cardiology to see for consideration of HORACE. 2.  SNF - accepted by [facility]  3.  Covid PCR test for placement was negative on 10/26  4.  Outpt f/u  5.  [facility] is likely needed for transport     CM will continue to follow pt for SNF  HIMA Tidwell

## 2021-10-28 NOTE — PROGRESS NOTES
Problem: Self Care Deficits Care Plan (Adult)  Goal: *Acute Goals and Plan of Care (Insert Text)  Description: FUNCTIONAL STATUS PRIOR TO ADMISSION: Patient was modified independent using a rollator for functional mobility. HOME SUPPORT PRIOR TO ADMISSION: The patient lived alone with no local support. Occupational Therapy Goals  Initiated 10/20/2021  1. Patient will perform grooming with modified independence within 7 day(s). 2.  Patient will perform upper body dressing and bathing with supervision/set-up within 7 day(s). 3.  Patient will perform lower body dressing and bathing with minimal assistance within 7 day(s). 4.  Patient will perform toilet transfers to [facility] with moderate assistance within 7 day(s). 5.  Patient will perform all aspects of toileting with moderate assistance within 7 day(s). 6.  Patient will participate in upper extremity therapeutic exercise/activities with supervision/set-up for 10 minutes within 7 day(s). 7.  Patient will utilize energy conservation techniques during functional activities with verbal cues within 7 day(s). Outcome: Progressing Towards Goal     OCCUPATIONAL THERAPY TREATMENT  Patient: Raphael Heart (83 y.o. female)  Date: 10/28/2021  Diagnosis: Generalized weakness [R53.1] Shock ([address]       Precautions: Fall (Per Critical Care note 10/19/21 MAP>65)  Chart, occupational therapy assessment, plan of care, and goals were reviewed. ASSESSMENT  Patient continues with skilled OT services and is progressing towards goals. Patient received supine in bed, reluctantly agreeable to activity. Patient to EOB with moderate assistance. She reports increased arthritic pain. With encouragement patient agreeable to stand. Patient ntoed with soiled bedding and able to perform hygiene of bladder and bowel in standing. Patient able to perform with increased time and encouragement, but initially requests assist to perform. Min assist overall to complete.   Patient returned to bed at end of session. Current Level of Function Impacting Discharge (ADLs):     Other factors to consider for discharge: lives alone         PLAN :  Patient continues to benefit from skilled intervention to address the above impairments. Continue treatment per established plan of care to address goals. Recommend with staff: OOB to chair for meals, commode transfers     Recommend next OT session: continue goals     Recommendation for discharge: (in order for the patient to meet his/her long term goals)  Therapy up to 5 days/week in SNF setting    This discharge recommendation:  Has been made in collaboration with the attending provider and/or case management    IF patient discharges home will need the following DME: TBD       SUBJECTIVE:   Patient stated I cant do it.     OBJECTIVE DATA SUMMARY:   Cognitive/Behavioral Status:  Neurologic State: Alert;Eyes open spontaneously  Orientation Level: Oriented X4  Cognition: Follows commands             Functional Mobility and Transfers for ADLs:  Bed Mobility:  Supine to Sit: Moderate assistance; Additional time  Sit to Supine: Maximum assistance  Scooting: Minimum assistance;Assist x1;Additional time    Transfers:  Sit to Stand: Minimum assistance;Assist x2; Additional time          Balance:  Sitting: High guard; Impaired  Standing: Impaired; With support  Standing - Static: Constant support; Fair  Standing - Dynamic : Constant support; Fair    ADL Intervention:                                Toileting  Bladder Hygiene: Minimum assistance  Bowel Hygiene: Minimum assistance  Cues: Verbal cues provided (physical assistance )         Therapeutic Exercises:       Pain:      Activity Tolerance:   Fair    After treatment patient left in no apparent distress:   Supine in bed, Call bell within reach, and Bed / chair alarm activated    COMMUNICATION/COLLABORATION:   The patients plan of care was discussed with: Physical therapist and Registered nurse.      Cherie Weaver [place], OTR/L  Time Calculation: 26 mins

## 2021-10-28 NOTE — PROGRESS NOTES
Bedside and Verbal shift change report given to Brian VEGA RN (oncoming nurse) by Orlando Tuttle RN (offgoing nurse).  Report included the following information SBAR, Kardex, Intake/Output, MAR and Recent Results

## 2021-10-28 NOTE — PROGRESS NOTES
Problem: Mobility Impaired (Adult and Pediatric)  Goal: *Acute Goals and Plan of Care (Insert Text)  Description: FUNCTIONAL STATUS PRIOR TO ADMISSION: Patient was modified independent using a rollator for functional mobility. HOME SUPPORT PRIOR TO ADMISSION: The patient lived alone with no local support. Physical Therapy Goals  Initiated 10/20/2021  1. Patient will move from supine to sit and sit to supine  in bed with minimal assistance/contact guard assist within 7 day(s). 2.  Patient will transfer from bed to chair and chair to bed with minimal assistance/contact guard assist using the least restrictive device within 7 day(s). 3.  Patient will perform sit to stand with minimal assistance/contact guard assist within 7 day(s). 4.  Patient will ambulate with minimal assistance/contact guard assist for 25 feet with the least restrictive device within 7 day(s). Outcome: Progressing Towards Goal  Note:   PHYSICAL THERAPY TREATMENT  Patient: Makayla Castañeda (38 y.o. female)  Date: 10/28/2021  Diagnosis: Generalized weakness [R53.1] Shock ([address]       Precautions: Fall (Per Critical Care note 10/19/21 MAP>65)  Chart, physical therapy assessment, plan of care and goals were reviewed. ASSESSMENT  Patient continues with skilled PT services and slowly progressing towards goals. Max encouragement to participate with therapy. Mod A for bed mobility increased LBP requiring BUE support in sitting. Assist x 2 for safety for sit>stand Min x 1 for gait training using RW. Fatigues quickly and requests to return to bed due to pain. Encourage OOB to recliner fro meal and use of BSC vs purwick for increased mobility      Current Level of Function Impacting Discharge (mobility/balance): Mod A    Other factors to consider for discharge: live alone         PLAN :  Patient continues to benefit from skilled intervention to address the above impairments. Continue treatment per established plan of care.   to address goals. Recommendation for discharge: (in order for the patient to meet his/her long term goals)  Therapy up to 5 days/week in SNF setting    This discharge recommendation:  Has been made in collaboration with the attending provider and/or case management    IF patient discharges home will need the following DME: to be determined (TBD)       SUBJECTIVE:   Patient stated Juan Luis Whittaker don't want to get up.     OBJECTIVE DATA SUMMARY:   Critical Behavior:  Neurologic State: Alert, Eyes open spontaneously  Orientation Level: Oriented X4  Cognition: Follows commands  Safety/Judgement: Awareness of environment  Functional Mobility Training:  Bed Mobility:     Supine to Sit: Moderate assistance; Additional time  Sit to Supine: Maximum assistance  Scooting: Minimum assistance;Assist x1;Additional time        Transfers:  Sit to Stand: Minimum assistance;Assist x2; Additional time  Stand to Sit: Minimum assistance; Additional time;Assist x2                             Balance:  Sitting: High guard; Impaired  Standing: Impaired; With support  Standing - Static: Constant support; Fair  Standing - Dynamic : Constant support; Fair  Ambulation/Gait Training:  Distance (ft): 12 Feet (ft)  Assistive Device: Walker, rolling;Gait belt  Ambulation - Level of Assistance: Minimal assistance;Assist x1;Additional time        Gait Abnormalities: Decreased step clearance; Step to gait        Base of Support: Narrowed     Speed/Tayler: Slow                       Stairs: Therapeutic Exercises:     Pain Ratin/10    Activity Tolerance:   Fair    After treatment patient left in no apparent distress:   Supine in bed, Call bell within reach, and Bed / chair alarm activated    COMMUNICATION/COLLABORATION:   The patients plan of care was discussed with: Occupational therapist and Registered nurse.      Nishi Afsaneh   Time Calculation: 26 mins

## 2021-10-28 NOTE — PROGRESS NOTES
Javid Felizelsen mark [address]  380 [facility], [address] [place] [address]  [phone]      Medical Progress Note      NAME: Allene Primrose   :  1928  MRM:  [id number]    Date/Time: 10/28/2021        Assessment / Plan:     81 yo F w/ hx of HTN, CAD, chronic AF s/p pacer/ICD, hypothyroidism presenting w/ weakness, found to have R pyelonephritis, septic shock, ELADIA, and severe thrombocytopenia     R pyelonephritis/bacteremia: septic shock POA, now resolved. Abd CT showed evidence of R pyelonephritis. Appreciate ID evaluation. GPR isolated from a single blood culture was nonviable, likely contaminant. GNR growing in a second culture, showing Haemophilus parainfluenzae. Haemophilus is one of HACEK organisms associated with endocarditis, and ID recommends HORACE. Cont IV CTX     Acute urine retention: passed voiding trial 10/25. Urology advised outpatient follow up     ELADIA/CKD 3: now resolved with IVF    Thrombocytopenia: 2/2 sepsis, resolved     HTN/CAD/chronic afib: s/p pacer/ICD. Cont Amio, Eliquis      Hypothyroid: cont LT4     Weakness: cont PT/OT. Anticipate need for SNF placement. Discussed with CM      Total time spent: 35 minute, d/w son via phone. He requests update   Name Relation Home Work Cassy Child [phone]       Time spent in the care of this patient including reviewing records, discussing with nursing and/or other providers on the treatment team, obtaining history and examining the patient, and discussing treatment plans. Care Plan discussed with: Patient, Nursing Staff and >50% of time spent in counseling and coordination of care    Discussed:  Care Plan and D/C Planning    Prophylaxis:  Eliquis    Disposition:  SNF/LTC         Subjective:     Chief Complaint:  Follow up pyelonephritis    Chart/notes/labs/studies reviewed, patient examined. Had abdominal pain. After suppository, had BM, felt better. No CP.  No fevers Objective:       Vitals:        Last 24hrs VS reviewed since prior progress note. Most recent are:    Visit Vitals  /73 (BP 1 Location: Right upper arm, BP Patient Position: At rest)   Pulse 78   Temp 97.8 °F (36.6 °C)   Resp 18   Ht 5' 7.01\" (1.702 m)   Wt 63 kg (138 lb 14.2 oz)   SpO2 97%   BMI 21.75 kg/m²     SpO2 Readings from Last 6 Encounters:   10/28/21 97%   10/04/21 94%   05/25/21 98%   02/16/21 98%   12/15/20 97%   12/09/20 98%            Intake/Output Summary (Last 24 hours) at 10/28/2021 1924  Last data filed at 10/28/2021 1542  Gross per 24 hour   Intake 840 ml   Output 400 ml   Net 440 ml          Exam:     Physical Exam:    Gen:  Elderly, well-developed, well-nourished, in no acute distress. HEENT:  Atraumatic, normocephalic. Sclerae nonicteric, hearing intact to voice  Neck:  Supple, without apparent masses. Resp:  No accessory muscle use, CTAB without wheezes, rales, or rhonchi  Card: RRR, without m/r/g. No LE edema  Abd:  +bowel sounds, soft, NTTP, nondistended  Neuro:  Face symmetric, speech fluent, follows commands appropriately, no focal weakness or numbness  Psych:  Alert, oriented to self and hospital. Fair insight     Medications Reviewed: (see below)    Lab Data Reviewed: (see below)    ______________________________________________________________________    Medications:     Current Facility-Administered Medications   Medication Dose Route Frequency    cefTRIAXone (ROCEPHIN) 2 g in sterile water (preservative free) 20 mL IV syringe  2 g IntraVENous Q24H    prochlorperazine (COMPAZINE) injection 10 mg  10 mg IntraVENous Q6H PRN    traMADoL (ULTRAM) tablet 50 mg  50 mg Oral Q6H PRN    simethicone (MYLICON) tablet 80 mg  80 mg Oral QID PRN    PARoxetine (PAXIL) tablet 10 mg  10 mg Oral DAILY    levothyroxine (SYNTHROID) tablet 112 mcg  112 mcg Oral ACB    meloxicam (MOBIC) tablet 7.5 mg  7.5 mg Oral DAILY    diclofenac (VOLTAREN) 1 % topical gel 2 g  2 g Topical DAILY PRN    sodium chloride (NS) flush 5-40 mL  5-40 mL IntraVENous Q8H    sodium chloride (NS) flush 5-40 mL  5-40 mL IntraVENous PRN    acetaminophen (TYLENOL) tablet 650 mg  650 mg Oral Q6H PRN    Or    acetaminophen (TYLENOL) suppository 650 mg  650 mg Rectal Q6H PRN    senna (SENOKOT) tablet 8.6 mg  1 Tablet Oral DAILY PRN    apixaban (ELIQUIS) tablet 2.5 mg  2.5 mg Oral BID    amiodarone (CORDARONE) tablet 100 mg  100 mg Oral DAILY            Lab Review:     Recent Labs     10/26/21  0347   WBC 5.6   HGB 11.8   HCT 35.8        Recent Labs     10/26/21  0347   *   K 4.4      CO2 29   GLU 97   BUN 10   CREA 0.65   CA 8.3*   MG 1.8   PHOS 3.0     No components found for: GLPOC  No results for input(s): PH, PCO2, PO2, HCO3, FIO2 in the last 72 hours. No results for input(s): INR, INREXT, INREXT in the last 72 hours. No results found for: SDES  Lab Results   Component Value Date/Time    Culture result: NO GROWTH 3 DAYS 10/23/2021 10:45 AM    Culture result: (A) 10/19/2021 12:11 PM     GRAM POSITIVE RODS GROWING IN 1 OF 4 BOTTLES DRAWN SITES= LEFT AC ORGANISM NON VIABLE    Culture result: (A) 10/19/2021 12:11 PM     APPARENT GRAM NEGATIVE RODS GROWING IN 2ND OF 4 BOTTLES DRAWN (R AC SITE) NO GROWTH. UPON RE GRAM STAIN, NO ORGANISMS WERE SEEN. Culture result: (A) 10/19/2021 12:11 PM     PRELIMINARY REPORT OF APPARENT GRAM NEGATIVE RODS GROWING IN 2ND OF 4 BOTTLES DRAWN CALLED TO AND READ BACK BY MS BOBO RN ON 10/24/21 AT 1924 ([facility] 530). MS    Culture result: (A) 10/19/2021 12:11 PM     **RESUBBING BOTTLES 10/26/21, APPARENT GRAM NEGATIVE RODS GROWING IN 1 OF 2 BOTTLES DRAWN CALLED TO DR Ni Mcguire 10/26.  LWY    Culture result: REMAINING BOTTLE(S) HAS/HAVE NO GROWTH SO FAR 10/19/2021 12:11 PM              ___________________________________________________    Attending Physician: Yola Kahn MD

## 2021-10-28 NOTE — PROGRESS NOTES
attempted to follow up with Mrs. Zara Starks on the Med. Surgical Unit. As  was going towards her room,  observed physical therapy was was working with Mrs. Zara Starks at this time. 's are available for further support upon referral  Perry Sol.      Paging Service: [phone] ([phone])

## 2021-10-28 NOTE — PROGRESS NOTES
[facility] Progress Note           Taylor Lawrence DO    [phone] Office  [phone]  Fax    10/28/2021      Assessment & Plan:   · Haemophilus parainfluenza isolated from 2 blood cultures. A gram-positive warren was also isolated from a single blood culture which was nonviable. This likely represents a contaminant. As the Haemophilus was isolated from 2 separate cultures this will need to be treated as a \"real\" infection. Concern raised for endocarditis in the setting of pacemaker/ICD with bacteremia by this organism which is a member of the [address] [place] [address] group. Will ask cardiology to see for consideration of HORACE   Continue ceftriaxone. · Right-sided pyelonephritis. Urine culture not sent as UA was bland. Urology following and outpatient follow-up and imaging planned. · Multiple antibiotic allergies including penicillin, sulfa, and Biaxin. She is currently tolerating ceftriaxone  · ELADIA/CKD  · Chronic atrial fibrillation status post pacer/ICD          Subjective:     No complaints    Objective:     Vitals:   Visit Vitals  /74 (BP 1 Location: Right upper arm, BP Patient Position: At rest)   Pulse 92   Temp 97.7 °F (36.5 °C)   Resp 20   Ht 5' 7.01\" (1.702 m)   Wt 138 lb 14.2 oz (63 kg)   SpO2 97%   BMI 21.75 kg/m²        Tmax:  Temp (24hrs), Av.6 °F (36.4 °C), Min:97.3 °F (36.3 °C), Max:97.9 °F (36.6 °C)      Exam:   Patient is intubated:  no    Physical Examination:   General:  Alert, cooperative, no distress   Head:  Normocephalic, atraumatic. Eyes:  Conjunctivae clear   Neck: Supple       Lungs:   No distress. Clear to auscultation bilaterally. Chest wall:     Heart:  Regular rate and rhythm   Abdomen:    Nondistended   Extremities: Moves all. Skin:  No rash   Neurologic: CNII-XII intact. Normal strength     Labs:        No lab exists for component: ITNL   No results for input(s): CPK, CKMB, TROIQ in the last 72 hours.   Recent Labs     10/26/21  0347   NA 134*   K 4.4      CO2 29   BUN 10   CREA 0.65   GLU 97   PHOS 3.0   MG 1.8   WBC 5.6   HGB 11.8   HCT 35.8        No results for input(s): INR, PTP, APTT, INREXT, INREXT in the last 72 hours.   Needs: urine analysis, urine sodium, protein and creatinine  Lab Results   Component Value Date/Time    Creatinine, urine 22.4 05/07/2013 12:00 AM         Cultures:     No results found for: SDES      Radiology:     Medications       Current Facility-Administered Medications   Medication Dose Route Frequency Last Admin    cefTRIAXone (ROCEPHIN) 2 g in sterile water (preservative free) 20 mL IV syringe  2 g IntraVENous Q24H 2 g at 10/28/21 0956    prochlorperazine (COMPAZINE) injection 10 mg  10 mg IntraVENous Q6H PRN      traMADoL (ULTRAM) tablet 50 mg  50 mg Oral Q6H PRN 50 mg at 10/28/21 1258    simethicone (MYLICON) tablet 80 mg  80 mg Oral QID PRN 80 mg at 10/28/21 0207    PARoxetine (PAXIL) tablet 10 mg  10 mg Oral DAILY 10 mg at 10/28/21 0955    levothyroxine (SYNTHROID) tablet 112 mcg  112 mcg Oral  mcg at 10/28/21 0546    meloxicam (MOBIC) tablet 7.5 mg  7.5 mg Oral DAILY 7.5 mg at 10/28/21 0955    diclofenac (VOLTAREN) 1 % topical gel 2 g  2 g Topical DAILY PRN 2 g at 10/22/21 0330    sodium chloride (NS) flush 5-40 mL  5-40 mL IntraVENous Q8H 10 mL at 10/28/21 0545    sodium chloride (NS) flush 5-40 mL  5-40 mL IntraVENous PRN 10 mL at 10/25/21 2239    acetaminophen (TYLENOL) tablet 650 mg  650 mg Oral Q6H  mg at 10/28/21 0955    Or    acetaminophen (TYLENOL) suppository 650 mg  650 mg Rectal Q6H PRN      senna (SENOKOT) tablet 8.6 mg  1 Tablet Oral DAILY PRN 8.6 mg at 10/28/21 0545    apixaban (ELIQUIS) tablet 2.5 mg  2.5 mg Oral BID 2.5 mg at 10/28/21 0955    amiodarone (CORDARONE) tablet 100 mg  100 mg Oral DAILY 100 mg at 10/28/21 7489           Case discussed with:      Ambrocio Ruvalcaba DO

## 2021-10-29 ENCOUNTER — APPOINTMENT (OUTPATIENT)
Dept: GENERAL RADIOLOGY | Age: 86
DRG: 871 | End: 2021-10-29
Attending: INTERNAL MEDICINE
Payer: MEDICARE

## 2021-10-29 ENCOUNTER — APPOINTMENT (OUTPATIENT)
Dept: CARDIAC CATH/INVASIVE PROCEDURES | Age: 86
DRG: 871 | End: 2021-10-29
Attending: INTERNAL MEDICINE
Payer: MEDICARE

## 2021-10-29 LAB
ALBUMIN SERPL-MCNC: 2 G/DL (ref 3.5–5)
ANION GAP SERPL CALC-SCNC: 4 MMOL/L (ref 5–15)
BACTERIA SPEC CULT: NORMAL
BASOPHILS # BLD: 0.1 K/UL (ref 0–0.1)
BASOPHILS NFR BLD: 2 % (ref 0–1)
BUN SERPL-MCNC: 11 MG/DL (ref 6–20)
BUN/CREAT SERPL: 16 (ref 12–20)
CALCIUM SERPL-MCNC: 8.6 MG/DL (ref 8.5–10.1)
CHLORIDE SERPL-SCNC: 99 MMOL/L (ref 97–108)
CO2 SERPL-SCNC: 30 MMOL/L (ref 21–32)
CREAT SERPL-MCNC: 0.69 MG/DL (ref 0.55–1.02)
DIFFERENTIAL METHOD BLD: ABNORMAL
EOSINOPHIL # BLD: 0.1 K/UL (ref 0–0.4)
EOSINOPHIL NFR BLD: 1 % (ref 0–7)
ERYTHROCYTE [DISTWIDTH] IN BLOOD BY AUTOMATED COUNT: 13.5 % (ref 11.5–14.5)
GLUCOSE SERPL-MCNC: 94 MG/DL (ref 65–100)
HCT VFR BLD AUTO: 34.8 % (ref 35–47)
HGB BLD-MCNC: 11.4 G/DL (ref 11.5–16)
IMM GRANULOCYTES # BLD AUTO: 0 K/UL
IMM GRANULOCYTES NFR BLD AUTO: 0 %
LYMPHOCYTES # BLD: 0.3 K/UL (ref 0.8–3.5)
LYMPHOCYTES NFR BLD: 5 % (ref 12–49)
MAGNESIUM SERPL-MCNC: 1.7 MG/DL (ref 1.6–2.4)
MCH RBC QN AUTO: 31.1 PG (ref 26–34)
MCHC RBC AUTO-ENTMCNC: 32.8 G/DL (ref 30–36.5)
MCV RBC AUTO: 94.8 FL (ref 80–99)
METAMYELOCYTES NFR BLD MANUAL: 1 %
MONOCYTES # BLD: 0.2 K/UL (ref 0–1)
MONOCYTES NFR BLD: 4 % (ref 5–13)
NEUTS BAND NFR BLD MANUAL: 4 % (ref 0–6)
NEUTS SEG # BLD: 5.2 K/UL (ref 1.8–8)
NEUTS SEG NFR BLD: 83 % (ref 32–75)
NRBC # BLD: 0 K/UL (ref 0–0.01)
NRBC BLD-RTO: 0 PER 100 WBC
PHOSPHATE SERPL-MCNC: 3.6 MG/DL (ref 2.6–4.7)
PLATELET # BLD AUTO: 222 K/UL (ref 150–400)
PMV BLD AUTO: 9.5 FL (ref 8.9–12.9)
POTASSIUM SERPL-SCNC: 3.8 MMOL/L (ref 3.5–5.1)
RBC # BLD AUTO: 3.67 M/UL (ref 3.8–5.2)
RBC MORPH BLD: ABNORMAL
SERVICE CMNT-IMP: NORMAL
SODIUM SERPL-SCNC: 133 MMOL/L (ref 136–145)
WBC # BLD AUTO: 6 K/UL (ref 3.6–11)

## 2021-10-29 PROCEDURE — 71045 X-RAY EXAM CHEST 1 VIEW: CPT

## 2021-10-29 PROCEDURE — 74011250637 HC RX REV CODE- 250/637: Performed by: INTERNAL MEDICINE

## 2021-10-29 PROCEDURE — 2709999900 HC NON-CHARGEABLE SUPPLY

## 2021-10-29 PROCEDURE — 97116 GAIT TRAINING THERAPY: CPT

## 2021-10-29 PROCEDURE — 83735 ASSAY OF MAGNESIUM: CPT

## 2021-10-29 PROCEDURE — C1751 CATH, INF, PER/CENT/MIDLINE: HCPCS

## 2021-10-29 PROCEDURE — 76937 US GUIDE VASCULAR ACCESS: CPT

## 2021-10-29 PROCEDURE — 99232 SBSQ HOSP IP/OBS MODERATE 35: CPT | Performed by: INTERNAL MEDICINE

## 2021-10-29 PROCEDURE — 97530 THERAPEUTIC ACTIVITIES: CPT

## 2021-10-29 PROCEDURE — 77030018786 HC NDL GD F/USND BARD -B

## 2021-10-29 PROCEDURE — 74011000250 HC RX REV CODE- 250: Performed by: INTERNAL MEDICINE

## 2021-10-29 PROCEDURE — 65270000029 HC RM PRIVATE

## 2021-10-29 PROCEDURE — 85025 COMPLETE CBC W/AUTO DIFF WBC: CPT

## 2021-10-29 PROCEDURE — 36573 INSJ PICC RS&I 5 YR+: CPT | Performed by: INTERNAL MEDICINE

## 2021-10-29 PROCEDURE — 74011250636 HC RX REV CODE- 250/636: Performed by: INTERNAL MEDICINE

## 2021-10-29 PROCEDURE — 80069 RENAL FUNCTION PANEL: CPT

## 2021-10-29 PROCEDURE — 36415 COLL VENOUS BLD VENIPUNCTURE: CPT

## 2021-10-29 RX ORDER — ACETAMINOPHEN 325 MG/1
650 TABLET ORAL
Qty: 30 TABLET | Refills: 0 | Status: SHIPPED | OUTPATIENT
Start: 2021-10-29

## 2021-10-29 RX ORDER — TRAMADOL HYDROCHLORIDE 50 MG/1
50 TABLET ORAL
Qty: 30 TABLET | Refills: 0 | Status: SHIPPED | OUTPATIENT
Start: 2021-10-29 | End: 2021-11-28

## 2021-10-29 RX ORDER — AMLODIPINE BESYLATE 5 MG/1
5 TABLET ORAL DAILY
Status: DISCONTINUED | OUTPATIENT
Start: 2021-10-29 | End: 2021-10-30 | Stop reason: HOSPADM

## 2021-10-29 RX ADMIN — Medication 10 ML: at 06:32

## 2021-10-29 RX ADMIN — TRAMADOL HYDROCHLORIDE 50 MG: 50 TABLET, COATED ORAL at 12:29

## 2021-10-29 RX ADMIN — Medication 10 ML: at 17:23

## 2021-10-29 RX ADMIN — AMLODIPINE BESYLATE 5 MG: 5 TABLET ORAL at 17:24

## 2021-10-29 RX ADMIN — Medication 10 ML: at 21:27

## 2021-10-29 RX ADMIN — PAROXETINE HYDROCHLORIDE 10 MG: 20 TABLET, FILM COATED ORAL at 09:51

## 2021-10-29 RX ADMIN — CEFTRIAXONE 2 G: 2 INJECTION, POWDER, FOR SOLUTION INTRAMUSCULAR; INTRAVENOUS at 09:51

## 2021-10-29 RX ADMIN — MELOXICAM 7.5 MG: 7.5 TABLET ORAL at 09:52

## 2021-10-29 RX ADMIN — AMIODARONE HYDROCHLORIDE 100 MG: 200 TABLET ORAL at 09:51

## 2021-10-29 RX ADMIN — LEVOTHYROXINE SODIUM 112 MCG: 0.11 TABLET ORAL at 06:31

## 2021-10-29 RX ADMIN — ACETAMINOPHEN 650 MG: 325 TABLET ORAL at 17:33

## 2021-10-29 RX ADMIN — APIXABAN 2.5 MG: 2.5 TABLET, FILM COATED ORAL at 17:24

## 2021-10-29 RX ADMIN — APIXABAN 2.5 MG: 2.5 TABLET, FILM COATED ORAL at 09:52

## 2021-10-29 NOTE — PROGRESS NOTES
Problem: Mobility Impaired (Adult and Pediatric)  Goal: *Acute Goals and Plan of Care (Insert Text)  Description: Physical Therapy Goals  Revised 10/29/2021  1. Patient will move from supine to sit and sit to supine  in bed with modified independence within 7 day(s). 2.  Patient will transfer from bed to chair and chair to bed with supervision/set-up using the least restrictive device within 7 day(s). 3.  Patient will perform sit to stand with supervision/set-up within 7 day(s). 4.  Patient will ambulate with minimal assistance/contact guard assist for 150 feet with the least restrictive device within 7 day(s). FUNCTIONAL STATUS PRIOR TO ADMISSION: Patient was modified independent using a rollator for functional mobility. HOME SUPPORT PRIOR TO ADMISSION: The patient lived alone with no local support. Physical Therapy Goals  Initiated 10/20/2021  1. Patient will move from supine to sit and sit to supine  in bed with minimal assistance/contact guard assist within 7 day(s). 2.  Patient will transfer from bed to chair and chair to bed with minimal assistance/contact guard assist using the least restrictive device within 7 day(s). 3.  Patient will perform sit to stand with minimal assistance/contact guard assist within 7 day(s). 4.  Patient will ambulate with minimal assistance/contact guard assist for 25 feet with the least restrictive device within 7 day(s). 10/29/2021 1255 by Char Lou PT  Outcome: Progressing Towards Goal  PHYSICAL THERAPY TREATMENT: WEEKLY REASSESSMENT  Patient: Danie Youngblood (57 y.o. female)  Date: 10/29/2021  Primary Diagnosis: Generalized weakness [R53.1]        Precautions:   Fall, DNR      ASSESSMENT  Patient continues with skilled PT services and is progressing towards goals. Pt received supine in bed willing to participate. Agreeable to weekly re-assessment and recognizing need for rehab vs home alone. Donned brief in supine rolling with Min A.  Log rolled to EOB and needing Min A with HOB elevated. Sit to stand with with Min A x2 c/o chronic back pain. Tolerated gait of 50' in room with RW and Min A. Placed in chair with lunch tray. RN alerted to pt need for pain meds if allowable. Patient's progression toward goals since last assessment: per above    Current Level of Function Impacting Discharge (mobility/balance): Min A/fair    Functional Outcome Measure: The patient scored 45/100 on the barthel outcome measure which is indicative of 40-59% functional impairment. Other factors to consider for discharge: per above         PLAN :  Goals have been updated based on progression since last assessment. Patient continues to benefit from skilled intervention to address the above impairments. Recommendations and Planned Interventions: bed mobility training, transfer training, gait training, therapeutic exercises, neuromuscular re-education, edema management/control, patient and family training/education, and therapeutic activities      Frequency/Duration: Patient will be followed by physical therapy:  5 times a week to address goals. Recommendation for discharge: (in order for the patient to meet his/her long term goals)  Therapy up to 5 days/week in SNF setting    This discharge recommendation:  Has been made in collaboration with the attending provider and/or case management    IF patient discharges home will need the following DME: has rollator         SUBJECTIVE:   Patient stated My back has hurt me for a long time.     OBJECTIVE DATA SUMMARY:   HISTORY:    Past Medical History:   Diagnosis Date    Acute cystitis 05/23/2018    Anemia, unspecified     Anxiety     Arrhythmia     a fib    Arthritis     osteoarthritis, rheumatoid    Atrial fibrillation (HCC)     Dr. Yuli Cornelius and Dr. Virgilio Vaz  following    Autoimmune disease [facility])     sjogren disease Dr. Bishop Agarwal    CAD (coronary artery disease)     HTN (hypertension)     Hyperlipidemia LDL goal < 100     Hypothyroid     Insomnia     Osteoarthritis     Sjogren's disease ([facility] [address]     Thoracic aneurysm without mention of rupture      Past Surgical History:   Procedure Laterality Date    HX BIV-IMPLANTABLE CARDIOVERTER DEFIBRILLATOR      HX CHOLECYSTECTOMY      HX COLONOSCOPY  3/2014    Dr. Tuan Brantley OTHER SURGICAL      hernia repairs    HX PARTIAL THYROIDECTOMY      HX JANNET AND BSO      HX VEIN STRIPPING      OH INSJ ELTRD CAR COLLIN SYS TM INSJ DFB/PM PLS GEN Left 2/22/2019    Lv Lead Placement performed by Seema Nieves MD at [facility] CATH LAB    OH INSJ/RPLCMT PERM DFB W/TRNSVNS LDS 1/DUAL [facility] Left 2/22/2019    upgrade PM to ICD-Bsx performed by Seema Nieves MD at [facility] CATH LAB       Personal factors and/or comorbidities impacting plan of care: per above and below    Home Situation  Home Environment: Private residence  Living Alone: Yes  Support Systems: Child(lynetet) (pt lives alone in pvt residence, at baseline pt is ambualtory w/ rolalotr, iADLS, relies on family for transport)  Patient Expects to be Discharged to[de-identified] Rehabilitation facility  Current DME Used/Available at Home: Lift chair, Jeananne Garre, rollator, Shower chair  Tub or Shower Type: Shower    EXAMINATION/PRESENTATION/DECISION MAKING:   Critical Behavior:  Neurologic State: Alert, Eyes open spontaneously  Orientation Level: Oriented X4  Cognition: Follows commands  Safety/Judgement: Awareness of environment  Hearing:     Skin:  IV  Edema:  WNL  Range Of Motion:  AROM: Generally decreased, functional                       Strength:    Strength: Generally decreased, functional                    Tone & Sensation:   Tone: Normal                              Coordination:  Coordination: Generally decreased, functional  Vision:      Functional Mobility:  Bed Mobility:  Rolling: Contact guard assistance (log)  Supine to Sit: Minimum assistance; Moderate assistance     Scooting: Stand-by assistance  Transfers:  Sit to Stand: Minimum assistance;Assist x2  Stand to Sit: Minimum assistance;Assist x2                       Balance:   Sitting: Intact  Sitting - Static: Good (unsupported)  Sitting - Dynamic: Fair (occasional)  Standing: Impaired  Standing - Static: Constant support;Good  Standing - Dynamic : Constant support; Fair  Ambulation/Gait Training:  Distance (ft): 50 Feet (ft)  Assistive Device: Walker, rolling;Gait belt  Ambulation - Level of Assistance: Minimal assistance;Assist x2        Gait Abnormalities: Decreased step clearance              Speed/Tayler: Slow;Pace decreased (<100 feet/min)  Step Length: Right shortened;Left shortened                    Functional Measure:  Barthel Index:    Bathin  Bladder: 5  Bowels: 5  Groomin  Dressin  Feeding: 10  Mobility: 0  Stairs: 0  Toilet Use: 5  Transfer (Bed to Chair and Back): 10  Total: 45/100       The Barthel ADL Index: Guidelines  1. The index should be used as a record of what a patient does, not as a record of what a patient could do. 2. The main aim is to establish degree of independence from any help, physical or verbal, however minor and for whatever reason. 3. The need for supervision renders the patient not independent. 4. A patient's performance should be established using the best available evidence. Asking the patient, friends/relatives and nurses are the usual sources, but direct observation and common sense are also important. However direct testing is not needed. 5. Usually the patient's performance over the preceding 24-48 hours is important, but occasionally longer periods will be relevant. 6. Middle categories imply that the patient supplies over 50 per cent of the effort. 7. Use of aids to be independent is allowed. Melody Avila, Barthel, D.W. (1759). Functional evaluation: the Barthel Index. [address] [facility] [employer] (14)2. LAUREL Hutton Se, Barbara Mccarthy., Adeola Jaramillo., [place], [address] ().  Measuring the change indisability after inpatient rehabilitation; comparison of the responsiveness of the Barthel Index and Functional Helen Measure. Journal of Neurology, Neurosurgery, and Psychiatry, 66(4), 106-707. JOSE MIGUEL Liao, JAYSON Miranda, & Cierra Tristan M.A. (2004.) Assessment of post-stroke quality of life in cost-effectiveness studies: The usefulness of the Barthel Index and the EuroQoL-5D. Quality of Life Research, 13, 427-43          Pain Rating:  back    Activity Tolerance:   Fair    After treatment patient left in no apparent distress:   Sitting in chair, Heels elevated for pressure relief, Call bell within reach, and Bed / chair alarm activated    COMMUNICATION/EDUCATION:   The patients plan of care was discussed with: Occupational therapist and Registered nurse. Fall prevention education was provided and the patient/caregiver indicated understanding., Patient/family have participated as able in goal setting and plan of care. , and Patient/family agree to work toward stated goals and plan of care.     Thank you for this referral.  Otilia Jerome, PT   Time Calculation: 24 mins

## 2021-10-29 NOTE — PROGRESS NOTES
VAT Note:  PICC order acknowledged and plan to place today,this afternoon. Message left of above for case management, Belem Marks.

## 2021-10-29 NOTE — PROGRESS NOTES
Javid Felizelsen [facility] 79  [address]  [phone]      Medical Progress Note      NAME: Shyann Wade   :  1928  MRM:  [id number]    Date/Time: 10/29/2021  3:51 PM         Subjective:     Chief Complaint:  \"I'm okay\"     Pt seen and examined. No complaints. Awaiting PICC placement     ROS:  (bold if positive, if negative)           Objective:       Vitals:          Last 24hrs VS reviewed since prior progress note.  Most recent are:    Visit Vitals  BP (!) 163/79 (BP 1 Location: Left arm, BP Patient Position: At rest)   Pulse 87   Temp 97.6 °F (36.4 °C)   Resp 18   Ht 5' 7.01\" (1.702 m)   Wt 63 kg (138 lb 14.2 oz)   SpO2 96%   BMI 21.75 kg/m²     SpO2 Readings from Last 6 Encounters:   10/29/21 96%   10/04/21 94%   21 98%   21 98%   12/15/20 97%   20 98%            Intake/Output Summary (Last 24 hours) at 10/29/2021 1551  Last data filed at 10/29/2021 0835  Gross per 24 hour   Intake 440 ml   Output 1350 ml   Net -910 ml          Exam:     Physical Exam:    Gen:  Well-developed, well-nourished, in no acute distress  HEENT:  Pink conjunctivae, PERRL, hearing intact to voice, moist mucous membranes  Neck:  Supple, without masses, thyroid non-tender  Resp:  No accessory muscle use, clear breath sounds without wheezes rales or rhonchi  Card:  No murmurs, normal S1, S2 without thrills, bruits or peripheral edema  Abd:  Soft, non-tender, non-distended, normoactive bowel sounds are present  Musc:  No cyanosis or clubbing  Skin:  No rashes or ulcers, skin turgor is good  Neuro:  Cranial nerves 3-12 are grossly intact,  strength is 5/5 bilaterally and dorsi / plantarflexion is 5/5 bilaterally, follows commands appropriately  Psych:  Good insight, oriented to person, place and time, alert      Medications Reviewed: (see below)    Lab Data Reviewed: (see below)    ______________________________________________________________________    Medications: Current Facility-Administered Medications   Medication Dose Route Frequency    alteplase (CATHFLO) 1 mg in sterile water (preservative free) 1 mL injection  1 mg InterCATHeter PRN    cefTRIAXone (ROCEPHIN) 2 g in sterile water (preservative free) 20 mL IV syringe  2 g IntraVENous Q24H    prochlorperazine (COMPAZINE) injection 10 mg  10 mg IntraVENous Q6H PRN    traMADoL (ULTRAM) tablet 50 mg  50 mg Oral Q6H PRN    simethicone (MYLICON) tablet 80 mg  80 mg Oral QID PRN    PARoxetine (PAXIL) tablet 10 mg  10 mg Oral DAILY    levothyroxine (SYNTHROID) tablet 112 mcg  112 mcg Oral ACB    meloxicam (MOBIC) tablet 7.5 mg  7.5 mg Oral DAILY    diclofenac (VOLTAREN) 1 % topical gel 2 g  2 g Topical DAILY PRN    sodium chloride (NS) flush 5-40 mL  5-40 mL IntraVENous Q8H    sodium chloride (NS) flush 5-40 mL  5-40 mL IntraVENous PRN    acetaminophen (TYLENOL) tablet 650 mg  650 mg Oral Q6H PRN    Or    acetaminophen (TYLENOL) suppository 650 mg  650 mg Rectal Q6H PRN    senna (SENOKOT) tablet 8.6 mg  1 Tablet Oral DAILY PRN    apixaban (ELIQUIS) tablet 2.5 mg  2.5 mg Oral BID    amiodarone (CORDARONE) tablet 100 mg  100 mg Oral DAILY            Lab Review:     Recent Labs     10/29/21  0328   WBC 6.0   HGB 11.4*   HCT 34.8*        Recent Labs     10/29/21  0328   *   K 3.8   CL 99   CO2 30   GLU 94   BUN 11   CREA 0.69   CA 8.6   MG 1.7   PHOS 3.6   ALB 2.0*     No components found for: [place]         Assessment / Plan:     79 yo F w/ hx of HTN, CAD, chronic AF s/p pacer/ICD, hypothyroidism presenting w/ weakness, found to have R pyelonephritis, septic shock, ELADIA, and severe thrombocytopenia     R pyelonephritis/bacteremia: septic shock POA, now resolved.  Abd CT showed evidence of R pyelonephritis. Appreciate ID evaluation. GPR isolated from a single blood culture was nonviable, likely contaminant.  GNR growing in a second culture, showing Haemophilus parainfluenzae.  Haemophilus is one of HACEK organisms associated with endocarditis  -appreciate ID eval; PICC line and IV ceftriaxone at discharge   -HORACE deferred as risks great at her age and likely would need anesthesia      Acute urine retention: passed voiding trial 10/25  -outpt urology eval      ELADIA/CKD 3: resolved      Thrombocytopenia: 2/2 sepsis, resolved     CAD/chronic afib: s/p pacer/ICD  -on Amio, Eliquis  -spoke with caridology; no indication for ASA AND eliquis      Hypothyroid: cont LT4    Hypertension:   -resume amlodipine   -may also need atenolol resumed as BP tolerates      Weakness:   -PT/OT on board   -SNF placement     Osteoarthritis - on Mobic. As pt is a 79 yo female on dual anticoagulation, at risk for gastritis/PUD and GI bleed with this combination.  Would strongly advise holding NSAIDS     Total time spent with patient: [address] discussed with: Patient    Discussed:  Care Plan    Prophylaxis:  SCD's    Disposition:  SNF/LTC           ___________________________________________________    Attending Physician: Carter Villareal MD

## 2021-10-29 NOTE — PROGRESS NOTES
Bedside and Verbal shift change report given to Maribeth Joshua. BELKYS RN (oncoming nurse) by Jackie Thornton. Candy Silverio RN (offgoing nurse).  Report included the following information SBAR, Kardex, Intake/Output, MAR and Recent Results

## 2021-10-29 NOTE — PROGRESS NOTES
PICC Placement Note    PRE-PROCEDURE VERIFICATION  Correct Procedure: yes  Correct Site:  yes  Temperature: Temp: 97.6 °F (36.4 °C), Temperature Source: Temp Source: Oral  Recent Labs     10/29/21  0328   BUN 11   CREA 0.69      WBC 6.0     Allergies: Latex; Pcn [penicillins]; Sulfa (sulfonamide antibiotics); Biaxin [clarithromycin]; Biotin; Covid-19 vaccine, mrna, [id number], lnp-s (moderna); Pcn [penicillins]; and Sulfa (sulfonamide antibiotics)  Education materials for PICC Care given: yes. See Patient Education activity for further details. PICC Booklet placed at bedside: yes    Closed Ended PICC Catheters:  Flush Lumens as Follows:  Intermittent Medication:   Flush before and after each medication with 10 ml NS. Unused Ports:  Flush every 8 hours with 10 ml NS.  TPN Ports:  Flush every 24 hours with 20 ml NS prior to hanging new bag. Blood Draws: Stop infusion, draw off and waste 10 ml of blood. Draw sample with 10cc syringe or greater. DO NOT USE VACUTAINER . Transfer with appropriate device to lab  tubes. Flush with 20 ml NS. Dressing Change:  Every 7 days, and PRN using sterile technique if integrity of dressing is compromised. Initial dressing change for central line 24-48 hours post insertion if gauze is used. Apply new dressing per policy. PROCEDURE DETAIL  Consent was obtained and all questions were answered related to risks and benefits. A single lumen PICC line was inserted, as a sterile procedure using ultrasound and modified Seldinger technique for antibiotic therapy and Home IV Therapy. The following documentation is in addition to the PICC properties in the lines/airways flowsheet :  Lot #: [id number]  Lidocaine 1% administered intradermally :yes  Internal Catheter Total Length: 41 (cm) with 1 cm out  Vein Selection for PICC:right basilic  Central Line Bundle followed yes  Complication Related to Insertion:no       X-ray: yes.  The x-ray results state the tip location is on the right side and the tip is in the the superior vena cava.      Line is okay to use: yes    Srinath Roberto RN

## 2021-10-29 NOTE — PROGRESS NOTES
Nutrition Assessment     Type and Reason for Visit: Reassess    Nutrition Recommendations/Plan:   1. Modify diet order to Regular, 4 Carb Choice, DANICA  2. Continue Ensure Enlive BID    Nutrition Assessment:    10/29: Follow up. PO averaging 50% of all meals. No supplement intake documented. Patient endorses slight improvement in appetite. Consumes 50% of all supplements. Son brings in food, patient states she has a smoothie in freezer from him. Endorses 100% intake of items brought in. No N/V/D. Provided more meal preferences. 10/25: Pt is a 80year old female admitted with Generalized weakness [R53.1]. She  has a past medical history of Acute cystitis (05/23/2018), Anemia, unspecified, Anxiety, Arrhythmia, Arthritis, Atrial fibrillation ([address] Autoimmune disease ([address] CAD (coronary artery disease), HTN (hypertension), Hyperlipidemia LDL goal < 100, Hypothyroid, Insomnia, Osteoarthritis, Sjogren's disease ([address] and Thoracic aneurysm without mention of rupture. Patient states decreased appetite x 6 days (\"since I've been here\"). No weight or appetite changes previous to this. No chewing/swallowing problems. No N/V/D at this time. States she is uncomfortable as she has not yet voided but feels as though she needs to. PO intake averaging 25% of all meals - voiced acceptance of protein supplement.  [facility]    Patient Vitals for the past 168 hrs:   % Diet Eaten   10/28/21 1400 1 - 25%   10/28/21 1041 1 - 25%   10/27/21 1711 51 - 75%   10/27/21 1230 1 - 25%   10/27/21 0932 51 - 75%   10/26/21 1741 26 - 50%   10/26/21 1300 51 - 75%   10/26/21 0945 51 - 75%   10/25/21 0845 51 - 75%   10/24/21 1721 51 - 75%   10/24/21 1400 1 - 25%   10/23/21 1400 0%   10/23/21 1334 1 - 25%   10/23/21 0942 1 - 25%   10/22/21 1347 0%       Wt Readings from Last 10 Encounters:   10/20/21 63 kg (138 lb 14.2 oz)   10/04/21 63.8 kg (140 lb 9.6 oz)   05/25/21 62.6 kg (138 lb)   02/16/21 61.7 kg (136 lb)   10/21/20 59 kg (130 lb)   09/10/20 62.6 kg (138 lb)   05/26/20 59.9 kg (132 lb)   01/02/20 59.9 kg (132 lb)   12/30/19 60.8 kg (134 lb)   12/14/19 61.2 kg (135 lb)       Malnutrition Assessment:  Malnutrition Status: At risk for malnutrition (specify)    Context:  Acute illness     Findings of the 6 clinical characteristics of malnutrition:   Energy Intake:  Mild decrease in energy intake (specify) (50% x 4 days)  Weight Loss:  No significant weight loss     Body Fat Loss:  No significant body fat loss,     Muscle Mass Loss:  No significant muscle mass loss,    Fluid Accumulation:  No significant fluid accumulation,     Strength:  Normal  strength     Estimated Daily Nutrient Needs:  Energy (kcal):  3188-2091 (25-30)  Protein (g):  63-76 (1.0-1.2)       Fluid (ml/day):  2163-8724    Nutrition Related Findings:    ABD: Distended, hypoactive bowel sounds 10/28  Last BM: 10/27, watery  Edema: None noted 10/29    Nutr. Labs:  Lab Results   Component Value Date/Time    GFR est AA >60 10/29/2021 03:28 AM    GFR est non-AA >60 10/29/2021 03:28 AM    Creatinine (POC) 1.1 02/16/2019 05:38 PM    Creatinine 0.69 10/29/2021 03:28 AM    BUN 11 10/29/2021 03:28 AM    BUN (POC) 18 02/16/2019 05:38 PM    Sodium (POC) 137 02/16/2019 05:38 PM    Sodium 133 (L) 10/29/2021 03:28 AM    Potassium 3.8 10/29/2021 03:28 AM    Potassium (POC) 3.8 02/16/2019 05:38 PM    Chloride (POC) 99 02/16/2019 05:38 PM    Chloride 99 10/29/2021 03:28 AM    CO2 30 10/29/2021 03:28 AM     Lab Results   Component Value Date/Time    Glucose 94 10/29/2021 03:28 AM    Glucose (POC) 275 (H) 02/16/2019 05:38 PM     Lab Results   Component Value Date/Time    Hemoglobin A1c 6.0 (H) 03/14/2017 12:41 PM       Nutr. Meds:  Eliquis  Synthroid  Senna    Current Nutrition Therapies:  DIET ONE TIME MESSAGE  ADULT ORAL NUTRITION SUPPLEMENT Dinner, Breakfast; Standard High Calorie/High Protein  ADULT DIET Regular; 4 carb choices (60 gm/meal);  Low Fat/Low Chol/High Fiber/2 gm Na    Anthropometric Measures:  · Height:  5' 7.01\" (170.2 cm)  · Current Body Wt:  63 kg (138 lb 14.2 oz)  · BMI: 21.7    Nutrition Diagnosis:   · Inadequate energy intake related to early satiety as evidenced by intake 26-50%    Nutrition Intervention:  Food and/or Nutrient Delivery: Continue oral nutrition supplement, Modify current diet  Nutrition Education and Counseling: No recommendations at this time  Coordination of Nutrition Care: Continue to monitor while inpatient, Interdisciplinary rounds    Goals:  PO intake >/= 75% of all meals and supplements within 3 - 5 days       Nutrition Monitoring and Evaluation:   Behavioral-Environmental Outcomes: None identified  Food/Nutrient Intake Outcomes: Food and nutrient intake, Supplement intake  Physical Signs/Symptoms Outcomes: Biochemical data, Weight    Discharge Planning:     Too soon to determine     Electronically signed by Rosa Beckett RD on 21/73/3176  Contact Number: [phone] or via [employer]

## 2021-10-29 NOTE — PROGRESS NOTES
[facility] Progress Note           Yue Romero DO    [phone] Office  [phone]  Fax    10/29/2021      Assessment & Plan:   · Haemophilus parainfluenza isolated from 2 blood cultures. A gram-positive warren was also isolated from a single blood culture which was nonviable. This likely represents a contaminant. As the Haemophilus was isolated from 2 separate cultures this will need to be treated as a \"real\" infection. Concern raised for endocarditis in the setting of pacemaker/ICD with bacteremia by this organism which is a member of the [address] [place] [address] group. Cardiology  feels patient is high risk for HORACE so we will treat empirically with 4 weeks IV antibiotics. IV antibiotic orders are in the chart. · Right-sided pyelonephritis. Urine culture not sent as UA was bland. Urology following and outpatient follow-up and imaging planned. · Multiple antibiotic allergies including penicillin, sulfa, and Biaxin. She is currently tolerating ceftriaxone  · ELADIA/CKD  · Chronic atrial fibrillation status post pacer/ICD          Subjective:     No complaints    Objective:     Vitals:   Visit Vitals  BP (!) 163/79 (BP 1 Location: Left arm, BP Patient Position: At rest)   Pulse 89   Temp 97.6 °F (36.4 °C)   Resp 18   Ht 5' 7.01\" (1.702 m)   Wt 138 lb 14.2 oz (63 kg)   SpO2 96%   BMI 21.75 kg/m²        Tmax:  Temp (24hrs), Av.8 °F (36.6 °C), Min:97.6 °F (36.4 °C), Max:97.9 °F (36.6 °C)      Exam:   Patient is intubated:  no    Physical Examination:   General:  Alert, cooperative, no distress   Head:  Normocephalic, atraumatic. Eyes:  Conjunctivae clear   Neck: Supple       Lungs:   No distress. Clear to auscultation bilaterally. Chest wall:     Heart:  Regular rate and rhythm   Abdomen:    Nondistended   Extremities: Moves all. Skin:  No rash   Neurologic: CNII-XII intact.  Normal strength     Labs:        No lab exists for component: ITNL   No results for input(s): CPK, CKMB, TROIQ in the last 72 hours. Recent Labs     10/29/21  0328   *   K 3.8   CL 99   CO2 30   BUN 11   CREA 0.69   GLU 94   PHOS 3.6   MG 1.7   ALB 2.0*   WBC 6.0   HGB 11.4*   HCT 34.8*        No results for input(s): INR, PTP, APTT, INREXT, INREXT in the last 72 hours.   Needs: urine analysis, urine sodium, protein and creatinine  Lab Results   Component Value Date/Time    Creatinine, urine 22.4 05/07/2013 12:00 AM         Cultures:     No results found for: SDES      Radiology:     Medications       Current Facility-Administered Medications   Medication Dose Route Frequency Last Admin    cefTRIAXone (ROCEPHIN) 2 g in sterile water (preservative free) 20 mL IV syringe  2 g IntraVENous Q24H 2 g at 10/29/21 0951    prochlorperazine (COMPAZINE) injection 10 mg  10 mg IntraVENous Q6H PRN      traMADoL (ULTRAM) tablet 50 mg  50 mg Oral Q6H PRN 50 mg at 10/28/21 1905    simethicone (MYLICON) tablet 80 mg  80 mg Oral QID PRN 80 mg at 10/28/21 0207    PARoxetine (PAXIL) tablet 10 mg  10 mg Oral DAILY 10 mg at 10/29/21 0951    levothyroxine (SYNTHROID) tablet 112 mcg  112 mcg Oral  mcg at 10/29/21 0631    meloxicam (MOBIC) tablet 7.5 mg  7.5 mg Oral DAILY 7.5 mg at 10/29/21 3635    diclofenac (VOLTAREN) 1 % topical gel 2 g  2 g Topical DAILY PRN 2 g at 10/22/21 0330    sodium chloride (NS) flush 5-40 mL  5-40 mL IntraVENous Q8H 10 mL at 10/29/21 7610    sodium chloride (NS) flush 5-40 mL  5-40 mL IntraVENous PRN 10 mL at 10/25/21 2239    acetaminophen (TYLENOL) tablet 650 mg  650 mg Oral Q6H  mg at 10/28/21 1749    Or    acetaminophen (TYLENOL) suppository 650 mg  650 mg Rectal Q6H PRN      senna (SENOKOT) tablet 8.6 mg  1 Tablet Oral DAILY PRN 8.6 mg at 10/28/21 0545    apixaban (ELIQUIS) tablet 2.5 mg  2.5 mg Oral BID 2.5 mg at 10/29/21 5281    amiodarone (CORDARONE) tablet 100 mg  100 mg Oral DAILY 100 mg at 10/29/21 9763           Case discussed with: Cardiology, hospitalist      Ambrocio Ruvalcaba DO

## 2021-10-29 NOTE — PROGRESS NOTES
Post Discharge PICC and Antibiotic Orders    1. Diagnosis: Haemophilus parainfluenza bacteremia  2. Antibiotic:  Ceftriaxone 2 g IV daily through 11/19/2021  3. Routine PICC/ Zonia/ Portacath Care including PRN catheter flow management  4. Weekly labs:   _x__CBC/diff/platelets   _x__BUN/Creatinine   ___CPK   _x__AST/TotalBilirubin/AlkalinePhosphatase   ___CRP  5. Fax lab to [phone]  Call Critical Lab Values to [phone]  6. May send to IR for line evaluation or replacement -[phone]  7. Home Health to pull PIC line at end of therapy or send to IR for Zonia removal  8. Allergies:     Allergies   Allergen Reactions    Latex Hives     NOT ALLERGIC PER PT 02/01/2018    Pcn [Penicillins] Anaphylaxis    Sulfa (Sulfonamide Antibiotics) Anaphylaxis    Biaxin [Clarithromycin] Nausea Only    Biotin Unknown (comments)    Covid-19 Vaccine, Mrna, [id number], Lnp-S (Moderna) Rash    Pcn [Penicillins] Hives    Sulfa (Sulfonamide Antibiotics) Nausea Only         Nalini Lowe, DO

## 2021-10-29 NOTE — PROGRESS NOTES
D/w Dr Gorge Ureña  D/w Patient - she is willing to get 4 wks of abx rather than go through with HORACE procedure  Revisit HORACE for treatment failure of abx/worsening symptoms      Francine Luong MD, [facility]

## 2021-10-29 NOTE — PROGRESS NOTES
10/29/2021   CARE MANAGEMENT NOTE:  CM reviewed EMR for clinical updates.    Reportedly, pt was admitted with right pyelo/bacteremia; septic shock, urine retention, ELADIA/CKD 3, HTN, and weakness. Reportedly, lives alone in 1st floor apt. Son, Kat Temple ([phone]) is the primary family contact.     RUR 13%; LOS 9 days     Transition Plan of Care:  1.  SNF - accepted by [facility] and skilled bed is available on Saturday. RN, please call report to [phone]. CM notified SNF that pt will require IV ABX (Ceftriaxone 2 g IV daily thru 11/19/21). AVS, IV orders faxed to SNF. 2.  PICC line was placed on 10/29  3.  Covid PCR test for placement was negative on 10/26  4.  Outpt f/u  5.  AMR has been requested for Sat. Oct.30 at 9:30 a.m. AMR was delayed at least 3 1/2 hours Friday evening so discharge was postponed until Sat. Son is aware and agreeable to plan.     CM will continue to follow pt until discharged.   Libra

## 2021-10-30 VITALS
SYSTOLIC BLOOD PRESSURE: 127 MMHG | DIASTOLIC BLOOD PRESSURE: 71 MMHG | WEIGHT: 138.89 LBS | RESPIRATION RATE: 18 BRPM | TEMPERATURE: 98 F | OXYGEN SATURATION: 91 % | HEART RATE: 78 BPM | BODY MASS INDEX: 21.8 KG/M2 | HEIGHT: 67 IN

## 2021-10-30 PROCEDURE — 74011250637 HC RX REV CODE- 250/637: Performed by: INTERNAL MEDICINE

## 2021-10-30 PROCEDURE — 74011250636 HC RX REV CODE- 250/636: Performed by: INTERNAL MEDICINE

## 2021-10-30 PROCEDURE — 74011000250 HC RX REV CODE- 250: Performed by: INTERNAL MEDICINE

## 2021-10-30 RX ADMIN — SIMETHICONE 80 MG: 80 TABLET, CHEWABLE ORAL at 11:33

## 2021-10-30 RX ADMIN — APIXABAN 2.5 MG: 2.5 TABLET, FILM COATED ORAL at 08:15

## 2021-10-30 RX ADMIN — AMLODIPINE BESYLATE 5 MG: 5 TABLET ORAL at 08:15

## 2021-10-30 RX ADMIN — PAROXETINE HYDROCHLORIDE 10 MG: 20 TABLET, FILM COATED ORAL at 08:15

## 2021-10-30 RX ADMIN — LEVOTHYROXINE SODIUM 112 MCG: 0.11 TABLET ORAL at 06:16

## 2021-10-30 RX ADMIN — TRAMADOL HYDROCHLORIDE 50 MG: 50 TABLET, COATED ORAL at 08:15

## 2021-10-30 RX ADMIN — AMIODARONE HYDROCHLORIDE 100 MG: 200 TABLET ORAL at 08:16

## 2021-10-30 RX ADMIN — CEFTRIAXONE 2 G: 2 INJECTION, POWDER, FOR SOLUTION INTRAMUSCULAR; INTRAVENOUS at 11:33

## 2021-10-30 RX ADMIN — ACETAMINOPHEN 650 MG: 325 TABLET ORAL at 11:33

## 2021-10-30 RX ADMIN — Medication 10 ML: at 06:16

## 2021-10-30 NOTE — PROGRESS NOTES
Bedside and Verbal shift change report given to Kristy Dinh (oncoming nurse) by Celestino Traylor (offgoing nurse). Report included the following information SBAR, Kardex, ED Summary, Intake/Output, MAR, Accordion and Recent Results.

## 2021-10-30 NOTE — PROGRESS NOTES
Problem: Pressure Injury - Risk of  Goal: *Prevention of pressure injury  Description: Document Luciano Scale and appropriate interventions in the flowsheet. Outcome: Progressing Towards Goal  Note: Pressure Injury Interventions:  Sensory Interventions: Assess changes in LOC, Assess need for specialty bed, Keep linens dry and wrinkle-free, Maintain/enhance activity level, Minimize linen layers, Pressure redistribution bed/mattress (bed type)    Moisture Interventions: Absorbent underpads, Apply protective barrier, creams and emollients, Check for incontinence Q2 hours and as needed, Minimize layers, Maintain skin hydration (lotion/cream)    Activity Interventions: Assess need for specialty bed, Pressure redistribution bed/mattress(bed type), PT/OT evaluation    Mobility Interventions: HOB 30 degrees or less, Pressure redistribution bed/mattress (bed type)    Nutrition Interventions: Document food/fluid/supplement intake    Friction and Shear Interventions: Apply protective barrier, creams and emollients, HOB 30 degrees or less, Minimize layers                Problem: Falls - Risk of  Goal: *Absence of Falls  Description: Document Gabbi Fall Risk and appropriate interventions in the flowsheet.   Outcome: Progressing Towards Goal  Note: Fall Risk Interventions:  Mobility Interventions: Bed/chair exit alarm, Communicate number of staff needed for ambulation/transfer, Patient to call before getting OOB, Strengthening exercises (ROM-active/passive), Utilize walker, cane, or other assistive device         Medication Interventions: Bed/chair exit alarm, Patient to call before getting OOB, Teach patient to arise slowly    Elimination Interventions: Bed/chair exit alarm, Patient to call for help with toileting needs, Toileting schedule/hourly rounds

## 2021-10-30 NOTE — PROGRESS NOTES
Report called to [facility]. All questions answered.

## 2021-10-30 NOTE — DISCHARGE INSTRUCTIONS
HOSPITALIST DISCHARGE INSTRUCTIONS  NAME: Elian Medina   :  1928   MRN:  [id number]     Date/Time:  10/29/2021 1:12 PM    ADMIT DATE: 10/19/2021     DISCHARGE DATE: 10/30/2021    ADMITTING DIAGNOSIS:   Septic shock   Pyelonephritis     DISCHARGE DIAGNOSIS:  As above     MEDICATIONS:     · It is important that you take the medication exactly as they are prescribed. · Keep your medication in the bottles provided by the pharmacist and keep a list of the medication names, dosages, and times to be taken in your wallet. · Do not take other medications without consulting your doctor. Pain Management: per above medications    What to do at Home    Recommended diet:  Regular Diet    Recommended activity: Activity as tolerated    If you experience any of the following symptoms then please call your primary care physician or return to the emergency room if you cannot get hold of your doctor:  Fever, chills, nausea, vomiting, diarrhea, change in mentation, falling, bleeding, shortness of breath, chest pain     Follow Up: Follow-up Information     Follow up With Specialties Details Why [facility]  On 2021 Dr. Laurence Nolen at 9:40am [address] [place], [place] [address] obtained by :  I understand that if any problems occur once I am at home I am to contact my physician. I understand and acknowledge receipt of the instructions indicated above.                                                                                                                                            Physician's or R.N.'s Signature                                                                  Date/Time                                                                                                                                              Patient or Representative Signature                                                          Date/Time

## 2021-10-30 NOTE — PROGRESS NOTES
Transition of Care Plan to SNF/Rehab    SNF/Rehab Transition:  Patient has been accepted to [facility] and meets criteria for admission. Patient will transported by [facility] and expected to leave at 11:15am.    Communication to Patient/Family:  Met with patient and spoke with son over the phone (identified care giver) and they are agreeable to the transition plan. Communication to SNF/Rehab:  Bedside RN, Antonella Blood, has been notified to update the transition plan to the facility and call report (phone number [phone]). Discharge information has been updated on the AVS.     Discharge instructions to be fax'd to facility via [employer]. Nursing Please include all hard scripts for controlled substances, med rec and dc summary, and AVS in packet. Reviewed and confirmed with facility, [facility], can manage the patient care needs for the following:     SNF/Rehab Transition:    Reviewed and confirmed with facility, [facility], they can manage the patient care needs for the following:     Laisha Smith with (X) only those applicable:    Medication:  [x]  Medications will be available at the facility  [x]  IV Antibiotics - Ceftriaxone 2g daily  []  Controlled Substance - hard copy to be sent with patient   [x]  Weekly Labs   Documents:  [x] Hard RX  [x] MAR  [] Kardex  [x] AVS  []Transfer Summary  []Discharge   Equipment:   []  CPAP/BiPAP  []  Wound Vacuum  []  Dorman or Urinary Device  [x]  PICC/Central Line  []  Nebulizer  []  Ventilator   Treatment:  []Isolation (for MRSA, VRE, etc.)  []Surgical Drain Management  []Tracheostomy Care  [x]Dressing Changes   []Dialysis with transportation and chair time   []PEG Care  []Oxygen  []Daily Weights for Heart Failure   Dietary:  []Any diet limitations  []Tube Feedings   []Total Parenteral Management (TPN)   Eligible for Medicaid Long Term Services and Supports  Yes:  [] Eligible for medical assistance or will become eligible within 180 days and UAI completed.    [] Provider/Patient and/or support system has requested screening. [] UAI copy provided to patient or responsible party,   [] UAI unavailable at discharge will send once processed to SNF provider. [] UAI unavailable at discharged mailed to patient  No:   [] Private pay and is not financially eligible for Medicaid within the next 180 days. [] Reside out-of-state. [] A residents of a state owned/operated facility that is licensed  by [address] [facility] or [facility]  [] Enrollment in [facility] hospice services  [] [address] [place] [address] [facility]  [] Patient /Family declines to have screening completed or provide financial information for screening     Financial Resources:  Medicaid    [] Initiated and application pending   [] Full coverage     Advanced Care Plan:  []Surrogate Decision Maker of Care   []POA  [x]Communicated Code Status - DNR   Other     Care Management Interventions  PCP Verified by CM: Yes Jacek Villegas MD, last visit > 30 days)  Palliative Care Criteria Met (RRAT>21 & CHF Dx)?: No  Mode of Transport at Discharge:  Other (see comment) (TBD)  Transition of Care Consult (CM Consult): SNF  Partner SNF: No  Reason Why Partner SNF Not Chosen: Location  Physical Therapy Consult: Yes  Occupational Therapy Consult: Yes  Speech Therapy Consult: No  Support Systems: Child(lynette) (pt lives alone in pvt residence, at baseline pt is ambualtory w/ rolalotr, iADLS, relies on family for transport)  Confirm Follow Up Transport: Family  The Patient and/or Patient Representative was Provided with a Choice of Provider and Agrees with the Discharge Plan?: Yes  Freedom of Choice List was Provided with Basic Dialogue that Supports the Patient's Individualized Plan of Care/Goals, Treatment Preferences and Shares the Quality Data Associated with the Providers?: Yes  Discharge Location  Discharge Placement: Skilled nursing facility      Ynes Chavez RN

## 2021-11-01 ENCOUNTER — PATIENT OUTREACH (OUTPATIENT)
Dept: CASE MANAGEMENT | Age: 86
End: 2021-11-01

## 2021-11-01 NOTE — PROGRESS NOTES
Transition of care outreach postponed for 14 days due to patient's discharge to SNF. Per EMR patient discharged to [facility], will continue to monitor patient progress.

## 2021-11-02 LAB
BACTERIA ISLT: ABNORMAL
SOURCE, RSRC70: ABNORMAL

## 2021-11-04 NOTE — DISCHARGE SUMMARY
Physician Discharge Summary     Patient ID:  Shayy Rivera  [id number]  80 y.o.  4/22/1928    Admit date: 10/19/2021    Discharge date and time: 10/30/2021    Admission Diagnoses: Generalized weakness [R53.1]    Discharge Diagnoses/Hospital Course     81 yo F w/ hx of HTN, CAD, chronic AF s/p pacer/ICD, hypothyroidism presenting w/ weakness, found to have R pyelonephritis, septic shock, ELADIA, and severe thrombocytopenia    R pyelonephritis/bacteremia: septic shock POA, now resolved.  Abd CT showed evidence of R pyelonephritis. Appreciate ID evaluation. GPR isolated from a single blood culture was nonviable, likely contaminant.  GNR growing in a second culture, showing Haemophilus parainfluenzae. Haemophilus is one of HACEK organisms associated with endocarditis therefore TTE completed. Due to need for anesthesia and risks of such for HORACE, decision was made to complete antibiotics rather than risk HORACE considering her age, co-morbidities     Acute urine retention: passed voiding trial 10/25  -outpt urology eval      ELADIA/CKD 3: resolved      Thrombocytopenia: 2/2 sepsis, resolved     CAD/chronic afib: s/p pacer/ICD: on amio, eliquis. Spoke to cardiology. No indication for dual anti-anticoagulant therapy. ASA was held      Hypothyroid: cont LT4    Hypertension: on amlodipine. May need resumption of atenolol if BP's uptrend              Osteoarthritis - on Mobic. As pt is a 81 yo female on dual anticoagulation, at risk for gastritis/PUD and GI bleed with this                     combination.  Would strongly advise holding NSAIDS     PCP: Ariana Sheldon MD     Consults: cardiology, ID, urology     Discharge Exam:  Visit Vitals  /71 (BP 1 Location: Left upper arm, BP Patient Position: At rest)   Pulse 78   Temp 98 °F (36.7 °C)   Resp 18   Ht 5' 7.01\" (1.702 m)   Wt 63 kg (138 lb 14.2 oz)   SpO2 91%   BMI 21.75 kg/m²     Gen:  Well-developed, well-nourished, in no acute distress  HEENT:  Pink conjunctivae, PERRL, hearing intact to voice, moist mucous membranes  Neck:  Supple, without masses, thyroid non-tender  Resp:  No accessory muscle use, clear breath sounds without wheezes rales or rhonchi  Card:  No murmurs, normal S1, S2 without thrills, bruits or peripheral edema  Abd:  Soft, non-tender, non-distended, normoactive bowel sounds are present  Musc:  No cyanosis or clubbing  Skin:  No rashes or ulcers, skin turgor is good  Neuro:  Cranial nerves 3-12 are grossly intact,  strength is 5/5 bilaterally and dorsi / plantarflexion is 5/5 bilaterally, follows commands appropriately  Psych:  Good insight, oriented to person, place and time, alert     Disposition: [facility]    Patient Instructions:   Discharge Medication List as of 10/30/2021 11:52 AM      START taking these medications    Details   OTHER,NON-FORMULARY, Ceftriaxone 2 g IV daily through 11/19/2021, Print, Disp-1 Each, R-0      traMADoL (ULTRAM) 50 mg tablet Take 1 Tablet by mouth every six (6) hours as needed for Pain for up to 30 days. Max Daily Amount: 200 mg., Print, Disp-30 Tablet, R-0      acetaminophen (TYLENOL) 325 mg tablet Take 2 Tablets by mouth every six (6) hours as needed for Pain., Normal, Disp-30 Tablet, R-0         CONTINUE these medications which have NOT CHANGED    Details   PARoxetine (PAXIL) 10 mg tablet TAKE 1 AND 1/2 TABLET BY MOUTH DAILY, Normal, Disp-135 Tablet, R-0      levothyroxine (SYNTHROID) 112 mcg tablet Take 1 Tablet by mouth Daily (before breakfast). , Normal, Disp-90 Tablet, R-0Please notify patient when ready. ferrous sulfate (IRON PO) Take 325 mg by mouth daily. , Historical Med      amLODIPine (NORVASC) 5 mg tablet Take 1 Tablet by mouth daily. , Normal, Disp-90 Tablet, R-1      ipratropium (ATROVENT) 42 mcg (0.06 %) nasal spray 1 [place] by Both Nostrils route three (3) times daily as needed., Historical Med      diclofenac (VOLTAREN) 1 % gel Apply 2 g to affected area daily as needed., Historical Med      albuterol (PROVENTIL HFA, VENTOLIN HFA, PROAIR HFA) 90 mcg/actuation inhaler albuterol sulfate HFA 90 mcg/actuation aerosol inhaler, Historical Med      Biotin 2,500 mcg cap Take 1 Caplet by mouth daily. , Historical Med      amiodarone (PACERONE) 100 mg tablet Take 100 mg by mouth daily. , Historical Med      ascorbic acid, vitamin C, (Vitamin C) 500 mg tablet Take 500 mg by mouth daily. , Historical Med      cetirizine (ZYRTEC) 10 mg tablet Take 10 mg by mouth daily. , Historical Med      apixaban (ELIQUIS) 2.5 mg tablet Take 2.5 mg by mouth two (2) times a day., Historical Med      fluticasone (FLONASE) 50 mcg/actuation nasal spray 2 Sprays by Both Nostrils route daily as needed for Allergies. , Historical Med      cholecalciferol, vitamin D3, (VITAMIN D3) 2,000 unit tab Take 2,000 Units by mouth daily. , Historical Med         STOP taking these medications       fosinopriL (MONOPRIL) 10 mg tablet Comments:   Reason for Stopping:         atenoloL (TENORMIN) 25 mg tablet Comments:   Reason for Stopping:         aspirin delayed-release 81 mg tablet Comments:   Reason for Stopping:         eszopiclone (LUNESTA) 3 mg tablet Comments:   Reason for Stopping:         meloxicam (MOBIC) 7.5 mg tablet Comments:   Reason for Stopping:             Activity: as tolerated   Diet: regular    Follow-up with Kanwal Liao MD   Follow-up Information     Follow up With Specialties Details Why [address] [facility] Urology  On 11/4/2021 Dr. Gin Hansen at 9:40am [address]  Merlinda Cancer, MD Internal Medicine   [facility] [address] [phone]      [address] [facility] [address]  [phone]          Approximate time spent in patient care on day of discharge: 35 minutes   Signed:  Tgire Faulkner MD  11/4/2021  10:28 AM

## 2021-11-15 ENCOUNTER — PATIENT OUTREACH (OUTPATIENT)
Dept: CASE MANAGEMENT | Age: 86
End: 2021-11-15

## 2021-11-15 NOTE — PROGRESS NOTES
Outgoing call placed to [facility] [address] [facility] regarding update of patient progress. Spoke to Brant Castillo (staff member) patient identified utilizing 2 identifiers. Brant Castillo reports patient is currently admitted to the facility. Will continue to monitor patient progress.

## 2021-11-25 LAB
AMPICILLIN, SUS10T: NORMAL
AMPICILLIN/SULB, SUS11T: NORMAL
CEFACLOR, SUS12T: NORMAL
CEFIXIME, SUS13T: NORMAL
CEFTRIAXONE, SUS14T: NORMAL
CEFUROXIME, SUS15T: NORMAL
CHLORAMPHENICOL, SUS16T: NORMAL
CLARITHROMYCIN, SUS17T: NORMAL
LEVOFLOXACIN, SUS18T: NORMAL
MEROPENEM, SUS19T: NORMAL
ORGANISM ID, SUS9T: NORMAL
SPECIMEN SOURCE: NORMAL
TETRACYCLINE, SUS20T: NORMAL
TRIMETHOPRIM/SULFA, SUS21T: NORMAL

## 2021-11-29 RX ORDER — FOSINOPIRL SODIUM 10 MG/1
TABLET ORAL
Qty: 270 TABLET | Refills: 0 | Status: SHIPPED | OUTPATIENT
Start: 2021-11-29 | End: 2022-01-19

## 2021-12-01 ENCOUNTER — PATIENT OUTREACH (OUTPATIENT)
Dept: CASE MANAGEMENT | Age: 86
End: 2021-12-01

## 2021-12-01 NOTE — PROGRESS NOTES
Per EMR patient discharged from [facility] and is being followed by [facility]. Outgoing call placed to patient introduced self and reason for the call. Due to patient hearing difficulty unable to perform post acute follow up. Verbal permission given to follow up with Gigi Aldridge (son) call placed no answer message left with this writer's contact information given.

## 2021-12-07 ENCOUNTER — PATIENT OUTREACH (OUTPATIENT)
Dept: CASE MANAGEMENT | Age: 86
End: 2021-12-07

## 2021-12-07 NOTE — PROGRESS NOTES
Outgoing calls attempted to reach patient and family, no response from messages left via voicemail. Patient has been discharged from the hospital greater than 30 days, episode of care closed.

## 2021-12-21 ENCOUNTER — TELEPHONE (OUTPATIENT)
Dept: INTERNAL MEDICINE CLINIC | Age: 86
End: 2021-12-21

## 2021-12-21 NOTE — TELEPHONE ENCOUNTER
I spoke with Nurse, [facility] but pt still needs help with 1 medication and nurse needs to do another f/up visit. She is requesting to have another visit order. Verbal order given.

## 2021-12-21 NOTE — TELEPHONE ENCOUNTER
Home Health was calling to speak with nurse about medication issues/has some confusion - wants to see if she can get order for a regular visit today    Please give her a call when possible

## 2022-01-10 RX ORDER — PAROXETINE 10 MG/1
TABLET, FILM COATED ORAL
Qty: 135 TABLET | Refills: 0 | Status: SHIPPED | OUTPATIENT
Start: 2022-01-10 | End: 2022-04-08

## 2022-01-11 NOTE — TELEPHONE ENCOUNTER
Attempted to reach pt, no answer, left voicemail requesting return call to schedule an appt [General Appearance - Well Developed] : well developed [General Appearance - Well Nourished] : well nourished [Normal Appearance] : normal appearance [Well Groomed] : well groomed [General Appearance - In No Acute Distress] : no acute distress [Abdomen Soft] : soft [Abdomen Tenderness] : non-tender [Costovertebral Angle Tenderness] : no ~M costovertebral angle tenderness [Urinary Bladder Findings] : the bladder was normal on palpation [Edema] : no peripheral edema [] : no respiratory distress [Respiration, Rhythm And Depth] : normal respiratory rhythm and effort [Exaggerated Use Of Accessory Muscles For Inspiration] : no accessory muscle use [Oriented To Time, Place, And Person] : oriented to person, place, and time [Affect] : the affect was normal [Mood] : the mood was normal [Not Anxious] : not anxious [Normal Station and Gait] : the gait and station were normal for the patient's age [No Focal Deficits] : no focal deficits [No Palpable Adenopathy] : no palpable adenopathy

## 2022-01-19 RX ORDER — FOSINOPIRL SODIUM 10 MG/1
TABLET ORAL
Qty: 270 TABLET | Refills: 0 | Status: SHIPPED | OUTPATIENT
Start: 2022-01-19

## 2022-02-16 NOTE — PROGRESS NOTES
(Inserted Image. Unable to display)   January 21, 2020        TAWANA GILES  [address] [place], [place] [id number]        Dear TAWANA,      Thank you for selecting [facility] for your healthcare needs.    Our records indicate you are due for the following services:     Non-Fasting Labs    If you had your labs done at another facility or with Direct Access Lab Testing at [facility], please bring in a copy of the results to your next visit, mail a copy, or drop off a copy of your results to your Healthcare Provider.    To schedule an appointment or if you have further questions, please contact your primary clinic:   [facility]       [phone]   Critical access hospital       [phone]              [facility]     [phone]      Powered by [employer]    Sincerely,    NATHAN FontanezNP   2- CASE MANAGEMENT NOTE: 
I spoke with the cardiac NP who informed me it is anticipated the pt will be medically stable for discharge on Saturday, 2-. I spoke with intake at [facility] and they have placed pt on their schedule for Sunday, 2-. I will pass this on to the weekend CM and request they send the AVS to [facility].  TARIQ Carmichael, CM

## 2022-03-18 PROBLEM — R53.1 GENERALIZED WEAKNESS: Status: ACTIVE | Noted: 2021-10-19

## 2022-03-18 PROBLEM — D69.6 THROMBOCYTOPENIA (HCC): Status: ACTIVE | Noted: 2020-10-21

## 2022-03-18 PROBLEM — N18.30 CKD (CHRONIC KIDNEY DISEASE) STAGE 3, GFR 30-59 ML/MIN (HCC): Status: ACTIVE | Noted: 2021-10-19

## 2022-03-18 PROBLEM — N17.9 AKI (ACUTE KIDNEY INJURY) (HCC): Status: ACTIVE | Noted: 2021-10-19

## 2022-03-19 PROBLEM — R06.02 SOB (SHORTNESS OF BREATH): Status: ACTIVE | Noted: 2020-10-21

## 2022-03-19 PROBLEM — I47.20 VENTRICULAR TACHYCARDIA (HCC): Status: ACTIVE | Noted: 2019-02-16

## 2022-03-19 PROBLEM — R57.9 SHOCK (HCC): Status: ACTIVE | Noted: 2021-10-19

## 2022-03-19 PROBLEM — U07.1 COVID-19: Status: ACTIVE | Noted: 2020-10-26

## 2022-03-19 PROBLEM — E87.1 HYPONATREMIA: Status: ACTIVE | Noted: 2021-10-19

## 2022-03-20 PROBLEM — R05.9 COUGH: Status: ACTIVE | Noted: 2020-10-21

## 2022-03-20 PROBLEM — R53.1 WEAKNESS: Status: ACTIVE | Noted: 2020-10-21

## 2022-03-20 PROBLEM — R79.89 ELEVATED SERUM CREATININE: Status: ACTIVE | Noted: 2019-02-16

## 2022-03-20 PROBLEM — D72.819 LEUKOPENIA: Status: ACTIVE | Noted: 2020-10-21

## 2022-04-03 ENCOUNTER — HOSPITAL ENCOUNTER (OUTPATIENT)
Age: 87
Setting detail: OBSERVATION
Discharge: HOME HEALTH | End: 2022-04-04
Attending: STUDENT IN AN ORGANIZED HEALTH CARE EDUCATION/TRAINING PROGRAM | Admitting: INTERNAL MEDICINE
Payer: MEDICARE

## 2022-04-03 ENCOUNTER — APPOINTMENT (OUTPATIENT)
Dept: GENERAL RADIOLOGY | Age: 87
End: 2022-04-03
Attending: STUDENT IN AN ORGANIZED HEALTH CARE EDUCATION/TRAINING PROGRAM
Payer: MEDICARE

## 2022-04-03 DIAGNOSIS — R55 NEAR SYNCOPE: ICD-10-CM

## 2022-04-03 DIAGNOSIS — Z45.02 DEFIBRILLATOR DISCHARGE: Primary | ICD-10-CM

## 2022-04-03 PROBLEM — T82.118A ICD (IMPLANTABLE CARDIOVERTER-DEFIBRILLATOR) MALFUNCTION: Status: ACTIVE | Noted: 2022-04-03

## 2022-04-03 LAB
ALBUMIN SERPL-MCNC: 3.9 G/DL (ref 3.5–5)
ALBUMIN/GLOB SERPL: 1.2 {RATIO} (ref 1.1–2.2)
ALP SERPL-CCNC: 101 U/L (ref 45–117)
ALT SERPL-CCNC: 21 U/L (ref 12–78)
ANION GAP SERPL CALC-SCNC: 6 MMOL/L (ref 5–15)
AST SERPL-CCNC: 21 U/L (ref 15–37)
BASOPHILS # BLD: 0 K/UL (ref 0–0.1)
BASOPHILS NFR BLD: 1 % (ref 0–1)
BILIRUB SERPL-MCNC: 0.5 MG/DL (ref 0.2–1)
BUN SERPL-MCNC: 30 MG/DL (ref 6–20)
BUN/CREAT SERPL: 27 (ref 12–20)
CALCIUM SERPL-MCNC: 8.9 MG/DL (ref 8.5–10.1)
CHLORIDE SERPL-SCNC: 98 MMOL/L (ref 97–108)
CO2 SERPL-SCNC: 32 MMOL/L (ref 21–32)
CREAT SERPL-MCNC: 1.11 MG/DL (ref 0.55–1.02)
DIFFERENTIAL METHOD BLD: ABNORMAL
EOSINOPHIL # BLD: 0.1 K/UL (ref 0–0.4)
EOSINOPHIL NFR BLD: 1 % (ref 0–7)
ERYTHROCYTE [DISTWIDTH] IN BLOOD BY AUTOMATED COUNT: 14.5 % (ref 11.5–14.5)
GLOBULIN SER CALC-MCNC: 3.2 G/DL (ref 2–4)
GLUCOSE SERPL-MCNC: 146 MG/DL (ref 65–100)
HCT VFR BLD AUTO: 44.9 % (ref 35–47)
HGB BLD-MCNC: 14.6 G/DL (ref 11.5–16)
IMM GRANULOCYTES # BLD AUTO: 0.1 K/UL (ref 0–0.04)
IMM GRANULOCYTES NFR BLD AUTO: 1 % (ref 0–0.5)
INR PPP: 1 (ref 0.9–1.1)
LYMPHOCYTES # BLD: 1 K/UL (ref 0.8–3.5)
LYMPHOCYTES NFR BLD: 16 % (ref 12–49)
MAGNESIUM SERPL-MCNC: 2 MG/DL (ref 1.6–2.4)
MCH RBC QN AUTO: 29.6 PG (ref 26–34)
MCHC RBC AUTO-ENTMCNC: 32.5 G/DL (ref 30–36.5)
MCV RBC AUTO: 90.9 FL (ref 80–99)
MONOCYTES # BLD: 0.5 K/UL (ref 0–1)
MONOCYTES NFR BLD: 9 % (ref 5–13)
NEUTS SEG # BLD: 4.6 K/UL (ref 1.8–8)
NEUTS SEG NFR BLD: 72 % (ref 32–75)
NRBC # BLD: 0 K/UL (ref 0–0.01)
NRBC BLD-RTO: 0 PER 100 WBC
PLATELET # BLD AUTO: 172 K/UL (ref 150–400)
PMV BLD AUTO: 9.3 FL (ref 8.9–12.9)
POTASSIUM SERPL-SCNC: 4 MMOL/L (ref 3.5–5.1)
PROT SERPL-MCNC: 7.1 G/DL (ref 6.4–8.2)
PROTHROMBIN TIME: 10.2 SEC (ref 9–11.1)
RBC # BLD AUTO: 4.94 M/UL (ref 3.8–5.2)
SODIUM SERPL-SCNC: 136 MMOL/L (ref 136–145)
TROPONIN-HIGH SENSITIVITY: 20 NG/L (ref 0–51)
WBC # BLD AUTO: 6.3 K/UL (ref 3.6–11)

## 2022-04-03 PROCEDURE — 80053 COMPREHEN METABOLIC PANEL: CPT

## 2022-04-03 PROCEDURE — 71045 X-RAY EXAM CHEST 1 VIEW: CPT

## 2022-04-03 PROCEDURE — 85610 PROTHROMBIN TIME: CPT

## 2022-04-03 PROCEDURE — 96374 THER/PROPH/DIAG INJ IV PUSH: CPT

## 2022-04-03 PROCEDURE — 83735 ASSAY OF MAGNESIUM: CPT

## 2022-04-03 PROCEDURE — 99285 EMERGENCY DEPT VISIT HI MDM: CPT

## 2022-04-03 PROCEDURE — 85025 COMPLETE CBC W/AUTO DIFF WBC: CPT

## 2022-04-03 PROCEDURE — 93005 ELECTROCARDIOGRAM TRACING: CPT

## 2022-04-03 PROCEDURE — 84443 ASSAY THYROID STIM HORMONE: CPT

## 2022-04-03 PROCEDURE — 36415 COLL VENOUS BLD VENIPUNCTURE: CPT

## 2022-04-03 PROCEDURE — 65660000000 HC RM CCU STEPDOWN

## 2022-04-03 PROCEDURE — 84484 ASSAY OF TROPONIN QUANT: CPT

## 2022-04-03 NOTE — Clinical Note
Status[de-identified] INPATIENT [101]   Type of Bed: Stepdown [17]   Cardiac Monitoring Required?: Yes   Inpatient Hospitalization Certified Necessary for the Following Reasons: 3.  Patient receiving treatment that can only be provided in an inpatient setting (further clarification in H&P documentation)   Admitting Diagnosis: ICD (implantable cardioverter-defibrillator) malfunction [[id number]]   Admitting Physician: Helder Mack, [address]   Attending Physician: Efrain Robert   Estimated Length of Stay: 2 Midnights   Discharge Plan[de-identified] Home with Office Follow-up

## 2022-04-04 ENCOUNTER — HOME HEALTH ADMISSION (OUTPATIENT)
Dept: HOME HEALTH SERVICES | Facility: HOME HEALTH | Age: 87
End: 2022-04-04

## 2022-04-04 ENCOUNTER — TELEPHONE (OUTPATIENT)
Dept: INTERNAL MEDICINE CLINIC | Age: 87
End: 2022-04-04

## 2022-04-04 ENCOUNTER — APPOINTMENT (OUTPATIENT)
Dept: NON INVASIVE DIAGNOSTICS | Age: 87
End: 2022-04-04
Attending: NURSE PRACTITIONER
Payer: MEDICARE

## 2022-04-04 VITALS
DIASTOLIC BLOOD PRESSURE: 80 MMHG | HEART RATE: 76 BPM | RESPIRATION RATE: 19 BRPM | SYSTOLIC BLOOD PRESSURE: 163 MMHG | OXYGEN SATURATION: 97 % | WEIGHT: 119 LBS | TEMPERATURE: 98.2 F | BODY MASS INDEX: 18.68 KG/M2 | HEIGHT: 67 IN

## 2022-04-04 PROBLEM — R62.7 FTT (FAILURE TO THRIVE) IN ADULT: Status: ACTIVE | Noted: 2022-04-04

## 2022-04-04 PROBLEM — R63.6 UNDERWEIGHT: Status: ACTIVE | Noted: 2022-04-04

## 2022-04-04 PROBLEM — Z45.02 DEFIBRILLATOR DISCHARGE: Status: ACTIVE | Noted: 2022-04-04

## 2022-04-04 PROBLEM — E87.1 HYPONATREMIA: Status: RESOLVED | Noted: 2021-10-19 | Resolved: 2022-04-04

## 2022-04-04 PROBLEM — Z45.02 ICD (IMPLANTABLE CARDIOVERTER-DEFIBRILLATOR) DISCHARGE: Status: ACTIVE | Noted: 2022-04-04

## 2022-04-04 PROBLEM — R57.9 SHOCK (HCC): Status: RESOLVED | Noted: 2021-10-19 | Resolved: 2022-04-04

## 2022-04-04 PROBLEM — N17.9 AKI (ACUTE KIDNEY INJURY) (HCC): Status: RESOLVED | Noted: 2021-10-19 | Resolved: 2022-04-04

## 2022-04-04 PROBLEM — R06.02 SOB (SHORTNESS OF BREATH): Status: RESOLVED | Noted: 2020-10-21 | Resolved: 2022-04-04

## 2022-04-04 PROBLEM — T82.118A ICD (IMPLANTABLE CARDIOVERTER-DEFIBRILLATOR) MALFUNCTION: Status: RESOLVED | Noted: 2022-04-03 | Resolved: 2022-04-04

## 2022-04-04 PROBLEM — R05.9 COUGH: Status: RESOLVED | Noted: 2020-10-21 | Resolved: 2022-04-04

## 2022-04-04 PROBLEM — N18.30 CKD (CHRONIC KIDNEY DISEASE) STAGE 3, GFR 30-59 ML/MIN (HCC): Status: RESOLVED | Noted: 2021-10-19 | Resolved: 2022-04-04

## 2022-04-04 PROBLEM — D72.819 LEUKOPENIA: Status: RESOLVED | Noted: 2020-10-21 | Resolved: 2022-04-04

## 2022-04-04 PROBLEM — U07.1 COVID-19: Status: RESOLVED | Noted: 2020-10-26 | Resolved: 2022-04-04

## 2022-04-04 PROBLEM — D69.6 THROMBOCYTOPENIA (HCC): Status: RESOLVED | Noted: 2020-10-21 | Resolved: 2022-04-04

## 2022-04-04 LAB
ANION GAP SERPL CALC-SCNC: 4 MMOL/L (ref 5–15)
BUN SERPL-MCNC: 30 MG/DL (ref 6–20)
BUN/CREAT SERPL: 31 (ref 12–20)
CALCIUM SERPL-MCNC: 8.7 MG/DL (ref 8.5–10.1)
CHLORIDE SERPL-SCNC: 99 MMOL/L (ref 97–108)
CO2 SERPL-SCNC: 34 MMOL/L (ref 21–32)
CREAT SERPL-MCNC: 0.97 MG/DL (ref 0.55–1.02)
ECHO AV AREA PEAK VELOCITY: 3 CM2
ECHO AV AREA VTI: 2.9 CM2
ECHO AV AREA/BSA PEAK VELOCITY: 1.9 CM2/M2
ECHO AV AREA/BSA VTI: 1.8 CM2/M2
ECHO AV MEAN GRADIENT: 2 MMHG
ECHO AV MEAN VELOCITY: 0.6 M/S
ECHO AV PEAK GRADIENT: 4 MMHG
ECHO AV PEAK VELOCITY: 1.1 M/S
ECHO AV VELOCITY RATIO: 0.91
ECHO AV VTI: 21.5 CM
ECHO LA DIAMETER INDEX: 2.35 CM/M2
ECHO LA DIAMETER: 3.8 CM
ECHO LA VOL 4C: 42 ML (ref 22–52)
ECHO LA VOLUME INDEX A4C: 26 ML/M2 (ref 16–34)
ECHO LV E' LATERAL VELOCITY: 11 CM/S
ECHO LV E' SEPTAL VELOCITY: 6 CM/S
ECHO LV FRACTIONAL SHORTENING: 23 % (ref 28–44)
ECHO LV INTERNAL DIMENSION DIASTOLE INDEX: 2.47 CM/M2
ECHO LV INTERNAL DIMENSION DIASTOLIC: 4 CM (ref 3.9–5.3)
ECHO LV INTERNAL DIMENSION SYSTOLIC INDEX: 1.91 CM/M2
ECHO LV INTERNAL DIMENSION SYSTOLIC: 3.1 CM
ECHO LV IVSD: 1 CM (ref 0.6–0.9)
ECHO LV MASS 2D: 127.1 G (ref 67–162)
ECHO LV MASS INDEX 2D: 78.4 G/M2 (ref 43–95)
ECHO LV POSTERIOR WALL DIASTOLIC: 1 CM (ref 0.6–0.9)
ECHO LV RELATIVE WALL THICKNESS RATIO: 0.5
ECHO LVOT AREA: 3.1 CM2
ECHO LVOT AV VTI INDEX: 0.91
ECHO LVOT DIAM: 2 CM
ECHO LVOT MEAN GRADIENT: 2 MMHG
ECHO LVOT PEAK GRADIENT: 4 MMHG
ECHO LVOT PEAK VELOCITY: 1 M/S
ECHO LVOT STROKE VOLUME INDEX: 38 ML/M2
ECHO LVOT SV: 61.5 ML
ECHO LVOT VTI: 19.6 CM
ECHO MV A VELOCITY: 0.79 M/S
ECHO MV E DECELERATION TIME (DT): 284.8 MS
ECHO MV E VELOCITY: 0.63 M/S
ECHO MV E/A RATIO: 0.8
ECHO MV E/E' LATERAL: 5.73
ECHO MV E/E' RATIO (AVERAGED): 8.11
ECHO MV E/E' SEPTAL: 10.5
ECHO RV INTERNAL DIMENSION: 3.7 CM
ECHO RV TAPSE: 1.9 CM (ref 1.5–2)
ECHO TV REGURGITANT MAX VELOCITY: 2.52 M/S
ECHO TV REGURGITANT PEAK GRADIENT: 26 MMHG
ERYTHROCYTE [DISTWIDTH] IN BLOOD BY AUTOMATED COUNT: 14.6 % (ref 11.5–14.5)
GLUCOSE SERPL-MCNC: 109 MG/DL (ref 65–100)
HCT VFR BLD AUTO: 39.9 % (ref 35–47)
HGB BLD-MCNC: 13 G/DL (ref 11.5–16)
MAGNESIUM SERPL-MCNC: 2.4 MG/DL (ref 1.6–2.4)
MCH RBC QN AUTO: 29.7 PG (ref 26–34)
MCHC RBC AUTO-ENTMCNC: 32.6 G/DL (ref 30–36.5)
MCV RBC AUTO: 91.3 FL (ref 80–99)
NRBC # BLD: 0 K/UL (ref 0–0.01)
NRBC BLD-RTO: 0 PER 100 WBC
PLATELET # BLD AUTO: 152 K/UL (ref 150–400)
PMV BLD AUTO: 9.3 FL (ref 8.9–12.9)
POTASSIUM SERPL-SCNC: 3.6 MMOL/L (ref 3.5–5.1)
RBC # BLD AUTO: 4.37 M/UL (ref 3.8–5.2)
SODIUM SERPL-SCNC: 137 MMOL/L (ref 136–145)
TSH SERPL DL<=0.05 MIU/L-ACNC: 0.59 UIU/ML (ref 0.36–3.74)
WBC # BLD AUTO: 4.7 K/UL (ref 3.6–11)

## 2022-04-04 PROCEDURE — 93306 TTE W/DOPPLER COMPLETE: CPT

## 2022-04-04 PROCEDURE — 96374 THER/PROPH/DIAG INJ IV PUSH: CPT

## 2022-04-04 PROCEDURE — 74011000258 HC RX REV CODE- 258: Performed by: STUDENT IN AN ORGANIZED HEALTH CARE EDUCATION/TRAINING PROGRAM

## 2022-04-04 PROCEDURE — G0378 HOSPITAL OBSERVATION PER HR: HCPCS

## 2022-04-04 PROCEDURE — 99214 OFFICE O/P EST MOD 30 MIN: CPT | Performed by: SPECIALIST

## 2022-04-04 PROCEDURE — 97530 THERAPEUTIC ACTIVITIES: CPT

## 2022-04-04 PROCEDURE — 74011250637 HC RX REV CODE- 250/637: Performed by: INTERNAL MEDICINE

## 2022-04-04 PROCEDURE — 74011250636 HC RX REV CODE- 250/636: Performed by: STUDENT IN AN ORGANIZED HEALTH CARE EDUCATION/TRAINING PROGRAM

## 2022-04-04 PROCEDURE — 80048 BASIC METABOLIC PNL TOTAL CA: CPT

## 2022-04-04 PROCEDURE — 74011250637 HC RX REV CODE- 250/637: Performed by: NURSE PRACTITIONER

## 2022-04-04 PROCEDURE — 93306 TTE W/DOPPLER COMPLETE: CPT | Performed by: SPECIALIST

## 2022-04-04 PROCEDURE — 97116 GAIT TRAINING THERAPY: CPT

## 2022-04-04 PROCEDURE — 36415 COLL VENOUS BLD VENIPUNCTURE: CPT

## 2022-04-04 PROCEDURE — 2709999900 HC NON-CHARGEABLE SUPPLY

## 2022-04-04 PROCEDURE — 83735 ASSAY OF MAGNESIUM: CPT

## 2022-04-04 PROCEDURE — 97162 PT EVAL MOD COMPLEX 30 MIN: CPT

## 2022-04-04 PROCEDURE — APPSS45 APP SPLIT SHARED TIME 31-45 MINUTES: Performed by: NURSE PRACTITIONER

## 2022-04-04 PROCEDURE — 85027 COMPLETE CBC AUTOMATED: CPT

## 2022-04-04 PROCEDURE — 74011000250 HC RX REV CODE- 250: Performed by: INTERNAL MEDICINE

## 2022-04-04 RX ORDER — PAROXETINE HYDROCHLORIDE 20 MG/1
10 TABLET, FILM COATED ORAL DAILY
Status: DISCONTINUED | OUTPATIENT
Start: 2022-04-04 | End: 2022-04-04 | Stop reason: HOSPADM

## 2022-04-04 RX ORDER — AMIODARONE HYDROCHLORIDE 200 MG/1
100 TABLET ORAL DAILY
Status: DISCONTINUED | OUTPATIENT
Start: 2022-04-04 | End: 2022-04-04

## 2022-04-04 RX ORDER — SODIUM CHLORIDE 0.9 % (FLUSH) 0.9 %
5-40 SYRINGE (ML) INJECTION AS NEEDED
Status: DISCONTINUED | OUTPATIENT
Start: 2022-04-04 | End: 2022-04-04 | Stop reason: HOSPADM

## 2022-04-04 RX ORDER — POTASSIUM CHLORIDE 750 MG/1
20 TABLET, FILM COATED, EXTENDED RELEASE ORAL EVERY 4 HOURS
Status: COMPLETED | OUTPATIENT
Start: 2022-04-04 | End: 2022-04-04

## 2022-04-04 RX ORDER — LEVOTHYROXINE SODIUM 112 UG/1
112 TABLET ORAL
Status: DISCONTINUED | OUTPATIENT
Start: 2022-04-04 | End: 2022-04-04 | Stop reason: HOSPADM

## 2022-04-04 RX ORDER — SODIUM CHLORIDE 0.9 % (FLUSH) 0.9 %
5-40 SYRINGE (ML) INJECTION EVERY 8 HOURS
Status: DISCONTINUED | OUTPATIENT
Start: 2022-04-04 | End: 2022-04-04 | Stop reason: HOSPADM

## 2022-04-04 RX ORDER — POLYETHYLENE GLYCOL 3350 17 G/17G
17 POWDER, FOR SOLUTION ORAL DAILY PRN
Status: DISCONTINUED | OUTPATIENT
Start: 2022-04-04 | End: 2022-04-04 | Stop reason: HOSPADM

## 2022-04-04 RX ORDER — AMIODARONE HYDROCHLORIDE 200 MG/1
100 TABLET ORAL ONCE
Status: COMPLETED | OUTPATIENT
Start: 2022-04-04 | End: 2022-04-04

## 2022-04-04 RX ORDER — ACETAMINOPHEN 650 MG/1
650 SUPPOSITORY RECTAL
Status: DISCONTINUED | OUTPATIENT
Start: 2022-04-04 | End: 2022-04-04 | Stop reason: HOSPADM

## 2022-04-04 RX ORDER — AMIODARONE HYDROCHLORIDE 200 MG/1
200 TABLET ORAL DAILY
Status: DISCONTINUED | OUTPATIENT
Start: 2022-04-05 | End: 2022-04-04 | Stop reason: HOSPADM

## 2022-04-04 RX ORDER — LANOLIN ALCOHOL/MO/W.PET/CERES
3 CREAM (GRAM) TOPICAL ONCE
Status: COMPLETED | OUTPATIENT
Start: 2022-04-04 | End: 2022-04-04

## 2022-04-04 RX ORDER — AMIODARONE HYDROCHLORIDE 200 MG/1
200 TABLET ORAL DAILY
Qty: 30 TABLET | Refills: 0 | Status: SHIPPED | OUTPATIENT
Start: 2022-04-05 | End: 2022-05-05

## 2022-04-04 RX ORDER — ACETAMINOPHEN 325 MG/1
650 TABLET ORAL
Status: DISCONTINUED | OUTPATIENT
Start: 2022-04-04 | End: 2022-04-04 | Stop reason: HOSPADM

## 2022-04-04 RX ORDER — ONDANSETRON 4 MG/1
4 TABLET, ORALLY DISINTEGRATING ORAL
Status: DISCONTINUED | OUTPATIENT
Start: 2022-04-04 | End: 2022-04-04 | Stop reason: HOSPADM

## 2022-04-04 RX ORDER — AMLODIPINE BESYLATE 5 MG/1
5 TABLET ORAL DAILY
Status: DISCONTINUED | OUTPATIENT
Start: 2022-04-04 | End: 2022-04-04 | Stop reason: HOSPADM

## 2022-04-04 RX ORDER — ONDANSETRON 2 MG/ML
4 INJECTION INTRAMUSCULAR; INTRAVENOUS
Status: DISCONTINUED | OUTPATIENT
Start: 2022-04-04 | End: 2022-04-04 | Stop reason: HOSPADM

## 2022-04-04 RX ADMIN — Medication 3 MG: at 03:17

## 2022-04-04 RX ADMIN — Medication 10 ML: at 01:04

## 2022-04-04 RX ADMIN — AMIODARONE HYDROCHLORIDE 100 MG: 200 TABLET ORAL at 11:59

## 2022-04-04 RX ADMIN — Medication 10 ML: at 06:00

## 2022-04-04 RX ADMIN — APIXABAN 2.5 MG: 2.5 TABLET, FILM COATED ORAL at 08:49

## 2022-04-04 RX ADMIN — AMIODARONE HYDROCHLORIDE 100 MG: 200 TABLET ORAL at 08:49

## 2022-04-04 RX ADMIN — POTASSIUM CHLORIDE 20 MEQ: 750 TABLET, EXTENDED RELEASE ORAL at 08:49

## 2022-04-04 RX ADMIN — LEVOTHYROXINE SODIUM 112 MCG: 0.11 TABLET ORAL at 08:50

## 2022-04-04 RX ADMIN — PAROXETINE HYDROCHLORIDE 10 MG: 20 TABLET, FILM COATED ORAL at 08:49

## 2022-04-04 RX ADMIN — POTASSIUM CHLORIDE 20 MEQ: 750 TABLET, EXTENDED RELEASE ORAL at 12:00

## 2022-04-04 RX ADMIN — DEXTROSE MONOHYDRATE 150 MG: 50 INJECTION, SOLUTION INTRAVENOUS at 00:03

## 2022-04-04 RX ADMIN — AMLODIPINE BESYLATE 5 MG: 5 TABLET ORAL at 08:50

## 2022-04-04 NOTE — PROGRESS NOTES
Home health orders received from attending for home PT/OT  As pt had home health through [facility] previously,pt is hopeful [facility] will accept her for services. Referral sent through the cc link to [facility] .   Once accepted,I will place on PK Castelan

## 2022-04-04 NOTE — PROGRESS NOTES
Reason for Admission:  Shocked by ICD @ home(defibrillator discharge),+weakness,+dizziness. ,+nausea prior to admission,near syncope                     RUR Score:         Observation status            Plan for utilizing home health: To be determined prior to discharge    PCP: First and Last name:  Loralyn Kocher, MD     Name of Practice:    Are you a current patient: Yes/No: yes   Approximate date of last visit:    Can you participate in a virtual visit with your PCP: yes                    Current Advanced Directive/Advance Care Plan: Full Code      Healthcare Decision Maker:                Primary Decision MakerChastity Mukherjee - [phone]                  Transition of Care Plan:                      Pt was admitted after being shocked by her defibrillator x 1. Please see specifics of cardiac work-up to date in cardiology NP's note today . Pt has a history of VT arrest and CAD. ICD was placed approximately 3 years ago. Pt also has a history of atrial fib,HTN,HLD,Sjogren's syndrome ,anemia,arthritis,OA and thoracic aneurysm. Pt is . Pt lives alone in  [address] @ [address] [place] [address]. Pt has no steps to navigate. Pt has a rolling walker she uses to ambulate. When discharged,pt states her son who is retired will transport pt. Pt sees a cardiologist with [facility]. She says his name sounds like Tony? ? But is not sure. Pt has been consulted to evaluate pt for any potential home health or outpatient needs. Plan today:  Echo  Potential discharge later today unless per cardiology,there is an interval decline in pt.'s EF and then an ischemic work-up may be indicated. If pt is discharged,request will be made for case management assistant to please make follow-up appointment with PCP.     Sweta Oconnor  Perfect Serve  [phone]

## 2022-04-04 NOTE — DISCHARGE SUMMARY
Physician Discharge Summary     Patient ID:  Angelica Cuellar  [id number]  32 y.o.  4/22/1928    Admit date: 4/3/2022    Discharge date of service and time: 4/4/2022    Admission Diagnoses: ICD (implantable cardioverter-defibrillator) malfunction [T82.118A]  Defibrillator discharge [Z45.02]    Discharge Diagnoses:    Principal Diagnosis   ICD (implantable cardioverter-defibrillator) discharge                                             Hospital Course and other diagnoses  ICD (implantable cardioverter-defibrillator) discharge / Hx Persistent atrial fibrillation / HX Ventricular tachycardia - Cardiology consulted. Interrogated ICD. Continue eliquis. DC on increased amiodarone.     Generalized weakness / Osteoarthritis / Underweight / FTT (failure to thrive) in adult - Supportive care. Cardioloyg and family may want to discuss ICD deactivation in setting of age and frailty     CAD (coronary artery disease) / HTN (hypertension) - Continue Norvasc. She is not on BB, ASA nor statin. Cardiology can comment.     Anxiety and depression - Continue paroxetine     Hypothyroidism - continue Synthroid     Elevated serum creatinine - POA due to poor PO intake, Improved with hydration     PCP: Abelino Pandya MD    Consults: Cardiology    Significant Diagnostic Studies: See Hospital Course    Discharged home in improved condition.     Discharge Exam:  BP (!) 147/96 (BP 1 Location: Left upper arm, BP Patient Position: Lying)   Pulse 70   Temp 98.6 °F (37 °C)   Resp 18   Ht 5' 7\" (1.702 m)   Wt 54 kg (119 lb 0.8 oz)   SpO2 98%   Breastfeeding No   BMI 18.65 kg/m²      Gen:  Frail, cachectic, in no acute distress  HEENT:  Pink conjunctivae, PERRL, hearing barely intact to voice, moist mucous membranes  Neck:  Supple, without masses, thyroid non-tender  Resp:  No accessory muscle use, clear breath sounds without wheezes rales or rhonchi  Card:  No murmurs, normal S1, S2 without thrills, bruits or peripheral edema  Abd: Soft, non-tender, non-distended, normoactive bowel sounds are present, no mass  Lymph:  No cervical or inguinal adenopathy  Musc:  No cyanosis or clubbing  Skin:  No rashes or ulcers, skin turgor is good  Neuro:  Cranial nerves are grossly intact, general motor weakness, follows commands   Psych:  Poor insight, oriented to person, place and time, alert    Patient Instructions:   Current Discharge Medication List      CONTINUE these medications which have CHANGED    Details   amiodarone (CORDARONE) 200 mg tablet Take 1 Tablet by mouth daily for 30 days. Qty: 30 Tablet, Refills: 0         CONTINUE these medications which have NOT CHANGED    Details   fosinopriL (MONOPRIL) 10 mg tablet TAKE THREE TABLETS BY MOUTH EVERY MORNING AND TAKE TWO TABLETS BY MOUTH EVERY EVENING  Qty: 270 Tablet, Refills: 0      levothyroxine (SYNTHROID) 112 mcg tablet Take 1 Tablet by mouth Daily (before breakfast). 10/21/2021 wnl TSH  Qty: 90 Tablet, Refills: 2    Associated Diagnoses: Acquired hypothyroidism      acetaminophen (TYLENOL) 325 mg tablet Take 2 Tablets by mouth every six (6) hours as needed for Pain. Qty: 30 Tablet, Refills: 0      amLODIPine (NORVASC) 5 mg tablet Take 1 Tablet by mouth daily. Qty: 90 Tablet, Refills: 1    Associated Diagnoses: Essential hypertension      ipratropium (ATROVENT) 42 mcg (0.06 %) nasal spray 1 [place] by Both Nostrils route three (3) times daily as needed. albuterol (PROVENTIL HFA, VENTOLIN HFA, PROAIR HFA) 90 mcg/actuation inhaler albuterol sulfate HFA 90 mcg/actuation aerosol inhaler      cetirizine (ZYRTEC) 10 mg tablet Take 10 mg by mouth daily. apixaban (ELIQUIS) 2.5 mg tablet Take 2.5 mg by mouth two (2) times a day. fluticasone (FLONASE) 50 mcg/actuation nasal spray 2 Sprays by Both Nostrils route daily as needed for Allergies.       PARoxetine (PAXIL) 10 mg tablet TAKE 1 AND 1/2 TABLET BY MOUTH DAILY  Qty: 135 Tablet, Refills: 0      OTHER,NON-FORMULARY, Ceftriaxone 2 g IV daily through 11/19/2021  Qty: 1 Each, Refills: 0      ferrous sulfate (IRON PO) Take 325 mg by mouth daily. diclofenac (VOLTAREN) 1 % gel Apply 2 g to affected area daily as needed. Biotin 2,500 mcg cap Take 1 Caplet by mouth daily. ascorbic acid, vitamin C, (Vitamin C) 500 mg tablet Take 500 mg by mouth daily. cholecalciferol, vitamin D3, (VITAMIN D3) 2,000 unit tab Take 2,000 Units by mouth daily. Activity: Activity as tolerated  Diet: Cardiac Diet, Increase noncaffeinated fluids and Comfort feeding  Wound Care: None needed    Follow-up with your PCP and cardiologist in a few days.   Follow-up tests/labs - none    Signed:  Irwin Hoang MD  4/4/2022  11:58 AM

## 2022-04-04 NOTE — TELEPHONE ENCOUNTER
Flaquita from [facility] would like a call back from nurse.  Please call her at [phone]

## 2022-04-04 NOTE — H&P
[address]  [phone]    Admission History and Physical      NAME:  Mraina Murillo   :   1928   MRN:  [id number]     PCP:  Mohan Iniguez MD     Date of service:  4/3/2022         Subjective:     CHIEF COMPLAINT: Defibrillator went off     HISTORY OF PRESENT ILLNESS:     Ms. Cassie Orozco is a 80 y.o.  female who is admitted with defibrillator went off. Ms. Cassie Orozco with past medical history of persistent A. fib, VT arrest, CAD, HTN, hyperlipidemia, hypothyroidism, Sjogren's disease was brought to ER after her defibrillator went off. It discharged once. Prior to its discharge, patient felt a feeling of \"hot flash\". Had chest discomfort after the discharge. Denies loss of consciousness. Denies palpitation. The discharges happened while she was brushing her hair.          Past Medical History:   Diagnosis Date    Acute cystitis 2018    Anemia, unspecified     Anxiety     Arrhythmia     a fib    Arthritis     osteoarthritis, rheumatoid    Atrial fibrillation (HCC)     Dr. Claudell Stabs and Dr. Ronnell Bryant  following    Autoimmune disease [facility])     sjogren disease Dr. Kathy Napier CAD (coronary artery disease)     History of vascular access device 10/29/2021    4 FR Single PICC Lt Ant bx 41 cm 1 cm out    HTN (hypertension)     Hyperlipidemia LDL goal < 100     Hypothyroid     Insomnia     Osteoarthritis     Sjogren's disease ([facility] [address]     Thoracic aneurysm without mention of rupture         Past Surgical History:   Procedure Laterality Date    HX BIV-IMPLANTABLE CARDIOVERTER DEFIBRILLATOR      HX CHOLECYSTECTOMY      HX COLONOSCOPY  3/2014    Dr. Claudette Salvia    HX OTHER SURGICAL      hernia repairs    HX PARTIAL THYROIDECTOMY      HX JANNET AND BSO      HX VEIN STRIPPING      [place] INSJ ELTRD CAR COLLIN SYS TM INSJ DFB/PM PLS GEN Left 2019    Lv Lead Placement performed by Adriana Londono MD at [address] [place] [address] CATH LAB    [place] INSJ/RPLCMT PERM DFB W/TRNSVNS LDS 1/DUAL [facility] Left 2/22/2019    upgrade PM to ICD-Bsx performed by Fito Alexander MD at [facility] LAB       Social History     Tobacco Use    Smoking status: Never Smoker    Smokeless tobacco: Never Used   Substance Use Topics    Alcohol use: Yes     Comment: wine        Family History   Problem Relation Age of Onset    Heart Disease Father         aneurysm    Cancer Maternal Grandfather         Allergies   Allergen Reactions    Latex Hives     NOT ALLERGIC PER PT 02/01/2018    Pcn [Penicillins] Anaphylaxis    Sulfa (Sulfonamide Antibiotics) Anaphylaxis    Biaxin [Clarithromycin] Nausea Only    Biotin Unknown (comments)    Covid-19 Vaccine, Mrna, [id number], Lnp-S (Moderna) Rash    Pcn [Penicillins] Hives    Sulfa (Sulfonamide Antibiotics) Nausea Only        Prior to Admission medications    Medication Sig Start Date End Date Taking? Authorizing Provider   fosinopriL (MONOPRIL) 10 mg tablet TAKE THREE TABLETS BY MOUTH EVERY MORNING AND TAKE TWO TABLETS BY MOUTH EVERY EVENING 1/19/22   Sonia Moreno MD   PARoxetine (PAXIL) 10 mg tablet TAKE 1 AND 1/2 TABLET BY MOUTH DAILY 1/10/22   Sonia Moreno MD   levothyroxine (SYNTHROID) 112 mcg tablet Take 1 Tablet by mouth Daily (before breakfast). 10/21/2021 wnl TSH 1/4/22   Devora Brown MD   OTHER,NON-FORMULARY, Ceftriaxone 2 g IV daily through 11/19/2021 10/30/21   Marya Dakin, MD   acetaminophen (TYLENOL) 325 mg tablet Take 2 Tablets by mouth every six (6) hours as needed for Pain. 10/29/21   Marya Dakin, MD   ferrous sulfate (IRON PO) Take 325 mg by mouth daily. Provider, Historical   amLODIPine (NORVASC) 5 mg tablet Take 1 Tablet by mouth daily. 10/4/21   Sonia Moreno MD   ipratropium (ATROVENT) 42 mcg (0.06 %) nasal spray 1 [place] by Both Nostrils route three (3) times daily as needed.     Provider, Historical   diclofenac (VOLTAREN) 1 % gel Apply 2 g to affected area daily as needed. Provider, Historical   albuterol (PROVENTIL HFA, VENTOLIN HFA, PROAIR HFA) 90 mcg/actuation inhaler albuterol sulfate HFA 90 mcg/actuation aerosol inhaler    Provider, Historical   Biotin 2,500 mcg cap Take 1 Caplet by mouth daily. Provider, Historical   amiodarone (PACERONE) 100 mg tablet Take 100 mg by mouth daily. Provider, Historical   ascorbic acid, vitamin C, (Vitamin C) 500 mg tablet Take 500 mg by mouth daily. Provider, Historical   cetirizine (ZYRTEC) 10 mg tablet Take 10 mg by mouth daily. Provider, Historical   apixaban (ELIQUIS) 2.5 mg tablet Take 2.5 mg by mouth two (2) times a day. 12/4/20   Provider, Historical   fluticasone (FLONASE) 50 mcg/actuation nasal spray 2 Sprays by Both Nostrils route daily as needed for Allergies. 7/3/16   Provider, Historical   cholecalciferol, vitamin D3, (VITAMIN D3) 2,000 unit tab Take 2,000 Units by mouth daily.     Provider, Historical         Review of Systems:  (bold if positive, if negative)    Gen:  Eyes:  ENT:  CVS:  Pulm:  GI:  :  MS:  Skin:  Psych:  Endo:  Hem:  Renal:  Neuro:            Objective:      VITALS:    Vital signs reviewed; most recent are:    Visit Vitals  /61   Pulse 77   Temp 97.3 °F (36.3 °C)   Resp 17   Ht 5' 7\" (1.702 m)   Wt 55.1 kg (121 lb 7.6 oz)   SpO2 95%   BMI 19.03 kg/m²     SpO2 Readings from Last 6 Encounters:   04/03/22 95%   10/30/21 91%   10/04/21 94%   05/25/21 98%   02/16/21 98%   12/15/20 97%        No intake or output data in the 24 hours ending 04/03/22 4989         Exam:     Physical Exam:    Gen:  Well-developed, well-nourished, in no acute distress  HEENT:  Pink conjunctivae, PERRL, hearing intact to voice, moist mucous membranes  Neck:  Supple, without masses, thyroid non-tender  Resp:  No accessory muscle use, clear breath sounds without wheezes rales or rhonchi  Card:  No murmurs, normal S1, S2 without thrills, bruits or peripheral edema  Abd: Soft, non-tender, non-distended, normoactive bowel sounds are present, no palpable organomegaly and no detectable hernias  Lymph:  No cervical or inguinal adenopathy  Musc:  No cyanosis or clubbing  Skin:  No rashes or ulcers, skin turgor is good  Neuro:  Cranial nerves are grossly intact, no focal motor weakness, follows commands appropriately  Psych:  Good insight, oriented to person, place and time, alert       Labs:    Recent Labs     04/03/22 2103   WBC 6.3   HGB 14.6   HCT 44.9        Recent Labs     04/03/22 2103      K 4.0   CL 98   CO2 32   *   BUN 30*   CREA 1.11*   CA 8.9   MG 2.0   ALB 3.9   TBILI 0.5   ALT 21     Lab Results   Component Value Date/Time    Glucose (POC) 275 (H) 02/16/2019 05:38 PM     No results for input(s): PH, PCO2, PO2, HCO3, FIO2 in the last 72 hours. Recent Labs     04/03/22 2103   INR 1.0       Telemetry reviewed:   normal sinus rhythm       Assessment/Plan:    1. ICD (implantable cardioverter-defibrillator) discharge/ Hx of VT tach. Admit to stepdown for observation. Happened once at home, no events when she is in the hospital.  Magnesium is normal.  A dose of amiodarone 150 mg IV was given. Defibrillator was interrogated in ER. Monitor electrolytes and consult cardiology    2. Persistent atrial fibrillation ([address] Continue amiodarone 100 mg daily, but may need to increase the dose. Continue Eliquis    3. HTN (hypertension). Continue home amlodipine and ACEI    4. Hypothyroidism. Continue levothyroxine    5. Elevated serum creatinine (2/16/2019).   Avoid nephrotoxic drugs      Previous medical records reviewed     Risk of deterioration: high      Total time spent with patient: [address] discussed with: Patient, Nursing Staff and >50% of time spent in counseling and coordination of care    Discussed:  Care Plan    Prophylaxis:  Eliquis    Probable Disposition:  Home w/Family           ___________________________________________________    Attending Physician: Tyler Diana MD

## 2022-04-04 NOTE — ED TRIAGE NOTES
Pt is a 80 yof. Reports this is the first time her pacer has shocked her (approx 45 mins prior to arrival). Feeling weak, dizzy, and nauseous all day today. Shocked when she was \"sitting combing my hair\". EMS shows a fib en route with HR in 80s. C/o current head discomfort but states \"it's not painful. It just feels weird. \" Yes

## 2022-04-04 NOTE — PROGRESS NOTES
Problem: Mobility Impaired (Adult and Pediatric)  Goal: *Acute Goals and Plan of Care (Insert Text)  Description: FUNCTIONAL STATUS PRIOR TO ADMISSION: Patient was modified independent using a rollator for functional mobility. HOME SUPPORT PRIOR TO ADMISSION: The patient lived alone with son to provide assistance for transportation. Physical Therapy Goals  Initiated 4/4/2022  1. Patient will move from supine to sit and sit to supine  in bed with modified independence within 7 day(s). 2.  Patient will transfer from bed to chair and chair to bed with modified independence using the least restrictive device within 7 day(s). 3.  Patient will perform sit to stand with modified independence within 7 day(s). 4.  Patient will ambulate with modified independence for 300 feet with the least restrictive device within 7 day(s). 5.  Patient will ascend/descend 4 stairs with one handrail(s) with minimal assistance/contact guard assist within 7 day(s). Outcome: Not Met   PHYSICAL THERAPY EVALUATION  Patient: Sandra Morgan (53 y.o. female)  Date: 4/4/2022  Primary Diagnosis: ICD (implantable cardioverter-defibrillator) malfunction [T82.118A]  Defibrillator discharge [Z45.02]       Precautions: falls, low hearing (hearing aids at home)       ASSESSMENT  Based on the objective data described below, the patient presents with generally decreased strength, decreased endurance, impaired balance, and limited functional mobility on day 1 of admission following ICD discharge. Pt participates in bed mobility, transfers, ambulation using rollator, and commode transfers with SBA and cues. VSS with activity and she tolerates activity without complaints. Current Level of Function Impacting Discharge (mobility/balance): supervision/SBA    Functional Outcome Measure: The patient scored 18 on the Tinetti outcome measure which is indicative of high fall risk.       Other factors to consider for discharge: none additional Patient will benefit from skilled therapy intervention to address the above noted impairments. PLAN :  Recommendations and Planned Interventions: bed mobility training, transfer training, gait training, therapeutic exercises, neuromuscular re-education, patient and family training/education and therapeutic activities      Frequency/Duration: Patient will be followed by physical therapy:  5 times a week to address goals. Recommendation for discharge: (in order for the patient to meet his/her long term goals)  Physical therapy at least 2 days/week in the home and 24-hour assistance for all functional mobility    This discharge recommendation:  Has been discussed the attending provider and/or case management    IF patient discharges home will need the following DME: none         SUBJECTIVE:   Patient stated I have to have the walker if I'm going to stand up.     OBJECTIVE DATA SUMMARY:   HISTORY:    Past Medical History:   Diagnosis Date    Acute cystitis 05/23/2018    Anemia, unspecified     Anxiety     Arrhythmia     a fib    Arthritis     osteoarthritis, rheumatoid    Atrial fibrillation (HCC)     Dr. Will Quarles and Dr. Bo Cristobal  following    Autoimmune disease [facility])     sjogren disease Dr. Jose Rahman CAD (coronary artery disease)     History of vascular access device 10/29/2021    4 FR Single PICC Lt Ant bx 41 cm 1 cm out    HTN (hypertension)     Hyperlipidemia LDL goal < 100     Hypothyroid     Insomnia     Osteoarthritis     Sjogren's disease ([facility] [address]     Thoracic aneurysm without mention of rupture      Past Surgical History:   Procedure Laterality Date    HX BIV-IMPLANTABLE CARDIOVERTER DEFIBRILLATOR      HX CHOLECYSTECTOMY      HX COLONOSCOPY  3/2014    Dr. Crissy Mortensen      hernia repairs    HX PARTIAL THYROIDECTOMY      HX JANNET AND BSO      HX VEIN STRIPPING      [place] INSJ ELTRD CAR COLLIN SYS TM INSJ DFB/PM PLS GEN Left 2/22/2019    Lv Lead Placement performed by Sorin Ratliff MD at [facility] LAB    [place] INSJ/RPLCMT PERM DFB W/TRNSVNS LDS 1/DUAL [facility] Left 2/22/2019    upgrade PM to ICD-Bsx performed by Sorin Ratliff MD at [facility] LAB       Personal factors and/or comorbidities impacting plan of care: as above    Home Situation  Home Environment: Apartment  # Steps to Enter: 0  One/Two Story Residence: One story  Living Alone: Yes  Support Systems: Child(lynette)  Patient Expects to be Discharged to[de-identified] Home with family assistance  Current DME Used/Available at Home: Shasha Flirt, rollator,Grab bars,Shower chair  Tub or Shower Type: Shower    EXAMINATION/PRESENTATION/DECISION MAKING:   Critical Behavior:  Neurologic State: Alert  Orientation Level: Oriented to place,Oriented to person,Oriented to situation (oriented to month and year)  Cognition: Follows commands     Hearing: Auditory  Auditory Impairment: Hard of hearing, bilateral  Skin:  LE exposed skin intact; converted to wireless telemetry for treatment and returned to wired tele after treatment. Edema: none noted LEs  Range Of Motion:  AROM: Within functional limits                       Strength:    Strength: Generally decreased, functional                    Tone & Sensation:   Tone: Normal              Sensation: Intact               Coordination:  Coordination: Within functional limits       Functional Mobility:  Bed Mobility:     Supine to Sit: Additional time;Modified independent     Scooting:  Additional time;Modified independent  Transfers:  Sit to Stand: Supervision  Stand to Sit: Supervision                       Balance:   Sitting: Without support  Sitting - Static: Good (unsupported)  Sitting - Dynamic: Good (unsupported)  Standing: With support  Standing - Static: Good  Standing - Dynamic : Good  Ambulation/Gait Training:  Distance (ft):  (30 + 70)  Assistive Device: Walker, rollator;Gait belt  Ambulation - Level of Assistance: Stand-by assistance        Gait Abnormalities: Decreased step clearance              Speed/Tayler: Pace decreased (<100 feet/min)                               Functional Measure:  Tinetti test:    Sitting Balance: 1  Arises: 1  Attempts to Rise: 2  Immediate Standing Balance: 1  Standing Balance: 1  Nudged: 0  Eyes Closed: 1  Turn 360 Degrees - Continuous/Discontinuous: 1  Turn 360 Degrees - Steady/Unsteady: 1  Sitting Down: 1  Balance Score: 10 Balance total score  Indication of Gait: 1  R Step Length/Height: 1  L Step Length/Height: 1  R Foot Clearance: 1  L Foot Clearance: 1  Step Symmetry: 1  Step Continuity: 1  Path: 1  Trunk: 0  Walking Time: 0  Gait Score: 8 Gait total score  Total Score: 18/28 Overall total score         Tinetti Tool Score Risk of Falls  <19 = High Fall Risk  19-24 = Moderate Fall Risk  25-28 = Low Fall Risk  Tinetti ME. Performance-Oriented Assessment of Mobility Problems in Elderly Patients. [facility] 66; [id number].  (Scoring Description: PT Bulletin Feb. 10, 1993)    Older adults: Vidal Doyle et al, 2009; n = 1000 [facility] elderly evaluated with ABC, KESHAWN, ADL, and IADL)  · Mean KESHAWN score for males aged 69-68 years = 26.21(3.40)  · Mean KESHAWN score for females age 69-68 years = 25.16(4.30)  · Mean KESHAWN score for males over 80 years = 23.29(6.02)  · Mean KESHAWN score for females over 80 years = 17.20(8.32)            Physical Therapy Evaluation Charge Determination   History Examination Presentation Decision-Making   HIGH Complexity :3+ comorbidities / personal factors will impact the outcome/ POC  HIGH Complexity : 4+ Standardized tests and measures addressing body structure, function, activity limitation and / or participation in recreation  MEDIUM Complexity : Evolving with changing characteristics  Other outcome measures tinetti  HIGH       Based on the above components, the patient evaluation is determined to be of the following complexity level: MEDIUM    Pain Ratin/10 (NPS)    Activity Tolerance:   requires rest breaks    After treatment patient left in no apparent distress:   Sitting in chair, Call bell within reach and Bed / chair alarm activated    COMMUNICATION/EDUCATION:   The patients plan of care was discussed with: Registered nurse. Fall prevention education was provided and the patient/caregiver indicated understanding., Patient/family have participated as able in goal setting and plan of care. and Patient/family agree to work toward stated goals and plan of care.     Thank you for this referral.  Farzaneh Pham, PT, DPT   Time Calculation: 35 mins

## 2022-04-04 NOTE — PROGRESS NOTES
If PCP is willing to follow pt for home health orders,Fredrick Buchanan has accepted pt for SN/PT/OT,  This has been placed on AVS.    [facility]    Addendum-PCP has agreed to sign orders. [facility]

## 2022-04-04 NOTE — PROGRESS NOTES
Hospital Follow Up with PCP Jennifer Lang MD for 04/13/2022 at 11:20am. Patients normal provider did not have any available appointments Dr Luis Felipe Adames

## 2022-04-04 NOTE — TELEPHONE ENCOUNTER
I spoke with [facility] nurse, they need to confirm pcp will follow/sign orders for home health once pt is d/c today. I advise that pcp follows her pts. Flaquita states pt has a jay appt next week but its with Dr. Dennie Perking. Appt moved to pcp's scheduled,   Monday Apr 11, 2022   Appt at 2:20 PM    Flaquita will give pt this new appt information.  She was very thankful for the help

## 2022-04-04 NOTE — PROGRESS NOTES
[facility]    Medical Progress Note      NAME: Keena Acosta   :  1928  MRM:  [id number]    Date/Time of service 2022  7:18 AM          Assessment and Plan:     ICD (implantable cardioverter-defibrillator) discharge / Hx Persistent atrial fibrillation / HX Ventricular tachycardia - Cardiology consulted. Interrogated ICD. Continue eliquis. DC on increased amiodarone. Generalized weakness / Osteoarthritis / Underweight / FTT (failure to thrive) in adult - Supportive care. Cardioloyg and family may want to discuss ICD deactivation in setting of age and frailty    CAD (coronary artery disease) / HTN (hypertension) - Continue Norvasc. She is not on BB, ASA nor statin. Cardiology can comment. Anxiety and depression - Continue paroxetine    Hypothyroidism - continue Synthroid    Elevated serum creatinine - POA due to poor PO intake, Improved with hydration         Subjective:     Chief Complaint:  Feels back to baseline. No further shocks    ROS:  (bold if positive, if negative)    Tolerating some PT  Tolerating Diet        Objective:     Last 24hrs VS reviewed since prior progress note.  Most recent are:    Visit Vitals  BP (!) 147/96 (BP 1 Location: Left upper arm, BP Patient Position: Lying)   Pulse 70   Temp 98.6 °F (37 °C)   Resp 18   Ht 5' 7\" (1.702 m)   Wt 54 kg (119 lb 0.8 oz)   SpO2 98%   Breastfeeding No   BMI 18.65 kg/m²     SpO2 Readings from Last 6 Encounters:   22 98%   10/30/21 91%   10/04/21 94%   21 98%   21 98%   12/15/20 97%            Intake/Output Summary (Last 24 hours) at 2022 0718  Last data filed at 2022 0244  Gross per 24 hour   Intake 190 ml   Output 0 ml   Net 190 ml        Physical Exam:    Gen:  Frail, cachectic, in no acute distress  HEENT:  Pink conjunctivae, PERRL, hearing intact to voice, moist mucous membranes  Neck:  Supple, without masses, thyroid non-tender  Resp:  No accessory muscle use, clear breath sounds without wheezes rales or rhonchi  Card:  No murmurs, normal S1, S2 without thrills, bruits or peripheral edema  Abd:  Soft, non-tender, non-distended, normoactive bowel sounds are present, no mass  Lymph:  No cervical or inguinal adenopathy  Musc:  No cyanosis or clubbing  Skin:  No rashes or ulcers, skin turgor is good  Neuro:  Cranial nerves are grossly intact, general motor weakness, follows commands   Psych:  Poor insight, oriented to person, place and time, alert    Telemetry reviewed:   normal sinus rhythm, paced  __________________________________________________________________  Medications Reviewed: (see below)  Medications:     Current Facility-Administered Medications   Medication Dose Route Frequency    amLODIPine (NORVASC) tablet 5 mg  5 mg Oral DAILY    apixaban (ELIQUIS) tablet 2.5 mg  2.5 mg Oral BID    amiodarone (CORDARONE) tablet 100 mg  100 mg Oral DAILY    levothyroxine (SYNTHROID) tablet 112 mcg  112 mcg Oral ACB    PARoxetine (PAXIL) tablet 10 mg  10 mg Oral DAILY    sodium chloride (NS) flush 5-40 mL  5-40 mL IntraVENous Q8H    sodium chloride (NS) flush 5-40 mL  5-40 mL IntraVENous PRN    acetaminophen (TYLENOL) tablet 650 mg  650 mg Oral Q6H PRN    Or    acetaminophen (TYLENOL) suppository 650 mg  650 mg Rectal Q6H PRN    polyethylene glycol (MIRALAX) packet 17 g  17 g Oral DAILY PRN    ondansetron (ZOFRAN ODT) tablet 4 mg  4 mg Oral Q8H PRN    Or    ondansetron (ZOFRAN) injection 4 mg  4 mg IntraVENous Q6H PRN        Lab Data Reviewed: (see below)  Lab Review:     Recent Labs     04/04/22 0319 04/03/22 2103   WBC 4.7 6.3   HGB 13.0 14.6   HCT 39.9 44.9    172     Recent Labs     04/04/22 0319 04/03/22 2103    136   K 3.6 4.0   CL 99 98   CO2 34* 32   * 146*   BUN 30* 30*   CREA 0.97 1.11*   CA 8.7 8.9   MG 2.4 2.0   ALB  --  3.9   TBILI  --  0.5   ALT  --  21   INR  --  1.0     Lab Results   Component Value Date/Time    Glucose (POC) 275 (H) 02/16/2019 05:38 PM No results for input(s): PH, PCO2, PO2, HCO3, FIO2 in the last 72 hours. Recent Labs     04/03/22  2103   INR 1.0     All Micro Results     None          Other pertinent lab: none    Total time spent with patient: 30 Minutes I personally reviewed chart, notes, data and current medications in the medical record. I have personally examined and treated the patient at bedside during this period.                  Care Plan discussed with: Patient, Care Manager, Nursing Staff, Consultant/Specialist and >50% of time spent in counseling and coordination of care    Discussed:  Care Plan and D/C Planning    Prophylaxis:  Lovenox and H2B/PPI    Disposition:  Home w/Family           ___________________________________________________    Attending Physician: Cindy Owen MD

## 2022-04-04 NOTE — ED PROVIDER NOTES
31-year-old woman with history of CAD, VT arrest in the past presenting after her defibrillator discharge. She has [facility] [facility] device which was placed about 3 years ago. She has not had any defibrillator discharge of late. She denies shortness of breath, chest pain currently. She states that she has had episodic lightheadedness however and sweating for the past few days. She did not notice palpitations recently however. She states that she was brushing her hair and suddenly felt a shock. At this point she feels otherwise well. Her pacemaker is located on the left side of her anterior chest and she was using her right dominant hand to brush her hair.            Past Medical History:   Diagnosis Date    Acute cystitis 05/23/2018    Anemia, unspecified     Anxiety     Arrhythmia     a fib    Arthritis     osteoarthritis, rheumatoid    Atrial fibrillation (HCC)     Dr. Eduardo Ford and Dr. Edward Gray  following    Autoimmune disease [facility])     sjogren disease Dr. Reyes Callaway CAD (coronary artery disease)     History of vascular access device 10/29/2021    4 FR Single PICC Lt Ant bx 41 cm 1 cm out    HTN (hypertension)     Hyperlipidemia LDL goal < 100     Hypothyroid     Insomnia     Osteoarthritis     Sjogren's disease ([address]     Thoracic aneurysm without mention of rupture        Past Surgical History:   Procedure Laterality Date    HX BIV-IMPLANTABLE CARDIOVERTER DEFIBRILLATOR      HX CHOLECYSTECTOMY      HX COLONOSCOPY  3/2014    Dr. Esvin Holland    HX OTHER SURGICAL      hernia repairs    HX PARTIAL THYROIDECTOMY      HX JANNET AND BSO      HX VEIN STRIPPING      ME INSJ ELTRD CAR COLLIN SYS TM INSJ DFB/PM PLS GEN Left 2/22/2019    Lv Lead Placement performed by Mary Childress MD at [facility] CATH LAB    ME INSJ/RPLCMT PERM DFB W/TRNSVNS LDS 1/DUAL [facility] Left 2/22/2019    upgrade PM to ICD-Bsx performed by Mary Childress MD at [facility] CATH LAB         Family History:   Problem Relation Age of Onset  Heart Disease Father         aneurysm    Cancer Maternal Grandfather        Social History     Socioeconomic History    Marital status:      Spouse name: Not on file    Number of children: Not on file    Years of education: Not on file    Highest education level: Not on file   Occupational History    Not on file   Tobacco Use    Smoking status: Never Smoker    Smokeless tobacco: Never Used   Vaping Use    Vaping Use: Never used   Substance and Sexual Activity    Alcohol use: Yes     Comment: wine    Drug use: Never    Sexual activity: Not Currently   Other Topics Concern     Service Not Asked    Blood Transfusions Not Asked    Caffeine Concern Not Asked    Occupational Exposure Not Asked    Hobby Hazards Not Asked    Sleep Concern Not Asked    Stress Concern Not Asked    Weight Concern Not Asked    Special Diet Not Asked    Back Care Not Asked    Exercise Not Asked    Bike Helmet Not Asked    Seat Belt Not Asked    Self-Exams Not Asked   Social History Narrative    ** Merged History Encounter **          Social Determinants of Health     Financial Resource Strain:     Difficulty of Paying Living Expenses: Not on file   Food Insecurity:     Worried About Running Out of Food in the Last Year: Not on file    Kishor of Food in the Last Year: Not on file   Transportation Needs:     Lack of Transportation (Medical): Not on file    Lack of Transportation (Non-Medical):  Not on file   Physical Activity:     Days of Exercise per Week: Not on file    Minutes of Exercise per Session: Not on file   Stress:     Feeling of Stress : Not on file   Social Connections:     Frequency of Communication with Friends and Family: Not on file    Frequency of Social Gatherings with Friends and Family: Not on file    Attends [employer] Services: Not on file    Active Member of Clubs or Organizations: Not on file    Attends Club or Organization Meetings: Not on file    Marital Status: Not on file   Intimate Partner Violence:     Fear of Current or Ex-Partner: Not on file    Emotionally Abused: Not on file    Physically Abused: Not on file    Sexually Abused: Not on file   Housing Stability:     Unable to Pay for Housing in the Last Year: Not on file    Number of [place] in the Last Year: Not on file    Unstable Housing in the Last Year: Not on file         ALLERGIES: Latex; Pcn [penicillins]; Sulfa (sulfonamide antibiotics); Biaxin [clarithromycin]; Biotin; Covid-19 vaccine, mrna, [id number], lnp-s (moderna); Pcn [penicillins]; and Sulfa (sulfonamide antibiotics)    Review of Systems   Constitutional: Positive for fatigue. Negative for chills and fever. Eyes: Negative for photophobia. Respiratory: Negative for shortness of breath. Cardiovascular: Negative for chest pain. Gastrointestinal: Negative for abdominal pain. Genitourinary: Negative for dysuria. Musculoskeletal: Negative for back pain. Neurological: Positive for light-headedness. Negative for headaches. Psychiatric/Behavioral: Negative for confusion. All other systems reviewed and are negative. Vitals:    04/03/22 2145 04/03/22 2200 04/03/22 2245 04/03/22 2300   BP: 127/74 136/62 128/61    Pulse: 81 78 77 79   Resp: 17 22 17    Temp:       SpO2: 96% 96% 95%    Weight:       Height:                Physical Exam  Vitals reviewed. Constitutional:       General: She is not in acute distress. Appearance: She is not toxic-appearing. Comments: Thin, elderly woman in no distress   HENT:      Head: Normocephalic and atraumatic. Mouth/Throat:      Mouth: Mucous membranes are moist.   Eyes:      Extraocular Movements: Extraocular movements intact. Cardiovascular:      Rate and Rhythm: Normal rate and regular rhythm. Heart sounds: Normal heart sounds.       Comments: Pacemaker generator left anterior chest wall, no tenderness over the device or fluctuance  Pulmonary:      Effort: Pulmonary effort is normal. No respiratory distress. Breath sounds: Normal breath sounds. Abdominal:      Palpations: Abdomen is soft. Tenderness: There is no abdominal tenderness. Musculoskeletal:      Cervical back: Normal range of motion. Right lower leg: No edema. Left lower leg: No edema. Skin:     Capillary Refill: Capillary refill takes less than 2 seconds. Neurological:      General: No focal deficit present. Mental Status: She is alert and oriented to person, place, and time. Psychiatric:         Mood and Affect: Mood normal.          MDM  Number of Diagnoses or Management Options     Amount and/or Complexity of Data Reviewed  Clinical lab tests: ordered and reviewed  Tests in the radiology section of CPT®: ordered and reviewed  Decide to obtain previous medical records or to obtain history from someone other than the patient: yes (Interrogated her device)  Obtain history from someone other than the patient: yes (Her daughter)  Discuss the patient with other providers: yes (Dr. Nasra Landon  [address])      ED Course as of 22 2356   Sun 2022   2102 EK  Normal sinus rhythm, ventricular rate 83, normal axis, no ST elevation or depression.     [NS]      ED Course User Index  [NS] Washington Weller MD       Procedures    MEDICAL DECISION MAKIN y.o. female presents with Pacemaker Problem    Differential diagnosis includes but not limited to: Pacemaker discharge either appropriate versus inappropriate/there is mention that she follows with Dr. Nasra Landon. on newer notes apparently she follows at [facility]. She has not had any ectopy here however given recent defibrillator discharge will admit for observation. LABORATORY TESTS:  Labs Reviewed   CBC WITH AUTOMATED DIFF - Abnormal; Notable for the following components:       Result Value    IMMATURE GRANULOCYTES 1 (*)     ABS. IMM.  GRANS. 0.1 (*)     All other components within normal limits   METABOLIC PANEL, COMPREHENSIVE - Abnormal; Notable for the following components:    Glucose 146 (*)     BUN 30 (*)     Creatinine 1.11 (*)     BUN/Creatinine ratio 27 (*)     GFR est AA 56 (*)     GFR est non-AA 46 (*)     All other components within normal limits   MAGNESIUM   PROTHROMBIN TIME + INR   TROPONIN-HIGH SENSITIVITY   SAMPLES BEING HELD   TSH 3RD GENERATION       IMAGING RESULTS:  XR CHEST PORT   Final Result   1. No radiographic evidence of acute cardiopulmonary disease. MEDICATIONS GIVEN:  Medications   amiodarone (CORDARONE) 150 mg in dextrose 5% 100 mL bolus infusion (has no administration in time range)       PROGRESS NOTE:   11:24 PM Patient has remained stable    EKG:  Reviewed     CONSULTS:  Hospitalist Consult: [facility] for Admission  11:25 PM    ED Room Number: ER06/06  Patient Name and age:  Solitario Chi 80 y.o.  female  Working Diagnosis:   1. Defibrillator discharge    2. Near syncope        COVID-19 Suspicion:  no  Sepsis present:  no  Reassessment needed: no  Code Status:  Full Code  Readmission: no  Isolation Requirements:  no  Recommended Level of Care:  telemetry  Department:[facility] - [phone]  Other: Patient had a defibrillator discharge prior to arrival, denies interrogated and appears that she did have an episode of ventricular tachycardia. Was treated with antitachycardia pacing and when asked unsuccessful she was defibrillated. She did notice consciousness. She follows with [place] cardiovascular specialist and a consult has been placed. She has not yet received her evening meds. IMPRESSION:  1. Defibrillator discharge    2. Near syncope        Discussed with Dr. Dre Ledesma, states that the [address] [place] [address] based cardiology service is able to see the patient if needed. However I will contact the patient's primary cardiology service      11:51 PM discussed with Dr. Raj Dacosta from [facility] recommended 150 mg of IV amiodarone.   Patient can then be observed overnight, she has no further events consider discharge and outpatient follow-up. PLAN:  - Admit to hospitalist    Total critical care time spent exclusive of procedures:  36 minutes    Bean Michael MD          Please note that this dictation was completed with [employer], the computer voice recognition software. Quite often unanticipated grammatical, syntax, homophones, and other interpretive errors are inadvertently transcribed by the computer software. Please disregard these errors. Please excuse any errors that have escaped final proofreading.

## 2022-04-04 NOTE — PROGRESS NOTES
Virtual reviewer communicated change to CM, which reflect outpatient observation order written prior to Written discharge order. Code 44 delivered and given to patient. CM met with the patient and provided to the patient the printed information about her outpatient observation status. The patient was given the flyer entitled, \"Medicare Outpatient Observation: Information & notification. \" All questions were answered, no additional discharge needs identified at this time and patient expects to return to their (home, assisted living facility, relatives home, etc.) after discharge today. Copy provided to patient or sent secure email, secure fax , or certified mail to patient's loved one per their request.    Dorcas Grounds Medicare Outpatient Observation Notice (MOON)/ [place] Outpatient Observation Notice (Ace Peer) provided to patient/representative with verbal explanation of the notice. Time allotted for questions regarding the notice. Patient /representative provided a completed copy of the MOON/VOON notice. Copy placed on bedside chart.     [facility]

## 2022-04-04 NOTE — DISCHARGE INSTRUCTIONS
Patient Discharge Instructions    Geo Vega / [id number] : 1928    Admitted 4/3/2022 Discharged: 2022     Primary Diagnoses  Problem List as of 2022 Date Reviewed: 2022           * (Principal) ICD (implantable cardioverter-defibrillator) discharge   Underweight   FTT (failure to thrive) in adult   Generalized weakness   Weakness   Ventricular tachycardia (HCC)   Elevated serum creatinine   Anxiety   HTN (hypertension) (Chronic)   Osteoarthritis   Hypothyroidism   CAD (coronary artery disease) (Chronic)   Persistent atrial fibrillation ([facility] [address]          Take Home Medications     · It is important that you take the medication exactly as they are prescribed. · Keep your medication in the bottles provided by the pharmacist and keep a list of the medication names, dosages, and times to be taken in your wallet. · Do not take other medications without consulting your doctor. What to do at Home    Recommended diet: Cardiac Diet, Increase noncaffeinated fluids and Comfort feeding    Recommended activity: Activity as tolerated    If you experience worse symptoms, please follow up with our cardiologist.    Follow-up with your PCP in a few weeks    Follow-up Information     Follow up With Specialties Details Why Contact Info    Joanna Ly MD Internal Medicine Schedule an appointment as soon as possible for a visit in 1 week  [address] [facility]  [phone]      Star Reynoso MD Cardiology Schedule an appointment as soon as possible for a visit in 2 days  [address] [facility]  [phone]             Information obtained by :  I understand that if any problems occur once I am at home I am to contact my physician. I understand and acknowledge receipt of the instructions indicated above. Physician's or R.N.'s Signature                                                                  Date/Time                                                                                                                                              Patient or Representative Signature                                                          Date/Time        . [facility]/ED Visit Follow-Up Instructions/Information    You may have an in home follow up visit set up with [facility] or may wish to contact [facility] to set-up a visit:    What are we? [facility] is an in-home urgent care service staffed with emergency trained medical teams. We come to your home in a vehicle stocked with medical supplies and technology. An ER physician is always available if needed. When? As a part of your hospital follow-up, an appointment has been/ or can be set up for us to come see you. Usually, this will be 24-72 hours after you leave the hospital or as needed. [facility] is open 7am-9pm, 7 days a week, 365 days a year, including holidays. Why? We know that you cannot always get to your doctor after being in the hospital and that your doctor is not always available when you need them. Once your workup is complete, we'll call in your prescriptions, update your family doctor, and handle billing with your insurance so you can focus on feeling better, faster without leaving home. How much? We accept most major health insurance plans, including Medicaid, Medicare, and Medicare Advantage [facility], [facility], [place], and [place]. We also accept: credit, debit, health savings account (HSA), health reimbursement account (HRA) and flexible spending account (FSA) payments. [facility]'s prices compare to conventional urgent care facilities, but we bring the care to you. How to reach us?   Getting care is easy- use our mobile levi ([employer]), website [email or web address]) or call us [phone]. Patient did not assign but  is involved and aware of d/c plan and in agreement

## 2022-04-04 NOTE — CONSULTS
CARDIOLOGY CONSULTATION NOTE    Jerson Blood MD,  [address], [place], [address] [facility]  Phone [phone]; Fax [phone]  Mobile [phone]   Voice Mail [phone]                               4/3/2022  8:54 PM  Primary Care Andria Lee MD  :  [id number]   MRN:  [id number]     CC: ICD discharge    Reason for consult: ICD discharge      Admission Diagnosis: ICD (implantable cardioverter-defibrillator) malfunction [T82.118A]; Defibrillator discharge [Z45.02]             Impression Plan/Recommendation   1. ICD discharge  2. Persistent atrial fibrillation  3. HTN  4. Elevated creatinine              H/H 13/40, potassium 3.6, BUN 30, creatinine 1.97, magnesium 2.4, troponin 20, chest x-ray reviewed by me no abnormality 1. Obtain VCS records  2. ECHO  3. Replete K  4. Inc amio to 200 mg every day  5. Can consider ischemic work up if interval decline in EF>                Marcia Hancock is a 80 y.o. female we are seeing for ICD discharge. She has a  history of CAD, VT arrest followed by [facility] who is presenting with discharge of ICD. She states has been having  lightheadness associated with sweating and generally feeling nauseous the day of her ICD discharge. She was getting ready for bed, combing her hair when she felt a shock. It scared her. [place] device that was placed about 3 years, VCS. PMH of persistent fibrillation ventricular rest, CKD, hypertension.   Per pt it has never fired prior to yesterday         Cardiac risk factors: hypertension, post menopausal .        Allergies   Allergen Reactions    Latex Hives     NOT ALLERGIC PER PT 2018    Pcn [Penicillins] Anaphylaxis    Sulfa (Sulfonamide Antibiotics) Anaphylaxis    Biaxin [Clarithromycin] Nausea Only    Biotin Unknown (comments)    Covid-19 Vaccine, Mrna, [id number], Lnp-S (Moderna) Rash    Pcn [Penicillins] Hives    Sulfa (Sulfonamide Antibiotics) Nausea Only         Past Medical History:   Diagnosis Date    Acute cystitis 05/23/2018    Anemia, unspecified     Anxiety     Arrhythmia     a fib    Arthritis     osteoarthritis, rheumatoid    Atrial fibrillation (HCC)     Dr. Vijay Gleason and Dr. Sheeba Baron  following    Autoimmune disease [facility])     sjogren disease Dr. Huy Bhandari CAD (coronary artery disease)     History of vascular access device 10/29/2021    4 FR Single PICC Lt Ant bx 41 cm 1 cm out    HTN (hypertension)     Hyperlipidemia LDL goal < 100     Hypothyroid     Insomnia     Osteoarthritis     Sjogren's disease ([address]     Thoracic aneurysm without mention of rupture         Past Surgical History:   Procedure Laterality Date    HX BIV-IMPLANTABLE CARDIOVERTER DEFIBRILLATOR      HX CHOLECYSTECTOMY      HX COLONOSCOPY  3/2014    Dr. Khanh Ellis    HX OTHER SURGICAL      hernia repairs    HX PARTIAL THYROIDECTOMY      HX JANNET AND BSO      HX VEIN STRIPPING      TX INSJ ELTRD CAR COLLIN SYS TM INSJ DFB/PM PLS GEN Left 2/22/2019    Lv Lead Placement performed by Loree Larsen MD at [address] CATH LAB    TX INSJ/RPLCMT PERM DFB W/TRNSVNS LDS 1/DUAL [facility] Left 2/22/2019    upgrade PM to ICD-Bsx performed by Loree Larsen MD at [facility] CATH LAB        . Home Medications:  Prior to Admission Medications   Prescriptions Last Dose Informant Patient Reported? Taking? Biotin 2,500 mcg cap Unknown at Unknown time Child Yes No   Sig: Take 1 Caplet by mouth daily. OTHER,NON-FORMULARY, Unknown at Unknown time  No No   Sig: Ceftriaxone 2 g IV daily through 11/19/2021   PARoxetine (PAXIL) 10 mg tablet Unknown at Unknown time  No No   Sig: TAKE 1 AND 1/2 TABLET BY MOUTH DAILY   acetaminophen (TYLENOL) 325 mg tablet 4/3/2022 at Unknown time  No Yes   Sig: Take 2 Tablets by mouth every six (6) hours as needed for Pain.    albuterol (PROVENTIL HFA, VENTOLIN HFA, PROAIR HFA) 90 mcg/actuation inhaler 3/4/2022 at Unknown time Child Yes Yes   Sig: albuterol sulfate HFA 90 mcg/actuation aerosol inhaler   amLODIPine (NORVASC) 5 mg tablet 4/3/2022 at Unknown time Child No Yes   Sig: Take 1 Tablet by mouth daily. amiodarone (PACERONE) 100 mg tablet 4/3/2022 at Unknown time Child Yes Yes   Sig: Take 100 mg by mouth daily. apixaban (ELIQUIS) 2.5 mg tablet 4/3/2022 at Unknown time Child Yes Yes   Sig: Take 2.5 mg by mouth two (2) times a day. ascorbic acid, vitamin C, (Vitamin C) 500 mg tablet Unknown at Unknown time Child Yes No   Sig: Take 500 mg by mouth daily. cetirizine (ZYRTEC) 10 mg tablet 4/3/2022 at Unknown time Child Yes Yes   Sig: Take 10 mg by mouth daily. cholecalciferol, vitamin D3, (VITAMIN D3) 2,000 unit tab Unknown at Unknown time Child Yes No   Sig: Take 2,000 Units by mouth daily. diclofenac (VOLTAREN) 1 % gel Unknown at Unknown time Child Yes No   Sig: Apply 2 g to affected area daily as needed. ferrous sulfate (IRON PO) Unknown at Unknown time Child Yes No   Sig: Take 325 mg by mouth daily. fluticasone (FLONASE) 50 mcg/actuation nasal spray 3/4/2022 at Unknown time Child Yes Yes   Si Sprays by Both Nostrils route daily as needed for Allergies. fosinopriL (MONOPRIL) 10 mg tablet 4/3/2022 at Unknown time  No Yes   Sig: TAKE THREE TABLETS BY MOUTH EVERY MORNING AND TAKE TWO TABLETS BY MOUTH EVERY EVENING   ipratropium (ATROVENT) 42 mcg (0.06 %) nasal spray 3/4/2022 at Unknown time Child Yes Yes   Si [place] by Both Nostrils route three (3) times daily as needed. levothyroxine (SYNTHROID) 112 mcg tablet 4/3/2022 at Unknown time  No Yes   Sig: Take 1 Tablet by mouth Daily (before breakfast).  10/21/2021 wnl TSH      Facility-Administered Medications: None       Hospital Medications:  Current Facility-Administered Medications   Medication Dose Route Frequency    amLODIPine (NORVASC) tablet 5 mg  5 mg Oral DAILY    apixaban (ELIQUIS) tablet 2.5 mg  2.5 mg Oral BID    amiodarone (CORDARONE) tablet 100 mg  100 mg Oral DAILY    levothyroxine (SYNTHROID) tablet 112 mcg  112 mcg Oral ACB    PARoxetine (PAXIL) tablet 10 mg  10 mg Oral DAILY    sodium chloride (NS) flush 5-40 mL  5-40 mL IntraVENous Q8H    sodium chloride (NS) flush 5-40 mL  5-40 mL IntraVENous PRN    acetaminophen (TYLENOL) tablet 650 mg  650 mg Oral Q6H PRN    Or    acetaminophen (TYLENOL) suppository 650 mg  650 mg Rectal Q6H PRN    polyethylene glycol (MIRALAX) packet 17 g  17 g Oral DAILY PRN    ondansetron (ZOFRAN ODT) tablet 4 mg  4 mg Oral Q8H PRN    Or    ondansetron (ZOFRAN) injection 4 mg  4 mg IntraVENous Q6H PRN          OBJECTIVE       Laboratory and Imaging have been reviewed and are notable for      ECG:  Date:  normal EKG, normal sinus rhythm, unchanged from previous tracings      Diagnostic Tests:     No results for input(s): CPK, CKMB, TROIQ in the last 72 hours. No lab exists for component: CKQMB, CPKMB  Recent Labs     04/04/22  0319 04/03/22  2103    136   K 3.6 4.0   CO2 34* 32   BUN 30* 30*   CREA 0.97 1.11*   * 146*   MG 2.4 2.0   WBC 4.7 6.3   HGB 13.0 14.6   HCT 39.9 44.9    172         Cardiac work up to date:  2/22/2019: Upgrade of dual chamber pacemaker to a dual-chamber [employer] ICD for secondary prevention    2/19/19 cath: L Main: Med; Nml LAD: Med; Mid 30% tandem lesion; Small vessel distally; D1/D2 - MLI LCflex: Med; Prox 30%; OM1 - MLI RCA: Med to Large; Mid - 50% lesion; PDA and PLB - med; MLI LVEDP: 12     6/16/16 stress EF 61% no ischemia  6/24/11 stress EF 71% no ischemia    2/10/17 DCCV a fib > SR 1 shock 150 joules    9/14/16 dual chamber PPM, medtronic    2/18/16 ECHO EF 65% mild/mod hypertrophy, mild/mod MR/TR  6/28/12: ECHO EF 60% mild AI, MR    Chest CT 3/12/12 aneurysmal dilation of ascending thoracic aorta 4.4 cm in greatest diameter Proximal descending aorta 4.2 cm in greatest diameter.  Mod calcified plaque   involving LAD, and prox cflex                 Social History:  Social History     Tobacco Use  Smoking status: Never Smoker    Smokeless tobacco: Never Used   Substance Use Topics    Alcohol use: Yes     Comment: wine       Family History:  Family History   Problem Relation Age of Onset    Heart Disease Father         aneurysm    Cancer Maternal Grandfather        Review of Symptoms:  A comprehensive review of systems was negative except for that written in the HPI. Physical Exam:      Visit Vitals  BP (!) 143/71   Pulse 67   Temp 98.6 °F (37 °C)   Resp 14   Ht 5' 7\" (1.702 m)   Wt 119 lb 0.8 oz (54 kg)   SpO2 95%   Breastfeeding No   BMI 18.65 kg/m²     General Appearance:  Well developed, well nourished,alert and oriented x 2, and individual in no acute distress. Teller    Ears/Nose/Mouth/Throat:   Hearing grossly normal.Normal oral mucosa,no scleral icterus     Neck: Supple no JVD or bruits,no cervical lymphadenopathy   Chest:   Lungs clear to auscultation bilaterally,no rales rhonchi or wheezing   Cardiovascular:  Regular rate and rhythm, S1, S2 normal,  Murmur. PMI nondisplaced   Abdomen:   Soft, non-tender, bowel sounds are active. No abdominal bruits   Extremities: No edema bilaterally. Pulses detected, no varicosities   Skin: Warm and dry. No bruising  Neuro  Moves all extermities and neurologically intact                                                       I have discussed the diagnosis with the patient and the intended plan as seen in the above orders. Questions were answered concerning future plans. I have discussed medication side effects and warnings with the patient as well. Reta Quintanilla is in agreement to the plan listed above and wishes to proceed. she  was instructed not to smoke, eat heart healthy diet  and to exercise. Thank you for the consult and the opportunity to be involved in the care of Reta Quintanilla.         Geremias Vicente MD

## 2022-04-04 NOTE — PROGRESS NOTES
Discharge instructions reviewed with patient who verbalized understanding. Discharge papers specifically state when she took her medications last, and when the next doses are due. Son Cozette Dance notified of discharge, and increase in amiodarone dose.

## 2022-04-05 ENCOUNTER — HOME CARE VISIT (OUTPATIENT)
Dept: HOME HEALTH SERVICES | Facility: HOME HEALTH | Age: 87
End: 2022-04-05

## 2022-04-05 LAB
ATRIAL RATE: 83 BPM
CALCULATED P AXIS, ECG09: 81 DEGREES
CALCULATED R AXIS, ECG10: 34 DEGREES
CALCULATED T AXIS, ECG11: 63 DEGREES
DIAGNOSIS, 93000: NORMAL
P-R INTERVAL, ECG05: 188 MS
Q-T INTERVAL, ECG07: 390 MS
QRS DURATION, ECG06: 88 MS
QTC CALCULATION (BEZET), ECG08: 458 MS
VENTRICULAR RATE, ECG03: 83 BPM

## 2022-04-06 ENCOUNTER — HOME CARE VISIT (OUTPATIENT)
Dept: SCHEDULING | Facility: HOME HEALTH | Age: 87
End: 2022-04-06

## 2022-04-07 ENCOUNTER — TELEPHONE (OUTPATIENT)
Dept: INTERNAL MEDICINE CLINIC | Age: 87
End: 2022-04-07

## 2022-04-07 NOTE — TELEPHONE ENCOUNTER
----- Message from Rolan Carvalho sent at 4/6/2022  3:50 PM EDT -----  Subject: Message to Provider    QUESTIONS  Information for Provider? Home Health care services is calling in regards   to the patient to state that she was supposed to take on Home health care   services but the patient did deny these services and will not be receiving   the home health care. Please advise.   ---------------------------------------------------------------------------  --------------  CALL BACK INFO  What is the best way for the office to contact you? OK to leave message on   voicemail  Preferred Call Back Phone Number? [phone]  ---------------------------------------------------------------------------  --------------  SCRIPT ANSWERS  Relationship to Patient? Third Party  Third Party Type? Home Health Care? Representative Name?  Mirna Ferraro

## 2022-04-08 RX ORDER — PAROXETINE 10 MG/1
TABLET, FILM COATED ORAL
Qty: 135 TABLET | Refills: 0 | Status: SHIPPED | OUTPATIENT
Start: 2022-04-08 | End: 2022-07-06

## 2022-06-16 ENCOUNTER — TELEPHONE (OUTPATIENT)
Dept: INTERNAL MEDICINE CLINIC | Age: 87
End: 2022-06-16

## 2022-06-16 NOTE — TELEPHONE ENCOUNTER
Pt call in would like nurse to call her son, Karlos Schafer, at [phone] to discuss her labwork.

## 2022-06-16 NOTE — TELEPHONE ENCOUNTER
I spoke with patients son, who states cardiology did labs on patient and son wanted to know if pts tsh is off and if so she needs to change dose. I advise we don't have the results, we weren't the ordering provider. I will send cardiology a request asking that they send us pts most recent labs results for pcp to review. Son also needs to call the cardiology to get the results on these labs and what their follow up suggestion is, since they ordered these results. Son verbalized understanding. He would like a call back once we receive results from cardiology.

## 2022-06-16 NOTE — PROGRESS NOTES
0220 - pt having sleep apnea with desats into the upper 70's. Placed Pt on 2L NC. When pt wakes up quickly rebounds o2 saturations. Adelaida Victoria. [facility]      Efren Nieves is a 58 y.o. female and presents with Follow Up Chronic Condition, Cholesterol Problem, and Hypertension       Assessment/Plan:    Diagnoses and all orders for this visit:    1. Essential hypertension  -     METABOLIC PANEL, COMPREHENSIVE; Future  Endorses medication compliance, Follow up labs prior to next visit and Blood pressure stable, continue amlodipine and atenololo at same dose   2. Raynaud's disease without gangrene  -     LIPID PANEL; Future  Endorses medication compliance, follow up labs prior to next visit      Follow-up and Dispositions    · Return in about 4 months (around 10/16/2022) for CPE, w/gyn, 30 min, office only, with, labs prior. Health Maintenance:   Health Maintenance   Topic Date Due    DTaP/Tdap/Td series (1 - Tdap) Never done    Cervical cancer screen  Never done    Shingrix Vaccine Age 50> (1 of 2) Never done    Low dose CT lung screening  Never done    Colorectal Cancer Screening Combo  07/15/2022    Flu Vaccine (Season Ended) 09/01/2022    Breast Cancer Screen Mammogram  04/12/2023    Lipid Screen  06/09/2023    Depression Screen  06/16/2023    Hepatitis C Screening  Completed    Bone Densitometry (Dexa) Screening  Completed    COVID-19 Vaccine  Completed    Pneumococcal 0-64 years  Aged Out        Subjective:    Labs obtained prior to visit? YES  Reviewed with patient? Yes    Hypertension:  Visit Vitals  BP (!) 127/55 (BP 1 Location: Left arm, BP Patient Position: Sitting, BP Cuff Size: Small adult) Comment (BP Patient Position): feet flat on floor   Pulse 60   Temp 98.3 °F (36.8 °C) (Temporal)   Resp 18   Ht 5' 7\" (1.702 m)   Wt 126 lb 9.6 oz (57.4 kg)   SpO2 97%   BMI 19.83 kg/m²      Patient reports taking medications as instructed. yes   Medication side effects noted. no  Headache upon wakening.  no   Home BP monitoring: Does not check  Do you experience chest pain/pressure or SOB with exertion? no  Maintain a low Sodium diet? yes  Key CAD CHF Meds             atorvastatin (LIPITOR) 10 mg tablet (Taking) TAKE 1 TABLET BY MOUTH DAILY    atenoloL (TENORMIN) 50 mg tablet (Taking) TAKE 1 TABLET BY MOUTH DAILY    amLODIPine (NORVASC) 5 mg tablet (Taking) Take 1.5 Tablets by mouth daily. amLODIPine (NORVASC) 2.5 mg tablet         BUN   Date Value Ref Range Status   06/09/2022 13 6 - 22 mg/dL Final     Creatinine   Date Value Ref Range Status   06/09/2022 0.6 (L) 0.8 - 1.4 mg/dL Final     GFR est AA   Date Value Ref Range Status   10/15/2017 >60.0   Final     Comment:     THE [facility] STATES THAT THE MDRD STUDY EQUATION SHOULD ONLY BE USED ON  INDIVIDUALS 18 OR OLDER. THE REPORT ALSO NOTES THAT THE MDRD STUDY EQUATION HAS NOT BEEN  VALIDATED FOR USE WITH THE ELDERLY (OVER 79YEARS OF AGE), PREGNANT WOMEN, PATIENTS WITH SERIOUS  COMORBID CONDITIONS, OR PERSONS WITH EXTREMES OF BODY SIZE, MUSCLE MASS, OR NUTRITIONAL STATUS. APPLICATION OF THE EQUATION TO THESE PATIENT GROUPS MAY LEAD TO ERRORS IN GFR ESTIMATION. GFR  ESTIMATING EQUATIONS HAVE POORER AGREEMENT WITH MEASURED GFR FOR ILL HOSPITALIZED PATIENTS AND  FOR PEOPLE WITH NEAR NORMAL KIDNEY FUNCTION THAN FOR SUBJECTS IN THE MDRD STUDY. VALIDATION  STUDIES ARE IN PROGRESS TO EVALUATE THE MDRD STUDY EQUATION FOR ADDITIONAL ETHNIC GROUPS, THE  ELDERLY, VARIOUS DISEASE CONDITIONS, AND PEOPLE WITH NORMAL KIDNEY FUNCTION. GFRA----REFERS TO   GFRO---REFERS TO OTHER RACES  REFERENCES AVAILABLE UPON REQUEST. Potassium   Date Value Ref Range Status   06/09/2022 4.0 3.5 - 5.5 mmol/L Final     HLD:  Has been compliant with meds  Yes  Compliant with low-fat diet. most of the time    Denies myalgias or other side effects. yes  The ASCVD Risk score (Rosana Oliveira., et al., 2013) failed to calculate for the following reasons:     The valid total cholesterol range is 130 to 320 mg/dL    Cholesterol, total   Date Value Ref Range Status   2022 111 110 - 200 mg/dL Final     Triglyceride   Date Value Ref Range Status   2022 87 40 - 149 mg/dL Final     HDL Cholesterol   Date Value Ref Range Status   2022 45 >=40 mg/dL Final     LDL, calculated   Date Value Ref Range Status   2022 49 (L) 50 - 99 mg/dL Final   ]  Key Antihyperlipidemia Meds             atorvastatin (LIPITOR) 10 mg tablet (Taking) TAKE 1 TABLET BY MOUTH DAILY             ROS:     ROS  As stated in HPI, otherwise all others negative. The problem list was updated as a part of today's visit. Patient Active Problem List   Diagnosis Code    Tobacco use Z72.0    Degenerative joint disease (DJD) of hip M16.9    History of DVT (deep vein thrombosis) Z86.718    Rheumatoid arthritis involving multiple sites ([facility] [address] M06.9    Raynaud's disease without gangrene I73.00    Essential hypertension I10    Anxiety F41.9    Cervical radiculopathy M54.12    Arthritis of carpometacarpal (CMC) joint of left thumb M18.12    Cervical spondylosis with radiculopathy M47.22    Chronic wrist pain, left M25.532, G89.29    Subluxation of distal end of left ulna S63.072A    Ulnocarpal abutment syndrome, 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 SH:  Social History     Tobacco Use    Smoking status: Former Smoker     Packs/day: 1.00     Years: 25.00     Pack years: 25.00     Quit date: 2021     Years since quittin.7    Smokeless tobacco: Never Used   Vaping Use    Vaping Use: Never used   Substance Use Topics    Alcohol use: No    Drug use: No       Medications/Allergies:  Current Outpatient Medications on File Prior to Visit   Medication Sig Dispense Refill    gabapentin (NEURONTIN) 300 mg capsule Take 300 mg by mouth three (3) times daily.  methocarbamoL (ROBAXIN) 500 mg tablet Take 1,000 mg by mouth four (4) times daily.  atorvastatin (LIPITOR) 10 mg tablet TAKE 1 TABLET BY MOUTH DAILY 90 Tablet 3    atenoloL (TENORMIN) 50 mg tablet TAKE 1 TABLET BY MOUTH DAILY 90 Tablet 0    amLODIPine (NORVASC) 5 mg tablet Take 1.5 Tablets by mouth daily. 45 Tablet 0    acetaminophen (TYLENOL) 500 mg tablet Take 500 mg by mouth every six (6) hours as needed for Pain.  celecoxib (CELEBREX) 100 mg capsule TK 1 C PO BID      hydroxychloroquine (PLAQUENIL) 200 mg tablet Take 200 mg by mouth two (2) times a day.  esomeprazole (NEXIUM) 20 mg capsule Take  by mouth.  amLODIPine (NORVASC) 2.5 mg tablet        No current facility-administered medications on file prior to visit. Allergies   Allergen Reactions    Keflex [Cephalexin] Rash       Objective:  Visit Vitals  BP (!) 127/55 (BP 1 Location: Left arm, BP Patient Position: Sitting, BP Cuff Size: Small adult) Comment (BP Patient Position): feet flat on floor   Pulse 60   Temp 98.3 °F (36.8 °C) (Temporal)   Resp 18   Ht 5' 7\" (1.702 m)   Wt 126 lb 9.6 oz (57.4 kg)   SpO2 97%   BMI 19.83 kg/m²    Body mass index is 19.83 kg/m². Physical assessment  Physical Exam  Vitals and nursing note reviewed. Eyes:      Conjunctiva/sclera: Conjunctivae normal.      Pupils: Pupils are equal, round, and reactive to light. Neck:      Comments: S/p surgery, in a rigid neck brace  Cardiovascular:      Rate and Rhythm: Normal rate and regular rhythm. Heart sounds: Normal heart sounds. No murmur heard. No friction rub. No gallop. Pulmonary:      Effort: Pulmonary effort is normal.      Breath sounds: Normal breath sounds. Musculoskeletal:         General: Normal range of motion. Cervical back: Normal range of motion. Skin:     General: Skin is warm and dry. Neurological:      Mental Status: She is alert.            Labwork and Ancillary Studies:    CBC w/Diff  Lab Results Component Value Date/Time    WBC 5.2 02/28/2019 01:53 PM    HGB 12.6 02/28/2019 01:53 PM    PLATELET 593 57/65/9088 01:53 PM         Basic Metabolic Profile  Lab Results   Component Value Date/Time    Sodium 137 06/09/2022 12:52 PM    Potassium 4.0 06/09/2022 12:52 PM    Chloride 100 06/09/2022 12:52 PM    CO2 25 06/09/2022 12:52 PM    Anion gap 12.0 06/09/2022 12:52 PM    Glucose 107 (H) 06/09/2022 12:52 PM    BUN 13 06/09/2022 12:52 PM    Creatinine 0.6 (L) 06/09/2022 12:52 PM    GFR est AA >60.0 10/15/2017 02:40 AM    GFR est non-AA >60 10/15/2017 02:40 AM    Calcium 9.6 06/09/2022 12:52 PM        Cholesterol  Lab Results   Component Value Date/Time    Cholesterol, total 111 06/09/2022 12:52 PM    HDL Cholesterol 45 06/09/2022 12:52 PM    LDL, calculated 49 (L) 06/09/2022 12:52 PM    Triglyceride 87 06/09/2022 12:52 PM           I have discussed the diagnosis with the patient and the intended plan as seen in the above orders. The patient has received an After-Visit Summary and questions were answered concerning future plans. An After Visit Summary was printed and given to the patient. All diagnosis have been discussed with the patient and all of the patient's questions have been answered. Follow-up and Dispositions    · Return in about 4 months (around 10/16/2022) for CPE, w/gyn, 30 min, office only, with, labs prior. Jaqueline Willis, AGNP-BC  [address] [facility]   [address] [facility] [address]  [id number]

## 2022-06-21 ENCOUNTER — TELEPHONE (OUTPATIENT)
Dept: INTERNAL MEDICINE CLINIC | Age: 87
End: 2022-06-21

## 2022-06-21 RX ORDER — FUROSEMIDE 20 MG/1
20 TABLET ORAL DAILY
COMMUNITY
End: 2022-06-21 | Stop reason: SDUPTHER

## 2022-06-21 RX ORDER — FUROSEMIDE 20 MG/1
20 TABLET ORAL DAILY
Qty: 30 TABLET | Refills: 0 | Status: SHIPPED | OUTPATIENT
Start: 2022-06-21 | End: 2022-11-16

## 2022-06-21 NOTE — TELEPHONE ENCOUNTER
I think another provider discontinued the medication because not showing on her current meds.   Please have her confirm with cardiology if she should still be taking

## 2022-07-06 RX ORDER — PAROXETINE 10 MG/1
TABLET, FILM COATED ORAL
Qty: 135 TABLET | Refills: 0 | Status: SHIPPED | OUTPATIENT
Start: 2022-07-06 | End: 2022-10-03

## 2022-10-03 RX ORDER — PAROXETINE 10 MG/1
TABLET, FILM COATED ORAL
Qty: 135 TABLET | Refills: 0 | Status: SHIPPED | OUTPATIENT
Start: 2022-10-03

## 2022-10-04 NOTE — TELEPHONE ENCOUNTER
Please call patient, no further refills until office visit. If bridge fill is needed due to NOV > 30 days, please place order.  (David Stands)

## 2022-10-11 ENCOUNTER — TELEPHONE (OUTPATIENT)
Dept: INTERNAL MEDICINE CLINIC | Age: 87
End: 2022-10-11

## 2022-10-11 NOTE — TELEPHONE ENCOUNTER
----- Message from Ricky Ev sent at 10/11/2022 11:18 AM EDT -----  Subject: Message to Provider    QUESTIONS  Information for Provider?  PT IS RETURNING A CALL STATING SHE CAN NOT MAKE   AN APPT SHE DOES NOT FEEL UP TO IT. PT WOULD LIKE A CALL BACK.  ---------------------------------------------------------------------------  --------------  Rosamaria Preciado [facility]  [phone]; OK to leave message on voicemail  ---------------------------------------------------------------------------  --------------  SCRIPT ANSWERS  undefined

## 2022-10-11 NOTE — TELEPHONE ENCOUNTER
I called pt back, she states her hip is \"out of socket again\" she can't walk much now. She is basically home bound. She is unable to come to the office anymore. She does have help from her family. She wants to know what options does she have?

## 2022-10-18 NOTE — TELEPHONE ENCOUNTER
Nurse forwarded patient's chart to Alessia Portillo  requesting that patient's chart be reviewed to see if she would be a candidate for the program as she can no longer walk; therefore, she can no longer attend her visits in the office.  Nurse will lamonte for follow-up

## 2022-10-20 ENCOUNTER — TELEPHONE (OUTPATIENT)
Dept: INTERNAL MEDICINE CLINIC | Age: 87
End: 2022-10-20

## 2022-10-20 NOTE — TELEPHONE ENCOUNTER
Nurse forwarded patient's case to Intake Nurse for [address] [address] Based Primary Care program. Patient's case will be added to the next Provider Referral Intake Meeting for Home Based [address] [place] [address] Patient Acceptance Mid November & Intake Nurse will notify this nurse of follow-up acceptance of denial of patient into Home Based Primary Care Program. Referral Meeting will take place on or around 10/25/22 for the Home Based Primary Care Team. Nurse has notified PCP.

## 2022-10-21 ENCOUNTER — TELEPHONE (OUTPATIENT)
Dept: FAMILY MEDICINE CLINIC | Age: 87
End: 2022-10-21

## 2022-10-25 ENCOUNTER — TELEPHONE (OUTPATIENT)
Dept: INTERNAL MEDICINE CLINIC | Age: 87
End: 2022-10-25

## 2022-10-25 NOTE — TELEPHONE ENCOUNTER
Nurse silva'd update from Alessia [facility] ([facility]) Intake Nurse that patient's case will be discussed today during the [facility] provider meeting to determine if patient is able to be accepted by the program at this time or not. [facility] Intake Nurse will provide this nurse with final determination once a decision has been reached. This update has been shared with patient's PCP.

## 2022-10-26 NOTE — TELEPHONE ENCOUNTER
Message recv'd from Intake Nurse at [place] [address]: \"The providers accepted Ms. Jeffery Bocanegra for Home Based Primary Care. Santos Purcell NP will be seeing her for start of care visit on 11/16/22. Our SW Vijay Davis will be giving Ms. Jeffery Bocanegra or her son a call soon to set up her visit. Vijay Davis visits a few days ahead of the provider to get the new patient paperwork signed. Thank your for the referral!\" Nurse notified PCP that patient will be transitioning to the [place] [address] which will be a great fit for patient now as she is home bound & she can no longer walk independently. Thank you.

## 2022-11-01 ENCOUNTER — TELEPHONE (OUTPATIENT)
Dept: INTERNAL MEDICINE CLINIC | Age: 87
End: 2022-11-01

## 2022-11-01 NOTE — TELEPHONE ENCOUNTER
Can you please order a TSH and call pt that we should check and make sure her TSH is in range. Too strong of the medication can make her feel week Jerrye Lick weight.

## 2022-11-03 NOTE — TELEPHONE ENCOUNTER
Patients son was returning call - states if she needs lab work the people that come to her house can do whatever she needs done but requesting call back when possible.

## 2022-11-03 NOTE — TELEPHONE ENCOUNTER
I called pt's son, who is on [employer] form. I asked that he call back re: labs that may need to be done with us or asher sapp home based pcp.

## 2022-11-04 NOTE — TELEPHONE ENCOUNTER
I spoke with pts son, who is on hip[employer] form. He states pt is doing ok, [facility] is coming to see pt on the 14th. And will have labs done with them.  He was thankful for the referral to [facility]

## 2022-11-14 ENCOUNTER — OFFICE VISIT (OUTPATIENT)
Dept: FAMILY MEDICINE CLINIC | Age: 87
End: 2022-11-14

## 2022-11-14 DIAGNOSIS — Z76.89 ENCOUNTER FOR SOCIAL WORK INTERVENTION: Primary | ICD-10-CM

## 2022-11-14 NOTE — PROGRESS NOTES
Home Based Primary Care   [phone]  Initial Social Work Assessment    Date and Time of Initial In-Home Visit:  22, 11:00am    Reason for Assessment:  [facility] intake paperwork completion, initial psychosocial assessment    Sources of Information: Pt and son, Hina Kothari HISTORY  Relationship Status:   [] Single  []   [] Significant Other  []   [x] /:  twice, both spouses . 2nd   15 years ago. She has lived independently ever since he passed away. [] Other:     Living Circumstances:  Pt lives [place] [facility] [place] ground [place] by herself. Her son, Louie Almazan, lives 10 minutes away and visits about 5 days/week. She uses a rolling walker and ambulates in her apartment carefully. She has not had a fall in over 1 year. She has a LifeAlert necklace, but she does not wear it because it is sensitive and goes off when she does not mean for it to. This has happened several times. Current/Type of Housing:  [] Public/subsidized housing  [x] Apartment, Is there an elevator: Pt lives on ground level  [] Assisted Living  [] Private House, How many floors:     [address] of Income:    [x] Social Security: Social Security from 1st , 1/2 longterm income from 2nd    [] SSI   [] Pension   [] Employment Income   [] Spouse income    Insurance Information:   [x] Medicare   [] Medicaid   [] [employer]   [x] Other: [address]    Are you having trouble paying for things like rent, food, medications? Not at this time    Is there an agency or person who pays your bills? If yes, who? Pt's son, Louie Almazan, manages pt's finances    [address]: Louie Almazan does grocery shopping and meal preparation for pt    Problem with bed bugs, odors, pests, or pets? Not at this time    Working smoke alarm? [x] Yes [] No    Difficulty getting to exits from the home?  No, pt lives on ground level of [address]    Firearm Assessment:   Are there firearms in the home? [] Yes, Where are they kept? [x] No      SOCIAL SUPPORT ENVIRONMENT  Support System: Pt's family is primary support (son, daughter in law,  son's wife Haley Martinez, nephew)    How often do social supports visit? About 5 days/week    How do you socialize? When people come to visit or call on the phone    Are you involved with any community agencies? Name/Contact: Not at this time    Is or has Adult Protective Services ever been involved? No    In the last 6 months, have you seen a ? Psychiatrist? If yes, they be contacted by [address] team? No    Substance Use:   Tobacco? [] Yes [x] No    Alcohol? [] Yes, How many drinks per week: [x] No   Drugs? [] Yes, which drugs:   [x] No    Do you have an Emergency Call Device system? [x] Yes, Which brand? LifeAlert, but pt does not wear it because it has gone off too many times because it is sensitive and has a \"fall alert\" detection. She said several times, EMS has showed up at her door and she was unaware the button had been pressed. She has not had a fall in over a year, and uses a rolling walker carefully in her condo. [] No, Are you interested in obtaining and subscribing to an Emergency Call Device system? COGNITIVE/EMOTIONAL   Mental Status: Alert and oriented, short term memory challenges    How is your memory? Good long term memory, short term memory challenges at times    In the last 6 months, has anyone told you that you are forgetful? Yes    Do you receive help with ADLs? [] Yes, Who helps you? How many hours/days? [x] No, Do you need help? Pt denies needing assistance with ADLs, stating that she bathes herself and does her own laundry. TRANSPORTATION NEEDS ASSESSMENT  Can you use public transportation? [] Yes [x] No  Do you use transportation services provided by: Managed Care Organization? Community Service Resource?  No    EMERGENCY ACTION PLAN  HARRY reviewed the Emergency Action Plan with pt and/or family during this initial in-home Social Work assessment. SW completed Emergency Numbers, reviewed the content areas of Power Loss, Natural Disasters, Clinical Emergencies, Severe Weather, Safety in the Home, and Infection Control. Patient's acuity value consider to be LOW. ADVANCED CARE PLANNING  Pt's son states that he has POA paperwork, did not have it with him today. Son is Sheldon Going. Narrative:  SW visited with pt and son, Tate, in the [facility] where pt lives independently. New patient assessment of psychosocial needs and social determinants of health completed. SW introduced Home Based Primary Care and Supportive Services and reviewed Emergency Action Plan booklet. Pt lives independently in [facility]. She has lived here for 14 years. She moved here with her late  who  15 years ago. Pt was  twice. Her first  is the father of her 2 sons. One of her sons passed away when he was 58years old, had just retired from his career as a . Her other son, Tate, lives 10 minutes away and manages pt's finances, does grocery shopping for her, prepares meals, takes her doctor's appointments, is involved in mom's care and making sure her needs are being met. Pt is proudly independent with ADLs, including bathing herself, doing her own laundry. She denies feeling like she needs any additional help at this time. She uses rolling walker in her home, feels unsteady on her feet at times but makes sure her walker is always with her. She has a LifeAlert necklace that she does not wear, citing that it is too sensitive and has gone off several times without her knowing and was surprised to have EMS arrive at her home. Pt did not work outside the home. Her income is her first 's social security, and half of her second 's [employer] income from the government. Her son manages her finances. Per son, she would not qualify for Medicaid.  He had looked in to [place] when she was at an inpatient rehab, but her income and assets exceed the eligibility requirements. Pt's primary support is her family. Her son, his wife, her  son's wife, and a nephew visit frequently and ensure that pt has food in her home and prepared meals. Deepti Lois is requesting labwork to be drawn at [facility] visit, and to order flu vaccine and pneumonia vaccine.  LCSW relayed this information to [facility] clinical team.     Plan:   [x] Establish therapeutic relationship through in home visits and telephonic touch points  [] Assist in optimizing levels of psychosocial function  [x] Assess safety concerns and address as appropriate  [] Assess for caregiver burden and intervene as psychosocially indicated  [x] Assess patient for caregiver needs to optimize psychosocial function and safety  [] Provide information on available community resources  [] Initiate conversation regarding health care wishes   [] Assess current Advance Care Planning documents and make ACP recommendations as appropriate  [] Discuss goals of care and treatment preferences  [] Screen for mental health conditions including but not limited to anxiety, depression, and anticipatory grief  [] Offer counseling services for mental health conditions including but not limited to anxiety, depression, and anticipatory grief  [] Engage family in patient health care goals to ensure support for those goals and minimize patient/family conflict  [] Assess cultural needs of patient/family, educate interdisciplinary team and engage appropriate resources    Frequency of Social Work Follow-Up/Visits  [x] As needed  [] Bi-monthly  [] Monthly  [] Weekly  [] Other:    Swati Michele, CODY, [address] [facility]   Home Based [address] [place] [address]  [phone] [id number]

## 2022-11-16 ENCOUNTER — DOCUMENTATION ONLY (OUTPATIENT)
Dept: FAMILY MEDICINE CLINIC | Age: 87
End: 2022-11-16

## 2022-11-16 ENCOUNTER — HOME VISIT (OUTPATIENT)
Dept: FAMILY MEDICINE CLINIC | Age: 87
End: 2022-11-16
Payer: MEDICARE

## 2022-11-16 VITALS
HEART RATE: 61 BPM | OXYGEN SATURATION: 97 % | DIASTOLIC BLOOD PRESSURE: 80 MMHG | TEMPERATURE: 97.7 F | SYSTOLIC BLOOD PRESSURE: 148 MMHG

## 2022-11-16 DIAGNOSIS — I47.20 VENTRICULAR TACHYCARDIA: ICD-10-CM

## 2022-11-16 DIAGNOSIS — Z71.89 DO NOT RESUSCITATE DISCUSSION: ICD-10-CM

## 2022-11-16 DIAGNOSIS — I48.19 PERSISTENT ATRIAL FIBRILLATION (HCC): ICD-10-CM

## 2022-11-16 DIAGNOSIS — E03.9 ACQUIRED HYPOTHYROIDISM: ICD-10-CM

## 2022-11-16 DIAGNOSIS — I10 PRIMARY HYPERTENSION: ICD-10-CM

## 2022-11-16 DIAGNOSIS — Z00.00 MEDICARE ANNUAL WELLNESS VISIT, SUBSEQUENT: ICD-10-CM

## 2022-11-16 DIAGNOSIS — F41.9 ANXIETY: ICD-10-CM

## 2022-11-16 DIAGNOSIS — I25.10 CORONARY ARTERY DISEASE INVOLVING NATIVE CORONARY ARTERY OF NATIVE HEART WITHOUT ANGINA PECTORIS: ICD-10-CM

## 2022-11-16 DIAGNOSIS — R41.3 MEMORY DIFFICULTIES: ICD-10-CM

## 2022-11-16 DIAGNOSIS — Z76.89 ENCOUNTER TO ESTABLISH CARE: Primary | ICD-10-CM

## 2022-11-16 DIAGNOSIS — Z95.810 AUTOMATIC IMPLANTABLE CARDIAC DEFIBRILLATOR IN SITU: ICD-10-CM

## 2022-11-16 DIAGNOSIS — I10 PRIMARY HYPERTENSION: Primary | ICD-10-CM

## 2022-11-16 DIAGNOSIS — J30.9 ALLERGIC RHINITIS, UNSPECIFIED SEASONALITY, UNSPECIFIED TRIGGER: ICD-10-CM

## 2022-11-16 DIAGNOSIS — M15.9 OSTEOARTHRITIS OF MULTIPLE JOINTS, UNSPECIFIED OSTEOARTHRITIS TYPE: ICD-10-CM

## 2022-11-16 PROCEDURE — G8432 DEP SCR NOT DOC, RNG: HCPCS | Performed by: NURSE PRACTITIONER

## 2022-11-16 PROCEDURE — 1090F PRES/ABSN URINE INCON ASSESS: CPT | Performed by: NURSE PRACTITIONER

## 2022-11-16 PROCEDURE — G8420 CALC BMI NORM PARAMETERS: HCPCS | Performed by: NURSE PRACTITIONER

## 2022-11-16 PROCEDURE — G8427 DOCREV CUR MEDS BY ELIG CLIN: HCPCS | Performed by: NURSE PRACTITIONER

## 2022-11-16 PROCEDURE — G0439 PPPS, SUBSEQ VISIT: HCPCS | Performed by: NURSE PRACTITIONER

## 2022-11-16 PROCEDURE — 1101F PT FALLS ASSESS-DOCD LE1/YR: CPT | Performed by: NURSE PRACTITIONER

## 2022-11-16 PROCEDURE — 99345 HOME/RES VST NEW HIGH MDM 75: CPT | Performed by: NURSE PRACTITIONER

## 2022-11-16 PROCEDURE — 1123F ACP DISCUSS/DSCN MKR DOCD: CPT | Performed by: NURSE PRACTITIONER

## 2022-11-16 PROCEDURE — G8536 NO DOC ELDER MAL SCRN: HCPCS | Performed by: NURSE PRACTITIONER

## 2022-11-16 PROCEDURE — 36415 COLL VENOUS BLD VENIPUNCTURE: CPT | Performed by: NURSE PRACTITIONER

## 2022-11-16 RX ORDER — .BETA.-CAROTENE, ASCORBIC ACID, CHOLECALCIFEROL, .ALPHA.-TOCOPHEROL ACETATE, D-, THIAMINE MONONITRATE, RIBOFLAVIN, PYRIDOXINE HYDROCHLORIDE, FOLIC ACID, CYANOCOBALAMIN, IRON, NIACINAMIDE 2700; 120; 400; 20; 2; 3; 10; 1; 12; 28; 20 [IU]/1; MG/1; [IU]/1; [IU]/1; MG/1; MG/1; MG/1; MG/1; UG/1; MG/1; MG/1
1 TABLET, CHEWABLE ORAL DAILY
COMMUNITY

## 2022-11-16 RX ORDER — AMIODARONE HYDROCHLORIDE 200 MG/1
200 TABLET ORAL DAILY
COMMUNITY

## 2022-11-16 RX ORDER — BUMETANIDE 1 MG/1
1 TABLET ORAL DAILY
COMMUNITY

## 2022-11-16 RX ORDER — ESZOPICLONE 3 MG/1
3 TABLET, FILM COATED ORAL
COMMUNITY

## 2022-11-16 RX ORDER — PREDNISONE 5 MG/1
5 TABLET ORAL
COMMUNITY

## 2022-11-16 RX ORDER — CARVEDILOL 3.12 MG/1
3.12 TABLET ORAL 2 TIMES DAILY
COMMUNITY

## 2022-11-16 NOTE — PROGRESS NOTES
Home Based Primary Care  Plan of Care    [facility] Team Members: Elva Higuera MD; Berkley Bumpers, FNP-C; Nasir Booth NP; Chapo Pierson, RONAK; Vj Marcano, RONAK; Everardo Buerger, LCSW    Danna Vang  4/22/1928 / [id number]  female    The physician has reviewed and discussed the plan of care with the interdisciplinary group on 11/22/22 and agrees. Date of Initial Visit (Start of Care):    Diagnosis:   Patient Active Problem List   Diagnosis Code    Anxiety F41.9    HTN (hypertension) I10    Osteoarthritis M19.90    Hypothyroidism E03.9    CAD (coronary artery disease) I25.10    Persistent atrial fibrillation (HCC) I48.19    Ventricular tachycardia I47.20    Elevated serum creatinine R79.89    Weakness R53.1    Generalized weakness R53.1    ICD (implantable cardioverter-defibrillator) discharge Z45.02    Underweight R63.6    FTT (failure to thrive) in adult R62.7    Allergic rhinitis J30.9    Automatic implantable cardiac defibrillator in situ Z95.810       Patient status summary: 80 y.o. female who was referred to the [facility] program due to impaired mobility and frailty. Patient discussed today for initial POC review.     Advance Care Planning:  Code status: DNR      Primary Decision Dipti Diaz Child - [phone]   Advance Care Planning 4/4/2022   Confirm Advance Directive Yes, not on file       DME/Supplies:  Rolling walker and Wheelchair (non-motorized)     Allergies   Allergen Reactions    Latex Hives     NOT ALLERGIC PER PT 02/01/2018    Pcn [Penicillins] Anaphylaxis    Sulfa (Sulfonamide Antibiotics) Anaphylaxis    Biaxin [Clarithromycin] Nausea Only    Biotin Unknown (comments)    Covid-19 Vaccine, Mrna, [id number], Lnp-S (Moderna) Rash    Pcn [Penicillins] Hives    Sulfa (Sulfonamide Antibiotics) Nausea Only       Nutritional Requirements:   Oral with personal meal preparation and Oral with supported meal preparation    Functional/Activity Level:  Ambulatory with assistance of cane and/or walker (mild impairment)    Safety Measures:   Fall risk and Self-care deficity    Acuity Level Rating: Level 1 - LOW    Current Outpatient Medications   Medication Sig    levothyroxine (SYNTHROID) 112 mcg tablet TAKE ONE TABLET BY MOUTH DAILY BEFORE BREAKFAST    PARoxetine (PAXIL) 10 mg tablet TAKE 1 AND 1/2 TABLET BY MOUTH DAILY    furosemide (LASIX) 20 mg tablet Take 1 Tablet by mouth daily. fosinopriL (MONOPRIL) 10 mg tablet TAKE THREE TABLETS BY MOUTH EVERY MORNING AND TAKE TWO TABLETS BY MOUTH EVERY EVENING    OTHER,NON-FORMULARY, Ceftriaxone 2 g IV daily through 11/19/2021    acetaminophen (TYLENOL) 325 mg tablet Take 2 Tablets by mouth every six (6) hours as needed for Pain. ferrous sulfate (IRON PO) Take 325 mg by mouth daily. ipratropium (ATROVENT) 42 mcg (0.06 %) nasal spray 1 [place] by Both Nostrils route three (3) times daily as needed. diclofenac (VOLTAREN) 1 % gel Apply 2 g to affected area daily as needed. albuterol (PROVENTIL HFA, VENTOLIN HFA, PROAIR HFA) 90 mcg/actuation inhaler albuterol sulfate HFA 90 mcg/actuation aerosol inhaler (Patient not taking: Reported on 11/16/2022)    Biotin 2,500 mcg cap Take 1 Caplet by mouth daily. cetirizine (ZYRTEC) 10 mg tablet Take 10 mg by mouth daily. apixaban (ELIQUIS) 2.5 mg tablet Take 2.5 mg by mouth two (2) times a day. fluticasone (FLONASE) 50 mcg/actuation nasal spray 2 Sprays by Both Nostrils route daily as needed for Allergies. cholecalciferol, vitamin D3, (VITAMIN D3) 2,000 unit tab Take 2,000 Units by mouth daily. No current facility-administered medications for this visit. The Plan of Care has been initiated for within 15 days of New Visit  and reviewed and updated by the Interdisciplinary Group (IDG) as frequently as the patient condition warrants. Plan of Care by Discipline:    1.  Provider  Identify patient's health care goal(s), Reduction of polypharmacy within benefit/burden framework appropriate to age, function and disease state, Determine need for laboratory assessment based on patient disease status , Assess results of laboratory testing and adjust treatment plan as appropriate, Anticipate and plan for medical intervention(s) in emergency / at time of crisis, Complete annual Medicare Wellness Visit, and Assess current Advance Care Planning documents and make ACP recommendations as appropriate    2. Nursing  Review Emergency Preparedness Plan, Communicate with prior/current health care team members to enhance understanding of patient status, Education for safety; falls, and Provider caregiver / family support for patient's current and changing condition    3. Social Work  [address], Establish therapeutic relationship through in home visits and telephonic touch points, Assist in optimizing levels of psychosocial function, Assess for caregiver burden and intervene as psychosocially indicated, and Provide information on available community resources      Plan of Care Orders / Action Items:        Health Maintenance   Topic Date Due    Shingrix Vaccine Age 50> (1 of 2) Never done    Pneumococcal 65+ years (1 - PCV) Never done    Flu Vaccine (1) 08/01/2022    COVID-19 Vaccine (2 - Claudeen Fermo series) 11/01/2023 (Originally 4/2/2021)    Depression Screen  04/03/2023    Medicare Yearly Exam  11/17/2023    DTaP/Tdap/Td series (3 - Td or Tdap) 12/14/2029    Bone Densitometry (Dexa) Screening  Completed     Encounter to establish care - We reviewed patient's medical/surgical/family history today, along with her goals of care. We will request records from cardiology, dermatology, and rheum/ortho for continuity of care and to clarify the treatment plan moving forward. Hypertension/Atrial fibrillation/Hx VTAC/AICD in place - Most recent hospital records reviewed from April 2022. Follows with [facility]; will request records. Blood pressure reasonable for age today; continue to monitor.  Discussed code status today, and DDNR was completed (see below). Will discuss consideration of ICD deactivation at upcoming appt given age and frailty, if this has not already been discussed with cardiology; however, patient reports today that she wishes to keep ICD active for now. Has some lower extremity bruising, likely related to frailty and anticoagulation; advised watching this and calling if it fails to improve. Chronic pain/arthritis - Unclear diagnosis, with some reports of Sjogren's vs. OA vs. RA in the chart, and patient/son not sure of diagnosis. Will request records from Dr. Oswaldo Etienne for clarification. Continues to benefit from injections with him. Hypothyroidism - Secondary to partial thyroidectomy many years ago. Most recent TFTs at goal April 2022; rechecked today. If normal, will plan to recheck annually. Anxiety - Appears well-controlled on paxil 15mg daily. Using lunesta 3mg nightly for sleep, prescribed by Dr. Oswaldo Etienne. Allergic rhinitis - Encouraged continuing zyrtec but adding flonase regularly at night for rhinorrhea relief. Avoid benadryl. Memory difficulties - Animal naming test = 8 today. Will plan to re-assess at next visit with [facility] or [facility]. Long-term memory appears intact with some short-term memory difficulty. Preventative Care - Medicare AWV completed; see separate note. Due for flu and PCV20; [facility]. NP call with son on 11/17/22: Called son Yolnada Sung to discuss lab results; thyroid levels at goal, blood counts/kidney/liver stable. No changes to medications. Also asked about plans/thoughts about d/c'ing AICD. He is not interested in that at this time.     Estimated Visit Frequency:  Every 2 month Provider Visit  Last NP visit: 11/16/2022  SW visits PRN

## 2022-11-16 NOTE — PROGRESS NOTES
Home Based Primary Care [facility]) [facility]    [phone]      NOTE: Home Based Primary Care is a PROVIDER (MD/NP) based interdisciplinary practice (RN, LCSW) for patients who cannot access (or have a taxing effort) primary / speciality medical care in an office setting. \[facility]\"" is NOT [facility] but works with [address] when there is a skilled need. Our MD/NPs are integral in Care Transitions; PLEASE CALL [phone] if this patient arrives in the Emergency Department or Hospital.        Date of Current Visit: 22  Patient/Family present for Current Visit: patient, son Julio Soriano (by phone)  Goals of Care as stated by the patient/family is: to maintain independence, avoid hospitalization, avoid pain     Preferences for hospitalization if that were to be necessary:  [] Patient DOES NOT WANT hospitalization; focus all treatments at home  [x] Patient WOULD WANT hospitalization for potentially reversible causes  Patient prefers hospitalization at:  Leticia Aguila (: 1928) is a 80 y.o. female, new patient, here for evaluation of the following chief complaint(s):  Establish Care       ASSESSMENT/PLAN:  Below is the assessment and plan developed based on review of pertinent history, physical exam, labs, studies, and medications. 1. Encounter to establish care  2. Primary hypertension  -     COLLECTION VENOUS BLOOD,VENIPUNCTURE  3. Acquired hypothyroidism  -     COLLECTION VENOUS BLOOD,VENIPUNCTURE  4. Anxiety  -     COLLECTION VENOUS BLOOD,VENIPUNCTURE  5. Ventricular tachycardia  6. Automatic implantable cardiac defibrillator in situ  7. Osteoarthritis of multiple joints, unspecified osteoarthritis type  8. Persistent atrial fibrillation ([facility] [address]  9. Medicare annual wellness visit, subsequent  10. Memory difficulties  11. Do not resuscitate discussion  -     DO NOT RESUSCITATE  12.  Allergic rhinitis, unspecified seasonality, unspecified trigger      Encounter to establish care - We reviewed patient's medical/surgical/family history today, along with her goals of care. We will request records from cardiology, dermatology, and rheum/ortho for continuity of care and to clarify the treatment plan moving forward. Hypertension/Atrial fibrillation/Hx VTAC/AICD in place/HFpEF - Most recent hospital records reviewed from April 2022. Follows with [facility]; records reviewed today. Blood pressure reasonable for age today; continue to monitor. Discussed code status today, and DDNR was completed again for clarity (see below). Will discuss consideration of ICD deactivation at upcoming appt given age and frailty, if this has not already been discussed with cardiology; however, patient reports today that she wishes to keep ICD active for now. Has some lower extremity bruising, likely related to frailty and anticoagulation; advised watching this and calling if it fails to improve. Euvolemic on exam today, with most recent echocardiogram in Sept 2021 with LVEF of 60 - 65%. Chronic pain/arthritis - Scant notes received from Dr. Valentine Houston, but X-ray reviewed reveals lumbar DDD and scoliosis with diffuse facet hypertrophy of lumbar spine. Continues to benefit from injections with him. Hypothyroidism - Secondary to partial thyroidectomy many years ago. Most recent TFTs at goal April 2022; rechecked today. If normal, will plan to recheck annually. Anxiety - Appears well-controlled on paxil 15mg daily. Using lunesta 3mg nightly for sleep, prescribed by Dr. Valentine Houston. Allergic rhinitis - Encouraged continuing zyrtec but adding flonase regularly at night for rhinorrhea relief. Avoid benadryl. Memory difficulties - Animal naming test = 8 today. Will plan to re-assess at next visit with [address] [place] [id number], [place] or [facility]. Long-term memory appears intact with some short-term memory difficulty. Preventative Care - Medicare AWV completed; see separate note.  Due for flu and PCV20; request faxed to [facility] for in-home administration. Labs drawn today; will call with results & son also requests that results be mailed to patient's home once available. Plan to follow up in 2 months, sooner PRN. Return in 2 months (on 2023) for 2 month follow up. SUBJECTIVE/OBJECTIVE:  HPI    Patient is seen today in her home to establish care with the [facility] program. She lives independently in a single-story ground floor apartment and provides the majority of the history today, with her son Kamaljit Truong providing some additional information by phone. Ms. Estella Benitez has lived in [place] most of her life, though she did live in [place] for many years with her late . She now lives alone. 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 She had 2 sons, one of whom  at age 58, and the other Obdulio Bruno) lives nearby and helps bring her groceries, take her to appointments, etc.     She has a history of \"a bad heart\" and follows with [facility] (Dr. Ramón Baxter). Chart review from most recent cardiology note in 2022 reveals paroxysmal atrial fibrillation on amiodarone and eliquis, sick sinus syndrome s/p pacer  (now replaced by AICD placed ), and HFpEF with LVEF of 60-65% on echocardiogram 2021. She has a history of Vtach (VT arrest in 2019) and had a defibrillator placed. She is monitored remotely by [facility] and goes into the office every 6 months for evaluation. She was hospitalized briefly in spring 2022 when the defibrillator fired. Amiodarone was adjusted at that time and it appears that her cardiac medications have been adjusted again since that time. She is now taking bumex 1mg daily, carvedilol 3.125mg BID, amiodarone 200mg daily, and eliquis 2.5mg BID for atrial fibrillation. She denies chest pain or shortness of breath.  She does have a DNR on her fridge that was signed by her son in  when she was deemed incapable, but she endorses today that this continues to reflect her wishes. She has a history of hypothyroidism, having had a partial thyroidectomy at some point, though she isn't sure of the details. She takes 112mcg levothyroxine once daily. Most recent thyroid labs were at goal in April 2022. She also reports a history of arthritis; chart review reveals a diagnosis of Sjogren's syndrome, but she isn't sure whether or not she has that. She follows with Dr. Jw Dukes at [facility], going for visits every 2 months (approximately) and has trigger point and cortisone injections for symptom relief, which she reports is helpful. No documentation of Sjogren's per Dr. 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 Most recent visit November 2022 with trigger point injections. She has a history of anxiety and has been maintained on paxil 15mg daily for many years, which she reports is effective. She takes benadryl at night to sleep sometimes, though her son reports this is primarily due to nasal allergy symptoms. She also has a prescription for 3mg lunesta that she takes nightly, prescribed by Dr. Jw Dukes. She is hesitant to discontinue any sleep medications as she finds these very effective. She also follows with [facility] [place], and is currently on a prolonged prednisone taper for a rash/itching that is presumed to be related to amiodarone. She next follows up in January 2023. She has a history of seasonal allergies and takes zyrtec regularly. She reports nasal congestion/rhinorrhea as her primary symptom and is \"always blowing my nose. \" She has flonase, but does not typically use it. She is able to complete nearly all of her ADLs independently, which she is very proud of. She has her daughter-in-law nearby when she takes a shower in case she cannot get out, but otherwise is able to bathe herself. She is continent of stool and urine. Denies any bowel/bladder difficulty.  Appetite is stable; her son brings her groceries but she is able to prepare her own food. She is due for annual flu shot and PCV20. She reports a strong reaction to the Covid-19 vaccine and does not wish to have any additional doses. Visit Vitals  BP (!) 148/80 (BP 1 Location: Left upper arm, BP Patient Position: Sitting)   Pulse 61   Temp 97.7 °F (36.5 °C) (Temporal)   SpO2 97%     Current Outpatient Medications on File Prior to Visit   Medication Sig Dispense Refill    amiodarone (CORDARONE) 200 mg tablet Take 200 mg by mouth daily. predniSONE (DELTASONE) 5 mg tablet Take 5 mg by mouth. Take 3 tabs PO Daily for 2 weeks  Take 2 tabs PO Daily for 2 weeks  Take 1 tab PO daily until directed (Per Dermatologist)      bumetanide (BUMEX) 1 mg tablet Take 1 mg by mouth daily. carvediloL (COREG) 3.125 mg tablet Take 3.125 mg by mouth two (2) times a day. PNV 05-NBFX fum,b-g-folic acid (NataChew, Fe Bis-glycinate,) 28 mg iron -1 mg chew Take 1 Tablet by mouth daily. eszopiclone (Lunesta) 3 mg tablet Take 3 mg by mouth nightly. Indications: difficulty sleeping      levothyroxine (SYNTHROID) 112 mcg tablet TAKE ONE TABLET BY MOUTH DAILY BEFORE BREAKFAST 90 Tablet 0    PARoxetine (PAXIL) 10 mg tablet TAKE 1 AND 1/2 TABLET BY MOUTH DAILY 135 Tablet 0    acetaminophen (TYLENOL) 325 mg tablet Take 2 Tablets by mouth every six (6) hours as needed for Pain. 30 Tablet 0    cetirizine (ZYRTEC) 10 mg tablet Take 10 mg by mouth daily. apixaban (ELIQUIS) 2.5 mg tablet Take 2.5 mg by mouth two (2) times a day. fluticasone (FLONASE) 50 mcg/actuation nasal spray 2 Sprays by Both Nostrils route daily as needed for Allergies. cholecalciferol, vitamin D3, 50 mcg (2,000 unit) tab Take 2,000 Units by mouth daily. No current facility-administered medications on file prior to visit.        Review of Systems  Constitutional: denies activity change, appetite change, fatigue, fever  HEENT: denies congestion, sinus pressure, sore throat; endorses chronic rhinorrhea  CV: denies chest pain, palpitations, edema  Respiratory: denies shortness of breath, wheezing  GI: denies abdominal pain, blood in stool, diarrhea, constipation  MSK: denies joint swelling; endorses chronic arthralgias  Neuro: denies frequent headaches, dizziness, numbness/tingling  Skin: denies skin breakdown  Psych: denies depression, anxiety, confusion, memory changes        Physical Exam  Constitutional: no acute distress, normal weight, elderly  female ambulating with rollator, appears younger than stated age  [de-identified]: normocephalic, atraumatic, external ears normal bilaterally; hearing aids in place; moist mucous membranes; EOM intact, PERRL; + boggy nasal turbinates with clear nasal discharge bilaterally  CV: regular rate and rhythm, no murmurs noted; ICD in place  Pulm: normal effort, normal breath sounds without wheezing or rhonchi  Abd: normal bowel sounds, soft, non-tender; fist-sized nontender area of induration left lower abd (site of previous hernia repair, per patient)  : deferred  Skin: warm, dry; no breakdown noted; several ecchymoses, primarily lower legs, without broken skin  Neuro: A & O x 3; mental status at baseline  Psych: mood and behavior normal         Advanced Care Planning    Primary Decision MakeKarine Wells Child - [phone]  Code Status: DNR  Advanced Medical Directive: reportedly has POA; DDNR on file from 11/2022          On this date 11/16/2022 I have spent 90 minutes reviewing previous notes, test results and face to face with the patient discussing the diagnosis and importance of compliance with the treatment plan as well as documenting on the day of the visit. An electronic signature was used to authenticate this note.   -- Quyen Jones, PINKY

## 2022-11-16 NOTE — PROGRESS NOTES
SN Joint visit with Deshaun Locke NP for Start of Care    Patient alert, oriented, pleasant, denies any discomfort, able to provide medical history. Vital Signs:    /80  HR 61  Temp 97.7F   O2 97% on RA    Venipuncture x1 on L AC  Specimen taken to [facility] lab at [facility]. Influenza and Pneumonia (prevnar 20) vaccine requests faxed to [facility].

## 2022-11-16 NOTE — PROGRESS NOTES
This is the Subsequent Medicare Annual Wellness Exam, performed 12 months or more after the Initial AWV or the last Subsequent AWV    I have reviewed the patient's medical history in detail and updated the computerized patient record. Assessment/Plan   Education and counseling provided:  Are appropriate based on today's review and evaluation  End-of-Life planning (with patient's consent)  Pneumococcal Vaccine  Influenza Vaccine  Covid-19 booster    1. Encounter to establish care  2. Primary hypertension  -     COLLECTION VENOUS BLOOD,VENIPUNCTURE  3. Acquired hypothyroidism  -     COLLECTION VENOUS BLOOD,VENIPUNCTURE  4. Anxiety  -     COLLECTION VENOUS BLOOD,VENIPUNCTURE  5. Ventricular tachycardia  6. Automatic implantable cardiac defibrillator in situ  7. Osteoarthritis of multiple joints, unspecified osteoarthritis type  8. Persistent atrial fibrillation ([facility] [address]  9. Medicare annual wellness visit, subsequent  10. Do not resuscitate discussion  -     DO NOT RESUSCITATE       Depression Risk Factor Screening     3 most recent PHQ Screens 11/16/2022   PHQ Not Done -   Little interest or pleasure in doing things Not at all   Feeling down, depressed, irritable, or hopeless Not at all   Total Score PHQ 2 0       Alcohol & Drug Abuse Risk Screen    Do you average more than 1 drink per night or more than 7 drinks a week:  Yes    On any one occasion in the past three months have you have had more than 3 drinks containing alcohol:  No          Functional Ability and Level of Safety    Hearing: Hearing is good. The patient wears hearing aids. Activities of Daily Living: The home contains: handrails, grab bars, and rugs  Patient needs help with:  transportation, shopping, managing money, and bathing      Ambulation: with difficulty, uses a rolling walker     Fall Risk:  Fall Risk Assessment, last 12 mths 11/16/2022   Able to walk? Yes   Fall in past 12 months? 0   Do you feel unsteady?  0   Are you worried about falling 0   Number of falls in past 12 months -   Fall with injury?  -      Abuse Screen:  Patient is not abused       Cognitive Screening    Has your family/caregiver stated any concerns about your memory: no     Cognitive Screening: Abnormal - Animal Naming Test    Health Maintenance Due     Health Maintenance Due   Topic Date Due    Shingrix Vaccine Age 49> (1 of 2) Never done    Pneumococcal 65+ years (1 - PCV) Never done    Flu Vaccine (1) 08/01/2022       Patient Care Team   Patient Care Team:  Daniel Mcelroy NP as PCP - General (Nurse Practitioner)  Brian Santiago MD as PCP - [facility]  Brian Santiago MD (Internal Medicine Physician)    History     Patient Active Problem List   Diagnosis Code    Anxiety F41.9    HTN (hypertension) I10    Osteoarthritis M19.90    Hypothyroidism E03.9    CAD (coronary artery disease) I25.10    Persistent atrial fibrillation (HCC) I48.19    Ventricular tachycardia I47.20    Elevated serum creatinine R79.89    Weakness R53.1    Generalized weakness R53.1    ICD (implantable cardioverter-defibrillator) discharge Z45.02    Underweight R63.6    FTT (failure to thrive) in adult R62.7     Past Medical History:   Diagnosis Date    Acute cystitis 05/23/2018    Anemia, unspecified     Anxiety     Arrhythmia     a fib    Arthritis     osteoarthritis, rheumatoid    Atrial fibrillation (HCC)     Dr. Coretta Blood and Dr. Kym Cruz  following    Autoimmune disease [facility])     sjogren disease Dr. Crystal Chiang    CAD (coronary artery disease)     History of vascular access device 10/29/2021    4 FR Single PICC Lt Ant bx 41 cm 1 cm out    HTN (hypertension)     Hyperlipidemia LDL goal < 100     Hypothyroid     Insomnia     Osteoarthritis     Sjogren's disease ([facility] [address]     Thoracic aneurysm without mention of rupture       Past Surgical History:   Procedure Laterality Date    HX BIV-IMPLANTABLE CARDIOVERTER DEFIBRILLATOR      HX CHOLECYSTECTOMY      HX COLONOSCOPY  03/2014 Dr. Zuniga [facility] HIP REPLACEMENT Left     HX OTHER SURGICAL      hernia repairs    HX PARTIAL THYROIDECTOMY      HX JANNET AND BSO      HX VEIN STRIPPING      CO INSJ ELTRD CAR COLLIN SYS TM INSJ DFB/PM PLS GEN Left 02/22/2019    Lv Lead Placement performed by Joby Sood MD at [facility]    CO INSJ/RPLCMT PERM DFB W/TRNSVNS LDS 1/DUAL [facility] Left 02/22/2019    upgrade PM to ICD-Bsx performed by Joby Sood MD at [facility]     Current Outpatient Medications   Medication Sig Dispense Refill    levothyroxine (SYNTHROID) 112 mcg tablet TAKE ONE TABLET BY MOUTH DAILY BEFORE BREAKFAST 90 Tablet 0    PARoxetine (PAXIL) 10 mg tablet TAKE 1 AND 1/2 TABLET BY MOUTH DAILY 135 Tablet 0    furosemide (LASIX) 20 mg tablet Take 1 Tablet by mouth daily. 30 Tablet 0    fosinopriL (MONOPRIL) 10 mg tablet TAKE THREE TABLETS BY MOUTH EVERY MORNING AND TAKE TWO TABLETS BY MOUTH EVERY EVENING 270 Tablet 0    OTHER,NON-FORMULARY, Ceftriaxone 2 g IV daily through 11/19/2021 1 Each 0    acetaminophen (TYLENOL) 325 mg tablet Take 2 Tablets by mouth every six (6) hours as needed for Pain. 30 Tablet 0    ferrous sulfate (IRON PO) Take 325 mg by mouth daily. amLODIPine (NORVASC) 5 mg tablet Take 1 Tablet by mouth daily. 90 Tablet 1    ipratropium (ATROVENT) 42 mcg (0.06 %) nasal spray 1 [place] by Both Nostrils route three (3) times daily as needed. diclofenac (VOLTAREN) 1 % gel Apply 2 g to affected area daily as needed. albuterol (PROVENTIL HFA, VENTOLIN HFA, PROAIR HFA) 90 mcg/actuation inhaler albuterol sulfate HFA 90 mcg/actuation aerosol inhaler      Biotin 2,500 mcg cap Take 1 Cap[place] by mouth daily. ascorbic acid, vitamin C, (Vitamin C) 500 mg tablet Take 500 mg by mouth daily. cetirizine (ZYRTEC) 10 mg tablet Take 10 mg by mouth daily. apixaban (ELIQUIS) 2.5 mg tablet Take 2.5 mg by mouth two (2) times a day.       fluticasone (FLONASE) 50 mcg/actuation nasal spray 2 Sprays by Both Nostrils route daily as needed for Allergies. cholecalciferol, vitamin D3, (VITAMIN D3) 2,000 unit tab Take 2,000 Units by mouth daily. Allergies   Allergen Reactions    Latex Hives     NOT ALLERGIC PER PT 02/01/2018    Pcn [Penicillins] Anaphylaxis    Sulfa (Sulfonamide Antibiotics) Anaphylaxis    Biaxin [Clarithromycin] Nausea Only    Biotin Unknown (comments)    Covid-19 Vaccine, Mrna, [id number], Lnp-S (Moderna) Rash    Pcn [Penicillins] Hives    Sulfa (Sulfonamide Antibiotics) Nausea Only       Family History   Problem Relation Age of Onset    Heart Disease Mother     Prostate Cancer Father     Cancer Maternal Grandfather      Social History     Tobacco Use    Smoking status: Never    Smokeless tobacco: Never   Substance Use Topics    Alcohol use:  Yes     Alcohol/week: 7.0 standard drinks     Types: 7 Glasses of wine per week     Comment: flower Arteaga, NP

## 2022-11-17 ENCOUNTER — TELEPHONE (OUTPATIENT)
Dept: FAMILY MEDICINE CLINIC | Age: 87
End: 2022-11-17

## 2022-11-17 LAB
ALBUMIN SERPL-MCNC: 3.3 G/DL (ref 3.5–5)
ALBUMIN/GLOB SERPL: 1.3 {RATIO} (ref 1.1–2.2)
ALP SERPL-CCNC: 68 U/L (ref 45–117)
ALT SERPL-CCNC: 28 U/L (ref 12–78)
ANION GAP SERPL CALC-SCNC: 5 MMOL/L (ref 5–15)
AST SERPL-CCNC: 21 U/L (ref 15–37)
BILIRUB SERPL-MCNC: 0.5 MG/DL (ref 0.2–1)
BUN SERPL-MCNC: 19 MG/DL (ref 6–20)
BUN/CREAT SERPL: 21 (ref 12–20)
CALCIUM SERPL-MCNC: 8.5 MG/DL (ref 8.5–10.1)
CHLORIDE SERPL-SCNC: 103 MMOL/L (ref 97–108)
CO2 SERPL-SCNC: 32 MMOL/L (ref 21–32)
CREAT SERPL-MCNC: 0.91 MG/DL (ref 0.55–1.02)
ERYTHROCYTE [DISTWIDTH] IN BLOOD BY AUTOMATED COUNT: 12.9 % (ref 11.5–14.5)
GLOBULIN SER CALC-MCNC: 2.5 G/DL (ref 2–4)
GLUCOSE SERPL-MCNC: 110 MG/DL (ref 65–100)
HCT VFR BLD AUTO: 35.6 % (ref 35–47)
HGB BLD-MCNC: 11.5 G/DL (ref 11.5–16)
MCH RBC QN AUTO: 33.2 PG (ref 26–34)
MCHC RBC AUTO-ENTMCNC: 32.3 G/DL (ref 30–36.5)
MCV RBC AUTO: 102.9 FL (ref 80–99)
NRBC # BLD: 0 K/UL (ref 0–0.01)
NRBC BLD-RTO: 0 PER 100 WBC
PLATELET # BLD AUTO: 157 K/UL (ref 150–400)
PMV BLD AUTO: 10.3 FL (ref 8.9–12.9)
POTASSIUM SERPL-SCNC: 3.9 MMOL/L (ref 3.5–5.1)
PROT SERPL-MCNC: 5.8 G/DL (ref 6.4–8.2)
RBC # BLD AUTO: 3.46 M/UL (ref 3.8–5.2)
SODIUM SERPL-SCNC: 140 MMOL/L (ref 136–145)
T4 FREE SERPL-MCNC: 1.7 NG/DL (ref 0.8–1.5)
TSH SERPL DL<=0.05 MIU/L-ACNC: 0.64 UIU/ML (ref 0.36–3.74)
WBC # BLD AUTO: 4.6 K/UL (ref 3.6–11)

## 2022-11-17 RX ORDER — LEVOTHYROXINE SODIUM 112 UG/1
112 TABLET ORAL
Qty: 90 TABLET | Refills: 3 | Status: SHIPPED | OUTPATIENT
Start: 2022-11-17

## 2022-11-17 RX ORDER — CETIRIZINE HYDROCHLORIDE 10 MG/1
10 TABLET ORAL DAILY
Qty: 90 TABLET | Refills: 3 | Status: SHIPPED | OUTPATIENT
Start: 2022-11-17

## 2022-11-17 RX ORDER — FLUTICASONE PROPIONATE 50 MCG
2 SPRAY, SUSPENSION (ML) NASAL
Qty: 3 EACH | Refills: 3 | Status: SHIPPED | OUTPATIENT
Start: 2022-11-17

## 2022-11-17 RX ORDER — CHOLECALCIFEROL (VITAMIN D3) 125 MCG
2000 CAPSULE ORAL DAILY
Qty: 90 TABLET | Refills: 3 | Status: SHIPPED | OUTPATIENT
Start: 2022-11-17

## 2022-11-17 RX ORDER — PAROXETINE 10 MG/1
TABLET, FILM COATED ORAL
Qty: 135 TABLET | Refills: 3 | Status: SHIPPED | OUTPATIENT
Start: 2022-11-17

## 2022-11-17 NOTE — TELEPHONE ENCOUNTER
Called son Nelia Gonzalez to discuss lab results; thyroid levels at goal, blood counts/kidney/liver stable. No changes to medications. Also asked about plans/thoughts about d/c'ing AICD. He is not interested in that at this time.    Erik Zazueta NP

## 2022-11-28 ENCOUNTER — TELEPHONE (OUTPATIENT)
Dept: FAMILY MEDICINE CLINIC | Age: 87
End: 2022-11-28

## 2023-01-11 ENCOUNTER — DOCUMENTATION ONLY (OUTPATIENT)
Dept: FAMILY MEDICINE CLINIC | Age: 88
End: 2023-01-11

## 2023-01-11 NOTE — PROGRESS NOTES
Home Based Primary Care  Plan of Care    [facility] Team Members: Alley Finch MD; BISI Dooley; Abran Thompson NP; Jesus Molina, RN; Laurel Jackson RN; RADHA Leonhernan Rivasrer  4/22/1928 / [id number]  female    Date of Initial Visit (Start of Care):    Diagnosis:   Patient Active Problem List   Diagnosis Code    Anxiety F41.9    HTN (hypertension) I10    Osteoarthritis M19.90    Hypothyroidism E03.9    CAD (coronary artery disease) I25.10    Persistent atrial fibrillation (HCC) I48.19    Ventricular tachycardia I47.20    Elevated serum creatinine R79.89    Weakness R53.1    Generalized weakness R53.1    ICD (implantable cardioverter-defibrillator) discharge Z45.02    Underweight R63.6    FTT (failure to thrive) in adult R62.7    Allergic rhinitis J30.9    Automatic implantable cardiac defibrillator in situ Z95.810       Patient status summary: 80 y.o. female who was referred to the [facility] program due to impaired mobility and frailty. Patient discussed today for POC review.     Advance Care Planning:  Code status: DNR      [facility]John [facility] - [phone]   Advance Care Planning 4/4/2022   Confirm Advance Directive Yes, not on file       DME/Supplies:  Rolling walker and Wheelchair (non-motorized)     Allergies   Allergen Reactions    Latex Hives     NOT ALLERGIC PER PT 02/01/2018    Pcn [Penicillins] Anaphylaxis    Sulfa (Sulfonamide Antibiotics) Anaphylaxis    Biaxin [Clarithromycin] Nausea Only    Biotin Unknown (comments)    Covid-19 Vaccine, Mrna, [id number], Lnp-S (Moderna) Rash    Pcn [Penicillins] Hives    Sulfa (Sulfonamide Antibiotics) Nausea Only       Nutritional Requirements:   Oral with personal meal preparation and Oral with supported meal preparation    Functional/Activity Level:  Ambulatory with assistance of cane and/or walker (mild impairment)    Safety Measures:   Fall risk and Self-care deficity    Acuity Level Rating: Level 1 - LOW    Current Outpatient Medications   Medication Sig    cetirizine (ZYRTEC) 10 mg tablet Take 1 Tablet by mouth daily. cholecalciferol, vitamin D3, 50 mcg (2,000 unit) tab Take 1 Tablet by mouth daily. fluticasone propionate (FLONASE) 50 mcg/actuation nasal spray 2 Sprays by Both Nostrils route daily as needed for Allergies. levothyroxine (SYNTHROID) 112 mcg tablet Take 1 Tablet by mouth Daily (before breakfast). PARoxetine (PAXIL) 10 mg tablet Take 1.5 tablets by mouth once daily. amiodarone (CORDARONE) 200 mg tablet Take 200 mg by mouth daily. predniSONE (DELTASONE) 5 mg tablet Take 5 mg by mouth. Take 3 tabs PO Daily for 2 weeks  Take 2 tabs PO Daily for 2 weeks  Take 1 tab PO daily until directed (Per Dermatologist)    bumetanide (BUMEX) 1 mg tablet Take 1 mg by mouth daily. carvediloL (COREG) 3.125 mg tablet Take 3.125 mg by mouth two (2) times a day. PNV 74-TVAU fum,b-g-folic acid (NataChew, Fe Bis-glycinate,) 28 mg iron -1 mg chew Take 1 Tablet by mouth daily. eszopiclone (Lunesta) 3 mg tablet Take 3 mg by mouth nightly. Indications: difficulty sleeping    acetaminophen (TYLENOL) 325 mg tablet Take 2 Tablets by mouth every six (6) hours as needed for Pain. apixaban (ELIQUIS) 2.5 mg tablet Take 2.5 mg by mouth two (2) times a day. No current facility-administered medications for this visit. The Plan of Care has been reviewed and updated by the Interdisciplinary Group (IDG) as frequently as the patient condition warrants. Plan of Care by Discipline:    1.  Provider  Identify patient's health care goal(s), Reduction of polypharmacy within benefit/burden framework appropriate to age, function and disease state, Determine need for laboratory assessment based on patient disease status , Assess results of laboratory testing and adjust treatment plan as appropriate, Anticipate and plan for medical intervention(s) in emergency / at time of crisis, Complete annual Medicare Wellness Visit, and Assess current Advance Care Planning documents and make ACP recommendations as appropriate    2. Nursing  Communicate with prior/current health care team members to enhance understanding of patient status, Education for safety; falls, and Provider caregiver / family support for patient's current and changing condition    3. Social Work  Establish therapeutic relationship through in home visits and telephonic touch points, Assist in optimizing levels of psychosocial function, Assess for caregiver burden and intervene as psychosocially indicated, and Provide information on available community resources      Plan of Care Orders / Action Items:        Health Maintenance   Topic Date Due    Shingles Vaccine (1 of 2) Never done    Pneumococcal 65+ years (1 - PCV) Never done    COVID-19 Vaccine (2 - Seferino Scrape series) 11/01/2023 (Originally 4/2/2021)    Depression Screen  11/16/2023    Medicare Yearly Exam  11/17/2023    DTaP/Tdap/Td series (3 - Td or Tdap) 12/14/2029    Bone Densitometry (Dexa) Screening  Completed    Flu Vaccine  Completed     Encounter to establish care - We reviewed patient's medical/surgical/family history today, along with her goals of care. We will request records from cardiology, dermatology, and rheum/ortho for continuity of care and to clarify the treatment plan moving forward. Hypertension/Atrial fibrillation/Hx VTAC/AICD in place - Most recent hospital records reviewed from April 2022. Follows with [facility]; will request records. Blood pressure reasonable for age; continue to monitor. Discussed code status, and DDNR was completed (see below). Will discuss consideration of ICD deactivation at upcoming appt given age and frailty, if this has not already been discussed with cardiology; however, patient reports that she wishes to keep ICD active for now. Has some lower extremity bruising, likely related to frailty and anticoagulation; advised watching this and calling if it fails to improve.   Chronic pain/arthritis - Unclear diagnosis, with some reports of Sjogren's vs. OA vs. RA in the chart, and patient/son not sure of diagnosis. Will request records from Dr. Misty Hussein for clarification. Continues to benefit from injections with him. Hypothyroidism - Secondary to partial thyroidectomy many years ago. Most recent TFTs at goal April 2022; rechecked. If normal, will plan to recheck annually. Anxiety - Appears well-controlled on paxil 15mg daily. Using lunesta 3mg nightly for sleep, prescribed by Dr. Misty Hussein. Allergic rhinitis - Encouraged continuing zyrtec but adding flonase regularly at night for rhinorrhea relief. Avoid benadryl. Memory difficulties - Animal naming test = 8 today. Will plan to re-assess at next visit with [facility] or [facility]. Long-term memory appears intact with some short-term memory difficulty. Preventative Care - Medicare AWV completed; see separate note. Due for flu and PCV20; request faxed to [facility] for in-home administration. NP call with son on 11/17/22: Called son Ranjeet Robison to discuss lab results; thyroid levels at goal, blood counts/kidney/liver stable. No changes to medications. Also asked about plans/thoughts about d/c'ing AICD. He is not interested in that at this time.     Estimated Visit Frequency:  Every 2 month Provider Visit  Last NP visit: 11/16/2022  SW visits PRN

## 2023-01-13 ENCOUNTER — TELEPHONE (OUTPATIENT)
Dept: FAMILY MEDICINE CLINIC | Age: 88
End: 2023-01-13

## 2023-01-13 RX ORDER — GUAIFENESIN 100 MG/5ML
200 SOLUTION ORAL
Qty: 236 ML | Refills: 1 | Status: SHIPPED | OUTPATIENT
Start: 2023-01-13

## 2023-01-13 NOTE — TELEPHONE ENCOUNTER
The patient called and said that she thinks she has an infection. She said that she has a lot of mucus coming from her nose and is coughing up a lot of phlegm. No ferver. The patient said this has been going on for about 2 weeks. She said she has been unsuccessful in getting dispatch health or home health to come to her home. She asks for a callback at [phone].

## 2023-01-13 NOTE — TELEPHONE ENCOUNTER
Per discussion with Vaibhav Munguia, NP  -  Calls placed to both patient and later to her son, Gabi Nunez to give NP's recommendation - take Guaifenesin (sent to [address]) starting today, up to 4 times daily as needed for  cough and also recommended Covid home test to rule it out. Patient and son verbalized understanding. I also encouraged them to reach out to [facility] again if symptoms worsen.

## 2023-01-13 NOTE — TELEPHONE ENCOUNTER
Call returned to patient. Pt reports she has been coughing for approximately 2 weeks, but it is \"not all the time\". She reports thick, yellow sputum, states \"I just feel bad\", endorses mild SOB on exertion (everything is an effort),  denies fever. Patient verbalizes she had COVID in the past, but has NOT been tested this time. Per patient, she has not tried any medication, denied taking Guaifenesin (mucinex) or Cough syrup. Patient verbalizes she called [facility], but was told the earliest appointment available would be on Saturday 1/14/23.

## 2023-01-26 ENCOUNTER — HOME VISIT (OUTPATIENT)
Dept: FAMILY MEDICINE CLINIC | Age: 88
End: 2023-01-26
Payer: MEDICARE

## 2023-01-26 VITALS
OXYGEN SATURATION: 95 % | SYSTOLIC BLOOD PRESSURE: 128 MMHG | DIASTOLIC BLOOD PRESSURE: 66 MMHG | HEART RATE: 67 BPM | RESPIRATION RATE: 18 BRPM | TEMPERATURE: 97.5 F

## 2023-01-26 DIAGNOSIS — M15.9 OSTEOARTHRITIS OF MULTIPLE JOINTS, UNSPECIFIED OSTEOARTHRITIS TYPE: ICD-10-CM

## 2023-01-26 DIAGNOSIS — I25.10 CORONARY ARTERY DISEASE INVOLVING NATIVE CORONARY ARTERY OF NATIVE HEART WITHOUT ANGINA PECTORIS: Chronic | ICD-10-CM

## 2023-01-26 DIAGNOSIS — R41.3 MILD MEMORY DISTURBANCE: ICD-10-CM

## 2023-01-26 DIAGNOSIS — Z95.810 AUTOMATIC IMPLANTABLE CARDIAC DEFIBRILLATOR IN SITU: ICD-10-CM

## 2023-01-26 DIAGNOSIS — I10 PRIMARY HYPERTENSION: Chronic | ICD-10-CM

## 2023-01-26 DIAGNOSIS — E03.9 ACQUIRED HYPOTHYROIDISM: ICD-10-CM

## 2023-01-26 DIAGNOSIS — F41.9 ANXIETY: ICD-10-CM

## 2023-01-26 DIAGNOSIS — I48.19 PERSISTENT ATRIAL FIBRILLATION (HCC): Primary | ICD-10-CM

## 2023-01-26 DIAGNOSIS — Z95.810 AICD (AUTOMATIC CARDIOVERTER/DEFIBRILLATOR) PRESENT: ICD-10-CM

## 2023-01-26 PROCEDURE — 1101F PT FALLS ASSESS-DOCD LE1/YR: CPT | Performed by: NURSE PRACTITIONER

## 2023-01-26 PROCEDURE — 99350 HOME/RES VST EST HIGH MDM 60: CPT | Performed by: NURSE PRACTITIONER

## 2023-01-26 PROCEDURE — G8536 NO DOC ELDER MAL SCRN: HCPCS | Performed by: NURSE PRACTITIONER

## 2023-01-26 PROCEDURE — 1123F ACP DISCUSS/DSCN MKR DOCD: CPT | Performed by: NURSE PRACTITIONER

## 2023-01-26 PROCEDURE — 1090F PRES/ABSN URINE INCON ASSESS: CPT | Performed by: NURSE PRACTITIONER

## 2023-01-26 PROCEDURE — G8427 DOCREV CUR MEDS BY ELIG CLIN: HCPCS | Performed by: NURSE PRACTITIONER

## 2023-01-26 NOTE — PROGRESS NOTES
Home Based Primary Care [facility]) & Supportive Services    [phone]      NOTE: Home Based Primary Care is a PROVIDER (MD/NP) based interdisciplinary practice (RN, LCSW) for patients who cannot access (or have a taxing effort) primary / speciality medical care in an office setting. [facility] is NOT [employer] but works with [address] when there is a skilled need. Our MD/NPs are integral in Care Transitions; PLEASE CALL [phone] if this patient arrives in the Emergency Department or Hospital.        Date of Current Visit: 23  Patient/Family present for Current Visit: patient only  Goals of Care as stated by the patient/family is: to maintain independence, avoid hospitalization, avoid pain     Preferences for hospitalization if that were to be necessary:  [] Patient DOES NOT WANT hospitalization; focus all treatments at home  [x] Patient WOULD WANT hospitalization for potentially reversible causes  Patient prefers hospitalization at:  Leticia Aguila (: 1928) is a 80 y.o. female, new patient, here for evaluation of the following chief complaint(s):  Follow Up Chronic Condition       ASSESSMENT/PLAN:  Below is the assessment and plan developed based on review of pertinent history, physical exam, labs, studies, and medications. 1. Persistent atrial fibrillation ([address]  2. Coronary artery disease involving native coronary artery of native heart without angina pectoris  3. Primary hypertension  4. AICD (automatic cardioverter/defibrillator) present  5. Automatic implantable cardiac defibrillator in situ  6. Anxiety  7. Osteoarthritis of multiple joints, unspecified osteoarthritis type  8. Acquired hypothyroidism  9. Mild memory disturbance        Hypertension/Atrial fibrillation/Hx VTAC/AICD in place/HFpEF - Most recent hospital records reviewed from 2022. Follows with [facility]; records reviewed at initial visit.  Blood pressure reasonable for age today; continue to monitor. Discussed consideration of ICD deactivation with patient today, and the friction between her desire to avoid hospitalization and her statement that \"I'm not ready to die yet. \" She is following up with Dr. Caroline Galvan (cards/EP) in February 2023; encouraged her to have that conversation with him at that time as well, especially given her age and frailty. Has some lower extremity bruising, likely related to frailty and anticoagulation; advised watching this and calling if it fails to improve. Euvolemic on exam today, with most recent echocardiogram in Sept 2021 with LVEF of 60 - 65%. Prescribed bumex 1mg BID but tolerates taking it only once daily due to polyuria; encouraged her to discuss with cardiologist at upcoming evaluation. May benefit from deprescribing some medications in alignment with goals of care, but wishes to discuss with cardiology. Chronic pain/arthritis - Scant notes received from Dr. Tucker Vidal, but X-ray reviewed reveals lumbar DDD and scoliosis with diffuse facet hypertrophy of lumbar spine. Continues to benefit from injections with him. Sees him every 2 months. Hypothyroidism - Secondary to partial thyroidectomy many years ago. TFTs at goal November 2022; recheck annually. Anxiety - Appears well-controlled on paxil 15mg daily. Using lunesta 3mg nightly for sleep, prescribed by Dr. Tucker Vidal. Allergic rhinitis - Encouraged continuing zyrtec but adding flonase regularly at night for rhinorrhea relief. Avoid benadryl. Memory difficulties - Animal naming test = 8 at initial visit. [facility] today 22/30. Long-term memory appears intact with some short-term memory difficulty. Preventative Care - Will follow up on pneumonia vaccine with [facility]. Goals of Care conversation - As above, AICD deactivation discussed with patient today, but she is unsure at this time.  Also having symptom burden from bumex and amiodarone (on prednisone taper x months through derm for allergic reaction to amiodarone), so may benefit from discontinuation in alignment with goals of care, but she wishes to discuss with her son and cardiologist prior to making any of these decisions. Plan to follow up in 4 months, sooner PRN. Return in 16 weeks (on 2023) for 4 month follow up. SUBJECTIVE/OBJECTIVE:  HPI    Patient is seen today in her homef or follow up with the [facility] program. She lives independently in a single-story ground floor apartment and provides the majority of the history today. Ms. Zara Starks has lived in [place] most of her life, though she did live in [place] for many years with her late . She now lives alone. 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 She had 2 sons, one of whom  at age 58, and the other Mohan Jaime) lives nearby and helps bring her groceries, take her to appointments, etc.     She has a history of \"a bad heart\" and follows with [facility] (Dr. Brittany Bear). Chart review from most recent cardiology note in 2022 reveals paroxysmal atrial fibrillation on amiodarone and eliquis, sick sinus syndrome s/p pacer  (now replaced by AICD placed ), and HFpEF with LVEF of 60-65% on echocardiogram 2021. She has a history of Vtach (VT arrest in 2019) and had a defibrillator placed. She is monitored remotely by [facility] and goes into the office every 6 months for evaluation. She was hospitalized briefly in spring 2022 when the defibrillator fired. Amiodarone was adjusted at that time and it appears that her cardiac medications have been adjusted again since that time. She is now taking bumex 1mg daily, carvedilol 3.125mg BID, amiodarone 200mg daily, and eliquis 2.5mg BID for atrial fibrillation. She denies chest pain or shortness of breath. She has an appointment with Dr. Anshul Norris in 2023. She is unsure whether or not she wishes to deactivate her AICD.  She does complain of polyuria with taking bumex and is on a months-long steroid taper for presumed amiodarone allergy. She has a history of hypothyroidism, having had a partial thyroidectomy at some point, though she isn't sure of the details. She takes 112mcg levothyroxine once daily. Most recent thyroid labs were at goal in [facility] 2022. She also reports a history of arthritis; chart review reveals a diagnosis of Sjogren's syndrome, but she isn't sure whether or not she has that. She follows with Dr. Cassandra Ibarra at [facility], going for visits every 2 months (approximately) and has trigger point and cortisone injections for symptom relief, which she reports is helpful. No documentation of Sjogren's per Dr. 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 Most recent visit January 2023 with trigger point injections. She has a history of anxiety and has been maintained on paxil 15mg daily for many years, which she reports is effective. She also has a prescription for 3mg lunesta that she takes nightly, prescribed by Dr. Cassandra Ibarra. She is hesitant to discontinue any sleep medications as she finds these very effective. She also follows with [facility], and is currently on a prolonged prednisone taper for a rash/itching that is presumed to be related to amiodarone. She next follows up in January 2023. She has a history of seasonal allergies and takes zyrtec regularly. She reports nasal congestion/rhinorrhea as her primary symptom and is \"always blowing my nose. \" She has flonase, but does not typically use it. She recently had a cough and presumed viral URI; she was never seen by a provider but has had relief with guaifenesin. She is able to complete nearly all of her ADLs independently, which she is very proud of. She has her daughter-in-law nearby when she takes a shower in case she cannot get out, but otherwise is able to bathe herself. She is continent of stool and urine.  Denies any bowel/bladder difficulty. Appetite is stable; her son brings her groceries but she is able to prepare her own food. She reports a strong reaction to the Covid-19 vaccine and does not wish to have any additional doses. Visit Vitals  /66 (BP 1 Location: Left upper arm, BP Patient Position: Sitting, BP Cuff Size: Adult)   Pulse 67   Temp 97.5 °F (36.4 °C) (Temporal)   Resp 18   SpO2 95%     Current Outpatient Medications on File Prior to Visit   Medication Sig Dispense Refill    guaiFENesin (ROBITUSSIN) 100 mg/5 mL liquid Take 10 mL by mouth four (4) times daily as needed for Cough. Indications: cold symptoms 236 mL 1    cetirizine (ZYRTEC) 10 mg tablet Take 1 Tablet by mouth daily. 90 Tablet 3    cholecalciferol, vitamin D3, 50 mcg (2,000 unit) tab Take 1 Tablet by mouth daily. 90 Tablet 3    fluticasone propionate (FLONASE) 50 mcg/actuation nasal spray 2 Sprays by Both Nostrils route daily as needed for Allergies. 3 Each 3    levothyroxine (SYNTHROID) 112 mcg tablet Take 1 Tablet by mouth Daily (before breakfast). 90 Tablet 3    PARoxetine (PAXIL) 10 mg tablet Take 1.5 tablets by mouth once daily. 135 Tablet 3    amiodarone (CORDARONE) 200 mg tablet Take 200 mg by mouth daily. predniSONE (DELTASONE) 5 mg tablet Take 5 mg by mouth. Take 3 tabs PO Daily for 2 weeks  Take 2 tabs PO Daily for 2 weeks  Take 1 tab PO daily until directed (Per Dermatologist)      bumetanide (BUMEX) 1 mg tablet Take 1 mg by mouth daily. carvediloL (COREG) 3.125 mg tablet Take 3.125 mg by mouth two (2) times a day. PNV 07-MNPJ fum,b-g-folic acid (NataChew, Fe Bis-glycinate,) 28 mg iron -1 mg chew Take 1 Tablet by mouth daily. eszopiclone (LUNESTA) 3 mg tablet Take 3 mg by mouth nightly. Indications: difficulty sleeping      acetaminophen (TYLENOL) 325 mg tablet Take 2 Tablets by mouth every six (6) hours as needed for Pain.  30 Tablet 0    apixaban (ELIQUIS) 2.5 mg tablet Take 2.5 mg by mouth two (2) times a day.       No current facility-administered medications on file prior to visit.        Review of Systems  Constitutional: denies activity change, appetite change, fatigue, fever  HEENT: denies congestion, sinus pressure, sore throat; endorses chronic rhinorrhea  CV: denies chest pain, palpitations, edema  Respiratory: denies shortness of breath, wheezing  GI: denies abdominal pain, blood in stool, diarrhea, constipation  MSK: denies joint swelling; endorses chronic arthralgias  Neuro: denies frequent headaches, dizziness, numbness/tingling  Skin: denies skin breakdown  Psych: denies depression, anxiety, confusion, memory changes        Physical Exam  Constitutional: no acute distress, normal weight, elderly  female ambulating with rollator, appears younger than stated age  [de-identified]: normocephalic, atraumatic, external ears normal bilaterally; hearing aids in place; moist mucous membranes; EOM intact, PERRL; + boggy nasal turbinates with clear nasal discharge bilaterally  CV: regular rate and rhythm, no murmurs noted; ICD in place, mild dependent lower extremity edema  Pulm: normal effort, normal breath sounds without wheezing or rhonchi  Abd: normal bowel sounds, soft, non-tender; fist-sized nontender area of induration left lower abd (site of previous hernia repair, per patient)  : deferred  Skin: warm, dry; no breakdown noted; several ecchymoses, primarily lower legs, without broken skin  Neuro: A & O x 3; mental status at baseline, slight short-term memory forgetfulness evident in casual conversation  Psych: mood and behavior normal         Advanced Care Planning    Primary Decision MakeShmuel Spring Child - [phone]  Code Status: DNR  Advanced Medical Directive: reportedly has POA; DDNR on file from 11/2022          On this date 01/26/2023 I have spent 64 minutes reviewing previous notes, test results and face to face with the patient discussing the diagnosis and importance of compliance with the treatment plan as well as documenting on the day of the visit. An electronic signature was used to authenticate this note.   -- Coral Dixon NP

## 2023-03-08 ENCOUNTER — DOCUMENTATION ONLY (OUTPATIENT)
Dept: FAMILY MEDICINE CLINIC | Age: 88
End: 2023-03-08

## 2023-03-08 NOTE — PROGRESS NOTES
Home Based Primary Care  Plan of Care    [facility] Team Members: Laura Monet MD; BISI Victor; Alexandria Jerez NP; Norberto Ahn, RN; Ade Peters RN; Molina Rodriguez LCSW    Bill Davilans  4/22/1928 / [id number]  female      Date of Initial Visit (Start of Care): 11/16/2022    Diagnosis:   Patient Active Problem List   Diagnosis Code    Anxiety F41.9    HTN (hypertension) I10    Osteoarthritis M19.90    Hypothyroidism E03.9    CAD (coronary artery disease) I25.10    Persistent atrial fibrillation (HCC) I48.19    Ventricular tachycardia I47.20    Elevated serum creatinine R79.89    Weakness R53.1    Generalized weakness R53.1    ICD (implantable cardioverter-defibrillator) discharge Z45.02    Underweight R63.6    FTT (failure to thrive) in adult R62.7    Allergic rhinitis J30.9    Automatic implantable cardiac defibrillator in situ Z95.810    Mild memory disturbance R41.3       Patient status summary: 80 y.o. female who was referred to the [facility] program due to impaired mobility and frailty. Patient discussed today for POC review.     Advance Care Planning:  Code status: DNR      Primary Decision MakeTej Brownlee Child - [phone]   Advance Care Planning 4/4/2022   Confirm Advance Directive Yes, not on file       DME/Supplies:  Rolling walker and Wheelchair (non-motorized)     Allergies   Allergen Reactions    Latex Hives     NOT ALLERGIC PER PT 02/01/2018    Pcn [Penicillins] Anaphylaxis    Sulfa (Sulfonamide Antibiotics) Anaphylaxis    Biaxin [Clarithromycin] Nausea Only    Biotin Unknown (comments)    Covid-19 Vaccine, Mrna, [id number], Lnp-S (Moderna) Rash    Pcn [Penicillins] Hives    Sulfa (Sulfonamide Antibiotics) Nausea Only       Nutritional Requirements:   Oral with personal meal preparation and Oral with supported meal preparation    Functional/Activity Level:  Ambulatory with assistance of cane and/or walker (mild impairment)    Safety Measures:   Fall risk and Self-care deficity    Acuity Level Rating: Level 1 - LOW    Current Outpatient Medications   Medication Sig    guaiFENesin (ROBITUSSIN) 100 mg/5 mL liquid Take 10 mL by mouth four (4) times daily as needed for Cough. Indications: cold symptoms    cetirizine (ZYRTEC) 10 mg tablet Take 1 Tablet by mouth daily. cholecalciferol, vitamin D3, 50 mcg (2,000 unit) tab Take 1 Tablet by mouth daily. fluticasone propionate (FLONASE) 50 mcg/actuation nasal spray 2 Sprays by Both Nostrils route daily as needed for Allergies. levothyroxine (SYNTHROID) 112 mcg tablet Take 1 Tablet by mouth Daily (before breakfast). PARoxetine (PAXIL) 10 mg tablet Take 1.5 tablets by mouth once daily. amiodarone (CORDARONE) 200 mg tablet Take 200 mg by mouth daily. predniSONE (DELTASONE) 5 mg tablet Take 5 mg by mouth. Take 3 tabs PO Daily for 2 weeks  Take 2 tabs PO Daily for 2 weeks  Take 1 tab PO daily until directed (Per Dermatologist)    bumetanide (BUMEX) 1 mg tablet Take 1 mg by mouth daily. carvediloL (COREG) 3.125 mg tablet Take 3.125 mg by mouth two (2) times a day. PNV 05-ARLS fum,b-g-folic acid (NataChew, Fe Bis-glycinate,) 28 mg iron -1 mg chew Take 1 Tablet by mouth daily. eszopiclone (LUNESTA) 3 mg tablet Take 3 mg by mouth nightly. Indications: difficulty sleeping    acetaminophen (TYLENOL) 325 mg tablet Take 2 Tablets by mouth every six (6) hours as needed for Pain. apixaban (ELIQUIS) 2.5 mg tablet Take 2.5 mg by mouth two (2) times a day. No current facility-administered medications for this visit. The Plan of Care has been reviewed and updated by the Interdisciplinary Group (IDG) as frequently as the patient condition warrants. Plan of Care by Discipline:    1.  Provider  Identify patient's health care goal(s), Reduction of polypharmacy within benefit/burden framework appropriate to age, function and disease state, Determine need for laboratory assessment based on patient disease status , Assess results of laboratory testing and adjust treatment plan as appropriate, Anticipate and plan for medical intervention(s) in emergency / at time of crisis, Complete annual Medicare Wellness Visit, and Assess current Advance Care Planning documents and make ACP recommendations as appropriate    2. Nursing  Communicate with prior/current health care team members to enhance understanding of patient status, Education for safety; falls, and Provider caregiver / family support for patient's current and changing condition    3. Social Work  Establish therapeutic relationship through in home visits and telephonic touch points, Assist in optimizing levels of psychosocial function, Assess for caregiver burden and intervene as psychosocially indicated, and Provide information on available community resources      Plan of Care Orders / Action Items:    Health Maintenance   Topic Date Due    Shingles Vaccine (1 of 2) Never done    Pneumococcal 65+ years (1 - PCV) Never done    COVID-19 Vaccine (2 - Leonetta Drone series) 11/01/2023 (Originally 4/2/2021)    Medicare Yearly Exam  11/17/2023    Depression Screen  01/26/2024    DTaP/Tdap/Td series (3 - Td or Tdap) 12/14/2029    Bone Densitometry (Dexa) Screening  Completed    Flu Vaccine  Completed     Hypertension/Atrial fibrillation/Hx VTAC/AICD in place/HFpEF - Most recent hospital records reviewed from April 2022. Follows with [facility]; records reviewed at initial visit. Blood pressure reasonable for age on 1/26/23; continue to monitor. Discussed consideration of ICD deactivation with patient, and the friction between her desire to avoid hospitalization and her statement that \"I'm not ready to die yet. \" She is following up with Dr. Rachel Porter (cards/EP) in February 2023; encouraged her to have that conversation with him at that time as well, especially given her age and frailty.   Has some lower extremity bruising, likely related to frailty and anticoagulation; advised watching this and calling if it fails to improve. Euvolemic on 1/26/23 exam, with most recent echocardiogram in Sept 2021 with LVEF of 60 - 65%. Prescribed bumex 1mg BID but tolerates taking it only once daily due to polyuria; encouraged her to discuss with cardiologist at upcoming evaluation. May benefit from deprescribing some medications in alignment with goals of care, but wishes to discuss with cardiology. Chronic pain/arthritis - Scant notes received from Dr. Magy Bhardwaj, but X-ray reviewed reveals lumbar DDD and scoliosis with diffuse facet hypertrophy of lumbar spine. Continues to benefit from injections with him. Sees him every 2 months. Hypothyroidism - Secondary to partial thyroidectomy many years ago. TFTs at goal November 2022; recheck annually. Anxiety - Appears well-controlled on paxil 15mg daily. Using lunesta 3mg nightly for sleep, prescribed by Dr. Magy Bhardwaj. Allergic rhinitis - Encouraged continuing zyrtec but adding flonase regularly at night for rhinorrhea relief. Avoid benadryl. Memory difficulties - Animal naming test = 8 at initial visit. MMSE on 1/26/23 - 22/30. Long-term memory appears intact with some short-term memory difficulty. Preventative Care - Will follow up on pneumonia vaccine with [facility]. Goals of Care conversation - As above, AICD deactivation discussed with patient, but she is unsure at this time. Also having symptom burden from bumex and amiodarone (on prednisone taper x months through derm for allergic reaction to amiodarone), so may benefit from discontinuation in alignment with goals of care, but she wishes to discuss with her son and cardiologist prior to making any of these decisions. Plan to follow up in 4 months, sooner PRN (on 5/18/2023) for 4 month follow up.        Estimated Visit Frequency:  Other: Every 4 months Provider visits  Last NP visit: 1/26/2023  SW visits PRN

## 2023-03-10 ENCOUNTER — HOSPITAL ENCOUNTER (EMERGENCY)
Dept: GENERAL RADIOLOGY | Age: 88
Discharge: HOME | End: 2023-03-10
Attending: EMERGENCY MEDICINE
Payer: MEDICARE

## 2023-03-10 ENCOUNTER — HOSPITAL ENCOUNTER (INPATIENT)
Age: 88
LOS: 7 days | Discharge: HOME | End: 2023-03-17
Attending: EMERGENCY MEDICINE | Admitting: INTERNAL MEDICINE
Payer: MEDICARE

## 2023-03-10 ENCOUNTER — APPOINTMENT (OUTPATIENT)
Dept: CT IMAGING | Age: 88
End: 2023-03-10
Attending: EMERGENCY MEDICINE
Payer: MEDICARE

## 2023-03-10 DIAGNOSIS — M51.37 DDD (DEGENERATIVE DISC DISEASE), LUMBOSACRAL: ICD-10-CM

## 2023-03-10 DIAGNOSIS — S09.90XA INJURY OF HEAD, INITIAL ENCOUNTER: ICD-10-CM
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
## 2023-03-10 DIAGNOSIS — D84.821 IMMUNOSUPPRESSION DUE TO CHRONIC STEROID USE (HCC): ICD-10-CM

## 2023-03-10 DIAGNOSIS — R26.2 INABILITY TO AMBULATE DUE TO ANKLE OR FOOT: ICD-10-CM

## 2023-03-10 DIAGNOSIS — N30.00 ACUTE CYSTITIS WITHOUT HEMATURIA: ICD-10-CM

## 2023-03-10 DIAGNOSIS — T38.0X5A IMMUNOSUPPRESSION DUE TO CHRONIC STEROID USE (HCC): ICD-10-CM

## 2023-03-10 DIAGNOSIS — A49.01 MSSA (METHICILLIN SUSCEPTIBLE STAPHYLOCOCCUS AUREUS) INFECTION: ICD-10-CM

## 2023-03-10 DIAGNOSIS — Z79.52 IMMUNOSUPPRESSION DUE TO CHRONIC STEROID USE (HCC): ICD-10-CM

## 2023-03-10 DIAGNOSIS — I87.2 CHRONIC VENOUS STASIS DERMATITIS OF LEFT LOWER EXTREMITY: ICD-10-CM

## 2023-03-10 DIAGNOSIS — Z88.1 ALLERGY TO MULTIPLE ANTIBIOTICS: ICD-10-CM

## 2023-03-10 PROBLEM — R29.6 FREQUENT FALLS: Status: ACTIVE | Noted: 2023-03-10

## 2023-03-10 PROBLEM — N39.0 UTI (URINARY TRACT INFECTION): Status: ACTIVE | Noted: 2023-03-10

## 2023-03-10 PROBLEM — R53.81 DEBILITY: Status: ACTIVE | Noted: 2021-10-19

## 2023-03-10 LAB
ALBUMIN SERPL-MCNC: 3.1 G/DL (ref 3.5–5)
ALBUMIN/GLOB SERPL: 1 (ref 1.1–2.2)
ALP SERPL-CCNC: 64 U/L (ref 45–117)
ALT SERPL-CCNC: 33 U/L (ref 12–78)
ANION GAP SERPL CALC-SCNC: 6 MMOL/L (ref 5–15)
APPEARANCE UR: ABNORMAL
AST SERPL-CCNC: 43 U/L (ref 15–37)
BACTERIA URNS QL MICRO: ABNORMAL /HPF
BASOPHILS # BLD: 0 K/UL (ref 0–0.1)
BASOPHILS NFR BLD: 0 % (ref 0–1)
BILIRUB SERPL-MCNC: 1.8 MG/DL (ref 0.2–1)
BILIRUB UR QL: NEGATIVE
BNP SERPL-MCNC: 4087 PG/ML
BUN SERPL-MCNC: 23 MG/DL (ref 6–20)
BUN/CREAT SERPL: 25 (ref 12–20)
CALCIUM SERPL-MCNC: 8.9 MG/DL (ref 8.5–10.1)
CHLORIDE SERPL-SCNC: 105 MMOL/L (ref 97–108)
CK SERPL-CCNC: 340 U/L (ref 26–192)
CO2 SERPL-SCNC: 29 MMOL/L (ref 21–32)
COLOR UR: ABNORMAL
COMMENT, HOLDF: NORMAL
CREAT SERPL-MCNC: 0.93 MG/DL (ref 0.55–1.02)
DIFFERENTIAL METHOD BLD: ABNORMAL
EOSINOPHIL # BLD: 0 K/UL (ref 0–0.4)
EOSINOPHIL NFR BLD: 0 % (ref 0–7)
EPITH CASTS URNS QL MICRO: ABNORMAL /LPF
ERYTHROCYTE [DISTWIDTH] IN BLOOD BY AUTOMATED COUNT: 14.9 % (ref 11.5–14.5)
GLOBULIN SER CALC-MCNC: 3.1 G/DL (ref 2–4)
GLUCOSE SERPL-MCNC: 102 MG/DL (ref 65–100)
GLUCOSE UR STRIP.AUTO-MCNC: NEGATIVE MG/DL
HCT VFR BLD AUTO: 31.5 % (ref 35–47)
HGB BLD-MCNC: 10.2 G/DL (ref 11.5–16)
HGB UR QL STRIP: ABNORMAL
HYALINE CASTS URNS QL MICRO: ABNORMAL /LPF (ref 0–2)
IMM GRANULOCYTES # BLD AUTO: 0 K/UL
IMM GRANULOCYTES NFR BLD AUTO: 0 %
KETONES UR QL STRIP.AUTO: 15 MG/DL
LEUKOCYTE ESTERASE UR QL STRIP.AUTO: ABNORMAL
LYMPHOCYTES # BLD: 0.4 K/UL (ref 0.8–3.5)
LYMPHOCYTES NFR BLD: 7 % (ref 12–49)
MAGNESIUM SERPL-MCNC: 1.7 MG/DL (ref 1.6–2.4)
MCH RBC QN AUTO: 31.6 PG (ref 26–34)
MCHC RBC AUTO-ENTMCNC: 32.4 G/DL (ref 30–36.5)
MCV RBC AUTO: 97.5 FL (ref 80–99)
MONOCYTES # BLD: 0.7 K/UL (ref 0–1)
MONOCYTES NFR BLD: 12 % (ref 5–13)
NEUTS BAND NFR BLD MANUAL: 2 % (ref 0–6)
NEUTS SEG # BLD: 4.4 K/UL (ref 1.8–8)
NEUTS SEG NFR BLD: 79 % (ref 32–75)
NITRITE UR QL STRIP.AUTO: NEGATIVE
NRBC # BLD: 0 K/UL (ref 0–0.01)
NRBC BLD-RTO: 0 PER 100 WBC
PH UR STRIP: 8 (ref 5–8)
PHOSPHATE SERPL-MCNC: 2.8 MG/DL (ref 2.6–4.7)
PLATELET # BLD AUTO: 148 K/UL (ref 150–400)
PMV BLD AUTO: 10 FL (ref 8.9–12.9)
POTASSIUM SERPL-SCNC: 3.2 MMOL/L (ref 3.5–5.1)
PROT SERPL-MCNC: 6.2 G/DL (ref 6.4–8.2)
PROT UR STRIP-MCNC: 30 MG/DL
RBC # BLD AUTO: 3.23 M/UL (ref 3.8–5.2)
RBC #/AREA URNS HPF: ABNORMAL /HPF (ref 0–5)
RBC MORPH BLD: ABNORMAL
SAMPLES BEING HELD,HOLD: NORMAL
SODIUM SERPL-SCNC: 140 MMOL/L (ref 136–145)
SP GR UR REFRACTOMETRY: 1.02 (ref 1–1.03)
TROPONIN I SERPL HS-MCNC: 37 NG/L (ref 0–51)
UA: UC IF INDICATED,UAUC: ABNORMAL
UROBILINOGEN UR QL STRIP.AUTO: 1 EU/DL (ref 0.2–1)
WBC # BLD AUTO: 5.5 K/UL (ref 3.6–11)
WBC URNS QL MICRO: ABNORMAL /HPF (ref 0–4)

## 2023-03-10 PROCEDURE — 83735 ASSAY OF MAGNESIUM: CPT

## 2023-03-10 PROCEDURE — 36415 COLL VENOUS BLD VENIPUNCTURE: CPT

## 2023-03-10 PROCEDURE — 84484 ASSAY OF TROPONIN QUANT: CPT

## 2023-03-10 PROCEDURE — 72192 CT PELVIS W/O DYE: CPT

## 2023-03-10 PROCEDURE — 99285 EMERGENCY DEPT VISIT HI MDM: CPT

## 2023-03-10 PROCEDURE — 72125 CT NECK SPINE W/O DYE: CPT

## 2023-03-10 PROCEDURE — 74011250636 HC RX REV CODE- 250/636: Performed by: INTERNAL MEDICINE

## 2023-03-10 PROCEDURE — 87186 SC STD MICRODIL/AGAR DIL: CPT

## 2023-03-10 PROCEDURE — 82550 ASSAY OF CK (CPK): CPT

## 2023-03-10 PROCEDURE — 81001 URINALYSIS AUTO W/SCOPE: CPT

## 2023-03-10 PROCEDURE — 74011000250 HC RX REV CODE- 250: Performed by: INTERNAL MEDICINE

## 2023-03-10 PROCEDURE — 93005 ELECTROCARDIOGRAM TRACING: CPT

## 2023-03-10 PROCEDURE — 74011250636 HC RX REV CODE- 250/636: Performed by: EMERGENCY MEDICINE

## 2023-03-10 PROCEDURE — 87086 URINE CULTURE/COLONY COUNT: CPT

## 2023-03-10 PROCEDURE — 70450 CT HEAD/BRAIN W/O DYE: CPT

## 2023-03-10 PROCEDURE — 80053 COMPREHEN METABOLIC PANEL: CPT

## 2023-03-10 PROCEDURE — 72131 CT LUMBAR SPINE W/O DYE: CPT

## 2023-03-10 PROCEDURE — 65270000029 HC RM PRIVATE

## 2023-03-10 PROCEDURE — 85025 COMPLETE CBC W/AUTO DIFF WBC: CPT

## 2023-03-10 PROCEDURE — 96360 HYDRATION IV INFUSION INIT: CPT

## 2023-03-10 PROCEDURE — 87077 CULTURE AEROBIC IDENTIFY: CPT

## 2023-03-10 PROCEDURE — 73590 X-RAY EXAM OF LOWER LEG: CPT

## 2023-03-10 PROCEDURE — 71045 X-RAY EXAM CHEST 1 VIEW: CPT

## 2023-03-10 PROCEDURE — 83880 ASSAY OF NATRIURETIC PEPTIDE: CPT

## 2023-03-10 PROCEDURE — 84100 ASSAY OF PHOSPHORUS: CPT

## 2023-03-10 PROCEDURE — 73610 X-RAY EXAM OF ANKLE: CPT

## 2023-03-10 RX ORDER — HYDROMORPHONE HYDROCHLORIDE 1 MG/ML
0.5 INJECTION, SOLUTION INTRAMUSCULAR; INTRAVENOUS; SUBCUTANEOUS
Status: DISCONTINUED | OUTPATIENT
Start: 2023-03-10 | End: 2023-03-17 | Stop reason: HOSPADM

## 2023-03-10 RX ORDER — OXYCODONE HYDROCHLORIDE 5 MG/1
5 TABLET ORAL
Status: DISCONTINUED | OUTPATIENT
Start: 2023-03-10 | End: 2023-03-17 | Stop reason: HOSPADM

## 2023-03-10 RX ADMIN — CEFEPIME 2 G: 2 INJECTION, POWDER, FOR SOLUTION INTRAVENOUS at 23:57

## 2023-03-10 RX ADMIN — SODIUM CHLORIDE 1000 ML: 9 INJECTION, SOLUTION INTRAVENOUS at 21:30

## 2023-03-11 LAB
ALBUMIN SERPL-MCNC: 2.6 G/DL (ref 3.5–5)
ALBUMIN/GLOB SERPL: 0.9 (ref 1.1–2.2)
ALP SERPL-CCNC: 55 U/L (ref 45–117)
ALT SERPL-CCNC: 31 U/L (ref 12–78)
ANION GAP SERPL CALC-SCNC: 5 MMOL/L (ref 5–15)
AST SERPL-CCNC: 43 U/L (ref 15–37)
BILIRUB DIRECT SERPL-MCNC: 0.4 MG/DL (ref 0–0.2)
BILIRUB SERPL-MCNC: 1.6 MG/DL (ref 0.2–1)
BUN SERPL-MCNC: 23 MG/DL (ref 6–20)
BUN/CREAT SERPL: 29 (ref 12–20)
CALCIUM SERPL-MCNC: 8.6 MG/DL (ref 8.5–10.1)
CHLORIDE SERPL-SCNC: 108 MMOL/L (ref 97–108)
CO2 SERPL-SCNC: 27 MMOL/L (ref 21–32)
CREAT SERPL-MCNC: 0.8 MG/DL (ref 0.55–1.02)
ERYTHROCYTE [DISTWIDTH] IN BLOOD BY AUTOMATED COUNT: 15.1 % (ref 11.5–14.5)
EST. AVERAGE GLUCOSE BLD GHB EST-MCNC: 111 MG/DL
GLOBULIN SER CALC-MCNC: 2.8 G/DL (ref 2–4)
GLUCOSE SERPL-MCNC: 98 MG/DL (ref 65–100)
HBA1C MFR BLD: 5.5 % (ref 4–5.6)
HCT VFR BLD AUTO: 30 % (ref 35–47)
HGB BLD-MCNC: 9.6 G/DL (ref 11.5–16)
MAGNESIUM SERPL-MCNC: 1.7 MG/DL (ref 1.6–2.4)
MCH RBC QN AUTO: 31.8 PG (ref 26–34)
MCHC RBC AUTO-ENTMCNC: 32 G/DL (ref 30–36.5)
MCV RBC AUTO: 99.3 FL (ref 80–99)
NRBC # BLD: 0 K/UL (ref 0–0.01)
NRBC BLD-RTO: 0 PER 100 WBC
PHOSPHATE SERPL-MCNC: 3 MG/DL (ref 2.6–4.7)
PLATELET # BLD AUTO: 124 K/UL (ref 150–400)
PMV BLD AUTO: 10.5 FL (ref 8.9–12.9)
POTASSIUM SERPL-SCNC: 3.1 MMOL/L (ref 3.5–5.1)
PROT SERPL-MCNC: 5.4 G/DL (ref 6.4–8.2)
RBC # BLD AUTO: 3.02 M/UL (ref 3.8–5.2)
SODIUM SERPL-SCNC: 140 MMOL/L (ref 136–145)
TSH SERPL DL<=0.05 MIU/L-ACNC: 1.83 UIU/ML (ref 0.36–3.74)
WBC # BLD AUTO: 4.1 K/UL (ref 3.6–11)

## 2023-03-11 PROCEDURE — 97535 SELF CARE MNGMENT TRAINING: CPT

## 2023-03-11 PROCEDURE — 80076 HEPATIC FUNCTION PANEL: CPT

## 2023-03-11 PROCEDURE — 83036 HEMOGLOBIN GLYCOSYLATED A1C: CPT

## 2023-03-11 PROCEDURE — 74011250637 HC RX REV CODE- 250/637: Performed by: INTERNAL MEDICINE

## 2023-03-11 PROCEDURE — 85027 COMPLETE CBC AUTOMATED: CPT

## 2023-03-11 PROCEDURE — 83735 ASSAY OF MAGNESIUM: CPT

## 2023-03-11 PROCEDURE — 74011000258 HC RX REV CODE- 258: Performed by: INTERNAL MEDICINE

## 2023-03-11 PROCEDURE — 74011636637 HC RX REV CODE- 636/637: Performed by: INTERNAL MEDICINE

## 2023-03-11 PROCEDURE — 74011250636 HC RX REV CODE- 250/636: Performed by: INTERNAL MEDICINE

## 2023-03-11 PROCEDURE — 97116 GAIT TRAINING THERAPY: CPT

## 2023-03-11 PROCEDURE — 36415 COLL VENOUS BLD VENIPUNCTURE: CPT

## 2023-03-11 PROCEDURE — 77030038269 HC DRN EXT URIN PURWCK BARD -A

## 2023-03-11 PROCEDURE — 74011000250 HC RX REV CODE- 250: Performed by: INTERNAL MEDICINE

## 2023-03-11 PROCEDURE — 84443 ASSAY THYROID STIM HORMONE: CPT

## 2023-03-11 PROCEDURE — 2709999900 HC NON-CHARGEABLE SUPPLY

## 2023-03-11 PROCEDURE — 97165 OT EVAL LOW COMPLEX 30 MIN: CPT

## 2023-03-11 PROCEDURE — 80048 BASIC METABOLIC PNL TOTAL CA: CPT

## 2023-03-11 PROCEDURE — 65270000029 HC RM PRIVATE

## 2023-03-11 PROCEDURE — 97161 PT EVAL LOW COMPLEX 20 MIN: CPT

## 2023-03-11 PROCEDURE — 84100 ASSAY OF PHOSPHORUS: CPT

## 2023-03-11 RX ORDER — PROMETHAZINE HYDROCHLORIDE 25 MG/1
12.5 TABLET ORAL
Status: DISCONTINUED | OUTPATIENT
Start: 2023-03-11 | End: 2023-03-17 | Stop reason: HOSPADM

## 2023-03-11 RX ORDER — PREDNISONE 5 MG/1
5 TABLET ORAL
Status: DISCONTINUED | OUTPATIENT
Start: 2023-03-11 | End: 2023-03-17 | Stop reason: HOSPADM

## 2023-03-11 RX ORDER — SODIUM CHLORIDE 0.9 % (FLUSH) 0.9 %
5-40 SYRINGE (ML) INJECTION EVERY 8 HOURS
Status: DISCONTINUED | OUTPATIENT
Start: 2023-03-11 | End: 2023-03-17 | Stop reason: HOSPADM

## 2023-03-11 RX ORDER — PAROXETINE 10 MG/5ML
15 SUSPENSION ORAL DAILY
Status: DISCONTINUED | OUTPATIENT
Start: 2023-03-11 | End: 2023-03-17 | Stop reason: HOSPADM

## 2023-03-11 RX ORDER — BUMETANIDE 1 MG/1
1 TABLET ORAL DAILY
Status: DISCONTINUED | OUTPATIENT
Start: 2023-03-11 | End: 2023-03-17 | Stop reason: HOSPADM

## 2023-03-11 RX ORDER — SODIUM CHLORIDE 9 MG/ML
25 INJECTION, SOLUTION INTRAVENOUS AS NEEDED
Status: DISCONTINUED | OUTPATIENT
Start: 2023-03-11 | End: 2023-03-17 | Stop reason: HOSPADM

## 2023-03-11 RX ORDER — ACETAMINOPHEN 650 MG/1
650 SUPPOSITORY RECTAL
Status: DISCONTINUED | OUTPATIENT
Start: 2023-03-11 | End: 2023-03-17 | Stop reason: HOSPADM

## 2023-03-11 RX ORDER — CARVEDILOL 3.12 MG/1
3.12 TABLET ORAL 2 TIMES DAILY WITH MEALS
Status: DISCONTINUED | OUTPATIENT
Start: 2023-03-11 | End: 2023-03-15

## 2023-03-11 RX ORDER — AMOXICILLIN 250 MG
1 CAPSULE ORAL 2 TIMES DAILY
Status: DISCONTINUED | OUTPATIENT
Start: 2023-03-11 | End: 2023-03-17 | Stop reason: HOSPADM

## 2023-03-11 RX ORDER — ONDANSETRON 2 MG/ML
4 INJECTION INTRAMUSCULAR; INTRAVENOUS
Status: DISCONTINUED | OUTPATIENT
Start: 2023-03-11 | End: 2023-03-17 | Stop reason: HOSPADM

## 2023-03-11 RX ORDER — CETIRIZINE HYDROCHLORIDE 10 MG/1
10 TABLET ORAL DAILY
Status: DISCONTINUED | OUTPATIENT
Start: 2023-03-11 | End: 2023-03-17 | Stop reason: HOSPADM

## 2023-03-11 RX ORDER — FACIAL-BODY WIPES
10 EACH TOPICAL DAILY PRN
Status: DISCONTINUED | OUTPATIENT
Start: 2023-03-11 | End: 2023-03-17 | Stop reason: HOSPADM

## 2023-03-11 RX ORDER — SODIUM CHLORIDE 0.9 % (FLUSH) 0.9 %
5-40 SYRINGE (ML) INJECTION AS NEEDED
Status: DISCONTINUED | OUTPATIENT
Start: 2023-03-11 | End: 2023-03-17 | Stop reason: HOSPADM

## 2023-03-11 RX ORDER — FLUTICASONE PROPIONATE 50 MCG
2 SPRAY, SUSPENSION (ML) NASAL
Status: DISCONTINUED | OUTPATIENT
Start: 2023-03-11 | End: 2023-03-17 | Stop reason: HOSPADM

## 2023-03-11 RX ORDER — POTASSIUM CHLORIDE 750 MG/1
40 TABLET, FILM COATED, EXTENDED RELEASE ORAL
Status: COMPLETED | OUTPATIENT
Start: 2023-03-11 | End: 2023-03-11

## 2023-03-11 RX ORDER — AMIODARONE HYDROCHLORIDE 200 MG/1
200 TABLET ORAL DAILY
Status: DISCONTINUED | OUTPATIENT
Start: 2023-03-11 | End: 2023-03-17 | Stop reason: HOSPADM

## 2023-03-11 RX ORDER — ACETAMINOPHEN 325 MG/1
650 TABLET ORAL
Status: DISCONTINUED | OUTPATIENT
Start: 2023-03-11 | End: 2023-03-17 | Stop reason: HOSPADM

## 2023-03-11 RX ORDER — LEVOTHYROXINE SODIUM 112 UG/1
112 TABLET ORAL
Status: DISCONTINUED | OUTPATIENT
Start: 2023-03-11 | End: 2023-03-17 | Stop reason: HOSPADM

## 2023-03-11 RX ADMIN — LEVOTHYROXINE SODIUM 112 MCG: 0.11 TABLET ORAL at 06:47

## 2023-03-11 RX ADMIN — APIXABAN 2.5 MG: 2.5 TABLET, FILM COATED ORAL at 09:06

## 2023-03-11 RX ADMIN — ACETAMINOPHEN 650 MG: 325 TABLET ORAL at 23:28

## 2023-03-11 RX ADMIN — ACETAMINOPHEN 650 MG: 325 TABLET ORAL at 06:48

## 2023-03-11 RX ADMIN — Medication 10 ML: at 21:43

## 2023-03-11 RX ADMIN — CARVEDILOL 3.12 MG: 3.12 TABLET, FILM COATED ORAL at 09:06

## 2023-03-11 RX ADMIN — PAROXETINE HYDROCHLORIDE 15 MG: 10 SUSPENSION ORAL at 10:27

## 2023-03-11 RX ADMIN — SENNOSIDES AND DOCUSATE SODIUM 1 TABLET: 50; 8.6 TABLET ORAL at 01:19

## 2023-03-11 RX ADMIN — BUMETANIDE 1 MG: 1 TABLET ORAL at 09:06

## 2023-03-11 RX ADMIN — Medication 10 ML: at 06:49

## 2023-03-11 RX ADMIN — Medication 10 ML: at 01:19

## 2023-03-11 RX ADMIN — APIXABAN 2.5 MG: 2.5 TABLET, FILM COATED ORAL at 19:23

## 2023-03-11 RX ADMIN — CETIRIZINE HYDROCHLORIDE 10 MG: 10 TABLET, FILM COATED ORAL at 09:06

## 2023-03-11 RX ADMIN — CARVEDILOL 3.12 MG: 3.12 TABLET, FILM COATED ORAL at 19:23

## 2023-03-11 RX ADMIN — AMIODARONE HYDROCHLORIDE 200 MG: 200 TABLET ORAL at 09:06

## 2023-03-11 RX ADMIN — Medication 10 ML: at 14:00

## 2023-03-11 RX ADMIN — PREDNISONE 5 MG: 5 TABLET ORAL at 09:06

## 2023-03-11 RX ADMIN — CEFEPIME 2 G: 2 INJECTION, POWDER, FOR SOLUTION INTRAVENOUS at 21:41

## 2023-03-11 RX ADMIN — POTASSIUM CHLORIDE 40 MEQ: 750 TABLET, EXTENDED RELEASE ORAL at 01:19

## 2023-03-11 RX ADMIN — SENNOSIDES AND DOCUSATE SODIUM 1 TABLET: 50; 8.6 TABLET ORAL at 09:05

## 2023-03-11 NOTE — PROGRESS NOTES
Javid Felizelsen [facility] 79  [address] [facility], [place], [address] [place] [address]  [phone] [address] Adult  Hospitalist Group                                                                                          Hospitalist Progress Note  Rafael Alvarenga MD        Date of Service:  3/11/2023  NAME:  Thomas Jones  :  1928  MRN:  [id number]      Admission Summary:   43-year-old female presented with a fall at home and found to have a UTI    Interval history / Subjective:   Denies any complaints, wants to go home     Assessment & Plan:     UTI  -Continue cefepime  -Follow urine cultures    Frequent falls/debility  -No evidence of CVA on imaging  -Consult PT/OT    Hypertension/A-fib/CAD  -Continue Coreg, amiodarone, Eliquis  -May need to consider discontinuing Eliquis due to history of frequent falls    Sick sinus syndrome/V. tach/chronic diastolic CHF  -Patient has pacer/ICD  -Continue Bumex    Sjogren syndrome  -Continue prednisone    Outisde Records, prior notes, labs, radiology, and medications reviewed     Code status: Full code  DVT prophylaxis: [facility] Problems  Date Reviewed: 3/10/2023            Codes Class Noted POA    * (Principal) Frequent falls ICD-10-CM: R29.6  ICD-9-CM: V15.88  3/10/2023 Unknown        UTI (urinary tract infection) ICD-10-CM: N39.0  ICD-9-CM: 599.0  3/10/2023 Yes        Debility ICD-10-CM: R53.81  ICD-9-CM: 799.3  10/19/2021 Unknown        HTN (hypertension) (Chronic) ICD-10-CM: I10  ICD-9-CM: 401.9  Unknown Yes        CAD (coronary artery disease) (Chronic) ICD-10-CM: I25.10  ICD-9-CM: 414.00  Unknown Yes        Persistent atrial fibrillation ([address] ICD-10-CM: I48.19  ICD-9-CM: 427.31  Unknown Yes             Review of Systems:   A comprehensive review of systems was negative except for that written in the HPI. Vital Signs:    Last 24hrs VS reviewed since prior progress note.  Most recent are:  Visit Vitals  BP (!) 104/52 (BP 1 Location: Right upper arm, BP Patient Position: At rest;Semi fowlers)   Pulse 97   Temp 97.7 °F (36.5 °C)   Resp 18   Ht 5' 7\" (1.702 m)   Wt 60.9 kg (134 lb 3.2 oz)   SpO2 91%   Breastfeeding No   BMI 21.02 kg/m²         Intake/Output Summary (Last 24 hours) at 3/11/2023 1245  Last data filed at 3/11/2023 1227  Gross per 24 hour   Intake 1861 ml   Output 650 ml   Net 1211 ml        Physical Examination:             Constitutional:  No acute distress, cooperative, pleasant    ENT:  Oral mucosa moist, oropharynx benign. Resp:  CTA bilaterally. No wheezing/rhonchi/rales. No accessory muscle use   CV:  Regular rhythm, normal rate, no murmurs, gallops, rubs    GI:  Soft, non distended, non tender. normoactive bowel sounds, no hepatosplenomegaly     Musculoskeletal:  No edema, warm, 2+ pulses throughout    Neurologic:  Moves all extremities. AAOx3, CN II-XII reviewed     Psych:  Good insight, Not anxious nor agitated. Data Review:    Review and/or order of clinical lab test      Labs:     Recent Labs     03/11/23  0137 03/10/23  2046   WBC 4.1 5.5   HGB 9.6* 10.2*   HCT 30.0* 31.5*   * 148*     Recent Labs     03/11/23  0137 03/10/23  2046    140   K 3.1* 3.2*    105   CO2 27 29   BUN 23* 23*   CREA 0.80 0.93   GLU 98 102*   CA 8.6 8.9   MG 1.7 1.7   PHOS 3.0 2.8     Recent Labs     03/11/23  0137 03/10/23  2046   ALT 31 33   AP 55 64   TBILI 1.6* 1.8*   TP 5.4* 6.2*   ALB 2.6* 3.1*   GLOB 2.8 3.1     No results for input(s): INR, PTP, APTT, INREXT in the last 72 hours. No results for input(s): FE, TIBC, PSAT, FERR in the last 72 hours. Lab Results   Component Value Date/Time    Folate 27.2 (H) 05/25/2021 03:40 PM      No results for input(s): PH, PCO2, PO2 in the last 72 hours.   Recent Labs     03/10/23  2046   *     Lab Results   Component Value Date/Time    Cholesterol, total 183 02/16/2021 02:58 PM    HDL Cholesterol 77 02/16/2021 02:58 PM    LDL, calculated 83.8 02/16/2021 02:58 PM    Triglyceride 111 02/16/2021 02:58 PM    CHOL/HDL Ratio 2.4 02/16/2021 02:58 PM     Lab Results   Component Value Date/Time    Glucose (POC) 275 (H) 02/16/2019 05:38 PM     Lab Results   Component Value Date/Time    Color YELLOW/STRAW 03/10/2023 10:27 PM    Appearance CLOUDY (A) 03/10/2023 10:27 PM    Specific gravity 1.016 03/10/2023 10:27 PM    pH (UA) 8.0 03/10/2023 10:27 PM    Protein 30 (A) 03/10/2023 10:27 PM    Glucose Negative 03/10/2023 10:27 PM    Ketone 15 (A) 03/10/2023 10:27 PM    Bilirubin Negative 03/10/2023 10:27 PM    Urobilinogen 1.0 03/10/2023 10:27 PM    Nitrites Negative 03/10/2023 10:27 PM    Leukocyte Esterase LARGE (A) 03/10/2023 10:27 PM    Epithelial cells FEW 03/10/2023 10:27 PM    Bacteria TOO NUMEROUS TO COUNT (A) 03/10/2023 10:27 PM    WBC 20-50 03/10/2023 10:27 PM    RBC 0-5 03/10/2023 10:27 PM         Medications Reviewed:     Current Facility-Administered Medications   Medication Dose Route Frequency    amiodarone (CORDARONE) tablet 200 mg  200 mg Oral DAILY    apixaban (ELIQUIS) tablet 2.5 mg  2.5 mg Oral BID    bumetanide (BUMEX) tablet 1 mg  1 mg Oral DAILY    carvediloL (COREG) tablet 3.125 mg  3.125 mg Oral BID WITH MEALS    cetirizine (ZYRTEC) tablet 10 mg  10 mg Oral DAILY    fluticasone propionate (FLONASE) 50 mcg/actuation nasal spray 2 Spray  2 Spray Both Nostrils DAILY PRN    levothyroxine (SYNTHROID) tablet 112 mcg  112 mcg Oral ACB    PARoxetine hcl (PAXIL) 10 mg/5 mL oral suspension 15 mg  15 mg Oral DAILY    predniSONE (DELTASONE) tablet 5 mg  5 mg Oral DAILY WITH BREAKFAST    sodium chloride (NS) flush 5-40 mL  5-40 mL IntraVENous Q8H    sodium chloride (NS) flush 5-40 mL  5-40 mL IntraVENous PRN    0.9% sodium chloride infusion 25 mL  25 mL IntraVENous PRN    acetaminophen (TYLENOL) tablet 650 mg  650 mg Oral Q6H PRN    Or    acetaminophen (TYLENOL) suppository 650 mg  650 mg Rectal Q6H PRN    senna-docusate (PERICOLACE) 8.6-50 mg per tablet 1 Tablet  1 Tablet Oral BID    bisacodyL (DULCOLAX) suppository 10 mg  10 mg Rectal DAILY PRN    promethazine (PHENERGAN) tablet 12.5 mg  12.5 mg Oral Q6H PRN    Or    ondansetron (ZOFRAN) injection 4 mg  4 mg IntraVENous Q6H PRN    HYDROmorphone (DILAUDID) syringe 0.5 mg  0.5 mg IntraVENous Q4H PRN    oxyCODONE IR (ROXICODONE) tablet 5 mg  5 mg Oral Q4H PRN    cefepime (MAXIPIME) 2 g in 0.9% sodium chloride (MBP/ADV) 100 mL MBP  2 g IntraVENous Q24H     ______________________________________________________________________  EXPECTED LENGTH OF STAY: - - -  ACTUAL LENGTH OF STAY:          1                 Geni Kaye MD

## 2023-03-11 NOTE — H&P
Javid Blum [facility] 79  [address] [facility], [address] [place] [address]  [phone]    Hospitalist Admission History and Physical      NAME:  Leyda Hinton   :   1928   MRN:  [id number]     PCP:  None     Date/Time of service:  3/10/2023  11:40 PM        Subjective:     CHIEF COMPLAINT: Fall, couldn't get up     HISTORY OF PRESENT ILLNESS:     The patient is a 81 yo hx of HTN, CAD, chronic afib, SSS/V-tach s/p AICD, Sjogren's, presented w/ fall, weakness, UTI. The patient fell yesterday while using her walker. She could not get up for 24 hours. Denied chest pain, SOB, fevers, chills, syncope, or focal deficits. The patient mainly c/o generalized pain. In the ED, head/spine CT were neg for fractures, bleeding. Allergies   Allergen Reactions    Latex Hives     NOT ALLERGIC PER PT 2018    Pcn [Penicillins] Anaphylaxis    Sulfa (Sulfonamide Antibiotics) Anaphylaxis    Biaxin [Clarithromycin] Nausea Only    Biotin Unknown (comments)    Covid-19 Vaccine, Mrna, [id number], Lnp-S (Moderna) Rash    Pcn [Penicillins] Hives    Sulfa (Sulfonamide Antibiotics) Nausea Only       Prior to Admission medications    Medication Sig Start Date End Date Taking? Authorizing Provider   cetirizine (ZYRTEC) 10 mg tablet Take 1 Tablet by mouth daily. 22  Yes Danny Stubbs NP   levothyroxine (SYNTHROID) 112 mcg tablet Take 1 Tablet by mouth Daily (before breakfast). 22  Yes Danny Stubbs NP   PARoxetine (PAXIL) 10 mg tablet Take 1.5 tablets by mouth once daily. 22  Yes Danny Stubbs NP   amiodarone (CORDARONE) 200 mg tablet Take 200 mg by mouth daily. Yes Provider, Historical   bumetanide (BUMEX) 1 mg tablet Take 1 mg by mouth daily. Yes Provider, Historical   carvediloL (COREG) 3.125 mg tablet Take 3.125 mg by mouth two (2) times a day. Yes Provider, Historical   apixaban (ELIQUIS) 2.5 mg tablet Take 2.5 mg by mouth two (2) times a day.  20  Yes Provider, Historical   guaiFENesin (ROBITUSSIN) 100 mg/5 mL liquid Take 10 mL by mouth four (4) times daily as needed for Cough. Indications: cold symptoms 1/13/23   Kvng Gallardo NP   cholecalciferol, vitamin D3, 50 mcg (2,000 unit) tab Take 1 Tablet by mouth daily. 11/17/22   Kvng Gallardo NP   fluticasone propionate (FLONASE) 50 mcg/actuation nasal spray 2 Sprays by Both Nostrils route daily as needed for Allergies. 11/17/22   Kvng Gallardo NP   predniSONE (DELTASONE) 5 mg tablet Take 5 mg by mouth. Take 3 tabs PO Daily for 2 weeks  Take 2 tabs PO Daily for 2 weeks  Take 1 tab PO daily until directed (Per Dermatologist)    Provider, Historical   PNV 65-QMKF fum,b-g-folic acid (NataChew, Fe Bis-glycinate,) 28 mg iron -1 mg chew Take 1 Tablet by mouth daily. Provider, Historical   eszopiclone (LUNESTA) 3 mg tablet Take 3 mg by mouth nightly. Indications: difficulty sleeping    Provider, Historical   acetaminophen (TYLENOL) 325 mg tablet Take 2 Tablets by mouth every six (6) hours as needed for Pain.  10/29/21   Esther Barbour MD       Past Medical History:   Diagnosis Date    Acute cystitis 05/23/2018    Anemia, unspecified     Anxiety     Arrhythmia     a fib    Arthritis     osteoarthritis, rheumatoid    Atrial fibrillation (HCC)     Dr. Lorenzo Webber and Dr. Troy Kayser  following    Autoimmune disease [facility])     sjogren disease Dr. Steven Hook    CAD (coronary artery disease)     History of vascular access device 10/29/2021    4 FR Single PICC Lt Ant bx 41 cm 1 cm out    HTN (hypertension)     Hyperlipidemia LDL goal < 100     Hypothyroid     Insomnia     Osteoarthritis     Sjogren's disease ([address]     Thoracic aneurysm without mention of rupture         Past Surgical History:   Procedure Laterality Date    HX BIV-IMPLANTABLE CARDIOVERTER DEFIBRILLATOR      HX CHOLECYSTECTOMY      HX COLONOSCOPY  03/2014    Dr. Elena Murillo Left     HX OTHER SURGICAL      hernia repairs    HX PARTIAL THYROIDECTOMY HX JANNET AND BSO      HX VEIN STRIPPING      NM INSJ ELTRD CAR COLLIN SYS TM INSJ DFB/PM PLS GEN Left 02/22/2019    Lv Lead Placement performed by Seng Mckeon MD at [facility] CATH LAB    NM INSJ/RPLCMT PERM DFB W/TRNSVNS LDS 1/DUAL [facility] Left 02/22/2019    upgrade PM to ICD-Bsx performed by Seng Mckeon MD at [facility] LAB       Social History     Tobacco Use    Smoking status: Never    Smokeless tobacco: Never   Substance Use Topics    Alcohol use:  Yes     Alcohol/week: 7.0 standard drinks     Types: 7 Glasses of wine per week     Comment: wine        Family History   Problem Relation Age of Onset    Heart Disease Mother     Prostate Cancer Father     Cancer Maternal Grandfather         Review of Systems:  (bold if positive, if negative)    Gen:  Eyes:  ENT:  CVS:  Pulm:  GI:  :  MS:  Pain, weakness, swelling, arthritis  Skin:  Psych:  Endo:  Hem:  Renal:  Neuro:          Objective:      VITALS:    Vital signs reviewed; most recent are:    Visit Vitals  BP (!) 160/67 (BP 1 Location: Right upper arm, BP Patient Position: Sitting)   Pulse 62   Temp 98.6 °F (37 °C)   Resp 14   Ht 5' 7\" (1.702 m)   Wt 54 kg (119 lb)   SpO2 95%   BMI 18.64 kg/m²     SpO2 Readings from Last 6 Encounters:   03/10/23 95%   01/26/23 95%   11/16/22 97%   04/04/22 97%   10/30/21 91%   10/04/21 94%          Intake/Output Summary (Last 24 hours) at 3/10/2023 2340  Last data filed at 3/10/2023 2300  Gross per 24 hour   Intake 1000 ml   Output --   Net 1000 ml        Exam:     Physical Exam:    Gen:  elderly, frail, ill-appearing, mild distress  HEENT:  Pink conjunctivae, PERRL, hearing intact to voice, dry mucous membranes  Neck:  Supple, without masses, thyroid non-tender  Resp:  No accessory muscle use, clear breath sounds without wheezes rales or rhonchi  Card:  No murmurs, normal S1, S2 without thrills, bruits or peripheral edema  Abd:  Soft, non-tender, non-distended, normoactive bowel sounds are present  Lymph:  No cervical adenopathy  Musc:  No cyanosis or clubbing  Skin:  bruising of extremities  Neuro:  Cranial nerves 3-12 are grossly intact, generalized weakness, follows commands appropriately  Psych:  Alert with good insight. Oriented to person, place, and time      Labs:    Recent Labs     03/10/23  2046   WBC 5.5   HGB 10.2*   HCT 31.5*   *     Recent Labs     03/10/23  2046      K 3.2*      CO2 29   *   BUN 23*   CREA 0.93   CA 8.9   MG 1.7   PHOS 2.8   ALB 3.1*   TBILI 1.8*   ALT 33     Lab Results   Component Value Date/Time    Glucose (POC) 275 (H) 02/16/2019 05:38 PM     No results for input(s): PH, PCO2, PO2, HCO3, FIO2 in the last 72 hours. No results for input(s): INR, INREXT in the last 72 hours. Radiology and EKG reviewed:   head CT reviewed    **Prior records, notes, labs, radiology, and medications reviewed in [facility]**       Assessment/Plan:       Principal Problem:    79 yo hx of HTN, CAD, chronic afib, SSS/V-tach s/p AICD, Sjogren's, presented w/ fall, weakness, UTI    1) Frequent falls/debility: no syncope. No evidence of CVA. Has mild rhabdo. Will consult PT/OT. Will need placement    2) UTI: check urine Cx. Start IV cefepime (due to allergies)    3) HTN/afib/CAD: cont coreg, amio, eliquis. Consider stopping eliquis if frequent falls     4) SSS/V-tach/chronic dCHF: s/p pacer/AICD.   Cont bumex    5) Sjogren's: cont prednisone    Risk of deterioration: high      Total time spent with patient care: 79 Minutes **I personally saw and examined the patient during this time period**                 Care Plan discussed with: Patient, nursing     Discussed:  Care Plan    Prophylaxis:   eliquis    Probable Disposition:  SNF/LTC           ___________________________________________________    Attending Physician: Alize Coughlin MD

## 2023-03-11 NOTE — ED TRIAGE NOTES
Pt arrived via EMS , pt is coming from home. Was found by family on the floor. Pt had a GLF last night after family left, reports hitting her head. Denies LOC or headache    Pt reports back pain and left hip pain.      Pt is on eliquis

## 2023-03-11 NOTE — PROGRESS NOTES
Problem: Mobility Impaired (Adult and Pediatric)  Goal: *Acute Goals and Plan of Care (Insert Text)  Description: Description: FUNCTIONAL STATUS PRIOR TO ADMISSION: Patient was modified independent using a tall rolling walker for functional mobility. Her son assists with groceries and errands. HOME SUPPORT: The patient lived alone with son to provide assistance. One story home son lives nearby. Physical Therapy Goals  Initiated 3/11/2023  1. Patient will move from supine to sit and sit to supine  in bed with independence within 7 day(s). 2.  Patient will transfer from bed to chair and chair to bed with independence using the least restrictive device within 7 day(s). 3.  Patient will perform sit to stand with independence within 7 day(s). 4.  Patient will ambulate with independence for 150 feet with the least restrictive device within 7 day(s). 3/11/2023 1602 by Joanna Lieberman, PT  Outcome: Progressing Towards Goal  3/11/2023 1558 by Joanna Lieberman, PT  Outcome: Progressing Towards Goal   PHYSICAL THERAPY EVALUATION  Patient: Vahe Roberson (1 y.o. female)  Date: 3/11/2023  Primary Diagnosis: Frequent falls [R29.6]       Precautions: fall       ASSESSMENT  Based on the objective data described below, the patient presents with impaired functional mobility, activity tolerance, standing balance, generalized strength and questionable safety awareness necessary in ADL tasks s/p GLF at home. .  Knee, ankle, hip, lumbar, pelvis and cspine imaging all negative. Patient alert, oriented x4 and agreeable to therapy, but does not want to go out of the room. She reports fall occurred due to walker turning and she has a life alert but was not wearing it so she was on the floor over a day. Performed transfer training for supine to sit with good sitting balance to don shoes with min A. Performed gait training in the room and in the bathroom with RW with min A for all sit <> stand and CGA for mobility.   Poor carry over of safety education on RW use and use of call bell despite multiple conversations to call for nursing to get up. Current Level of Function Impacting Discharge (mobility/balance): min assist with sit <> stand and with gait    Functional Outcome Measure: The patient scored 55 out of 100 on the Barthel outcome measure     Other factors to consider for discharge: lives alone, will likely refuse rehab or SNF     Patient will benefit from skilled therapy intervention to address the above noted impairments. PLAN :  Recommendations and Planned Interventions: bed mobility training, transfer training, gait training, and therapeutic exercises      Frequency/Duration: Patient will be followed by physical therapy:  5 times a week to address goals. Recommendation for discharge: (in order for the patient to meet his/her long term goals)  Therapy up to 5 days/week in SNF setting vs home with 24/7    This discharge recommendation:  Has not yet been discussed the attending provider and/or case management    IF patient discharges home will need the following DME: patient owns DME required for discharge         SUBJECTIVE:   Patient stated I was on the floor for over 18 hours.     OBJECTIVE DATA SUMMARY:   HISTORY:    Past Medical History:   Diagnosis Date    Acute cystitis 05/23/2018    Anemia, unspecified     Anxiety     Arrhythmia     a fib    Arthritis     osteoarthritis, rheumatoid    Atrial fibrillation (HCC)     Dr. Sandrita Reilly and Dr. Melba Hernandez  following    Autoimmune disease [facility])     sjogren disease Dr. Natty Major    CAD (coronary artery disease)     History of vascular access device 10/29/2021    4 FR Single PICC Lt Ant bx 41 cm 1 cm out    HTN (hypertension)     Hyperlipidemia LDL goal < 100     Hypothyroid     Insomnia     Osteoarthritis     Sjogren's disease ([facility] [address]     Thoracic aneurysm without mention of rupture      Past Surgical History:   Procedure Laterality Date    HX BIV-IMPLANTABLE CARDIOVERTER DEFIBRILLATOR      HX CHOLECYSTECTOMY      HX COLONOSCOPY  03/2014    Dr. Helio Elizabeth    HX HIP REPLACEMENT Left     HX OTHER SURGICAL      hernia repairs    HX PARTIAL THYROIDECTOMY      HX JANNET AND BSO      HX VEIN STRIPPING      OH INSJ ELTRD CAR COLLIN SYS TM INSJ DFB/PM PLS GEN Left 02/22/2019    Lv Lead Placement performed by Alfredo Lawrence MD at [address] [facility] CATH LAB    OH INSJ/RPLCMT PERM DFB W/TRNSVNS LDS 1/DUAL [facility] Left 02/22/2019    upgrade PM to ICD-Bsx performed by Alfredo Lawrence MD at [facility] [facility] CATH LAB       Personal factors and/or comorbidities impacting plan of care:     Home Situation  Home Environment: Private residence  One/Two Story Residence: One story  Living Alone: Yes  Support Systems: Child(lynette), Other Family Member(s)  Patient Expects to be Discharged to[de-identified] Home with family assistance  Current DME Used/Available at Home: Walker, rolling, Safety frame toliet (tall RW)  Tub or Shower Type:  (sponge bath at sink)    EXAMINATION/PRESENTATION/DECISION MAKING:   Critical Behavior:  Neurologic State: Alert  Orientation Level: Oriented to person, Oriented to place, Oriented to situation, Disoriented to time  Cognition: Follows commands     Hearing: Auditory  Auditory Impairment: Hard of hearing, bilateral, Hearing aid(s)  Hearing Aids/Status: With patient    Functional Mobility:  Bed Mobility:     Supine to Sit: Bed Modified;Stand-by assistance     Scooting: Stand-by assistance; Additional time  Transfers:  Sit to Stand: Minimum assistance (vc for hand placement)  Stand to Sit: Minimum assistance        Bed to Chair: Minimum assistance              Balance:   Sitting: Intact  Standing: Impaired  Standing - Static: Constant support; Fair  Standing - Dynamic : Constant support; Fair  Ambulation/Gait Training:  Distance (ft): 20 Feet (ft)  Assistive Device: Walker, rolling;Gait belt  Ambulation - Level of Assistance: Minimal assistance;Assist x1;Additional time Functional Measure:  Barthel Index:    Bathin  Bladder: 10  Bowels: 10  Groomin  Dressin  Feeding: 10  Mobility: 0  Stairs: 0  Toilet Use: 5  Transfer (Bed to Chair and Back): 10  Total: 55/100       The Barthel ADL Index: Guidelines  1. The index should be used as a record of what a patient does, not as a record of what a patient could do. 2. The main aim is to establish degree of independence from any help, physical or verbal, however minor and for whatever reason. 3. The need for supervision renders the patient not independent. 4. A patient's performance should be established using the best available evidence. Asking the patient, friends/relatives and nurses are the usual sources, but direct observation and common sense are also important. However direct testing is not needed. 5. Usually the patient's performance over the preceding 24-48 hours is important, but occasionally longer periods will be relevant. 6. Middle categories imply that the patient supplies over 50 per cent of the effort. 7. Use of aids to be independent is allowed. Score Interpretation (from [address] [address])    Independent   60-79 Minimally independent   40-59 Partially dependent   20-39 Very dependent   <20 Totally dependent     -Fadumo Kim., Barthel, D.W. (1965). Functional evaluation: the Barthel Index. [facility] ([address] (1997). The Barthel activities of daily living index: self-reporting versus actual performance in the old (> or = 75 years). Journal of [address] [place] [address] 45(7), [facility], J.J.M.F, Estephanie Méndez., Jenn Becerra. (1999). Measuring the change in disability after inpatient rehabilitation; comparison of the responsiveness of the Barthel Index and Functional [place] Measure. Journal of Neurology, Neurosurgery, and Psychiatry, 66(4), 947-063.   -COMPA Hensley.DARRION, JAYSON Miranda, hSalom Vicente, M.A. (2004) Assessment of post-stroke quality of life in cost-effectiveness studies: The usefulness of the Barthel Index and the EuroQoL-5D. Quality of Life Research, 15, 511-67           Physical Therapy Evaluation Charge Determination   History Examination Presentation Decision-Making   LOW Complexity : Zero comorbidities / personal factors that will impact the outcome / POC LOW Complexity : 1-2 Standardized tests and measures addressing body structure, function, activity limitation and / or participation in recreation  LOW Complexity : Stable, uncomplicated  LOW Complexity : FOTO score of       Based on the above components, the patient evaluation is determined to be of the following complexity level: LOW     Pain Rating:  None reported    Activity Tolerance:   Good    After treatment patient left in no apparent distress:   Sitting in chair, Call bell within reach, and Bed / chair alarm activated    COMMUNICATION/EDUCATION:   The patients plan of care was discussed with: Occupational therapist and Registered nurse. Fall prevention education was provided and the patient/caregiver indicated understanding., Patient/family have participated as able in goal setting and plan of care. , and Patient/family agree to work toward stated goals and plan of care.     Thank you for this referral.  Isabell Haile, PT   Time Calculation: 24 mins

## 2023-03-11 NOTE — PROGRESS NOTES
Problem: Falls - Risk of  Goal: *Absence of Falls  Description: Document Sharon Hernandez Fall Risk and appropriate interventions in the flowsheet. Outcome: Progressing Towards Goal  Note: Fall Risk Interventions:  Mobility Interventions: Bed/chair exit alarm    Mentation Interventions: Door open when patient unattended, Adequate sleep, hydration, pain control    Medication Interventions: Teach patient to arise slowly    Elimination Interventions: Bed/chair exit alarm, Call light in reach, Stay With Me (per policy)    History of Falls Interventions: Bed/chair exit alarm         Problem: Patient Education: Go to Patient Education Activity  Goal: Patient/Family Education  Outcome: Progressing Towards Goal     Problem: Pressure Injury - Risk of  Goal: *Prevention of pressure injury  Description: Document Luciano Scale and appropriate interventions in the flowsheet.   Outcome: Progressing Towards Goal  Note: Pressure Injury Interventions:  Sensory Interventions: Assess changes in LOC    Moisture Interventions: Internal/External urinary devices    Activity Interventions: PT/OT evaluation    Mobility Interventions: HOB 30 degrees or less    Nutrition Interventions: Document food/fluid/supplement intake    Friction and Shear Interventions: HOB 30 degrees or less                Problem: Patient Education: Go to Patient Education Activity  Goal: Patient/Family Education  Outcome: Progressing Towards Goal

## 2023-03-11 NOTE — PROGRESS NOTES
3/11/23  3:11 PM    Care Management Initial Evaluation:    CM reviewed EMR and met with patient and patients son in the room to complete the initial evaluation. Confirmed charted demographics. Reason for Admission:    1. Fall, initial encounter    2. Injury of head, initial encounter    3. Inability to ambulate due to ankle or foot    4. 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 DDD (degenerative disc disease), lumbosacral                         RUR Score:     13%  Level: Low                Plan for utilizing home health:  No recent home health services- had in the past        Rehab history: [facility] in 2021    PCP: First and Last name:  [facility] [employer]     Name of Practice:    Are you a current patient: Yes/No: Yes   Approximate date of last visit: 1/26/23   Can you participate in a virtual visit with your PCP:                     Current Advanced Directive/Advance Care Plan: Full Code      Healthcare Decision Maker:   Click here to complete [address] including selection of the Healthcare Decision Maker Relationship (ie \"Primary\")             Primary Decision MakerSshar Mukherjee - [phone]                  Transition of Care Plan:                    Home environment: Patient lives alone in a first floor condo  PTA: Normally independent with all ADL's, family stops by often and gets groceries, assists with meals and does laundry, she has a cleaning service-- has a life alert system- was no wearing it. DME: RW, Wheelchair, Shower chair, grab bars, raised toilet seat    1). Patient admitted for emergent care  2). PT/OT to eval  3). Family will transport at dc  4). CM following for dc needs    [facility] Management Interventions  PCP Verified by CM: Yes (saw last month)  Mode of Transport at Discharge:  Other (see comment) (Patients son will transport her home)  Transition of Care Consult (CM Consult): Discharge Planning  Discharge Durable Medical Equipment: No  Physical Therapy Consult: Yes  Occupational Therapy Consult: Yes  Speech Therapy Consult: No  Support Systems: Child(lynette), Other Family Member(s)  Confirm Follow Up Transport: Family  Discharge Location  Patient Expects to be Discharged to[de-identified] Home with family assistance

## 2023-03-11 NOTE — ED NOTES
TRANSFER - OUT REPORT:    Verbal report given to oliver hilliard(name) on Ana M Kirby  being transferred to [facility](unit) for routine progression of care       Report consisted of patients Situation, Background, Assessment and   Recommendations(SBAR). Information from the following report(s) SBAR, Kardex, ED Summary, Procedure Summary and MAR was reviewed with the receiving nurse. Lines:   Peripheral IV 03/10/23 Right Antecubital (Active)        Opportunity for questions and clarification was provided.       Patient transported with:   Registered Nurse

## 2023-03-11 NOTE — PROGRESS NOTES
Problem: Self Care Deficits Care Plan (Adult)  Goal: *Acute Goals and Plan of Care (Insert Text)  Description: FUNCTIONAL STATUS PRIOR TO ADMISSION: Patient was modified independent using a tall rolling walker for functional mobility. Her son assists with groceries and errands. HOME SUPPORT: The patient lived alone with son to provide assistance. Occupational Therapy Goals  Initiated 3/11/2023  1. Patient will perform lower body dressing with supervision/set-up within 7 day(s). 2.  Patient will perform grooming standing at sink with supervision/set-up within 7 day(s). 3.  Patient will perform bathing standing at sink with supervision/set-up within 7 day(s). 4.  Patient will perform toilet transfers with supervision/set-up within 7 day(s). 5.  Patient will perform all aspects of toileting with supervision/set-up within 7 day(s). 6.  Patient will participate in upper extremity therapeutic exercise/activities with supervision/set-up for 8 minutes within 7 day(s). 7.  Patient will utilize energy conservation techniques during functional activities with verbal cues within 7 day(s). Outcome: Not Met   OCCUPATIONAL THERAPY EVALUATION  Patient: Vahe Roberson (12 y.o. female)  Date: 3/11/2023  Primary Diagnosis: Frequent falls [R29.6]       Precautions: Fall       ASSESSMENT  Nursing cleared for therapy. Based on the objective data described below, the patient presents with impaired functional mobility, activity tolerance, standing balance, generalized strength and questionable safety awareness necessary in ADL tasks s/p GLF at home. Patient alert, oriented x4 and agreeable to therapy. She reports fall occurred due to walker turning and she has a life alert but was not wear it so she was on the floor over a day. All imaging negative for acute changes. Supine > sit with CGA with HOB mild elevation with good sitting balance to don shoes with min A.   She ambulated to the bathroom with RW with min A for all sit <> stand and CGA for mobility. Poor carry over of safety education on RW use and use of call bell despite multiple conversations to call for nursing to get up. Current Level of Function Impacting Discharge (ADLs/self-care): shoes min A, self care transfer min A    Functional Outcome Measure: The patient scored 55/100 on the Barthel Index outcome measure which is indicative of moderate impairment with ADL tasks. Other factors to consider for discharge: Patient was living at home alone. She prefers to dc home and not go to rehab. If home then recommend 24 hour assistance as she is needing min A for sit <> stand and is a high fall risk. Patient will benefit from skilled therapy intervention to address the above noted impairments. PLAN :  Recommendations and Planned Interventions: self care training, functional mobility training, therapeutic exercise, balance training, therapeutic activities, endurance activities, patient education, home safety training, and family training/education    Frequency/Duration: Patient will be followed by occupational therapy 5 times a week to address goals.     Recommendation for discharge: (in order for the patient to meet his/her long term goals)  Therapy up to 5 days/week in SNF setting vs  with assistance    This discharge recommendation:  Has been made in collaboration with the attending provider and/or case management    IF patient discharges home will need the following DME: walker: rolling, wearing life alert       SUBJECTIVE:   Patient stated I have a life alert but I didn't have it on.    OBJECTIVE DATA SUMMARY:   HISTORY:   Past Medical History:   Diagnosis Date    Acute cystitis 05/23/2018    Anemia, unspecified     Anxiety     Arrhythmia     a fib    Arthritis     osteoarthritis, rheumatoid    Atrial fibrillation (HCC)     Dr. Arnulfo Kearns and Dr. Sudeep Wilson  following    Autoimmune disease [facility])     sjogren disease Dr. Chetan Callahan    CAD (coronary artery disease)     History of vascular access device 10/29/2021    4 FR Single PICC Lt Ant bx 41 cm 1 cm out    HTN (hypertension)     Hyperlipidemia LDL goal < 100     Hypothyroid     Insomnia     Osteoarthritis     Sjogren's disease ([address]     Thoracic aneurysm without mention of rupture      Past Surgical History:   Procedure Laterality Date    HX BIV-IMPLANTABLE CARDIOVERTER DEFIBRILLATOR      HX CHOLECYSTECTOMY      HX COLONOSCOPY  03/2014    Dr. Russel Ferreira    HX HIP REPLACEMENT Left     HX OTHER SURGICAL      hernia repairs    HX PARTIAL THYROIDECTOMY      HX JANNET AND BSO      HX VEIN STRIPPING      CO INSJ ELTRD CAR COLLIN SYS TM INSJ DFB/PM PLS GEN Left 02/22/2019    Lv Lead Placement performed by Key Vazquez MD at [facility] CATH LAB    CO INSJ/RPLCMT PERM DFB W/TRNSVNS LDS 1/DUAL [facility] [place] Left 02/22/2019    upgrade PM to ICD-Bsx performed by Key Vazquez MD at [facility] CATH LAB       Expanded or extensive additional review of patient history:     Home Situation  Home Environment: Private residence  One/Two Story Residence: One story  Living Alone: Yes  Support Systems: Child(lynette), Other Family Member(s)  Patient Expects to be Discharged to[de-identified] Home with family assistance  Current DME Used/Available at Home: Walker, rolling, Safety frame toliet (tall RW)  Tub or Shower Type:  (sponge bath at sink)    Hand dominance: Right    EXAMINATION OF PERFORMANCE DEFICITS:  Cognitive/Behavioral Status:  Neurologic State: Alert  Orientation Level: Oriented to person;Oriented to place;Oriented to situation;Disoriented to time  Cognition: Follows commands             Hearing:   Auditory  Auditory Impairment: Hard of hearing, bilateral, Hearing aid(s)  Hearing Aids/Status: With patient    Skin:   L distal LE red  R distal LE blueish- when in dependent position                                   Range of Motion:  BUE WDL                      Strength:  BUE mild impairment, functional                   Coordination:     Fine Motor Skills-Upper: Left Intact; Right Intact    Gross Motor Skills-Upper: Left Intact; Right Intact    Tone & Sensation:  BUE WDL                            Balance:  Sitting: Intact  Standing: Impaired  Standing - Static: Constant support; Fair  Standing - Dynamic : Constant support; Fair    Functional Mobility and Transfers for ADLs:  Bed Mobility:  Supine to Sit: Bed Modified;Stand-by assistance  Scooting: Stand-by assistance; Additional time    Transfers:  Sit to Stand: Minimum assistance (vc for hand placement)  Stand to Sit: Minimum assistance  Bed to Chair: Minimum assistance  Bathroom Mobility: Contact guard assistance;Minimum assistance  Toilet Transfer : Minimum assistance  Assistive Device : Walker, rolling    ADL Assessment:  Feeding: Independent    Oral Facial Hygiene/Grooming: Stand-by assistance    Bathing: Contact guard assistance    Type of Bath: Bath Pack    Upper Body Dressing: Setup;Supervision    Lower Body Dressing: Minimum assistance    Toileting: Minimum assistance                  ADL Intervention and task modifications:  Patient instructed and indicated understanding the benefits of maintaining activity tolerance, functional mobility, and independence with self care tasks during acute stay  to ensure safe return home and to baseline. Encouraged patient to increase frequency and duration OOB, be out of bed for all meals, perform daily ADLs (as approved by RN/MD regarding bathing etc), and performing functional mobility to/from bathroom. Provided education with patient on fall prevention for hospital and at home. This includes not getting OOB/chair/toilet without staff assistance, good lighting, good footwear, and recommended AD use. Patient with questionable understanding. Activated chair alarm. Provided education through multi-modal cues for RW safety including proper positioning, hand placement,  and safety. Patient able to perform sit <> stand with Sally assistance.   Poor understanding of RW use and safety. Lower Body Dressing Assistance  Slip on Shoes with Back: Minimum assistance              Functional Measure:    Barthel Index:  Bathin  Bladder: 10  Bowels: 10  Groomin  Dressin  Feeding: 10  Mobility: 0  Stairs: 0  Toilet Use: 5  Transfer (Bed to Chair and Back): 10  Total: 55/100      The Barthel ADL Index: Guidelines  1. The index should be used as a record of what a patient does, not as a record of what a patient could do. 2. The main aim is to establish degree of independence from any help, physical or verbal, however minor and for whatever reason. 3. The need for supervision renders the patient not independent. 4. A patient's performance should be established using the best available evidence. Asking the patient, friends/relatives and nurses are the usual sources, but direct observation and common sense are also important. However direct testing is not needed. 5. Usually the patient's performance over the preceding 24-48 hours is important, but occasionally longer periods will be relevant. 6. Middle categories imply that the patient supplies over 50 per cent of the effort. 7. Use of aids to be independent is allowed. Score Interpretation (from [address] [address])    Independent   60-79 Minimally independent   40-59 Partially dependent   20-39 Very dependent   <20 Totally dependent     -Fadumo Kim., Barthel, D.W. (1965). Functional evaluation: the Barthel Index. [facility] ([address] (1997). The Barthel activities of daily living index: self-reporting versus actual performance in the old (> or = 75 years). Journal of [address] [place] [address] 45(7), [facility], .[facility], [facility]., [place] Sender. (). Measuring the change in disability after inpatient rehabilitation; comparison of the responsiveness of the Barthel Index and Functional Flora Measure.  Journal of Neurology, Neurosurgery, and Psychiatry, 66(4), 438-511. JOSE MIGUEL Abbott, JAYSON Miranda, & Benson Rivera M.A. (2004) Assessment of post-stroke quality of life in cost-effectiveness studies: The usefulness of the Barthel Index and the EuroQoL-5D. Quality of Life Research, 15, 228-34         Occupational Therapy Evaluation Charge Determination   History Examination Decision-Making   LOW Complexity : Brief history review  LOW Complexity : 1-3 performance deficits relating to physical, cognitive , or psychosocial skils that result in activity limitations and / or participation restrictions  LOW Complexity : No comorbidities that affect functional and no verbal or physical assistance needed to complete eval tasks       Based on the above components, the patient evaluation is determined to be of the following complexity level: LOW   Pain Rating:  No c/o pain at rest or with activity    Activity Tolerance:   Fair    After treatment patient left in no apparent distress:    Sitting in chair, Call bell within reach, and Bed / chair alarm activated    COMMUNICATION/EDUCATION:   The patients plan of care was discussed with: Physical therapist and Registered nurse. Home safety education was provided and the patient/caregiver indicated understanding. and Patient/family have participated as able in goal setting and plan of care. This patients plan of care is appropriate for delegation to [facility].     Thank you for this referral.  Jennifer Del Toro OT  Time Calculation: 24 mins

## 2023-03-11 NOTE — PROGRESS NOTES
Problem: Falls - Risk of  Goal: *Absence of Falls  Description: Document [facility] Fall Risk and appropriate interventions in the flowsheet. Outcome: Progressing Towards Goal  Note: Fall Risk Interventions:  Mobility Interventions: Bed/chair exit alarm    Mentation Interventions: Door open when patient unattended, Adequate sleep, hydration, pain control    Medication Interventions: Teach patient to arise slowly    Elimination Interventions: Bed/chair exit alarm, Call light in reach, Stay With Me (per policy)    History of Falls Interventions: Bed/chair exit alarm         Problem: Pressure Injury - Risk of  Goal: *Prevention of pressure injury  Description: Document Luciano Scale and appropriate interventions in the flowsheet.   Outcome: Progressing Towards Goal  Note: Pressure Injury Interventions:  Sensory Interventions: Assess changes in LOC    Moisture Interventions: Internal/External urinary devices    Activity Interventions: PT/OT evaluation    Mobility Interventions: HOB 30 degrees or less    Nutrition Interventions: Document food/fluid/supplement intake    Friction and Shear Interventions: HOB 30 degrees or less

## 2023-03-11 NOTE — ED PROVIDER NOTES
28-year-old female with a past medical history significant for anxiety, arrhythmia, atrial fibrillation on chronic anticoagulation with Eliquis, Sjogren's syndrome, anxiety, osteoarthritis, thoracic aortic aneurysm, hyperlipidemia, insomnia, hypothyroidism who presents to the ER by EMS for evaluation for fall. The patient allegedly had a ground-level fall during which time she tripped and fell and landed on the floor and spent 24 hours on the floor. She resides by herself. She usually ambulates with a walker and able to take care of ADLs. She is complaining of neck and back pain. She denies any fever and chills, sore throat, cough or congestion, nausea or vomiting, diarrhea constipation, dysuria, sick contact, recent travel.        Past Medical History:   Diagnosis Date    Acute cystitis 05/23/2018    Anemia, unspecified     Anxiety     Arrhythmia     a fib    Arthritis     osteoarthritis, rheumatoid    Atrial fibrillation (HCC)     Dr. Orville Peacock and Dr. Jonn Whalen  following    Autoimmune disease [facility])     sjogren disease Dr. Stephanie Miller    CAD (coronary artery disease)     History of vascular access device 10/29/2021    4 FR Single PICC Lt Ant bx 41 cm 1 cm out    HTN (hypertension)     Hyperlipidemia LDL goal < 100     Hypothyroid     Insomnia     Osteoarthritis     Sjogren's disease ([facility] [address]     Thoracic aneurysm without mention of rupture        Past Surgical History:   Procedure Laterality Date    HX BIV-IMPLANTABLE CARDIOVERTER DEFIBRILLATOR      HX CHOLECYSTECTOMY      HX COLONOSCOPY  03/2014    Dr. Rony Shook    HX HIP REPLACEMENT Left     HX OTHER SURGICAL      hernia repairs    HX PARTIAL THYROIDECTOMY      HX JANNET AND BSO      HX VEIN STRIPPING      MO INSJ ELTRD CAR COLLIN SYS TM INSJ DFB/PM PLS GEN Left 02/22/2019    Lv Lead Placement performed by Rogelio Joseph MD at [facility] CATH LAB    MO INSJ/RPLCMT PERM DFB W/TRNSVNS LDS 1/DUAL [facility] Left 02/22/2019    upgrade PM to ICD-Bsx performed by Rogelio Joseph MD at [facility] CARDIAC CATH LAB         Family History:   Problem Relation Age of Onset    Heart Disease Mother     Prostate Cancer Father     Cancer Maternal Grandfather        Social History     Socioeconomic History    Marital status:      Spouse name: Not on file    Number of children: Not on file    Years of education: Not on file    Highest education level: Not on file   Occupational History    Not on file   Tobacco Use    Smoking status: Never    Smokeless tobacco: Never   Vaping Use    Vaping Use: Never used   Substance and Sexual Activity    Alcohol use: Yes     Alcohol/week: 7.0 standard drinks     Types: 7 Glasses of wine per week     Comment: wine    Drug use: Never    Sexual activity: Not Currently   Other Topics Concern     Service Not Asked    Blood Transfusions Not Asked    Caffeine Concern Not Asked    Occupational Exposure Not Asked    Hobby Hazards Not Asked    Sleep Concern Not Asked    Stress Concern Not Asked    Weight Concern Not Asked    Special Diet Not Asked    Back Care Not Asked    Exercise Not Asked    Bike Helmet Not Asked    Seat Belt Not Asked    Self-Exams Not Asked   Social History Narrative    ** Merged History Encounter **          Social Determinants of Health     Financial Resource Strain: Not on file   Food Insecurity: Not on file   Transportation Needs: Not on file   Physical Activity: Not on file   Stress: Not on file   Social Connections: Not on file   Intimate Partner Violence: Not on file   Housing Stability: Not on file         ALLERGIES: Latex; Pcn [penicillins]; Sulfa (sulfonamide antibiotics); Biaxin [clarithromycin]; Biotin; Covid-19 vaccine, mrna, [id number], lnp-s (moderna); Pcn [penicillins]; and Sulfa (sulfonamide antibiotics)    Review of Systems   All other systems reviewed and are negative.     Vitals:    03/10/23 2031   BP: (!) 160/67   Pulse: 62   Resp: 14   Temp: 98.6 °F (37 °C)   SpO2: 95%   Weight: 54 kg (119 lb)   Height: 5' 7\" (1.702 m) Physical Exam  Vitals and nursing note reviewed. Exam conducted with a chaperone present. CONSTITUTIONAL: Well-appearing; well-nourished; in no apparent distress  HEAD: Normocephalic; atraumatic  EYES: PERRL; EOM intact; conjunctiva and sclera are clear bilaterally. ENT: No rhinorrhea; normal pharynx with no tonsillar hypertrophy; mucous membranes pink/moist, no erythema, no exudate. NECK: Supple; non-tender; no cervical lymphadenopathy  CARD: Normal S1, S2; no murmurs, rubs, or gallops. Regular rate and rhythm. RESP: Normal respiratory effort; breath sounds clear and equal bilaterally; no wheezes, rhonchi, or rales. ABD: Normal bowel sounds; non-distended; non-tender; no palpable organomegaly, no masses, no bruits. Back Exam: Normal inspection; L/S vertebral point tenderness, no CVA tenderness. Normal range of motion. EXT: Normal ROM in all four extremities; to palpation in the left foot and ankle with swelling but no deformity; distal pulses are normal, no edema. SKIN: Warm; dry; ecchymosis purpuric lesion located to the left leg that extend into the ankle and foot consistent with stasis dermatitis that appears chronic. NEURO:Alert and oriented x 3, coherent, BISHNU-XII grossly intact, sensory and motor are non-focal.        Medical Decision Making  Amount and/or Complexity of Data Reviewed  Labs: ordered. Radiology: ordered. Risk  Decision regarding hospitalization. Procedures    ED EKG interpretation:  Rhythm: normal sinus rhythm; and regular . Rate (approx.): 66; Axis: normal; P wave: prolonged; QRS interval: normal ; ST/T wave: non-specific changes; in  Lead: Diffusely; first-degree AV block other findings: abnormal ekg. This EKG was interpreted by Taran Reed MD,ED Provider. XRAY INTERPRETATION (ED MD)  Chest Xray  No acute process seen. Normal heart size. No bony abnormalities. No infiltrate.   Tejal Menendez MD 8:54 PM      XRAY INTERPRETATION (ED MD)  Xray of left tib-fib shows no fracture. No subluxation/dislocation. No bony abnormality. Naseem Waller MD 8:54 PM     XRAY INTERPRETATION (ED MD)  Xray of left ankle shows no fracture. No subluxation/dislocation. No bony abnormality. Naseem Waller MD 8:54 PM       PROGRESS NOTE:  Pt has been reexamined by Naseem Waller MD all available results have been reviewed with pt and any available family. Pt understands sx, dx, and tx in ED. Care plan has been outlined and questions have been answered. Pt and any available family understands and agrees to need for admission to hospital for further tx not available in ED. Pt is ready for admission. Written by Joyce Alcala MD,  11:17 PM    Perfect Serve Consult for Admission  11:18 PM    ED Room Number: DV23/60  Patient Name and age:  Corinne Helms 80 y.o.  female  Working Diagnosis:   1. Fall, initial encounter    2. Injury of head, initial encounter    3. Inability to ambulate due to ankle or foot    4. 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 DDD (degenerative disc disease), lumbosacral        COVID-19 Suspicion:  no  Sepsis present:  no  Reassessment needed: no  Code Status:  Full Code  Readmission: no  Isolation Requirements:  no  Recommended Level of Care:  telemetry  Department: [address] [place] [address] ED - [phone]  Admitting Provider: Dr. Zuniga    Other. Patient leaves alone    Total critical care time spent exclusive of procedures:  65 minutes. Palmira Worley

## 2023-03-12 LAB
ANION GAP SERPL CALC-SCNC: 6 MMOL/L (ref 5–15)
BASOPHILS # BLD: 0 K/UL (ref 0–0.1)
BASOPHILS NFR BLD: 0 % (ref 0–1)
BUN SERPL-MCNC: 27 MG/DL (ref 6–20)
BUN/CREAT SERPL: 28 (ref 12–20)
CALCIUM SERPL-MCNC: 7.9 MG/DL (ref 8.5–10.1)
CHLORIDE SERPL-SCNC: 105 MMOL/L (ref 97–108)
CO2 SERPL-SCNC: 26 MMOL/L (ref 21–32)
CREAT SERPL-MCNC: 0.98 MG/DL (ref 0.55–1.02)
DIFFERENTIAL METHOD BLD: ABNORMAL
EOSINOPHIL # BLD: 0.3 K/UL (ref 0–0.4)
EOSINOPHIL NFR BLD: 7 % (ref 0–7)
ERYTHROCYTE [DISTWIDTH] IN BLOOD BY AUTOMATED COUNT: 15.1 % (ref 11.5–14.5)
GLUCOSE SERPL-MCNC: 122 MG/DL (ref 65–100)
HCT VFR BLD AUTO: 28.3 % (ref 35–47)
HGB BLD-MCNC: 8.9 G/DL (ref 11.5–16)
IMM GRANULOCYTES # BLD AUTO: 0 K/UL (ref 0–0.04)
IMM GRANULOCYTES NFR BLD AUTO: 1 % (ref 0–0.5)
LYMPHOCYTES # BLD: 0.5 K/UL (ref 0.8–3.5)
LYMPHOCYTES NFR BLD: 13 % (ref 12–49)
MCH RBC QN AUTO: 31.3 PG (ref 26–34)
MCHC RBC AUTO-ENTMCNC: 31.4 G/DL (ref 30–36.5)
MCV RBC AUTO: 99.6 FL (ref 80–99)
MONOCYTES # BLD: 0.4 K/UL (ref 0–1)
MONOCYTES NFR BLD: 10 % (ref 5–13)
NEUTS SEG # BLD: 2.6 K/UL (ref 1.8–8)
NEUTS SEG NFR BLD: 70 % (ref 32–75)
NRBC # BLD: 0 K/UL (ref 0–0.01)
NRBC BLD-RTO: 0 PER 100 WBC
PLATELET # BLD AUTO: 147 K/UL (ref 150–400)
PMV BLD AUTO: 10.2 FL (ref 8.9–12.9)
POTASSIUM SERPL-SCNC: 3 MMOL/L (ref 3.5–5.1)
RBC # BLD AUTO: 2.84 M/UL (ref 3.8–5.2)
SODIUM SERPL-SCNC: 137 MMOL/L (ref 136–145)
WBC # BLD AUTO: 3.7 K/UL (ref 3.6–11)

## 2023-03-12 PROCEDURE — 77030022298 HC DRSG ANTIMIC ACTICT S&N -A

## 2023-03-12 PROCEDURE — 74011250637 HC RX REV CODE- 250/637: Performed by: FAMILY MEDICINE

## 2023-03-12 PROCEDURE — 74011000250 HC RX REV CODE- 250: Performed by: INTERNAL MEDICINE

## 2023-03-12 PROCEDURE — 85025 COMPLETE CBC W/AUTO DIFF WBC: CPT

## 2023-03-12 PROCEDURE — 77030041074 HC DRSG AG OPTIFRM MDII -A

## 2023-03-12 PROCEDURE — 74011250636 HC RX REV CODE- 250/636: Performed by: FAMILY MEDICINE

## 2023-03-12 PROCEDURE — 74011000258 HC RX REV CODE- 258: Performed by: INTERNAL MEDICINE

## 2023-03-12 PROCEDURE — 65270000029 HC RM PRIVATE

## 2023-03-12 PROCEDURE — 80048 BASIC METABOLIC PNL TOTAL CA: CPT

## 2023-03-12 PROCEDURE — 74011250636 HC RX REV CODE- 250/636: Performed by: INTERNAL MEDICINE

## 2023-03-12 PROCEDURE — 74011250637 HC RX REV CODE- 250/637: Performed by: INTERNAL MEDICINE

## 2023-03-12 PROCEDURE — 74011636637 HC RX REV CODE- 636/637: Performed by: INTERNAL MEDICINE

## 2023-03-12 PROCEDURE — 36415 COLL VENOUS BLD VENIPUNCTURE: CPT

## 2023-03-12 PROCEDURE — 2709999900 HC NON-CHARGEABLE SUPPLY

## 2023-03-12 RX ORDER — POTASSIUM CHLORIDE 750 MG/1
40 TABLET, FILM COATED, EXTENDED RELEASE ORAL
Status: COMPLETED | OUTPATIENT
Start: 2023-03-12 | End: 2023-03-12

## 2023-03-12 RX ORDER — SODIUM CHLORIDE 9 MG/ML
50 INJECTION, SOLUTION INTRAVENOUS CONTINUOUS
Status: DISCONTINUED | OUTPATIENT
Start: 2023-03-12 | End: 2023-03-14

## 2023-03-12 RX ADMIN — Medication 10 ML: at 15:01

## 2023-03-12 RX ADMIN — BUMETANIDE 1 MG: 1 TABLET ORAL at 10:57

## 2023-03-12 RX ADMIN — AMIODARONE HYDROCHLORIDE 200 MG: 200 TABLET ORAL at 10:57

## 2023-03-12 RX ADMIN — LEVOTHYROXINE SODIUM 112 MCG: 0.11 TABLET ORAL at 06:06

## 2023-03-12 RX ADMIN — SENNOSIDES AND DOCUSATE SODIUM 1 TABLET: 50; 8.6 TABLET ORAL at 10:57

## 2023-03-12 RX ADMIN — CARVEDILOL 3.12 MG: 3.12 TABLET, FILM COATED ORAL at 10:57

## 2023-03-12 RX ADMIN — APIXABAN 2.5 MG: 2.5 TABLET, FILM COATED ORAL at 10:57

## 2023-03-12 RX ADMIN — PAROXETINE HYDROCHLORIDE 15 MG: 10 SUSPENSION ORAL at 11:00

## 2023-03-12 RX ADMIN — CETIRIZINE HYDROCHLORIDE 10 MG: 10 TABLET, FILM COATED ORAL at 10:57

## 2023-03-12 RX ADMIN — SODIUM CHLORIDE 75 ML/HR: 9 INJECTION, SOLUTION INTRAVENOUS at 15:00

## 2023-03-12 RX ADMIN — Medication 10 ML: at 06:06

## 2023-03-12 RX ADMIN — PREDNISONE 5 MG: 5 TABLET ORAL at 10:57

## 2023-03-12 RX ADMIN — APIXABAN 2.5 MG: 2.5 TABLET, FILM COATED ORAL at 18:28

## 2023-03-12 RX ADMIN — Medication 5 ML: at 22:01

## 2023-03-12 RX ADMIN — CEFEPIME 2 G: 2 INJECTION, POWDER, FOR SOLUTION INTRAVENOUS at 21:58

## 2023-03-12 RX ADMIN — CARVEDILOL 3.12 MG: 3.12 TABLET, FILM COATED ORAL at 18:28

## 2023-03-12 RX ADMIN — OXYCODONE HYDROCHLORIDE 5 MG: 5 TABLET ORAL at 22:49

## 2023-03-12 RX ADMIN — POTASSIUM CHLORIDE 40 MEQ: 750 TABLET, EXTENDED RELEASE ORAL at 15:00

## 2023-03-12 NOTE — PROGRESS NOTES
Javid Blum [facility] 79  1555 [facility], [address] [place] [address]  [phone] [address] Adult  Hospitalist Group                                                                                          Hospitalist Progress Note  Ramón Ashley MD        Date of Service:  3/12/2023  NAME:  Talat Hook  :  1928  MRN:  [id number]      Admission Summary:   80-year-old female presented with a fall at home and found to have a UTI    Interval history / Subjective:   Denies any complaints, states that she has been having difficulty with voiding, states she has had no urge to void all morning     Assessment & Plan:     UTI  -Continue cefepime  -Follow urine cultures    Frequent falls/debility  -No evidence of CVA on imaging  -Consult PT/OT, will need SNF on discharge    Hypertension/A-fib/CAD  -Continue Coreg, amiodarone, Eliquis  -May need to consider discontinuing Eliquis due to history of frequent falls    Sick sinus syndrome/V. tach/chronic diastolic CHF  -Patient has pacer/ICD  -Continue Bumex    Sjogren syndrome  -Continue prednisone    Outisde Records, prior notes, labs, radiology, and medications reviewed     Code status: Full code  DVT prophylaxis: [facility] Problems  Date Reviewed: 3/10/2023            Codes Class Noted POA    * (Principal) Frequent falls ICD-10-CM: R29.6  ICD-9-CM: V15.88  3/10/2023 Unknown        UTI (urinary tract infection) ICD-10-CM: N39.0  ICD-9-CM: 599.0  3/10/2023 Yes        Debility ICD-10-CM: R53.81  ICD-9-CM: 799.3  10/19/2021 Unknown        HTN (hypertension) (Chronic) ICD-10-CM: I10  ICD-9-CM: 401.9  Unknown Yes        CAD (coronary artery disease) (Chronic) ICD-10-CM: I25.10  ICD-9-CM: 414.00  Unknown Yes        Persistent atrial fibrillation ([address] ICD-10-CM: I48.19  ICD-9-CM: 427.31  Unknown Yes           Review of Systems:   A comprehensive review of systems was negative except for that written in the HPI.        Vital Signs:    Last 24hrs VS reviewed since prior progress note. Most recent are:  Visit Vitals  /67 (BP 1 Location: Left upper arm, BP Patient Position: At rest)   Pulse 63   Temp 98.1 °F (36.7 °C)   Resp 18   Ht 5' 7\" (1.702 m)   Wt 62.1 kg (137 lb)   SpO2 90%   Breastfeeding No   BMI 21.46 kg/m²         Intake/Output Summary (Last 24 hours) at 3/12/2023 1245  Last data filed at 3/12/2023 0133  Gross per 24 hour   Intake 950 ml   Output --   Net 950 ml          Physical Examination:             Constitutional:  No acute distress, cooperative, pleasant    ENT:  Oral mucosa moist, oropharynx benign. Resp:  CTA bilaterally. No wheezing/rhonchi/rales. No accessory muscle use   CV:  Regular rhythm, normal rate, no murmurs, gallops, rubs    GI:  Soft, non distended, non tender. normoactive bowel sounds, no hepatosplenomegaly     Musculoskeletal:  No edema, warm, 2+ pulses throughout    Neurologic:  Moves all extremities. AAOx3, CN II-XII reviewed     Psych:  Good insight, Not anxious nor agitated. Data Review:    Review and/or order of clinical lab test      Labs:     Recent Labs     03/12/23 0342 03/11/23 0137   WBC 3.7 4.1   HGB 8.9* 9.6*   HCT 28.3* 30.0*   * 124*       Recent Labs     03/12/23  0342 03/11/23  0137 03/10/23  2046    140 140   K 3.0* 3.1* 3.2*    108 105   CO2 26 27 29   BUN 27* 23* 23*   CREA 0.98 0.80 0.93   * 98 102*   CA 7.9* 8.6 8.9   MG  --  1.7 1.7   PHOS  --  3.0 2.8       Recent Labs     03/11/23  0137 03/10/23  2046   ALT 31 33   AP 55 64   TBILI 1.6* 1.8*   TP 5.4* 6.2*   ALB 2.6* 3.1*   GLOB 2.8 3.1       No results for input(s): INR, PTP, APTT, INREXT, INREXT in the last 72 hours. No results for input(s): FE, TIBC, PSAT, FERR in the last 72 hours. Lab Results   Component Value Date/Time    Folate 27.2 (H) 05/25/2021 03:40 PM        No results for input(s): PH, PCO2, PO2 in the last 72 hours.   Recent Labs     03/10/23  2046   *       Lab Results   Component Value Date/Time    Cholesterol, total 183 02/16/2021 02:58 PM    HDL Cholesterol 77 02/16/2021 02:58 PM    LDL, calculated 83.8 02/16/2021 02:58 PM    Triglyceride 111 02/16/2021 02:58 PM    CHOL/HDL Ratio 2.4 02/16/2021 02:58 PM     Lab Results   Component Value Date/Time    Glucose (POC) 275 (H) 02/16/2019 05:38 PM     Lab Results   Component Value Date/Time    Color YELLOW/STRAW 03/10/2023 10:27 PM    Appearance CLOUDY (A) 03/10/2023 10:27 PM    Specific gravity 1.016 03/10/2023 10:27 PM    pH (UA) 8.0 03/10/2023 10:27 PM    Protein 30 (A) 03/10/2023 10:27 PM    Glucose Negative 03/10/2023 10:27 PM    Ketone 15 (A) 03/10/2023 10:27 PM    Bilirubin Negative 03/10/2023 10:27 PM    Urobilinogen 1.0 03/10/2023 10:27 PM    Nitrites Negative 03/10/2023 10:27 PM    Leukocyte Esterase LARGE (A) 03/10/2023 10:27 PM    Epithelial cells FEW 03/10/2023 10:27 PM    Bacteria TOO NUMEROUS TO COUNT (A) 03/10/2023 10:27 PM    WBC 20-50 03/10/2023 10:27 PM    RBC 0-5 03/10/2023 10:27 PM         Medications Reviewed:     Current Facility-Administered Medications   Medication Dose Route Frequency    0.9% sodium chloride infusion  75 mL/hr IntraVENous CONTINUOUS    amiodarone (CORDARONE) tablet 200 mg  200 mg Oral DAILY    apixaban (ELIQUIS) tablet 2.5 mg  2.5 mg Oral BID    bumetanide (BUMEX) tablet 1 mg  1 mg Oral DAILY    carvediloL (COREG) tablet 3.125 mg  3.125 mg Oral BID WITH MEALS    cetirizine (ZYRTEC) tablet 10 mg  10 mg Oral DAILY    fluticasone propionate (FLONASE) 50 mcg/actuation nasal spray 2 Spray  2 Spray Both Nostrils DAILY PRN    levothyroxine (SYNTHROID) tablet 112 mcg  112 mcg Oral ACB    PARoxetine hcl (PAXIL) 10 mg/5 mL oral suspension 15 mg  15 mg Oral DAILY    predniSONE (DELTASONE) tablet 5 mg  5 mg Oral DAILY WITH BREAKFAST    sodium chloride (NS) flush 5-40 mL  5-40 mL IntraVENous Q8H    sodium chloride (NS) flush 5-40 mL  5-40 mL IntraVENous PRN    0.9% sodium chloride infusion 25 mL 25 mL IntraVENous PRN    acetaminophen (TYLENOL) tablet 650 mg  650 mg Oral Q6H PRN    Or    acetaminophen (TYLENOL) suppository 650 mg  650 mg Rectal Q6H PRN    senna-docusate (PERICOLACE) 8.6-50 mg per tablet 1 Tablet  1 Tablet Oral BID    bisacodyL (DULCOLAX) suppository 10 mg  10 mg Rectal DAILY PRN    promethazine (PHENERGAN) tablet 12.5 mg  12.5 mg Oral Q6H PRN    Or    ondansetron (ZOFRAN) injection 4 mg  4 mg IntraVENous Q6H PRN    HYDROmorphone (DILAUDID) syringe 0.5 mg  0.5 mg IntraVENous Q4H PRN    oxyCODONE IR (ROXICODONE) tablet 5 mg  5 mg Oral Q4H PRN    cefepime (MAXIPIME) 2 g in 0.9% sodium chloride (MBP/ADV) 100 mL MBP  2 g IntraVENous Q24H     ______________________________________________________________________  EXPECTED LENGTH OF STAY: - - -  ACTUAL LENGTH OF STAY:          2                 Abdulkadir Woods MD

## 2023-03-12 NOTE — WOUND CARE
Wound Nurse Note    Initial consult received to see patient regarding BLE wounds s/p fall. Patient alert and oriented resting on a [facility] bed; turns well without assist.  Son at bedside. Assessment:  Left lower leg - 4.0cm x 5.0cm skin tear proximally with red moist tissue and surrounding areas of ecchymosis; distally there is an area of nonblanching purplish discoloration at sock line approx 1.0cm x 5.0cm. Right lower leg - 1.5cm x 1.5cm skin tear proximally with pink moist tissue, with intact serous blistering noted distally approx 9.0cm x 4.0cm. Bilateral heels - intact; no redness. Bilateral elbows - areas of ecchymosis; skin otherwise intact with exception of small dried abrasion noted on right elbow. Sacral area/buttocks - intact; no redness. Bilateral hips - pale bruising noted; unclear if related to fall on on each hip? Recommendations:  Turn q2h and float heels; off-loading boots applied. Padded elbow protectors. Optifoam Ag gentle to BLE open/blistered areas; gently secure with sorin; change MWF. Foam applied to LLE non-blanching purplish discoloration; change MWF. Plan: Will notify MD of visit findings; will follow. Reconsult as needed.     Usama Mustafa RN [facility], [facility].  [facility] Wound Dept  [phone]    Right leg        Left leg        Right elbow        Right hip        Left hip

## 2023-03-12 NOTE — PROGRESS NOTES
Problem: Falls - Risk of  Goal: *Absence of Falls  Description: Document Sundra Saltillo Fall Risk and appropriate interventions in the flowsheet. Outcome: Progressing Towards Goal  Note: Fall Risk Interventions:  Mobility Interventions: Bed/chair exit alarm    Mentation Interventions: Door open when patient unattended, Adequate sleep, hydration, pain control    Medication Interventions: Teach patient to arise slowly    Elimination Interventions: Bed/chair exit alarm, Call light in reach, Stay With Me (per policy)    History of Falls Interventions: Bed/chair exit alarm         Problem: Patient Education: Go to Patient Education Activity  Goal: Patient/Family Education  Outcome: Progressing Towards Goal     Problem: Pressure Injury - Risk of  Goal: *Prevention of pressure injury  Description: Document Luciano Scale and appropriate interventions in the flowsheet.   Outcome: Progressing Towards Goal  Note: Pressure Injury Interventions:  Sensory Interventions: Avoid rigorous massage over bony prominences, Assess need for specialty bed, Check visual cues for pain, Minimize linen layers, Maintain/enhance activity level, Monitor skin under medical devices    Moisture Interventions: Assess need for specialty bed, Apply protective barrier, creams and emollients, Limit adult briefs    Activity Interventions: Chair cushion, Increase time out of bed, Assess need for specialty bed    Mobility Interventions: Assess need for specialty bed, Float heels, HOB 30 degrees or less    Nutrition Interventions: Document food/fluid/supplement intake, Discuss nutritional consult with provider, Offer support with meals,snacks and hydration    Friction and Shear Interventions: Foam dressings/transparent film/skin sealants, Minimize layers, Sit at 90-degree angle, Transfer aides:transfer board/Nely lift/ceiling lift                Problem: Patient Education: Go to Patient Education Activity  Goal: Patient/Family Education  Outcome: Progressing Towards Goal     Problem: Patient Education: Go to Patient Education Activity  Goal: Patient/Family Education  Outcome: Progressing Towards Goal     Problem: Patient Education: Go to Patient Education Activity  Goal: Patient/Family Education  Outcome: Progressing Towards Goal

## 2023-03-12 NOTE — PROGRESS NOTES
Problem: Falls - Risk of  Goal: *Absence of Falls  Description: Document Paul Santiago Fall Risk and appropriate interventions in the flowsheet. Outcome: Progressing Towards Goal  Note: Fall Risk Interventions:  Mobility Interventions: Bed/chair exit alarm, Patient to call before getting OOB, Utilize walker, cane, or other assistive device    Mentation Interventions: Door open when patient unattended    Medication Interventions: Bed/chair exit alarm, Patient to call before getting OOB, Teach patient to arise slowly    Elimination Interventions: Call light in reach, Bed/chair exit alarm, Patient to call for help with toileting needs    History of Falls Interventions: Bed/chair exit alarm, Door open when patient unattended, Evaluate medications/consider consulting pharmacy, Room close to nurse's station         Problem: Pressure Injury - Risk of  Goal: *Prevention of pressure injury  Description: Document Luciano Scale and appropriate interventions in the flowsheet.   Outcome: Progressing Towards Goal  Note: Pressure Injury Interventions:  Sensory Interventions: Avoid rigorous massage over bony prominences, Minimize linen layers    Moisture Interventions: Check for incontinence Q2 hours and as needed    Activity Interventions: PT/OT evaluation, Increase time out of bed    Mobility Interventions: PT/OT evaluation, Pressure redistribution bed/mattress (bed type)    Nutrition Interventions: Document food/fluid/supplement intake    Friction and Shear Interventions: Minimize layers

## 2023-03-12 NOTE — ACP (ADVANCE CARE PLANNING)
Javid Felizelsen [facility] 79  3001 [facility], [address] [place] [address]  [phone] [address] Adult  Hospitalist Group      Advance Care Planning Note    Name: Leyda Hinton  YOB: 1928  MRN: [id number]  Admission Date: 3/10/2023  8:22 PM    Date of discussion: 3/12/2023    Active Diagnoses:    Hospital Problems  Date Reviewed: 3/10/2023            Codes Class Noted POA    * (Principal) Frequent falls ICD-10-CM: R29.6  ICD-9-CM: V15.88  3/10/2023 Unknown        UTI (urinary tract infection) ICD-10-CM: N39.0  ICD-9-CM: 599.0  3/10/2023 Yes        Debility ICD-10-CM: R53.81  ICD-9-CM: 799.3  10/19/2021 Unknown        HTN (hypertension) (Chronic) ICD-10-CM: I10  ICD-9-CM: 401.9  Unknown Yes        CAD (coronary artery disease) (Chronic) ICD-10-CM: I25.10  ICD-9-CM: 414.00  Unknown Yes        Persistent atrial fibrillation ([address] ICD-10-CM: I48.19  ICD-9-CM: 427.31  Unknown Yes           These active diagnoses are of sufficient risk that focused discussion on advance care planning is indicated in order to allow the patient to thoughtfully consider personal goals of care, and if situations arise that prevent the ability to personally give input, to ensure appropriate representation of their personal desires for different levels and aggressiveness of care. Discussion:     Persons present and participating in discussion: Mollytoshia JamaalEnriqueta MD    Discussion: Patient is alert and oriented and has decision-making capacity. Patient states that she has a living will which assigns her son Kalyan Champion as her MPOA. Her current CODE STATUS is full code however she does have a D DNR scanned into her record so I confirmed with her that she would in fact prefer to be DNR. CODE STATUS changed to DNR. Time Spent:     Total time spent face-to-face in education and discussion: 17 minutes.      Enriqueta Jennings MD  Date of Service:  3/12/2023  12:46 PM

## 2023-03-13 LAB
BACTERIA SPEC CULT: ABNORMAL
CC UR VC: ABNORMAL
SERVICE CMNT-IMP: ABNORMAL

## 2023-03-13 PROCEDURE — 74011250636 HC RX REV CODE- 250/636: Performed by: INTERNAL MEDICINE

## 2023-03-13 PROCEDURE — 74011250636 HC RX REV CODE- 250/636: Performed by: FAMILY MEDICINE

## 2023-03-13 PROCEDURE — 65270000029 HC RM PRIVATE

## 2023-03-13 PROCEDURE — 97116 GAIT TRAINING THERAPY: CPT

## 2023-03-13 PROCEDURE — 74011250637 HC RX REV CODE- 250/637

## 2023-03-13 PROCEDURE — 74011250637 HC RX REV CODE- 250/637: Performed by: INTERNAL MEDICINE

## 2023-03-13 PROCEDURE — 74011636637 HC RX REV CODE- 636/637: Performed by: INTERNAL MEDICINE

## 2023-03-13 PROCEDURE — 77030041074 HC DRSG AG OPTIFRM MDII -A

## 2023-03-13 PROCEDURE — 74011000250 HC RX REV CODE- 250: Performed by: INTERNAL MEDICINE

## 2023-03-13 PROCEDURE — 97530 THERAPEUTIC ACTIVITIES: CPT

## 2023-03-13 PROCEDURE — 77030038269 HC DRN EXT URIN PURWCK BARD -A

## 2023-03-13 PROCEDURE — 74011000258 HC RX REV CODE- 258: Performed by: INTERNAL MEDICINE

## 2023-03-13 RX ORDER — LANOLIN ALCOHOL/MO/W.PET/CERES
1.5 CREAM (GRAM) TOPICAL ONCE
Status: COMPLETED | OUTPATIENT
Start: 2023-03-14 | End: 2023-03-13

## 2023-03-13 RX ADMIN — PREDNISONE 5 MG: 5 TABLET ORAL at 08:48

## 2023-03-13 RX ADMIN — Medication 5 ML: at 05:00

## 2023-03-13 RX ADMIN — Medication 5 ML: at 21:52

## 2023-03-13 RX ADMIN — BUMETANIDE 1 MG: 1 TABLET ORAL at 08:48

## 2023-03-13 RX ADMIN — APIXABAN 2.5 MG: 2.5 TABLET, FILM COATED ORAL at 17:22

## 2023-03-13 RX ADMIN — SODIUM CHLORIDE 75 ML/HR: 9 INJECTION, SOLUTION INTRAVENOUS at 04:59

## 2023-03-13 RX ADMIN — AMIODARONE HYDROCHLORIDE 200 MG: 200 TABLET ORAL at 08:48

## 2023-03-13 RX ADMIN — CARVEDILOL 3.12 MG: 3.12 TABLET, FILM COATED ORAL at 08:48

## 2023-03-13 RX ADMIN — CARVEDILOL 3.12 MG: 3.12 TABLET, FILM COATED ORAL at 17:22

## 2023-03-13 RX ADMIN — OXYCODONE HYDROCHLORIDE 5 MG: 5 TABLET ORAL at 23:04

## 2023-03-13 RX ADMIN — PAROXETINE HYDROCHLORIDE 15 MG: 10 SUSPENSION ORAL at 08:59

## 2023-03-13 RX ADMIN — SENNOSIDES AND DOCUSATE SODIUM 1 TABLET: 50; 8.6 TABLET ORAL at 08:48

## 2023-03-13 RX ADMIN — CEFEPIME 2 G: 2 INJECTION, POWDER, FOR SOLUTION INTRAVENOUS at 21:51

## 2023-03-13 RX ADMIN — CETIRIZINE HYDROCHLORIDE 10 MG: 10 TABLET, FILM COATED ORAL at 08:48

## 2023-03-13 RX ADMIN — APIXABAN 2.5 MG: 2.5 TABLET, FILM COATED ORAL at 08:48

## 2023-03-13 RX ADMIN — Medication 10 ML: at 17:22

## 2023-03-13 RX ADMIN — Medication 1.5 MG: at 23:07

## 2023-03-13 RX ADMIN — SODIUM CHLORIDE 75 ML/HR: 9 INJECTION, SOLUTION INTRAVENOUS at 17:22

## 2023-03-13 RX ADMIN — LEVOTHYROXINE SODIUM 112 MCG: 0.11 TABLET ORAL at 06:33

## 2023-03-13 NOTE — PROGRESS NOTES
Javid Blum [facility] 79  1555 [facility], [address], [address] [place] [address]  [phone] 700 [address] Adult  Hospitalist Group                                                                                          Hospitalist Progress Note  Tania Epps MD        Date of Service:  3/13/2023  NAME:  Araceli Reed  :  1928  MRN:  [id number]      Admission Summary:   42-year-old female presented with a fall at home and found to have a UTI    Interval history / Subjective:   Denies any complaints     Assessment & Plan:     UTI  -Continue cefepime  -Urine culture showed probable Staph aureus    Frequent falls/debility  -No evidence of CVA on imaging  -Consult PT/OT, will need SNF on discharge    Hypertension/A-fib/CAD  -Continue Coreg, amiodarone, Eliquis  -May need to consider discontinuing Eliquis due to history of frequent falls    Sick sinus syndrome/V. tach/chronic diastolic CHF  -Patient has pacer/ICD  -Continue Bumex    Sjogren syndrome  -Continue prednisone    Outisde Records, prior notes, labs, radiology, and medications reviewed     Code status: Full code  DVT prophylaxis: [facility] Problems  Date Reviewed: 3/10/2023            Codes Class Noted POA    * (Principal) Frequent falls ICD-10-CM: R29.6  ICD-9-CM: V15.88  3/10/2023 Unknown        UTI (urinary tract infection) ICD-10-CM: N39.0  ICD-9-CM: 599.0  3/10/2023 Yes        Debility ICD-10-CM: R53.81  ICD-9-CM: 799.3  10/19/2021 Unknown        HTN (hypertension) (Chronic) ICD-10-CM: I10  ICD-9-CM: 401.9  Unknown Yes        CAD (coronary artery disease) (Chronic) ICD-10-CM: I25.10  ICD-9-CM: 414.00  Unknown Yes        Persistent atrial fibrillation ([address] ICD-10-CM: I48.19  ICD-9-CM: 427.31  Unknown Yes         Review of Systems:   A comprehensive review of systems was negative except for that written in the HPI. Vital Signs:    Last 24hrs VS reviewed since prior progress note.  Most recent are:  Visit Vitals  BP (!) 121/58 (BP 1 Location: Left upper arm, BP Patient Position: At rest)   Pulse 96   Temp 98.1 °F (36.7 °C)   Resp 18   Ht 5' 7\" (1.702 m)   Wt 62.1 kg (137 lb)   SpO2 90%   Breastfeeding No   BMI 21.46 kg/m²         Intake/Output Summary (Last 24 hours) at 3/13/2023 1303  Last data filed at 3/13/2023 0740  Gross per 24 hour   Intake 1590 ml   Output 1950 ml   Net -360 ml          Physical Examination:             Constitutional:  No acute distress, cooperative, pleasant    ENT:  Oral mucosa moist, oropharynx benign. Resp:  CTA bilaterally. No wheezing/rhonchi/rales. No accessory muscle use   CV:  Regular rhythm, normal rate, no murmurs, gallops, rubs    GI:  Soft, non distended, non tender. normoactive bowel sounds, no hepatosplenomegaly     Musculoskeletal:  No edema, warm, 2+ pulses throughout    Neurologic:  Moves all extremities. AAOx3, CN II-XII reviewed     Psych:  Good insight, Not anxious nor agitated. Data Review:    Review and/or order of clinical lab test      Labs:     Recent Labs     03/12/23 0342 03/11/23 0137   WBC 3.7 4.1   HGB 8.9* 9.6*   HCT 28.3* 30.0*   * 124*       Recent Labs     03/12/23  0342 03/11/23  0137 03/10/23  2046    140 140   K 3.0* 3.1* 3.2*    108 105   CO2 26 27 29   BUN 27* 23* 23*   CREA 0.98 0.80 0.93   * 98 102*   CA 7.9* 8.6 8.9   MG  --  1.7 1.7   PHOS  --  3.0 2.8       Recent Labs     03/11/23  0137 03/10/23  2046   ALT 31 33   AP 55 64   TBILI 1.6* 1.8*   TP 5.4* 6.2*   ALB 2.6* 3.1*   GLOB 2.8 3.1       No results for input(s): INR, PTP, APTT, INREXT, INREXT in the last 72 hours. No results for input(s): FE, TIBC, PSAT, FERR in the last 72 hours. Lab Results   Component Value Date/Time    Folate 27.2 (H) 05/25/2021 03:40 PM        No results for input(s): PH, PCO2, PO2 in the last 72 hours.   Recent Labs     03/10/23  2046   *       Lab Results   Component Value Date/Time    Cholesterol, total 183 02/16/2021 02:58 PM    HDL Cholesterol 77 02/16/2021 02:58 PM    LDL, calculated 83.8 02/16/2021 02:58 PM    Triglyceride 111 02/16/2021 02:58 PM    CHOL/HDL Ratio 2.4 02/16/2021 02:58 PM     Lab Results   Component Value Date/Time    Glucose (POC) 275 (H) 02/16/2019 05:38 PM     Lab Results   Component Value Date/Time    Color YELLOW/STRAW 03/10/2023 10:27 PM    Appearance CLOUDY (A) 03/10/2023 10:27 PM    Specific gravity 1.016 03/10/2023 10:27 PM    pH (UA) 8.0 03/10/2023 10:27 PM    Protein 30 (A) 03/10/2023 10:27 PM    Glucose Negative 03/10/2023 10:27 PM    Ketone 15 (A) 03/10/2023 10:27 PM    Bilirubin Negative 03/10/2023 10:27 PM    Urobilinogen 1.0 03/10/2023 10:27 PM    Nitrites Negative 03/10/2023 10:27 PM    Leukocyte Esterase LARGE (A) 03/10/2023 10:27 PM    Epithelial cells FEW 03/10/2023 10:27 PM    Bacteria TOO NUMEROUS TO COUNT (A) 03/10/2023 10:27 PM    WBC 20-50 03/10/2023 10:27 PM    RBC 0-5 03/10/2023 10:27 PM         Medications Reviewed:     Current Facility-Administered Medications   Medication Dose Route Frequency    0.9% sodium chloride infusion  75 mL/hr IntraVENous CONTINUOUS    amiodarone (CORDARONE) tablet 200 mg  200 mg Oral DAILY    apixaban (ELIQUIS) tablet 2.5 mg  2.5 mg Oral BID    bumetanide (BUMEX) tablet 1 mg  1 mg Oral DAILY    carvediloL (COREG) tablet 3.125 mg  3.125 mg Oral BID WITH MEALS    cetirizine (ZYRTEC) tablet 10 mg  10 mg Oral DAILY    fluticasone propionate (FLONASE) 50 mcg/actuation nasal spray 2 Spray  2 Spray Both Nostrils DAILY PRN    levothyroxine (SYNTHROID) tablet 112 mcg  112 mcg Oral ACB    PARoxetine hcl (PAXIL) 10 mg/5 mL oral suspension 15 mg  15 mg Oral DAILY    predniSONE (DELTASONE) tablet 5 mg  5 mg Oral DAILY WITH BREAKFAST    sodium chloride (NS) flush 5-40 mL  5-40 mL IntraVENous Q8H    sodium chloride (NS) flush 5-40 mL  5-40 mL IntraVENous PRN    0.9% sodium chloride infusion 25 mL  25 mL IntraVENous PRN    acetaminophen (TYLENOL) tablet 650 mg  650 mg Oral Q6H PRN    Or    acetaminophen (TYLENOL) suppository 650 mg  650 mg Rectal Q6H PRN    senna-docusate (PERICOLACE) 8.6-50 mg per tablet 1 Tablet  1 Tablet Oral BID    bisacodyL (DULCOLAX) suppository 10 mg  10 mg Rectal DAILY PRN    promethazine (PHENERGAN) tablet 12.5 mg  12.5 mg Oral Q6H PRN    Or    ondansetron (ZOFRAN) injection 4 mg  4 mg IntraVENous Q6H PRN    HYDROmorphone (DILAUDID) syringe 0.5 mg  0.5 mg IntraVENous Q4H PRN    oxyCODONE IR (ROXICODONE) tablet 5 mg  5 mg Oral Q4H PRN    cefepime (MAXIPIME) 2 g in 0.9% sodium chloride (MBP/ADV) 100 mL MBP  2 g IntraVENous Q24H     ______________________________________________________________________  EXPECTED LENGTH OF STAY: - - -  ACTUAL LENGTH OF STAY:          3                 Tania Epps MD

## 2023-03-13 NOTE — PROGRESS NOTES
Spiritual Care Assessment/Progress Note  [address]      NAME: Dorothy Chen      MRN: [id number]  AGE: 80 y.o.  SEX: female  Sabianist Affiliation: Protestant   Language: English     3/13/2023     Total Time (in minutes): 40     Spiritual Assessment begun in [facility]  MED SURG 2 through conversation with:         [x]Patient        [] Family    [] Friend(s)        Reason for Consult: Request by staff     Spiritual beliefs: (Please include comment if needed)     [x] Identifies with a nelia tradition:   Protestant      [x] Supported by a nelia community: member of [employer]         [] Claims no spiritual orientation:           [] Seeking spiritual identity:                [] Adheres to an individual form of spirituality:           [] Not able to assess:                           Identified resources for coping:      [] Prayer                               [] Music                  [] Guided Imagery     [] Family/friends                 [] Pet visits     [] Devotional reading                         [x] Nelia in God     [] Other: hobbies at home                                              Interventions offered during this visit: (See comments for more details)    Patient Interventions: Affirmation of emotions/emotional suffering, Normalization of emotional/spiritual concerns, Sabianist beliefs/image of God discussed, Prayer (assurance of), Coping skills reviewed/reinforced, Catharsis/review of pertinent events in supportive environment           Plan of Care:     [] Support spiritual and/or cultural needs    [] Support AMD and/or advance care planning process      [] Support grieving process   [] Coordinate Rites and/or Rituals    [] Coordination with community clergy   [] No spiritual needs identified at this time   [] Detailed Plan of Care below (See Comments)  [] Make referral to Music Therapy  [] Make referral to Pet Therapy     [] Make referral to Addiction services  [] Make referral to [facility]  [] Make referral to Spiritual Care Partner  [] No future visits requested        [x] Contact Spiritual Care for further referrals     Comments: Received a request for a  visit for the patient on Med Surg. Reviewed pt's chart and spoke with patient's nurse. Introduced self and chaplaincy. Pt shared recent medical events. Pt angelica though personal resilience and charlie in God. Pt shared she will be 89 next month, and she lives independently. Pt shared she has had to give up some independence as she has aged and it has been hard. Pt shared she was a member of a [employer] but cannot attend now due to mobility issues. Pt finds art at home through her hobby of decorating her home. Pt believes she will go to rehab and is hopeful to return home. Initiated a relationship of trust and support. Offered a listening ear. Facilitated storytelling. Affirmed pt's autonomy and wisdom in decision making. Affirmed pt's hope. Reviewed coping. Reflected value statements. Advised of  availability. Please contact Spiritual Care for further referrals.     Tommie Blancas [address], [address], [facility]  Staff   Paging service: [phone] (PRABHA)

## 2023-03-13 NOTE — PROGRESS NOTES
Physician Progress Note      Jaycob Tanner  CSN #:                  [id number]  :                       1928  ADMIT DATE:       3/10/2023 8:22 PM  [address] [place] DATE:  RESPONDING  PROVIDER #:        Evin Jc MD          QUERY TEXTPauline Double Afternoon    This patient admitted for UTI    The patient also has known A-fib which is being maintained on Eliquis. The patient has continued to receive Eliquis during this admission. If possible, please document in progress notes and discharge summary if you are evaluating and/or treating any of the following: The medical record reflects the following:  Risk Factors:  80 yr old female with Known A-fib,  Clinical Indicators: Known A-fib and is on Eliquis, Eliquis has been continued during this admission. SSS, Pt has pacer as well. Treatment: EKG, On Eliquis for now, MD notes, may need to consider d/c Eliquis due to the history of the frequent falling. 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     Query created by: Singh Lagunas on 3/13/2023 1:38 PM      Electronically signed by:  Evin Jc MD 3/13/2023 3:23 PM

## 2023-03-13 NOTE — PROGRESS NOTES
3/13/2023   CARE MANAGEMENT NOTE:  CM reviewed EMR and handoff received from weekend  Danielle Willis). Pt was admitted with UTI, and frequent falls. Reportedly, pt lives alone in a first floor condo. Son Consepcion Schaumann ([phone]) is the primary family contact. RUR 15%    Transition Plan of Care:  PT/OT evals complete; pt ambulated 30 feet on 3/13 and SNF vs home health was recommended. CM received return call from pt's son. He declines SNF stating that \"they didn't nothing for her in the past.\"  He agrees to [facility] but voiced no preference for an agency. Outpatient follow up  Son will transport pt upon discharge    CM will continue to follow pt for definitive discharge plan ie SNF vs [facility]

## 2023-03-13 NOTE — PROGRESS NOTES
Bedside and Verbal shift change report given to [facility] (oncoming nurse) by Evelia Ruiz RN (offgoing nurse). Report included the following information SBAR, Kardex, Intake/Output, MAR, Recent Results, and Med Rec Status.

## 2023-03-13 NOTE — PROGRESS NOTES
Problem: Falls - Risk of  Goal: *Absence of Falls  Description: Document Lj Rodriguez Fall Risk and appropriate interventions in the flowsheet.   Outcome: Progressing Towards Goal  Note: Fall Risk Interventions:  Mobility Interventions: Bed/chair exit alarm, Patient to call before getting OOB, Utilize walker, cane, or other assistive device    Mentation Interventions: Door open when patient unattended    Medication Interventions: Bed/chair exit alarm, Patient to call before getting OOB, Teach patient to arise slowly    Elimination Interventions: Call light in reach, Bed/chair exit alarm, Patient to call for help with toileting needs    History of Falls Interventions: Bed/chair exit alarm, Door open when patient unattended, Evaluate medications/consider consulting pharmacy, Room close to nurse's station         Problem: Urinary Tract Infection - Adult  Goal: *Absence of infection signs and symptoms  Outcome: Progressing Towards Goal

## 2023-03-13 NOTE — PROGRESS NOTES
Problem: Falls - Risk of  Goal: *Absence of Falls  Description: Document Margaret Daniels Fall Risk and appropriate interventions in the flowsheet. Outcome: Progressing Towards Goal  Note: Fall Risk Interventions:  Mobility Interventions: Bed/chair exit alarm, Patient to call before getting OOB, Utilize walker, cane, or other assistive device    Mentation Interventions: Door open when patient unattended    Medication Interventions: Bed/chair exit alarm, Patient to call before getting OOB, Teach patient to arise slowly    Elimination Interventions: Call light in reach, Bed/chair exit alarm, Patient to call for help with toileting needs    History of Falls Interventions: Bed/chair exit alarm, Door open when patient unattended, Evaluate medications/consider consulting pharmacy, Room close to nurse's station         Problem: Pressure Injury - Risk of  Goal: *Prevention of pressure injury  Description: Document Luciano Scale and appropriate interventions in the flowsheet.   Outcome: Progressing Towards Goal  Note: Pressure Injury Interventions:  Sensory Interventions: Float heels, Minimize linen layers    Moisture Interventions: Absorbent underpads, Limit adult briefs, Minimize layers    Activity Interventions: Assess need for specialty bed, Increase time out of bed    Mobility Interventions: Assess need for specialty bed, Float heels    Nutrition Interventions: Document food/fluid/supplement intake    Friction and Shear Interventions: Apply protective barrier, creams and emollients, Lift sheet, Minimize layers

## 2023-03-13 NOTE — PROGRESS NOTES
Problem: Mobility Impaired (Adult and Pediatric)  Goal: *Acute Goals and Plan of Care (Insert Text)  Description: Description: FUNCTIONAL STATUS PRIOR TO ADMISSION: Patient was modified independent using a tall rolling walker for functional mobility. Her son assists with groceries and errands. HOME SUPPORT: The patient lived alone with son to provide assistance. One story home son lives nearby. Physical Therapy Goals  Initiated 3/11/2023  1. Patient will move from supine to sit and sit to supine  in bed with independence within 7 day(s). 2.  Patient will transfer from bed to chair and chair to bed with independence using the least restrictive device within 7 day(s). 3.  Patient will perform sit to stand with independence within 7 day(s). 4.  Patient will ambulate with independence for 150 feet with the least restrictive device within 7 day(s). Outcome: Progressing Towards Goal   PHYSICAL THERAPY TREATMENT  Patient: Fito Vásquez (32 y.o. female)  Date: 3/13/2023  Diagnosis: Frequent falls [R29.6] Frequent falls      Precautions:  FALL  Chart, physical therapy assessment, plan of care and goals were reviewed. ASSESSMENT  Patient continues with skilled PT services and is progressing towards goals. Communicated with nurse cleared for therapy. Patient supine on bed when received. rolled on the edge of bed min assist, supine to sit min assist, sit to stand min assist, ambulate with rolling walker min assist in the room and around the bed offered to ambulate out on the hallway patient declined and just want to ambulate inside the room. Performed some active range of motion exercise on both LE while on bed and when on the chair. OOB to chair as tolerated  Educate and instructed patient to continue active range of motion exercise on both legs while up on chair or on bed multiple times. Recommend patient to be up on the chair at least 3 times a day every meal times as tolerated.  Activated chair alarm and notified nurse who agreed to monitor patient. Current Level of Function Impacting Discharge (mobility/balance): min assist with transfers and ambulation using a rolling walker. Other factors to consider for discharge: fall         PLAN :  Patient continues to benefit from skilled intervention to address the above impairments. Continue treatment per established plan of care. to address goals. Recommendation for discharge: (in order for the patient to meet his/her long term goals)  Therapy up to 5 days/week in SNF setting vs  to be determine pending progress from therapy    This discharge recommendation:  Has been made in collaboration with the attending provider and/or case management    IF patient discharges home will need the following DME: patient owns DME required for discharge       SUBJECTIVE:   Patient stated [address].     OBJECTIVE DATA SUMMARY:   Critical Behavior:  Neurologic State: Alert  Orientation Level: Oriented X4  Cognition: Follows commands     Functional Mobility Training:  Bed Mobility:  Rolling: Minimum assistance  Supine to Sit: Minimum assistance  Sit to Supine: Minimum assistance  Scooting: Minimum assistance        Transfers:  Sit to Stand: Minimum assistance  Stand to Sit: Minimum assistance  Stand Pivot Transfers: Minimum assistance     Bed to Chair: Minimum assistance                    Balance:  Sitting: Intact; High guard  Standing: Impaired; With support  Standing - Static: Fair  Standing - Dynamic : Fair  Ambulation/Gait Training:  Distance (ft): 30 Feet (ft)  Assistive Device: Walker, rolling;Gait belt  Ambulation - Level of Assistance: Minimal assistance     Gait Description (WDL): Exceptions to WDL  Gait Abnormalities: Path deviations; Step to gait        Base of Support: Widened     Speed/Tayler: Pace decreased (<100 feet/min)                      Therapeutic Exercises:   Educate and instructed patient to continue active range of motion exercise on both legs while up on chair or on bed multiple times. Recommend patient to be up on the chair at least 3 times a day every meal times as tolerated. Pain Ratin/10    Activity Tolerance:   Good    After treatment patient left in no apparent distress:   Sitting in chair, Heels elevated for pressure relief, Call bell within reach, and Bed / chair alarm activated    COMMUNICATION/COLLABORATION:   The patients plan of care was discussed with: Occupational therapist, Registered nurse, and Case management. Rabia Cuevas PT,WCC.    Time Calculation: 24 mins

## 2023-03-13 NOTE — PROGRESS NOTES
Problem: Self Care Deficits Care Plan (Adult)  Goal: *Acute Goals and Plan of Care (Insert Text)  Description: FUNCTIONAL STATUS PRIOR TO ADMISSION: Patient was modified independent using a tall rolling walker for functional mobility. Her son assists with groceries and errands. HOME SUPPORT: The patient lived alone with son to provide assistance. Occupational Therapy Goals  Initiated 3/11/2023  1. Patient will perform lower body dressing with supervision/set-up within 7 day(s). 2.  Patient will perform grooming standing at sink with supervision/set-up within 7 day(s). 3.  Patient will perform bathing standing at sink with supervision/set-up within 7 day(s). 4.  Patient will perform toilet transfers with supervision/set-up within 7 day(s). 5.  Patient will perform all aspects of toileting with supervision/set-up within 7 day(s). 6.  Patient will participate in upper extremity therapeutic exercise/activities with supervision/set-up for 8 minutes within 7 day(s). 7.  Patient will utilize energy conservation techniques during functional activities with verbal cues within 7 day(s). Outcome: Progressing Towards Goal   OCCUPATIONAL THERAPY TREATMENT  Patient: Yasmeen Salazar (19 y.o. female)  Date: 3/13/2023  Diagnosis: Frequent falls [R29.6] Frequent falls      Precautions:    Chart, occupational therapy assessment, plan of care, and goals were reviewed. ASSESSMENT  Patient continues with skilled OT services and is progressing towards goals. Patient received supine in bed with HOB elevated. Patient requires total assist to don socks today. Min assist to EOB, transfer and stand. Patient using RW for support and reports using rollator at home, but fell. Patient with good tolerance for use of RW today and manages transfers to commode, though does not require toileting. Patient left in chair at end of session, meal tray set up for lunch.       Current Level of Function Impacting Discharge (ADLs): min assist    Other factors to consider for discharge: lives alone         PLAN :  Patient continues to benefit from skilled intervention to address the above impairments. Continue treatment per established plan of care to address goals. Recommend with staff: OOB to chair x 3 daily, commode transfers     Recommend next OT session: continue goals     Recommendation for discharge: (in order for the patient to meet his/her long term goals)  To be determined: HH with family support, vs SNF     This discharge recommendation:  Has been made in collaboration with the attending provider and/or case management    IF patient discharges home will need the following DME: TBD       SUBJECTIVE:   Patient stated I cant get them on.    OBJECTIVE DATA SUMMARY:   Cognitive/Behavioral Status:  Neurologic State: Alert  Orientation Level: Oriented X4  Cognition: Follows commands             Functional Mobility and Transfers for ADLs:  Bed Mobility:  Rolling: Minimum assistance  Supine to Sit: Minimum assistance  Sit to Supine: Minimum assistance  Scooting: Minimum assistance    Transfers:  Sit to Stand: Minimum assistance     Bed to Chair: Minimum assistance    Balance:  Sitting: Intact; High guard  Standing: Impaired; With support  Standing - Static: Fair  Standing - Dynamic : Fair    ADL Intervention:                 Type of Bath: Chlorhexidine (CHG); Full              Lower Body Dressing Assistance  Socks: Total assistance (dependent)              Therapeutic Exercises:       Pain:  None reported     Activity Tolerance:   Good    After treatment patient left in no apparent distress:   Sitting in chair, Call bell within reach, and Bed / chair alarm activated    COMMUNICATION/COLLABORATION:   The patients plan of care was discussed with: Physical therapist and Registered nurse.      Taylor Conrad OTR/L  Time Calculation: 24 mins

## 2023-03-14 LAB
ANION GAP SERPL CALC-SCNC: 5 MMOL/L (ref 5–15)
BUN SERPL-MCNC: 17 MG/DL (ref 6–20)
BUN/CREAT SERPL: 22 (ref 12–20)
CALCIUM SERPL-MCNC: 8 MG/DL (ref 8.5–10.1)
CHLORIDE SERPL-SCNC: 110 MMOL/L (ref 97–108)
CO2 SERPL-SCNC: 25 MMOL/L (ref 21–32)
CREAT SERPL-MCNC: 0.76 MG/DL (ref 0.55–1.02)
ERYTHROCYTE [DISTWIDTH] IN BLOOD BY AUTOMATED COUNT: 14.8 % (ref 11.5–14.5)
FERRITIN SERPL-MCNC: 140 NG/ML (ref 26–388)
FOLATE SERPL-MCNC: 5.3 NG/ML (ref 5–21)
GLUCOSE SERPL-MCNC: 110 MG/DL (ref 65–100)
HCT VFR BLD AUTO: 28.1 % (ref 35–47)
HGB BLD-MCNC: 9 G/DL (ref 11.5–16)
IRON SATN MFR SERPL: 9 % (ref 20–50)
IRON SERPL-MCNC: 30 UG/DL (ref 35–150)
MAGNESIUM SERPL-MCNC: 1.7 MG/DL (ref 1.6–2.4)
MCH RBC QN AUTO: 31.8 PG (ref 26–34)
MCHC RBC AUTO-ENTMCNC: 32 G/DL (ref 30–36.5)
MCV RBC AUTO: 99.3 FL (ref 80–99)
NRBC # BLD: 0 K/UL (ref 0–0.01)
NRBC BLD-RTO: 0 PER 100 WBC
PHOSPHATE SERPL-MCNC: 2.4 MG/DL (ref 2.6–4.7)
PLATELET # BLD AUTO: 169 K/UL (ref 150–400)
PMV BLD AUTO: 9.4 FL (ref 8.9–12.9)
POTASSIUM SERPL-SCNC: 3.6 MMOL/L (ref 3.5–5.1)
RBC # BLD AUTO: 2.83 M/UL (ref 3.8–5.2)
SODIUM SERPL-SCNC: 140 MMOL/L (ref 136–145)
TIBC SERPL-MCNC: 331 UG/DL (ref 250–450)
VIT B12 SERPL-MCNC: 517 PG/ML (ref 193–986)
WBC # BLD AUTO: 2.7 K/UL (ref 3.6–11)

## 2023-03-14 PROCEDURE — 87077 CULTURE AEROBIC IDENTIFY: CPT

## 2023-03-14 PROCEDURE — 84100 ASSAY OF PHOSPHORUS: CPT

## 2023-03-14 PROCEDURE — 36415 COLL VENOUS BLD VENIPUNCTURE: CPT

## 2023-03-14 PROCEDURE — 82728 ASSAY OF FERRITIN: CPT

## 2023-03-14 PROCEDURE — 85027 COMPLETE CBC AUTOMATED: CPT

## 2023-03-14 PROCEDURE — 99223 1ST HOSP IP/OBS HIGH 75: CPT | Performed by: INTERNAL MEDICINE

## 2023-03-14 PROCEDURE — 74011250636 HC RX REV CODE- 250/636: Performed by: INTERNAL MEDICINE

## 2023-03-14 PROCEDURE — 74011000250 HC RX REV CODE- 250: Performed by: INTERNAL MEDICINE

## 2023-03-14 PROCEDURE — 82746 ASSAY OF FOLIC ACID SERUM: CPT

## 2023-03-14 PROCEDURE — 87186 SC STD MICRODIL/AGAR DIL: CPT

## 2023-03-14 PROCEDURE — 83735 ASSAY OF MAGNESIUM: CPT

## 2023-03-14 PROCEDURE — 80048 BASIC METABOLIC PNL TOTAL CA: CPT

## 2023-03-14 PROCEDURE — 65270000029 HC RM PRIVATE

## 2023-03-14 PROCEDURE — 97530 THERAPEUTIC ACTIVITIES: CPT

## 2023-03-14 PROCEDURE — 74011250637 HC RX REV CODE- 250/637: Performed by: INTERNAL MEDICINE

## 2023-03-14 PROCEDURE — 77030038269 HC DRN EXT URIN PURWCK BARD -A

## 2023-03-14 PROCEDURE — 74011636637 HC RX REV CODE- 636/637: Performed by: INTERNAL MEDICINE

## 2023-03-14 PROCEDURE — 87040 BLOOD CULTURE FOR BACTERIA: CPT

## 2023-03-14 PROCEDURE — 87205 SMEAR GRAM STAIN: CPT

## 2023-03-14 PROCEDURE — 83540 ASSAY OF IRON: CPT

## 2023-03-14 PROCEDURE — 82607 VITAMIN B-12: CPT

## 2023-03-14 RX ORDER — DIPHENHYDRAMINE HYDROCHLORIDE 50 MG/ML
25 INJECTION, SOLUTION INTRAMUSCULAR; INTRAVENOUS
Status: DISCONTINUED | OUTPATIENT
Start: 2023-03-14 | End: 2023-03-17 | Stop reason: HOSPADM

## 2023-03-14 RX ADMIN — Medication 10 ML: at 14:00

## 2023-03-14 RX ADMIN — Medication 10 ML: at 22:39

## 2023-03-14 RX ADMIN — BUMETANIDE 1 MG: 1 TABLET ORAL at 09:26

## 2023-03-14 RX ADMIN — POTASSIUM PHOSPHATE, MONOBASIC POTASSIUM PHOSPHATE, DIBASIC: 224; 236 INJECTION, SOLUTION, CONCENTRATE INTRAVENOUS at 09:22

## 2023-03-14 RX ADMIN — Medication 5 ML: at 05:22

## 2023-03-14 RX ADMIN — CEFAZOLIN 2 G: 1 INJECTION, POWDER, FOR SOLUTION INTRAMUSCULAR; INTRAVENOUS at 21:12

## 2023-03-14 RX ADMIN — SENNOSIDES AND DOCUSATE SODIUM 1 TABLET: 50; 8.6 TABLET ORAL at 09:26

## 2023-03-14 RX ADMIN — PREDNISONE 5 MG: 5 TABLET ORAL at 09:26

## 2023-03-14 RX ADMIN — ACETAMINOPHEN 650 MG: 325 TABLET ORAL at 21:11

## 2023-03-14 RX ADMIN — PAROXETINE HYDROCHLORIDE 15 MG: 10 SUSPENSION ORAL at 10:51

## 2023-03-14 RX ADMIN — SENNOSIDES AND DOCUSATE SODIUM 1 TABLET: 50; 8.6 TABLET ORAL at 21:11

## 2023-03-14 RX ADMIN — APIXABAN 2.5 MG: 2.5 TABLET, FILM COATED ORAL at 09:26

## 2023-03-14 RX ADMIN — AMIODARONE HYDROCHLORIDE 200 MG: 200 TABLET ORAL at 09:26

## 2023-03-14 RX ADMIN — DIPHENHYDRAMINE HYDROCHLORIDE 25 MG: 50 INJECTION, SOLUTION INTRAMUSCULAR; INTRAVENOUS at 13:08

## 2023-03-14 RX ADMIN — APIXABAN 2.5 MG: 2.5 TABLET, FILM COATED ORAL at 18:03

## 2023-03-14 RX ADMIN — CARVEDILOL 3.12 MG: 3.12 TABLET, FILM COATED ORAL at 18:03

## 2023-03-14 RX ADMIN — LEVOTHYROXINE SODIUM 112 MCG: 0.11 TABLET ORAL at 06:38

## 2023-03-14 RX ADMIN — CARVEDILOL 3.12 MG: 3.12 TABLET, FILM COATED ORAL at 09:26

## 2023-03-14 RX ADMIN — CETIRIZINE HYDROCHLORIDE 10 MG: 10 TABLET, FILM COATED ORAL at 09:26

## 2023-03-14 NOTE — PROGRESS NOTES
Bedside and Verbal shift change report given to Antonio Adrian (oncoming nurse) by Nini Mccoy RN (offgoing nurse). Report included the following information SBAR, Kardex, Intake/Output, MAR, Recent Results, and Med Rec Status.

## 2023-03-14 NOTE — PROGRESS NOTES
Bedside and Verbal shift change report given to Marquise (oncoming nurse) by Jessi Garcia (offgoing nurse). Report included the following information SBAR, Kardex, Intake/Output, and MAR.

## 2023-03-14 NOTE — PROGRESS NOTES
Problem: Falls - Risk of  Goal: *Absence of Falls  Description: Document Arthuro Yousiflard Fall Risk and appropriate interventions in the flowsheet. Outcome: Progressing Towards Goal  Note: Fall Risk Interventions:  Mobility Interventions: Bed/chair exit alarm, Patient to call before getting OOB, Utilize walker, cane, or other assistive device    Mentation Interventions: Door open when patient unattended    Medication Interventions: Bed/chair exit alarm, Patient to call before getting OOB, Teach patient to arise slowly    Elimination Interventions: Call light in reach, Bed/chair exit alarm, Patient to call for help with toileting needs    History of Falls Interventions: Bed/chair exit alarm, Door open when patient unattended, Evaluate medications/consider consulting pharmacy, Room close to nurse's station         Problem: Pressure Injury - Risk of  Goal: *Prevention of pressure injury  Description: Document Luciano Scale and appropriate interventions in the flowsheet.   Outcome: Progressing Towards Goal  Note: Pressure Injury Interventions:  Sensory Interventions: Assess changes in LOC, Chair cushion, Float heels, Minimize linen layers    Moisture Interventions: Absorbent underpads, Limit adult briefs, Minimize layers    Activity Interventions: Assess need for specialty bed, Increase time out of bed    Mobility Interventions: Assess need for specialty bed, Float heels    Nutrition Interventions: Document food/fluid/supplement intake    Friction and Shear Interventions: Apply protective barrier, creams and emollients, Lift sheet, Minimize layers

## 2023-03-14 NOTE — PROGRESS NOTES
3/14/2023   CARE MANAGEMENT NOTE:  CM reviewed EMR. Pt was admitted with UTI, and frequent falls. Reportedly, pt lives alone in a first floor condo. Son Diana He ([phone]) is the primary family contact. RUR 12%; LOS 3 days 12 hrs. Transition Plan of Care:  PT/OT evals complete; pt ambulated 30 feet on 3/13 and SNF vs home health was recommended. Both pt and son decline SNF. Outpatient follow up  Son will transport pt upon discharge     CM will continue to follow pt until discharged.   Libra

## 2023-03-14 NOTE — PROGRESS NOTES
[address] [place] [address]  [address] [facility], [address] [place] [address]  [phone]      Hospitalist Progress Note      NAME: Yasmeen Salazar   :  1928  MRM:  [id number]    Date/Time of service: 3/14/2023  2:43 PM       Subjective:     Chief Complaint:  Patient was personally seen and examined by me during this time period. Chart reviewed. Still with weakness. No chest pain, SOB       Objective:       Vitals:       Last 24hrs VS reviewed since prior progress note. Most recent are:    Visit Vitals  BP (!) 147/86 (BP 1 Location: Right upper arm)   Pulse 61   Temp 97.7 °F (36.5 °C)   Resp 18   Ht 5' 7\" (1.702 m)   Wt 62.1 kg (137 lb)   SpO2 95%   Breastfeeding No   BMI 21.46 kg/m²     SpO2 Readings from Last 6 Encounters:   23 95%   23 95%   22 97%   22 97%   10/30/21 91%   10/04/21 94%    O2 Flow Rate (L/min): 2 l/min     Intake/Output Summary (Last 24 hours) at 3/14/2023 1443  Last data filed at 3/14/2023 1499  Gross per 24 hour   Intake 2184.58 ml   Output 1000 ml   Net 1184.58 ml        Exam:     Physical Exam:    Gen:  elderly, frail, ill-appearing, NAD  HEENT:  Pink conjunctivae, PERRL, hearing intact to voice, dry mucous membranes  Neck:  Supple, without masses, thyroid non-tender  Resp:  No accessory muscle use, clear breath sounds without wheezes rales or rhonchi  Card:  No murmurs, normal S1, S2 without thrills, bruits or peripheral edema  Abd:  Soft, non-tender, non-distended, normoactive bowel sounds are present  Lymph:  No cervical adenopathy  Musc:  No cyanosis or clubbing  Skin:  bruising of extremities  Neuro:  Cranial nerves 3-12 are grossly intact, generalized weakness, follows commands appropriately  Psych:  Alert with good insight.   Oriented to person, place, and time    Medications Reviewed: (see below)    Lab Data Reviewed: (see below)    ______________________________________________________________________    Medications:     Current Facility-Administered Medications   Medication Dose Route Frequency    diphenhydrAMINE (BENADRYL) injection 25 mg  25 mg IntraVENous Q6H PRN    amiodarone (CORDARONE) tablet 200 mg  200 mg Oral DAILY    apixaban (ELIQUIS) tablet 2.5 mg  2.5 mg Oral BID    bumetanide (BUMEX) tablet 1 mg  1 mg Oral DAILY    carvediloL (COREG) tablet 3.125 mg  3.125 mg Oral BID WITH MEALS    cetirizine (ZYRTEC) tablet 10 mg  10 mg Oral DAILY    fluticasone propionate (FLONASE) 50 mcg/actuation nasal spray 2 Spray  2 Spray Both Nostrils DAILY PRN    levothyroxine (SYNTHROID) tablet 112 mcg  112 mcg Oral ACB    PARoxetine hcl (PAXIL) 10 mg/5 mL oral suspension 15 mg  15 mg Oral DAILY    predniSONE (DELTASONE) tablet 5 mg  5 mg Oral DAILY WITH BREAKFAST    sodium chloride (NS) flush 5-40 mL  5-40 mL IntraVENous Q8H    sodium chloride (NS) flush 5-40 mL  5-40 mL IntraVENous PRN    0.9% sodium chloride infusion 25 mL  25 mL IntraVENous PRN    acetaminophen (TYLENOL) tablet 650 mg  650 mg Oral Q6H PRN    Or    acetaminophen (TYLENOL) suppository 650 mg  650 mg Rectal Q6H PRN    senna-docusate (PERICOLACE) 8.6-50 mg per tablet 1 Tablet  1 Tablet Oral BID    bisacodyL (DULCOLAX) suppository 10 mg  10 mg Rectal DAILY PRN    promethazine (PHENERGAN) tablet 12.5 mg  12.5 mg Oral Q6H PRN    Or    ondansetron (ZOFRAN) injection 4 mg  4 mg IntraVENous Q6H PRN    HYDROmorphone (DILAUDID) syringe 0.5 mg  0.5 mg IntraVENous Q4H PRN    oxyCODONE IR (ROXICODONE) tablet 5 mg  5 mg Oral Q4H PRN    cefepime (MAXIPIME) 2 g in 0.9% sodium chloride (MBP/ADV) 100 mL MBP  2 g IntraVENous Q24H          Lab Review:     Recent Labs     03/14/23 0224 03/12/23  0342   WBC 2.7* 3.7   HGB 9.0* 8.9*   HCT 28.1* 28.3*    147*     Recent Labs     03/14/23 0224 03/12/23  0342    137   K 3.6 3.0*   * 105   CO2 25 26   * 122*   BUN 17 27*   CREA 0.76 0.98   CA 8.0* 7.9*   MG 1.7  --    PHOS 2.4*  --      Lab Results   Component Value Date/Time Glucose (POC) 275 (H) 02/16/2019 05:38 PM          Assessment / Plan:     81 yo hx of HTN, CAD, chronic afib, SSS/V-tach s/p AICD, Sjogren's, presented w/ fall, weakness, UTI     1) Frequent falls/debility: no syncope. No evidence of CVA. Has mild rhabdo. Will cont PT/OT. Awaiting SNF     2) UTI: urine Cx w/ staph A. No evidence of systemic infection. Will consult ID for further eval      3) HTN/afib/CAD: cont coreg, amio, eliquis. Consider stopping eliquis if frequent falls      4) SSS/V-tach/chronic dCHF: s/p pacer/AICD.   Cont bumex     5) Sjogren's: cont prednisone    6) Anemia: will check iron studies, monitor Hgb     **Prior records, notes, labs, radiology, and medications reviewed in [address] [facility]**    Total time spent with patient care: 28 Minutes **I personally saw and examined the patient during this time period**                 Care Plan discussed with: Patient, nursing     Discussed:  Care Plan    Prophylaxis:   eliquis    Disposition:  SNF/LTC           ___________________________________________________    Attending Physician: Carmel Ramirez MD

## 2023-03-14 NOTE — PROGRESS NOTES
Problem: Mobility Impaired (Adult and Pediatric)  Goal: *Acute Goals and Plan of Care (Insert Text)  Description: Description: FUNCTIONAL STATUS PRIOR TO ADMISSION: Patient was modified independent using a tall rolling walker for functional mobility. Her son assists with groceries and errands. HOME SUPPORT: The patient lived alone with son to provide assistance. One story home son lives nearby. Physical Therapy Goals  Initiated 3/11/2023  1. Patient will move from supine to sit and sit to supine  in bed with independence within 7 day(s). 2.  Patient will transfer from bed to chair and chair to bed with independence using the least restrictive device within 7 day(s). 3.  Patient will perform sit to stand with independence within 7 day(s). 4.  Patient will ambulate with independence for 150 feet with the least restrictive device within 7 day(s). Note:   PHYSICAL THERAPY TREATMENT  Patient: Adonay Barrera (52 y.o. female)  Date: 3/14/2023  Diagnosis: Frequent falls [R29.6] Frequent falls      ASSESSMENT  Patient continues with skilled PT services. Pt seen this AM.Pt supine to sit with min to mod assist.Pt sit to stand CGA. Pt ambulated 5ft to chair with RW CGA to min assist.Pt left sitting. Pt progressing slowly. Continue goals. PLAN :  Patient continues to benefit from skilled intervention to address the above impairments. Continue treatment per established plan of care. to address goals.     Recommendation for discharge: (in order for the patient to meet his/her long term goals)  Therapy up to 5 days/week in SNF setting    This discharge recommendation:  Has been made in collaboration with the attending provider and/or case management    IF patient discharges home will need the following DME: rolling walker       SUBJECTIVE:       OBJECTIVE DATA SUMMARY:   Critical Behavior:  Neurologic State: Alert  Orientation Level: Oriented X4  Cognition: Follows commands, Appropriate decision making, Appropriate for age attention/concentration, Appropriate safety awareness         Functional Mobility Training:      Supine to Sit: Min to mod assist         Transfers:   Pt CGA sit to stand.                   Balance:  Sitting: High guard  Standing: With support  Standing - Static: Fair  Standing - Dynamic : Occasional  Ambulation/Gait Training:  Distance (ft): 5 Feet (ft)  Assistive Device: Gait belt;Walker, rolling  Ambulation - Level of Assistance: Minimal assistance;Contact guard assistance        Gait Abnormalities: Decreased step clearance        Base of Support: Narrowed     Speed/Tayler: Pace decreased (<100 feet/min)                      Activity Tolerance:   Fair    After treatment patient left in no apparent distress:   Sitting in chair    COMMUNICATION/COLLABORATION:   The patients plan of care was discussed with: Physical therapist.     Woodrow Rizvi PTA   Time Calculation: 23 mins

## 2023-03-14 NOTE — CONSULTS
Infectious Disease Consult    Impression/Plan   MSSA isolated from urine. MSSA is a very unusual cause of urinary tract infection in the absence of recent catheterization or urological procedure. Need to consider spillover from blood into the urine. Blood cultures were sent today. Patient at risk for bacteremia from chronic left leg wound. Patient currently on cefepime which will cover MSSA. Narrow antibiotics to cefazolin pending blood cultures  Left leg ulcer. Appears to be a venous stasis ulcer. Culture sent by myself today. Recommended outpatient follow-up with the wound clinic  History of penicillin and sulfa allergies  Sjogren syndrome. On chronic prednisone  History of sick sinus syndrome/V. tach. Patient has pacemaker/ICD  Immunosuppression. On chronic prednisone  Thoracic artery aneurysm    Anti-infectives:   Cefepime    Subjective:   Date of Consultation:  March 14, 2023  Date of Admission: 3/10/2023   Referring Physician:     Patient is a 80 y.o. female with a past medical history significant for hypertension, ICD placement, and ventricular tachycardia who is being seen for abnormal urine culture. The patient was admitted to [facility] on 3/10/2023 after a ground-level fall. The patient was fine the night prior to admission. She had dinner with her son and daughter-in-law uneventfully. Approximately 1 hour after her son left the house she fell while using her walker. She was unable to get up and remained on the ground to the next day when her family came to see her. Patient states that she was too weak to get up after she fell. She does complain of malodorous urine prior to admission however no dysuria. Patient also complains of some chills but no fevers. Urine culture at this admission is growing MSSA. She is currently on cefepime.   The infectious diseases service has been asked to assist with antibiotic management    Patient Active Problem List   Diagnosis Code Anxiety F41.9    HTN (hypertension) I10    Osteoarthritis M19.90    Hypothyroidism E03.9    CAD (coronary artery disease) I25.10    Persistent atrial fibrillation (HCC) I48.19    Ventricular tachycardia I47.20    Elevated serum creatinine R79.89    Weakness R53.1    Debility R53.81    ICD (implantable cardioverter-defibrillator) discharge Z45.02    Underweight R63.6    FTT (failure to thrive) in adult R62.7    Allergic rhinitis J30.9    Automatic implantable cardiac defibrillator in situ Z95.810    Mild memory disturbance R41.3    Frequent falls R29.6    UTI (urinary tract infection) N39.0     Past Medical History:   Diagnosis Date    Acute cystitis 05/23/2018    Anemia, unspecified     Anxiety     Arrhythmia     a fib    Arthritis     osteoarthritis, rheumatoid    Atrial fibrillation (HCC)     Dr. Santhosh Lowe and Dr. Susan Helm  following    Autoimmune disease [facility])     sjogren disease Dr. Edwige Contreras    CAD (coronary artery disease)     History of vascular access device 10/29/2021    4 FR Single PICC Lt Ant bx 41 cm 1 cm out    HTN (hypertension)     Hyperlipidemia LDL goal < 100     Hypothyroid     Insomnia     Osteoarthritis     Sjogren's disease ([facility] [address]     Thoracic aneurysm without mention of rupture       Family History   Problem Relation Age of Onset    Heart Disease Mother     Prostate Cancer Father     Cancer Maternal Grandfather       Social History     Tobacco Use    Smoking status: Never    Smokeless tobacco: Never   Substance Use Topics    Alcohol use:  Yes     Alcohol/week: 7.0 standard drinks     Types: 7 Glasses of wine per week     Comment: wine     Past Surgical History:   Procedure Laterality Date    HX BIV-IMPLANTABLE CARDIOVERTER DEFIBRILLATOR      HX CHOLECYSTECTOMY      HX COLONOSCOPY  03/2014    Dr. Russel Ferreira    HX HIP REPLACEMENT Left     HX OTHER SURGICAL      hernia repairs    HX PARTIAL THYROIDECTOMY      HX JANNET AND BSO      HX VEIN STRIPPING      PA INSJ ELTRD CAR COLLIN SYS TM INSJ DFB/PM PLS GEN Left 02/22/2019    Lv Lead Placement performed by John Clifton MD at [address] [facility] CATH LAB    MA INSJ/RPLCMT PERM DFB W/TRNSVNS LDS 1/DUAL [facility]. Left 02/22/2019    upgrade PM to ICD-Bsx performed by John Clifton MD at [address] [facility] LAB      Prior to Admission medications    Medication Sig Start Date End Date Taking? Authorizing Provider   cetirizine (ZYRTEC) 10 mg tablet Take 1 Tablet by mouth daily. 11/17/22  Yes Roberto Stubbs NP   levothyroxine (SYNTHROID) 112 mcg tablet Take 1 Tablet by mouth Daily (before breakfast). 11/17/22  Yes Roberto Stubbs NP   PARoxetine (PAXIL) 10 mg tablet Take 1.5 tablets by mouth once daily. 11/17/22  Yes Roberto Stubbs NP   amiodarone (CORDARONE) 200 mg tablet Take 200 mg by mouth daily. Yes Provider, Historical   bumetanide (BUMEX) 1 mg tablet Take 1 mg by mouth daily. Yes Provider, Historical   carvediloL (COREG) 3.125 mg tablet Take 3.125 mg by mouth two (2) times a day. Yes Provider, Historical   apixaban (ELIQUIS) 2.5 mg tablet Take 2.5 mg by mouth two (2) times a day. 12/4/20  Yes Provider, Historical   guaiFENesin (ROBITUSSIN) 100 mg/5 mL liquid Take 10 mL by mouth four (4) times daily as needed for Cough. Indications: cold symptoms 1/13/23   Bert Rizvi NP   cholecalciferol, vitamin D3, 50 mcg (2,000 unit) tab Take 1 Tablet by mouth daily. 11/17/22   Bert Rizvi NP   fluticasone propionate (FLONASE) 50 mcg/actuation nasal spray 2 Sprays by Both Nostrils route daily as needed for Allergies. 11/17/22   Bert Rizvi NP   predniSONE (DELTASONE) 5 mg tablet Take 5 mg by mouth. Take 3 tabs PO Daily for 2 weeks  Take 2 tabs PO Daily for 2 weeks  Take 1 tab PO daily until directed (Per Dermatologist)    Provider, Historical   PNV 85-ORWL fum,b-g-folic acid (Ariane Fe Bis-glycinate,) 28 mg iron -1 mg chew Take 1 Tablet by mouth daily. Provider, Historical   eszopiclone (LUNESTA) 3 mg tablet Take 3 mg by mouth nightly.  Indications: difficulty sleeping Provider, Historical   acetaminophen (TYLENOL) 325 mg tablet Take 2 Tablets by mouth every six (6) hours as needed for Pain. 10/29/21   Lynne Rondon MD     Allergies   Allergen Reactions    Latex Hives     NOT ALLERGIC PER PT 2018    Pcn [Penicillins] Anaphylaxis    Sulfa (Sulfonamide Antibiotics) Anaphylaxis    Biaxin [Clarithromycin] Nausea Only    Biotin Unknown (comments)    Covid-19 Vaccine, Mrna, [id number], Lnp-S (Moderna) Rash    Pcn [Penicillins] Hives    Sulfa (Sulfonamide Antibiotics) Nausea Only        Review of Systems:  A comprehensive review of systems was negative except for that written in the History of Present Illness. Objective:   Blood pressure (!) 143/63, pulse 61, temperature 97.5 °F (36.4 °C), resp. rate 16, height 5' 7\" (1.702 m), weight 137 lb (62.1 kg), SpO2 95 %, not currently breastfeeding. Temp (24hrs), Av.9 °F (36.6 °C), Min:97.5 °F (36.4 °C), Max:98.5 °F (36.9 °C)       Exam:    General:  Alert, cooperative, well noursished, well developed, appears stated age   Eyes:  Sclera anicteric. Mouth/Throat: Mucous membranes normal   Neck: Supple   Lungs:   No distress   CV:     Abdomen:   non-distended. Extremities: Bilateral lower extremity edema. Chronic venous stasis changes. Left anterior tibial ulcer.   See image above   Skin: No acute rash on exposed skin   Lymph nodes:    Musculoskeletal: Moves all   Lines/Devices:  Peripheral   Psych: Alert and oriented, normal mood affect given the setting       Data Review:   Recent Results (from the past 24 hour(s))   CBC W/O DIFF    Collection Time: 23  2:24 AM   Result Value Ref Range    WBC 2.7 (L) 3.6 - 11.0 K/uL    RBC 2.83 (L) 3.80 - 5.20 M/uL    HGB 9.0 (L) 11.5 - 16.0 g/dL    HCT 28.1 (L) 35.0 - 47.0 %    MCV 99.3 (H) 80.0 - 99.0 FL    MCH 31.8 26.0 - 34.0 PG    MCHC 32.0 30.0 - 36.5 g/dL    RDW 14.8 (H) 11.5 - 14.5 %    PLATELET 721 510 - 197 K/uL    MPV 9.4 8.9 - 12.9 FL    NRBC 0.0 0  WBC ABSOLUTE NRBC 0.00 0.00 - 0.01 K/uL   PHOSPHORUS    Collection Time: 03/14/23  2:24 AM   Result Value Ref Range    Phosphorus 2.4 (L) 2.6 - 4.7 MG/DL   MAGNESIUM    Collection Time: 03/14/23  2:24 AM   Result Value Ref Range    Magnesium 1.7 1.6 - 2.4 mg/dL   METABOLIC PANEL, BASIC    Collection Time: 03/14/23  2:24 AM   Result Value Ref Range    Sodium 140 136 - 145 mmol/L    Potassium 3.6 3.5 - 5.1 mmol/L    Chloride 110 (H) 97 - 108 mmol/L    CO2 25 21 - 32 mmol/L    Anion gap 5 5 - 15 mmol/L    Glucose 110 (H) 65 - 100 mg/dL    BUN 17 6 - 20 MG/DL    Creatinine 0.76 0.55 - 1.02 MG/DL    BUN/Creatinine ratio 22 (H) 12 - 20      eGFR >60 >60 ml/min/1.73m2    Calcium 8.0 (L) 8.5 - 10.1 MG/DL   FERRITIN    Collection Time: 03/14/23  2:24 AM   Result Value Ref Range    Ferritin 140 26 - 388 NG/ML   VITAMIN B12    Collection Time: 03/14/23  2:24 AM   Result Value Ref Range    Vitamin B12 517 193 - 986 pg/mL   FOLATE    Collection Time: 03/14/23  2:24 AM   Result Value Ref Range    Folate 5.3 5.0 - 21.0 ng/mL   IRON PROFILE    Collection Time: 03/14/23  2:24 AM   Result Value Ref Range    Iron 30 (L) 35 - 150 ug/dL    TIBC 331 250 - 450 ug/dL    Iron % saturation 9 (L) 20 - 50 %        Microbiology:      Studies:      Signed By: Didi Alatorre DO     March 14, 2023

## 2023-03-14 NOTE — PROGRESS NOTES
Occupational therapy Note:pt declined all activity this afternoon including exercises. Stated she did not sleep well last night. Pt does not want to go to a SNF.

## 2023-03-15 LAB
ATRIAL RATE: 66 BPM
CALCULATED P AXIS, ECG09: 78 DEGREES
CALCULATED R AXIS, ECG10: 62 DEGREES
CALCULATED T AXIS, ECG11: 90 DEGREES
DIAGNOSIS, 93000: NORMAL
P-R INTERVAL, ECG05: 216 MS
Q-T INTERVAL, ECG07: 432 MS
QRS DURATION, ECG06: 92 MS
QTC CALCULATION (BEZET), ECG08: 452 MS
VENTRICULAR RATE, ECG03: 66 BPM

## 2023-03-15 PROCEDURE — 74011636637 HC RX REV CODE- 636/637: Performed by: INTERNAL MEDICINE

## 2023-03-15 PROCEDURE — 99232 SBSQ HOSP IP/OBS MODERATE 35: CPT | Performed by: INTERNAL MEDICINE

## 2023-03-15 PROCEDURE — 97116 GAIT TRAINING THERAPY: CPT

## 2023-03-15 PROCEDURE — 97110 THERAPEUTIC EXERCISES: CPT

## 2023-03-15 PROCEDURE — 74011250636 HC RX REV CODE- 250/636: Performed by: INTERNAL MEDICINE

## 2023-03-15 PROCEDURE — 74011000250 HC RX REV CODE- 250: Performed by: INTERNAL MEDICINE

## 2023-03-15 PROCEDURE — 97535 SELF CARE MNGMENT TRAINING: CPT

## 2023-03-15 PROCEDURE — 74011250637 HC RX REV CODE- 250/637: Performed by: INTERNAL MEDICINE

## 2023-03-15 PROCEDURE — 77030033032 HC DRSG MAXORB AG MDII -A

## 2023-03-15 PROCEDURE — 77030040393 HC DRSG OPTIFOAM GENT MDII -B

## 2023-03-15 PROCEDURE — 65270000029 HC RM PRIVATE

## 2023-03-15 RX ORDER — CARVEDILOL 6.25 MG/1
6.25 TABLET ORAL 2 TIMES DAILY WITH MEALS
Status: DISCONTINUED | OUTPATIENT
Start: 2023-03-15 | End: 2023-03-17 | Stop reason: HOSPADM

## 2023-03-15 RX ADMIN — CETIRIZINE HYDROCHLORIDE 10 MG: 10 TABLET, FILM COATED ORAL at 09:27

## 2023-03-15 RX ADMIN — APIXABAN 2.5 MG: 2.5 TABLET, FILM COATED ORAL at 09:27

## 2023-03-15 RX ADMIN — BUMETANIDE 1 MG: 1 TABLET ORAL at 09:26

## 2023-03-15 RX ADMIN — IRON SUCROSE 200 MG: 20 INJECTION, SOLUTION INTRAVENOUS at 09:29

## 2023-03-15 RX ADMIN — PREDNISONE 5 MG: 5 TABLET ORAL at 09:27

## 2023-03-15 RX ADMIN — Medication 10 ML: at 21:35

## 2023-03-15 RX ADMIN — CEFAZOLIN 2 G: 1 INJECTION, POWDER, FOR SOLUTION INTRAMUSCULAR; INTRAVENOUS at 05:29

## 2023-03-15 RX ADMIN — ACETAMINOPHEN 650 MG: 325 TABLET ORAL at 21:34

## 2023-03-15 RX ADMIN — LEVOTHYROXINE SODIUM 112 MCG: 0.11 TABLET ORAL at 09:27

## 2023-03-15 RX ADMIN — CARVEDILOL 3.12 MG: 3.12 TABLET, FILM COATED ORAL at 09:27

## 2023-03-15 RX ADMIN — Medication 10 ML: at 14:00

## 2023-03-15 RX ADMIN — AMIODARONE HYDROCHLORIDE 200 MG: 200 TABLET ORAL at 09:26

## 2023-03-15 RX ADMIN — APIXABAN 2.5 MG: 2.5 TABLET, FILM COATED ORAL at 18:08

## 2023-03-15 RX ADMIN — CEFAZOLIN 2 G: 1 INJECTION, POWDER, FOR SOLUTION INTRAMUSCULAR; INTRAVENOUS at 11:17

## 2023-03-15 RX ADMIN — CARVEDILOL 6.25 MG: 6.25 TABLET, FILM COATED ORAL at 18:08

## 2023-03-15 RX ADMIN — Medication 10 ML: at 05:29

## 2023-03-15 RX ADMIN — CEFAZOLIN 2 G: 1 INJECTION, POWDER, FOR SOLUTION INTRAMUSCULAR; INTRAVENOUS at 21:35

## 2023-03-15 RX ADMIN — PAROXETINE HYDROCHLORIDE 15 MG: 10 SUSPENSION ORAL at 11:14

## 2023-03-15 RX ADMIN — SENNOSIDES AND DOCUSATE SODIUM 1 TABLET: 50; 8.6 TABLET ORAL at 21:34

## 2023-03-15 NOTE — PROGRESS NOTES
Problem: Self Care Deficits Care Plan (Adult)  Goal: *Acute Goals and Plan of Care (Insert Text)  Description: FUNCTIONAL STATUS PRIOR TO ADMISSION: Patient was modified independent using a tall rolling walker for functional mobility. Her son assists with groceries and errands. HOME SUPPORT: The patient lived alone with son to provide assistance. Occupational Therapy Goals  Initiated 3/11/2023  1. Patient will perform lower body dressing with supervision/set-up within 7 day(s). 2.  Patient will perform grooming standing at sink with supervision/set-up within 7 day(s). 3.  Patient will perform bathing standing at sink with supervision/set-up within 7 day(s). 4.  Patient will perform toilet transfers with supervision/set-up within 7 day(s). 5.  Patient will perform all aspects of toileting with supervision/set-up within 7 day(s). 6.  Patient will participate in upper extremity therapeutic exercise/activities with supervision/set-up for 8 minutes within 7 day(s). 7.  Patient will utilize energy conservation techniques during functional activities with verbal cues within 7 day(s). Outcome: Progressing Towards Goal   OCCUPATIONAL THERAPY TREATMENT  Patient: Ashley Weaver (75 y.o. female)  Date: 3/15/2023  Diagnosis: Frequent falls [R29.6] Frequent falls      Precautions:    Chart, occupational therapy assessment, plan of care, and goals were reviewed. ASSESSMENT  Patient continues with skilled OT services and is progressing towards goals. Patient received in chair, finishing lunch. Patient requesting bathing tasks secondary to use of pur wick leaking. Patient able to perform bathing tasks with min assist for balance in standing and thoroughness of task. Protective brief donned with min assist sit/stand. Patient requesting supine in bed at end of session. Discussed patient living alone and need for assist currently. She reports son to check in, but likely cannot stay.   OT expressed concern for patient being alone. Patient declines SNF option due to poor experience in the past.  Patient left in bed in NAD, alarm set. Current Level of Function Impacting Discharge (ADLs): MIN A for mobility and ADLs     Other factors to consider for discharge: lives alone         PLAN :  Patient continues to benefit from skilled intervention to address the above impairments. Continue treatment per established plan of care to address goals. Recommend with staff: OOB to chair for meals, commode transfers     Recommend next OT session: progression of goals     Recommendation for discharge: (in order for the patient to meet his/her long term goals)  Therapy up to 5 days/week in SNF setting- patient refusing. Will require HH and family assistance at all times. This discharge recommendation:  Has been made in collaboration with the attending provider and/or case management    IF patient discharges home will need the following DME: Can I get another bath       SUBJECTIVE:   Patient stated Oh no, I had a bad experience and Im not going back.     OBJECTIVE DATA SUMMARY:   Cognitive/Behavioral Status:  Neurologic State: Alert  Orientation Level: Oriented X4  Cognition: Follows commands  Perception: Appears intact  Perseveration: No perseveration noted  Safety/Judgement: Awareness of environment    Functional Mobility and Transfers for ADLs:  Bed Mobility:  Sit to Supine: Stand-by assistance  Scooting: Stand-by assistance    Transfers:  Sit to Stand: Minimum assistance     Bed to Chair: Minimum assistance    Balance:  Sitting: Intact  Standing: Impaired; With support  Standing - Static: Constant support  Standing - Dynamic : Fair    ADL Intervention:  Feeding  Feeding Assistance: Independent                   Lower Body Bathing  Perineal  : Minimum assistance  Position Performed: Standing (sitting)  Cues: Verbal cues provided;Physical assistance pants down;Physical assistance pants up         Lower Body Dressing Assistance  Protective Undergarmet: Minimum assistance  Cues: Physical assistance;Verbal cues provided         Cognitive Retraining  Safety/Judgement: Awareness of environment    Therapeutic Exercises:       Pain:  Reports none    Activity Tolerance:   Good    After treatment patient left in no apparent distress:   Supine in bed, Call bell within reach, Bed / chair alarm activated, and Side rails x 3    COMMUNICATION/COLLABORATION:   The patients plan of care was discussed with: Physical therapist and Registered nurse.      Taylor Conrad OTR/L  Time Calculation: 24 mins

## 2023-03-15 NOTE — PROGRESS NOTES
[address] [place] [address]  [address]  [phone]      Hospitalist Progress Note      NAME: Brittni Del Valle   :  1928  MRM:  [id number]    Date/Time of service: 3/15/2023  12:09 PM       Subjective:     Chief Complaint:  Patient was personally seen and examined by me during this time period. Chart reviewed. No fevers, chills       Objective:       Vitals:       Last 24hrs VS reviewed since prior progress note. Most recent are:    Visit Vitals  BP (!) 164/68 (BP 1 Location: Right upper arm, BP Patient Position: At rest)   Pulse 62   Temp 98.2 °F (36.8 °C)   Resp 16   Ht 5' 7\" (1.702 m)   Wt 62.1 kg (137 lb)   SpO2 93%   Breastfeeding No   BMI 21.46 kg/m²     SpO2 Readings from Last 6 Encounters:   03/15/23 93%   23 95%   22 97%   22 97%   10/30/21 91%   10/04/21 94%    O2 Flow Rate (L/min): 2 l/min     Intake/Output Summary (Last 24 hours) at 3/15/2023 1209  Last data filed at 3/15/2023 1012  Gross per 24 hour   Intake 520 ml   Output 1200 ml   Net -680 ml          Exam:     Physical Exam:    Gen:  elderly, frail, ill-appearing, NAD  HEENT:  Pink conjunctivae, PERRL, hearing intact to voice, dry mucous membranes  Neck:  Supple, without masses, thyroid non-tender  Resp:  No accessory muscle use, clear breath sounds without wheezes rales or rhonchi  Card:  No murmurs, normal S1, S2 without thrills, bruits or peripheral edema  Abd:  Soft, non-tender, non-distended, normoactive bowel sounds are present  Lymph:  No cervical adenopathy  Musc:  No cyanosis or clubbing  Skin:  bruising of extremities, chronic L leg  ulcer  Neuro:  Cranial nerves 3-12 are grossly intact, generalized weakness, follows commands appropriately  Psych:  Alert with good insight.   Oriented to person, place, and time    Medications Reviewed: (see below)    Lab Data Reviewed: (see below)    ______________________________________________________________________    Medications: Current Facility-Administered Medications   Medication Dose Route Frequency    iron sucrose (VENOFER) injection 200 mg  200 mg IntraVENous DAILY    carvediloL (COREG) tablet 6.25 mg  6.25 mg Oral BID WITH MEALS    diphenhydrAMINE (BENADRYL) injection 25 mg  25 mg IntraVENous Q6H PRN    ceFAZolin (ANCEF) 2 g in sterile water (preservative free) 20 mL IV syringe  2 g IntraVENous Q8H    amiodarone (CORDARONE) tablet 200 mg  200 mg Oral DAILY    apixaban (ELIQUIS) tablet 2.5 mg  2.5 mg Oral BID    bumetanide (BUMEX) tablet 1 mg  1 mg Oral DAILY    cetirizine (ZYRTEC) tablet 10 mg  10 mg Oral DAILY    fluticasone propionate (FLONASE) 50 mcg/actuation nasal spray 2 Spray  2 Spray Both Nostrils DAILY PRN    levothyroxine (SYNTHROID) tablet 112 mcg  112 mcg Oral ACB    PARoxetine hcl (PAXIL) 10 mg/5 mL oral suspension 15 mg  15 mg Oral DAILY    predniSONE (DELTASONE) tablet 5 mg  5 mg Oral DAILY WITH BREAKFAST    sodium chloride (NS) flush 5-40 mL  5-40 mL IntraVENous Q8H    sodium chloride (NS) flush 5-40 mL  5-40 mL IntraVENous PRN    0.9% sodium chloride infusion 25 mL  25 mL IntraVENous PRN    acetaminophen (TYLENOL) tablet 650 mg  650 mg Oral Q6H PRN    Or    acetaminophen (TYLENOL) suppository 650 mg  650 mg Rectal Q6H PRN    senna-docusate (PERICOLACE) 8.6-50 mg per tablet 1 Tablet  1 Tablet Oral BID    bisacodyL (DULCOLAX) suppository 10 mg  10 mg Rectal DAILY PRN    promethazine (PHENERGAN) tablet 12.5 mg  12.5 mg Oral Q6H PRN    Or    ondansetron (ZOFRAN) injection 4 mg  4 mg IntraVENous Q6H PRN    HYDROmorphone (DILAUDID) syringe 0.5 mg  0.5 mg IntraVENous Q4H PRN    oxyCODONE IR (ROXICODONE) tablet 5 mg  5 mg Oral Q4H PRN          Lab Review:     Recent Labs     03/14/23 0224   WBC 2.7*   HGB 9.0*   HCT 28.1*          Recent Labs     03/14/23 0224      K 3.6   *   CO2 25   *   BUN 17   CREA 0.76   CA 8.0*   MG 1.7   PHOS 2.4*       Lab Results   Component Value Date/Time Glucose (POC) 275 (H) 02/16/2019 05:38 PM          Assessment / Plan:     79 yo hx of HTN, CAD, chronic afib, SSS/V-tach s/p AICD, Sjogren's, presented w/ fall, weakness, UTI     1) Frequent falls/debility: no syncope. No evidence of CVA. Has mild rhabdo. Will cont PT/OT. Awaiting SNF     2) UTI: urine Cx w/ MSSA (this organism typically will come from other sources rather than urine). No evidence of systemic infection. Has chronic LLE ulcer. Awaiting blood Cx. Cont empiric IV Ancef. ID following      3) HTN/afib/CAD: BP high. Will increase coreg. Cont amio, eliquis. Consider stopping eliquis if frequent falls      4) SSS/V-tach/chronic dCHF: s/p pacer/AICD. Cont bumex     5) Sjogren's: cont prednisone    6) Anemia: Fe studies with mild iron def. Will start IV iron.   Monitor Hgb     **Prior records, notes, labs, radiology, and medications reviewed in [address] [facility]**    Total time spent with patient care: 35 Minutes **I personally saw and examined the patient during this time period**                 Care Plan discussed with: Patient, nursing     Discussed:  Care Plan    Prophylaxis:   eliquis    Disposition:  SNF/LTC           ___________________________________________________    Attending Physician: Jaqueline Kirk MD

## 2023-03-15 NOTE — PROGRESS NOTES
[facility] Progress Note           Che Horne DO    [phone] Office  [phone]  Fax    3/15/2023      Assessment & Plan:     MSSA isolated from urine. MSSA is a very unusual cause of urinary tract infection in the absence of recent catheterization or urological procedure. Need to consider spillover from blood into the urine. Blood cultures NGSF. Patient at risk for bacteremia from chronic left leg wound. Continue cefazolin  Left leg ulcer. Appears to be a venous stasis ulcer. Preliminary cultures growing Staphylococcus aureus. Continue cefazolin. Patient would   Recommend outpatient follow-up with the wound clinic  History of penicillin and sulfa allergies  Sjogren syndrome. On chronic prednisone  History of sick sinus syndrome/V. tach. Patient has pacemaker/ICD  Immunosuppression. On chronic prednisone  Thoracic artery aneurysm          Subjective:     No complaints    Objective:     Vitals: Visit Vitals  /60 (BP 1 Location: Right upper arm, BP Patient Position: At rest)   Pulse 89   Temp 98.2 °F (36.8 °C)   Resp 16   Ht 5' 7\" (1.702 m)   Wt 137 lb (62.1 kg)   SpO2 95%   Breastfeeding No   BMI 21.46 kg/m²        Tmax:  Temp (24hrs), Av.8 °F (36.6 °C), Min:97 °F (36.1 °C), Max:98.2 °F (36.8 °C)      Exam:   Patient is intubated:  no    Physical Examination:   General:  Alert, cooperative, no distress   Head:  Normocephalic, atraumatic. Eyes:  Conjunctivae clear   Neck: Supple       Lungs:   No distress   Chest wall:     Heart:     Abdomen:   non-distended   Extremities: Moves all. Skin: No acute rash on exposed skin. Left leg dressed   Neurologic: CNII-XII intact. Normal strength     Labs:        No lab exists for component: ITNL   No results for input(s): CPK, CKMB, TROIQ in the last 72 hours.   Recent Labs     23  0224      K 3.6   *   CO2 25   BUN 17   CREA 0.76   *   PHOS 2.4*   MG 1.7   WBC 2.7*   HGB 9.0*   HCT 28.1*      No results for input(s): INR, PTP, APTT, INREXT in the last 72 hours.   Needs: urine analysis, urine sodium, protein and creatinine  Lab Results   Component Value Date/Time    Creatinine, urine random 22.4 05/07/2013 12:00 AM         Cultures:     No results found for: SDES  Lab Results   Component Value Date/Time    Culture result: MODERATE PROBABLE Staphylococcus aureus (A) 03/14/2023 05:20 PM    Culture result:  03/14/2023 05:20 PM     CHECKING FOR POSSIBLE LIGHT MIXED SKIN WILLA ISOLATED    Culture result: NO GROWTH AFTER 15 HOURS 03/14/2023 02:15 PM       Radiology:     Medications       Current Facility-Administered Medications   Medication Dose Route Frequency Last Admin    iron sucrose (VENOFER) injection 200 mg  200 mg IntraVENous DAILY 200 mg at 03/15/23 0929    carvediloL (COREG) tablet 6.25 mg  6.25 mg Oral BID WITH MEALS      diphenhydrAMINE (BENADRYL) injection 25 mg  25 mg IntraVENous Q6H PRN 25 mg at 03/14/23 1308    ceFAZolin (ANCEF) 2 g in sterile water (preservative free) 20 mL IV syringe  2 g IntraVENous Q8H 2 g at 03/15/23 1117    amiodarone (CORDARONE) tablet 200 mg  200 mg Oral DAILY 200 mg at 03/15/23 0926    apixaban (ELIQUIS) tablet 2.5 mg  2.5 mg Oral BID 2.5 mg at 03/15/23 0927    bumetanide (BUMEX) tablet 1 mg  1 mg Oral DAILY 1 mg at 03/15/23 0926    cetirizine (ZYRTEC) tablet 10 mg  10 mg Oral DAILY 10 mg at 03/15/23 0927    fluticasone propionate (FLONASE) 50 mcg/actuation nasal spray 2 Spray  2 Spray Both Nostrils DAILY PRN      levothyroxine (SYNTHROID) tablet 112 mcg  112 mcg Oral  mcg at 03/15/23 0927    PARoxetine hcl (PAXIL) 10 mg/5 mL oral suspension 15 mg  15 mg Oral DAILY 15 mg at 03/15/23 1114    predniSONE (DELTASONE) tablet 5 mg  5 mg Oral DAILY WITH BREAKFAST 5 mg at 03/15/23 0927    sodium chloride (NS) flush 5-40 mL  5-40 mL IntraVENous Q8H 10 mL at 03/15/23 0529    sodium chloride (NS) flush 5-40 mL  5-40 mL IntraVENous PRN      0.9% sodium chloride infusion 25 mL  25 mL IntraVENous PRN      acetaminophen (TYLENOL) tablet 650 mg  650 mg Oral Q6H  mg at 03/14/23 2111    Or    acetaminophen (TYLENOL) suppository 650 mg  650 mg Rectal Q6H PRN      senna-docusate (PERICOLACE) 8.6-50 mg per tablet 1 Tablet  1 Tablet Oral BID 1 Tablet at 03/14/23 2111    bisacodyL (DULCOLAX) suppository 10 mg  10 mg Rectal DAILY PRN      promethazine (PHENERGAN) tablet 12.5 mg  12.5 mg Oral Q6H PRN      Or    ondansetron (ZOFRAN) injection 4 mg  4 mg IntraVENous Q6H PRN      HYDROmorphone (DILAUDID) syringe 0.5 mg  0.5 mg IntraVENous Q4H PRN      oxyCODONE IR (ROXICODONE) tablet 5 mg  5 mg Oral Q4H PRN 5 mg at 03/13/23 0029           Case discussed with:      Aimee Ramos DO

## 2023-03-15 NOTE — PROGRESS NOTES
3/14/2023   CARE MANAGEMENT NOTE:  CM reviewed EMR. Pt was admitted with UTI, and frequent falls. Reportedly, pt lives alone in a first floor condo. Son Deidra Chamberlain ([phone]) is the primary family contact. RUR 11%; LOS 4 days     Transition Plan of Care:  ID following for medical management  PT/OT evals complete; pt ambulated 5 feet on 3/14 and SNF was recommended. Both pt and son are declining SNF  Outpatient follow up  Son will transport pt upon discharge     CM will continue to follow pt until discharged.   Libra

## 2023-03-15 NOTE — PROGRESS NOTES
Bedside and Verbal shift change report given to Rubens Silverio RN (oncoming nurse) by Asiya Lainez RN (offgoing nurse). Report included the following information SBAR, Kardex, and Accordion.

## 2023-03-15 NOTE — PROGRESS NOTES
Problem: Mobility Impaired (Adult and Pediatric)  Goal: *Acute Goals and Plan of Care (Insert Text)  Description: Description: FUNCTIONAL STATUS PRIOR TO ADMISSION: Patient was modified independent using a tall rolling walker for functional mobility. Her son assists with groceries and errands. HOME SUPPORT: The patient lived alone with son to provide assistance. One story home son lives nearby. Physical Therapy Goals  Initiated 3/11/2023  1. Patient will move from supine to sit and sit to supine  in bed with independence within 7 day(s). 2.  Patient will transfer from bed to chair and chair to bed with independence using the least restrictive device within 7 day(s). 3.  Patient will perform sit to stand with independence within 7 day(s). 4.  Patient will ambulate with independence for 150 feet with the least restrictive device within 7 day(s). Outcome: Progressing Towards Goal   PHYSICAL THERAPY TREATMENT  Patient: Vahe Roberson (89 y.o. female)  Date: 3/15/2023  Diagnosis: Frequent falls [R29.6] Frequent falls      Precautions:    Chart, physical therapy assessment, plan of care and goals were reviewed. ASSESSMENT  Patient continues with skilled PT services and is progressing towards goals. Performed active range of motion exercises for bilateral lower extremities in sitting for strengthening and pre gait warm up. Performed transfer training for sit <> stand with cues for hand placement. Performed gait training in the room and in the hallway with the rolling walker. Needed one standing rest due to fatigue. Current Level of Function Impacting Discharge (mobility/balance): min assist with sit to stand and contact guard with gait training    Other factors to consider for discharge: lives alone, son nearby         PLAN :  Patient continues to benefit from skilled intervention to address the above impairments. Continue treatment per established plan of care.   to address goals. Recommendation for discharge: (in order for the patient to meet his/her long term goals)  Therapy up to 5 days/week in SNF setting vs home with assist    This discharge recommendation:  Has not yet been discussed the attending provider and/or case management    IF patient discharges home will need the following DME: patient owns DME required for discharge       SUBJECTIVE:   Patient stated I just go up [to the chair].     OBJECTIVE DATA SUMMARY:   Critical Behavior:  Neurologic State: Alert  Orientation Level: Oriented X4  Cognition: Appropriate decision making, Follows commands     Functional Mobility Training:  Bed Mobility:                    Transfers:  Sit to Stand: Minimum assistance;Assist x1;Additional time  Stand to Sit: Minimum assistance;Assist x1;Additional time                             Balance:     Ambulation/Gait Training:  Distance (ft): 80 Feet (ft) (twice, with standing rest)  Assistive Device: Walker, rolling;Gait belt  Ambulation - Level of Assistance: Contact guard assistance;Assist x1;Additional time                                            Therapeutic Exercises:   Performed active range of motion exercises for bilateral lower extremities in sitting for strengthening and pre gait warm up. Exercises included DF, PF, LAQs, hip flex) x 20 reps each ex. Pain Rating:  None reported    Activity Tolerance:   Fair and requires rest breaks    After treatment patient left in no apparent distress:   Sitting in chair, Heels elevated for pressure relief, Call bell within reach, and Bed / chair alarm activated    COMMUNICATION/COLLABORATION:   The patients plan of care was discussed with: Registered nurse.      Hedy Lay, PT   Time Calculation: 26 mins

## 2023-03-15 NOTE — PROGRESS NOTES
Problem: Falls - Risk of  Goal: *Absence of Falls  Description: Document Aneeshtin Lopez Fall Risk and appropriate interventions in the flowsheet. Outcome: Progressing Towards Goal  Note: Fall Risk Interventions:  Mobility Interventions: Bed/chair exit alarm, Patient to call before getting OOB, Utilize walker, cane, or other assistive device    Mentation Interventions: Door open when patient unattended    Medication Interventions: Bed/chair exit alarm, Patient to call before getting OOB, Teach patient to arise slowly    Elimination Interventions: Call light in reach, Bed/chair exit alarm, Patient to call for help with toileting needs    History of Falls Interventions: Bed/chair exit alarm, Door open when patient unattended, Evaluate medications/consider consulting pharmacy, Room close to nurse's station         Problem: Patient Education: Go to Patient Education Activity  Goal: Patient/Family Education  Outcome: Progressing Towards Goal     Problem: Pressure Injury - Risk of  Goal: *Prevention of pressure injury  Description: Document Luciano Scale and appropriate interventions in the flowsheet.   Outcome: Progressing Towards Goal  Note: Pressure Injury Interventions:  Sensory Interventions: Assess changes in LOC, Check visual cues for pain    Moisture Interventions: Absorbent underpads, Internal/External urinary devices    Activity Interventions: PT/OT evaluation, Increase time out of bed    Mobility Interventions: Assess need for specialty bed, PT/OT evaluation    Nutrition Interventions: Document food/fluid/supplement intake    Friction and Shear Interventions: Sit at 90-degree angle, Minimize layers                Problem: Patient Education: Go to Patient Education Activity  Goal: Patient/Family Education  Outcome: Progressing Towards Goal     Problem: Patient Education: Go to Patient Education Activity  Goal: Patient/Family Education  Outcome: Progressing Towards Goal     Problem: Patient Education: Go to Patient Education Activity  Goal: Patient/Family Education  Outcome: Progressing Towards Goal     Problem: Urinary Tract Infection - Adult  Goal: *Absence of infection signs and symptoms  Outcome: Progressing Towards Goal     Problem: Patient Education: Go to Patient Education Activity  Goal: Patient/Family Education  Outcome: Progressing Towards Goal

## 2023-03-16 PROCEDURE — 74011250636 HC RX REV CODE- 250/636: Performed by: INTERNAL MEDICINE

## 2023-03-16 PROCEDURE — 77030040393 HC DRSG OPTIFOAM GENT MDII -B

## 2023-03-16 PROCEDURE — 2709999900 HC NON-CHARGEABLE SUPPLY

## 2023-03-16 PROCEDURE — 65270000029 HC RM PRIVATE

## 2023-03-16 PROCEDURE — 74011250637 HC RX REV CODE- 250/637: Performed by: INTERNAL MEDICINE

## 2023-03-16 PROCEDURE — 74011000250 HC RX REV CODE- 250: Performed by: INTERNAL MEDICINE

## 2023-03-16 PROCEDURE — 97110 THERAPEUTIC EXERCISES: CPT

## 2023-03-16 PROCEDURE — 99232 SBSQ HOSP IP/OBS MODERATE 35: CPT | Performed by: INTERNAL MEDICINE

## 2023-03-16 PROCEDURE — 74011636637 HC RX REV CODE- 636/637: Performed by: INTERNAL MEDICINE

## 2023-03-16 PROCEDURE — 97116 GAIT TRAINING THERAPY: CPT

## 2023-03-16 PROCEDURE — 77030041074 HC DRSG AG OPTIFRM MDII -A

## 2023-03-16 PROCEDURE — 77030038269 HC DRN EXT URIN PURWCK BARD -A

## 2023-03-16 RX ADMIN — OXYCODONE HYDROCHLORIDE 5 MG: 5 TABLET ORAL at 01:03

## 2023-03-16 RX ADMIN — AMIODARONE HYDROCHLORIDE 200 MG: 200 TABLET ORAL at 09:06

## 2023-03-16 RX ADMIN — PREDNISONE 5 MG: 5 TABLET ORAL at 09:05

## 2023-03-16 RX ADMIN — Medication 10 ML: at 13:58

## 2023-03-16 RX ADMIN — CARVEDILOL 6.25 MG: 6.25 TABLET, FILM COATED ORAL at 09:07

## 2023-03-16 RX ADMIN — APIXABAN 2.5 MG: 2.5 TABLET, FILM COATED ORAL at 09:06

## 2023-03-16 RX ADMIN — LEVOTHYROXINE SODIUM 112 MCG: 0.11 TABLET ORAL at 09:06

## 2023-03-16 RX ADMIN — Medication 10 ML: at 21:29

## 2023-03-16 RX ADMIN — PAROXETINE HYDROCHLORIDE 15 MG: 10 SUSPENSION ORAL at 10:28

## 2023-03-16 RX ADMIN — OXYCODONE HYDROCHLORIDE 5 MG: 5 TABLET ORAL at 21:28

## 2023-03-16 RX ADMIN — CARVEDILOL 6.25 MG: 6.25 TABLET, FILM COATED ORAL at 17:45

## 2023-03-16 RX ADMIN — Medication 10 ML: at 05:44

## 2023-03-16 RX ADMIN — CEFAZOLIN 2 G: 1 INJECTION, POWDER, FOR SOLUTION INTRAMUSCULAR; INTRAVENOUS at 05:44

## 2023-03-16 RX ADMIN — APIXABAN 2.5 MG: 2.5 TABLET, FILM COATED ORAL at 17:45

## 2023-03-16 RX ADMIN — CETIRIZINE HYDROCHLORIDE 10 MG: 10 TABLET, FILM COATED ORAL at 09:06

## 2023-03-16 RX ADMIN — CEFAZOLIN 2 G: 1 INJECTION, POWDER, FOR SOLUTION INTRAMUSCULAR; INTRAVENOUS at 13:21

## 2023-03-16 RX ADMIN — IRON SUCROSE 200 MG: 20 INJECTION, SOLUTION INTRAVENOUS at 09:06

## 2023-03-16 RX ADMIN — SENNOSIDES AND DOCUSATE SODIUM 1 TABLET: 50; 8.6 TABLET ORAL at 09:05

## 2023-03-16 RX ADMIN — BUMETANIDE 1 MG: 1 TABLET ORAL at 09:05

## 2023-03-16 RX ADMIN — CEFAZOLIN 2 G: 1 INJECTION, POWDER, FOR SOLUTION INTRAMUSCULAR; INTRAVENOUS at 21:29

## 2023-03-16 NOTE — PROGRESS NOTES
Bedside and Verbal shift change report given to Marquise (oncoming nurse) by Mayela Staton (offgoing nurse). Report included the following information SBAR, Kardex, Intake/Output, and MAR.

## 2023-03-16 NOTE — PROGRESS NOTES
Problem: Falls - Risk of  Goal: *Absence of Falls  Description: Document Ladcande Randi Fall Risk and appropriate interventions in the flowsheet. Outcome: Progressing Towards Goal  Note: Fall Risk Interventions:  Mobility Interventions: Bed/chair exit alarm, Patient to call before getting OOB, Utilize walker, cane, or other assistive device    Mentation Interventions: Door open when patient unattended    Medication Interventions: Bed/chair exit alarm, Patient to call before getting OOB, Teach patient to arise slowly    Elimination Interventions: Call light in reach, Bed/chair exit alarm, Patient to call for help with toileting needs    History of Falls Interventions: Bed/chair exit alarm, Door open when patient unattended, Evaluate medications/consider consulting pharmacy, Room close to nurse's station         Problem: Patient Education: Go to Patient Education Activity  Goal: Patient/Family Education  Outcome: Progressing Towards Goal     Problem: Pressure Injury - Risk of  Goal: *Prevention of pressure injury  Description: Document Luciano Scale and appropriate interventions in the flowsheet.   Outcome: Progressing Towards Goal  Note: Pressure Injury Interventions:  Sensory Interventions: Assess changes in LOC, Check visual cues for pain    Moisture Interventions: Absorbent underpads, Internal/External urinary devices    Activity Interventions: PT/OT evaluation    Mobility Interventions: PT/OT evaluation    Nutrition Interventions: Document food/fluid/supplement intake    Friction and Shear Interventions: Sit at 90-degree angle, Minimize layers                Problem: Patient Education: Go to Patient Education Activity  Goal: Patient/Family Education  Outcome: Progressing Towards Goal     Problem: Patient Education: Go to Patient Education Activity  Goal: Patient/Family Education  Outcome: Progressing Towards Goal     Problem: Patient Education: Go to Patient Education Activity  Goal: Patient/Family Education  Outcome: Progressing Towards Goal     Problem: Urinary Tract Infection - Adult  Goal: *Absence of infection signs and symptoms  Outcome: Progressing Towards Goal     Problem: Patient Education: Go to Patient Education Activity  Goal: Patient/Family Education  Outcome: Progressing Towards Goal

## 2023-03-16 NOTE — PROGRESS NOTES
Bedside and Verbal shift change report given to Marquise (oncoming nurse) by Gaudencio Napoles RN (offgoing nurse). Report included the following information SBAR, Kardex, and Recent Results.

## 2023-03-16 NOTE — PROGRESS NOTES
Problem: Mobility Impaired (Adult and Pediatric)  Goal: *Acute Goals and Plan of Care (Insert Text)  Description: Description: FUNCTIONAL STATUS PRIOR TO ADMISSION: Patient was modified independent using a tall rolling walker for functional mobility. Her son assists with groceries and errands. HOME SUPPORT: The patient lived alone with son to provide assistance. One story home son lives nearby. Physical Therapy Goals  Initiated 3/11/2023  1. Patient will move from supine to sit and sit to supine  in bed with independence within 7 day(s). 2.  Patient will transfer from bed to chair and chair to bed with independence using the least restrictive device within 7 day(s). 3.  Patient will perform sit to stand with independence within 7 day(s). 4.  Patient will ambulate with independence for 150 feet with the least restrictive device within 7 day(s). Outcome: Progressing Towards Goal   PHYSICAL THERAPY TREATMENT  Patient: Juliette Roa (80 y.o. female)  Date: 3/16/2023  Diagnosis: Frequent falls [R29.6] Frequent falls      Precautions:    Chart, physical therapy assessment, plan of care and goals were reviewed. ASSESSMENT  Patient continues with skilled PT services and is progressing towards goals. Performed active range of motion exercises for bilateral lower extremities in sitting for strengthening and pre gait warm up. Performed transfer training for sit <> stand with cues for hand placement. Patient has poor eccentric control with stand to sit and decreased safety. Performed gait training in the room and in the hallway with the rolling walker. Patient  reports fatigue after walking. She is adamant about returning home at discharge.    .     Current Level of Function Impacting Discharge (mobility/balance): contact guard with transfers and gait    Other factors to consider for discharge: fall risk         PLAN :  Patient continues to benefit from skilled intervention to address the above impairments. Continue treatment per established plan of care. to address goals. Recommendation for discharge: (in order for the patient to meet his/her long term goals)  Physical therapy at least 2 days/week in the home     This discharge recommendation:  Has been made in collaboration with the attending provider and/or case management    IF patient discharges home will need the following DME: patient owns DME required for discharge       SUBJECTIVE:   Patient stated I'm tired this morning.     OBJECTIVE DATA SUMMARY:   Critical Behavior:  Neurologic State: Alert, Appropriate for age  Orientation Level: Oriented X4  Cognition: Follows commands  Safety/Judgement: Awareness of environment  Functional Mobility Training:  Bed Mobility:     Supine to Sit: Supervision              Transfers:  Sit to Stand: Contact guard assistance  Stand to Sit: Contact guard assistance;Assist x1 (poor eccentric control)                             Balance:     Ambulation/Gait Training:  Distance (ft): 150 Feet (ft)  Assistive Device: Walker, rolling;Gait belt  Ambulation - Level of Assistance: Contact guard assistance                                            Therapeutic Exercises:   Performed active range of motion exercises for bilateral lower extremities in sitting for strengthening and pre gait warm up. Exercises included DF, PF, LAQs, hip flex, bilateral hip flex (ab ex) x 20 reps each ex. Pain Rating:  None reported    Activity Tolerance:   requires rest breaks    After treatment patient left in no apparent distress:   Sitting in chair, Heels elevated for pressure relief, Call bell within reach, and Bed / chair alarm activated    COMMUNICATION/COLLABORATION:   The patients plan of care was discussed with: Registered nurse.      Sheila Madrigal, PT   Time Calculation: 24 mins

## 2023-03-16 NOTE — PROGRESS NOTES
[facility] Progress Note           Noe Bonilla DO    [phone] Office  [phone]  Fax    3/16/2023      Assessment & Plan:     MSSA isolated from urine. MSSA is a very unusual cause of urinary tract infection in the absence of recent catheterization or urological procedure. Spillover from blood to urine seems less likely this at point as blood cultures are negative at 48 hours. Continue cefazolin. Awaiting cultures from leg wound. If this culture grows MSSA as well then antibiotics may be narrowed to cephalexin to complete a total 10-day course of therapy  Left leg ulcer. Appears to be a venous stasis ulcer. Preliminary cultures growing Staphylococcus aureus. Continue cefazolin. Awaiting sensitivities as outlined above. Recommend outpatient follow-up with the wound clinic  History of penicillin and sulfa allergies  Sjogren syndrome. On chronic prednisone  History of sick sinus syndrome/V. tach. Patient has pacemaker/ICD  Immunosuppression. On chronic prednisone  Thoracic artery aneurysm          Subjective:     No complaints    Objective:     Vitals: Visit Vitals  /60 (BP 1 Location: Left upper arm, BP Patient Position: At rest;Sitting)   Pulse 60   Temp 98.6 °F (37 °C)   Resp 18   Ht 5' 7\" (1.702 m)   Wt 137 lb (62.1 kg)   SpO2 92%   Breastfeeding No   BMI 21.46 kg/m²          Tmax:  Temp (24hrs), Av.2 °F (36.8 °C), Min:98 °F (36.7 °C), Max:98.6 °F (37 °C)      Exam:   Patient is intubated:  no    Physical Examination:   General:  Alert, cooperative, no distress   Head:  Normocephalic, atraumatic. Eyes:  Conjunctivae clear   Neck: Supple       Lungs:   No distress   Chest wall:     Heart:     Abdomen:   non-distended   Extremities: Moves all. Skin: No acute rash on exposed skin. Left leg dressed   Neurologic: CNII-XII intact.  Normal strength     Labs:        No lab exists for component: ITNL   No results for input(s): CPK, CKMB, TROIQ in the last 72 hours. Recent Labs     03/14/23  0224      K 3.6   *   CO2 25   BUN 17   CREA 0.76   *   PHOS 2.4*   MG 1.7   WBC 2.7*   HGB 9.0*   HCT 28.1*          No results for input(s): INR, PTP, APTT, INREXT, INREXT in the last 72 hours.   Needs: urine analysis, urine sodium, protein and creatinine  Lab Results   Component Value Date/Time    Creatinine, urine random 22.4 05/07/2013 12:00 AM         Cultures:     No results found for: SDES  Lab Results   Component Value Date/Time    Culture result: MODERATE PROBABLE Staphylococcus aureus (A) 03/14/2023 05:20 PM    Culture result:  03/14/2023 05:20 PM     CHECKING FOR POSSIBLE LIGHT MIXED SKIN WILLA ISOLATED    Culture result: NO GROWTH 2 DAYS 03/14/2023 02:15 PM       Radiology:     Medications       Current Facility-Administered Medications   Medication Dose Route Frequency Last Admin    carvediloL (COREG) tablet 6.25 mg  6.25 mg Oral BID WITH MEALS 6.25 mg at 03/16/23 0907    diphenhydrAMINE (BENADRYL) injection 25 mg  25 mg IntraVENous Q6H PRN 25 mg at 03/14/23 1308    ceFAZolin (ANCEF) 2 g in sterile water (preservative free) 20 mL IV syringe  2 g IntraVENous Q8H 2 g at 03/16/23 0544    amiodarone (CORDARONE) tablet 200 mg  200 mg Oral DAILY 200 mg at 03/16/23 0906    apixaban (ELIQUIS) tablet 2.5 mg  2.5 mg Oral BID 2.5 mg at 03/16/23 0906    bumetanide (BUMEX) tablet 1 mg  1 mg Oral DAILY 1 mg at 03/16/23 0905    cetirizine (ZYRTEC) tablet 10 mg  10 mg Oral DAILY 10 mg at 03/16/23 0906    fluticasone propionate (FLONASE) 50 mcg/actuation nasal spray 2 Spray  2 Spray Both Nostrils DAILY PRN      levothyroxine (SYNTHROID) tablet 112 mcg  112 mcg Oral  mcg at 03/16/23 0906    PARoxetine hcl (PAXIL) 10 mg/5 mL oral suspension 15 mg  15 mg Oral DAILY 15 mg at 03/16/23 1028    predniSONE (DELTASONE) tablet 5 mg  5 mg Oral DAILY WITH BREAKFAST 5 mg at 03/16/23 0905    sodium chloride (NS) flush 5-40 mL  5-40 mL IntraVENous Q8H 10 mL at 03/16/23 0544    sodium chloride (NS) flush 5-40 mL  5-40 mL IntraVENous PRN      0.9% sodium chloride infusion 25 mL  25 mL IntraVENous PRN      acetaminophen (TYLENOL) tablet 650 mg  650 mg Oral Q6H  mg at 03/15/23 2134    Or    acetaminophen (TYLENOL) suppository 650 mg  650 mg Rectal Q6H PRN      senna-docusate (PERICOLACE) 8.6-50 mg per tablet 1 Tablet  1 Tablet Oral BID 1 Tablet at 03/16/23 0905    bisacodyL (DULCOLAX) suppository 10 mg  10 mg Rectal DAILY PRN      promethazine (PHENERGAN) tablet 12.5 mg  12.5 mg Oral Q6H PRN      Or    ondansetron (ZOFRAN) injection 4 mg  4 mg IntraVENous Q6H PRN      HYDROmorphone (DILAUDID) syringe 0.5 mg  0.5 mg IntraVENous Q4H PRN      oxyCODONE IR (ROXICODONE) tablet 5 mg  5 mg Oral Q4H PRN 5 mg at 03/16/23 0103           Case discussed with: Jayde Spring DO

## 2023-03-16 NOTE — PROGRESS NOTES
3/16/2023   CARE MANAGEMENT NOTE:  CM reviewed EMR. Pt was admitted with UTI, and frequent falls. Reportedly, pt lives alone in a first floor condo. Son Deidra Chamberlain ([phone]) is the primary family contact. RUR 11%; LOS 5 days     Transition Plan of Care:  ID following for medical management  PT/OT evals complete; pt ambulated 80 feet on 3/15 and SNF vs home was recommended. Both pt and son are declining SNF  Outpatient follow up  Son will transport pt upon discharge     CM will continue to follow pt until discharged.   Libra

## 2023-03-16 NOTE — PROGRESS NOTES
Comprehensive Nutrition Assessment    Type and Reason for Visit: Initial, RD nutrition re-screen/LOS    Nutrition Recommendations/Plan:   Continue regular diet order  Provide Ensure High Protein once daily (160 kcal, 19 g carbs, 16 g protein)       Malnutrition Assessment:  Malnutrition Status:  No malnutrition (03/16/23 1143)    Context:  Acute illness     Findings of the 6 clinical characteristics of malnutrition:   Energy Intake:  No significant decrease in energy intake  Weight Loss:  No significant weight loss     Body Fat Loss:  Mild body fat loss, Triceps   Muscle Mass Loss:  No significant muscle mass loss,    Fluid Accumulation:  No significant fluid accumulation,     Strength:  Not performed     Nutrition Assessment:     Pt is a 80year old female admitted with Frequent falls [R29.6]. She  has a past medical history of Acute cystitis (05/23/2018), Anemia, unspecified, Anxiety, Arrhythmia, Arthritis, Atrial fibrillation ([facility] [address] Autoimmune disease ([facility] [address] CAD (coronary artery disease), History of vascular access device (10/29/2021), HTN (hypertension), Hyperlipidemia LDL goal < 100, Hypothyroid, Insomnia, Osteoarthritis, Sjogren's disease ([facility] [address] and Thoracic aneurysm without mention of rupture. RD screen for LOS. Patient reports so-so appetite with no nausea/vomiting/diarrhea. No chewing/swallowing problems. NKFA. Noted daily weight label outside of door. Does not appear to have been taken in several days. Patient reports UBW of 130#; measured weights appear to be close to stated UBW. Nutrition Focused Physical Exam with sarcopenia. If PO intake trend continues, patient likely meeting kcal/protein needs. However, she reports she drinks Ensures at home daily - RD to order during admission.        Last 3 Recorded Weights in this Encounter    03/10/23 2031 03/11/23 0045 03/12/23 0612   Weight: 54 kg (119 lb) 60.9 kg (134 lb 3.2 oz) 62.1 kg (137 lb)         Documented Meal intake:  Patient Vitals for the past 168 hrs:   % Diet Eaten   03/16/23 0904 51 - 75%   03/15/23 1659 76 - 100%   03/15/23 1235 26 - 50%   03/15/23 0925 76 - 100%   03/14/23 1616 76 - 100%   03/14/23 1146 76 - 100%   03/14/23 0800 51 - 75%   03/13/23 2334 0%   03/13/23 2003 0%   03/13/23 0923 51 - 75%   03/12/23 1811 51 - 75%   03/12/23 0132 0%   03/11/23 1835 51 - 75%   03/11/23 1227 76 - 100%   03/11/23 0815 0%       Documentation of supplement intake:  Patient Vitals for the past 168 hrs:   Supplement intake %   03/16/23 0904 0%   03/12/23 0132 0%       Nutrition Related Findings:      Wound Type: None  Last Bowel Movement Date: 03/14/23  Stool Appearance: Loose  Edema:LLE: 1+; Non-pitting (3/16/2023  9:04 AM)  RLE: 1+; Non-pitting (3/16/2023  9:04 AM)      Nutr.  Labs:    Lab Results   Component Value Date/Time    GFR est AA >60 04/04/2022 03:19 AM    GFR est non-AA 54 (L) 04/04/2022 03:19 AM    Creatinine (POC) 1.1 02/16/2019 05:38 PM    Creatinine 0.76 03/14/2023 02:24 AM    BUN 17 03/14/2023 02:24 AM    BUN (POC) 18 02/16/2019 05:38 PM    Sodium (POC) 137 02/16/2019 05:38 PM    Sodium 140 03/14/2023 02:24 AM    Potassium 3.6 03/14/2023 02:24 AM    Potassium (POC) 3.8 02/16/2019 05:38 PM    Chloride (POC) 99 02/16/2019 05:38 PM    Chloride 110 (H) 03/14/2023 02:24 AM    CO2 25 03/14/2023 02:24 AM       Lab Results   Component Value Date/Time    Glucose 110 (H) 03/14/2023 02:24 AM    Glucose (POC) 275 (H) 02/16/2019 05:38 PM       Lab Results   Component Value Date/Time    Hemoglobin A1c 5.5 03/11/2023 01:37 AM       Magnesium   Date Value Ref Range Status   03/14/2023 1.7 1.6 - 2.4 mg/dL Final   03/11/2023 1.7 1.6 - 2.4 mg/dL Final   03/10/2023 1.7 1.6 - 2.4 mg/dL Final   04/04/2022 2.4 1.6 - 2.4 mg/dL Final   04/03/2022 2.0 1.6 - 2.4 mg/dL Final       Lab Results   Component Value Date/Time    Calcium 8.0 (L) 03/14/2023 02:24 AM    Phosphorus 2.4 (L) 03/14/2023 02:24 AM       Lab Results   Component Value Date/Time    Cholesterol, total 183 02/16/2021 02:58 PM    HDL Cholesterol 77 02/16/2021 02:58 PM    LDL, calculated 83.8 02/16/2021 02:58 PM    VLDL, calculated 22.2 02/16/2021 02:58 PM    Triglyceride 111 02/16/2021 02:58 PM    CHOL/HDL Ratio 2.4 02/16/2021 02:58 PM         Nutr. Meds:  Cordarone, eliquis, dulcolax PRN, bumex, coreg, synthroid, zofran PRN< deltasone, pericolace    Current Nutrition Intake & Therapies:  Average Meal Intake: 51-75%  Average Supplement Intake: None ordered  ADULT DIET Regular    Anthropometric Measures:  Height: 5' 7\" (170.2 cm)  Ideal Body Weight (IBW): 135 lbs (61 kg)     Current Body Wt:  62.1 kg (136 lb 14.5 oz), 101.4 % IBW. Current BMI (kg/m2): 21.4  Usual Body Weight: 59 kg (130 lb)  % Weight Change (Calculated): 5.3  Weight Adjustment: No adjustment                 BMI Category: Normal weight (BMI 22.0-24.9) age over 72    Estimated Daily Nutrient Needs:  Energy Requirements Based On: Kcal/kg  Weight Used for Energy Requirements: Current  Energy (kcal/day): 0473-4982 (25-30 kcal/kg)  Weight Used for Protein Requirements: Current  Protein (g/day): 62-75 (1.0-1.2 g/kg)  Method Used for Fluid Requirements: 1 ml/kcal  Fluid (ml/day): 4626-9219    Nutrition Diagnosis:   No nutrition diagnosis at this time     Nutrition Interventions:   Food and/or Nutrient Delivery: Continue current diet, Start oral nutrition supplement  Nutrition Education/Counseling: No recommendations at this time  Coordination of Nutrition Care: Continue to monitor while inpatient, Interdisciplinary rounds  Plan of Care discussed with: IDR team    Goals:     Goals: Meet at least 75% of estimated needs, by next RD assessment       Nutrition Monitoring and Evaluation:   Behavioral-Environmental Outcomes: None identified  Food/Nutrient Intake Outcomes: Supplement intake, Food and nutrient intake  Physical Signs/Symptoms Outcomes: Biochemical data, Weight    Discharge Planning:     Too soon to determine    Marie Hodges MS, RD  Contact: Ext: [phone], or via Perfect[employer]

## 2023-03-16 NOTE — PROGRESS NOTES
Bedside and Verbal shift change report given to Rosa Maria Ross RN (oncoming nurse) by Veronica Long RN (offgoing nurse). Report included the following information SBAR, Kardex, Accordion, and Recent Results.

## 2023-03-16 NOTE — WOUND CARE
Wound Nurse Note     Reconsulted to see patient regarding left leg wound s/p fall. Patient alert and oriented resting on a [place] bed; turns well without assist.  Patient last assessed by Wound Dept 3/12/23. Assessment:  Left lower leg - 4.0cm x 5.0cm skin tear proximally with red moist tissue and surrounding fading areas of ecchymosis; distally there is an area of nonblanching purplish discoloration at sock line approx 1.0cm x 5.0cm. Right lower leg - 1.5cm x 1.5cm skin tear proximally with pink moist tissue, with intact decompressed serous blistering noted distally approx 9.0cm x 4.0cm. Bilateral heels - delayed blanching redness. Bilateral elbows - areas of ecchymosis; skin otherwise intact with exception of small dried abrasion noted on right elbow. Sacral area/buttocks - intact; no redness. Recommendations:  Turn q2h and float heels; foam dressings to protect heels; change MWF. Padded elbow protectors. Optifoam Ag Gentle to BLE open/blistered areas; gently secure with sorin; change MWF. This foam dressing is in a white/black package - foam has silver in it - showed package to nursing to clarify dressing. Foam applied to LLE non-blanching purplish discoloration; change MWF. [facility]up with [facility] and [facility] for dressing change support in the interim. Plan: Will notify MD of visit findings; will follow. Reconsult as needed.      Carmelita Estrella RN [facility], [facility].  [facility] Wound Dept  [phone]    Left leg          Right leg        Left heel        Right heel        Right elbow      chest

## 2023-03-16 NOTE — PROGRESS NOTES
Javid Kulwant [facility]  [address]  [phone]      Hospitalist Progress Note      NAME: Tracey Michaud   :  1928  MRM:  [id number]    Date/Time of service: 3/16/2023  12:09 PM       Subjective:     Chief Complaint:  Patient was personally seen and examined by me during this time period. Chart reviewed. Doing well. No chest pain, SOB. Still with weakness       Objective:       Vitals:       Last 24hrs VS reviewed since prior progress note. Most recent are:    Visit Vitals  /60 (BP 1 Location: Left upper arm, BP Patient Position: At rest;Sitting)   Pulse 60   Temp 98.6 °F (37 °C)   Resp 18   Ht 5' 7\" (1.702 m)   Wt 62.1 kg (137 lb)   SpO2 92%   Breastfeeding No   BMI 21.46 kg/m²     SpO2 Readings from Last 6 Encounters:   23 92%   23 95%   22 97%   22 97%   10/30/21 91%   10/04/21 94%    O2 Flow Rate (L/min): 2 l/min     Intake/Output Summary (Last 24 hours) at 3/16/2023 1229  Last data filed at 3/16/2023 5017  Gross per 24 hour   Intake 1000 ml   Output 1900 ml   Net -900 ml          Exam:     Physical Exam:    Gen:  elderly, frail, ill-appearing, NAD  HEENT:  Pink conjunctivae, PERRL, hearing intact to voice, dry mucous membranes  Neck:  Supple, without masses, thyroid non-tender  Resp:  No accessory muscle use, clear breath sounds without wheezes rales or rhonchi  Card:  No murmurs, normal S1, S2 without thrills, bruits or peripheral edema  Abd:  Soft, non-tender, non-distended, normoactive bowel sounds are present  Lymph:  No cervical adenopathy  Musc:  No cyanosis or clubbing  Skin:  bruising of extremities, chronic L leg  ulcer  Neuro:  Cranial nerves 3-12 are grossly intact, generalized weakness, follows commands appropriately  Psych:  Alert with good insight.   Oriented to person, place, and time    Medications Reviewed: (see below)    Lab Data Reviewed: (see below)    ______________________________________________________________________    Medications:     Current Facility-Administered Medications   Medication Dose Route Frequency    carvediloL (COREG) tablet 6.25 mg  6.25 mg Oral BID WITH MEALS    diphenhydrAMINE (BENADRYL) injection 25 mg  25 mg IntraVENous Q6H PRN    ceFAZolin (ANCEF) 2 g in sterile water (preservative free) 20 mL IV syringe  2 g IntraVENous Q8H    amiodarone (CORDARONE) tablet 200 mg  200 mg Oral DAILY    apixaban (ELIQUIS) tablet 2.5 mg  2.5 mg Oral BID    bumetanide (BUMEX) tablet 1 mg  1 mg Oral DAILY    cetirizine (ZYRTEC) tablet 10 mg  10 mg Oral DAILY    fluticasone propionate (FLONASE) 50 mcg/actuation nasal spray 2 Spray  2 Spray Both Nostrils DAILY PRN    levothyroxine (SYNTHROID) tablet 112 mcg  112 mcg Oral ACB    PARoxetine hcl (PAXIL) 10 mg/5 mL oral suspension 15 mg  15 mg Oral DAILY    predniSONE (DELTASONE) tablet 5 mg  5 mg Oral DAILY WITH BREAKFAST    sodium chloride (NS) flush 5-40 mL  5-40 mL IntraVENous Q8H    sodium chloride (NS) flush 5-40 mL  5-40 mL IntraVENous PRN    0.9% sodium chloride infusion 25 mL  25 mL IntraVENous PRN    acetaminophen (TYLENOL) tablet 650 mg  650 mg Oral Q6H PRN    Or    acetaminophen (TYLENOL) suppository 650 mg  650 mg Rectal Q6H PRN    senna-docusate (PERICOLACE) 8.6-50 mg per tablet 1 Tablet  1 Tablet Oral BID    bisacodyL (DULCOLAX) suppository 10 mg  10 mg Rectal DAILY PRN    promethazine (PHENERGAN) tablet 12.5 mg  12.5 mg Oral Q6H PRN    Or    ondansetron (ZOFRAN) injection 4 mg  4 mg IntraVENous Q6H PRN    HYDROmorphone (DILAUDID) syringe 0.5 mg  0.5 mg IntraVENous Q4H PRN    oxyCODONE IR (ROXICODONE) tablet 5 mg  5 mg Oral Q4H PRN          Lab Review:     Recent Labs     03/14/23 0224   WBC 2.7*   HGB 9.0*   HCT 28.1*          Recent Labs     03/14/23 0224      K 3.6   *   CO2 25   *   BUN 17   CREA 0.76   CA 8.0*   MG 1.7   PHOS 2.4*       Lab Results Component Value Date/Time    Glucose (POC) 275 (H) 02/16/2019 05:38 PM          Assessment / Plan:     79 yo hx of HTN, CAD, chronic afib, SSS/V-tach s/p AICD, Sjogren's, presented w/ fall, weakness, UTI     1) Frequent falls/debility: no syncope. No evidence of CVA. Has mild rhabdo. Will cont PT/OT. Declined SNF, likely home with [facility] on 03/17     2) UTI: urine Cx w/ MSSA (this organism typically will come from other sources rather than urine). No evidence of systemic infection. Has chronic LLE ulcer. Blood Cx neg so far. Cont empiric IV Ancef. Will need Keflex on discharge. ID following      3) HTN/afib/CAD: BP high. Will cont coreg (dose increased). Cont amio, eliquis. Consider stopping eliquis if frequent falls      4) SSS/V-tach/chronic dCHF: s/p pacer/AICD. Cont bumex     5) Sjogren's: cont prednisone    6) Anemia: Fe studies with mild iron def. S/p IV iron.   Monitor Hgb     **Prior records, notes, labs, radiology, and medications reviewed in [facility]**    Total time spent with patient care: 30 Minutes **I personally saw and examined the patient during this time period**                 Care Plan discussed with: Patient, nursing, ID      Discussed:  Care Plan    Prophylaxis:   eliquis    Disposition:  SNF/LTC           ___________________________________________________    Attending Physician: Laz Rivera MD

## 2023-03-17 VITALS
SYSTOLIC BLOOD PRESSURE: 113 MMHG | HEIGHT: 67 IN | RESPIRATION RATE: 18 BRPM | HEART RATE: 64 BPM | OXYGEN SATURATION: 93 % | WEIGHT: 146.6 LBS | BODY MASS INDEX: 23.01 KG/M2 | DIASTOLIC BLOOD PRESSURE: 58 MMHG | TEMPERATURE: 98.5 F

## 2023-03-17 LAB
ANION GAP SERPL CALC-SCNC: 5 MMOL/L (ref 5–15)
BACTERIA SPEC CULT: ABNORMAL
BACTERIA SPEC CULT: ABNORMAL
BUN SERPL-MCNC: 18 MG/DL (ref 6–20)
BUN/CREAT SERPL: 20 (ref 12–20)
CALCIUM SERPL-MCNC: 8.1 MG/DL (ref 8.5–10.1)
CHLORIDE SERPL-SCNC: 104 MMOL/L (ref 97–108)
CO2 SERPL-SCNC: 30 MMOL/L (ref 21–32)
CREAT SERPL-MCNC: 0.88 MG/DL (ref 0.55–1.02)
ERYTHROCYTE [DISTWIDTH] IN BLOOD BY AUTOMATED COUNT: 14.7 % (ref 11.5–14.5)
GLUCOSE SERPL-MCNC: 96 MG/DL (ref 65–100)
GRAM STN SPEC: ABNORMAL
GRAM STN SPEC: ABNORMAL
HCT VFR BLD AUTO: 29 % (ref 35–47)
HGB BLD-MCNC: 9.3 G/DL (ref 11.5–16)
MAGNESIUM SERPL-MCNC: 1.6 MG/DL (ref 1.6–2.4)
MCH RBC QN AUTO: 32 PG (ref 26–34)
MCHC RBC AUTO-ENTMCNC: 32.1 G/DL (ref 30–36.5)
MCV RBC AUTO: 99.7 FL (ref 80–99)
NRBC # BLD: 0.02 K/UL (ref 0–0.01)
NRBC BLD-RTO: 0.5 PER 100 WBC
PHOSPHATE SERPL-MCNC: 2.6 MG/DL (ref 2.6–4.7)
PLATELET # BLD AUTO: 201 K/UL (ref 150–400)
PMV BLD AUTO: 9.3 FL (ref 8.9–12.9)
POTASSIUM SERPL-SCNC: 3.2 MMOL/L (ref 3.5–5.1)
RBC # BLD AUTO: 2.91 M/UL (ref 3.8–5.2)
SERVICE CMNT-IMP: ABNORMAL
SODIUM SERPL-SCNC: 139 MMOL/L (ref 136–145)
WBC # BLD AUTO: 4.3 K/UL (ref 3.6–11)

## 2023-03-17 PROCEDURE — 74011000250 HC RX REV CODE- 250: Performed by: INTERNAL MEDICINE

## 2023-03-17 PROCEDURE — 99232 SBSQ HOSP IP/OBS MODERATE 35: CPT | Performed by: INTERNAL MEDICINE

## 2023-03-17 PROCEDURE — 80048 BASIC METABOLIC PNL TOTAL CA: CPT

## 2023-03-17 PROCEDURE — 85027 COMPLETE CBC AUTOMATED: CPT

## 2023-03-17 PROCEDURE — 77030038269 HC DRN EXT URIN PURWCK BARD -A

## 2023-03-17 PROCEDURE — 2709999900 HC NON-CHARGEABLE SUPPLY

## 2023-03-17 PROCEDURE — 74011250637 HC RX REV CODE- 250/637: Performed by: INTERNAL MEDICINE

## 2023-03-17 PROCEDURE — 83735 ASSAY OF MAGNESIUM: CPT

## 2023-03-17 PROCEDURE — 74011250636 HC RX REV CODE- 250/636: Performed by: INTERNAL MEDICINE

## 2023-03-17 PROCEDURE — 84100 ASSAY OF PHOSPHORUS: CPT

## 2023-03-17 PROCEDURE — 74011636637 HC RX REV CODE- 636/637: Performed by: INTERNAL MEDICINE

## 2023-03-17 PROCEDURE — 36415 COLL VENOUS BLD VENIPUNCTURE: CPT

## 2023-03-17 RX ORDER — CEPHALEXIN 500 MG/1
500 CAPSULE ORAL 3 TIMES DAILY
Qty: 9 CAPSULE | Refills: 0 | Status: SHIPPED | OUTPATIENT
Start: 2023-03-17

## 2023-03-17 RX ADMIN — CEFAZOLIN 2 G: 1 INJECTION, POWDER, FOR SOLUTION INTRAMUSCULAR; INTRAVENOUS at 04:23

## 2023-03-17 RX ADMIN — LEVOTHYROXINE SODIUM 112 MCG: 0.11 TABLET ORAL at 06:50

## 2023-03-17 RX ADMIN — APIXABAN 2.5 MG: 2.5 TABLET, FILM COATED ORAL at 08:53

## 2023-03-17 RX ADMIN — DIPHENHYDRAMINE HYDROCHLORIDE 25 MG: 50 INJECTION, SOLUTION INTRAMUSCULAR; INTRAVENOUS at 06:50

## 2023-03-17 RX ADMIN — Medication 10 ML: at 13:07

## 2023-03-17 RX ADMIN — PREDNISONE 5 MG: 5 TABLET ORAL at 08:51

## 2023-03-17 RX ADMIN — SODIUM CHLORIDE, PRESERVATIVE FREE 10 ML: 5 INJECTION INTRAVENOUS at 04:24

## 2023-03-17 RX ADMIN — SENNOSIDES AND DOCUSATE SODIUM 1 TABLET: 50; 8.6 TABLET ORAL at 08:52

## 2023-03-17 RX ADMIN — AMIODARONE HYDROCHLORIDE 200 MG: 200 TABLET ORAL at 08:55

## 2023-03-17 RX ADMIN — BUMETANIDE 1 MG: 1 TABLET ORAL at 08:55

## 2023-03-17 RX ADMIN — CEFAZOLIN 2 G: 1 INJECTION, POWDER, FOR SOLUTION INTRAMUSCULAR; INTRAVENOUS at 13:07

## 2023-03-17 RX ADMIN — PAROXETINE HYDROCHLORIDE 15 MG: 10 SUSPENSION ORAL at 08:52

## 2023-03-17 RX ADMIN — CETIRIZINE HYDROCHLORIDE 10 MG: 10 TABLET, FILM COATED ORAL at 08:51

## 2023-03-17 NOTE — PROGRESS NOTES
Problem: Falls - Risk of  Goal: *Absence of Falls  Description: Document Lj Rodriguez Fall Risk and appropriate interventions in the flowsheet. Outcome: Progressing Towards Goal  Note: Fall Risk Interventions:  Mobility Interventions: Bed/chair exit alarm, Patient to call before getting OOB, Utilize walker, cane, or other assistive device    Mentation Interventions: Door open when patient unattended    Medication Interventions: Bed/chair exit alarm, Patient to call before getting OOB, Teach patient to arise slowly    Elimination Interventions: Call light in reach, Bed/chair exit alarm, Patient to call for help with toileting needs    History of Falls Interventions: Bed/chair exit alarm, Door open when patient unattended, Evaluate medications/consider consulting pharmacy, Room close to nurse's station         Problem: Patient Education: Go to Patient Education Activity  Goal: Patient/Family Education  Outcome: Progressing Towards Goal     Problem: Pressure Injury - Risk of  Goal: *Prevention of pressure injury  Description: Document Luciano Scale and appropriate interventions in the flowsheet.   Outcome: Progressing Towards Goal  Note: Pressure Injury Interventions:  Sensory Interventions: Assess changes in LOC, Check visual cues for pain    Moisture Interventions: Internal/External urinary devices    Activity Interventions: PT/OT evaluation    Mobility Interventions: PT/OT evaluation    Nutrition Interventions: Document food/fluid/supplement intake    Friction and Shear Interventions: Apply protective barrier, creams and emollients, Minimize layers                Problem: Patient Education: Go to Patient Education Activity  Goal: Patient/Family Education  Outcome: Progressing Towards Goal     Problem: Patient Education: Go to Patient Education Activity  Goal: Patient/Family Education  Outcome: Progressing Towards Goal     Problem: Patient Education: Go to Patient Education Activity  Goal: Patient/Family Education  Outcome: Progressing Towards Goal     Problem: Urinary Tract Infection - Adult  Goal: *Absence of infection signs and symptoms  Outcome: Progressing Towards Goal     Problem: Patient Education: Go to Patient Education Activity  Goal: Patient/Family Education  Outcome: Progressing Towards Goal

## 2023-03-17 NOTE — PROGRESS NOTES
3/17/2023   CARE MANAGEMENT NOTE:  CM reviewed EMR. Pt was admitted with UTI, and frequent falls. Reportedly, pt lives alone in a first floor condo. Son Vincent Kwon ([phone]) is the primary family contact. RUR 12%; LOS 6 days     Transition Plan of Care:  ID following for medical management  [facility] (PT assess for OT) has been arranged. AVS was faxed to agency. Outpatient follow up  Son will transport pt upon discharge     CM will continue to follow pt until discharged.   Libra

## 2023-03-17 NOTE — PROGRESS NOTES
Met with pt and pt's son. Pt is being discharged today with [facility]. I called the office and start of care will be Artem 3/19. Medicare pt has received, reviewed, and signed 2nd IM letter informing them of their right to appeal the discharge. Signed copied has been placed on pt bedside chart and patient given a copy. Pt's son will be back at 4:30 to  his mother. RN and CM notified of the above.  Pam Fernandez, MSW

## 2023-03-17 NOTE — DISCHARGE SUMMARY
Physician Discharge Summary     Patient ID:  Vahe Roberson  [id number]  9 y.o.  4/22/1928    Admit date: 3/10/2023    Discharge date and time: 3/17/2023    Admission Diagnoses: Frequent falls [R29.6]    Discharge Diagnoses and Hospital Course:   81 yo hx of HTN, CAD, chronic afib, SSS/V-tach s/p AICD, Sjogren's, presented w/ fall, weakness, UTI:     1) Frequent falls/debility: No syncope. No obvious evidence of CVA. Has mild rhabdo, s/p IVF hydration. PT/OT. Declined SNF, [facility] discharge. 2) UTI: urine Cx w/ MSSA (this organism typically will come from other sources rather than urine). However no evidence of systemic infection or bacteremia. Has chronic LLE ulcer. Treated with IV Ancef for transition to cephalexin upon discharge to complete through March 20th. 3) Left leg ulcer with cellulitis. Cultures grew MSSA and Morganella. Completed Ancef with resolution of cellulitis. Outpatient wound care required. 4) HTN/afib/CAD: Home medications continued. Defer as an outpatient with PCP to consider cessation of Eliquis if patient continues to have frequent falls. 5) SSS/V-tach/chronic dCHF: s/p pacer/AICD. Continued home medications including diuretics. 6) Sjogren's syndrome: Continue chronic prednisone. 7) Anemia: Iron deficiency anemia status post iron infusion while admitted. For outpatient follow-up. PCP: Natty Longoria, PINKY     Consults: ID    Condition of patient at discharge: good    Discharge Exam:    Physical Exam:    Gen:  No acute distress. Nontoxic-appearing. HEENT:  NC/AT. Neck:  Supple. Resp:  Unlabored breathing. Clear breath sounds with good air entry. No wheezes rales or rhonchi. Card:  Regular rate and rhythm. Normal S1 and S2. Abd:  Soft, non-tender, non-distended, normoactive bowel sounds. Ext: Left leg dressed with dressing clean dry and intact. Neuro:  Moving all extremities with no gross focal deficits appreciated.   Psych:  Good insight, oriented to person, place and time, alert. Disposition: home with . Patient Instructions:   Current Discharge Medication List        START taking these medications    Details   cephALEXin (KEFLEX) 500 mg capsule Take 1 Capsule by mouth three (3) times daily. Indications: UTI  Qty: 9 Capsule, Refills: 0           CONTINUE these medications which have NOT CHANGED    Details   cetirizine (ZYRTEC) 10 mg tablet Take 1 Tablet by mouth daily. Qty: 90 Tablet, Refills: 3    Associated Diagnoses: Allergic rhinitis, unspecified seasonality, unspecified trigger      levothyroxine (SYNTHROID) 112 mcg tablet Take 1 Tablet by mouth Daily (before breakfast). Qty: 90 Tablet, Refills: 3    Associated Diagnoses: Acquired hypothyroidism      PARoxetine (PAXIL) 10 mg tablet Take 1.5 tablets by mouth once daily. Qty: 135 Tablet, Refills: 3    Associated Diagnoses: Anxiety      amiodarone (CORDARONE) 200 mg tablet Take 200 mg by mouth daily. bumetanide (BUMEX) 1 mg tablet Take 1 mg by mouth daily. carvediloL (COREG) 3.125 mg tablet Take 3.125 mg by mouth two (2) times a day. apixaban (ELIQUIS) 2.5 mg tablet Take 2.5 mg by mouth two (2) times a day. guaiFENesin (ROBITUSSIN) 100 mg/5 mL liquid Take 10 mL by mouth four (4) times daily as needed for Cough. Indications: cold symptoms  Qty: 236 mL, Refills: 1      cholecalciferol, vitamin D3, 50 mcg (2,000 unit) tab Take 1 Tablet by mouth daily. Qty: 90 Tablet, Refills: 3      fluticasone propionate (FLONASE) 50 mcg/actuation nasal spray 2 Sprays by Both Nostrils route daily as needed for Allergies. Qty: 3 Each, Refills: 3    Associated Diagnoses: Allergic rhinitis, unspecified seasonality, unspecified trigger      predniSONE (DELTASONE) 5 mg tablet Take 5 mg by mouth.  Take 3 tabs PO Daily for 2 weeks  Take 2 tabs PO Daily for 2 weeks  Take 1 tab PO daily until directed (Per Dermatologist)      PNV 53-BPBF fum,b-g-folic acid (NataChew, Fe Bis-glycinate,) 28 mg iron -1 mg chew Take 1 Tablet by mouth daily. eszopiclone (LUNESTA) 3 mg tablet Take 3 mg by mouth nightly. Indications: difficulty sleeping      acetaminophen (TYLENOL) 325 mg tablet Take 2 Tablets by mouth every six (6) hours as needed for Pain. Qty: 30 Tablet, Refills: 0         Discharge instructions: you are being discharged home today after an admission for urinary tract infection as well as lower extremity cellulitis. You will require 3 more days of oral antibiotics, cephalexin upon discharge. You will further require outpatient follow-up with wound care therapy but your cellulitis has resolved at this time. Discussed above hospitalization, assessment and plan with patient's son Deven Lomeli () prior to discharge today. Deven Lomeli had requested an additional day of hospitalization to \"prepare\" home for patient. I did explain to him that at this point patient is medically stable for discharge home with home health and that I do not have any clinical concerns to hold discharge, or any significant home social circumstances preventing her from being discharged today. Activity: Activity as tolerated  Diet: Cardiac Diet  Wound Care: Keep wound clean and dry and Reinforce dressing PRN    Follow-up with Preston Smith NP in 1 week. Follow-up tests/labs: Wound care + Home Health. Approximate time spent in patient care on day of discharge: 35 minutes.     Signed:  Sabine Leblanc MD  3/17/2023  1:05 PM

## 2023-03-17 NOTE — PROGRESS NOTES
[facility] Infectious Disease Specialists Progress Note           Deepti Severino DO    [phone] Office  [phone]  Fax    3/17/2023      Assessment & Plan:     MSSA isolated from urine. Doubt due to bacteremia as blood cultures are sterile. Narrow antibiotics to cephalexin 500 mg p.o. every 8 hours through 3/20   Left leg ulcer. Appears to be a venous stasis ulcer. Cultures growing MSSA and Morganella. Cellulitis has resolved. Recommend outpatient follow-up with the wound clinic. History of penicillin and sulfa allergies  Sjogren syndrome. On chronic prednisone  History of sick sinus syndrome/V. tach. Patient has pacemaker/ICD  Immunosuppression. On chronic prednisone  Thoracic artery aneurysm          Subjective:     No complaints    Objective:     Vitals: Visit Vitals  BP (!) 113/58 (BP 1 Location: Left upper arm, BP Patient Position: At rest)   Pulse 64   Temp 98.5 °F (36.9 °C)   Resp 18   Ht 5' 7\" (1.702 m)   Wt 146 lb 9.6 oz (66.5 kg)   SpO2 93%   Breastfeeding No   BMI 22.96 kg/m²          Tmax:  Temp (24hrs), Av.2 °F (36.8 °C), Min:97.9 °F (36.6 °C), Max:98.6 °F (37 °C)      Exam:   Patient is intubated:  no    Physical Examination:       General:  Alert, cooperative, no distress   Head:  Normocephalic, atraumatic. Eyes:  Conjunctivae clear   Neck: Supple       Lungs:   No distress   Chest wall:     Heart:     Abdomen:   non-distended   Extremities: Moves all. Skin: No acute rash on exposed skin. See image above   Neurologic: CNII-XII intact. Normal strength     Labs:        No lab exists for component: ITNL   No results for input(s): CPK, CKMB, TROIQ in the last 72 hours. Recent Labs     23  0123      K 3.2*      CO2 30   BUN 18   CREA 0.88   GLU 96   PHOS 2.6   MG 1.6   WBC 4.3   HGB 9.3*   HCT 29.0*          No results for input(s): INR, PTP, APTT, INREXT, INREXT in the last 72 hours.   Needs: urine analysis, urine sodium, protein and creatinine  Lab Results   Component Value Date/Time    Creatinine, urine random 22.4 05/07/2013 12:00 AM         Cultures:     No results found for: SDES  Lab Results   Component Value Date/Time    Culture result: MODERATE Staphylococcus aureus (A) 03/14/2023 05:20 PM    Culture result: LIGHT Morganella morganii ssp morganii (A) 03/14/2023 05:20 PM    Culture result: NO GROWTH 3 DAYS 03/14/2023 02:15 PM       Radiology:     Medications       Current Facility-Administered Medications   Medication Dose Route Frequency Last Admin    carvediloL (COREG) tablet 6.25 mg  6.25 mg Oral BID WITH MEALS 6.25 mg at 03/16/23 1745    diphenhydrAMINE (BENADRYL) injection 25 mg  25 mg IntraVENous Q6H PRN 25 mg at 03/17/23 0650    ceFAZolin (ANCEF) 2 g in sterile water (preservative free) 20 mL IV syringe  2 g IntraVENous Q8H 2 g at 03/17/23 0423    amiodarone (CORDARONE) tablet 200 mg  200 mg Oral DAILY 200 mg at 03/17/23 0855    apixaban (ELIQUIS) tablet 2.5 mg  2.5 mg Oral BID 2.5 mg at 03/17/23 0853    bumetanide (BUMEX) tablet 1 mg  1 mg Oral DAILY 1 mg at 03/17/23 0855    cetirizine (ZYRTEC) tablet 10 mg  10 mg Oral DAILY 10 mg at 03/17/23 0851    fluticasone propionate (FLONASE) 50 mcg/actuation nasal spray 2 Spray  2 Spray Both Nostrils DAILY PRN      levothyroxine (SYNTHROID) tablet 112 mcg  112 mcg Oral  mcg at 03/17/23 0650    PARoxetine hcl (PAXIL) 10 mg/5 mL oral suspension 15 mg  15 mg Oral DAILY 15 mg at 03/17/23 0852    predniSONE (DELTASONE) tablet 5 mg  5 mg Oral DAILY WITH BREAKFAST 5 mg at 03/17/23 0851    sodium chloride (NS) flush 5-40 mL  5-40 mL IntraVENous Q8H 10 mL at 03/16/23 2129    sodium chloride (NS) flush 5-40 mL  5-40 mL IntraVENous PRN 10 mL at 03/17/23 0424    0.9% sodium chloride infusion 25 mL  25 mL IntraVENous PRN      acetaminophen (TYLENOL) tablet 650 mg  650 mg Oral Q6H  mg at 03/15/23 2134    Or    acetaminophen (TYLENOL) suppository 650 mg  650 mg Rectal Q6H PRN      senna-docusate (PERICOLACE) 8.6-50 mg per tablet 1 Tablet  1 Tablet Oral BID 1 Tablet at 03/17/23 0852    bisacodyL (DULCOLAX) suppository 10 mg  10 mg Rectal DAILY PRN      promethazine (PHENERGAN) tablet 12.5 mg  12.5 mg Oral Q6H PRN      Or    ondansetron (ZOFRAN) injection 4 mg  4 mg IntraVENous Q6H PRN      HYDROmorphone (DILAUDID) syringe 0.5 mg  0.5 mg IntraVENous Q4H PRN      oxyCODONE IR (ROXICODONE) tablet 5 mg  5 mg Oral Q4H PRN 5 mg at 03/16/23 2128           Case discussed with: Hospitalist, family at [address], DO

## 2023-03-17 NOTE — PROGRESS NOTES
Bedside and Verbal shift change report given to Kezia Green RN (oncoming nurse) by Madai Whitaker RN (offgoing nurse). Report included the following information SBAR, Kardex, Accordion, and Recent Results.

## 2023-03-17 NOTE — PROGRESS NOTES
Pt is discharged to home with [facility] and son as transport. Discharge instructions were provided along with the opportunity for questions and concerns. The pt verbalized understanding and is awaiting for son to arrive. Volunteer request is being ordered.

## 2023-03-17 NOTE — DISCHARGE INSTRUCTIONS
HOSPITALIST DISCHARGE INSTRUCTIONS  NAME: Maame Heard   :  1928   MRN:  [id number]     Date/Time:  3/17/2023 1:04 PM    ADMIT DATE: 3/10/2023     DISCHARGE DATE: 3/17/2023     DISCHARGE DIAGNOSIS:  You are being discharged home today after an admission for urinary tract infection as well as lower extremity cellulitis. You will require 3 more days of oral antibiotics, cephalexin upon discharge. You will further require outpatient follow-up with wound care therapy but your cellulitis has resolved at this time. MEDICATIONS:    It is important that you take the medication exactly as they are prescribed. Keep your medication in the bottles provided by the pharmacist and keep a list of the medication names, dosages, and times to be taken in your wallet. Do not take other medications without consulting your doctor. {Medication reconciliation information is now added to the patient's AVS automatically when it is printed. There is no need to use this SmartLink in discharge instructions. Highlight this text and delete it to clear this message}      If you start feeling unwell or experience any of the following symptoms (including but not limited to), please call your primary care physician or return to the emergency room if you cannot get hold of your doctor:  Fever, chills, nausea, vomiting, diarrhea, change in mentation, falling, bleeding, shortness of breath. Follow Up:   [unfilled]  you are to call and set up an appointment to see them in 1 week. [unfilled]     Information obtained by :  I understand that if any problems occur once I am at home I am to contact my physician. I understand and acknowledge receipt of the instructions indicated above.                                                                                                                                            Physician's or R.N.'s Signature Date/Time                                                                                                                                              Patient or Representative Signature                                                          Date/Time

## 2023-03-19 LAB
BACTERIA SPEC CULT: NORMAL
SERVICE CMNT-IMP: NORMAL

## 2023-03-20 ENCOUNTER — TELEPHONE (OUTPATIENT)
Dept: FAMILY MEDICINE CLINIC | Age: 88
End: 2023-03-20

## 2023-03-20 NOTE — TELEPHONE ENCOUNTER
[address]  Phone:  [phone]  Fax:  [phone]    Patient:  Sandro Ty  YOB: 1928    Hospital Discharge Transition of Care Note    Date/Time:  3/20/2023 11:16 AM    Patient was discharged from [facility] on 3/17/23. Patient was hospitalized 3/10/23 and was treated for UTI, frequent falls and L leg cellulitis . RRAT score: 35%  Moderate    Medical History:     Past Medical History:   Diagnosis Date    Acute cystitis 2018    Anemia, unspecified     Anxiety     Arrhythmia     a fib    Arthritis     osteoarthritis, rheumatoid    Atrial fibrillation (HCC)     Dr. Michelle Calderón and Dr. Zeus Morgan  following    Autoimmune disease [facility])     sjogren disease Dr. Eliezer Schaefer    CAD (coronary artery disease)     History of vascular access device 10/29/2021    4 FR Single PICC Lt Ant bx 41 cm 1 cm out    HTN (hypertension)     Hyperlipidemia LDL goal < 100     Hypothyroid     Insomnia     Osteoarthritis     Sjogren's disease ([address]     Thoracic aneurysm without mention of rupture        This nurse contacted the patient by telephone to perform post hospital discharge assessment. Verified  and address with patient as identifiers. Diet:   Patient reports: Regular Diet    Activity:    Patient reports: mostly moving around the house (using walker) patient is back to her apartment, lives alone. Medication:   Performed medication reconciliation with patient, and patient verbalizes understanding of administration of home medications. Patient verbalized she took the last dose of antibiotic prescribed at discharge (Keflex) today. There were no barriers to obtaining medications identified at this time. Support system:  patient and son    Discharge Instructions :  Reviewed discharge instructions with patient. Patient verbalizes understanding of discharge instructions and follow-up care.      Red Flags:  Patient reports poor appetite, she reports her son brings her food, but she doesn't have much appetite. Patient unsure of when she had her last BM. I asked if she had one after she came back home from the hospital and she was unable to recall. Patient verbalized she will have prune juice today. I asked if she has stool softener at home, and she responded she does not. I encouraged her to call us back [facility] if no BM by tomorrow, 3/21/23 and we would provide further recommendations. GOALS:      Knowledge and adherence of prescribed medication (ie. action, side effects, missed dose, etc.). Patient reports taking all medications as directed      Develop action plan for Self-Management Chronic Disease. Patient has follow up appointment scheduled with Michele Riggins on Wednesday 3/22. PCP/Specialist follow up:   Patient scheduled to follow up with Huy Bhatti NP on Thursday, 3/23/23. Plan for What do Do if a Problem Arises:  Nurse reviewed [facility] 24/7 on call line. Patient is able to verbalize clinical signs and reasons to use EMS services and/or [facility]. Communication with [facility] by Patient:      Pt has [facility] contact number ([phone]) for further questions or concerns.        Jillian Cotter RN

## 2023-03-22 ENCOUNTER — TELEPHONE (OUTPATIENT)
Dept: FAMILY MEDICINE CLINIC | Age: 88
End: 2023-03-22

## 2023-03-22 DIAGNOSIS — I25.10 CORONARY ARTERY DISEASE INVOLVING NATIVE CORONARY ARTERY OF NATIVE HEART WITHOUT ANGINA PECTORIS: Primary | ICD-10-CM

## 2023-03-22 DIAGNOSIS — I48.19 PERSISTENT ATRIAL FIBRILLATION (HCC): ICD-10-CM

## 2023-03-22 DIAGNOSIS — R29.6 FREQUENT FALLS: ICD-10-CM

## 2023-03-22 DIAGNOSIS — R62.7 FTT (FAILURE TO THRIVE) IN ADULT: ICD-10-CM

## 2023-03-22 DIAGNOSIS — Z45.02 ICD (IMPLANTABLE CARDIOVERTER-DEFIBRILLATOR) DISCHARGE: ICD-10-CM

## 2023-03-22 NOTE — TELEPHONE ENCOUNTER
Spoke with provider Mingo Issa NP, order received for hospice referral to [facility]. Telephone call to Mildred Moreland with [facility] to advise order will be faxed. Confirmed fax is [phone].

## 2023-03-22 NOTE — TELEPHONE ENCOUNTER
Telephone call from Maximilian Yang with  [facility] at [phone] requesting hospice order for patient. They were contacted by son Claudette Grice with request to eval for hospice services. Advised that this nurse will check with provider Lyndsey Harrison NP and return this call.

## 2023-03-22 NOTE — TELEPHONE ENCOUNTER
Telephone call to son Michael Kim to advise that we have sent hospice order and clinical information to  [facility] per his request.  Inquired if he still wants provider visit tomorrow as scheduled. He does want Radha Lee NP to visit pt tomorrow and he does not want hospice to visit until Friday when he can be present for the visit. Advised that he can make the arrangements for hospice visit directly with the hospice and he acknowledged.

## 2023-03-23 ENCOUNTER — TELEPHONE (OUTPATIENT)
Dept: FAMILY MEDICINE CLINIC | Age: 88
End: 2023-03-23

## 2023-03-23 ENCOUNTER — HOME VISIT (OUTPATIENT)
Dept: FAMILY MEDICINE CLINIC | Age: 88
End: 2023-03-23

## 2023-03-23 VITALS
RESPIRATION RATE: 20 BRPM | HEART RATE: 69 BPM | OXYGEN SATURATION: 100 % | TEMPERATURE: 98 F | DIASTOLIC BLOOD PRESSURE: 64 MMHG | SYSTOLIC BLOOD PRESSURE: 112 MMHG

## 2023-03-23 DIAGNOSIS — R41.3 MILD MEMORY DISTURBANCE: ICD-10-CM

## 2023-03-23 DIAGNOSIS — R29.6 FREQUENT FALLS: ICD-10-CM

## 2023-03-23 DIAGNOSIS — Z09 HOSPITAL DISCHARGE FOLLOW-UP: Primary | ICD-10-CM

## 2023-03-23 NOTE — PROGRESS NOTES
Transitional Care Management Progress Note    Patient: Ramon Welsh  : 1928  PCP: Maile Simon NP    Date of office visit: 3/23/2023   Date of admission: 3/10/23  Date of discharge: 3/17/23  Hospital: [facility]    Call initiated w/i 2 business dates of discharge: *No response documented in the last 14 days   Date of the most recent call to the patient: *No documented post hospital discharge outreach found in the last 14 days      Assessment/Plan:   Diagnoses and all orders for this visit:    1. Hospital discharge follow-up    2. Mild memory disturbance    3. Frequent falls      Hospital discharge follow-up -  Patient was treated for UTI and left leg ulcer with cellulitis, which have now resolved. Completed outpatient PO antibiotic regimen earlier this week and currently receiving  SN/PT/OT services. Has hospice evaluation scheduled for 3/24/23; discussed basic philosophy of hospice care with patient today as well as with her son Sander Come after the visit by phone. Discussed that I would not expect daily visits from hospice at this point given the patient's status, but that I do think she would be hospice-appropriate. However, they may still need to secure additional help in the home, especially with concerns related to her memory evidenced today re: forearm injury fall timeline. At this point, will defer follow up labs (Hgb 9.3, K 3.2 on discharge) or med adjustments (ie. D/c of eliquis) as she has plans to transition to hospice tomorrow. She is now wearing her life alert necklace at all times. Discussed strategies to prevent further falls and associated injury as well. No follow up planned as patient plans to transition to hospice services tomorrow. To call if she does not choose to pursue that path. Subjective:   Ramon Welsh is a 80 y.o. female presenting today for follow-up after hospital discharge. This encounter and supporting documentation was reviewed if available. Medication reconciliation was performed today. The main problem requiring admission was fall with UTI and LLE ulcer. Complications during admission: none      Interval history/Current status: Patient was admitted to [facility] after a mechanical fall at home, where she lay on the floor for 20 hours before being discovered. She was brought to the ED once her son found her down, where she was diagnosed with a UTI and was treated for left leg ulcer with cellultiis and UTI with IV antibiotics and transitioned to keflex upon discharge, which she completed earlier this week. SNF placement was recommended, but patient declined as she wants to stay in her own home. She is receiving home health services at present (SN, PT, OT), and the home health agency called to report a fall earlier this week, which patient does not recall. However, she does have a bandage on her right forearm but reports that is from a fall prior to hospitalization. Our office did receive a call from [facility] requesting a referral, as son Stan Hernandez had called them for a hospice evaluation; patient denies any knowledge of this, but reports that \"I'm just ready for the Lord to take me home. \" Stan Hernandez reports that he knows his mother needs more help at home, but they cannot presently afford that. Hospice meeting scheduled for 3/24/23, and Stan Hernandez plans to discuss with his mother tonight. Admitting symptoms have: improved      Medications marked \"taking\" at this time:  Prior to Admission medications    Medication Sig Start Date End Date Taking? Authorizing Provider   cephALEXin (KEFLEX) 500 mg capsule Take 1 Capsule by mouth three (3) times daily. Indications: UTI 3/17/23  Yes Ilsa Lowe MD   guaiFENesin (ROBITUSSIN) 100 mg/5 mL liquid Take 10 mL by mouth four (4) times daily as needed for Cough. Indications: cold symptoms 1/13/23  Yes Scott Stubbs NP   cetirizine (ZYRTEC) 10 mg tablet Take 1 Tablet by mouth daily.  11/17/22  Yes Enoc Myriam MAIER NP   cholecalciferol, vitamin D3, 50 mcg (2,000 unit) tab Take 1 Tablet by mouth daily. 11/17/22  Yes Yovanny Stubbs NP   fluticasone propionate (FLONASE) 50 mcg/actuation nasal spray 2 Sprays by Both Nostrils route daily as needed for Allergies. 11/17/22  Yes Yovanny Stubbs NP   levothyroxine (SYNTHROID) 112 mcg tablet Take 1 Tablet by mouth Daily (before breakfast). 11/17/22  Yes Yovanny Stubbs NP   PARoxetine (PAXIL) 10 mg tablet Take 1.5 tablets by mouth once daily. 11/17/22  Yes Yovanny Stubbs NP   amiodarone (CORDARONE) 200 mg tablet Take 200 mg by mouth daily. Yes Provider, Historical   predniSONE (DELTASONE) 5 mg tablet Take 5 mg by mouth. Take 3 tabs PO Daily for 2 weeks  Take 2 tabs PO Daily for 2 weeks  Take 1 tab PO daily until directed (Per Dermatologist)   Yes Provider, Historical   bumetanide (BUMEX) 1 mg tablet Take 1 mg by mouth daily. Yes Provider, Historical   carvediloL (COREG) 3.125 mg tablet Take 3.125 mg by mouth two (2) times a day. Yes Provider, Historical   PNV 02-IDKK fum,b-g-folic acid (NataChew, Fe Bis-glycinate,) 28 mg iron -1 mg chew Take 1 Tablet by mouth daily. Yes Provider, Historical   eszopiclone (LUNESTA) 3 mg tablet Take 3 mg by mouth nightly. Indications: difficulty sleeping   Yes Provider, Historical   acetaminophen (TYLENOL) 325 mg tablet Take 2 Tablets by mouth every six (6) hours as needed for Pain. 10/29/21  Yes Moy Johnson MD   apixaban (ELIQUIS) 2.5 mg tablet Take 2.5 mg by mouth two (2) times a day.  12/4/20  Yes Provider, Historical        ROS:  Constitutional: denies activity change, appetite change, fatigue, fever  HEENT: denies congestion, sinus pressure, sore throat  CV: denies chest pain, palpitations, edema  Respiratory: denies shortness of breath, wheezing  GI: denies abdominal pain, blood in stool, diarrhea, constipation  MSK: denies arthralgias, joint swelling  Neuro: denies frequent headaches, dizziness, numbness/tingling  Skin: denies skin breakdown  Psych: denies depression, anxiety, confusion, memory changes         Patient Active Problem List    Diagnosis Date Noted    Frequent falls 03/10/2023    UTI (urinary tract infection) 03/10/2023    Mild memory disturbance 01/26/2023    Allergic rhinitis 11/16/2022    Automatic implantable cardiac defibrillator in situ 11/16/2022    ICD (implantable cardioverter-defibrillator) discharge 04/04/2022    Underweight 04/04/2022    FTT (failure to thrive) in adult 04/04/2022    Debility 10/19/2021    Weakness 10/21/2020    Ventricular tachycardia 02/16/2019    Elevated serum creatinine 02/16/2019    Anxiety     HTN (hypertension)     Osteoarthritis     Hypothyroidism     CAD (coronary artery disease)     Persistent atrial fibrillation (HCC)      Current Outpatient Medications   Medication Sig Dispense Refill    cephALEXin (KEFLEX) 500 mg capsule Take 1 Capsule by mouth three (3) times daily. Indications: UTI 9 Capsule 0    guaiFENesin (ROBITUSSIN) 100 mg/5 mL liquid Take 10 mL by mouth four (4) times daily as needed for Cough. Indications: cold symptoms 236 mL 1    cetirizine (ZYRTEC) 10 mg tablet Take 1 Tablet by mouth daily. 90 Tablet 3    cholecalciferol, vitamin D3, 50 mcg (2,000 unit) tab Take 1 Tablet by mouth daily. 90 Tablet 3    fluticasone propionate (FLONASE) 50 mcg/actuation nasal spray 2 Sprays by Both Nostrils route daily as needed for Allergies. 3 Each 3    levothyroxine (SYNTHROID) 112 mcg tablet Take 1 Tablet by mouth Daily (before breakfast). 90 Tablet 3    PARoxetine (PAXIL) 10 mg tablet Take 1.5 tablets by mouth once daily. 135 Tablet 3    amiodarone (CORDARONE) 200 mg tablet Take 200 mg by mouth daily. predniSONE (DELTASONE) 5 mg tablet Take 5 mg by mouth. Take 3 tabs PO Daily for 2 weeks  Take 2 tabs PO Daily for 2 weeks  Take 1 tab PO daily until directed (Per Dermatologist)      bumetanide (BUMEX) 1 mg tablet Take 1 mg by mouth daily. carvediloL (COREG) 3.125 mg tablet Take 3.125 mg by mouth two (2) times a day. PNV 47-[id number] fum,b-g-folic acid (NataChew, Fe Bis-glycinate,) 28 mg iron -1 mg chew Take 1 Tablet by mouth daily. eszopiclone (LUNESTA) 3 mg tablet Take 3 mg by mouth nightly. Indications: difficulty sleeping      acetaminophen (TYLENOL) 325 mg tablet Take 2 Tablets by mouth every six (6) hours as needed for Pain. 30 Tablet 0    apixaban (ELIQUIS) 2.5 mg tablet Take 2.5 mg by mouth two (2) times a day.        Allergies   Allergen Reactions    Latex Hives     NOT ALLERGIC PER PT 02/01/2018    Pcn [Penicillins] Anaphylaxis    Sulfa (Sulfonamide Antibiotics) Anaphylaxis    Biaxin [Clarithromycin] Nausea Only    Biotin Unknown (comments)    Covid-19 Vaccine, Mrna, -[id number], Lnp-S (Moderna) Rash    Pcn [Penicillins] Hives    Sulfa (Sulfonamide Antibiotics) Nausea Only     Past Medical History:   Diagnosis Date    Acute cystitis 05/23/2018    Anemia, unspecified     Anxiety     Arrhythmia     a fib    Arthritis     osteoarthritis, rheumatoid    Atrial fibrillation (HCC)     Dr. Richard Navarro and Dr. Nelia Goldman  following    Autoimmune disease [facility])     sjogren disease Dr. Denita Harley    CAD (coronary artery disease)     History of vascular access device 10/29/2021    4 FR Single PICC Lt Ant bx 41 cm 1 cm out    HTN (hypertension)     Hyperlipidemia LDL goal < 100     Hypothyroid     Insomnia     Osteoarthritis     Sjogren's disease ([address]     Thoracic aneurysm without mention of rupture      Past Surgical History:   Procedure Laterality Date    HX BIV-IMPLANTABLE CARDIOVERTER DEFIBRILLATOR      HX CHOLECYSTECTOMY      HX COLONOSCOPY  03/2014    Dr. Cindy Hazel    HX HIP REPLACEMENT Left     HX OTHER SURGICAL      hernia repairs    HX PARTIAL THYROIDECTOMY      HX JANNET AND BSO      HX VEIN STRIPPING      TN INSJ ELTRD CAR COLLIN SYS TM INSJ DFB/PM PLS GEN Left 02/22/2019    Lv Lead Placement performed by Veronica Beaver MD at [facility] CATH LAB    TN INSJ/RPLCMT PERM DFB W/TRNSVNS LDS 1/DUAL CHMBR Left 02/22/2019    upgrade PM to ICD-Bsx performed by Isaak Gallagher MD at [facility] LAB          Objective:   Visit Vitals  /64 (BP 1 Location: Right upper arm, BP Patient Position: Sitting, BP Cuff Size: Adult)   Pulse 69   Temp 98 °F (36.7 °C) (Temporal)   Resp 20   SpO2 100%        Physical Examination  Constitutional: no acute distress, normal weight, elderly  female, frail-appearing, ambulating with rollator, pale  HEENT: normocephalic, atraumatic, external ears normal bilaterally; moist mucous membranes; EOM intact,  CV: regular rate and rhythm  Pulm: normal effort, normal breath sounds without wheezing or rhonchi  Abd: normal bowel sounds, soft, non-tender  : deferred  Skin: warm, dry; healing wounds under dressings left anterior shin + right posterior forearm  Neuro: A & O x 3; mental status at baseline  Psych: mood and behavior normal       We discussed the expected course, resolution and complications of the diagnosis(es) in detail. Medication risks, benefits, costs, interactions, and alternatives were discussed as indicated. I advised her to contact the office if her condition worsens, changes or fails to improve as anticipated. She expressed understanding with the diagnosis(es) and plan.      José Miguel Tijerina NP

## 2023-03-23 NOTE — TELEPHONE ENCOUNTER
Call received from [facility], OT from Karoline to question if patient was going to be admitted to hospice tomorrow. Patient had given her this information. I explained that she will have an evaluation session tomorrow 3/24, but there are some unanswered questions, as Ms. Rashmi Garcia was unaware of hospice admission during provider's visit today, and also patient's son expectation for daily hospice visits being unrealistic. Gisselle verbalized understanding and responded that she will follow up either tomorrow or early next week for updates. Gisselle informed that when she arrived at patient's home, patient was unable to get up from her chair and in fact she had not taken her fluid pill because she couldn't get up. We discussed patient should not be left alone, OT agreed.

## 2023-03-24 ENCOUNTER — TELEPHONE (OUTPATIENT)
Dept: FAMILY MEDICINE CLINIC | Age: 88
End: 2023-03-24

## 2023-03-24 NOTE — TELEPHONE ENCOUNTER
SRIKANTHW called pt's son to schedule in-home visit, as mom has had falls and hospitalization recently, and there may be an opportunity to discuss more appropriate and necessary caregiver coverage. Son did not answer the phone, LCSW left brief message, provided contact information, and encouraged a call back at his convenience. SRIKANTHW called pt directly. She answered and stated that she is going to be admitted to hospice later today. She said she has not met with anyone from hospice yet, but that was the plan. SRIKANTHW offered an in-home visit, but if hospice team enrolls pt in to their services then [facility] program would step back as her medical team since hospice would be her medical team, and they have a  who can meet with her. She acknowledged, and agreed for LCSW to follow up with her on Monday if she has not been admitted to hospice.      CODY Marquez, [facility]    [place] [facility] [address] [place] [address]  (u) [phone]

## 2023-03-24 NOTE — PROGRESS NOTES
Physician Progress Note      PATIENT:               Ton Daniel  CSN #:                  [id number]  :                       [id number]  ADMIT DATE:       3/10/2023 8:22 PM  [address] [place] DATE:        3/17/2023 4:30 PM  RESPONDING  PROVIDER #:        Lanita Cogan MD          QUERY Maxwelldarek Zac Afternoon    This patient admitted on 03/10/2023-2023-    The medical record notes a diagnosis of Rhabdomyolysis. If possible, can you please further specify the type of Rhabdomyolysis and please document in progress notes and discharge summary if you are evaluating and/or treating any of the following: The medical record reflects the following:  Risk Factors: Age 80 yr old, documented frequent falls, debility. Clinical Indicators: 80 yr old female with frequent falls, debilitated, CK assessed once during this admission @ 340 H&P notes \"mild rhabdomyolysis. Pt with UTI , urine cx with probable staph aures  Treatment: IVF, x1 IVF NS bolus 1000cc, then IVF of NC @ 50cc/ PT, OT, Refused SNF    Thank you  Saul Venegas, CPHJ CARLOS, CCDS, SMART      Per [email or web address]  Traumatic rhabdomyolysis cause examples: crush syndrome, prolonged immobilization  Nontraumatic rhabdomyolysis cause examples:  marked exertion, hyperthermia, metabolic myopathy, drugs or toxins, infections, electrolyte disorders. Options provided:  -- Traumatic rhabdomyolysis  -- Nontraumatic rhabdomyolysis  -- Other - I will add my own diagnosis  -- Disagree - Not applicable / Not valid  -- Disagree - Clinically unable to determine / Unknown  -- Refer to Clinical Documentation Reviewer    PROVIDER RESPONSE TEXT:    This patient has traumatic rhabdomyolysis. Query created by: Berkley Redding on 3/21/2023 12:15 PM      QUERY TEXT:    Good Morning    This patient admitted on 03/10/2023-2023 for Frequent Falls. The medical record notes UTI.   Discharge summary notes \"MSSA typically will come from other sources other than urine)  No  complaints this admission    In order to support the diagnosis of UTI, please include additional clinical indicators in your documentation. Or please document if the diagnosis of  Rajiv Dumont been ruled out after further study. The medical record reflects the following:  Risk Factors: (80) yr ol female, Debilitated with frequent falls,  + LLL ulcer with cellulitis. Clinical Indicators: Presented with weakness and frequent falls, UA +too many and Large Leuk Est. Urine cx. + MSSA. ID consulted. MSSA is a very unusual cause of UTI in the absence of recent cath or urological procedure. Spillover from blood to urine seems less likely as blood cx negative. Treatment: UA, Urine cx, ID consulted. IV Cefazlin then narrowed to cephalexin for total of 10 day course of therapy,    Thank you  Reyes Maris, [place], CPQ, CCDS, SMART  Options provided:  -- UTI present as evidenced by, Please document evidence. -- UTI was ruled out.  The patient had asymptomatic bacteriuria only  -- Other - I will add my own diagnosis  -- Disagree - Not applicable / Not valid  -- Disagree - Clinically unable to determine / Unknown  -- Refer to Clinical Documentation Reviewer    PROVIDER RESPONSE TEXT:    *** is present as evidenced by Positive urine cultures    Query created by: Richi Jamil on 3/24/2023 8:29 AM      Electronically signed by:  Marjan Akers MD 3/24/2023 9:10 AM

## 2023-03-27 ENCOUNTER — TELEPHONE (OUTPATIENT)
Dept: FAMILY MEDICINE CLINIC | Age: 88
End: 2023-03-27

## 2023-03-27 NOTE — TELEPHONE ENCOUNTER
LCSW called and spoke with pt's son, Xander Hobbs. He stated that they had an information session with hospice this past Friday 3/24/23, but have not officially enrolled mom in hospice services. He states that he has a follow-up session with hospice today, and the plan is to admit mom to hospice. She just needed the weekend to think about this option, but has come to the decision that they offer important and helpful services that she could benefit from at this time. LCSW will not plan on visiting with pt, as she will be admitted to hospice this afternoon.      CODY Valiente, [facility]    [address]  [address]) [phone]

## 2023-03-28 ENCOUNTER — TELEPHONE (OUTPATIENT)
Dept: FAMILY MEDICINE CLINIC | Age: 88
End: 2023-03-28

## 2023-03-28 NOTE — TELEPHONE ENCOUNTER
Telephone call to AT [facility], spoke with Tabitha Berman to confirm that pt was admitted to services of their hospice on 3/27/23.

## 2023-04-09 PROBLEM — N39.0 UTI (URINARY TRACT INFECTION): Status: RESOLVED | Noted: 2023-03-10 | Resolved: 2023-04-09

## 2023-04-30 RX ORDER — CEPHALEXIN 500 MG/1
500 CAPSULE ORAL 3 TIMES DAILY
COMMUNITY
Start: 2023-03-17

## 2023-05-10 ENCOUNTER — TELEPHONE (OUTPATIENT)
Facility: CLINIC | Age: 88
End: 2023-05-10

## 2023-05-10 NOTE — TELEPHONE ENCOUNTER
Call returned to [address] [address] - I informed intake that Ms. Lucila Jacques was admitted to another hospice. No need to follow up with patient.

## 2023-05-10 NOTE — TELEPHONE ENCOUNTER
Paulina asked for callback RE whether or not pt still needs referral for hospice, stated they had issues with [facility] and wanted to double check.

## 2023-10-14 NOTE — PROGRESS NOTES
Pharmacist Discharge Medication Reconciliation Discharge Provider:  Andrey Villegas MD 
   
  
Discharge Medications: My Medications START taking these medications Instructions Each Dose to Equal Morning Noon Evening Bedtime  
clindamycin 150 mg capsule Commonly known as:  CLEOCIN Your last dose was: Your next dose is: Take 1 Cap by mouth three (3) times daily. 150 mg 
  
  
  
  
  
magnesium oxide 400 mg tablet Commonly known as:  MAG-OX Start taking on:  2/26/2019 Your last dose was: Your next dose is: Take 1 Tab by mouth daily. 400 mg CHANGE how you take these medications Instructions Each Dose to Equal Morning Noon Evening Bedtime  
amiodarone 200 mg tablet Commonly known as:  CORDARONE Start taking on:  2/25/2019 What changed:  See the new instructions. Your last dose was: Your next dose is: Take 1 Tab by mouth two (2) times a day for 10 days, THEN 1 Tab daily for 30 days. atenolol 25 mg tablet Commonly known as:  TENORMIN What changed:  how much to take Your last dose was: Your next dose is: Take 1 Tab by mouth daily. 25 mg CONTINUE taking these medications Instructions Each Dose to Equal Morning Noon Evening Bedtime  
albuterol 90 mcg/actuation inhaler Commonly known as:  PROVENTIL HFA, VENTOLIN HFA, PROAIR HFA Your last dose was: Your next dose is: Take 2 Puffs by inhalation every four (4) hours as needed for Wheezing or Shortness of Breath. Indications: Bronchospastic Pulmonary Disease 2 Puff 
  
  
  
  
  
apixaban 5 mg tablet Commonly known as:  Marily Edward Your last dose was: Your next dose is: Take 5 mg by mouth two (2) times a day. 5 mg 
  
  
  
  
  
estradiol 0.5 mg tablet Commonly known as:  ESTRACE Your last dose was: Your next dose is: Take 1 Tab by mouth daily. Please wean off this medication due to cv risks associated. 0.5 mg 
  
  
  
  
  
fluticasone 50 mcg/actuation nasal spray Commonly known as:  Amna Hoff Your last dose was: Your next dose is: 2 Sprays by Both Nostrils route daily as needed. 2 Spray 
  
  
  
  
  
fosinopril 10 mg tablet Commonly known as:  MONOPRIL Your last dose was: Your next dose is: Take 20 mg by mouth daily. 20 mg 
  
  
  
  
  
levothyroxine 100 mcg tablet Commonly known as:  SYNTHROID Your last dose was: Your next dose is: Take 100 mcg by mouth Daily (before breakfast). 100 mcg LUNESTA 2 mg tablet Generic drug:  eszopiclone Your last dose was: Your next dose is: Take 2 mg by mouth nightly. 2 mg MUCINEX 600 mg ER tablet Generic drug:  guaiFENesin ER Your last dose was: Your next dose is: Take 600 mg by mouth two (2) times daily as needed for Congestion. 600 mg MULTIPLE VITAMIN PO Your last dose was: Your next dose is: Take 1 Tab by mouth daily. 1 Tab SINGULAIR 10 mg tablet Generic drug:  montelukast 
 
Your last dose was: Your next dose is: Take 10 mg by mouth daily as needed. 10 mg 
  
  
  
  
  
SUPER B COMPLEX + C 150 mg Tab Generic drug:  vitamin b comp & c no.4 Your last dose was: Your next dose is: Take 1 Tab by mouth daily. 1 Tab 
  
  
  
  
  
TUMS PO Your last dose was: Your next dose is: Take 1 Tab by mouth every six (6) hours as needed. 1 Tab VITAMIN D3 2,000 unit Tab Generic drug:  cholecalciferol (vitamin D3) Your last dose was: Your next dose is: Take 2,000 Units by mouth daily. 2000 Units STOP taking these medications PAXIL 10 mg tablet Generic drug:  PARoxetine Where to Get Your Medications These medications were sent to [facility], [address]  [address] [facility], [facility] [place] [address] Phone:  [phone]  
· amiodarone 200 mg tablet · atenolol 25 mg tablet · clindamycin 150 mg capsule · magnesium oxide 400 mg tablet The patient's chart, MAR, and AVS were reviewed by Lance Priest, PHARMD, Contact: [phone] no

## 2023-11-21 RX ORDER — LEVOTHYROXINE SODIUM 112 UG/1
112 TABLET ORAL
Qty: 90 TABLET | OUTPATIENT
Start: 2023-11-21

## 2023-12-18 RX ORDER — LEVOTHYROXINE SODIUM 112 UG/1
112 TABLET ORAL
Qty: 90 TABLET | OUTPATIENT
Start: 2023-12-18

## 2024-02-22 PROBLEM — Z51.5 HOSPICE CARE PATIENT: Status: ACTIVE | Noted: 2024-02-22

## 2024-10-03 NOTE — Clinical Note
TRANSFER - IN REPORT:  
 
Verbal report received from: bedside RN. Report consisted of patient's Situation, Background, Assessment and  
Recommendations(SBAR). Opportunity for questions and clarification was provided. Assessment completed upon patient's arrival to unit and care assumed. Patient transported with a Registered Nurse. [Follow-Up Visit] : a follow-up visit for [Knee Pain] : knee pain [FreeTextEntry2] : RIGHT KNEE DUROLANE GEL INJECTION

## (undated) DEVICE — PACK PROCEDURE SURG HRT CATH

## (undated) DEVICE — PACEMAKER PACK: Brand: MEDLINE INDUSTRIES, INC.

## (undated) DEVICE — CATH GUID COR EB35 6FR 100CM -- LAUNCHER

## (undated) DEVICE — ANGIOGRAPHIC CATHETER: Brand: EXPO™

## (undated) DEVICE — CATH DIAG-D 6F MULTI PIG 155 5 -- IMPULSE 16599-302

## (undated) DEVICE — SUTURE MCRYL 3-0 L27IN ABSRB VLT SH L26MM 1/2 CIR Y316H

## (undated) DEVICE — DRESSING HEMOSTATIC SFT INTVENT W/O SLT DBL WRP QUIKCLOT LF

## (undated) DEVICE — BANDAGE ADH W3INXL5YD BGE E STRTCH FLUFFY NONFRAY EDG H2O

## (undated) DEVICE — MEDI-TRACE CADENCE ADULT, DEFIBRILLATION ELECTRODE -RTS  (10 PR/PK) - PHYSIO-CONTROL: Brand: MEDI-TRACE CADENCE

## (undated) DEVICE — INTRO SHTH 9FR 25X80CM -- TEARAWAY

## (undated) DEVICE — REM POLYHESIVE ADULT PATIENT RETURN ELECTRODE: Brand: VALLEYLAB

## (undated) DEVICE — DRSG AQUACEL SURG 3.5X6IN -- CONVERT TO ITEM 369227

## (undated) DEVICE — PINNACLE INTRODUCER SHEATH: Brand: PINNACLE

## (undated) DEVICE — SUTURE VCRL SZ 2-0 L36IN ABSRB UD L40MM CT 1/2 CIR J957H

## (undated) DEVICE — INTRO SHTH 9FR 13X20CM -- USE ITEM# 341577

## (undated) DEVICE — INTRODUCER SHTH 5 FRX30 CM 7 CM W/ NIT WIRE REG MICRO-STICK

## (undated) DEVICE — 3M™ IOBAN™ 2 ANTIMICROBIAL INCISE DRAPE 6640EZ: Brand: IOBAN™ 2

## (undated) DEVICE — SUTURE ETHBND EXCEL SZ 2 L30IN NONABSORBABLE GRN L40MM V-37 MX69G

## (undated) DEVICE — PLASMABLADE PS200-040 4.0: Brand: PLASMABLADE™

## (undated) DEVICE — ZIP 8I SURGICAL SKIN CLOSURE DEVICE: Brand: ZIP 8I SURGICAL SKIN CLOSURE DEVICE

## (undated) DEVICE — PROCEDURE KIT FLUID MGMT CUST MAINFOLD STRL